# Patient Record
Sex: MALE | Race: WHITE | NOT HISPANIC OR LATINO | Employment: OTHER | URBAN - METROPOLITAN AREA
[De-identification: names, ages, dates, MRNs, and addresses within clinical notes are randomized per-mention and may not be internally consistent; named-entity substitution may affect disease eponyms.]

---

## 2018-05-04 RX ORDER — METFORMIN HYDROCHLORIDE 500 MG/1
500 TABLET ORAL
COMMUNITY
Start: 2017-11-01 | End: 2018-05-24 | Stop reason: SDUPTHER

## 2018-05-04 RX ORDER — LOSARTAN POTASSIUM AND HYDROCHLOROTHIAZIDE 12.5; 1 MG/1; MG/1
TABLET ORAL DAILY
COMMUNITY
Start: 2017-11-01 | End: 2019-03-21 | Stop reason: HOSPADM

## 2018-05-04 RX ORDER — FELODIPINE 2.5 MG/1
10 TABLET, EXTENDED RELEASE ORAL DAILY
COMMUNITY
Start: 2018-01-12 | End: 2019-12-24 | Stop reason: ALTCHOICE

## 2018-05-04 RX ORDER — METOPROLOL TARTRATE 25 MG/1
25 TABLET, FILM COATED ORAL DAILY
COMMUNITY
Start: 2017-11-01 | End: 2019-02-25 | Stop reason: ALTCHOICE

## 2018-05-04 RX ORDER — FINASTERIDE 5 MG/1
5 TABLET, FILM COATED ORAL DAILY
COMMUNITY
Start: 2017-11-01 | End: 2019-05-20 | Stop reason: SDUPTHER

## 2018-05-09 ENCOUNTER — OFFICE VISIT (OUTPATIENT)
Dept: FAMILY MEDICINE | Facility: CLINIC | Age: 68
End: 2018-05-09
Attending: FAMILY MEDICINE
Payer: MEDICARE

## 2018-05-09 VITALS
DIASTOLIC BLOOD PRESSURE: 84 MMHG | TEMPERATURE: 97.2 F | HEIGHT: 71 IN | BODY MASS INDEX: 33.71 KG/M2 | SYSTOLIC BLOOD PRESSURE: 142 MMHG | HEART RATE: 67 BPM | WEIGHT: 240.8 LBS | RESPIRATION RATE: 18 BRPM | OXYGEN SATURATION: 97 %

## 2018-05-09 DIAGNOSIS — E11.9 TYPE 2 DIABETES MELLITUS WITHOUT COMPLICATION, WITHOUT LONG-TERM CURRENT USE OF INSULIN (CMS/HCC): ICD-10-CM

## 2018-05-09 DIAGNOSIS — I10 HYPERTENSION, BENIGN: Primary | ICD-10-CM

## 2018-05-09 PROCEDURE — 83036 HEMOGLOBIN GLYCOSYLATED A1C: CPT | Performed by: FAMILY MEDICINE

## 2018-05-09 PROCEDURE — 99213 OFFICE O/P EST LOW 20 MIN: CPT | Performed by: FAMILY MEDICINE

## 2018-05-09 ASSESSMENT — ENCOUNTER SYMPTOMS
SORE THROAT: 0
VOMITING: 0
APPETITE CHANGE: 0
CHEST TIGHTNESS: 0
DIABETIC ASSOCIATED SYMPTOMS: 0
NAUSEA: 0
WHEEZING: 0
SHORTNESS OF BREATH: 0
PALPITATIONS: 0
FATIGUE: 0

## 2018-05-09 NOTE — PROGRESS NOTES
"Subjective      Patient ID: Aneesh Brito is a 68 y.o. male.    Patient for follow-up of blood sugar and blood pressure.  He has no new medical complaints.  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  He is tolerating medication well.  Readings have been good.  His last A1c was 6.5.      Diabetes   He has type 2 diabetes mellitus. His disease course has been fluctuating. There are no hypoglycemic associated symptoms. There are no diabetic associated symptoms. Pertinent negatives for diabetes include no chest pain and no fatigue. There are no hypoglycemic complications. Symptoms are stable. There are no diabetic complications. Risk factors for coronary artery disease include male sex and hypertension. Current diabetic treatment includes diet and oral agent (monotherapy). He is compliant with treatment all of the time. His weight is stable. There is no change in his home blood glucose trend.       The following have been reviewed and updated as appropriate in this visit:  Allergies  Meds  Problems       Review of Systems   Constitutional: Negative for appetite change and fatigue.   HENT: Negative for ear pain and sore throat.    Respiratory: Negative for chest tightness, shortness of breath and wheezing.    Cardiovascular: Negative for chest pain, palpitations and leg swelling.   Gastrointestinal: Negative for nausea and vomiting.   Skin: Negative for rash.       Objective     Vitals:    05/09/18 1031 05/09/18 1110   BP: (!) 188/100 (!) 142/84   BP Location: Left upper arm    Patient Position: Sitting    Pulse: 67    Resp: 18    Temp: 36.2 °C (97.2 °F)    TempSrc: Tympanic    SpO2: 97%    Weight: 109 kg (240 lb 12.8 oz)    Height: 1.81 m (5' 11.25\")      Body mass index is 33.35 kg/m².    Physical Exam   Constitutional: He appears well-developed and well-nourished.   HENT:   Head: Normocephalic and atraumatic.   Right Ear: Tympanic membrane and ear canal normal.   Left " Ear: Tympanic membrane and ear canal normal.   Nose: Nose normal.   Mouth/Throat: Oropharynx is clear and moist.   Eyes: Conjunctivae are normal.   Neck: Normal range of motion. Neck supple. No thyromegaly present.   Cardiovascular: Normal rate, regular rhythm and normal heart sounds.    No murmur heard.  Pulmonary/Chest: Effort normal and breath sounds normal. He has no wheezes. He has no rales.   Musculoskeletal: He exhibits no edema.   Vitals reviewed.      Assessment/Plan   Problem List Items Addressed This Visit     Hypertension, benign - Primary    Relevant Medications    felodipine (PLENDIL) 2.5 mg 24 hr tablet    losartan-hydrochlorothiazide (HYZAAR) 100-12.5 mg per tablet    metoprolol tartrate (LOPRESSOR) 25 mg tablet    Type 2 diabetes mellitus (CMS/HCC) ([facility])    Relevant Medications    metFORMIN (GLUCOPHAGE) 500 mg tablet    Other Relevant Orders    Hemoglobin A1c        Blood pressure.  Adequate control.  Patient will continue current medication will reevaluate in 3 months.  Diabetes.  Reasonable control.  Diet discussed at length.  Patient to continue current medication will reevaluate with A1c today.

## 2018-05-10 LAB
EST. AVERAGE GLUCOSE BLD GHB EST-MCNC: 154 MG/DL
HBA1C MFR BLD HPLC: 7 %

## 2018-05-24 RX ORDER — METFORMIN HYDROCHLORIDE 500 MG/1
500 TABLET ORAL 2 TIMES DAILY WITH MEALS
Qty: 60 TABLET | Refills: 5 | Status: SHIPPED | OUTPATIENT
Start: 2018-05-24 | End: 2018-11-16 | Stop reason: SDUPTHER

## 2018-08-14 ENCOUNTER — OFFICE VISIT (OUTPATIENT)
Dept: FAMILY MEDICINE | Facility: CLINIC | Age: 68
End: 2018-08-14
Payer: MEDICARE

## 2018-08-14 VITALS
SYSTOLIC BLOOD PRESSURE: 164 MMHG | OXYGEN SATURATION: 95 % | WEIGHT: 235.6 LBS | HEIGHT: 71 IN | DIASTOLIC BLOOD PRESSURE: 86 MMHG | RESPIRATION RATE: 16 BRPM | TEMPERATURE: 98.3 F | HEART RATE: 70 BPM | BODY MASS INDEX: 32.98 KG/M2

## 2018-08-14 DIAGNOSIS — I10 HYPERTENSION, BENIGN: Primary | ICD-10-CM

## 2018-08-14 DIAGNOSIS — N52.9 ERECTILE DYSFUNCTION, UNSPECIFIED ERECTILE DYSFUNCTION TYPE: ICD-10-CM

## 2018-08-14 DIAGNOSIS — L29.9 CHRONIC PRURITUS: ICD-10-CM

## 2018-08-14 DIAGNOSIS — F43.21 REACTION, ADJUSTMENT, WITH DEPRESSED MOOD, BRIEF: ICD-10-CM

## 2018-08-14 DIAGNOSIS — E11.9 TYPE 2 DIABETES MELLITUS WITHOUT COMPLICATION, WITHOUT LONG-TERM CURRENT USE OF INSULIN (CMS/HCC): ICD-10-CM

## 2018-08-14 LAB
EST. AVERAGE GLUCOSE BLD GHB EST-MCNC: 148 MG/DL
HBA1C MFR BLD HPLC: 6.8 %

## 2018-08-14 PROCEDURE — 83036 HEMOGLOBIN GLYCOSYLATED A1C: CPT | Performed by: FAMILY MEDICINE

## 2018-08-14 PROCEDURE — 99214 OFFICE O/P EST MOD 30 MIN: CPT | Performed by: FAMILY MEDICINE

## 2018-08-14 RX ORDER — SILDENAFIL 50 MG/1
50 TABLET, FILM COATED ORAL DAILY PRN
Qty: 10 TABLET | Refills: 5 | Status: SHIPPED | OUTPATIENT
Start: 2018-08-14 | End: 2018-11-16 | Stop reason: SDUPTHER

## 2018-08-14 ASSESSMENT — ENCOUNTER SYMPTOMS
SORE THROAT: 0
SHORTNESS OF BREATH: 0
APPETITE CHANGE: 0
NAUSEA: 0
CHEST TIGHTNESS: 0
DYSPHORIC MOOD: 0
FATIGUE: 0
ARTHRALGIAS: 0
AGITATION: 0
VOMITING: 0
NERVOUS/ANXIOUS: 0
PALPITATIONS: 0
CONFUSION: 0
WHEEZING: 0

## 2018-08-14 NOTE — PROGRESS NOTES
"Subjective      Patient ID: Aneesh Brito is a 68 y.o. male.    Patient for follow-up of blood pressure and blood sugar.  Patient has been retired.  He has been active however his wife seems to have a concern that his mood seems to be off.  Patient notes energy level good concentration level is good.  But at times he does seem less interested.  He admits that retiring is not as easy as he thought it would be.  It always been very busy he is entire life.  Unrelated patient complains of recent diffuse pruritus without rash.  Different parts of his skin seem to get itchy for no apparent reason.  Lastly patient would like refill of Viagra.  He stopped this is not generic.  He and his wife are planning a trip to [place] for a few weeks.        The following have been reviewed and updated as appropriate in this visit:       Review of Systems   Constitutional: Negative for appetite change and fatigue.   HENT: Negative for ear pain and sore throat.    Respiratory: Negative for chest tightness, shortness of breath and wheezing.    Cardiovascular: Negative for chest pain, palpitations and leg swelling.   Gastrointestinal: Negative for nausea and vomiting.   Musculoskeletal: Negative for arthralgias.   Skin: Negative for rash.   Psychiatric/Behavioral: Negative for agitation, behavioral problems, confusion, dysphoric mood and self-injury. The patient is not nervous/anxious.        Objective     Vitals:    08/14/18 0944 08/14/18 1020   BP: (!) 174/100 (!) 164/86   BP Location: Left upper arm    Patient Position: Sitting    Pulse: 70    Resp: 16    Temp: 36.8 °C (98.3 °F)    TempSrc: Tympanic    SpO2: 95%    Weight: 107 kg (235 lb 9.6 oz)    Height: 1.81 m (5' 11.25\")      Body mass index is 32.63 kg/m².    Physical Exam   Constitutional: He appears well-developed and well-nourished.   HENT:   Head: Normocephalic and atraumatic.   Right Ear: Tympanic membrane and ear canal normal.   Left Ear: Tympanic membrane and ear canal " normal.   Nose: Nose normal.   Mouth/Throat: Oropharynx is clear and moist.   Eyes: Conjunctivae are normal.   Neck: Normal range of motion. Neck supple. No thyromegaly present.   Cardiovascular: Normal rate, regular rhythm and normal heart sounds.    No murmur heard.  Pulmonary/Chest: Effort normal and breath sounds normal. He has no wheezes. He has no rales.   Musculoskeletal: He exhibits no edema.   Skin: Skin is warm and dry. No rash noted. No erythema.   Vitals reviewed.      Assessment/Plan   Problem List Items Addressed This Visit     Hypertension, benign - Primary    Type 2 diabetes mellitus (CMS/HCC) ([facility])    Relevant Orders    Hemoglobin A1c      Other Visit Diagnoses     Chronic pruritus        Reaction, adjustment, with depressed mood, brief        Erectile dysfunction, unspecified erectile dysfunction type        Relevant Medications    sildenafil (VIAGRA) 50 mg tablet        Blood pressure.  Fair control continue current medication plan to reevaluate.  Diabetes.  Encourage weight loss.  Will check A1c today.  Patient continue medication.  Chronic pruritus without rash.  Questionable etiology.  Will give simple trial of antihistamine at bedtime.  Adjustment reaction.  Long discussion.  Will refer patient to psychology for evaluation and treatment.

## 2018-08-15 ENCOUNTER — TELEPHONE (OUTPATIENT)
Dept: FAMILY MEDICINE | Facility: CLINIC | Age: 68
End: 2018-08-15

## 2018-08-15 NOTE — TELEPHONE ENCOUNTER
LM - returning call to review lab ( hgbAic6.8- + improved 0  Reviewed HgbAic 6.8 w pt- + improvement- pt states + wt loss- will cont./ applaud efforts/ cont diet/ exercise/ metformin 500 BID- lisa 3 mths

## 2018-09-18 ENCOUNTER — OFFICE VISIT (OUTPATIENT)
Dept: FAMILY MEDICINE | Facility: CLINIC | Age: 68
End: 2018-09-18
Payer: MEDICARE

## 2018-09-18 VITALS
HEART RATE: 73 BPM | HEIGHT: 71 IN | WEIGHT: 235 LBS | SYSTOLIC BLOOD PRESSURE: 142 MMHG | RESPIRATION RATE: 14 BRPM | BODY MASS INDEX: 32.9 KG/M2 | DIASTOLIC BLOOD PRESSURE: 86 MMHG | OXYGEN SATURATION: 96 % | TEMPERATURE: 98.1 F

## 2018-09-18 DIAGNOSIS — Z23 NEED FOR PROPHYLACTIC VACCINATION AND INOCULATION AGAINST INFLUENZA: ICD-10-CM

## 2018-09-18 DIAGNOSIS — I10 HYPERTENSION, BENIGN: Primary | ICD-10-CM

## 2018-09-18 PROCEDURE — G0008 ADMIN INFLUENZA VIRUS VAC: HCPCS | Performed by: FAMILY MEDICINE

## 2018-09-18 PROCEDURE — 99213 OFFICE O/P EST LOW 20 MIN: CPT | Mod: 25 | Performed by: FAMILY MEDICINE

## 2018-09-18 PROCEDURE — 90653 IIV ADJUVANT VACCINE IM: CPT | Performed by: FAMILY MEDICINE

## 2018-09-18 ASSESSMENT — ENCOUNTER SYMPTOMS
SHORTNESS OF BREATH: 0
VOMITING: 0
PALPITATIONS: 0
APPETITE CHANGE: 0
NAUSEA: 0
WHEEZING: 0
CHEST TIGHTNESS: 0
FATIGUE: 0
SORE THROAT: 0

## 2018-09-18 NOTE — PROGRESS NOTES
"Subjective      Patient ID: Aneesh Brito is a 68 y.o. male.  1950      Patient for follow-up blood pressure he has no new medical complaints.  Patient has been seeing counseling with good success.  Tolerating blood pressure medicine well.        The following have been reviewed and updated as appropriate in this visit:       Review of Systems   Constitutional: Negative for appetite change and fatigue.   HENT: Negative for ear pain and sore throat.    Respiratory: Negative for chest tightness, shortness of breath and wheezing.    Cardiovascular: Negative for chest pain, palpitations and leg swelling.   Gastrointestinal: Negative for nausea and vomiting.   Skin: Negative for rash.       Objective     Vitals:    09/18/18 1028   BP: (!) 142/86   BP Location: Left upper arm   Patient Position: Sitting   Pulse: 73   Resp: 14   Temp: 36.7 °C (98.1 °F)   TempSrc: Oral   SpO2: 96%   Weight: 107 kg (235 lb)   Height: 1.81 m (5' 11.25\")     Body mass index is 32.55 kg/m².    Physical Exam   Constitutional: He appears well-developed and well-nourished.   HENT:   Head: Normocephalic and atraumatic.   Right Ear: Tympanic membrane and ear canal normal.   Left Ear: Tympanic membrane and ear canal normal.   Nose: Nose normal.   Mouth/Throat: Oropharynx is clear and moist.   Eyes: Conjunctivae are normal.   Neck: Normal range of motion. Neck supple. No thyromegaly present.   Cardiovascular: Normal rate, regular rhythm and normal heart sounds.    No murmur heard.  Pulmonary/Chest: Effort normal and breath sounds normal. He has no wheezes. He has no rales.   Musculoskeletal: He exhibits no edema.   Vitals reviewed.      Assessment/Plan   Problem List Items Addressed This Visit     Hypertension, benign - Primary      Other Visit Diagnoses     Need for prophylactic vaccination and inoculation against influenza        Relevant Orders    Influenza vaccine 65 and older IM preservative free (FluAd)        Blood pressure.  Adequate " control.  Patient continue medication reevaluate in 2 months.

## 2018-11-16 ENCOUNTER — OFFICE VISIT (OUTPATIENT)
Dept: FAMILY MEDICINE | Facility: CLINIC | Age: 68
End: 2018-11-16
Payer: MEDICARE

## 2018-11-16 VITALS
OXYGEN SATURATION: 97 % | DIASTOLIC BLOOD PRESSURE: 78 MMHG | TEMPERATURE: 97.7 F | RESPIRATION RATE: 20 BRPM | SYSTOLIC BLOOD PRESSURE: 138 MMHG | HEART RATE: 60 BPM | BODY MASS INDEX: 32.62 KG/M2 | HEIGHT: 71 IN | WEIGHT: 233 LBS

## 2018-11-16 DIAGNOSIS — C43.9 MALIGNANT MELANOMA OF SKIN (CMS/HCC): ICD-10-CM

## 2018-11-16 DIAGNOSIS — M48.061 SPINAL STENOSIS OF LUMBAR REGION WITHOUT NEUROGENIC CLAUDICATION: ICD-10-CM

## 2018-11-16 DIAGNOSIS — N40.1 BENIGN PROSTATIC HYPERPLASIA WITH LOWER URINARY TRACT SYMPTOMS, SYMPTOM DETAILS UNSPECIFIED: ICD-10-CM

## 2018-11-16 DIAGNOSIS — E11.9 TYPE 2 DIABETES MELLITUS WITHOUT COMPLICATION, WITHOUT LONG-TERM CURRENT USE OF INSULIN (CMS/HCC): ICD-10-CM

## 2018-11-16 DIAGNOSIS — H40.9 GLAUCOMA OF BOTH EYES, UNSPECIFIED GLAUCOMA TYPE: ICD-10-CM

## 2018-11-16 DIAGNOSIS — M17.0 PRIMARY OSTEOARTHRITIS OF BOTH KNEES: ICD-10-CM

## 2018-11-16 DIAGNOSIS — Z11.59 NEED FOR HEPATITIS C SCREENING TEST: ICD-10-CM

## 2018-11-16 DIAGNOSIS — Z00.00 ENCOUNTER FOR GENERAL ADULT MEDICAL EXAMINATION WITHOUT ABNORMAL FINDINGS: ICD-10-CM

## 2018-11-16 DIAGNOSIS — I10 HYPERTENSION, BENIGN: Primary | ICD-10-CM

## 2018-11-16 DIAGNOSIS — K57.30 DIVERTICULOSIS OF COLON: ICD-10-CM

## 2018-11-16 LAB
ALBUMIN SERPL-MCNC: 3.9 G/DL (ref 3.4–5)
ALBUMIN/CREAT UR: 629.7 UG/MG
ALP SERPL-CCNC: 63 IU/L (ref 35–126)
ALT SERPL-CCNC: 34 IU/L (ref 16–63)
ANION GAP SERPL CALC-SCNC: 12 MEQ/L (ref 3–15)
AST SERPL-CCNC: 26 IU/L (ref 15–41)
BACTERIA URNS QL MICRO: ABNORMAL /HPF
BILIRUB SERPL-MCNC: 0.7 MG/DL (ref 0.3–1.2)
BILIRUB UR QL STRIP.AUTO: NEGATIVE MG/DL
BUN SERPL-MCNC: 18 MG/DL (ref 8–20)
CALCIUM SERPL-MCNC: 9.4 MG/DL (ref 8.9–10.3)
CHLORIDE SERPL-SCNC: 101 MEQ/L (ref 98–109)
CHOLEST SERPL-MCNC: 163 MG/DL
CLARITY UR REFRACT.AUTO: CLEAR
CO2 SERPL-SCNC: 26 MEQ/L (ref 22–32)
COLOR UR AUTO: YELLOW
CREAT SERPL-MCNC: 0.9 MG/DL (ref 0.8–1.3)
CREAT UR-MCNC: 197.6 MG/DL
ERYTHROCYTE [DISTWIDTH] IN BLOOD BY AUTOMATED COUNT: 13.3 % (ref 11.6–14.4)
EST. AVERAGE GLUCOSE BLD GHB EST-MCNC: 157 MG/DL
GFR SERPL CREATININE-BSD FRML MDRD: >60 ML/MIN/1.73M*2
GLUCOSE SERPL-MCNC: 157 MG/DL (ref 70–99)
GLUCOSE UR STRIP.AUTO-MCNC: NEGATIVE MG/DL
HBA1C MFR BLD HPLC: 7.1 %
HCT VFR BLDCO AUTO: 40.9 % (ref 40.1–51)
HDLC SERPL-MCNC: 30 MG/DL
HDLC SERPL: 5.4 {RATIO}
HGB BLD-MCNC: 14.3 G/DL (ref 13.7–17.5)
HGB UR QL STRIP.AUTO: NEGATIVE
HYALINE CASTS #/AREA URNS LPF: ABNORMAL /LPF
KETONES UR STRIP.AUTO-MCNC: NEGATIVE MG/DL
LDLC SERPL CALC-MCNC: 96 MG/DL
LEUKOCYTE ESTERASE UR QL STRIP.AUTO: NEGATIVE
MCH RBC QN AUTO: 32.3 PG (ref 28–33.2)
MCHC RBC AUTO-ENTMCNC: 35 G/DL (ref 32.2–36.5)
MCV RBC AUTO: 92.3 FL (ref 83–98)
MICROALBUMIN UR-MCNC: 1244.2 MG/L
NITRITE UR QL STRIP.AUTO: NEGATIVE
NONHDLC SERPL-MCNC: 133 MG/DL
PDW BLD AUTO: 11.2 FL (ref 9.4–12.4)
PH UR STRIP.AUTO: 6 [PH]
PLATELET # BLD AUTO: 254 K/UL (ref 150–350)
POTASSIUM SERPL-SCNC: 3.1 MEQ/L (ref 3.6–5.1)
PROT SERPL-MCNC: 6.4 G/DL (ref 6–8.2)
PROT UR QL STRIP.AUTO: 3
PSA SERPL-MCNC: 0.15 NG/ML
RBC # BLD AUTO: 4.43 M/UL (ref 4.5–5.8)
RBC #/AREA URNS HPF: ABNORMAL /HPF
SODIUM SERPL-SCNC: 139 MEQ/L (ref 136–144)
SP GR UR REFRACT.AUTO: 1.02
SQUAMOUS URNS QL MICRO: 2 /HPF
TRIGL SERPL-MCNC: 185 MG/DL (ref 30–149)
UROBILINOGEN UR STRIP-ACNC: 0.2 EU/DL
WBC # BLD AUTO: 7.14 K/UL (ref 3.8–10.5)
WBC #/AREA URNS HPF: ABNORMAL /HPF

## 2018-11-16 PROCEDURE — 85027 COMPLETE CBC AUTOMATED: CPT | Performed by: FAMILY MEDICINE

## 2018-11-16 PROCEDURE — 83036 HEMOGLOBIN GLYCOSYLATED A1C: CPT | Performed by: FAMILY MEDICINE

## 2018-11-16 PROCEDURE — 81001 URINALYSIS AUTO W/SCOPE: CPT | Performed by: FAMILY MEDICINE

## 2018-11-16 PROCEDURE — 80053 COMPREHEN METABOLIC PANEL: CPT | Performed by: FAMILY MEDICINE

## 2018-11-16 PROCEDURE — 84153 ASSAY OF PSA TOTAL: CPT | Performed by: FAMILY MEDICINE

## 2018-11-16 PROCEDURE — G0439 PPPS, SUBSEQ VISIT: HCPCS | Performed by: FAMILY MEDICINE

## 2018-11-16 PROCEDURE — 82570 ASSAY OF URINE CREATININE: CPT | Performed by: FAMILY MEDICINE

## 2018-11-16 PROCEDURE — 80061 LIPID PANEL: CPT | Performed by: FAMILY MEDICINE

## 2018-11-16 PROCEDURE — 86803 HEPATITIS C AB TEST: CPT | Performed by: FAMILY MEDICINE

## 2018-11-16 RX ORDER — LATANOPROSTENE BUNOD 0.24 MG/ML
1 SOLUTION/ DROPS OPHTHALMIC NIGHTLY
COMMUNITY
Start: 2018-10-17 | End: 2022-05-17 | Stop reason: ALTCHOICE

## 2018-11-16 RX ORDER — METFORMIN HYDROCHLORIDE 500 MG/1
TABLET ORAL 2 TIMES DAILY
COMMUNITY
Start: 2018-08-30 | End: 2019-07-12 | Stop reason: SDUPTHER

## 2018-11-16 RX ORDER — BRIMONIDINE TARTRATE 1.5 MG/ML
1 SOLUTION/ DROPS OPHTHALMIC 2 TIMES DAILY
COMMUNITY
Start: 2018-08-30 | End: 2022-05-17 | Stop reason: ALTCHOICE

## 2018-11-16 RX ORDER — SILDENAFIL 50 MG/1
TABLET, FILM COATED ORAL AS NEEDED
COMMUNITY
Start: 2018-09-21 | End: 2020-08-18 | Stop reason: SDUPTHER

## 2018-11-16 ASSESSMENT — ENCOUNTER SYMPTOMS
DYSURIA: 0
ARTHRALGIAS: 0
FATIGUE: 0
VOMITING: 0
WEAKNESS: 0
COUGH: 0
CONSTIPATION: 0
EYE DISCHARGE: 0
SINUS PAIN: 0
BLOOD IN STOOL: 0
DIARRHEA: 0
EYE PAIN: 0
ACTIVITY CHANGE: 0
DIZZINESS: 0
HEADACHES: 0
NUMBNESS: 0
JOINT SWELLING: 0
CHEST TIGHTNESS: 0
HEMATURIA: 0
NAUSEA: 0
FREQUENCY: 0
SHORTNESS OF BREATH: 0
PALPITATIONS: 0
APPETITE CHANGE: 0
ABDOMINAL PAIN: 0
SORE THROAT: 0
TREMORS: 0

## 2018-11-16 ASSESSMENT — MINI COG
COMPLETED: YES
TOTAL SCORE: 4

## 2018-11-16 ASSESSMENT — ACTIVITIES OF DAILY LIVING (ADL)
PATIENT'S MEMORY ADEQUATE TO SAFELY COMPLETE DAILY ACTIVITIES?: YES
ADEQUATE_TO_COMPLETE_ADL: YES

## 2018-11-16 NOTE — PATIENT INSTRUCTIONS
Patient continue current medication regimen.  3 meals a day no snacking.  Patient to continue regular exercise program.

## 2018-11-16 NOTE — PROGRESS NOTES
Subjective      Patient ID: Aneesh Brito is a 68 y.o. male.  1950      Patient for annual exam.  He has no new medical complaints today.  Patient is retired.  He is .  He and his wife spent time helping out with grandchildren.  He typically has 3 meals per day.  He has excellent exercise routine and goes to the gym 5 days a week.  He typically sleeps well at night.  His nocturia is markedly improved with medication for his prostate.  He is tolerating blood pressure medicine well readings have been good.  Tolerates medication for his lipids well.  He remains on medication for diabetes his last A1c was 6.8.   has a past medical history of Diverticulosis of colon; Enlarged prostate with lower urinary tract symptoms (LUTS); Hypertension; Melanoma of skin (CMS/HCC) ([facility]); Osteoarthritis of knee; Overweight; Spinal stenosis of lumbar region; and Type 2 diabetes mellitus (CMS/[facility]) ([facility]).   has a past surgical history that includes Appendectomy; Colonoscopy (01/10/2012); Hip Arthroplasty (Bilateral); and Knee Arthroplasty (Left).        The following have been reviewed and updated as appropriate in this visit:  Med Hx  Surg Hx  Fam Hx       Review of Systems   Constitutional: Negative for activity change, appetite change and fatigue.   HENT: Negative for congestion, hearing loss, postnasal drip, sinus pain and sore throat.    Eyes: Negative for pain and discharge.   Respiratory: Negative for cough, chest tightness and shortness of breath.    Cardiovascular: Negative for chest pain and palpitations.   Gastrointestinal: Negative for abdominal pain, blood in stool, constipation, diarrhea, nausea and vomiting.   Genitourinary: Negative for dysuria, frequency and hematuria.   Musculoskeletal: Negative for arthralgias and joint swelling.   Neurological: Negative for dizziness, tremors, weakness, numbness and headaches.       Objective     Vitals:    11/16/18 0906 11/16/18 0958   BP: (!) 150/80 138/78   BP  "Location: Left upper arm    Patient Position: Sitting    Pulse: 60    Resp: 20    Temp: 36.5 °C (97.7 °F)    TempSrc: Oral    SpO2: 97%    Weight: 106 kg (233 lb)    Height: 1.795 m (5' 10.67\")      Body mass index is 32.8 kg/m².    Physical Exam   Constitutional: He is oriented to person, place, and time. He appears well-developed and well-nourished.   HENT:   Head: Normocephalic and atraumatic.   Right Ear: Hearing, tympanic membrane, external ear and ear canal normal.   Left Ear: Hearing, tympanic membrane, external ear and ear canal normal.   Nose: Nose normal.   Mouth/Throat: Uvula is midline, oropharynx is clear and moist and mucous membranes are normal.   Eyes: Conjunctivae and EOM are normal. Pupils are equal, round, and reactive to light.   Neck: Normal range of motion. Neck supple. No thyromegaly present.   Cardiovascular: Normal rate, regular rhythm, normal heart sounds and intact distal pulses.    Pulmonary/Chest: Effort normal and breath sounds normal.   Abdominal: Soft. Bowel sounds are normal. He exhibits no mass. There is no tenderness.   Genitourinary: Rectum normal and penis normal. Rectal exam shows guaiac negative stool. Prostate is enlarged.   Musculoskeletal: Normal range of motion. He exhibits no edema.   Lymphadenopathy:     He has no cervical adenopathy.   Neurological: He is alert and oriented to person, place, and time. No cranial nerve deficit. He exhibits normal muscle tone.   Skin: Skin is warm and dry.   Psychiatric: He has a normal mood and affect.   Vitals reviewed.      Assessment/Plan   Problem List Items Addressed This Visit     Diverticulosis of colon    Enlarged prostate with lower urinary tract symptoms (LUTS)    Relevant Orders    PSA    Hypertension, benign - Primary    Relevant Orders    Comprehensive metabolic panel    Lipid panel    CBC    Malignant melanoma of skin (CMS/HCC) (HCC)    Osteoarthritis of knee    Spinal stenosis of lumbar region    Type 2 diabetes mellitus " (CMS/HCC) (HCC)    Relevant Medications    metFORMIN (GLUCOPHAGE) 500 mg tablet    Other Relevant Orders    Hemoglobin A1c    Urinalysis with microscopic    MICROALBUMIN, RANDOM URINE    UA Macroscopic    UA Microscopic      Other Visit Diagnoses     Encounter for general adult medical examination without abnormal findings        Glaucoma of both eyes, unspecified glaucoma type        Relevant Medications    brimonidine (ALPHAGAN) 0.15 % ophthalmic solution    VYZULTA 0.024 % drops    Need for hepatitis C screening test        Relevant Orders    Hepatitis C antibody        Annual exam.  Daily routine discussed at length.  Recommend 3 meals per day no snacking.  Recommend continue regular exercise program.  Patient to limit caffeine to 28 ounces per day limit alcohol to one drink per day.  Hypertension.  Adequate control.  Patient continue current medication reevaluate in 3 months.  Diabetes.  Patient continue current medication will evaluate lab work today.  Lipids currently diet controlled.  Will check lab work today.  History of malignant melanoma.  Patient followed by dermatology on a regular basis.  Osteoarthrosis.  Patient continue to exercise.  Noted joint replacements.  History of spinal stenosis.  Patient much improved with regular exercise program.  Glaucoma.  Patient follows by ophthalmology.  Screen for hepatitis C

## 2018-11-17 LAB — HCV AB SER QL: NONREACTIVE

## 2018-11-26 ENCOUNTER — CLINICAL SUPPORT (OUTPATIENT)
Dept: FAMILY MEDICINE | Facility: CLINIC | Age: 68
End: 2018-11-26
Payer: MEDICARE

## 2018-11-26 DIAGNOSIS — R80.9 PROTEINURIA, UNSPECIFIED TYPE: Primary | ICD-10-CM

## 2018-11-26 DIAGNOSIS — E87.6 LOW BLOOD POTASSIUM: Primary | ICD-10-CM

## 2018-11-26 LAB
BACTERIA URNS QL MICRO: ABNORMAL /HPF
BILIRUB UR QL STRIP.AUTO: NEGATIVE MG/DL
CLARITY UR REFRACT.AUTO: CLEAR
COLOR UR AUTO: YELLOW
GLUCOSE UR STRIP.AUTO-MCNC: NEGATIVE MG/DL
HGB UR QL STRIP.AUTO: NEGATIVE
HYALINE CASTS #/AREA URNS LPF: ABNORMAL /LPF
KETONES UR STRIP.AUTO-MCNC: NEGATIVE MG/DL
LEUKOCYTE ESTERASE UR QL STRIP.AUTO: NEGATIVE
NITRITE UR QL STRIP.AUTO: NEGATIVE
PH UR STRIP.AUTO: 6.5 [PH]
POTASSIUM SERPL-SCNC: 3.1 MEQ/L (ref 3.6–5.1)
PROT UR QL STRIP.AUTO: 2
RBC #/AREA URNS HPF: ABNORMAL /HPF
SP GR UR REFRACT.AUTO: 1.01
SQUAMOUS URNS QL MICRO: 1 /HPF
UROBILINOGEN UR STRIP-ACNC: 0.2 EU/DL
WBC #/AREA URNS HPF: ABNORMAL /HPF

## 2018-11-26 PROCEDURE — 81001 URINALYSIS AUTO W/SCOPE: CPT | Performed by: FAMILY MEDICINE

## 2018-11-26 PROCEDURE — 84132 ASSAY OF SERUM POTASSIUM: CPT | Performed by: FAMILY MEDICINE

## 2018-11-26 PROCEDURE — 36415 COLL VENOUS BLD VENIPUNCTURE: CPT | Performed by: FAMILY MEDICINE

## 2018-11-26 NOTE — PROGRESS NOTES
Pt came into office stating per Dr. Palacios he was to repeat a urine sample from recent blood work. After pt left I looked into lab notes and pt was suppose to have potassium repeated as well. Will call pt to schedule a repeat blood draw on potassium.

## 2018-11-27 ENCOUNTER — TELEPHONE (OUTPATIENT)
Dept: FAMILY MEDICINE | Facility: CLINIC | Age: 68
End: 2018-11-27

## 2018-11-27 NOTE — TELEPHONE ENCOUNTER
Pt had lisa U/A, K+/ no changein K=- 3.1 ( pt on Hyzaar/ no K=supplement/ U/A- still + 2 protein- will review w Dr. Palacios

## 2018-11-28 ENCOUNTER — TELEPHONE (OUTPATIENT)
Dept: FAMILY MEDICINE | Facility: CLINIC | Age: 68
End: 2018-11-28

## 2018-11-28 RX ORDER — POTASSIUM CHLORIDE 750 MG/1
10 TABLET, EXTENDED RELEASE ORAL 2 TIMES DAILY
Qty: 30 TABLET | Refills: 3 | Status: SHIPPED | OUTPATIENT
Start: 2018-11-28 | End: 2019-01-18 | Stop reason: SDUPTHER

## 2018-11-28 NOTE — TELEPHONE ENCOUNTER
Pt calling back- had a question about his lab results that he forgot to ask when you spoke earlier this morning. Okay to call pt back at

## 2018-11-28 NOTE — TELEPHONE ENCOUNTER
Reviewed labs w pt/ repeat K + - still low- pt on losartan-HCTZ- eats banana q day- as per - will add KCL 10 meq qd-will lisa 2 mths- lisa U/A- + protein- will check 24 hr urine- pt to p/u

## 2018-11-29 NOTE — TELEPHONE ENCOUNTER
Tried to return call/ no answer/ no VM/  Pt in office- reviewed 24 hr urine collection- start p first voided am specimen/ include 2nd day first voided specimen/ reviewed K=-3.1- will start supplement-  Malaika 1 mth

## 2018-12-03 DIAGNOSIS — R80.9 PROTEINURIA, UNSPECIFIED TYPE: Primary | ICD-10-CM

## 2018-12-05 ENCOUNTER — TELEPHONE (OUTPATIENT)
Dept: FAMILY MEDICINE | Facility: CLINIC | Age: 68
End: 2018-12-05

## 2018-12-05 DIAGNOSIS — E11.21 DIABETIC NEPHROPATHY WITH PROTEINURIA (CMS/HCC): Primary | ICD-10-CM

## 2018-12-05 LAB — PROT 24H UR-MRATE: 1368 MG/24 H

## 2019-01-16 ENCOUNTER — TRANSCRIBE ORDERS (OUTPATIENT)
Dept: SCHEDULING | Age: 69
End: 2019-01-16

## 2019-01-16 DIAGNOSIS — I10 ESSENTIAL (PRIMARY) HYPERTENSION: ICD-10-CM

## 2019-01-16 DIAGNOSIS — R80.9 PROTEINURIA: ICD-10-CM

## 2019-01-16 DIAGNOSIS — E11.9 TYPE 2 DIABETES MELLITUS WITHOUT COMPLICATIONS (CMS/HCC): ICD-10-CM

## 2019-01-16 DIAGNOSIS — C43.9 MALIGNANT MELANOMA OF SKIN (CMS/HCC): ICD-10-CM

## 2019-01-16 DIAGNOSIS — N18.1 CHRONIC KIDNEY DISEASE, STAGE I: Primary | ICD-10-CM

## 2019-01-16 DIAGNOSIS — N40.0 ENLARGED PROSTATE WITHOUT LOWER URINARY TRACT SYMPTOMS (LUTS): ICD-10-CM

## 2019-01-18 RX ORDER — POTASSIUM CHLORIDE 750 MG/1
TABLET, EXTENDED RELEASE ORAL
Qty: 60 TABLET | Refills: 0 | Status: SHIPPED | OUTPATIENT
Start: 2019-01-18 | End: 2019-02-26 | Stop reason: SDUPTHER

## 2019-01-22 ENCOUNTER — TRANSCRIBE ORDERS (OUTPATIENT)
Dept: LAB | Facility: HOSPITAL | Age: 69
End: 2019-01-22

## 2019-01-22 ENCOUNTER — APPOINTMENT (OUTPATIENT)
Dept: LAB | Facility: HOSPITAL | Age: 69
End: 2019-01-22
Attending: SPECIALIST
Payer: MEDICARE

## 2019-01-22 DIAGNOSIS — N40.0 ENLARGED PROSTATE WITHOUT LOWER URINARY TRACT SYMPTOMS (LUTS): ICD-10-CM

## 2019-01-22 DIAGNOSIS — N18.1 CHRONIC KIDNEY DISEASE, STAGE I: Primary | ICD-10-CM

## 2019-01-22 DIAGNOSIS — R80.9 PROTEINURIA: ICD-10-CM

## 2019-01-22 DIAGNOSIS — M19.90 OSTEOARTHRITIS: ICD-10-CM

## 2019-01-22 DIAGNOSIS — I10 ESSENTIAL (PRIMARY) HYPERTENSION: ICD-10-CM

## 2019-01-22 DIAGNOSIS — N18.1 CHRONIC KIDNEY DISEASE, STAGE I: ICD-10-CM

## 2019-01-22 DIAGNOSIS — C43.9 MALIGNANT MELANOMA OF SKIN (CMS/HCC): ICD-10-CM

## 2019-01-22 DIAGNOSIS — E11.9 TYPE 2 DIABETES MELLITUS WITHOUT COMPLICATIONS (CMS/HCC): ICD-10-CM

## 2019-01-22 LAB
ALBUMIN SERPL-MCNC: 4 G/DL (ref 3.4–5)
ALP SERPL-CCNC: 65 IU/L (ref 35–126)
ALT SERPL-CCNC: 39 IU/L (ref 16–63)
ANION GAP SERPL CALC-SCNC: 10 MEQ/L (ref 3–15)
AST SERPL-CCNC: 29 IU/L (ref 15–41)
BILIRUB SERPL-MCNC: 0.8 MG/DL (ref 0.3–1.2)
BUN SERPL-MCNC: 13 MG/DL (ref 8–20)
CALCIUM SERPL-MCNC: 9.1 MG/DL (ref 8.9–10.3)
CHLORIDE SERPL-SCNC: 98 MEQ/L (ref 98–109)
CO2 SERPL-SCNC: 27 MEQ/L (ref 22–32)
CREAT SERPL-MCNC: 0.8 MG/DL
GFR SERPL CREATININE-BSD FRML MDRD: >60 ML/MIN/1.73M*2
GLUCOSE SERPL-MCNC: 145 MG/DL (ref 70–99)
POTASSIUM SERPL-SCNC: 3.4 MEQ/L (ref 3.6–5.1)
PROT SERPL-MCNC: 7.1 G/DL (ref 6–8.2)
SODIUM SERPL-SCNC: 135 MEQ/L (ref 136–144)

## 2019-01-22 PROCEDURE — 80053 COMPREHEN METABOLIC PANEL: CPT

## 2019-01-22 PROCEDURE — 36415 COLL VENOUS BLD VENIPUNCTURE: CPT

## 2019-01-22 PROCEDURE — 82088 ASSAY OF ALDOSTERONE: CPT

## 2019-01-25 ENCOUNTER — HOSPITAL ENCOUNTER (OUTPATIENT)
Dept: RADIOLOGY | Age: 69
Discharge: HOME | End: 2019-01-25
Attending: SPECIALIST
Payer: MEDICARE

## 2019-01-25 ENCOUNTER — TRANSCRIBE ORDERS (OUTPATIENT)
Dept: LAB | Facility: HOSPITAL | Age: 69
End: 2019-01-25

## 2019-01-25 ENCOUNTER — APPOINTMENT (OUTPATIENT)
Dept: LAB | Facility: HOSPITAL | Age: 69
End: 2019-01-25
Attending: SPECIALIST
Payer: MEDICARE

## 2019-01-25 DIAGNOSIS — N18.1 CHRONIC KIDNEY DISEASE, STAGE I: ICD-10-CM

## 2019-01-25 DIAGNOSIS — N40.0 ENLARGED PROSTATE WITHOUT LOWER URINARY TRACT SYMPTOMS (LUTS): ICD-10-CM

## 2019-01-25 DIAGNOSIS — E11.9 TYPE 2 DIABETES MELLITUS WITHOUT COMPLICATIONS (CMS/HCC): ICD-10-CM

## 2019-01-25 DIAGNOSIS — M19.90 OSTEOARTHRITIS: ICD-10-CM

## 2019-01-25 DIAGNOSIS — I10 ESSENTIAL (PRIMARY) HYPERTENSION: ICD-10-CM

## 2019-01-25 DIAGNOSIS — R80.9 PROTEINURIA: ICD-10-CM

## 2019-01-25 DIAGNOSIS — N18.1 CHRONIC KIDNEY DISEASE, STAGE I: Primary | ICD-10-CM

## 2019-01-25 DIAGNOSIS — C43.9 MALIGNANT MELANOMA OF SKIN (CMS/HCC): ICD-10-CM

## 2019-01-25 LAB
COLLECT DURATION TIME UR: 24 H
COLLECT DURATION TIME UR: 24 H
CREAT 24H UR-MRATE: 1.29 G/24 H (ref 0.8–2)
CREAT CL/1.73 SQ M 24H UR+SERPL-ARVRAT: 99 ML/MIN (ref 85–125)
CREAT SERPL-MCNC: 0.9 MG/DL
GFR SERPL CREATININE-BSD FRML MDRD: >60 ML/MIN/1.73M*2
PROT 24H UR-MRATE: 1.18 G/24 H (ref 0–0.15)
SPECIMEN VOL 24H UR: 2800 ML
SPECIMEN VOL 24H UR: 2800 ML

## 2019-01-25 PROCEDURE — 82088 ASSAY OF ALDOSTERONE: CPT

## 2019-01-25 PROCEDURE — 84156 ASSAY OF PROTEIN URINE: CPT

## 2019-01-25 PROCEDURE — 82565 ASSAY OF CREATININE: CPT | Mod: 59

## 2019-01-25 PROCEDURE — 82575 CREATININE CLEARANCE TEST: CPT

## 2019-01-25 PROCEDURE — 36415 COLL VENOUS BLD VENIPUNCTURE: CPT

## 2019-01-25 RX ORDER — GADOBUTROL 604.72 MG/ML
10.5 INJECTION INTRAVENOUS ONCE
Status: COMPLETED | OUTPATIENT
Start: 2019-01-25 | End: 2019-01-25

## 2019-01-25 RX ADMIN — GADOBUTROL 10.5 ML: 604.72 INJECTION INTRAVENOUS at 09:45

## 2019-01-26 LAB — ALDOST SERPL-MCNC: 10 NG/DL

## 2019-01-29 ENCOUNTER — HOSPITAL ENCOUNTER (OUTPATIENT)
Dept: RADIOLOGY | Age: 69
Discharge: HOME | End: 2019-01-29
Attending: SPECIALIST
Payer: MEDICARE

## 2019-01-29 DIAGNOSIS — C43.9 MALIGNANT MELANOMA OF SKIN (CMS/HCC): ICD-10-CM

## 2019-01-29 DIAGNOSIS — R80.9 PROTEINURIA: ICD-10-CM

## 2019-01-29 DIAGNOSIS — E11.9 TYPE 2 DIABETES MELLITUS WITHOUT COMPLICATIONS (CMS/HCC): ICD-10-CM

## 2019-01-29 DIAGNOSIS — N18.1 CHRONIC KIDNEY DISEASE, STAGE I: ICD-10-CM

## 2019-01-29 DIAGNOSIS — I10 ESSENTIAL (PRIMARY) HYPERTENSION: ICD-10-CM

## 2019-01-29 DIAGNOSIS — N40.0 ENLARGED PROSTATE WITHOUT LOWER URINARY TRACT SYMPTOMS (LUTS): ICD-10-CM

## 2019-01-29 RX ORDER — GADOBUTROL 604.72 MG/ML
10 INJECTION INTRAVENOUS
Status: COMPLETED | OUTPATIENT
Start: 2019-01-29 | End: 2019-01-29

## 2019-01-29 RX ADMIN — GADOBUTROL 10 MMOL: 604.72 INJECTION INTRAVENOUS at 09:58

## 2019-01-30 LAB
ALDOST 24H UR-MRATE: 8 MCG/24 H
CREAT 24H UR-MRATE: 1.23 G/24 H (ref 0.5–2.15)
SPECIMEN VOL 24H UR: 2800 ML

## 2019-02-12 RX ORDER — AMLODIPINE BESYLATE 10 MG/1
TABLET ORAL
COMMUNITY
End: 2019-02-25 | Stop reason: ALTCHOICE

## 2019-02-12 RX ORDER — OLMESARTAN MEDOXOMIL 5 MG/1
TABLET ORAL
COMMUNITY
End: 2019-02-25 | Stop reason: ALTCHOICE

## 2019-02-12 RX ORDER — TIMOLOL MALEATE 6.8 MG/ML
SOLUTION/ DROPS OPHTHALMIC
COMMUNITY
End: 2021-04-28 | Stop reason: ALTCHOICE

## 2019-02-25 ENCOUNTER — APPOINTMENT (OUTPATIENT)
Dept: LAB | Facility: HOSPITAL | Age: 69
End: 2019-02-25
Attending: SPECIALIST
Payer: MEDICARE

## 2019-02-25 ENCOUNTER — OFFICE VISIT (OUTPATIENT)
Dept: FAMILY MEDICINE | Facility: CLINIC | Age: 69
End: 2019-02-25
Payer: MEDICARE

## 2019-02-25 ENCOUNTER — TRANSCRIBE ORDERS (OUTPATIENT)
Dept: LAB | Facility: HOSPITAL | Age: 69
End: 2019-02-25

## 2019-02-25 VITALS
DIASTOLIC BLOOD PRESSURE: 106 MMHG | WEIGHT: 236.2 LBS | HEART RATE: 65 BPM | RESPIRATION RATE: 12 BRPM | BODY MASS INDEX: 33.07 KG/M2 | HEIGHT: 71 IN | OXYGEN SATURATION: 99 % | TEMPERATURE: 98.3 F | SYSTOLIC BLOOD PRESSURE: 184 MMHG

## 2019-02-25 DIAGNOSIS — N40.0 ENLARGED PROSTATE WITHOUT LOWER URINARY TRACT SYMPTOMS (LUTS): ICD-10-CM

## 2019-02-25 DIAGNOSIS — M19.90 OSTEOARTHRITIS: ICD-10-CM

## 2019-02-25 DIAGNOSIS — N18.1 CHRONIC KIDNEY DISEASE, STAGE I: Primary | ICD-10-CM

## 2019-02-25 DIAGNOSIS — E11.9 TYPE 2 DIABETES MELLITUS WITHOUT COMPLICATION, WITHOUT LONG-TERM CURRENT USE OF INSULIN (CMS/HCC): ICD-10-CM

## 2019-02-25 DIAGNOSIS — R80.9 PROTEINURIA: ICD-10-CM

## 2019-02-25 DIAGNOSIS — I10 ESSENTIAL (PRIMARY) HYPERTENSION: ICD-10-CM

## 2019-02-25 DIAGNOSIS — E11.9 TYPE 2 DIABETES MELLITUS WITHOUT COMPLICATIONS (CMS/HCC): ICD-10-CM

## 2019-02-25 DIAGNOSIS — C43.9 MALIGNANT MELANOMA OF SKIN (CMS/HCC): ICD-10-CM

## 2019-02-25 DIAGNOSIS — I72.3 ANEURYSM OF ILIAC ARTERY (CMS/HCC): ICD-10-CM

## 2019-02-25 DIAGNOSIS — I10 HYPERTENSION, BENIGN: Primary | ICD-10-CM

## 2019-02-25 DIAGNOSIS — N18.1 CHRONIC KIDNEY DISEASE, STAGE I: ICD-10-CM

## 2019-02-25 LAB
ALBUMIN SERPL-MCNC: 4.1 G/DL (ref 3.4–5)
ALBUMIN SERPL-MCNC: 4.2 G/DL (ref 3.4–5)
ALP SERPL-CCNC: 59 IU/L (ref 35–126)
ALT SERPL-CCNC: 42 IU/L (ref 16–63)
ANION GAP SERPL CALC-SCNC: 11 MEQ/L (ref 3–15)
ANION GAP SERPL CALC-SCNC: 11 MEQ/L (ref 3–15)
ANION GAP SERPL CALC-SCNC: 9 MEQ/L (ref 3–15)
AST SERPL-CCNC: 30 IU/L (ref 15–41)
BILIRUB SERPL-MCNC: 0.8 MG/DL (ref 0.3–1.2)
BUN SERPL-MCNC: 15 MG/DL (ref 8–20)
BUN SERPL-MCNC: 15 MG/DL (ref 8–20)
BUN SERPL-MCNC: 16 MG/DL (ref 8–20)
CALCIUM SERPL-MCNC: 9.3 MG/DL (ref 8.9–10.3)
CALCIUM SERPL-MCNC: 9.4 MG/DL (ref 8.9–10.3)
CALCIUM SERPL-MCNC: 9.6 MG/DL (ref 8.9–10.3)
CHLORIDE SERPL-SCNC: 97 MEQ/L (ref 98–109)
CHLORIDE SERPL-SCNC: 98 MEQ/L (ref 98–109)
CHLORIDE SERPL-SCNC: 99 MEQ/L (ref 98–109)
CO2 SERPL-SCNC: 27 MEQ/L (ref 22–32)
CO2 SERPL-SCNC: 28 MEQ/L (ref 22–32)
CO2 SERPL-SCNC: 29 MEQ/L (ref 22–32)
CREAT SERPL-MCNC: 0.8 MG/DL
CREAT SERPL-MCNC: 0.8 MG/DL
CREAT SERPL-MCNC: 0.9 MG/DL
EST. AVERAGE GLUCOSE BLD GHB EST-MCNC: 140 MG/DL
GFR SERPL CREATININE-BSD FRML MDRD: >60 ML/MIN/1.73M*2
GLUCOSE SERPL-MCNC: 116 MG/DL (ref 70–99)
GLUCOSE SERPL-MCNC: 121 MG/DL (ref 70–99)
GLUCOSE SERPL-MCNC: 208 MG/DL (ref 70–99)
HBA1C MFR BLD HPLC: 6.5 %
PHOSPHATE SERPL-MCNC: 3.2 MG/DL (ref 2.4–4.7)
POTASSIUM SERPL-SCNC: 3.2 MEQ/L (ref 3.6–5.1)
POTASSIUM SERPL-SCNC: 3.3 MEQ/L (ref 3.6–5.1)
POTASSIUM SERPL-SCNC: 3.4 MEQ/L (ref 3.6–5.1)
PROT SERPL-MCNC: 6.9 G/DL (ref 6–8.2)
SODIUM SERPL-SCNC: 136 MEQ/L (ref 136–144)
SODIUM SERPL-SCNC: 136 MEQ/L (ref 136–144)
SODIUM SERPL-SCNC: 137 MEQ/L (ref 136–144)

## 2019-02-25 PROCEDURE — 86039 ANTINUCLEAR ANTIBODIES (ANA): CPT

## 2019-02-25 PROCEDURE — 83516 IMMUNOASSAY NONANTIBODY: CPT

## 2019-02-25 PROCEDURE — 86160 COMPLEMENT ANTIGEN: CPT

## 2019-02-25 PROCEDURE — 86160 COMPLEMENT ANTIGEN: CPT | Mod: 59

## 2019-02-25 PROCEDURE — 86235 NUCLEAR ANTIGEN ANTIBODY: CPT | Mod: 59

## 2019-02-25 PROCEDURE — 83516 IMMUNOASSAY NONANTIBODY: CPT | Mod: 59

## 2019-02-25 PROCEDURE — 99213 OFFICE O/P EST LOW 20 MIN: CPT | Performed by: FAMILY MEDICINE

## 2019-02-25 PROCEDURE — 80069 RENAL FUNCTION PANEL: CPT

## 2019-02-25 PROCEDURE — 80048 BASIC METABOLIC PNL TOTAL CA: CPT | Performed by: FAMILY MEDICINE

## 2019-02-25 PROCEDURE — 86038 ANTINUCLEAR ANTIBODIES: CPT

## 2019-02-25 PROCEDURE — 83036 HEMOGLOBIN GLYCOSYLATED A1C: CPT | Performed by: FAMILY MEDICINE

## 2019-02-25 RX ORDER — METOPROLOL SUCCINATE 50 MG/1
50 TABLET, EXTENDED RELEASE ORAL DAILY
Qty: 90 TABLET | Refills: 1 | Status: ON HOLD | OUTPATIENT
Start: 2019-02-25 | End: 2019-03-20

## 2019-02-25 ASSESSMENT — ENCOUNTER SYMPTOMS
CHEST TIGHTNESS: 0
SHORTNESS OF BREATH: 0
APPETITE CHANGE: 0
VOMITING: 0
WHEEZING: 0
PALPITATIONS: 0
NAUSEA: 0
FATIGUE: 0
SORE THROAT: 0

## 2019-02-25 NOTE — PROGRESS NOTES
"Subjective      Patient ID: Aneesh Brito is a 68 y.o. male.  1950      Patient for follow-up of blood pressure.  He has no new complaints.  Patient does note of the past few weeks has been noticing a higher blood pressure at home.  In the 160/100 range.  He is otherwise asymptomatic.  He is exercising on a regular basis and feeling well doing this.  He has been being seen by nephrology for renal insufficiency.        The following have been reviewed and updated as appropriate in this visit:       Review of Systems   Constitutional: Negative for appetite change and fatigue.   HENT: Negative for ear pain and sore throat.    Respiratory: Negative for chest tightness, shortness of breath and wheezing.    Cardiovascular: Negative for chest pain, palpitations and leg swelling.   Gastrointestinal: Negative for nausea and vomiting.   Skin: Negative for rash.       Objective     Vitals:    02/25/19 1050   BP: (!) 184/106   BP Location: Left upper arm   Patient Position: Sitting   Pulse: 65   Resp: 12   Temp: 36.8 °C (98.3 °F)   TempSrc: Tympanic   SpO2: 99%   Weight: 107 kg (236 lb 3.2 oz)   Height: 1.795 m (5' 10.67\")     Body mass index is 33.25 kg/m².    Physical Exam   Constitutional: He appears well-developed and well-nourished.   HENT:   Head: Normocephalic and atraumatic.   Right Ear: Tympanic membrane and ear canal normal.   Left Ear: Tympanic membrane and ear canal normal.   Nose: Nose normal.   Mouth/Throat: Oropharynx is clear and moist.   Eyes: Conjunctivae are normal.   Neck: Normal range of motion. Neck supple. No thyromegaly present.   Cardiovascular: Normal rate, regular rhythm and normal heart sounds.    No murmur heard.  Pulmonary/Chest: Effort normal and breath sounds normal. He has no wheezes. He has no rales.   Musculoskeletal: He exhibits no edema.   Vitals reviewed.      Assessment/Plan   Diagnoses and all orders for this visit:    Hypertension, benign (Primary)  -     metoprolol succinate XL " (TOPROL XL) 50 mg 24 hr tablet; Take 1 tablet (50 mg total) by mouth daily.    Type 2 diabetes mellitus without complication, without long-term current use of insulin (CMS/[facility])  -     Hemoglobin A1c  -     Basic metabolic panel    Blood pressure.  Inadequate control.  Patient is limited by side effects he has had in the past.  We will initially increase his metoprolol tartrate continue current medication and plan to reevaluate at short interval.  Diabetes will check A1c today.  Also will check patient's potassium level.

## 2019-02-26 ENCOUNTER — TELEPHONE (OUTPATIENT)
Dept: FAMILY MEDICINE | Facility: CLINIC | Age: 69
End: 2019-02-26

## 2019-02-26 DIAGNOSIS — I10 HYPERTENSION, BENIGN: ICD-10-CM

## 2019-02-26 DIAGNOSIS — E87.6 HYPOKALEMIA: ICD-10-CM

## 2019-02-26 DIAGNOSIS — E87.6 HYPOKALEMIA: Primary | ICD-10-CM

## 2019-02-26 LAB
C3 SERPL-MCNC: 122 MG/DL (ref 80–200)
C4 SERPL-MCNC: 27 MG/DL (ref 16–45)

## 2019-02-26 RX ORDER — POTASSIUM CHLORIDE 750 MG/1
10 TABLET, EXTENDED RELEASE ORAL DAILY
Qty: 60 TABLET | Refills: 6 | Status: SHIPPED | OUTPATIENT
Start: 2019-02-26 | End: 2019-02-26 | Stop reason: SDUPTHER

## 2019-02-26 RX ORDER — POTASSIUM CHLORIDE 750 MG/1
10 TABLET, EXTENDED RELEASE ORAL DAILY
Qty: 30 TABLET | Refills: 6 | Status: ON HOLD | OUTPATIENT
Start: 2019-02-26 | End: 2019-03-21

## 2019-02-26 NOTE — TELEPHONE ENCOUNTER
Pharmacy calleed- rx  Potassium sent today-# 60 1 bid- pt was taking 1 BID- ( lisa K+ 3.4 - reviewed w dr Palacios - pt has not been taking K+ supplement- pt instructed to resume Rx K+ 1 qd-

## 2019-02-27 ENCOUNTER — APPOINTMENT (OUTPATIENT)
Dept: LAB | Facility: HOSPITAL | Age: 69
End: 2019-02-27
Attending: SPECIALIST
Payer: MEDICARE

## 2019-02-27 DIAGNOSIS — R80.9 PROTEINURIA: ICD-10-CM

## 2019-02-27 DIAGNOSIS — N18.1 CHRONIC KIDNEY DISEASE, STAGE I: ICD-10-CM

## 2019-02-27 DIAGNOSIS — E11.9 TYPE 2 DIABETES MELLITUS WITHOUT COMPLICATIONS (CMS/HCC): ICD-10-CM

## 2019-02-27 DIAGNOSIS — I72.3 ANEURYSM OF ILIAC ARTERY (CMS/HCC): ICD-10-CM

## 2019-02-27 DIAGNOSIS — M19.90 OSTEOARTHRITIS: ICD-10-CM

## 2019-02-27 DIAGNOSIS — N40.0 ENLARGED PROSTATE WITHOUT LOWER URINARY TRACT SYMPTOMS (LUTS): ICD-10-CM

## 2019-02-27 DIAGNOSIS — I10 ESSENTIAL (PRIMARY) HYPERTENSION: ICD-10-CM

## 2019-02-27 DIAGNOSIS — C43.9 MALIGNANT MELANOMA OF SKIN (CMS/HCC): ICD-10-CM

## 2019-02-27 LAB
MYELOPEROXIDASE AB SER IA-ACNC: <0.3 U/ML
PROTEINASE3 AB SER IA-ACNC: <0.7 U/ML

## 2019-02-27 PROCEDURE — 84166 PROTEIN E-PHORESIS/URINE/CSF: CPT

## 2019-02-28 LAB
ALBUMIN ?TM UR ELPH-MCNC: 49.47 MG/DL
ALBUMIN MFR UR ELPH: 72.8 %
ALPHA1 GLOB MFR UR ELPH: 4.6 %
ALPHA1 GLOB UR ELPH-MCNC: 3.15 MG/DL
ALPHA2 GLOB MFR UR ELPH: 4.3 %
ALPHA2 GLOB UR ELPH-MCNC: 2.94 MG/DL
ANA SER QL IA: ABNORMAL
B-GLOBULIN MFR UR ELPH: 8.1 %
B-GLOBULIN UR ELPH-MCNC: 5.5 MG/DL
CENTROMERE B AB SER-ACNC: 8 AU/ML
DSDNA IGG SER-ACNC: 10 IU/ML
ENA JO1 AB SER-ACNC: 16 AU/ML
ENA RNP AB SER-ACNC: 105 AU/ML
ENA SCL70 AB SER-ACNC: 10 AU/ML
ENA SM AB SER-ACNC: 12 AU/ML
ENA SS-A IGG SER-ACNC: 24 AU/ML
ENA SS-B IGG SER-ACNC: 16 AU/ML
GAMMA GLOB MFR UR ELPH: 10.2 %
GAMMA GLOB UR ELPH-MCNC: 6.94 MG/DL
HISTONE IGG SER-ACNC: 11 AU/ML
PROT PATTERN UR ELPH-IMP: NORMAL
PROT UR-MCNC: 68 MG/DL

## 2019-03-06 LAB
ANA PAT SER IF-IMP: NORMAL
ANA TITR SER HEP2 SUBST: NEGATIVE {TITER}

## 2019-03-19 ENCOUNTER — TRANSCRIBE ORDERS (OUTPATIENT)
Dept: LAB | Facility: HOSPITAL | Age: 69
End: 2019-03-19

## 2019-03-19 ENCOUNTER — APPOINTMENT (OUTPATIENT)
Dept: LAB | Facility: HOSPITAL | Age: 69
End: 2019-03-19
Attending: SPECIALIST
Payer: MEDICARE

## 2019-03-19 DIAGNOSIS — M19.90 OSTEOARTHRITIS: ICD-10-CM

## 2019-03-19 DIAGNOSIS — E11.9 TYPE 2 DIABETES MELLITUS WITHOUT COMPLICATIONS (CMS/HCC): ICD-10-CM

## 2019-03-19 DIAGNOSIS — K76.0 FATTY (CHANGE OF) LIVER, NOT ELSEWHERE CLASSIFIED: ICD-10-CM

## 2019-03-19 DIAGNOSIS — C43.9 MALIGNANT MELANOMA OF SKIN (CMS/HCC): ICD-10-CM

## 2019-03-19 DIAGNOSIS — I10 ESSENTIAL (PRIMARY) HYPERTENSION: ICD-10-CM

## 2019-03-19 DIAGNOSIS — I72.3 ANEURYSM OF ILIAC ARTERY (CMS/HCC): ICD-10-CM

## 2019-03-19 DIAGNOSIS — N18.1 CHRONIC KIDNEY DISEASE, STAGE I: Primary | ICD-10-CM

## 2019-03-19 DIAGNOSIS — N18.1 CHRONIC KIDNEY DISEASE, STAGE I: ICD-10-CM

## 2019-03-19 DIAGNOSIS — N40.0 ENLARGED PROSTATE WITHOUT LOWER URINARY TRACT SYMPTOMS (LUTS): ICD-10-CM

## 2019-03-19 DIAGNOSIS — R80.9 PROTEINURIA: ICD-10-CM

## 2019-03-19 LAB
CHOLEST SERPL-MCNC: 160 MG/DL
CORTIS SERPL-MCNC: 11.8 UG/DL
HDLC SERPL-MCNC: 32 MG/DL
HDLC SERPL: 5 {RATIO}
LDLC SERPL CALC-MCNC: 98 MG/DL
NONHDLC SERPL-MCNC: 128 MG/DL
TRIGL SERPL-MCNC: 151 MG/DL (ref 30–149)

## 2019-03-19 PROCEDURE — 82533 TOTAL CORTISOL: CPT

## 2019-03-19 PROCEDURE — 36415 COLL VENOUS BLD VENIPUNCTURE: CPT

## 2019-03-19 PROCEDURE — 83835 ASSAY OF METANEPHRINES: CPT

## 2019-03-19 PROCEDURE — 80061 LIPID PANEL: CPT

## 2019-03-20 ENCOUNTER — HOSPITAL ENCOUNTER (OUTPATIENT)
Dept: RADIOLOGY | Facility: HOSPITAL | Age: 69
Discharge: HOME | End: 2019-03-21
Attending: SPECIALIST | Admitting: HOSPITALIST
Payer: MEDICARE

## 2019-03-20 ENCOUNTER — TRANSCRIBE ORDERS (OUTPATIENT)
Dept: RADIOLOGY | Facility: HOSPITAL | Age: 69
End: 2019-03-20

## 2019-03-20 ENCOUNTER — APPOINTMENT (OUTPATIENT)
Dept: RADIOLOGY | Facility: HOSPITAL | Age: 69
End: 2019-03-20
Attending: PHYSICIAN ASSISTANT
Payer: MEDICARE

## 2019-03-20 ENCOUNTER — APPOINTMENT (OUTPATIENT)
Dept: RADIOLOGY | Facility: HOSPITAL | Age: 69
End: 2019-03-20
Attending: SPECIALIST
Payer: MEDICARE

## 2019-03-20 DIAGNOSIS — N18.1 CHRONIC KIDNEY DISEASE, STAGE I: ICD-10-CM

## 2019-03-20 DIAGNOSIS — E87.6 HYPOKALEMIA: ICD-10-CM

## 2019-03-20 LAB
ANION GAP SERPL CALC-SCNC: 13 MEQ/L (ref 3–15)
BASOPHILS # BLD: 0.08 K/UL (ref 0.01–0.1)
BASOPHILS NFR BLD: 1.1 %
BUN SERPL-MCNC: 15 MG/DL (ref 8–20)
CALCIUM SERPL-MCNC: 8.8 MG/DL (ref 8.9–10.3)
CHLORIDE SERPL-SCNC: 100 MEQ/L (ref 98–109)
CO2 SERPL-SCNC: 25 MEQ/L (ref 22–32)
CREAT SERPL-MCNC: 0.8 MG/DL
DIFFERENTIAL METHOD BLD: NORMAL
EOSINOPHIL # BLD: 0.18 K/UL (ref 0.04–0.54)
EOSINOPHIL NFR BLD: 2.4 %
ERYTHROCYTE [DISTWIDTH] IN BLOOD BY AUTOMATED COUNT: 12.7 % (ref 11.6–14.4)
GFR SERPL CREATININE-BSD FRML MDRD: >60 ML/MIN/1.73M*2
GLUCOSE SERPL-MCNC: 225 MG/DL (ref 70–99)
HCT VFR BLDCO AUTO: 37.3 %
HCT VFR BLDCO AUTO: 40.9 %
HCT VFR BLDCO AUTO: 43 %
HGB BLD-MCNC: 12.9 G/DL
HGB BLD-MCNC: 14.2 G/DL
HGB BLD-MCNC: 15 G/DL
IMM GRANULOCYTES # BLD AUTO: 0.03 K/UL (ref 0–0.08)
IMM GRANULOCYTES NFR BLD AUTO: 0.4 %
INR PPP: 1 INR
LYMPHOCYTES # BLD: 2.23 K/UL (ref 1.2–3.5)
LYMPHOCYTES NFR BLD: 30.2 %
MCH RBC QN AUTO: 31.4 PG (ref 28–33.2)
MCH RBC QN AUTO: 31.5 PG (ref 28–33.2)
MCH RBC QN AUTO: 31.6 PG (ref 28–33.2)
MCHC RBC AUTO-ENTMCNC: 34.6 G/DL (ref 32.2–36.5)
MCHC RBC AUTO-ENTMCNC: 34.7 G/DL (ref 32.2–36.5)
MCHC RBC AUTO-ENTMCNC: 34.9 G/DL (ref 32.2–36.5)
MCV RBC AUTO: 90.5 FL (ref 83–98)
MCV RBC AUTO: 90.7 FL (ref 83–98)
MCV RBC AUTO: 91.2 FL (ref 83–98)
MONOCYTES # BLD: 0.78 K/UL (ref 0.3–1)
MONOCYTES NFR BLD: 10.6 %
NEUTROPHILS # BLD: 4.08 K/UL (ref 1.7–7)
NEUTS SEG NFR BLD: 55.3 %
NRBC BLD-RTO: 0 %
PDW BLD AUTO: 10.6 FL (ref 9.4–12.4)
PDW BLD AUTO: 10.8 FL (ref 9.4–12.4)
PDW BLD AUTO: 11 FL (ref 9.4–12.4)
PLATELET # BLD AUTO: 238 K/UL
PLATELET # BLD AUTO: 242 K/UL
PLATELET # BLD AUTO: 263 K/UL
POTASSIUM SERPL-SCNC: 2.9 MEQ/L (ref 3.6–5.1)
PROTHROMBIN TIME: 12.5 SEC (ref 12.2–14.5)
RBC # BLD AUTO: 4.09 M/UL (ref 4.5–5.8)
RBC # BLD AUTO: 4.52 M/UL (ref 4.5–5.8)
RBC # BLD AUTO: 4.74 M/UL (ref 4.5–5.8)
SODIUM SERPL-SCNC: 138 MEQ/L (ref 136–144)
WBC # BLD AUTO: 7.38 K/UL
WBC # BLD AUTO: 7.67 K/UL
WBC # BLD AUTO: 9.98 K/UL

## 2019-03-20 PROCEDURE — 85027 COMPLETE CBC AUTOMATED: CPT | Mod: 59 | Performed by: SPECIALIST

## 2019-03-20 PROCEDURE — 85610 PROTHROMBIN TIME: CPT | Performed by: PHYSICIAN ASSISTANT

## 2019-03-20 PROCEDURE — 27200000 US GUIDED KIDNEY BIOPSY

## 2019-03-20 PROCEDURE — 99219 PR INITIAL OBSERVATION CARE/DAY 50 MINUTES: CPT | Performed by: INTERNAL MEDICINE

## 2019-03-20 PROCEDURE — 88313 SPECIAL STAINS GROUP 2: CPT | Mod: 59 | Performed by: SPECIALIST

## 2019-03-20 PROCEDURE — 36415 COLL VENOUS BLD VENIPUNCTURE: CPT | Performed by: PHYSICIAN ASSISTANT

## 2019-03-20 PROCEDURE — 76775 US EXAM ABDO BACK WALL LIM: CPT

## 2019-03-20 PROCEDURE — 0TB03ZX EXCISION OF RIGHT KIDNEY, PERCUTANEOUS APPROACH, DIAGNOSTIC: ICD-10-PCS | Performed by: RADIOLOGY

## 2019-03-20 PROCEDURE — 71000011 HC PACU PHASE 1 EA ADDL MIN

## 2019-03-20 PROCEDURE — 63600000 HC DRUGS/DETAIL CODE: Performed by: RADIOLOGY

## 2019-03-20 PROCEDURE — 82374 ASSAY BLOOD CARBON DIOXIDE: CPT | Performed by: INTERNAL MEDICINE

## 2019-03-20 PROCEDURE — 71000001 HC PACU PHASE 1 INITIAL 30MIN

## 2019-03-20 PROCEDURE — 85025 COMPLETE CBC W/AUTO DIFF WBC: CPT | Performed by: PHYSICIAN ASSISTANT

## 2019-03-20 PROCEDURE — 63700000 HC SELF-ADMINISTRABLE DRUG: Performed by: INTERNAL MEDICINE

## 2019-03-20 RX ORDER — POTASSIUM CHLORIDE 1.5 G/1.58G
20 POWDER, FOR SOLUTION ORAL AS NEEDED
Status: DISCONTINUED | OUTPATIENT
Start: 2019-03-20 | End: 2019-03-21 | Stop reason: HOSPADM

## 2019-03-20 RX ORDER — METOPROLOL SUCCINATE 50 MG/1
50 TABLET, EXTENDED RELEASE ORAL DAILY
Status: DISCONTINUED | OUTPATIENT
Start: 2019-03-20 | End: 2019-03-20

## 2019-03-20 RX ORDER — BRIMONIDINE TARTRATE 2 MG/ML
1 SOLUTION/ DROPS OPHTHALMIC 2 TIMES DAILY
Status: DISCONTINUED | OUTPATIENT
Start: 2019-03-20 | End: 2019-03-21 | Stop reason: HOSPADM

## 2019-03-20 RX ORDER — FENTANYL CITRATE 50 UG/ML
INJECTION, SOLUTION INTRAMUSCULAR; INTRAVENOUS
Status: COMPLETED | OUTPATIENT
Start: 2019-03-20 | End: 2019-03-20

## 2019-03-20 RX ORDER — POTASSIUM CHLORIDE 1.5 G/1.58G
40 POWDER, FOR SOLUTION ORAL AS NEEDED
Status: DISCONTINUED | OUTPATIENT
Start: 2019-03-20 | End: 2019-03-21 | Stop reason: HOSPADM

## 2019-03-20 RX ORDER — IBUPROFEN 200 MG
16-32 TABLET ORAL AS NEEDED
Status: DISCONTINUED | OUTPATIENT
Start: 2019-03-20 | End: 2019-03-21 | Stop reason: HOSPADM

## 2019-03-20 RX ORDER — METOPROLOL TARTRATE 50 MG/1
50 TABLET ORAL 2 TIMES DAILY
Status: DISCONTINUED | OUTPATIENT
Start: 2019-03-20 | End: 2019-03-21 | Stop reason: HOSPADM

## 2019-03-20 RX ORDER — DEXTROSE 50 % IN WATER (D50W) INTRAVENOUS SYRINGE
25 AS NEEDED
Status: DISCONTINUED | OUTPATIENT
Start: 2019-03-20 | End: 2019-03-21 | Stop reason: HOSPADM

## 2019-03-20 RX ORDER — FELODIPINE 2.5 MG/1
2.5 TABLET, EXTENDED RELEASE ORAL DAILY
Status: DISCONTINUED | OUTPATIENT
Start: 2019-03-20 | End: 2019-03-21 | Stop reason: HOSPADM

## 2019-03-20 RX ORDER — POTASSIUM CHLORIDE 14.9 MG/ML
20 INJECTION INTRAVENOUS AS NEEDED
Status: DISCONTINUED | OUTPATIENT
Start: 2019-03-20 | End: 2019-03-21 | Stop reason: HOSPADM

## 2019-03-20 RX ORDER — FINASTERIDE 5 MG/1
5 TABLET, FILM COATED ORAL DAILY
Status: DISCONTINUED | OUTPATIENT
Start: 2019-03-20 | End: 2019-03-21 | Stop reason: HOSPADM

## 2019-03-20 RX ORDER — POTASSIUM CHLORIDE 750 MG/1
40 TABLET, FILM COATED, EXTENDED RELEASE ORAL AS NEEDED
Status: DISCONTINUED | OUTPATIENT
Start: 2019-03-20 | End: 2019-03-21 | Stop reason: HOSPADM

## 2019-03-20 RX ORDER — POTASSIUM CHLORIDE 750 MG/1
20 TABLET, FILM COATED, EXTENDED RELEASE ORAL AS NEEDED
Status: DISCONTINUED | OUTPATIENT
Start: 2019-03-20 | End: 2019-03-21 | Stop reason: HOSPADM

## 2019-03-20 RX ORDER — DEXTROSE 40 %
15-30 GEL (GRAM) ORAL AS NEEDED
Status: DISCONTINUED | OUTPATIENT
Start: 2019-03-20 | End: 2019-03-21 | Stop reason: HOSPADM

## 2019-03-20 RX ORDER — TIMOLOL MALEATE 5 MG/ML
1 SOLUTION/ DROPS OPHTHALMIC DAILY
Status: DISCONTINUED | OUTPATIENT
Start: 2019-03-20 | End: 2019-03-21 | Stop reason: HOSPADM

## 2019-03-20 RX ORDER — LATANOPROST 50 UG/ML
1 SOLUTION/ DROPS OPHTHALMIC NIGHTLY
Status: DISCONTINUED | OUTPATIENT
Start: 2019-03-20 | End: 2019-03-21 | Stop reason: HOSPADM

## 2019-03-20 RX ADMIN — POTASSIUM CHLORIDE 40 MEQ: 750 TABLET, FILM COATED, EXTENDED RELEASE ORAL at 18:27

## 2019-03-20 RX ADMIN — FENTANYL CITRATE 50 MCG: 50 INJECTION INTRAMUSCULAR; INTRAVENOUS at 11:38

## 2019-03-20 RX ADMIN — FINASTERIDE 5 MG: 5 TABLET, FILM COATED ORAL at 22:14

## 2019-03-20 RX ADMIN — METOPROLOL TARTRATE 50 MG: 50 TABLET ORAL at 19:43

## 2019-03-20 RX ADMIN — LATANOPROST 1 DROP: 50 SOLUTION OPHTHALMIC at 22:16

## 2019-03-20 RX ADMIN — BRIMONIDINE TARTRATE 1 DROP: 2 SOLUTION OPHTHALMIC at 22:14

## 2019-03-20 NOTE — H&P
Hospital Medicine Service -  History & Physical        CHIEF COMPLAINT   Presented for elective renal biopsy     HISTORY OF PRESENT ILLNESS      Aneesh Brito is a 68 y.o. male with a past medical history of HTN, non-insulin-dependent diabetes, BPH with lower urinary tract symptoms, who presents for renal biopsy. Patient has proteinuria and is followed by nephrology as an outpatient.  He denies any complaints post procedure and has been able to urinate without difficulty.    PAST MEDICAL AND SURGICAL HISTORY      Past Medical History:   Diagnosis Date   • Diverticulosis of colon    • Enlarged prostate with lower urinary tract symptoms (LUTS)    • Hypertension    • Melanoma of skin (CMS/[facility]) ([facility])    • Osteoarthritis of knee    • Overweight    • Spinal stenosis of lumbar region    • Type 2 diabetes mellitus (CMS/[facility]) ([facility])        Past Surgical History:   Procedure Laterality Date   • APPENDECTOMY     • COLONOSCOPY  01/10/2012    diverticulosis   • HIP ARTHROPLASTY Bilateral    • KNEE ARTHROPLASTY Left        PCP: Kamar Palacios MD    MEDICATIONS      Prior to Admission medications    Medication Sig Start Date End Date Taking? Authorizing Provider   brimonidine (ALPHAGAN) 0.15 % ophthalmic solution Administer 1 drop into both eyes 2 (two) times a day.  8/30/18   Patience Saini MD   felodipine (PLENDIL) 2.5 mg 24 hr tablet 2.5 mg daily.  1/12/18   Patience Saini MD   finasteride (PROSCAR) 5 mg tablet 5 mg daily.  11/1/17   Patience Saini MD   losartan-hydrochlorothiazide (HYZAAR) 100-12.5 mg per tablet daily.  11/1/17   Patience Saini MD   metFORMIN (GLUCOPHAGE) 500 mg tablet 2 (two) times a day.  8/30/18   Patience Saini MD   metoprolol succinate XL (TOPROL XL) 50 mg 24 hr tablet Take 1 tablet (50 mg total) by mouth daily. 2/25/19 8/24/19  Kamar Palacios MD   potassium chloride (KLOR-CON) 10 mEq CR tablet Take 1 tablet (10 mEq total) by mouth daily. 2/26/19   Philip  Kamar ESPINOSA MD   sildenafil (VIAGRA) 50 mg tablet as needed.  9/21/18   Provider, MD Patience   timolol maleate (ISTALOL) 0.5 % drops, once daily     ProviderPatience MD   VYZULTA 0.024 % drops 1 drop nightly.  10/17/18   ProviderPatience MD       ALLERGIES      No known allergies    FAMILY HISTORY      Family History   Problem Relation Age of Onset   • Breast cancer Mother        SOCIAL HISTORY      Social History     Social History   • Marital status:      Spouse name: N/A   • Number of children: N/A   • Years of education: N/A     Social History Main Topics   • Smoking status: Never Smoker   • Smokeless tobacco: Never Used   • Alcohol use Yes      Comment: BEER, 1 CONSUMED DAILY   • Drug use: Unknown   • Sexual activity: Not Asked     Other Topics Concern   • None     Social History Narrative   • None       REVIEW OF SYSTEMS      All other systems reviewed and negative except as noted in HPI    PHYSICAL EXAMINATION      Temp:  [36.3 °C (97.4 °F)-36.7 °C (98 °F)] 36.3 °C (97.4 °F)  Heart Rate:  [72-74] 73  Resp:  [18] 18  BP: (135-168)/(72-94) 135/94  There is no height or weight on file to calculate BMI.    Physical Exam   Constitutional: He is oriented to person, place, and time. He appears well-developed and well-nourished.   HENT:   Head: Normocephalic and atraumatic.   Eyes: EOM are normal. Pupils are equal, round, and reactive to light.   Neck: Normal range of motion. Neck supple.   Cardiovascular: Normal rate and regular rhythm.    Pulmonary/Chest: Effort normal and breath sounds normal.   Abdominal: Soft. Bowel sounds are normal.   Musculoskeletal: He exhibits no edema or deformity.   Neurological: He is alert and oriented to person, place, and time.   Skin: Skin is warm and dry. Capillary refill takes less than 2 seconds.   Psychiatric: He has a normal mood and affect. His behavior is normal.   Vitals reviewed.      LABS / IMAGING / EKG        Labs  No new labs.    I    ASSESSMENT AND  PLAN           Chronic kidney disease, stage I   Assessment & Plan    Patient s/p renal biopsy due to proteinuria.  Followed by Dr. Asencio           Type 2 diabetes mellitus (CMS/HCC) ([facility])   Assessment & Plan    Stable, A1C 6.5  Accu check qac/hs        Hypertension, benign   Assessment & Plan    Patient states BP has been difficult to control, so Lopressor increased last week.  Continue lopressor, losartan/hctz, and plendil.        Enlarged prostate with lower urinary tract symptoms (LUTS)   Assessment & Plan    Patient states has chronic urinary frequency, no dysuria, or urgency  Cont proscar.              VTE Assessment: Padua VTE Score: 1  VTE Prophylaxis Plan: SCD  Code Status: Full Code  Estimated Discharge Date: 3/21/2019  Disposition Planning: SCD     Abdirahman Faulkner,   3/20/2019

## 2019-03-20 NOTE — ASSESSMENT & PLAN NOTE
Patient states BP has been difficult to control, so Lopressor increased last week.  Continue lopressor, losartan/hctz, and plendil.

## 2019-03-20 NOTE — DISCHARGE INSTR - OTHER ORDERS
Needle Biopsy    DISCHARGE INSTRUCTIONS  You have had a biopsy of your right kidney.  You were given pain medication today.   You may resume your normal diet.  You may resume your normal medications.   Do not drive, engage in heavy lifting or strenuous activity or drink any alcoholic beverages for the next 48 hours.  You may resume normal activity in 48 hours, as tolerated.  Keep the area covered and dry for the next 24 hours.   It is normal to experience some pain at the site over the next few days. You may take Tylenol for that pain  Notify your primary physician and/or Interventional Radiologist IMMEDIATELY if any of the following occur:  · Fever of 101° F (38 ° C) or chills  · Swelling and or redness in the area of the puncture site  · Worsening pain  · Lightheadedness or dizziness upon standing  Physician Contact Information  • If you have a problem that you believe requires immediate attention, you should go to the Emergency Room, either at [facility] or the closest hospital. If you believe that your problem can safely wait until you speak to a physician, you should contact your doctor or Interventional Radiologist.   • It is usually easier to contact your own physician by calling the office, or after hours through the answering service. If you do not know the number, the hospital  can connect you by calling [phone].   • If you have concerns that need to be answered by the Interventional Radiologist, you can reach him/ her as follows:   o During regular weekday hours (8AM to 5PM), call [phone] and ask the technologist to connect you with the Interventional Radiologist.   During off hours for emergencies call [phone] and ask the hospital  to contact the Interventional Radiologist on-callNeedle Biopsy    DISCHARGE INSTRUCTIONS  You have had a biopsy of your ***.  You were given pain medication today.   You may resume your normal diet.  You may resume your normal  medications.   Do not drive, engage in heavy lifting or strenuous activity or drink any alcoholic beverages for the next 48 hours.  You may resume normal activity in 48 hours, as tolerated.  Keep the area covered and dry for the next 24 hours.   It is normal to experience some pain at the site over the next few days. You may take Tylenol for that pain  Notify your primary physician and/or Interventional Radiologist IMMEDIATELY if any of the following occur:  · Fever of 101° F (38 ° C) or chills  · Swelling and or redness in the area of the puncture site  · Worsening pain  · Lightheadedness or dizziness upon standing  Physician Contact Information  • If you have a problem that you believe requires immediate attention, you should go to the Emergency Room, either at [facility] or the closest hospital. If you believe that your problem can safely wait until you speak to a physician, you should contact your doctor or Interventional Radiologist.   • It is usually easier to contact your own physician by calling the office, or after hours through the answering service. If you do not know the number, the hospital  can connect you by calling [phone].   • If you have concerns that need to be answered by the Interventional Radiologist, you can reach him/ her as follows:   o During regular weekday hours (8AM to 5PM), call [phone] and ask the technologist to connect you with the Interventional Radiologist.   During off hours for emergencies call [phone] and ask the hospital  to contact the Interventional Radiologist on-call

## 2019-03-20 NOTE — ASSESSMENT & PLAN NOTE
Patient s/p right renal biopsy 3/20/19 with IR due to proteinuria.   Post-procedure renal ultrasound - IMPRESSION: Small right perinephric hematoma.  Hgb stable, 13.7  Repeat renal ultrasound today pending, can be discharged if stable  D/w Dr. Asencio this morning - Recommend Repeat CBC in 5 days. Bedrest x 3 more days.  D/w Dr. Palacios, PCP - already has an appointment next week

## 2019-03-20 NOTE — H&P
Inpatient History & Physical     Admitting Diagnosis: Chronic kidney disease, stage I [N18.1]  Chronic kidney disease, stage I [N18.1]    HPI  Patient is a 68 y.o. male with history of chronic kidney disease and proteinuria presents to IR for image guided renal biopsy.  The patient has some urinary frequency.  He has no gross hematuria.  He is hypertensive and diabetic.    Medical History:   Past Medical History:   Diagnosis Date   • Diverticulosis of colon    • Enlarged prostate with lower urinary tract symptoms (LUTS)    • Hypertension    • Melanoma of skin (CMS/[facility]) ([facility])    • Osteoarthritis of knee    • Overweight    • Spinal stenosis of lumbar region    • Type 2 diabetes mellitus (CMS/[facility]) ([facility])        Surgical History:   Past Surgical History:   Procedure Laterality Date   • APPENDECTOMY     • COLONOSCOPY  01/10/2012    diverticulosis   • HIP ARTHROPLASTY Bilateral    • KNEE ARTHROPLASTY Left        Allergies: No known allergies    [unfilled]•  brimonidine, Administer 1 drop into both eyes 2 (two) times a day.   •  felodipine, 2.5 mg daily.   •  finasteride, 5 mg daily.   •  losartan-hydrochlorothiazide, daily.   •  metFORMIN, 2 (two) times a day.   •  metoprolol succinate XL, Take 1 tablet (50 mg total) by mouth daily.  •  potassium chloride, Take 1 tablet (10 mEq total) by mouth daily.  •  sildenafil, as needed.   •  timolol maleate,   •  VYZULTA, 1 drop nightly.       Social History:   Social History     Social History   • Marital status:      Spouse name: N/A   • Number of children: N/A   • Years of education: N/A     Social History Main Topics   • Smoking status: Never Smoker   • Smokeless tobacco: Never Used   • Alcohol use Yes      Comment: BEER, 1 CONSUMED DAILY   • Drug use: Unknown   • Sexual activity: Not Asked     Other Topics Concern   • None     Social History Narrative   • None       Family History:   Family History   Problem Relation Age of Onset   • Breast cancer Mother        Review of  Systems  Constitutional: negative except for weight loss  Respiratory: negative for cough, shortness of breath and dyspnea on exertion  Cardiovascular: negative for chest pain and palpitations  Gastrointestinal: negative for abdominal pain, nausea, vomiting and change in bowel habits  Genitourinary:negative except for frequency    Objective     Vital Signs for the last 24 hours:  BP: (168)/(86) 168/86      General appearance: alert, appears stated age, no distress and cooperative  Lungs: clear to auscultation bilaterally  Heart: regular rate and rhythm, S1, S2 normal, no murmur, click, rub or gallop  Abdomen: soft, non-tender; bowel sounds normal; no masses, no organomegaly  Extremities: extremities normal, warm and well-perfused; no cyanosis, clubbing, or edema    Labs   I have reviewed the patient's pertinent labs. Pertinent labs are within normal limits.    Imaging  Significant findings include:   Study Result     CLINICAL HISTORY:    History of chronic kidney disease, diabetes and  hypertension.     COMMENT:   MR angiography was performed from the lower chest to the lower pelvis  after the intravenous administration of 10 ml of Gadavist contrast. 3D MIPS  reconstructions were obtained in multiple projections.     Suprarenal abdominal aorta: Patent.  Infrarenal abdominal aorta: Patent.  No abdominal aortic aneurysm.  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  SMA: Patent without evidence of flow-limiting stenosis.  ASKSHI: Patent without evidence of flow-limiting stenosis.  Renal arteries: Patent without evidence of flow-limiting stenosis.     Right common iliac artery: Patent.  Right mid common iliac artery aneurysm  measuring 2.6 cm.  Left common iliac artery: Patent without evidence of flow-limiting stenosis or  aneurysm.     --  IMPRESSION:  1.  No evidence of renal artery stenosis.  2.  Right common iliac artery aneurysm  measuring 2.6 cm.     I certify that I have reviewed this examination and agree with this report.  Familia Mata         Assessment/Plan : 68-year-old male with history of diabetes, hypertension, chronic kidney disease and proteinuria presents to  for image guided renal biopsy.  The procedure was reviewed.  As per Dr. Asencio, the patient will be admitted overnight for observation.  We will recheck a hemoglobin and hematocrit 3 hours post biopsy as well as a follow-up renal ultrasound to assess for any evidence of perinephric hematoma.  [facility] is aware.  Consent will be obtained by Dr. Mata.        Code Status: Full Code  Estimated discharge date: 3/21/2019

## 2019-03-20 NOTE — POST-PROCEDURE NOTE
Interventional Radiology Brief Postprocedure Note    Aneesh Brito    Attending: Adolfo    Assistant: None    Diagnosis: Proteinuria    Description of procedure: US guided kidney biopsy    Contrast: None     Anesthesia:  Local    Medications: Fentanyl 50 mcg iv     Complications: None    Estimated Blood Loss: Estimated Blood Loss: 0-10 ml    Anticoagulation: Hold    Specimens: Right kidney    Findings: Satisfactory right kidney renal biopsy.    Familia Mata MD

## 2019-03-20 NOTE — Clinical Note
Patient placed on procedure table in prone position. Positioning devices: all pressure points padded, safety strap applied and pillow under knees.

## 2019-03-20 NOTE — PROGRESS NOTES
The patient is post right renal biopsy.  A postbiopsy ultrasound shows trace alaina-nephric hematoma which is not unusual following biopsy.  His hemoglobin decreased from 15-14.2 which again is not atypical as we usually allow for a 1 g drop following biopsy.  His vital signs appear stable.    D/W Dr. You, no intervention needed at this time.  If needed, could consider repeat hemoglobin in am.  IR on call if any concerns.

## 2019-03-21 ENCOUNTER — APPOINTMENT (OUTPATIENT)
Dept: RADIOLOGY | Facility: HOSPITAL | Age: 69
End: 2019-03-21
Attending: INTERNAL MEDICINE
Payer: MEDICARE

## 2019-03-21 VITALS
WEIGHT: 236 LBS | OXYGEN SATURATION: 96 % | BODY MASS INDEX: 33.04 KG/M2 | SYSTOLIC BLOOD PRESSURE: 174 MMHG | DIASTOLIC BLOOD PRESSURE: 87 MMHG | HEART RATE: 74 BPM | TEMPERATURE: 97.5 F | HEIGHT: 71 IN | RESPIRATION RATE: 16 BRPM

## 2019-03-21 PROBLEM — N18.9 CKD (CHRONIC KIDNEY DISEASE): Status: ACTIVE | Noted: 2019-03-21

## 2019-03-21 PROBLEM — E87.6 HYPOKALEMIA: Status: ACTIVE | Noted: 2019-03-21

## 2019-03-21 LAB
ANION GAP SERPL CALC-SCNC: 10 MEQ/L (ref 3–15)
BUN SERPL-MCNC: 19 MG/DL (ref 8–20)
CALCIUM SERPL-MCNC: 8.8 MG/DL (ref 8.9–10.3)
CHLORIDE SERPL-SCNC: 101 MEQ/L (ref 98–109)
CHLORIDE UR-SCNC: 31 MEQ/L
CO2 SERPL-SCNC: 24 MEQ/L (ref 22–32)
CREAT SERPL-MCNC: 1 MG/DL
ERYTHROCYTE [DISTWIDTH] IN BLOOD BY AUTOMATED COUNT: 12.7 % (ref 11.6–14.4)
GFR SERPL CREATININE-BSD FRML MDRD: >60 ML/MIN/1.73M*2
GLUCOSE SERPL-MCNC: 165 MG/DL (ref 70–99)
HCT VFR BLDCO AUTO: 39.1 %
HGB BLD-MCNC: 13.7 G/DL
MCH RBC QN AUTO: 31.5 PG (ref 28–33.2)
MCHC RBC AUTO-ENTMCNC: 35 G/DL (ref 32.2–36.5)
MCV RBC AUTO: 89.9 FL (ref 83–98)
PDW BLD AUTO: 10.7 FL (ref 9.4–12.4)
PLATELET # BLD AUTO: 251 K/UL
POTASSIUM SERPL-SCNC: 3.1 MEQ/L (ref 3.6–5.1)
POTASSIUM UR-SCNC: 58.8 MEQ/L
RBC # BLD AUTO: 4.35 M/UL (ref 4.5–5.8)
SODIUM SERPL-SCNC: 135 MEQ/L (ref 136–144)
SODIUM UR-SCNC: 35 MEQ/L
WBC # BLD AUTO: 9.39 K/UL

## 2019-03-21 PROCEDURE — 99217 PR OBSERVATION CARE DISCHARGE MANAGEMENT: CPT | Performed by: HOSPITALIST

## 2019-03-21 PROCEDURE — 63700000 HC SELF-ADMINISTRABLE DRUG: Performed by: INTERNAL MEDICINE

## 2019-03-21 PROCEDURE — 76775 US EXAM ABDO BACK WALL LIM: CPT

## 2019-03-21 PROCEDURE — 80048 BASIC METABOLIC PNL TOTAL CA: CPT | Performed by: INTERNAL MEDICINE

## 2019-03-21 PROCEDURE — 85027 COMPLETE CBC AUTOMATED: CPT | Performed by: SPECIALIST

## 2019-03-21 PROCEDURE — 84133 ASSAY OF URINE POTASSIUM: CPT | Performed by: SPECIALIST

## 2019-03-21 PROCEDURE — 36415 COLL VENOUS BLD VENIPUNCTURE: CPT | Performed by: SPECIALIST

## 2019-03-21 RX ORDER — LOSARTAN POTASSIUM 100 MG/1
100 TABLET ORAL DAILY
Status: DISCONTINUED | OUTPATIENT
Start: 2019-03-22 | End: 2019-03-21 | Stop reason: HOSPADM

## 2019-03-21 RX ORDER — POTASSIUM CHLORIDE 750 MG/1
20 TABLET, EXTENDED RELEASE ORAL DAILY
Qty: 60 TABLET | Refills: 0 | Status: SHIPPED | OUTPATIENT
Start: 2019-03-21 | End: 2019-12-24 | Stop reason: SDUPTHER

## 2019-03-21 RX ORDER — LOSARTAN POTASSIUM 100 MG/1
100 TABLET ORAL DAILY
Qty: 30 TABLET | Refills: 0 | Status: SHIPPED | OUTPATIENT
Start: 2019-03-22 | End: 2019-04-10 | Stop reason: SDUPTHER

## 2019-03-21 RX ADMIN — LOSARTAN POTASSIUM: 100 TABLET, FILM COATED ORAL at 08:26

## 2019-03-21 RX ADMIN — POTASSIUM CHLORIDE 40 MEQ: 750 TABLET, FILM COATED, EXTENDED RELEASE ORAL at 08:27

## 2019-03-21 RX ADMIN — BRIMONIDINE TARTRATE 1 DROP: 2 SOLUTION OPHTHALMIC at 08:27

## 2019-03-21 RX ADMIN — FELODIPINE 2.5 MG: 2.5 TABLET, FILM COATED, EXTENDED RELEASE ORAL at 08:27

## 2019-03-21 RX ADMIN — METOPROLOL TARTRATE 50 MG: 50 TABLET ORAL at 08:26

## 2019-03-21 NOTE — ASSESSMENT & PLAN NOTE
Chronic, 2.8 on admission  K 3.1 -repleted with 40 mEq of potassium chloride this morning  Instructed patient to increase his potassium supplement to 20 mEq or 2 tablets daily and repeat BMP with follow-up with Dr. Price next week  Also provide list of foods that are high in potassium

## 2019-03-21 NOTE — PLAN OF CARE
Problem: Patient Care Overview (Adult)  Goal: Plan of Care Review  Outcome: Ongoing (interventions implemented as appropriate)   03/21/19 8177   Plan of Care Review   Progress no change   Outcome Summary pt denies pain, still on bedrest, awaiting follow up ultrasound

## 2019-03-21 NOTE — PATIENT CARE CONFERENCE
Care Progression Rounds Note  Date: 3/21/2019  Time: 11:07 AM     Patient Name: Aneesh Brito     Medical Record Number: [id number]   YOB: 1950  Sex: Male      Room/Bed: G105    Admitting Diagnosis: Chronic kidney disease, stage I [N18.1]  Chronic kidney disease, stage I [N18.1]  CKD (chronic kidney disease) [N18.9]   Admit Date/Time: 3/20/2019  9:16 AM    Primary Diagnosis: No Principal Problem: There is no principal problem currently on the Problem List. Please update the Problem List and refresh.  Principal Problem: No Principal Problem: There is no principal problem currently on the Problem List. Please update the Problem List and refresh.    GMLOS: pending  Anticipated Discharge Date: 3/21/2019    AM-PAC  Mobility Score:      Discharge Planning:       Barriers to Discharge:  Barriers to Discharge: Test pending  Comment: renal ultrasound, poss d/c later    Participants:  advanced practice provider, , social work/services, nursing, physical therapy

## 2019-03-21 NOTE — PATIENT CARE CONFERENCE
Care Progression Rounds Note  Date: 3/21/2019  Time: 3:00 PM     Patient Name: Aneesh Brito     Medical Record Number: [id number]   YOB: 1950  Sex: Male      Room/Bed: G105    Admitting Diagnosis: Chronic kidney disease, stage I [N18.1]  Chronic kidney disease, stage I [N18.1]  CKD (chronic kidney disease) [N18.9]   Admit Date/Time: 3/20/2019  9:16 AM    Primary Diagnosis: Chronic kidney disease, stage I  Principal Problem: Chronic kidney disease, stage I    GMLOS: pending  Anticipated Discharge Date: 3/21/2019    AM-PAC  Mobility Score:      Discharge Planning:       Barriers to Discharge:  Barriers to Discharge: None  Comment: d/c, no needs    Participants:  , social work/services, nursing

## 2019-03-21 NOTE — DISCHARGE SUMMARY
Hospital Medicine Service -  Inpatient Discharge Summary        BRIEF OVERVIEW   Admitting Provider: Pamela Rivera MD  Attending Provider: Pamela Rivera MD Attending phys phone: [phone]    PCP: Kamar Palacios [phone]    Admission Date: 3/20/2019  Discharge Date: 3/21/2019     DISCHARGE DIAGNOSES      Primary Discharge Diagnosis  Chronic kidney disease, stage I    Secondary Discharge Diagnoses  Active Hospital Problems    Diagnosis Date Noted   • Chronic kidney disease, stage I 03/20/2019     Priority: High   • Hypertension, benign 09/10/2014     Priority: Medium   • CKD (chronic kidney disease) 03/21/2019   • Hypokalemia 03/21/2019   • Type 2 diabetes mellitus (CMS/HCC) (HCC) 09/10/2014   • Enlarged prostate with lower urinary tract symptoms (LUTS) 09/10/2014      Resolved Hospital Problems    Diagnosis Date Noted Date Resolved   No resolved problems to display.       Problem List on Day of Discharge  * Chronic kidney disease, stage I   Assessment & Plan    Patient s/p right renal biopsy 3/20/19 with IR due to proteinuria.   Post-procedure renal ultrasound - IMPRESSION: Small right perinephric hematoma.  Hgb stable, 13.7  Repeat renal ultrasound today pending, can be discharged if stable  D/w Dr. Asencio this morning - Recommend Repeat CBC in 5 days. Bedrest x 3 more days.  D/w Dr. Palacios, PCP - already has an appointment next week          Hypertension, benign   Assessment & Plan    Patient states BP has been difficult to control, so Lopressor increased last week.  SBP was in 180's at last PCP visit.  Continue lopressor, losartan/hctz, and plendil  BP elevated prior to AM medications, RN to recheck prior to d/c  Will f/u with PCP for management        Hypokalemia   Assessment & Plan    Chronic, 2.8 on admission  K 3.1 -repleted with 40 mEq of potassium chloride this morning  Instructed patient to increase his potassium supplement to 20 mEq or 2 tablets daily and repeat BMP with  follow-up with Dr. Price next week  Also provide list of foods that are high in potassium        Type 2 diabetes mellitus (CMS/HCC) (HCC)   Assessment & Plan    Stable, A1C 6.5  Continue metformin 500 mg BID        Enlarged prostate with lower urinary tract symptoms (LUTS)   Assessment & Plan    Patient states has chronic urinary frequency, no dysuria, or urgency  Cont proscar.           SUMMARY OF HOSPITALIZATION      Presenting Problem/History of Present Illness  CKD (chronic kidney disease)    This is a 68 y.o. year-old male admitted on 3/20/2019 with Chronic kidney disease, stage I [N18.1]  Chronic kidney disease, stage I [N18.1]  CKD (chronic kidney disease) [N18.9].    Aneesh Brito is a 68 y.o. male with a past medical history of HTN, non-insulin-dependent diabetes, BPH with lower urinary tract symptoms, who presented to [facility] on 3/20/2019 for renal biopsy. Patient has proteinuria and is followed by nephrology as an outpatient.  He denies any complaints post procedure and has been able to urinate without difficulty.  Nephrology recommended he be monitored for 24 hours post procedure on [facility] service.  Postprocedure right renal ultrasound showed small right perinephric hematoma.    Hospital Course    Patient has no complaints this morning.  He is anxious for discharge to home.  His hemoglobin has remained stable and is 13.7 this morning.  He shows no sign of active bleeding.  Repeat renal ultrasound is pending.  He will be discharged if ultrasound is stable. He will have a repeat CBC on follow-up with his PCP next week.  He will follow-up with nephrology for biopsy results.    Patient's potassium was 2.9 on admission 3.1 this morning after 40 mEq of potassium chloride.  He was given an additional 40 mEq of potassium chloride this morning.  He is recommended to increase his outpatient supplement to 20 mEq daily and have repeat BMP with his PCP next week.    Exam on Day of Discharge  Physical Exam    Constitutional: He is oriented to person, place, and time. He appears well-developed and well-nourished. No distress.   HENT:   Head: Normocephalic and atraumatic.   Eyes: No scleral icterus.   Neck: Neck supple.   Cardiovascular: Normal rate and regular rhythm.    Pulmonary/Chest: Effort normal and breath sounds normal.   Abdominal: Soft. Bowel sounds are normal.   Musculoskeletal: Normal range of motion. He exhibits no edema.   Neurological: He is alert and oriented to person, place, and time.   Skin: Skin is warm and dry.   Right back biopsy site - small dressing present, no new blood noted   Psychiatric: He has a normal mood and affect.          Consults During Admission  None    DISCHARGE MEDICATIONS        Medication List      CHANGE how you take these medications    potassium chloride 10 mEq CR tablet  Commonly known as:  KLOR-CON  Take 2 tablets (20 mEq total) by mouth daily.  What changed:  how much to take        CONTINUE taking these medications    brimonidine 0.15 % ophthalmic solution  Commonly known as:  ALPHAGAN  Administer 1 drop into both eyes 2 (two) times a day.     felodipine 2.5 mg 24 hr tablet  Commonly known as:  PLENDIL  2.5 mg daily.     finasteride 5 mg tablet  Commonly known as:  PROSCAR  5 mg daily.     ISTALOL 0.5 % drops, once daily  Generic drug:  timolol maleate     losartan-hydrochlorothiazide 100-12.5 mg per tablet  Commonly known as:  HYZAAR  daily.     metFORMIN 500 mg tablet  Commonly known as:  GLUCOPHAGE  2 (two) times a day.     sildenafil 50 mg tablet  Commonly known as:  VIAGRA  as needed.     VYZULTA 0.024 % drops  Generic drug:  latanoprostene bunod  1 drop nightly.            Instructions for after discharge     Call provider for:       Any signs of bleeding    Call provider for:  persistent dizziness or light-headedness       Diet       Diet Type:  Cardiac (2gm Sodium/Low Fat) Comment - Diabetic    Follow-up with Provider:       Instructions for follow-up:  as  previously instructed for biopsy results    Follow-up with primary physician (PCP)       Instructions for follow-up:  as scheduled next week - repeat BMP and CBC    Post-Discharge Activity (Specify Additional Restrictions in Comments)       Additional Activity Restrictions: Bedrest x 3 days             PROCEDURES / LABS / IMAGING          Pertinent Labs    Results from last 7 days  Lab Units 03/21/19  0659 03/20/19  1510   SODIUM mEQ/L 135* 138   POTASSIUM mEQ/L 3.1* 2.9*   CHLORIDE mEQ/L 101 100   CO2 mEQ/L 24 25   BUN mg/dL 19 15   CREATININE mg/dL 1.0 0.8   GLUCOSE mg/dL 165* 225*   CALCIUM mg/dL 8.8* 8.8*         Results from last 7 days  Lab Units 03/21/19  0659 03/20/19  2302 03/20/19  1510   WBC K/uL 9.39 9.98 7.67   HEMOGLOBIN g/dL 13.7 12.9* 14.2   HEMATOCRIT % 39.1* 37.3* 40.9   PLATELETS K/uL 251 242 238           Pertinent Imaging  Ultrasound Kidneys    Result Date: 3/20/2019  IMPRESSION: Small right perinephric hematoma.    Ultrasound Guided Kidney Biopsy    Result Date: 3/20/2019  IMPRESSION:   Successful right lower pole renal biopsy. COMMENT: PROCEDURE:  Ultrasound guided kidney biopsy. ANESTHESIA:  Lidocaine 1% local. MEDICATIONS:  Fentanyl 50 mcg IV. DESCRIPTION OF PROCEDURE:    Written informed consent was obtained.  The patient was placed prone on the table. Ultrasound localized an access site in the right kidney, and the skin over this area was prepped and draped in sterile fashion, and anesthetized with 1% Lidocaine.  Ultrasound was used to guide an 18-gauge core biopsy needle into the right lower pole renal cortex, and three productive passes were made with the needle, and samples were sent to histopathology. Ultrasound after the biopsies did not reveal any significant hemorrhage.  The patient tolerated the procedure well and left the IR in stable condition.    Us Technical Assistance    Result Date: 3/20/2019  IMPRESSION:   Successful right lower pole renal biopsy. COMMENT: PROCEDURE:   Ultrasound guided kidney biopsy. ANESTHESIA:  Lidocaine 1% local. MEDICATIONS:  Fentanyl 50 mcg IV. DESCRIPTION OF PROCEDURE:    Written informed consent was obtained.  The patient was placed prone on the table. Ultrasound localized an access site in the right kidney, and the skin over this area was prepped and draped in sterile fashion, and anesthetized with 1% Lidocaine.  Ultrasound was used to guide an 18-gauge core biopsy needle into the right lower pole renal cortex, and three productive passes were made with the needle, and samples were sent to histopathology. Ultrasound after the biopsies did not reveal any significant hemorrhage.  The patient tolerated the procedure well and left the IR in stable condition.      OUTPATIENT  FOLLOW-UP / REFERRALS / PENDING TESTS        Outpatient Follow-Up Appointments  PCP  Nephrology    Important Issues to Address in Follow-Up  Repeat BMP and CBC next week  Follow up with Nephrology for biopsy results    DISCHARGE DISPOSITION      Disposition: Home    Code Status At Discharge: Full Code    Physician Order for Life-Sustaining Treatment Document Status      No documents found

## 2019-03-21 NOTE — ASSESSMENT & PLAN NOTE
Patient states BP has been difficult to control, so Lopressor increased last week.  SBP was in 180's at last PCP visit.  Continue lopressor, losartan/hctz, and plendil  BP elevated prior to AM medications, RN to recheck prior to d/c  Will f/u with PCP for management

## 2019-03-21 NOTE — PLAN OF CARE
Problem: Patient Care Overview (Adult)  Goal: Plan of Care Review  Outcome: Ongoing (interventions implemented as appropriate)   03/21/19 1134   Coping/Psychosocial   Plan Of Care Reviewed With patient;spouse;son   Coping/Psychosocial   Patient Agreement with Plan of Care agrees   Plan of Care Review   Progress improving   Outcome Summary Patient denies pain. R flank dressing intact, drainage marked. BP improved over the night. Pt on bedrest until AM.;     Goal: Individualization & Mutuality  Outcome: Ongoing (interventions implemented as appropriate)

## 2019-03-23 LAB
METANEPH FREE SERPL-MCNC: <25 PG/ML
METANEPHS SERPL-MCNC: 169 PG/ML
NORMETANEPH FREE SERPL-MCNC: 169 PG/ML

## 2019-03-25 ENCOUNTER — OFFICE VISIT (OUTPATIENT)
Dept: FAMILY MEDICINE | Facility: CLINIC | Age: 69
End: 2019-03-25
Payer: MEDICARE

## 2019-03-25 VITALS
SYSTOLIC BLOOD PRESSURE: 122 MMHG | TEMPERATURE: 98.4 F | WEIGHT: 235.4 LBS | OXYGEN SATURATION: 97 % | HEART RATE: 80 BPM | BODY MASS INDEX: 32.96 KG/M2 | DIASTOLIC BLOOD PRESSURE: 78 MMHG | HEIGHT: 71 IN | RESPIRATION RATE: 12 BRPM

## 2019-03-25 DIAGNOSIS — E87.6 HYPOKALEMIA: ICD-10-CM

## 2019-03-25 DIAGNOSIS — N18.1 CHRONIC KIDNEY DISEASE, STAGE I: ICD-10-CM

## 2019-03-25 DIAGNOSIS — I10 HYPERTENSION, BENIGN: Primary | ICD-10-CM

## 2019-03-25 LAB
ANION GAP SERPL CALC-SCNC: 11 MEQ/L (ref 3–15)
BUN SERPL-MCNC: 16 MG/DL (ref 8–20)
CALCIUM SERPL-MCNC: 9.2 MG/DL (ref 8.9–10.3)
CHLORIDE SERPL-SCNC: 102 MEQ/L (ref 98–109)
CO2 SERPL-SCNC: 24 MEQ/L (ref 22–32)
CREAT SERPL-MCNC: 0.9 MG/DL
GFR SERPL CREATININE-BSD FRML MDRD: >60 ML/MIN/1.73M*2
GLUCOSE SERPL-MCNC: 202 MG/DL (ref 70–99)
POTASSIUM SERPL-SCNC: 3.6 MEQ/L (ref 3.6–5.1)
SODIUM SERPL-SCNC: 137 MEQ/L (ref 136–144)

## 2019-03-25 PROCEDURE — 80048 BASIC METABOLIC PNL TOTAL CA: CPT | Performed by: FAMILY MEDICINE

## 2019-03-25 PROCEDURE — 99495 TRANSJ CARE MGMT MOD F2F 14D: CPT | Performed by: FAMILY MEDICINE

## 2019-03-25 RX ORDER — METOPROLOL TARTRATE 50 MG/1
50 TABLET ORAL 2 TIMES DAILY
COMMUNITY
End: 2019-12-24 | Stop reason: SDUPTHER

## 2019-03-25 ASSESSMENT — ENCOUNTER SYMPTOMS
CHEST TIGHTNESS: 0
SHORTNESS OF BREATH: 0
APPETITE CHANGE: 0
FATIGUE: 0
NAUSEA: 0
WHEEZING: 0
VOMITING: 0
PALPITATIONS: 0
SORE THROAT: 0

## 2019-03-25 NOTE — PROGRESS NOTES
"Subjective      Patient ID: Aneesh Brito is a 68 y.o. male.  1950      Patient for follow-up of recent hospitalization for renal biopsy.  Patient underwent biopsy that was uneventful except for a postoperative small hematoma.  During hospitalization he was noted to be hypokalemic.  He is on potassium supplement as per renal.  He is tolerating blood pressure medicine well readings have been good.        The following have been reviewed and updated as appropriate in this visit:       Review of Systems   Constitutional: Negative for appetite change and fatigue.   HENT: Negative for ear pain and sore throat.    Respiratory: Negative for chest tightness, shortness of breath and wheezing.    Cardiovascular: Negative for chest pain, palpitations and leg swelling.   Gastrointestinal: Negative for nausea and vomiting.   Skin: Negative for rash.       Objective     Vitals:    03/25/19 1301 03/25/19 1321   BP: (!) 152/88 122/78   BP Location: Left upper arm    Patient Position: Sitting    Pulse: 80    Resp: 12    Temp: 36.9 °C (98.4 °F)    TempSrc: Tympanic    SpO2: 97%    Weight: 107 kg (235 lb 6.4 oz)    Height: 1.803 m (5' 11\")      Body mass index is 32.83 kg/m².    Physical Exam   Constitutional: He appears well-developed and well-nourished.   HENT:   Head: Normocephalic and atraumatic.   Right Ear: Tympanic membrane and ear canal normal.   Left Ear: Tympanic membrane and ear canal normal.   Nose: Nose normal.   Mouth/Throat: Oropharynx is clear and moist.   Eyes: Conjunctivae are normal.   Neck: Normal range of motion. Neck supple. No thyromegaly present.   Cardiovascular: Normal rate, regular rhythm and normal heart sounds.    No murmur heard.  Pulmonary/Chest: Effort normal and breath sounds normal. He has no wheezes. He has no rales.   Musculoskeletal: He exhibits no edema.   Vitals reviewed.      Assessment/Plan   Diagnoses and all orders for this visit:    Hypertension, benign (Primary)    Hypokalemia  -     " Basic metabolic panel    Blood pressure.  Adequate control.  Patient continue current medication reevaluate in 3 months.  Hypokalemia.  Patient on potassium supplement.  Will check lab work.

## 2019-04-04 ENCOUNTER — TRANSCRIBE ORDERS (OUTPATIENT)
Dept: LAB | Facility: HOSPITAL | Age: 69
End: 2019-04-04

## 2019-04-04 ENCOUNTER — APPOINTMENT (OUTPATIENT)
Dept: LAB | Facility: HOSPITAL | Age: 69
End: 2019-04-04
Attending: INTERNAL MEDICINE
Payer: MEDICARE

## 2019-04-04 DIAGNOSIS — E87.6 HYPOKALEMIA: ICD-10-CM

## 2019-04-04 DIAGNOSIS — I10 ESSENTIAL (PRIMARY) HYPERTENSION: ICD-10-CM

## 2019-04-04 DIAGNOSIS — I10 ESSENTIAL (PRIMARY) HYPERTENSION: Primary | ICD-10-CM

## 2019-04-04 PROCEDURE — 83835 ASSAY OF METANEPHRINES: CPT

## 2019-04-04 PROCEDURE — 84244 ASSAY OF RENIN: CPT

## 2019-04-04 PROCEDURE — 82088 ASSAY OF ALDOSTERONE: CPT

## 2019-04-04 PROCEDURE — 36415 COLL VENOUS BLD VENIPUNCTURE: CPT

## 2019-04-07 LAB — RENIN PLAS-CCNC: 0.21 NG/ML/H (ref 0.25–5.82)

## 2019-04-08 LAB — ALDOST SERPL-MCNC: 9 NG/DL

## 2019-04-09 LAB
METANEPH FREE SERPL-MCNC: <25 PG/ML
METANEPHS SERPL-MCNC: 145 PG/ML
NORMETANEPH FREE SERPL-MCNC: 145 PG/ML

## 2019-04-10 DIAGNOSIS — I10 ESSENTIAL HYPERTENSION: Primary | ICD-10-CM

## 2019-04-10 RX ORDER — LOSARTAN POTASSIUM 100 MG/1
100 TABLET ORAL DAILY
Qty: 30 TABLET | Refills: 6 | Status: SHIPPED | OUTPATIENT
Start: 2019-04-10 | End: 2019-06-25 | Stop reason: SDUPTHER

## 2019-04-12 LAB
CASE RPRT: NORMAL
CLINICAL INFO: NORMAL
PATH REPORT.ADDENDUM SPEC: NORMAL
PATH REPORT.FINAL DX SPEC: NORMAL
PATH REPORT.GROSS SPEC: NORMAL

## 2019-04-17 ENCOUNTER — APPOINTMENT (OUTPATIENT)
Dept: LAB | Facility: HOSPITAL | Age: 69
End: 2019-04-17
Attending: SPECIALIST
Payer: MEDICARE

## 2019-04-17 ENCOUNTER — TRANSCRIBE ORDERS (OUTPATIENT)
Dept: LAB | Facility: HOSPITAL | Age: 69
End: 2019-04-17

## 2019-04-17 DIAGNOSIS — I10 ESSENTIAL (PRIMARY) HYPERTENSION: ICD-10-CM

## 2019-04-17 DIAGNOSIS — M19.90 OSTEOARTHRITIS: ICD-10-CM

## 2019-04-17 DIAGNOSIS — C43.9 MALIGNANT MELANOMA OF SKIN (CMS/HCC): ICD-10-CM

## 2019-04-17 DIAGNOSIS — N18.1 CHRONIC KIDNEY DISEASE, STAGE I: Primary | ICD-10-CM

## 2019-04-17 DIAGNOSIS — K76.0 FATTY (CHANGE OF) LIVER, NOT ELSEWHERE CLASSIFIED: ICD-10-CM

## 2019-04-17 DIAGNOSIS — R80.9 PROTEINURIA: ICD-10-CM

## 2019-04-17 DIAGNOSIS — I72.3 ANEURYSM OF ILIAC ARTERY (CMS/HCC): ICD-10-CM

## 2019-04-17 DIAGNOSIS — N40.0 ENLARGED PROSTATE WITHOUT LOWER URINARY TRACT SYMPTOMS (LUTS): ICD-10-CM

## 2019-04-17 DIAGNOSIS — N18.1 CHRONIC KIDNEY DISEASE, STAGE I: ICD-10-CM

## 2019-04-17 DIAGNOSIS — E11.9 TYPE 2 DIABETES MELLITUS WITHOUT COMPLICATIONS (CMS/HCC): ICD-10-CM

## 2019-04-17 LAB
ANION GAP SERPL CALC-SCNC: 12 MEQ/L (ref 3–15)
BUN SERPL-MCNC: 12 MG/DL (ref 8–20)
CALCIUM SERPL-MCNC: 9.5 MG/DL (ref 8.9–10.3)
CHLORIDE SERPL-SCNC: 102 MEQ/L (ref 98–109)
CO2 SERPL-SCNC: 24 MEQ/L (ref 22–32)
CREAT SERPL-MCNC: 0.9 MG/DL
GFR SERPL CREATININE-BSD FRML MDRD: >60 ML/MIN/1.73M*2
GLUCOSE SERPL-MCNC: 100 MG/DL (ref 70–99)
POTASSIUM SERPL-SCNC: 3.9 MEQ/L (ref 3.6–5.1)
SODIUM SERPL-SCNC: 138 MEQ/L (ref 136–144)

## 2019-04-17 PROCEDURE — 36415 COLL VENOUS BLD VENIPUNCTURE: CPT

## 2019-04-17 PROCEDURE — 80048 BASIC METABOLIC PNL TOTAL CA: CPT

## 2019-06-25 ENCOUNTER — OFFICE VISIT (OUTPATIENT)
Dept: FAMILY MEDICINE | Facility: CLINIC | Age: 69
End: 2019-06-25
Payer: MEDICARE

## 2019-06-25 VITALS
WEIGHT: 221 LBS | OXYGEN SATURATION: 98 % | TEMPERATURE: 97.8 F | HEART RATE: 49 BPM | SYSTOLIC BLOOD PRESSURE: 128 MMHG | RESPIRATION RATE: 16 BRPM | DIASTOLIC BLOOD PRESSURE: 78 MMHG | BODY MASS INDEX: 30.94 KG/M2 | HEIGHT: 71 IN

## 2019-06-25 DIAGNOSIS — N18.1 CHRONIC KIDNEY DISEASE, STAGE I: ICD-10-CM

## 2019-06-25 DIAGNOSIS — E11.9 TYPE 2 DIABETES MELLITUS WITHOUT COMPLICATION, WITHOUT LONG-TERM CURRENT USE OF INSULIN (CMS/HCC): ICD-10-CM

## 2019-06-25 DIAGNOSIS — I10 ESSENTIAL HYPERTENSION: ICD-10-CM

## 2019-06-25 DIAGNOSIS — I10 HYPERTENSION, BENIGN: Primary | ICD-10-CM

## 2019-06-25 LAB
EST. AVERAGE GLUCOSE BLD GHB EST-MCNC: 128 MG/DL
HBA1C MFR BLD HPLC: 6.1 %

## 2019-06-25 PROCEDURE — 99213 OFFICE O/P EST LOW 20 MIN: CPT | Performed by: FAMILY MEDICINE

## 2019-06-25 PROCEDURE — 83036 HEMOGLOBIN GLYCOSYLATED A1C: CPT | Performed by: FAMILY MEDICINE

## 2019-06-25 RX ORDER — FELODIPINE 10 MG/1
TABLET, EXTENDED RELEASE ORAL
COMMUNITY
Start: 2019-05-20 | End: 2019-12-24 | Stop reason: SDUPTHER

## 2019-06-25 RX ORDER — LOSARTAN POTASSIUM 100 MG/1
100 TABLET ORAL DAILY
Qty: 90 TABLET | Refills: 3 | Status: SHIPPED | OUTPATIENT
Start: 2019-06-25 | End: 2019-09-25

## 2019-06-25 ASSESSMENT — ENCOUNTER SYMPTOMS
NAUSEA: 0
DIABETIC ASSOCIATED SYMPTOMS: 0
PALPITATIONS: 0
APPETITE CHANGE: 0
FATIGUE: 0
WHEEZING: 0
SORE THROAT: 0
HYPERTENSION: 1
CHEST TIGHTNESS: 0
VOMITING: 0
SHORTNESS OF BREATH: 0

## 2019-06-25 NOTE — PROGRESS NOTES
"Subjective      Patient ID: Aneesh Brito is a 69 y.o. male.  1950      Patient for follow-up of blood pressure.  Patient with stage I renal disease and focal glomerulosclerosis.  Patient has been purposefully losing weight he has been on weight watchers and lost 15 pounds in the last 3 months.      Hypertension   This is a chronic problem. The current episode started more than 1 year ago. The problem has been gradually improving since onset. Pertinent negatives include no chest pain, palpitations or shortness of breath.   Diabetes   He presents for his follow-up diabetic visit. He has type 2 diabetes mellitus. His disease course has been improving. There are no hypoglycemic associated symptoms. There are no diabetic associated symptoms. Pertinent negatives for diabetes include no chest pain and no fatigue.       The following have been reviewed and updated as appropriate in this visit:       Review of Systems   Constitutional: Negative for appetite change and fatigue.   HENT: Negative for ear pain and sore throat.    Respiratory: Negative for chest tightness, shortness of breath and wheezing.    Cardiovascular: Negative for chest pain, palpitations and leg swelling.   Gastrointestinal: Negative for nausea and vomiting.   Skin: Negative for rash.       Objective     Vitals:    06/25/19 1123 06/25/19 1148   BP: (!) 156/82 128/78   BP Location: Left upper arm    Patient Position: Sitting    Pulse: (!) 49    Resp: 16    Temp: 36.6 °C (97.8 °F)    TempSrc: Oral    SpO2: 98%    Weight: 100 kg (221 lb)    Height: 1.803 m (5' 11\")      Body mass index is 30.82 kg/m².    Physical Exam   Constitutional: He appears well-developed and well-nourished.   HENT:   Head: Normocephalic and atraumatic.   Right Ear: Tympanic membrane and ear canal normal.   Left Ear: Tympanic membrane and ear canal normal.   Nose: Nose normal.   Mouth/Throat: Oropharynx is clear and moist.   Eyes: Conjunctivae are normal.   Neck: Normal range " of motion. Neck supple. No thyromegaly present.   Cardiovascular: Normal rate, regular rhythm and normal heart sounds.    No murmur heard.  Pulmonary/Chest: Effort normal and breath sounds normal. He has no wheezes. He has no rales.   Musculoskeletal: He exhibits no edema.   Vitals reviewed.      Assessment/Plan   Diagnoses and all orders for this visit:    Hypertension, benign (Primary)    Chronic kidney disease, stage I    Type 2 diabetes mellitus without complication, without long-term current use of insulin (CMS/[facility])  -     Hemoglobin A1c    Essential hypertension  -     losartan (COZAAR) 100 mg tablet; Take 1 tablet (100 mg total) by mouth daily.    Blood pressure.  Adequate control.  Patient continue current medication will reevaluate in 3 months.  Stage I kidney disease with focal glomerulosclerosis.  Diabetes.  Will check A1c today.  Encouraged continued weight loss.

## 2019-06-27 ENCOUNTER — TELEPHONE (OUTPATIENT)
Dept: FAMILY MEDICINE | Facility: CLINIC | Age: 69
End: 2019-06-27

## 2019-09-25 ENCOUNTER — OFFICE VISIT (OUTPATIENT)
Dept: FAMILY MEDICINE | Facility: CLINIC | Age: 69
End: 2019-09-25
Payer: MEDICARE

## 2019-09-25 VITALS
DIASTOLIC BLOOD PRESSURE: 74 MMHG | SYSTOLIC BLOOD PRESSURE: 122 MMHG | OXYGEN SATURATION: 98 % | WEIGHT: 217.8 LBS | BODY MASS INDEX: 30.49 KG/M2 | HEIGHT: 71 IN | HEART RATE: 53 BPM | RESPIRATION RATE: 12 BRPM | TEMPERATURE: 98.5 F

## 2019-09-25 DIAGNOSIS — E11.9 TYPE 2 DIABETES MELLITUS WITHOUT COMPLICATION, WITHOUT LONG-TERM CURRENT USE OF INSULIN (CMS/HCC): ICD-10-CM

## 2019-09-25 DIAGNOSIS — I10 HYPERTENSION, BENIGN: Primary | ICD-10-CM

## 2019-09-25 DIAGNOSIS — Z23 NEED FOR IMMUNIZATION AGAINST INFLUENZA: ICD-10-CM

## 2019-09-25 PROBLEM — Z96.1 PSEUDOPHAKIA OF BOTH EYES: Status: ACTIVE | Noted: 2019-09-25

## 2019-09-25 PROBLEM — H01.116 CONTACT DERMATITIS OF LEFT EYELID: Status: ACTIVE | Noted: 2019-09-25

## 2019-09-25 PROBLEM — H40.1190 PRIMARY OPEN ANGLE GLAUCOMA (POAG): Status: ACTIVE | Noted: 2019-09-25

## 2019-09-25 PROBLEM — H01.113 CONTACT DERMATITIS OF RIGHT EYELID: Status: ACTIVE | Noted: 2019-09-25

## 2019-09-25 PROBLEM — H04.123 DRY EYE SYNDROME OF BILATERAL LACRIMAL GLANDS: Status: ACTIVE | Noted: 2019-09-25

## 2019-09-25 LAB
ANION GAP SERPL CALC-SCNC: 11 MEQ/L (ref 3–15)
BUN SERPL-MCNC: 13 MG/DL (ref 8–20)
CALCIUM SERPL-MCNC: 9.6 MG/DL (ref 8.9–10.3)
CHLORIDE SERPL-SCNC: 102 MEQ/L (ref 98–109)
CO2 SERPL-SCNC: 28 MEQ/L (ref 22–32)
CREAT SERPL-MCNC: 0.8 MG/DL
GFR SERPL CREATININE-BSD FRML MDRD: >60 ML/MIN/1.73M*2
GLUCOSE SERPL-MCNC: 112 MG/DL (ref 70–99)
POTASSIUM SERPL-SCNC: 3.5 MEQ/L (ref 3.6–5.1)
SODIUM SERPL-SCNC: 141 MEQ/L (ref 136–144)

## 2019-09-25 PROCEDURE — 83036 HEMOGLOBIN GLYCOSYLATED A1C: CPT | Performed by: FAMILY MEDICINE

## 2019-09-25 PROCEDURE — G0008 ADMIN INFLUENZA VIRUS VAC: HCPCS | Performed by: FAMILY MEDICINE

## 2019-09-25 PROCEDURE — 80048 BASIC METABOLIC PNL TOTAL CA: CPT | Performed by: FAMILY MEDICINE

## 2019-09-25 PROCEDURE — 99214 OFFICE O/P EST MOD 30 MIN: CPT | Mod: 25 | Performed by: FAMILY MEDICINE

## 2019-09-25 PROCEDURE — 90653 IIV ADJUVANT VACCINE IM: CPT | Performed by: FAMILY MEDICINE

## 2019-09-25 RX ORDER — LOSARTAN POTASSIUM 50 MG/1
50 TABLET ORAL DAILY
Qty: 90 TABLET | Refills: 1 | Status: SHIPPED | OUTPATIENT
Start: 2019-09-25 | End: 2019-12-24 | Stop reason: SDUPTHER

## 2019-09-25 ASSESSMENT — ENCOUNTER SYMPTOMS
FATIGUE: 0
PALPITATIONS: 0
NAUSEA: 0
APPETITE CHANGE: 0
WHEEZING: 0
DIABETIC ASSOCIATED SYMPTOMS: 0
CHEST TIGHTNESS: 0
SORE THROAT: 0
SHORTNESS OF BREATH: 0
VOMITING: 0

## 2019-09-25 NOTE — PROGRESS NOTES
"Subjective      Patient ID: Aneesh Brito is a 69 y.o. male.  1950      Patient for follow-up of blood pressure and blood sugar.  Patient also has had some hypokalemia currently on potassium replacement.  Patient has made dramatic changes in his diet and has lost 20 pounds in the past 6 months.  He also continues to exercise and feels well doing that.  His last A1c was 6.1.  His last potassium was 3.9      Diabetes   He presents for his follow-up diabetic visit. He has type 2 diabetes mellitus. His disease course has been improving. There are no hypoglycemic associated symptoms. There are no diabetic associated symptoms. Pertinent negatives for diabetes include no chest pain and no fatigue.       The following have been reviewed and updated as appropriate in this visit:       Review of Systems   Constitutional: Negative for appetite change and fatigue.   HENT: Negative for ear pain and sore throat.    Respiratory: Negative for chest tightness, shortness of breath and wheezing.    Cardiovascular: Negative for chest pain, palpitations and leg swelling.   Gastrointestinal: Negative for nausea and vomiting.   Skin: Negative for rash.       Objective     Vitals:    09/25/19 0939 09/25/19 1004   BP: (!) 158/86 122/74   BP Location: Left upper arm    Patient Position: Sitting    Pulse: (!) 53    Resp: 12    Temp: 36.9 °C (98.5 °F)    TempSrc: Tympanic    SpO2: 98%    Weight: 98.8 kg (217 lb 12.8 oz)    Height: 1.803 m (5' 11\")      Body mass index is 30.38 kg/m².    Physical Exam   Constitutional: He appears well-developed and well-nourished.   HENT:   Head: Normocephalic and atraumatic.   Right Ear: Tympanic membrane and ear canal normal.   Left Ear: Tympanic membrane and ear canal normal.   Nose: Nose normal.   Mouth/Throat: Oropharynx is clear and moist.   Eyes: Conjunctivae are normal.   Neck: Normal range of motion. Neck supple. No thyromegaly present.   Cardiovascular: Normal rate, regular rhythm and normal " heart sounds.    No murmur heard.  1+ ankle edema   Pulmonary/Chest: Effort normal and breath sounds normal. He has no wheezes. He has no rales.   Musculoskeletal: He exhibits edema.   Vitals reviewed.      Assessment/Plan   Diagnoses and all orders for this visit:    Hypertension, benign (Primary)  -     losartan (COZAAR) 50 mg tablet; Take 1 tablet (50 mg total) by mouth daily.    Type 2 diabetes mellitus without complication, without long-term current use of insulin (CMS/[facility])  -     Basic metabolic panel  -     Hemoglobin A1c    Need for immunization against influenza  -     Influenza vaccine 65 and older IM preservative free (FluAd)    Blood pressure excellent control with weight loss.  Will have patient decrease losartan to 50 once a day plan to recheck in 3 months.  Diabetes also markedly improving.  Will check lab work today.  Also repeat potassium today.

## 2019-09-26 LAB
EST. AVERAGE GLUCOSE BLD GHB EST-MCNC: 123 MG/DL
HBA1C MFR BLD HPLC: 5.9 %

## 2019-10-16 ENCOUNTER — TRANSCRIBE ORDERS (OUTPATIENT)
Dept: LAB | Facility: HOSPITAL | Age: 69
End: 2019-10-16

## 2019-10-16 ENCOUNTER — APPOINTMENT (OUTPATIENT)
Dept: LAB | Facility: HOSPITAL | Age: 69
End: 2019-10-16
Attending: SPECIALIST
Payer: MEDICARE

## 2019-10-16 DIAGNOSIS — C43.9 MALIGNANT MELANOMA OF SKIN, UNSPECIFIED: ICD-10-CM

## 2019-10-16 DIAGNOSIS — M19.90 UNSPECIFIED OSTEOARTHRITIS, UNSPECIFIED SITE: ICD-10-CM

## 2019-10-16 DIAGNOSIS — E11.9 TYPE 2 DIABETES MELLITUS WITHOUT COMPLICATIONS (CMS/HCC): ICD-10-CM

## 2019-10-16 DIAGNOSIS — N40.0 BENIGN PROSTATIC HYPERPLASIA WITHOUT LOWER URINARY TRACT SYMPTOMS: ICD-10-CM

## 2019-10-16 DIAGNOSIS — I72.3 ANEURYSM OF ILIAC ARTERY (CMS/HCC): ICD-10-CM

## 2019-10-16 DIAGNOSIS — I10 ESSENTIAL (PRIMARY) HYPERTENSION: ICD-10-CM

## 2019-10-16 DIAGNOSIS — R80.9 PROTEINURIA, UNSPECIFIED: ICD-10-CM

## 2019-10-16 DIAGNOSIS — N18.1 CHRONIC KIDNEY DISEASE, STAGE 1: ICD-10-CM

## 2019-10-16 DIAGNOSIS — K76.0 FATTY (CHANGE OF) LIVER, NOT ELSEWHERE CLASSIFIED: ICD-10-CM

## 2019-10-16 DIAGNOSIS — N18.1 CHRONIC KIDNEY DISEASE, STAGE 1: Primary | ICD-10-CM

## 2019-10-16 LAB
25(OH)D3 SERPL-MCNC: 36 NG/ML (ref 30–100)
ALBUMIN SERPL-MCNC: 3.7 G/DL (ref 3.4–5)
ALP SERPL-CCNC: 68 IU/L (ref 35–126)
ALT SERPL-CCNC: 18 IU/L (ref 16–63)
ANION GAP SERPL CALC-SCNC: 11 MEQ/L (ref 3–15)
AST SERPL-CCNC: 17 IU/L (ref 15–41)
BACTERIA URNS QL MICRO: ABNORMAL /HPF
BILIRUB SERPL-MCNC: 0.6 MG/DL (ref 0.3–1.2)
BILIRUB UR QL STRIP.AUTO: NEGATIVE MG/DL
BUN SERPL-MCNC: 12 MG/DL (ref 8–20)
CALCIUM SERPL-MCNC: 8.8 MG/DL (ref 8.9–10.3)
CALCIUM SERPL-MCNC: 8.8 MG/DL (ref 8.9–10.3)
CHLORIDE SERPL-SCNC: 103 MEQ/L (ref 98–109)
CLARITY UR REFRACT.AUTO: CLEAR
CO2 SERPL-SCNC: 27 MEQ/L (ref 22–32)
COLOR UR AUTO: YELLOW
CREAT SERPL-MCNC: 0.8 MG/DL
ERYTHROCYTE [DISTWIDTH] IN BLOOD BY AUTOMATED COUNT: 13.9 % (ref 11.6–14.4)
EST. AVERAGE GLUCOSE BLD GHB EST-MCNC: 126 MG/DL
GFR SERPL CREATININE-BSD FRML MDRD: >60 ML/MIN/1.73M*2
GLUCOSE SERPL-MCNC: 170 MG/DL (ref 70–99)
GLUCOSE UR STRIP.AUTO-MCNC: NEGATIVE MG/DL
HBA1C MFR BLD HPLC: 6 %
HCT VFR BLDCO AUTO: 39.4 % (ref 40.1–51)
HGB BLD-MCNC: 13.2 G/DL
HGB UR QL STRIP.AUTO: NEGATIVE
HYALINE CASTS #/AREA URNS LPF: ABNORMAL /LPF
KETONES UR STRIP.AUTO-MCNC: NEGATIVE MG/DL
LEUKOCYTE ESTERASE UR QL STRIP.AUTO: NEGATIVE
MCH RBC QN AUTO: 31.4 PG (ref 28–33.2)
MCHC RBC AUTO-ENTMCNC: 33.5 G/DL (ref 32.2–36.5)
MCV RBC AUTO: 93.8 FL (ref 83–98)
NITRITE UR QL STRIP.AUTO: NEGATIVE
PDW BLD AUTO: 11.5 FL (ref 9.4–12.4)
PH UR STRIP.AUTO: 7 [PH]
PHOSPHATE SERPL-MCNC: 3.9 MG/DL (ref 2.4–4.7)
PLATELET # BLD AUTO: 211 K/UL
POTASSIUM SERPL-SCNC: 3.5 MEQ/L (ref 3.6–5.1)
PROT SERPL-MCNC: 6.3 G/DL (ref 6–8.2)
PROT UR QL STRIP.AUTO: 2
PTH-INTACT SERPL-MCNC: 62.3 PG/ML (ref 12–88)
RBC # BLD AUTO: 4.2 M/UL (ref 4.5–5.8)
RBC #/AREA URNS HPF: ABNORMAL /HPF
SODIUM SERPL-SCNC: 141 MEQ/L (ref 136–144)
SP GR UR REFRACT.AUTO: 1.01
SQUAMOUS URNS QL MICRO: ABNORMAL /HPF
UROBILINOGEN UR STRIP-ACNC: 0.2 EU/DL
WBC # BLD AUTO: 6.24 K/UL
WBC #/AREA URNS HPF: ABNORMAL /HPF

## 2019-10-16 PROCEDURE — 80053 COMPREHEN METABOLIC PANEL: CPT

## 2019-10-16 PROCEDURE — 85027 COMPLETE CBC AUTOMATED: CPT

## 2019-10-16 PROCEDURE — 36415 COLL VENOUS BLD VENIPUNCTURE: CPT

## 2019-10-16 PROCEDURE — 83036 HEMOGLOBIN GLYCOSYLATED A1C: CPT

## 2019-10-16 PROCEDURE — 81003 URINALYSIS AUTO W/O SCOPE: CPT

## 2019-10-16 PROCEDURE — 82306 VITAMIN D 25 HYDROXY: CPT | Mod: GA

## 2019-10-16 PROCEDURE — 83970 ASSAY OF PARATHORMONE: CPT

## 2019-10-16 PROCEDURE — 84100 ASSAY OF PHOSPHORUS: CPT

## 2019-11-21 DIAGNOSIS — E11.29 TYPE 2 DIABETES MELLITUS WITH MICROALBUMINURIA, WITHOUT LONG-TERM CURRENT USE OF INSULIN (CMS/HCC): ICD-10-CM

## 2019-11-21 DIAGNOSIS — R80.9 TYPE 2 DIABETES MELLITUS WITH MICROALBUMINURIA, WITHOUT LONG-TERM CURRENT USE OF INSULIN (CMS/HCC): ICD-10-CM

## 2019-11-21 RX ORDER — METFORMIN HYDROCHLORIDE 500 MG/1
500 TABLET ORAL 2 TIMES DAILY WITH MEALS
Qty: 60 TABLET | Refills: 6 | Status: SHIPPED | OUTPATIENT
Start: 2019-11-21 | End: 2020-04-21 | Stop reason: SDUPTHER

## 2019-11-21 NOTE — TELEPHONE ENCOUNTER
Patent would like a refill  Medicine Refill Request    Last Office Visit: 9/25/2019  Next Office Visit: 12/24/2019        Current Outpatient Medications:   •  brimonidine (ALPHAGAN) 0.15 % ophthalmic solution, Administer 1 drop into both eyes 2 (two) times a day. , Disp: , Rfl:   •  felodipine (PLENDIL) 10 mg 24 hr tablet, , Disp: , Rfl:   •  felodipine (PLENDIL) 2.5 mg 24 hr tablet, 10 mg daily.  , Disp: , Rfl:   •  finasteride (PROSCAR) 5 mg tablet, TAKE ONE TABLET EVERY DAY, Disp: 90 tablet, Rfl: 3  •  losartan (COZAAR) 50 mg tablet, Take 1 tablet (50 mg total) by mouth daily., Disp: 90 tablet, Rfl: 1  •  metFORMIN (GLUCOPHAGE) 500 mg tablet, Take 1 tablet (500 mg total) by mouth 2 (two) times a day with meals., Disp: 60 tablet, Rfl: 11  •  metoprolol tartrate (LOPRESSOR) 50 mg tablet, Take 50 mg by mouth 2 (two) times a day., Disp: , Rfl:   •  potassium chloride (KLOR-CON) 10 mEq CR tablet, Take 2 tablets (20 mEq total) by mouth daily., Disp: 60 tablet, Rfl: 0  •  potassium chloride (KLOR-CON) 10 mEq CR tablet, TAKE ONE TABLET DAILY, Disp: 30 tablet, Rfl: 6  •  sildenafil (VIAGRA) 50 mg tablet, as needed. , Disp: , Rfl:   •  timolol maleate (ISTALOL) 0.5 % drops, once daily, , Disp: , Rfl:   •  VYZULTA 0.024 % drops, 1 drop nightly. , Disp: , Rfl:       BP Readings from Last 3 Encounters:   09/25/19 122/74   06/25/19 128/78   03/25/19 122/78       Recent Lab results:  Lab Results   Component Value Date    CHOL 160 03/19/2019   ,   Lab Results   Component Value Date    HDL 32 (L) 03/19/2019   ,   Lab Results   Component Value Date    LDLCALC 98 03/19/2019   ,   Lab Results   Component Value Date    TRIG 151 (H) 03/19/2019        Lab Results   Component Value Date    GLUCOSE 170 (H) 10/16/2019   ,   Lab Results   Component Value Date    HGBA1C 6.0 (H) 10/16/2019         Lab Results   Component Value Date    CREATININE 0.8 10/16/2019       No results found for: TSH

## 2019-12-24 ENCOUNTER — OFFICE VISIT (OUTPATIENT)
Dept: FAMILY MEDICINE | Facility: CLINIC | Age: 69
End: 2019-12-24
Payer: MEDICARE

## 2019-12-24 VITALS
RESPIRATION RATE: 16 BRPM | BODY MASS INDEX: 31.19 KG/M2 | HEIGHT: 71 IN | HEART RATE: 57 BPM | TEMPERATURE: 98.6 F | DIASTOLIC BLOOD PRESSURE: 82 MMHG | OXYGEN SATURATION: 99 % | WEIGHT: 222.8 LBS | SYSTOLIC BLOOD PRESSURE: 124 MMHG

## 2019-12-24 DIAGNOSIS — I10 HYPERTENSION, BENIGN: Primary | ICD-10-CM

## 2019-12-24 DIAGNOSIS — Z00.00 ENCOUNTER FOR GENERAL ADULT MEDICAL EXAMINATION WITHOUT ABNORMAL FINDINGS: ICD-10-CM

## 2019-12-24 DIAGNOSIS — N18.1 CHRONIC KIDNEY DISEASE, STAGE I: ICD-10-CM

## 2019-12-24 DIAGNOSIS — E87.6 HYPOKALEMIA: ICD-10-CM

## 2019-12-24 DIAGNOSIS — C43.9 MALIGNANT MELANOMA OF SKIN (CMS/HCC): ICD-10-CM

## 2019-12-24 DIAGNOSIS — N40.1 BENIGN PROSTATIC HYPERPLASIA WITH LOWER URINARY TRACT SYMPTOMS, SYMPTOM DETAILS UNSPECIFIED: ICD-10-CM

## 2019-12-24 DIAGNOSIS — H40.1130 PRIMARY OPEN ANGLE GLAUCOMA OF BOTH EYES, UNSPECIFIED GLAUCOMA STAGE: ICD-10-CM

## 2019-12-24 DIAGNOSIS — Z96.1 PSEUDOPHAKIA OF BOTH EYES: ICD-10-CM

## 2019-12-24 DIAGNOSIS — M48.061 SPINAL STENOSIS OF LUMBAR REGION WITHOUT NEUROGENIC CLAUDICATION: ICD-10-CM

## 2019-12-24 DIAGNOSIS — E11.9 TYPE 2 DIABETES MELLITUS WITHOUT COMPLICATION, WITHOUT LONG-TERM CURRENT USE OF INSULIN (CMS/HCC): ICD-10-CM

## 2019-12-24 PROBLEM — H01.113 CONTACT DERMATITIS OF RIGHT EYELID: Status: RESOLVED | Noted: 2019-09-25 | Resolved: 2019-12-24

## 2019-12-24 PROBLEM — N18.9 CKD (CHRONIC KIDNEY DISEASE): Status: RESOLVED | Noted: 2019-03-21 | Resolved: 2019-12-24

## 2019-12-24 PROBLEM — H01.116 CONTACT DERMATITIS OF LEFT EYELID: Status: RESOLVED | Noted: 2019-09-25 | Resolved: 2019-12-24

## 2019-12-24 LAB
ALBUMIN SERPL-MCNC: 3.8 G/DL (ref 3.4–5)
ALP SERPL-CCNC: 66 IU/L (ref 35–126)
ALT SERPL-CCNC: 21 IU/L (ref 16–63)
ANION GAP SERPL CALC-SCNC: 8 MEQ/L (ref 3–15)
AST SERPL-CCNC: 21 IU/L (ref 15–41)
BACTERIA URNS QL MICRO: ABNORMAL /HPF
BILIRUB SERPL-MCNC: 0.7 MG/DL (ref 0.3–1.2)
BILIRUB UR QL STRIP.AUTO: NEGATIVE MG/DL
BUN SERPL-MCNC: 15 MG/DL (ref 8–20)
CALCIUM SERPL-MCNC: 9.4 MG/DL (ref 8.9–10.3)
CAOX CRY URNS QL MICRO: 2 /HPF
CHLORIDE SERPL-SCNC: 105 MEQ/L (ref 98–109)
CHOLEST SERPL-MCNC: 181 MG/DL
CLARITY UR REFRACT.AUTO: CLEAR
CO2 SERPL-SCNC: 27 MEQ/L (ref 22–32)
COLOR UR AUTO: YELLOW
CREAT SERPL-MCNC: 0.8 MG/DL
ERYTHROCYTE [DISTWIDTH] IN BLOOD BY AUTOMATED COUNT: 13.6 % (ref 11.6–14.4)
EST. AVERAGE GLUCOSE BLD GHB EST-MCNC: 126 MG/DL
GFR SERPL CREATININE-BSD FRML MDRD: >60 ML/MIN/1.73M*2
GLUCOSE SERPL-MCNC: 111 MG/DL (ref 70–99)
GLUCOSE UR STRIP.AUTO-MCNC: NEGATIVE MG/DL
HBA1C MFR BLD HPLC: 6 %
HCT VFR BLDCO AUTO: 43.1 % (ref 40.1–51)
HDLC SERPL-MCNC: 39 MG/DL
HDLC SERPL: 4.6 {RATIO}
HGB BLD-MCNC: 14.5 G/DL
HGB UR QL STRIP.AUTO: NEGATIVE
HYALINE CASTS #/AREA URNS LPF: ABNORMAL /LPF
KETONES UR STRIP.AUTO-MCNC: NEGATIVE MG/DL
LDLC SERPL CALC-MCNC: 122 MG/DL
LEUKOCYTE ESTERASE UR QL STRIP.AUTO: NEGATIVE
MCH RBC QN AUTO: 31.7 PG (ref 28–33.2)
MCHC RBC AUTO-ENTMCNC: 33.6 G/DL (ref 32.2–36.5)
MCV RBC AUTO: 94.3 FL (ref 83–98)
NITRITE UR QL STRIP.AUTO: NEGATIVE
NONHDLC SERPL-MCNC: 142 MG/DL
PDW BLD AUTO: 11.3 FL (ref 9.4–12.4)
PH UR STRIP.AUTO: 6.5 [PH]
PLATELET # BLD AUTO: 241 K/UL
POTASSIUM SERPL-SCNC: 3.4 MEQ/L (ref 3.6–5.1)
PROT SERPL-MCNC: 6.4 G/DL (ref 6–8.2)
PROT UR QL STRIP.AUTO: 2
PSA SERPL-MCNC: 0.13 NG/ML
RBC # BLD AUTO: 4.57 M/UL (ref 4.5–5.8)
RBC #/AREA URNS HPF: ABNORMAL /HPF
SODIUM SERPL-SCNC: 140 MEQ/L (ref 136–144)
SP GR UR REFRACT.AUTO: 1.02
SQUAMOUS #/AREA URNS HPF: ABNORMAL /HPF
TRIGL SERPL-MCNC: 102 MG/DL (ref 30–149)
UROBILINOGEN UR STRIP-ACNC: 0.2 EU/DL
WBC # BLD AUTO: 7.11 K/UL
WBC #/AREA URNS HPF: ABNORMAL /HPF

## 2019-12-24 PROCEDURE — 80053 COMPREHEN METABOLIC PANEL: CPT | Performed by: FAMILY MEDICINE

## 2019-12-24 PROCEDURE — 84153 ASSAY OF PSA TOTAL: CPT | Performed by: FAMILY MEDICINE

## 2019-12-24 PROCEDURE — G0439 PPPS, SUBSEQ VISIT: HCPCS | Performed by: FAMILY MEDICINE

## 2019-12-24 PROCEDURE — 82043 UR ALBUMIN QUANTITATIVE: CPT | Performed by: FAMILY MEDICINE

## 2019-12-24 PROCEDURE — 80061 LIPID PANEL: CPT | Performed by: FAMILY MEDICINE

## 2019-12-24 PROCEDURE — 81003 URINALYSIS AUTO W/O SCOPE: CPT | Performed by: FAMILY MEDICINE

## 2019-12-24 PROCEDURE — 82570 ASSAY OF URINE CREATININE: CPT | Performed by: FAMILY MEDICINE

## 2019-12-24 PROCEDURE — 83036 HEMOGLOBIN GLYCOSYLATED A1C: CPT | Performed by: FAMILY MEDICINE

## 2019-12-24 PROCEDURE — 85027 COMPLETE CBC AUTOMATED: CPT | Performed by: FAMILY MEDICINE

## 2019-12-24 RX ORDER — METOPROLOL TARTRATE 50 MG/1
50 TABLET ORAL 2 TIMES DAILY
Qty: 90 TABLET | Refills: 3 | Status: SHIPPED | OUTPATIENT
Start: 2019-12-24 | End: 2020-02-28 | Stop reason: SDUPTHER

## 2019-12-24 RX ORDER — LOSARTAN POTASSIUM 50 MG/1
50 TABLET ORAL DAILY
Qty: 90 TABLET | Refills: 1 | Status: SHIPPED | OUTPATIENT
Start: 2019-12-24 | End: 2020-02-28 | Stop reason: SDUPTHER

## 2019-12-24 RX ORDER — FELODIPINE 10 MG/1
10 TABLET, EXTENDED RELEASE ORAL DAILY
Qty: 90 TABLET | Refills: 3 | Status: SHIPPED | OUTPATIENT
Start: 2019-12-24 | End: 2020-02-28 | Stop reason: SDUPTHER

## 2019-12-24 ASSESSMENT — ENCOUNTER SYMPTOMS
VOMITING: 0
EYE DISCHARGE: 0
FREQUENCY: 0
ABDOMINAL PAIN: 0
TREMORS: 0
WEAKNESS: 0
CHEST TIGHTNESS: 0
CONSTIPATION: 0
SINUS PAIN: 0
DIARRHEA: 0
ACTIVITY CHANGE: 0
BLOOD IN STOOL: 0
COUGH: 0
DYSURIA: 0
HEMATURIA: 0
EYE PAIN: 0
PALPITATIONS: 0
FATIGUE: 0
HEADACHES: 0
DIZZINESS: 0
JOINT SWELLING: 0
SORE THROAT: 0
ARTHRALGIAS: 0
NUMBNESS: 0
SHORTNESS OF BREATH: 0
APPETITE CHANGE: 0
NAUSEA: 0

## 2019-12-24 ASSESSMENT — ACTIVITIES OF DAILY LIVING (ADL)
PATIENT'S MEMORY ADEQUATE TO SAFELY COMPLETE DAILY ACTIVITIES?: YES
ADEQUATE_TO_COMPLETE_ADL: YES

## 2019-12-24 ASSESSMENT — MINI COG
TOTAL SCORE: 5
COMPLETED: YES

## 2019-12-24 NOTE — PROGRESS NOTES
Subjective      Patient ID: Aneesh Brito is a 69 y.o. male.  1950      Patient annual exam.  He has no new medical complaints today.  Patient had been doing weight watchers.  Unfortunately he has gained a few pounds back.  Patient is .  His children are all grown he is multiple grandchildren.  He typically has 2-3 meals per day.  He does exercise on a regular basis.  He typically sleeps well at night with occasional nocturia.  His blood pressure is under good control.  He has been seen by nephrology for a grade 1 renal disease.  They recently suggested patient be on Invokana.  Patient also with diabetes.  This is been under reasonable control, tolerating current medication.  His last A1c was 6.0.  He is followed by dermatology for history of melanoma.   has a past medical history of Diverticulosis of colon, Enlarged prostate with lower urinary tract symptoms (LUTS), Hypertension, Melanoma of skin (CMS/HCC), Osteoarthritis of knee, Overweight, Spinal stenosis of lumbar region, and Type 2 diabetes mellitus (CMS/[facility]).  Past Surgical History:  No date: APPENDECTOMY  01/10/2012: COLONOSCOPY      Comment:  diverticulosis  No date: HIP ARTHROPLASTY; Bilateral  No date: KNEE ARTHROPLASTY; Left      The following have been reviewed and updated as appropriate in this visit:  Fam Hx       Review of Systems   Constitutional: Negative for activity change, appetite change and fatigue.   HENT: Negative for congestion, hearing loss, postnasal drip, sinus pain and sore throat.    Eyes: Negative for pain and discharge.   Respiratory: Negative for cough, chest tightness and shortness of breath.    Cardiovascular: Negative for chest pain and palpitations.   Gastrointestinal: Negative for abdominal pain, blood in stool, constipation, diarrhea, nausea and vomiting.   Genitourinary: Negative for dysuria, frequency and hematuria.   Musculoskeletal: Negative for arthralgias and joint swelling.   Neurological: Negative for  "dizziness, tremors, weakness, numbness and headaches.       Objective     Vitals:    12/24/19 0850   BP: 124/82   BP Location: Left upper arm   Patient Position: Sitting   Pulse: (!) 57   Resp: 16   Temp: 37 °C (98.6 °F)   TempSrc: Oral   SpO2: 99%   Weight: 101 kg (222 lb 12.8 oz)   Height: 1.803 m (5' 11\")     Body mass index is 31.07 kg/m².    Physical Exam   Constitutional: He is oriented to person, place, and time. He appears well-developed and well-nourished.   HENT:   Head: Normocephalic and atraumatic.   Right Ear: Hearing, tympanic membrane, external ear and ear canal normal.   Left Ear: Hearing, tympanic membrane, external ear and ear canal normal.   Nose: Nose normal.   Mouth/Throat: Uvula is midline, oropharynx is clear and moist and mucous membranes are normal.   Eyes: Pupils are equal, round, and reactive to light. Conjunctivae and EOM are normal.   Neck: Normal range of motion. Neck supple. No thyromegaly present.   Cardiovascular: Normal rate, regular rhythm, normal heart sounds and intact distal pulses.   Pulses:       Dorsalis pedis pulses are 2+ on the right side, and 2+ on the left side.        Posterior tibial pulses are 2+ on the right side, and 2+ on the left side.   Pulmonary/Chest: Effort normal and breath sounds normal.   Abdominal: Soft. Bowel sounds are normal. He exhibits no mass. There is no tenderness.   Genitourinary: Rectum normal and penis normal. Rectal exam shows guaiac negative stool. Prostate is enlarged.   Musculoskeletal: Normal range of motion. He exhibits no edema.   Lymphadenopathy:     He has no cervical adenopathy.   Neurological: He is alert and oriented to person, place, and time. No cranial nerve deficit. He exhibits normal muscle tone.   Skin: Skin is warm and dry.   Multiple benign nevi.  Seborrheic keratosis left side of face at temporal area   Psychiatric: He has a normal mood and affect.   Vitals reviewed.      Assessment/Plan   Diagnoses and all orders for this " visit:    Hypertension, benign (Primary)  -     losartan (COZAAR) 50 mg tablet; Take 1 tablet (50 mg total) by mouth daily.  -     metoprolol tartrate (LOPRESSOR) 50 mg tablet; Take 1 tablet (50 mg total) by mouth 2 (two) times a day.  -     felodipine (PLENDIL) 10 mg 24 hr tablet; Take 1 tablet (10 mg total) by mouth daily.  -     Comprehensive metabolic panel  -     Lipid panel  -     CBC    Chronic kidney disease, stage I    Type 2 diabetes mellitus without complication, without long-term current use of insulin (CMS/HCC)  -     Hemoglobin A1c  -     Urinalysis with microscopic  -     MICROALBUMIN, RANDOM URINE  -     UA Macroscopic  -     UA Microscopic    Benign prostatic hyperplasia with lower urinary tract symptoms, symptom details unspecified  -     PSA    Hypokalemia    Spinal stenosis of lumbar region without neurogenic claudication    Malignant melanoma of skin (CMS/HCC)    Primary open angle glaucoma of both eyes, unspecified glaucoma stage    Pseudophakia of both eyes    Encounter for general adult medical examination without abnormal findings    Blood pressure.  Adequate control.  Patient continue current medication reevaluate in 3 months.  Chronic kidney disease.  This is stable.  Patient followed by nephrology.  Type 2 diabetes.  Will check lab work.  Diet exercise discussed.  Long discussion about medication.  Patient elects not to go on Invokana at this point in time.  Hypokalemia.  This is chronic patient remains on medication.  Spinal stenosis.  Currently asymptomatic no new intervention continue exercise  Melanoma.  Patient continue follow-up with dermatology on a regular basis.  Glaucoma history.  Patient continue follow-up with ophthalmology  Pseudophakia stable  Annual exam.  Daily routine discussed at length.  Recommend 3 meals per day no snacking.  Recommend regular low impact exercise.  Patient continue to limit caffeine and alcohol.

## 2019-12-24 NOTE — PATIENT INSTRUCTIONS
Recommend 3 meals per day no snacking.  Continue regular exercise  Continue current medication regimen

## 2019-12-26 ENCOUNTER — TELEPHONE (OUTPATIENT)
Dept: FAMILY MEDICINE | Facility: CLINIC | Age: 69
End: 2019-12-26

## 2019-12-26 LAB
ALBUMIN/CREAT UR: 1153.4 UG/MG
CREAT UR-MCNC: 82 MG/DL
MICROALBUMIN UR-MCNC: 945.8 MG/L

## 2019-12-27 ENCOUNTER — TELEPHONE (OUTPATIENT)
Dept: FAMILY MEDICINE | Facility: CLINIC | Age: 69
End: 2019-12-27

## 2019-12-27 NOTE — TELEPHONE ENCOUNTER
Patient calling to go over lab results from 12/24/19. Please give patient a call a @ [phone] anytime is good for him.

## 2020-02-28 DIAGNOSIS — I10 HYPERTENSION, BENIGN: ICD-10-CM

## 2020-02-28 RX ORDER — METOPROLOL TARTRATE 50 MG/1
50 TABLET ORAL 2 TIMES DAILY
Qty: 90 TABLET | Refills: 3 | Status: SHIPPED | OUTPATIENT
Start: 2020-02-28 | End: 2020-04-21 | Stop reason: SDUPTHER

## 2020-02-28 RX ORDER — FELODIPINE 10 MG/1
10 TABLET, EXTENDED RELEASE ORAL DAILY
Qty: 90 TABLET | Refills: 3 | Status: SHIPPED | OUTPATIENT
Start: 2020-02-28 | End: 2020-04-21 | Stop reason: SDUPTHER

## 2020-02-28 RX ORDER — LOSARTAN POTASSIUM 50 MG/1
50 TABLET ORAL DAILY
Qty: 90 TABLET | Refills: 1 | Status: SHIPPED | OUTPATIENT
Start: 2020-02-28 | End: 2020-04-21 | Stop reason: SDUPTHER

## 2020-02-28 NOTE — TELEPHONE ENCOUNTER
Refill request    Medicine Refill Request    Last Office Visit: 12/24/2019  Next Office Visit: 3/24/2020        Current Outpatient Medications:   •  brimonidine (ALPHAGAN) 0.15 % ophthalmic solution, Administer 1 drop into both eyes 2 (two) times a day. , Disp: , Rfl:   •  felodipine (PLENDIL) 10 mg 24 hr tablet, Take 1 tablet (10 mg total) by mouth daily., Disp: 90 tablet, Rfl: 3  •  finasteride (PROSCAR) 5 mg tablet, TAKE ONE TABLET EVERY DAY, Disp: 90 tablet, Rfl: 3  •  losartan (COZAAR) 50 mg tablet, Take 1 tablet (50 mg total) by mouth daily., Disp: 90 tablet, Rfl: 1  •  metFORMIN (GLUCOPHAGE) 500 mg tablet, Take 1 tablet (500 mg total) by mouth 2 (two) times a day with meals., Disp: 60 tablet, Rfl: 6  •  metoprolol tartrate (LOPRESSOR) 50 mg tablet, Take 1 tablet (50 mg total) by mouth 2 (two) times a day., Disp: 90 tablet, Rfl: 3  •  potassium chloride (KLOR-CON) 10 mEq CR tablet, TAKE ONE TABLET DAILY, Disp: 30 tablet, Rfl: 6  •  sildenafil (VIAGRA) 50 mg tablet, as needed. , Disp: , Rfl:   •  timolol maleate (ISTALOL) 0.5 % drops, once daily, , Disp: , Rfl:   •  VYZULTA 0.024 % drops, 1 drop nightly. , Disp: , Rfl:       BP Readings from Last 3 Encounters:   12/24/19 124/82   09/25/19 122/74   06/25/19 128/78       Recent Lab results:  Lab Results   Component Value Date    CHOL 181 12/24/2019   ,   Lab Results   Component Value Date    HDL 39 (L) 12/24/2019   ,   Lab Results   Component Value Date    LDLCALC 122 (H) 12/24/2019   ,   Lab Results   Component Value Date    TRIG 102 12/24/2019        Lab Results   Component Value Date    GLUCOSE 111 (H) 12/24/2019   ,   Lab Results   Component Value Date    HGBA1C 6.0 (H) 12/24/2019         Lab Results   Component Value Date    CREATININE 0.8 12/24/2019       No results found for: TSH

## 2020-04-21 ENCOUNTER — TELEMEDICINE (OUTPATIENT)
Dept: FAMILY MEDICINE | Facility: CLINIC | Age: 70
End: 2020-04-21
Payer: MEDICARE

## 2020-04-21 DIAGNOSIS — E11.9 DIABETES MELLITUS WITHOUT COMPLICATION (CMS/HCC): ICD-10-CM

## 2020-04-21 DIAGNOSIS — E87.6 HYPOKALEMIA: ICD-10-CM

## 2020-04-21 DIAGNOSIS — E11.29 TYPE 2 DIABETES MELLITUS WITH MICROALBUMINURIA, WITHOUT LONG-TERM CURRENT USE OF INSULIN (CMS/HCC): ICD-10-CM

## 2020-04-21 DIAGNOSIS — I10 ESSENTIAL HYPERTENSION: Primary | ICD-10-CM

## 2020-04-21 DIAGNOSIS — I10 HYPERTENSION, BENIGN: ICD-10-CM

## 2020-04-21 DIAGNOSIS — R80.9 TYPE 2 DIABETES MELLITUS WITH MICROALBUMINURIA, WITHOUT LONG-TERM CURRENT USE OF INSULIN (CMS/HCC): ICD-10-CM

## 2020-04-21 PROCEDURE — 99213 OFFICE O/P EST LOW 20 MIN: CPT | Mod: 95 | Performed by: FAMILY MEDICINE

## 2020-04-21 RX ORDER — POTASSIUM CHLORIDE 750 MG/1
10 TABLET, EXTENDED RELEASE ORAL
Qty: 90 TABLET | Refills: 3 | Status: SHIPPED | OUTPATIENT
Start: 2020-04-21 | End: 2022-01-11 | Stop reason: ENTERED-IN-ERROR

## 2020-04-21 RX ORDER — FELODIPINE 10 MG/1
10 TABLET, EXTENDED RELEASE ORAL DAILY
Qty: 90 TABLET | Refills: 3 | Status: SHIPPED | OUTPATIENT
Start: 2020-04-21 | End: 2021-01-20 | Stop reason: SDUPTHER

## 2020-04-21 RX ORDER — LOSARTAN POTASSIUM 50 MG/1
50 TABLET ORAL DAILY
Qty: 90 TABLET | Refills: 1 | Status: SHIPPED | OUTPATIENT
Start: 2020-04-21 | End: 2021-01-20 | Stop reason: SDUPTHER

## 2020-04-21 RX ORDER — METFORMIN HYDROCHLORIDE 500 MG/1
500 TABLET ORAL 2 TIMES DAILY WITH MEALS
Qty: 180 TABLET | Refills: 3 | Status: SHIPPED | OUTPATIENT
Start: 2020-04-21 | End: 2021-01-20 | Stop reason: SDUPTHER

## 2020-04-21 RX ORDER — FINASTERIDE 5 MG/1
5 TABLET, FILM COATED ORAL
Qty: 90 TABLET | Refills: 3 | Status: SHIPPED | OUTPATIENT
Start: 2020-04-21 | End: 2021-01-20 | Stop reason: SDUPTHER

## 2020-04-21 RX ORDER — METOPROLOL TARTRATE 50 MG/1
50 TABLET ORAL 2 TIMES DAILY
Qty: 90 TABLET | Refills: 3 | Status: SHIPPED | OUTPATIENT
Start: 2020-04-21 | End: 2021-01-20 | Stop reason: SDUPTHER

## 2020-04-21 ASSESSMENT — ENCOUNTER SYMPTOMS
WHEEZING: 0
CHEST TIGHTNESS: 0
SORE THROAT: 0
VOMITING: 0
PALPITATIONS: 0
NAUSEA: 0
FATIGUE: 0
APPETITE CHANGE: 0
SHORTNESS OF BREATH: 0

## 2020-04-21 NOTE — PATIENT INSTRUCTIONS
Patient continue current medication regimen.  Continue current low impact exercise.  Plan to reevaluate once the pandemic restrictions are lifted

## 2020-04-21 NOTE — PROGRESS NOTES
Request for Consent:   Video Encounter   Hello, my name is Kamar Palacios MD.  Before we proceed, can you please verify your identification by telling me your full name and date of birth?  Can you tell me who is in the room with you?    You and I are about to have a telemedicine check-in or visit because you have requested it.  This is a live video-conference.  I am a real person, speaking to you in real time.  There is no one else with me on the video-conference.  However, when we use ([employer], [employer], etc) it is important for you to know that the video-conference may not be secure or private.  I am not recording this conversation and I am asking you not to record it.  This telemedicine visit will be billed to your health insurance or you, if you are self-insured.  You understand you will be responsible for any copayments or coinsurances that apply to your telemedicine visit.  Before starting our telemedicine visit, I am required to get your consent for this virtual check-in or visit by telemedicine. Do you consent?    Patient Response to Request for Consent:  Yes      Visit Documentation:  Subjective     Patient ID: Aneesh Brito is a 70 y.o. male.  1950      This is a telemedicine visit with video.  Patient follow-up of diabetes and blood pressure.  Patient has been sheltering in place.  He is feeling very well.  He and his wife take 1 or 2 walks every day.  He feels well doing this.  He is tolerating blood pressure medicine at home his readings have been in the 140/70 range.  He admits his weight is up a few pounds although his last A1c was 6.0.  He seems to be tolerating medication well.  On video he is clearly in no distress.      The following have been reviewed and updated as appropriate in this visit:  Tobacco  Allergies  Meds  Problems  Med Hx  Surg Hx  Fam Hx  Soc Hx        Review of Systems   Constitutional: Negative for appetite change and fatigue.   HENT: Negative for ear pain and  sore throat.    Respiratory: Negative for chest tightness, shortness of breath and wheezing.    Cardiovascular: Negative for chest pain, palpitations and leg swelling.   Gastrointestinal: Negative for nausea and vomiting.   Skin: Negative for rash.         Assessment/Plan      Hypertension.  Adequate control.  Patient continue current medication will plan to reevaluate once pandemic restrictions are lifted.  Diabetes.  Patient continue medication.  Good A1c.  Diet discussed continue exercise program plan to reevaluate once pandemic restrictions are lifted.  Patient understands

## 2020-05-07 ENCOUNTER — TRANSCRIBE ORDERS (OUTPATIENT)
Dept: LAB | Facility: HOSPITAL | Age: 70
End: 2020-05-07

## 2020-05-07 ENCOUNTER — APPOINTMENT (OUTPATIENT)
Dept: LAB | Facility: HOSPITAL | Age: 70
End: 2020-05-07
Attending: SPECIALIST
Payer: MEDICARE

## 2020-05-07 DIAGNOSIS — I72.3 ANEURYSM OF ILIAC ARTERY (CMS/HCC): ICD-10-CM

## 2020-05-07 DIAGNOSIS — N40.0 BENIGN PROSTATIC HYPERPLASIA WITHOUT LOWER URINARY TRACT SYMPTOMS: ICD-10-CM

## 2020-05-07 DIAGNOSIS — I10 ESSENTIAL (PRIMARY) HYPERTENSION: ICD-10-CM

## 2020-05-07 DIAGNOSIS — E11.9 TYPE 2 DIABETES MELLITUS WITHOUT COMPLICATIONS (CMS/HCC): ICD-10-CM

## 2020-05-07 DIAGNOSIS — N18.1 CHRONIC KIDNEY DISEASE, STAGE 1: Primary | ICD-10-CM

## 2020-05-07 DIAGNOSIS — N18.1 CHRONIC KIDNEY DISEASE, STAGE 1: ICD-10-CM

## 2020-05-07 DIAGNOSIS — C43.9 MALIGNANT MELANOMA OF SKIN, UNSPECIFIED: ICD-10-CM

## 2020-05-07 DIAGNOSIS — R80.9 PROTEINURIA, UNSPECIFIED: ICD-10-CM

## 2020-05-07 DIAGNOSIS — M19.90 UNSPECIFIED OSTEOARTHRITIS, UNSPECIFIED SITE: ICD-10-CM

## 2020-05-07 DIAGNOSIS — K76.0 FATTY (CHANGE OF) LIVER, NOT ELSEWHERE CLASSIFIED: ICD-10-CM

## 2020-05-07 LAB
ALBUMIN SERPL-MCNC: 4 G/DL (ref 3.4–5)
ALP SERPL-CCNC: 67 IU/L (ref 35–126)
ALT SERPL-CCNC: 19 IU/L (ref 16–63)
ANION GAP SERPL CALC-SCNC: 12 MEQ/L (ref 3–15)
AST SERPL-CCNC: 18 IU/L (ref 15–41)
BILIRUB SERPL-MCNC: 0.7 MG/DL (ref 0.3–1.2)
BUN SERPL-MCNC: 12 MG/DL (ref 8–20)
CALCIUM SERPL-MCNC: 9.1 MG/DL (ref 8.9–10.3)
CHLORIDE SERPL-SCNC: 101 MEQ/L (ref 98–109)
CHOLEST SERPL-MCNC: 168 MG/DL
CO2 SERPL-SCNC: 25 MEQ/L (ref 22–32)
CREAT SERPL-MCNC: 0.7 MG/DL (ref 0.8–1.3)
CREAT UR-MCNC: 22.2 MG/DL
ERYTHROCYTE [DISTWIDTH] IN BLOOD BY AUTOMATED COUNT: 13.6 % (ref 11.6–14.4)
EST. AVERAGE GLUCOSE BLD GHB EST-MCNC: 128 MG/DL
GFR SERPL CREATININE-BSD FRML MDRD: >60 ML/MIN/1.73M*2
GLUCOSE SERPL-MCNC: 116 MG/DL (ref 70–99)
HBA1C MFR BLD HPLC: 6.1 %
HCT VFR BLDCO AUTO: 41.3 % (ref 40.1–51)
HDLC SERPL-MCNC: 37 MG/DL
HDLC SERPL: 4.5 {RATIO}
HGB BLD-MCNC: 14 G/DL (ref 13.7–17.5)
LDLC SERPL CALC-MCNC: 107 MG/DL
MCH RBC QN AUTO: 31.5 PG (ref 28–33.2)
MCHC RBC AUTO-ENTMCNC: 33.9 G/DL (ref 32.2–36.5)
MCV RBC AUTO: 93 FL (ref 83–98)
NONHDLC SERPL-MCNC: 131 MG/DL
PDW BLD AUTO: 11.1 FL (ref 9.4–12.4)
PHOSPHATE SERPL-MCNC: 4 MG/DL (ref 2.4–4.7)
PLATELET # BLD AUTO: 235 K/UL (ref 150–350)
POTASSIUM SERPL-SCNC: 3.6 MEQ/L (ref 3.6–5.1)
PROT SERPL-MCNC: 7 G/DL (ref 6–8.2)
PROT UR-MCNC: 98 MG/DL
PROT/CREAT UR: 4.41 MG/G CREAT
RBC # BLD AUTO: 4.44 M/UL (ref 4.5–5.8)
SODIUM SERPL-SCNC: 138 MEQ/L (ref 136–144)
TRIGL SERPL-MCNC: 118 MG/DL (ref 30–149)
WBC # BLD AUTO: 6.78 K/UL (ref 3.8–10.5)

## 2020-05-07 PROCEDURE — 80061 LIPID PANEL: CPT

## 2020-05-07 PROCEDURE — 84156 ASSAY OF PROTEIN URINE: CPT

## 2020-05-07 PROCEDURE — 85027 COMPLETE CBC AUTOMATED: CPT

## 2020-05-07 PROCEDURE — 84100 ASSAY OF PHOSPHORUS: CPT

## 2020-05-07 PROCEDURE — 36415 COLL VENOUS BLD VENIPUNCTURE: CPT

## 2020-05-07 PROCEDURE — 80053 COMPREHEN METABOLIC PANEL: CPT

## 2020-05-07 PROCEDURE — 83036 HEMOGLOBIN GLYCOSYLATED A1C: CPT

## 2020-05-14 ENCOUNTER — TELEPHONE (OUTPATIENT)
Dept: FAMILY MEDICINE | Facility: CLINIC | Age: 70
End: 2020-05-14

## 2020-05-14 NOTE — TELEPHONE ENCOUNTER
Patient seen Dr Asencio recently and had blood work done.     He is asking if you are able to take a look at the results and give him a call back, he would like to discuss the results with you.

## 2020-05-15 NOTE — TELEPHONE ENCOUNTER
Labs reviewed with patient.  Good recent results.  A1c was 6.1.  No new intervention recheck 3 months

## 2020-08-18 RX ORDER — SILDENAFIL 50 MG/1
50 TABLET, FILM COATED ORAL DAILY PRN
Qty: 10 TABLET | Refills: 3 | Status: SHIPPED | OUTPATIENT
Start: 2020-08-18 | End: 2021-04-28 | Stop reason: SDUPTHER

## 2020-08-18 NOTE — TELEPHONE ENCOUNTER
Medicine Refill Request    Last Office Visit: 12/24/2019  Last Telemedicine Visit: Visit date not found    Next Office Visit: 8/21/2020  Next Telemedicine Visit: Visit date not found         Current Outpatient Medications:   •  brimonidine (ALPHAGAN) 0.15 % ophthalmic solution, Administer 1 drop into both eyes 2 (two) times a day. , Disp: , Rfl:   •  felodipine (PLENDIL) 10 mg 24 hr tablet, Take 1 tablet (10 mg total) by mouth daily., Disp: 90 tablet, Rfl: 3  •  finasteride (PROSCAR) 5 mg tablet, Take 1 tablet (5 mg total) by mouth once daily., Disp: 90 tablet, Rfl: 3  •  losartan (COZAAR) 50 mg tablet, Take 1 tablet (50 mg total) by mouth daily., Disp: 90 tablet, Rfl: 1  •  metFORMIN (GLUCOPHAGE) 500 mg tablet, Take 1 tablet (500 mg total) by mouth 2 (two) times a day with meals., Disp: 180 tablet, Rfl: 3  •  metoprolol tartrate (LOPRESSOR) 50 mg tablet, Take 1 tablet (50 mg total) by mouth 2 (two) times a day., Disp: 90 tablet, Rfl: 3  •  potassium chloride (KLOR-CON) 10 mEq CR tablet, Take 1 tablet (10 mEq total) by mouth once daily., Disp: 90 tablet, Rfl: 3  •  sildenafil (VIAGRA) 50 mg tablet, as needed. , Disp: , Rfl:   •  timolol maleate (ISTALOL) 0.5 % drops, once daily, , Disp: , Rfl:   •  VYZULTA 0.024 % drops, 1 drop nightly. , Disp: , Rfl:       BP Readings from Last 3 Encounters:   12/24/19 124/82   09/25/19 122/74   06/25/19 128/78       Recent Lab results:  Lab Results   Component Value Date    CHOL 168 05/07/2020   ,   Lab Results   Component Value Date    HDL 37 (L) 05/07/2020   ,   Lab Results   Component Value Date    LDLCALC 107 (H) 05/07/2020   ,   Lab Results   Component Value Date    TRIG 118 05/07/2020        Lab Results   Component Value Date    GLUCOSE 116 (H) 05/07/2020   ,   Lab Results   Component Value Date    HGBA1C 6.1 (H) 05/07/2020         Lab Results   Component Value Date    CREATININE 0.7 (L) 05/07/2020       No results found for: TSH

## 2020-08-21 ENCOUNTER — OFFICE VISIT (OUTPATIENT)
Dept: FAMILY MEDICINE | Facility: CLINIC | Age: 70
End: 2020-08-21
Payer: MEDICARE

## 2020-08-21 VITALS
TEMPERATURE: 98.2 F | RESPIRATION RATE: 16 BRPM | BODY MASS INDEX: 32.34 KG/M2 | SYSTOLIC BLOOD PRESSURE: 128 MMHG | WEIGHT: 231 LBS | OXYGEN SATURATION: 97 % | HEART RATE: 66 BPM | HEIGHT: 71 IN | DIASTOLIC BLOOD PRESSURE: 82 MMHG

## 2020-08-21 DIAGNOSIS — E11.9 TYPE 2 DIABETES MELLITUS WITHOUT COMPLICATION, WITHOUT LONG-TERM CURRENT USE OF INSULIN (CMS/HCC): Primary | ICD-10-CM

## 2020-08-21 DIAGNOSIS — I10 HYPERTENSION, BENIGN: ICD-10-CM

## 2020-08-21 PROCEDURE — 99213 OFFICE O/P EST LOW 20 MIN: CPT | Performed by: FAMILY MEDICINE

## 2020-08-21 PROCEDURE — 83036 HEMOGLOBIN GLYCOSYLATED A1C: CPT | Performed by: FAMILY MEDICINE

## 2020-08-21 ASSESSMENT — ENCOUNTER SYMPTOMS
FATIGUE: 0
CHEST TIGHTNESS: 0
NAUSEA: 0
SHORTNESS OF BREATH: 0
SORE THROAT: 0
APPETITE CHANGE: 0
PALPITATIONS: 0
VOMITING: 0
WHEEZING: 0

## 2020-08-21 ASSESSMENT — PAIN SCALES - GENERAL: PAINLEVEL: 0-NO PAIN

## 2020-08-21 NOTE — PROGRESS NOTES
"Subjective      Patient ID: Aneesh Brito is a 70 y.o. male.  1950      Patient for follow-up of blood pressure and blood sugar.  Patient with no new complaints.  Patient is been sheltering in place for the pandemic.  He has gained some weight.  He does however feel very well.  Tolerating blood pressure medicine well readings have been good.  Also tolerating medication for blood sugars last A1c was 6.1.      The following have been reviewed and updated as appropriate in this visit:       Review of Systems   Constitutional: Negative for appetite change and fatigue.   HENT: Negative for ear pain and sore throat.    Respiratory: Negative for chest tightness, shortness of breath and wheezing.    Cardiovascular: Negative for chest pain, palpitations and leg swelling.   Gastrointestinal: Negative for nausea and vomiting.   Skin: Negative for rash.       Objective     Vitals:    08/21/20 1110   BP: 128/82   BP Location: Left upper arm   Patient Position: Sitting   Pulse: 66   Resp: 16   Temp: 36.8 °C (98.2 °F)   TempSrc: Temporal   SpO2: 97%   Weight: 105 kg (231 lb)   Height: 1.803 m (5' 11\")     Body mass index is 32.22 kg/m².    Physical Exam   Constitutional: He appears well-developed and well-nourished.   HENT:   Head: Normocephalic and atraumatic.   Right Ear: Tympanic membrane and ear canal normal.   Left Ear: Tympanic membrane and ear canal normal.   Nose: Nose normal.   Mouth/Throat: Oropharynx is clear and moist.   Eyes: Conjunctivae are normal.   Neck: Normal range of motion. Neck supple. No thyromegaly present.   Cardiovascular: Normal rate, regular rhythm and normal heart sounds.   No murmur heard.  Pulmonary/Chest: Effort normal and breath sounds normal. He has no wheezes. He has no rales.   Musculoskeletal: He exhibits no edema.   Vitals reviewed.      Assessment/Plan   Diagnoses and all orders for this visit:    Type 2 diabetes mellitus without complication, without long-term current use of insulin " (CMS/[facility]) (Primary)  -     Hemoglobin A1c    Hypertension, benign    Diabetes.  Patient continue medication.  Encourage patient to get back to weight loss routine.  Check A1c today.  Blood pressure.  Adequate control.  Patient continue medication reevaluate in 3 months

## 2020-08-22 LAB
EST. AVERAGE GLUCOSE BLD GHB EST-MCNC: 126 MG/DL
HBA1C MFR BLD HPLC: 6 %

## 2020-09-11 ENCOUNTER — CLINICAL SUPPORT (OUTPATIENT)
Dept: FAMILY MEDICINE | Facility: CLINIC | Age: 70
End: 2020-09-11
Payer: MEDICARE

## 2020-09-11 VITALS — TEMPERATURE: 97.8 F

## 2020-09-11 DIAGNOSIS — Z23 NEED FOR INFLUENZA VACCINATION: Primary | ICD-10-CM

## 2020-09-11 PROCEDURE — G0008 ADMIN INFLUENZA VIRUS VAC: HCPCS | Performed by: FAMILY MEDICINE

## 2020-09-11 PROCEDURE — 90694 VACC AIIV4 NO PRSRV 0.5ML IM: CPT | Performed by: FAMILY MEDICINE

## 2020-10-30 ENCOUNTER — APPOINTMENT (OUTPATIENT)
Dept: LAB | Facility: HOSPITAL | Age: 70
End: 2020-10-30
Attending: SPECIALIST
Payer: MEDICARE

## 2020-10-30 ENCOUNTER — TRANSCRIBE ORDERS (OUTPATIENT)
Dept: CASE MANAGEMENT | Facility: CLINIC | Age: 70
End: 2020-10-30

## 2020-10-30 DIAGNOSIS — I10 ESSENTIAL (PRIMARY) HYPERTENSION: ICD-10-CM

## 2020-10-30 DIAGNOSIS — R80.9 PROTEINURIA, UNSPECIFIED: ICD-10-CM

## 2020-10-30 DIAGNOSIS — I72.3 ANEURYSM OF ILIAC ARTERY (CMS/HCC): ICD-10-CM

## 2020-10-30 DIAGNOSIS — N18.1 CHRONIC KIDNEY DISEASE, STAGE 1: ICD-10-CM

## 2020-10-30 DIAGNOSIS — E11.9 TYPE 2 DIABETES MELLITUS WITHOUT COMPLICATIONS (CMS/HCC): ICD-10-CM

## 2020-10-30 DIAGNOSIS — M19.90 UNSPECIFIED OSTEOARTHRITIS, UNSPECIFIED SITE: ICD-10-CM

## 2020-10-30 DIAGNOSIS — K76.0 FATTY (CHANGE OF) LIVER, NOT ELSEWHERE CLASSIFIED: ICD-10-CM

## 2020-10-30 DIAGNOSIS — C43.9 MALIGNANT MELANOMA OF SKIN, UNSPECIFIED: ICD-10-CM

## 2020-10-30 DIAGNOSIS — N40.0 BENIGN PROSTATIC HYPERPLASIA WITHOUT LOWER URINARY TRACT SYMPTOMS: ICD-10-CM

## 2020-10-30 DIAGNOSIS — N18.1 CHRONIC KIDNEY DISEASE, STAGE 1: Primary | ICD-10-CM

## 2020-10-30 LAB
ALBUMIN SERPL-MCNC: 3.9 G/DL (ref 3.4–5)
ALP SERPL-CCNC: 71 IU/L (ref 35–126)
ALT SERPL-CCNC: 31 IU/L (ref 16–63)
ANION GAP SERPL CALC-SCNC: 11 MEQ/L (ref 3–15)
AST SERPL-CCNC: 22 IU/L (ref 15–41)
BACTERIA URNS QL MICRO: NORMAL /HPF
BILIRUB SERPL-MCNC: 0.9 MG/DL (ref 0.3–1.2)
BILIRUB UR QL STRIP.AUTO: NEGATIVE MG/DL
BUN SERPL-MCNC: 12 MG/DL (ref 8–20)
CALCIUM SERPL-MCNC: 8.9 MG/DL (ref 8.9–10.3)
CHLORIDE SERPL-SCNC: 102 MEQ/L (ref 98–109)
CHOLEST SERPL-MCNC: 176 MG/DL
CLARITY UR REFRACT.AUTO: CLEAR
CO2 SERPL-SCNC: 26 MEQ/L (ref 22–32)
COLOR UR AUTO: YELLOW
CREAT SERPL-MCNC: 0.9 MG/DL (ref 0.8–1.3)
CREAT UR-MCNC: 56 MG/DL
ERYTHROCYTE [DISTWIDTH] IN BLOOD BY AUTOMATED COUNT: 13.4 % (ref 11.6–14.4)
EST. AVERAGE GLUCOSE BLD GHB EST-MCNC: 134 MG/DL
GFR SERPL CREATININE-BSD FRML MDRD: >60 ML/MIN/1.73M*2
GLUCOSE SERPL-MCNC: 126 MG/DL (ref 70–99)
GLUCOSE UR STRIP.AUTO-MCNC: NEGATIVE MG/DL
HBA1C MFR BLD HPLC: 6.3 %
HCT VFR BLDCO AUTO: 42.8 % (ref 40.1–51)
HDLC SERPL-MCNC: 34 MG/DL
HDLC SERPL: 5.2 {RATIO}
HGB BLD-MCNC: 14.9 G/DL (ref 13.7–17.5)
HGB UR QL STRIP.AUTO: NEGATIVE
HYALINE CASTS #/AREA URNS LPF: NORMAL /LPF
KETONES UR STRIP.AUTO-MCNC: NEGATIVE MG/DL
LDLC SERPL CALC-MCNC: 105 MG/DL
LEUKOCYTE ESTERASE UR QL STRIP.AUTO: NEGATIVE
MCH RBC QN AUTO: 31.9 PG (ref 28–33.2)
MCHC RBC AUTO-ENTMCNC: 34.8 G/DL (ref 32.2–36.5)
MCV RBC AUTO: 91.6 FL (ref 83–98)
NITRITE UR QL STRIP.AUTO: NEGATIVE
NONHDLC SERPL-MCNC: 142 MG/DL
PDW BLD AUTO: 10.7 FL (ref 9.4–12.4)
PH UR STRIP.AUTO: 7 [PH]
PHOSPHATE SERPL-MCNC: 3.5 MG/DL (ref 2.4–4.7)
PLATELET # BLD AUTO: 261 K/UL (ref 150–350)
POTASSIUM SERPL-SCNC: 3.5 MEQ/L (ref 3.6–5.1)
PROT SERPL-MCNC: 6.8 G/DL (ref 6–8.2)
PROT UR QL STRIP.AUTO: 2
PROT UR-MCNC: 145 MG/DL
PROT/CREAT UR: 2.59 MG/G CREAT
RBC # BLD AUTO: 4.67 M/UL (ref 4.5–5.8)
RBC #/AREA URNS HPF: NORMAL /HPF
SODIUM SERPL-SCNC: 139 MEQ/L (ref 136–144)
SP GR UR REFRACT.AUTO: 1.01
SQUAMOUS URNS QL MICRO: NORMAL /HPF
TRIGL SERPL-MCNC: 184 MG/DL (ref 30–149)
UROBILINOGEN UR STRIP-ACNC: 0.2 EU/DL
WBC # BLD AUTO: 7.74 K/UL (ref 3.8–10.5)
WBC #/AREA URNS HPF: NORMAL /HPF

## 2020-10-30 PROCEDURE — 80061 LIPID PANEL: CPT

## 2020-10-30 PROCEDURE — 84100 ASSAY OF PHOSPHORUS: CPT

## 2020-10-30 PROCEDURE — 85027 COMPLETE CBC AUTOMATED: CPT

## 2020-10-30 PROCEDURE — 81001 URINALYSIS AUTO W/SCOPE: CPT

## 2020-10-30 PROCEDURE — 83036 HEMOGLOBIN GLYCOSYLATED A1C: CPT

## 2020-10-30 PROCEDURE — 80053 COMPREHEN METABOLIC PANEL: CPT

## 2020-10-30 PROCEDURE — 84156 ASSAY OF PROTEIN URINE: CPT

## 2020-10-30 PROCEDURE — 36415 COLL VENOUS BLD VENIPUNCTURE: CPT

## 2020-11-27 ENCOUNTER — TELEPHONE (OUTPATIENT)
Dept: FAMILY MEDICINE | Facility: CLINIC | Age: 70
End: 2020-11-27

## 2020-11-27 DIAGNOSIS — H83.09 LABYRINTHITIS, UNSPECIFIED LATERALITY: Primary | ICD-10-CM

## 2020-11-27 RX ORDER — MECLIZINE HCL 12.5 MG 12.5 MG/1
12.5 TABLET ORAL 3 TIMES DAILY PRN
Qty: 21 TABLET | Refills: 0 | Status: SHIPPED | OUTPATIENT
Start: 2020-11-27 | End: 2021-04-28 | Stop reason: ALTCHOICE

## 2020-11-27 NOTE — TELEPHONE ENCOUNTER
Patient complaining of episodes of positional vertigo symptoms.  Started last night.  Seems to be better today.  Will give trial of Antivert.  Patient to call if symptoms increase.

## 2020-12-28 ENCOUNTER — CLINICAL SUPPORT (OUTPATIENT)
Dept: FAMILY MEDICINE | Facility: CLINIC | Age: 70
End: 2020-12-28
Payer: MEDICARE

## 2020-12-28 DIAGNOSIS — E11.9 TYPE 2 DIABETES MELLITUS WITHOUT COMPLICATION, WITHOUT LONG-TERM CURRENT USE OF INSULIN (CMS/HCC): ICD-10-CM

## 2020-12-28 DIAGNOSIS — I10 HYPERTENSION, BENIGN: ICD-10-CM

## 2020-12-28 DIAGNOSIS — Z00.00 ENCOUNTER FOR GENERAL ADULT MEDICAL EXAMINATION WITHOUT ABNORMAL FINDINGS: Primary | ICD-10-CM

## 2020-12-28 LAB
ALBUMIN SERPL-MCNC: 4.2 G/DL (ref 3.4–5)
ALBUMIN/CREAT UR: 1396.2 UG/MG
ALP SERPL-CCNC: 73 IU/L (ref 35–126)
ALT SERPL-CCNC: 30 IU/L (ref 16–63)
ANION GAP SERPL CALC-SCNC: 12 MEQ/L (ref 3–15)
AST SERPL-CCNC: 18 IU/L (ref 15–41)
BACTERIA URNS QL MICRO: ABNORMAL /HPF
BASOPHILS # BLD: 0.07 K/UL (ref 0.01–0.1)
BASOPHILS NFR BLD: 0.9 %
BILIRUB SERPL-MCNC: 0.7 MG/DL (ref 0.3–1.2)
BILIRUB UR QL STRIP.AUTO: NEGATIVE MG/DL
BUN SERPL-MCNC: 20 MG/DL (ref 8–20)
CALCIUM SERPL-MCNC: 9.5 MG/DL (ref 8.9–10.3)
CHLORIDE SERPL-SCNC: 102 MEQ/L (ref 98–109)
CHOLEST SERPL-MCNC: 189 MG/DL
CLARITY UR REFRACT.AUTO: CLEAR
CO2 SERPL-SCNC: 26 MEQ/L (ref 22–32)
COLOR UR AUTO: YELLOW
CREAT SERPL-MCNC: 1 MG/DL (ref 0.8–1.3)
CREAT UR-MCNC: 90.6 MG/DL
DIFFERENTIAL METHOD BLD: NORMAL
EOSINOPHIL # BLD: 0.16 K/UL (ref 0.04–0.54)
EOSINOPHIL NFR BLD: 2.2 %
ERYTHROCYTE [DISTWIDTH] IN BLOOD BY AUTOMATED COUNT: 13.8 % (ref 11.6–14.4)
GFR SERPL CREATININE-BSD FRML MDRD: >60 ML/MIN/1.73M*2
GLUCOSE SERPL-MCNC: 128 MG/DL (ref 70–99)
GLUCOSE UR STRIP.AUTO-MCNC: NEGATIVE MG/DL
HCT VFR BLDCO AUTO: 45.1 % (ref 40.1–51)
HDLC SERPL-MCNC: 47 MG/DL
HDLC SERPL: 4 {RATIO}
HGB BLD-MCNC: 15.4 G/DL (ref 13.7–17.5)
HGB UR QL STRIP.AUTO: NEGATIVE
HYALINE CASTS #/AREA URNS LPF: ABNORMAL /LPF
IMM GRANULOCYTES # BLD AUTO: 0.02 K/UL (ref 0–0.08)
IMM GRANULOCYTES NFR BLD AUTO: 0.3 %
KETONES UR STRIP.AUTO-MCNC: NEGATIVE MG/DL
LDLC SERPL CALC-MCNC: 118 MG/DL
LEUKOCYTE ESTERASE UR QL STRIP.AUTO: NEGATIVE
LYMPHOCYTES # BLD: 1.98 K/UL (ref 1.2–3.5)
LYMPHOCYTES NFR BLD: 26.9 %
MCH RBC QN AUTO: 32.5 PG (ref 28–33.2)
MCHC RBC AUTO-ENTMCNC: 34.1 G/DL (ref 32.2–36.5)
MCV RBC AUTO: 95.1 FL (ref 83–98)
MICROALBUMIN UR-MCNC: 1265 MG/L
MONOCYTES # BLD: 0.71 K/UL (ref 0.3–1)
MONOCYTES NFR BLD: 9.6 %
NEUTROPHILS # BLD: 4.43 K/UL (ref 1.7–7)
NEUTS SEG NFR BLD: 60.1 %
NITRITE UR QL STRIP.AUTO: NEGATIVE
NONHDLC SERPL-MCNC: 142 MG/DL
NRBC BLD-RTO: 0 %
PDW BLD AUTO: 11 FL (ref 9.4–12.4)
PH UR STRIP.AUTO: 6.5 [PH]
PLATELET # BLD AUTO: 283 K/UL (ref 150–350)
POTASSIUM SERPL-SCNC: 3.8 MEQ/L (ref 3.6–5.1)
PROT SERPL-MCNC: 7 G/DL (ref 6–8.2)
PROT UR QL STRIP.AUTO: 3
PSA SERPL-MCNC: 0.16 NG/ML
RBC # BLD AUTO: 4.74 M/UL (ref 4.5–5.8)
RBC #/AREA URNS HPF: ABNORMAL /HPF
SODIUM SERPL-SCNC: 140 MEQ/L (ref 136–144)
SP GR UR REFRACT.AUTO: 1.02
SQUAMOUS URNS QL MICRO: 1 /HPF
TRIGL SERPL-MCNC: 118 MG/DL (ref 30–149)
UROBILINOGEN UR STRIP-ACNC: 0.2 EU/DL
WBC # BLD AUTO: 7.37 K/UL (ref 3.8–10.5)
WBC #/AREA URNS HPF: ABNORMAL /HPF

## 2020-12-28 PROCEDURE — 80061 LIPID PANEL: CPT | Performed by: FAMILY MEDICINE

## 2020-12-28 PROCEDURE — 80053 COMPREHEN METABOLIC PANEL: CPT | Performed by: FAMILY MEDICINE

## 2020-12-28 PROCEDURE — 82570 ASSAY OF URINE CREATININE: CPT | Performed by: FAMILY MEDICINE

## 2020-12-28 PROCEDURE — 36415 COLL VENOUS BLD VENIPUNCTURE: CPT | Performed by: FAMILY MEDICINE

## 2020-12-28 PROCEDURE — 83036 HEMOGLOBIN GLYCOSYLATED A1C: CPT | Performed by: FAMILY MEDICINE

## 2020-12-28 PROCEDURE — 85025 COMPLETE CBC W/AUTO DIFF WBC: CPT | Performed by: FAMILY MEDICINE

## 2020-12-28 PROCEDURE — 81001 URINALYSIS AUTO W/SCOPE: CPT | Performed by: FAMILY MEDICINE

## 2020-12-28 PROCEDURE — 84153 ASSAY OF PSA TOTAL: CPT | Mod: GZ | Performed by: FAMILY MEDICINE

## 2020-12-29 LAB
EST. AVERAGE GLUCOSE BLD GHB EST-MCNC: 131 MG/DL
HBA1C MFR BLD HPLC: 6.2 %

## 2021-01-20 ENCOUNTER — OFFICE VISIT (OUTPATIENT)
Dept: FAMILY MEDICINE | Facility: CLINIC | Age: 71
End: 2021-01-20
Payer: MEDICARE

## 2021-01-20 VITALS
DIASTOLIC BLOOD PRESSURE: 78 MMHG | SYSTOLIC BLOOD PRESSURE: 132 MMHG | HEIGHT: 71 IN | RESPIRATION RATE: 18 BRPM | WEIGHT: 230 LBS | BODY MASS INDEX: 32.2 KG/M2 | OXYGEN SATURATION: 98 % | TEMPERATURE: 96.6 F | HEART RATE: 67 BPM

## 2021-01-20 DIAGNOSIS — N40.1 BENIGN PROSTATIC HYPERPLASIA WITH LOWER URINARY TRACT SYMPTOMS, SYMPTOM DETAILS UNSPECIFIED: ICD-10-CM

## 2021-01-20 DIAGNOSIS — Z00.00 ENCOUNTER FOR GENERAL ADULT MEDICAL EXAMINATION WITHOUT ABNORMAL FINDINGS: ICD-10-CM

## 2021-01-20 DIAGNOSIS — C43.9 MALIGNANT MELANOMA OF SKIN (CMS/HCC): ICD-10-CM

## 2021-01-20 DIAGNOSIS — M48.061 SPINAL STENOSIS OF LUMBAR REGION WITHOUT NEUROGENIC CLAUDICATION: ICD-10-CM

## 2021-01-20 DIAGNOSIS — I10 HYPERTENSION, BENIGN: Primary | ICD-10-CM

## 2021-01-20 DIAGNOSIS — E11.9 TYPE 2 DIABETES MELLITUS WITHOUT COMPLICATION, WITHOUT LONG-TERM CURRENT USE OF INSULIN (CMS/HCC): ICD-10-CM

## 2021-01-20 DIAGNOSIS — R80.9 TYPE 2 DIABETES MELLITUS WITH MICROALBUMINURIA, WITHOUT LONG-TERM CURRENT USE OF INSULIN (CMS/HCC): ICD-10-CM

## 2021-01-20 DIAGNOSIS — E11.29 TYPE 2 DIABETES MELLITUS WITH MICROALBUMINURIA, WITHOUT LONG-TERM CURRENT USE OF INSULIN (CMS/HCC): ICD-10-CM

## 2021-01-20 DIAGNOSIS — K57.30 DIVERTICULOSIS OF COLON: ICD-10-CM

## 2021-01-20 DIAGNOSIS — N18.1 CHRONIC KIDNEY DISEASE, STAGE I: ICD-10-CM

## 2021-01-20 PROBLEM — H40.1134 PRIMARY OPEN-ANGLE GLAUCOMA, BILATERAL, INDETERMINATE STAGE: Status: ACTIVE | Noted: 2019-09-25

## 2021-01-20 PROBLEM — H04.129 TEAR FILM INSUFFICIENCY: Status: ACTIVE | Noted: 2021-01-20

## 2021-01-20 PROCEDURE — G0439 PPPS, SUBSEQ VISIT: HCPCS | Performed by: FAMILY MEDICINE

## 2021-01-20 RX ORDER — FELODIPINE 10 MG/1
10 TABLET, EXTENDED RELEASE ORAL DAILY
Qty: 90 TABLET | Refills: 3 | Status: SHIPPED | OUTPATIENT
Start: 2021-01-20 | End: 2022-01-11 | Stop reason: ENTERED-IN-ERROR

## 2021-01-20 RX ORDER — METFORMIN HYDROCHLORIDE 500 MG/1
500 TABLET ORAL 2 TIMES DAILY WITH MEALS
Qty: 180 TABLET | Refills: 3 | Status: SHIPPED | OUTPATIENT
Start: 2021-01-20 | End: 2022-01-11 | Stop reason: ENTERED-IN-ERROR

## 2021-01-20 RX ORDER — METOPROLOL TARTRATE 50 MG/1
50 TABLET ORAL 2 TIMES DAILY
Qty: 90 TABLET | Refills: 3 | Status: SHIPPED | OUTPATIENT
Start: 2021-01-20 | End: 2022-01-25 | Stop reason: SDUPTHER

## 2021-01-20 RX ORDER — LOSARTAN POTASSIUM 50 MG/1
50 TABLET ORAL DAILY
Qty: 90 TABLET | Refills: 1 | Status: SHIPPED | OUTPATIENT
Start: 2021-01-20 | End: 2021-08-09 | Stop reason: SDUPTHER

## 2021-01-20 RX ORDER — FINASTERIDE 5 MG/1
5 TABLET, FILM COATED ORAL
Qty: 90 TABLET | Refills: 3 | Status: SHIPPED | OUTPATIENT
Start: 2021-01-20 | End: 2022-01-11

## 2021-01-20 ASSESSMENT — ACTIVITIES OF DAILY LIVING (ADL)
ADEQUATE_TO_COMPLETE_ADL: YES
PATIENT'S MEMORY ADEQUATE TO SAFELY COMPLETE DAILY ACTIVITIES?: YES

## 2021-01-20 ASSESSMENT — ENCOUNTER SYMPTOMS
HEMATURIA: 0
NUMBNESS: 0
DYSURIA: 0
FATIGUE: 0
SHORTNESS OF BREATH: 0
APPETITE CHANGE: 0
ABDOMINAL PAIN: 0
SINUS PAIN: 0
EYE DISCHARGE: 0
CONSTIPATION: 0
SORE THROAT: 0
EYE PAIN: 0
VOMITING: 0
ACTIVITY CHANGE: 0
COUGH: 0
CHEST TIGHTNESS: 0
WEAKNESS: 0
DIARRHEA: 0
ARTHRALGIAS: 0
DIZZINESS: 0
PALPITATIONS: 0
TREMORS: 0
FREQUENCY: 0
NAUSEA: 0
JOINT SWELLING: 0
BLOOD IN STOOL: 0
HEADACHES: 0

## 2021-01-20 ASSESSMENT — MINI COG
TOTAL SCORE: 5
COMPLETED: YES

## 2021-01-20 ASSESSMENT — PATIENT HEALTH QUESTIONNAIRE - PHQ9: SUM OF ALL RESPONSES TO PHQ9 QUESTIONS 1 & 2: 0

## 2021-01-20 NOTE — PROGRESS NOTES
Subjective      Patient ID: Aneesh Brito is a 70 y.o. male.  1950      Patient annual exam.  Patient with no new medical complaints today.  Patient is .  He is retired teacher.  Patient typically has 3 meals per day he has rejoined weight watchers in hopes to reduce his weight again.  He does walk for exercise although his exercise has been limited during the pandemic.  He typically sleeps well at night he does have nocturia.  He remains on medication for blood pressure.  Readings have been good.  Also remains on medication for diabetes and tolerates this well.  His last A1c was 6.2.  Patient also with a history of hyperlipidemia and on medication.  Extensive review of recent lab work with patient.  Patient with BPH but normal PSA this was also reviewed with patient.  Patient with a history of chronic kidney disease has been evaluated by nephrology.  He did have elevation of urinary albumin.  History of melanoma and followed by dermatology on a regular basis   has a past medical history of Diverticulosis of colon, Enlarged prostate with lower urinary tract symptoms (LUTS), Hypertension, Melanoma of skin (CMS/[facility]), Osteoarthritis of knee, Overweight, Spinal stenosis of lumbar region, and Type 2 diabetes mellitus (CMS/[facility]).   has a past surgical history that includes Appendectomy; Colonoscopy (01/10/2012); Hip Arthroplasty (Bilateral); and Knee Arthroplasty (Left).      The following have been reviewed and updated as appropriate in this visit:  Tobacco  Allergies  Meds  Problems       Review of Systems   Constitutional: Negative for activity change, appetite change and fatigue.   HENT: Negative for congestion, hearing loss, postnasal drip, sinus pain and sore throat.    Eyes: Negative for pain and discharge.   Respiratory: Negative for cough, chest tightness and shortness of breath.    Cardiovascular: Negative for chest pain and palpitations.   Gastrointestinal: Negative for abdominal pain,  "blood in stool, constipation, diarrhea, nausea and vomiting.   Genitourinary: Negative for dysuria, frequency and hematuria.   Musculoskeletal: Negative for arthralgias and joint swelling.   Neurological: Negative for dizziness, tremors, weakness, numbness and headaches.       Objective     Vitals:    01/20/21 1112 01/20/21 2203   BP: (!) 146/88 132/78   Pulse: 67    Resp: 18    Temp: (!) 35.9 °C (96.6 °F)    TempSrc: Temporal    SpO2: 98%    Weight: 104 kg (230 lb)    Height: 1.803 m (5' 11\")      Body mass index is 32.08 kg/m².    Physical Exam  Vitals signs reviewed.   Constitutional:       Appearance: He is well-developed.   HENT:      Head: Normocephalic and atraumatic.      Right Ear: Hearing, tympanic membrane, ear canal and external ear normal.      Left Ear: Hearing, tympanic membrane, ear canal and external ear normal.      Nose: Nose normal.      Mouth/Throat:      Pharynx: Uvula midline.   Eyes:      Conjunctiva/sclera: Conjunctivae normal.      Pupils: Pupils are equal, round, and reactive to light.   Neck:      Musculoskeletal: Normal range of motion and neck supple.      Thyroid: No thyromegaly.   Cardiovascular:      Rate and Rhythm: Normal rate and regular rhythm.      Heart sounds: Normal heart sounds.   Pulmonary:      Effort: Pulmonary effort is normal.      Breath sounds: Normal breath sounds.   Abdominal:      General: Bowel sounds are normal.      Palpations: Abdomen is soft. There is no mass.      Tenderness: There is no abdominal tenderness.   Genitourinary:     Penis: Normal.       Prostate: Enlarged.      Rectum: Normal. Guaiac result negative.   Musculoskeletal: Normal range of motion.   Lymphadenopathy:      Cervical: No cervical adenopathy.   Skin:     General: Skin is warm and dry.   Neurological:      Mental Status: He is alert and oriented to person, place, and time.      Cranial Nerves: No cranial nerve deficit.      Motor: No abnormal muscle tone.         Assessment/Plan "   Diagnoses and all orders for this visit:    Hypertension, benign (Primary)  -     metoprolol tartrate (LOPRESSOR) 50 mg tablet; Take 1 tablet (50 mg total) by mouth 2 (two) times a day.  -     losartan (COZAAR) 50 mg tablet; Take 1 tablet (50 mg total) by mouth daily.  -     felodipine (PLENDIL) 10 mg 24 hr tablet; Take 1 tablet (10 mg total) by mouth daily.    Chronic kidney disease, stage I    Type 2 diabetes mellitus without complication, without long-term current use of insulin (CMS/HCC)    Spinal stenosis of lumbar region without neurogenic claudication    Malignant melanoma of skin (CMS/HCC)    Benign prostatic hyperplasia with lower urinary tract symptoms, symptom details unspecified  -     finasteride (PROSCAR) 5 mg tablet; Take 1 tablet (5 mg total) by mouth once daily.    Diverticulosis of colon    Type 2 diabetes mellitus with microalbuminuria, without long-term current use of insulin (CMS/HCC)  -     metFORMIN (GLUCOPHAGE) 500 mg tablet; Take 1 tablet (500 mg total) by mouth 2 (two) times a day with meals.    Encounter for general adult medical examination without abnormal findings    Blood pressure.  Adequate control.  Patient continue medication reevaluate in 3 months.  Chronic kidney disease stable.  No new intervention.  Diabetes improved control.  Agree with weight watchers.  Elevated microalbumin will continue to follow.  Spinal stenosis patient continue exercise program weight loss.  Malignant melanoma patient follow-up with dermatology on a regular basis.  BPH.  Improved symptoms with finasteride patient will continue his medication.  Lab work reviewed  Diverticulosis currently stable no new intervention.  Diabetes with microalbuminuria reviewed patient continue medication  Annual exam.  Daily routine discussed at length peer recommend 3 meals per day no snacking.  Recommend regular low impact exercise.  Patient continue limit caffeine and alcohol

## 2021-04-21 ENCOUNTER — DOCUMENTATION (OUTPATIENT)
Dept: FAMILY MEDICINE | Facility: CLINIC | Age: 71
End: 2021-04-21

## 2021-04-21 NOTE — PROGRESS NOTES
Coding Clarification ([facility]) - [facility]  Note       DATE: 2021    RE: Aneesh VOGEL Brito  : 1950      Under the direction of Kamar Palacios MD, the following adjustments were made to this patients Problem List:    Specified Code: E11.22 Code Description: Type 2 diabetes mellitus with diabetic chronic kidney disease   Clarification Type EMR System Encounter Type Date of Service Provider   Code Specificity Epic Office Note 2021  Kamar Palacios MD   Rationale: DM and CKD stage 1 found in Assessment on progress note 2021, diagnosed by Kamar Palacios MD, Higher specificity for E11.9 from Problem List is available to be E11.22         Original Code: E11.9

## 2021-04-28 ENCOUNTER — OFFICE VISIT (OUTPATIENT)
Dept: FAMILY MEDICINE | Facility: CLINIC | Age: 71
End: 2021-04-28
Payer: MEDICARE

## 2021-04-28 VITALS
RESPIRATION RATE: 18 BRPM | SYSTOLIC BLOOD PRESSURE: 142 MMHG | TEMPERATURE: 98.4 F | DIASTOLIC BLOOD PRESSURE: 76 MMHG | OXYGEN SATURATION: 98 % | BODY MASS INDEX: 32.9 KG/M2 | HEART RATE: 63 BPM | HEIGHT: 71 IN | WEIGHT: 235 LBS

## 2021-04-28 DIAGNOSIS — E87.6 HYPOKALEMIA: ICD-10-CM

## 2021-04-28 DIAGNOSIS — E78.00 PURE HYPERCHOLESTEROLEMIA: ICD-10-CM

## 2021-04-28 DIAGNOSIS — N18.1 TYPE 2 DIABETES MELLITUS WITH STAGE 1 CHRONIC KIDNEY DISEASE, WITHOUT LONG-TERM CURRENT USE OF INSULIN (CMS/HCC)  (CMS/HCC): ICD-10-CM

## 2021-04-28 DIAGNOSIS — I10 HYPERTENSION, BENIGN: Primary | ICD-10-CM

## 2021-04-28 DIAGNOSIS — E11.22 TYPE 2 DIABETES MELLITUS WITH STAGE 1 CHRONIC KIDNEY DISEASE, WITHOUT LONG-TERM CURRENT USE OF INSULIN (CMS/HCC)  (CMS/HCC): ICD-10-CM

## 2021-04-28 DIAGNOSIS — N52.9 ERECTILE DYSFUNCTION, UNSPECIFIED ERECTILE DYSFUNCTION TYPE: ICD-10-CM

## 2021-04-28 LAB
EST. AVERAGE GLUCOSE BLD GHB EST-MCNC: 128 MG/DL
HBA1C MFR BLD HPLC: 6.1 %

## 2021-04-28 PROCEDURE — G8754 DIAS BP LESS 90: HCPCS | Performed by: FAMILY MEDICINE

## 2021-04-28 PROCEDURE — G8753 SYS BP > OR = 140: HCPCS | Performed by: FAMILY MEDICINE

## 2021-04-28 PROCEDURE — 83036 HEMOGLOBIN GLYCOSYLATED A1C: CPT | Performed by: FAMILY MEDICINE

## 2021-04-28 PROCEDURE — 99214 OFFICE O/P EST MOD 30 MIN: CPT | Performed by: FAMILY MEDICINE

## 2021-04-28 RX ORDER — SILDENAFIL 50 MG/1
50 TABLET, FILM COATED ORAL DAILY PRN
Qty: 10 TABLET | Refills: 3 | Status: SHIPPED | OUTPATIENT
Start: 2021-04-28 | End: 2022-01-11 | Stop reason: ENTERED-IN-ERROR

## 2021-04-28 ASSESSMENT — ENCOUNTER SYMPTOMS
FATIGUE: 0
APPETITE CHANGE: 0
CHEST TIGHTNESS: 0
NAUSEA: 0
PALPITATIONS: 0
SHORTNESS OF BREATH: 0
SORE THROAT: 0
VOMITING: 0
WHEEZING: 0

## 2021-04-28 NOTE — PROGRESS NOTES
"      Subjective      Patient ID: Aneesh rBito is a 71 y.o. male.  1950      Patient for follow-up of blood pressure and blood sugar.  Patient with no new medical complaints today.  Patient now back in the gym and exercising and feeling well doing this.  He is tolerating medication well.  Blood pressure readings have been good.  His last A1c was 6.2.  Reviewed previous lab work with patient.  His renal function is now normalized.  His potassium was normal.  His LDL lipids were slightly elevated.      The following have been reviewed and updated as appropriate in this visit:       Review of Systems   Constitutional: Negative for appetite change and fatigue.   HENT: Negative for ear pain and sore throat.    Respiratory: Negative for chest tightness, shortness of breath and wheezing.    Cardiovascular: Negative for chest pain, palpitations and leg swelling.   Gastrointestinal: Negative for nausea and vomiting.   Skin: Negative for rash.       Objective     Vitals:    04/28/21 1001   BP: (!) 142/76   Pulse: 63   Resp: 18   Temp: 36.9 °C (98.4 °F)   TempSrc: Temporal   SpO2: 98%   Weight: 107 kg (235 lb)   Height: 1.803 m (5' 11\")     Body mass index is 32.78 kg/m².    Physical Exam  Vitals reviewed.   Constitutional:       Appearance: He is well-developed.   HENT:      Head: Normocephalic and atraumatic.      Right Ear: Tympanic membrane and ear canal normal.      Left Ear: Tympanic membrane and ear canal normal.      Nose: Nose normal.   Eyes:      Conjunctiva/sclera: Conjunctivae normal.   Neck:      Thyroid: No thyromegaly.   Cardiovascular:      Rate and Rhythm: Normal rate and regular rhythm.      Heart sounds: Normal heart sounds. No murmur heard.     Pulmonary:      Effort: Pulmonary effort is normal.      Breath sounds: Normal breath sounds. No wheezing or rales.   Musculoskeletal:      Cervical back: Normal range of motion and neck supple.         Assessment/Plan   Diagnoses and all orders for this " visit:    Hypertension, benign (Primary)    Type 2 diabetes mellitus with stage 1 chronic kidney disease, without long-term current use of insulin (CMS/[facility])  -     Hemoglobin A1c    Erectile dysfunction, unspecified erectile dysfunction type  -     sildenafiL (VIAGRA) 50 mg tablet; Take 1 tablet (50 mg total) by mouth daily as needed for erectile dysfunction.    Hypokalemia    Pure hypercholesterolemia    Blood pressure.  Adequate control.  Patient continue medication reevaluate in 3 months.  Diabetes.  Last renal lab work was normal.  Patient will continue her current medication regimen we will check lab work today.  Erectile dysfunction refill Viagra.  Hypokalemia.  Patient has had stable potassium may go off of potassium and will plan to recheck next visit.  Hyperlipidemia Long discussion.  Patient to continue work on diet and exercise.  We will check this again next visit patient may need to be on statin therapy

## 2021-06-21 ENCOUNTER — TELEPHONE (OUTPATIENT)
Dept: FAMILY MEDICINE | Facility: CLINIC | Age: 71
End: 2021-06-21

## 2021-06-21 DIAGNOSIS — N40.1 BENIGN PROSTATIC HYPERPLASIA WITH LOWER URINARY TRACT SYMPTOMS, SYMPTOM DETAILS UNSPECIFIED: Primary | ICD-10-CM

## 2021-06-28 ENCOUNTER — APPOINTMENT (EMERGENCY)
Dept: CT IMAGING | Facility: HOSPITAL | Age: 71
End: 2021-06-28
Payer: MEDICARE

## 2021-06-28 ENCOUNTER — HOSPITAL ENCOUNTER (EMERGENCY)
Facility: HOSPITAL | Age: 71
Discharge: HOME | End: 2021-06-28
Attending: EMERGENCY MEDICINE | Admitting: EMERGENCY MEDICINE
Payer: MEDICARE

## 2021-06-28 VITALS
HEIGHT: 72 IN | WEIGHT: 230 LBS | BODY MASS INDEX: 31.15 KG/M2 | HEART RATE: 71 BPM | TEMPERATURE: 97.3 F | RESPIRATION RATE: 18 BRPM | OXYGEN SATURATION: 96 % | DIASTOLIC BLOOD PRESSURE: 77 MMHG | SYSTOLIC BLOOD PRESSURE: 176 MMHG

## 2021-06-28 DIAGNOSIS — S00.93XA HEAD CONTUSION: Primary | ICD-10-CM

## 2021-06-28 PROCEDURE — 99284 EMERGENCY DEPT VISIT MOD MDM: CPT | Performed by: EMERGENCY MEDICINE

## 2021-06-28 PROCEDURE — 70450 CT HEAD/BRAIN W/O DYE: CPT

## 2021-06-28 PROCEDURE — 99284 EMERGENCY DEPT VISIT MOD MDM: CPT

## 2021-06-28 PROCEDURE — 72125 CT NECK SPINE W/O DYE: CPT

## 2021-06-28 PROCEDURE — 93005 ELECTROCARDIOGRAM TRACING: CPT

## 2021-06-29 LAB
ATRIAL RATE: 73 BPM
P AXIS: 56 DEGREES
PR INTERVAL: 198 MS
QRS AXIS: 8 DEGREES
QRSD INTERVAL: 94 MS
QT INTERVAL: 400 MS
QTC INTERVAL: 440 MS
T WAVE AXIS: 12 DEGREES
VENTRICULAR RATE: 73 BPM

## 2021-06-29 PROCEDURE — 93010 ELECTROCARDIOGRAM REPORT: CPT | Performed by: INTERNAL MEDICINE

## 2021-06-29 NOTE — ED PROVIDER NOTES
History  Chief Complaint   Patient presents with    Fall     Pt son in law reports he lost his balance and rolled into a ditch  He did hit his head , denies LOC or BTs  Pt reports he doesnt have pain anywhere  66year old male comes emergency department for evaluation of a slip and fall down a hill  Patient's son was with him, states that he saw him lose balance, and tumbled down help  Patient is ambulatory upon arrival here in the emergency department, he was well appearing, feeling well, did have a small abrasion on the lateral aspect of the frontal area of his scalp, with no bleeding and no hematoma  Plans for CT evaluation of the head and cervical spine  History provided by:  Patient   used: No    Fall  Mechanism of injury: fall    Injury location:  Head/neck  Arrived directly from scene: yes    Prior to arrival data:     Bystander interventions:  None  Associated symptoms: no abdominal pain, no blindness, no hearing loss and no vomiting    Risk factors: no pacemaker        None       Past Medical History:   Diagnosis Date    Hypertension        Past Surgical History:   Procedure Laterality Date    BACK SURGERY      JOINT REPLACEMENT         History reviewed  No pertinent family history  I have reviewed and agree with the history as documented  E-Cigarette/Vaping    E-Cigarette Use Never User      E-Cigarette/Vaping Substances     Social History     Tobacco Use    Smoking status: Never Smoker    Smokeless tobacco: Never Used   Vaping Use    Vaping Use: Never used   Substance Use Topics    Alcohol use: Never    Drug use: Not on file       Review of Systems   HENT: Negative for hearing loss  Eyes: Negative for blindness  Gastrointestinal: Negative for abdominal pain and vomiting  All other systems reviewed and are negative  Physical Exam  Physical Exam  Vitals and nursing note reviewed  Constitutional:       General: He is not in acute distress  Appearance: He is well-developed  He is not diaphoretic  HENT:      Head: Normocephalic and atraumatic  Right Ear: External ear normal       Left Ear: External ear normal    Eyes:      General: No scleral icterus  Right eye: No discharge  Left eye: No discharge  Conjunctiva/sclera: Conjunctivae normal    Neck:      Thyroid: No thyromegaly  Vascular: No JVD  Trachea: No tracheal deviation  Cardiovascular:      Rate and Rhythm: Normal rate and regular rhythm  Pulmonary:      Effort: Pulmonary effort is normal  No respiratory distress  Breath sounds: Normal breath sounds  No stridor  No wheezing or rales  Abdominal:      General: Bowel sounds are normal  There is no distension  Palpations: Abdomen is soft  Tenderness: There is no abdominal tenderness  Musculoskeletal:         General: No tenderness or deformity  Normal range of motion  Cervical back: Normal range of motion and neck supple  Skin:     General: Skin is warm and dry  Neurological:      Mental Status: He is alert and oriented to person, place, and time  Cranial Nerves: No cranial nerve deficit        Coordination: Coordination normal    Psychiatric:         Behavior: Behavior normal          Vital Signs  ED Triage Vitals [06/28/21 1455]   Temperature Pulse Respirations Blood Pressure SpO2   (!) 97 3 °F (36 3 °C) 71 18 (!) 176/77 96 %      Temp Source Heart Rate Source Patient Position - Orthostatic VS BP Location FiO2 (%)   Temporal Monitor Sitting Left arm --      Pain Score       --           Vitals:    06/28/21 1455   BP: (!) 176/77   Pulse: 71   Patient Position - Orthostatic VS: Sitting         Visual Acuity  Visual Acuity      Most Recent Value   L Pupil Size (mm)  3   R Pupil Size (mm)  3          ED Medications  Medications - No data to display    Diagnostic Studies  Results Reviewed     None                 CT head without contrast   Final Result by Anayeli Alcala MD (06/28 1819)      No acute intracranial abnormality  Workstation performed: [id number]         CT spine cervical without contrast   Final Result by Leola Jett MD (06/28 1817)      No acute cervical spine fracture or traumatic malalignment  Workstation performed: [id number]                    Procedures  Procedures         ED Course                             SBIRT 22yo+      Most Recent Value   SBIRT (24 yo +)   In order to provide better care to our patients, we are screening all of our patients for alcohol and drug use  Would it be okay to ask you these screening questions? Yes Filed at: 06/28/2021 1738   Initial Alcohol Screen: US AUDIT-C    1  How often do you have a drink containing alcohol?  0 Filed at: 06/28/2021 1738   2  How many drinks containing alcohol do you have on a typical day you are drinking? 0 Filed at: 06/28/2021 1738   3a  Male UNDER 65: How often do you have five or more drinks on one occasion? 0 Filed at: 06/28/2021 1738   3b  FEMALE Any Age, or MALE 65+: How often do you have 4 or more drinks on one occassion? 0 Filed at: 06/28/2021 1738   Audit-C Score  0 Filed at: 06/28/2021 1738   JAMILAH: How many times in the past year have you    Used an illegal drug or used a prescription medication for non-medical reasons?   Never Filed at: 06/28/2021 1738                    [facility]  Number of Diagnoses or Management Options  Head contusion: new and requires workup     Amount and/or Complexity of Data Reviewed  Clinical lab tests: ordered and reviewed  Tests in the radiology section of CPT®: reviewed and ordered  Decide to obtain previous medical records or to obtain history from someone other than the patient: yes  Review and summarize past medical records: yes    Patient Progress  Patient progress: stable      Disposition  Final diagnoses:   Head contusion     Time reflects when diagnosis was documented in both MDM as applicable and the Disposition within this note     Time User Action Codes Description Comment    6/28/2021  6:23 PM Xiomara Rojas Add [V90 [id number]] Head contusion       ED Disposition     ED Disposition Condition Date/Time Comment    Discharge Stable Mon Jun 28, 2021  6:22 PM Rosalva Tomlinson discharge to home/self care  Follow-up Information     Follow up With Specialties Details Why Layla    [address]  [facility] [id number]  [phone]            There are no discharge medications for this patient  No discharge procedures on file      PDMP Review     None          ED Provider  Electronically Signed by           Reva Vargas DO  06/29/21 2924

## 2021-08-03 ENCOUNTER — OFFICE VISIT (OUTPATIENT)
Dept: FAMILY MEDICINE | Facility: CLINIC | Age: 71
End: 2021-08-03
Payer: MEDICARE

## 2021-08-03 VITALS
RESPIRATION RATE: 17 BRPM | DIASTOLIC BLOOD PRESSURE: 78 MMHG | HEIGHT: 71 IN | HEART RATE: 62 BPM | OXYGEN SATURATION: 98 % | SYSTOLIC BLOOD PRESSURE: 134 MMHG | WEIGHT: 237 LBS | BODY MASS INDEX: 33.18 KG/M2

## 2021-08-03 DIAGNOSIS — E11.22 TYPE 2 DIABETES MELLITUS WITH STAGE 1 CHRONIC KIDNEY DISEASE, WITHOUT LONG-TERM CURRENT USE OF INSULIN (CMS/HCC)  (CMS/HCC): ICD-10-CM

## 2021-08-03 DIAGNOSIS — I10 HYPERTENSION, BENIGN: Primary | ICD-10-CM

## 2021-08-03 DIAGNOSIS — N18.1 TYPE 2 DIABETES MELLITUS WITH STAGE 1 CHRONIC KIDNEY DISEASE, WITHOUT LONG-TERM CURRENT USE OF INSULIN (CMS/HCC)  (CMS/HCC): ICD-10-CM

## 2021-08-03 LAB
EST. AVERAGE GLUCOSE BLD GHB EST-MCNC: 134 MG/DL
HBA1C MFR BLD HPLC: 6.3 %

## 2021-08-03 PROCEDURE — 83036 HEMOGLOBIN GLYCOSYLATED A1C: CPT | Performed by: FAMILY MEDICINE

## 2021-08-03 PROCEDURE — 99214 OFFICE O/P EST MOD 30 MIN: CPT | Performed by: FAMILY MEDICINE

## 2021-08-03 PROCEDURE — G8752 SYS BP LESS 140: HCPCS | Performed by: FAMILY MEDICINE

## 2021-08-03 PROCEDURE — G8754 DIAS BP LESS 90: HCPCS | Performed by: FAMILY MEDICINE

## 2021-08-03 ASSESSMENT — ENCOUNTER SYMPTOMS
CHEST TIGHTNESS: 0
SHORTNESS OF BREATH: 0
APPETITE CHANGE: 0
SORE THROAT: 0
PALPITATIONS: 0
VOMITING: 0
NAUSEA: 0
FATIGUE: 0
WHEEZING: 0

## 2021-08-03 NOTE — PROGRESS NOTES
"      Subjective      Patient ID: Aneesh Brito is a 71 y.o. male.  1950      Patient for follow-up of blood pressure and blood sugar.  No new medical complaints.  Patient actually spending summer vacation and the [employer].  He has been exercising.  His weight is stable.  His last A1c was 6.1.  Tolerating blood pressure medicine well readings have been good      The following have been reviewed and updated as appropriate in this visit:  Tobacco       Review of Systems   Constitutional: Negative for appetite change and fatigue.   HENT: Negative for ear pain and sore throat.    Respiratory: Negative for chest tightness, shortness of breath and wheezing.    Cardiovascular: Negative for chest pain, palpitations and leg swelling.   Gastrointestinal: Negative for nausea and vomiting.   Skin: Negative for rash.       Objective     Vitals:    08/03/21 1007 08/03/21 1017   BP: (!) 144/62 134/78   Pulse: 62    Resp: 17    SpO2: 98%    Weight: 108 kg (237 lb)    Height: 1.803 m (5' 11\")      Body mass index is 33.05 kg/m².    Physical Exam  Vitals reviewed.   Constitutional:       Appearance: He is well-developed.   HENT:      Head: Normocephalic and atraumatic.      Right Ear: Tympanic membrane and ear canal normal.      Left Ear: Tympanic membrane and ear canal normal.      Nose: Nose normal.   Eyes:      Conjunctiva/sclera: Conjunctivae normal.   Neck:      Thyroid: No thyromegaly.   Cardiovascular:      Rate and Rhythm: Normal rate and regular rhythm.      Heart sounds: Normal heart sounds. No murmur heard.     Pulmonary:      Effort: Pulmonary effort is normal.      Breath sounds: Normal breath sounds. No wheezing or rales.   Musculoskeletal:      Cervical back: Normal range of motion and neck supple.         Assessment/Plan   Diagnoses and all orders for this visit:    Hypertension, benign (Primary)    Type 2 diabetes mellitus with stage 1 chronic kidney disease, without long-term current use of insulin " (CMS/[facility])    Blood pressure.  Adequate control.  Patient continue medication reevaluate in 3 months  Diabetes.  Improved control.  Patient continue medication encourage weight loss and exercise recheck in 3 months

## 2021-08-09 ENCOUNTER — OFFICE VISIT (OUTPATIENT)
Dept: FAMILY MEDICINE | Facility: CLINIC | Age: 71
End: 2021-08-09
Payer: MEDICARE

## 2021-08-09 VITALS
HEIGHT: 71 IN | SYSTOLIC BLOOD PRESSURE: 138 MMHG | DIASTOLIC BLOOD PRESSURE: 80 MMHG | BODY MASS INDEX: 31.95 KG/M2 | WEIGHT: 228.2 LBS | OXYGEN SATURATION: 95 % | TEMPERATURE: 97.5 F | RESPIRATION RATE: 18 BRPM | HEART RATE: 58 BPM

## 2021-08-09 DIAGNOSIS — J06.9 ACUTE URI: Primary | ICD-10-CM

## 2021-08-09 DIAGNOSIS — I10 HYPERTENSION, BENIGN: ICD-10-CM

## 2021-08-09 PROCEDURE — G8754 DIAS BP LESS 90: HCPCS | Performed by: FAMILY MEDICINE

## 2021-08-09 PROCEDURE — G8752 SYS BP LESS 140: HCPCS | Performed by: FAMILY MEDICINE

## 2021-08-09 PROCEDURE — 99213 OFFICE O/P EST LOW 20 MIN: CPT | Performed by: FAMILY MEDICINE

## 2021-08-09 RX ORDER — METHYLPREDNISOLONE 4 MG/1
TABLET ORAL
Qty: 21 TABLET | Refills: 0 | Status: SHIPPED | OUTPATIENT
Start: 2021-08-09 | End: 2021-08-16

## 2021-08-09 RX ORDER — LOSARTAN POTASSIUM 50 MG/1
50 TABLET ORAL DAILY
Qty: 90 TABLET | Refills: 1 | Status: SHIPPED | OUTPATIENT
Start: 2021-08-09 | End: 2022-01-14 | Stop reason: HOSPADM

## 2021-08-09 ASSESSMENT — ENCOUNTER SYMPTOMS
VOMITING: 0
EYE PAIN: 0
SORE THROAT: 0
WHEEZING: 0
SINUS PRESSURE: 0
FEVER: 0
EYE DISCHARGE: 0
SINUS PAIN: 0
SHORTNESS OF BREATH: 0
VOICE CHANGE: 0
PALPITATIONS: 0
CHILLS: 0
NAUSEA: 0

## 2021-08-09 NOTE — PROGRESS NOTES
"      Subjective      Patient ID: Aneesh Brito is a 71 y.o. male.  1950      Patient with complaint of nasal congestion and occasional cough.  Patient has been vaccinated against COVID-19.  Patient did spend the last week with [place] children on vacation.  Patient notes no fever.  He is taking Coricidin      The following have been reviewed and updated as appropriate in this visit:       Review of Systems   Constitutional: Negative for chills and fever.   HENT: Negative for ear pain, sinus pressure, sinus pain, sore throat and voice change.    Eyes: Negative for pain and discharge.   Respiratory: Negative for shortness of breath and wheezing.    Cardiovascular: Negative for chest pain and palpitations.   Gastrointestinal: Negative for nausea and vomiting.   Skin: Negative for rash.       Objective     Vitals:    08/09/21 0944   BP: 138/80   Pulse: (!) 58   Resp: 18   Temp: 36.4 °C (97.5 °F)   TempSrc: Temporal   SpO2: 95%   Weight: 104 kg (228 lb 3.2 oz)   Height: 1.803 m (5' 11\")     Body mass index is 31.83 kg/m².    Physical Exam  Vitals reviewed.   Constitutional:       General: He is not in acute distress.     Appearance: He is well-developed.   HENT:      Head: Normocephalic and atraumatic.      Right Ear: Tympanic membrane, ear canal and external ear normal.      Left Ear: Tympanic membrane, ear canal and external ear normal.      Nose: Mucosal edema and rhinorrhea present.      Mouth/Throat:      Pharynx: No oropharyngeal exudate or posterior oropharyngeal erythema.   Eyes:      General:         Right eye: No discharge.         Left eye: No discharge.      Conjunctiva/sclera: Conjunctivae normal.   Neck:      Thyroid: No thyromegaly.   Cardiovascular:      Rate and Rhythm: Normal rate and regular rhythm.   Pulmonary:      Effort: Pulmonary effort is normal. No respiratory distress.      Breath sounds: Examination of the right-lower field reveals wheezing. Examination of the left-lower field reveals " wheezing. Wheezing present. No rales.      Comments: Pain inspiratory wheeze bilaterally  Musculoskeletal:      Cervical back: Normal range of motion and neck supple.   Lymphadenopathy:      Cervical: No cervical adenopathy.         Assessment/Plan   Diagnoses and all orders for this visit:    Acute URI (Primary)  -     methylPREDNISolone (MEDROL DOSEPACK) 4 mg tablet; Follow package directions.    Hypertension, benign  -     losartan (COZAAR) 50 mg tablet; Take 1 tablet (50 mg total) by mouth daily.    Upper respiratory infection.  Will have patient add Mucinex DM.  Will give trial of Medrol Dosepak for fate respiratory wheezing.  Hypertension we will refill losartan

## 2021-11-09 ENCOUNTER — OFFICE VISIT (OUTPATIENT)
Dept: FAMILY MEDICINE | Facility: CLINIC | Age: 71
End: 2021-11-09
Payer: MEDICARE

## 2021-11-09 VITALS
HEART RATE: 72 BPM | BODY MASS INDEX: 32.06 KG/M2 | RESPIRATION RATE: 17 BRPM | SYSTOLIC BLOOD PRESSURE: 130 MMHG | HEIGHT: 71 IN | DIASTOLIC BLOOD PRESSURE: 70 MMHG | OXYGEN SATURATION: 99 % | TEMPERATURE: 98 F | WEIGHT: 229 LBS

## 2021-11-09 DIAGNOSIS — I10 HYPERTENSION, BENIGN: Primary | ICD-10-CM

## 2021-11-09 DIAGNOSIS — N18.1 TYPE 2 DIABETES MELLITUS WITH STAGE 1 CHRONIC KIDNEY DISEASE, WITHOUT LONG-TERM CURRENT USE OF INSULIN (CMS/HCC)  (CMS/HCC): ICD-10-CM

## 2021-11-09 DIAGNOSIS — S00.91XA ABRASION OF HEAD, INITIAL ENCOUNTER: ICD-10-CM

## 2021-11-09 DIAGNOSIS — E11.22 TYPE 2 DIABETES MELLITUS WITH STAGE 1 CHRONIC KIDNEY DISEASE, WITHOUT LONG-TERM CURRENT USE OF INSULIN (CMS/HCC)  (CMS/HCC): ICD-10-CM

## 2021-11-09 LAB
EST. AVERAGE GLUCOSE BLD GHB EST-MCNC: 140 MG/DL
HBA1C MFR BLD HPLC: 6.5 %

## 2021-11-09 PROCEDURE — 99214 OFFICE O/P EST MOD 30 MIN: CPT | Performed by: FAMILY MEDICINE

## 2021-11-09 PROCEDURE — G8754 DIAS BP LESS 90: HCPCS | Performed by: FAMILY MEDICINE

## 2021-11-09 PROCEDURE — G8752 SYS BP LESS 140: HCPCS | Performed by: FAMILY MEDICINE

## 2021-11-09 PROCEDURE — 83036 HEMOGLOBIN GLYCOSYLATED A1C: CPT | Performed by: FAMILY MEDICINE

## 2021-11-09 RX ORDER — NETARSUDIL AND LATANOPROST OPHTHALMIC SOLUTION, 0.02%/0.005% .2; .05 MG/ML; MG/ML
1 SOLUTION/ DROPS OPHTHALMIC; TOPICAL NIGHTLY
COMMUNITY
Start: 2021-10-21

## 2021-11-09 ASSESSMENT — ENCOUNTER SYMPTOMS
HEADACHES: 0
SHORTNESS OF BREATH: 0
FACIAL ASYMMETRY: 0
APPETITE CHANGE: 0
CHEST TIGHTNESS: 0
NUMBNESS: 0
WHEEZING: 0
DIZZINESS: 0
FATIGUE: 0
SORE THROAT: 0
PALPITATIONS: 0
NAUSEA: 0
LIGHT-HEADEDNESS: 0
VOMITING: 0
WEAKNESS: 0

## 2021-11-09 NOTE — PROGRESS NOTES
"      Subjective      Patient ID: nAeesh Brito is a 71 y.o. male.  1950      Patient for follow-up of blood pressure and blood sugar.  Patient blood pressures been stable.  His last A1c was 6.3.  Is tolerating medication well.  Patient notes he was taking out the trash and had a slip and fall and abrasion to his left knee and his right forehead.  No loss of consciousness.  Patient today feels well.  Unrelated patient notes he has gotten his flu shot as well as his booster Covid immunization      The following have been reviewed and updated as appropriate in this visit:       Review of Systems   Constitutional: Negative for appetite change and fatigue.   HENT: Negative for ear pain and sore throat.    Respiratory: Negative for chest tightness, shortness of breath and wheezing.    Cardiovascular: Negative for chest pain, palpitations and leg swelling.   Gastrointestinal: Negative for nausea and vomiting.   Skin: Negative for rash.   Neurological: Negative for dizziness, syncope, facial asymmetry, weakness, light-headedness, numbness and headaches.       Objective     Vitals:    11/09/21 0953   BP: 130/70   Pulse: 72   Resp: 17   Temp: 36.7 °C (98 °F)   SpO2: 99%   Weight: 104 kg (229 lb)   Height: 1.803 m (5' 11\")     Body mass index is 31.94 kg/m².    Physical Exam  Vitals reviewed.   Constitutional:       Appearance: He is well-developed.   HENT:      Head: Normocephalic and atraumatic.      Right Ear: Tympanic membrane and ear canal normal.      Left Ear: Tympanic membrane and ear canal normal.      Nose: Nose normal.   Eyes:      Conjunctiva/sclera: Conjunctivae normal.   Neck:      Thyroid: No thyromegaly.   Cardiovascular:      Rate and Rhythm: Normal rate and regular rhythm.      Heart sounds: Normal heart sounds. No murmur heard.      Pulmonary:      Effort: Pulmonary effort is normal.      Breath sounds: Normal breath sounds. No wheezing or rales.   Musculoskeletal:      Cervical back: Normal range of " motion and neck supple.   Skin:     Comments: Right temporoparietal area superficial 4 cm abrasion.  No skin infection.   Neurological:      General: No focal deficit present.         Assessment/Plan   Diagnoses and all orders for this visit:    Hypertension, benign (Primary)    Type 2 diabetes mellitus with stage 1 chronic kidney disease, without long-term current use of insulin (CMS/[facility])  -     Hemoglobin A1c    Abrasion of head, initial encounter    Hypertension.  Adequate control.  Patient continue medication reevaluate in 3 months  Diabetes.  Improved control.  Patient weight stable will check A1c  Abrasion to head.  No signs of cellulitis.  May continue to keep clean with soap and water

## 2021-11-30 ENCOUNTER — TELEPHONE (OUTPATIENT)
Dept: FAMILY MEDICINE | Facility: CLINIC | Age: 71
End: 2021-11-30

## 2021-12-06 ENCOUNTER — TELEPHONE (OUTPATIENT)
Dept: FAMILY MEDICINE | Facility: CLINIC | Age: 71
End: 2021-12-06
Payer: MEDICARE

## 2021-12-06 DIAGNOSIS — M79.89 MASS OF SOFT TISSUE OF KNEE: Primary | ICD-10-CM

## 2021-12-06 NOTE — TELEPHONE ENCOUNTER
TC from pt - he recently discussed a CT with Dr. Palacios. He would like to follow up regarding this. He would not disclose any other information.     Ph: [phone]

## 2021-12-07 ENCOUNTER — TELEPHONE (OUTPATIENT)
Dept: FAMILY MEDICINE | Facility: CLINIC | Age: 71
End: 2021-12-07
Payer: MEDICARE

## 2021-12-07 DIAGNOSIS — M79.89 MASS OF SOFT TISSUE OF KNEE: Primary | ICD-10-CM

## 2021-12-07 NOTE — TELEPHONE ENCOUNTER
Pt states a script was ordered by Philip & needs it to be corrected & faxed. He states he was told that the MRI needs to be ordered w/o or both w/ & w/o contrast and CT [address]16 (hip) was coded & he's having MRI of right knee. Coding needs to be changed as well.     Fax: [phone]

## 2021-12-08 NOTE — TELEPHONE ENCOUNTER
Reviewed w pt/ pt having MRI at [facility]- told order needs to be MRI with/ and without contrast/ aslo note CPT code was wrong/ Rx renetered for MRI R knee w/o contrast/ dx soft tissue mass? / had ct KNEE WHICH SHOWED ? MASS VS CYST   RX FAXED TO [facility]/

## 2021-12-21 ENCOUNTER — TELEPHONE (OUTPATIENT)
Dept: FAMILY MEDICINE | Facility: CLINIC | Age: 71
End: 2021-12-21
Payer: MEDICARE

## 2021-12-21 NOTE — TELEPHONE ENCOUNTER
Recent MRI discussed with patient.  Ganglion cysts noted.  Patient due to have knee replacement early next year

## 2022-01-10 ENCOUNTER — APPOINTMENT (EMERGENCY)
Dept: RADIOLOGY | Facility: HOSPITAL | Age: 72
DRG: 372 | End: 2022-01-10
Attending: EMERGENCY MEDICINE
Payer: MEDICARE

## 2022-01-10 ENCOUNTER — TELEPHONE (OUTPATIENT)
Dept: FAMILY MEDICINE | Facility: CLINIC | Age: 72
End: 2022-01-10
Payer: MEDICARE

## 2022-01-10 ENCOUNTER — HOSPITAL ENCOUNTER (INPATIENT)
Facility: HOSPITAL | Age: 72
LOS: 2 days | Discharge: HOME HEALTH | DRG: 372 | End: 2022-01-14
Attending: EMERGENCY MEDICINE | Admitting: HOSPITALIST
Payer: MEDICARE

## 2022-01-10 DIAGNOSIS — E11.29 TYPE 2 DIABETES MELLITUS WITH MICROALBUMINURIA, WITHOUT LONG-TERM CURRENT USE OF INSULIN (CMS/HCC): ICD-10-CM

## 2022-01-10 DIAGNOSIS — R94.31 PROLONGED Q-T INTERVAL ON ECG: Primary | ICD-10-CM

## 2022-01-10 DIAGNOSIS — R80.9 TYPE 2 DIABETES MELLITUS WITH MICROALBUMINURIA, WITHOUT LONG-TERM CURRENT USE OF INSULIN (CMS/HCC): ICD-10-CM
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
## 2022-01-10 DIAGNOSIS — R50.9 FEVER, UNSPECIFIED FEVER CAUSE: ICD-10-CM

## 2022-01-10 LAB
ANION GAP SERPL CALC-SCNC: 12 MEQ/L (ref 3–15)
BASOPHILS # BLD: 0.05 K/UL (ref 0.01–0.1)
BASOPHILS NFR BLD: 0.3 %
BUN SERPL-MCNC: 12 MG/DL (ref 8–20)
CALCIUM SERPL-MCNC: 9.1 MG/DL (ref 8.9–10.3)
CHLORIDE SERPL-SCNC: 97 MEQ/L (ref 98–109)
CO2 SERPL-SCNC: 24 MEQ/L (ref 22–32)
CREAT SERPL-MCNC: 0.9 MG/DL (ref 0.8–1.3)
DIFFERENTIAL METHOD BLD: ABNORMAL
EOSINOPHIL # BLD: 0.01 K/UL (ref 0.04–0.54)
EOSINOPHIL NFR BLD: 0.1 %
ERYTHROCYTE [DISTWIDTH] IN BLOOD BY AUTOMATED COUNT: 13.3 % (ref 11.6–14.4)
GFR SERPL CREATININE-BSD FRML MDRD: >60 ML/MIN/1.73M*2
GLUCOSE SERPL-MCNC: 164 MG/DL (ref 70–99)
HCT VFR BLDCO AUTO: 41.2 % (ref 40.1–51)
HGB BLD-MCNC: 14.4 G/DL (ref 13.7–17.5)
IMM GRANULOCYTES # BLD AUTO: 0.06 K/UL (ref 0–0.08)
IMM GRANULOCYTES NFR BLD AUTO: 0.4 %
LYMPHOCYTES # BLD: 0.92 K/UL (ref 1.2–3.5)
LYMPHOCYTES NFR BLD: 6 %
MCH RBC QN AUTO: 32.3 PG (ref 28–33.2)
MCHC RBC AUTO-ENTMCNC: 35 G/DL (ref 32.2–36.5)
MCV RBC AUTO: 92.4 FL (ref 83–98)
MONOCYTES # BLD: 1.08 K/UL (ref 0.3–1)
MONOCYTES NFR BLD: 7.1 %
NEUTROPHILS # BLD: 13.16 K/UL (ref 1.7–7)
NEUTS SEG NFR BLD: 86.1 %
NRBC BLD-RTO: 0 %
PDW BLD AUTO: 10.5 FL (ref 9.4–12.4)
PLATELET # BLD AUTO: 244 K/UL (ref 150–350)
POTASSIUM SERPL-SCNC: 3.2 MEQ/L (ref 3.6–5.1)
RBC # BLD AUTO: 4.46 M/UL (ref 4.5–5.8)
SODIUM SERPL-SCNC: 133 MEQ/L (ref 136–144)
TROPONIN I SERPL HS-MCNC: 15.8 PG/ML
WBC # BLD AUTO: 15.28 K/UL (ref 3.8–10.5)

## 2022-01-10 PROCEDURE — 63700000 HC SELF-ADMINISTRABLE DRUG

## 2022-01-10 PROCEDURE — 84484 ASSAY OF TROPONIN QUANT: CPT | Performed by: EMERGENCY MEDICINE

## 2022-01-10 PROCEDURE — 36415 COLL VENOUS BLD VENIPUNCTURE: CPT

## 2022-01-10 PROCEDURE — 80048 BASIC METABOLIC PNL TOTAL CA: CPT

## 2022-01-10 PROCEDURE — 84484 ASSAY OF TROPONIN QUANT: CPT

## 2022-01-10 PROCEDURE — 80048 BASIC METABOLIC PNL TOTAL CA: CPT | Performed by: EMERGENCY MEDICINE

## 2022-01-10 PROCEDURE — 71045 X-RAY EXAM CHEST 1 VIEW: CPT

## 2022-01-10 PROCEDURE — 85025 COMPLETE CBC W/AUTO DIFF WBC: CPT | Performed by: EMERGENCY MEDICINE

## 2022-01-10 PROCEDURE — 99285 EMERGENCY DEPT VISIT HI MDM: CPT | Mod: 25

## 2022-01-10 PROCEDURE — 85025 COMPLETE CBC W/AUTO DIFF WBC: CPT

## 2022-01-10 PROCEDURE — 93005 ELECTROCARDIOGRAM TRACING: CPT | Performed by: PHYSICIAN ASSISTANT

## 2022-01-10 RX ORDER — ACETAMINOPHEN 325 MG/1
975 TABLET ORAL ONCE
Status: COMPLETED | OUTPATIENT
Start: 2022-01-10 | End: 2022-01-10

## 2022-01-10 RX ADMIN — ACETAMINOPHEN 975 MG: 325 TABLET, FILM COATED ORAL at 19:57

## 2022-01-10 NOTE — PROGRESS NOTES
On call note. Pt with weakness in upper and lower extremities over the last 4 days. 4 falls during this time. Fell out of bed and off commode. Also with diarrhea and fecal incontinence. Wife unable to bring him to office. He is too weak. ems came monday and neighbors helped yesterday with fall. Rec 911 and er eval for neurological and metabolic derrangements. Wife agrees with plan.   Rufina

## 2022-01-11 PROBLEM — E87.6 HYPOKALEMIA: Status: ACTIVE | Noted: 2022-01-11

## 2022-01-11 PROBLEM — R53.1 WEAKNESS: Status: ACTIVE | Noted: 2022-01-11

## 2022-01-11 PROBLEM — E87.1 HYPONATREMIA: Status: ACTIVE | Noted: 2022-01-11
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
## 2022-01-11 PROBLEM — R50.9 FEVER: Status: ACTIVE | Noted: 2022-01-11

## 2022-01-11 PROBLEM — R19.7 DIARRHEA: Status: ACTIVE | Noted: 2022-01-11

## 2022-01-11 LAB
ATRIAL RATE: 88
BACTERIA URNS QL MICRO: ABNORMAL /HPF
BILIRUB UR QL STRIP.AUTO: NEGATIVE MG/DL
C DIFF STL QL: NORMAL
CLARITY UR REFRACT.AUTO: CLEAR
COLOR UR AUTO: YELLOW
FLUAV RNA SPEC QL NAA+PROBE: NEGATIVE
FLUBV RNA SPEC QL NAA+PROBE: NEGATIVE
GLUCOSE UR STRIP.AUTO-MCNC: NEGATIVE MG/DL
HGB UR QL STRIP.AUTO: 1
HYALINE CASTS #/AREA URNS LPF: ABNORMAL /LPF
KETONES UR STRIP.AUTO-MCNC: ABNORMAL MG/DL
LEUKOCYTE ESTERASE UR QL STRIP.AUTO: NEGATIVE
NITRITE UR QL STRIP.AUTO: NEGATIVE
P AXIS: 72
PH UR STRIP.AUTO: 6.5 [PH]
PR INTERVAL: 216
PROT UR QL STRIP.AUTO: 3
QRS DURATION: 86
QT INTERVAL: 416
QTC CALCULATION(BAZETT): 506
R AXIS: 203
RBC #/AREA URNS HPF: ABNORMAL /HPF
RSV RNA SPEC QL NAA+PROBE: NEGATIVE
SARS-COV-2 RNA RESP QL NAA+PROBE: NEGATIVE
SP GR UR REFRACT.AUTO: 1.02
SQUAMOUS URNS QL MICRO: 1 /HPF
T WAVE AXIS: 220
TROPONIN I SERPL HS-MCNC: 14.2 PG/ML
UROBILINOGEN UR STRIP-ACNC: 0.2 EU/DL
VENTRICULAR RATE: 89
WBC #/AREA URNS HPF: ABNORMAL /HPF

## 2022-01-11 PROCEDURE — 63700000 HC SELF-ADMINISTRABLE DRUG: Performed by: HOSPITALIST

## 2022-01-11 PROCEDURE — 96361 HYDRATE IV INFUSION ADD-ON: CPT

## 2022-01-11 PROCEDURE — 87493 C DIFF AMPLIFIED PROBE: CPT | Performed by: INTERNAL MEDICINE

## 2022-01-11 PROCEDURE — 63600000 HC DRUGS/DETAIL CODE

## 2022-01-11 PROCEDURE — 99220 PR INITIAL OBSERVATION CARE/DAY 70 MINUTES: CPT | Performed by: HOSPITALIST

## 2022-01-11 PROCEDURE — 63600000 HC DRUGS/DETAIL CODE: Performed by: HOSPITALIST

## 2022-01-11 PROCEDURE — 87040 BLOOD CULTURE FOR BACTERIA: CPT | Performed by: PHYSICIAN ASSISTANT

## 2022-01-11 PROCEDURE — 87177 OVA AND PARASITES SMEARS: CPT | Performed by: PHYSICIAN ASSISTANT

## 2022-01-11 PROCEDURE — 87077 CULTURE AEROBIC IDENTIFY: CPT | Performed by: PHYSICIAN ASSISTANT

## 2022-01-11 PROCEDURE — 84484 ASSAY OF TROPONIN QUANT: CPT | Performed by: EMERGENCY MEDICINE

## 2022-01-11 PROCEDURE — 25800000 HC PHARMACY IV SOLUTIONS: Performed by: PHYSICIAN ASSISTANT

## 2022-01-11 PROCEDURE — 87154 CUL TYP ID BLD PTHGN 6+ TRGT: CPT | Performed by: PHYSICIAN ASSISTANT

## 2022-01-11 PROCEDURE — 87324 CLOSTRIDIUM AG IA: CPT | Performed by: PHYSICIAN ASSISTANT

## 2022-01-11 PROCEDURE — 25800000 HC PHARMACY IV SOLUTIONS: Performed by: HOSPITALIST

## 2022-01-11 PROCEDURE — 96360 HYDRATION IV INFUSION INIT: CPT

## 2022-01-11 PROCEDURE — 36415 COLL VENOUS BLD VENIPUNCTURE: CPT | Performed by: PHYSICIAN ASSISTANT

## 2022-01-11 PROCEDURE — 96372 THER/PROPH/DIAG INJ SC/IM: CPT | Mod: 59

## 2022-01-11 PROCEDURE — 87637 SARSCOV2&INF A&B&RSV AMP PRB: CPT | Performed by: PHYSICIAN ASSISTANT

## 2022-01-11 PROCEDURE — G0378 HOSPITAL OBSERVATION PER HR: HCPCS

## 2022-01-11 PROCEDURE — 200200 PR NO CHARGE: Performed by: HOSPITALIST

## 2022-01-11 PROCEDURE — 81001 URINALYSIS AUTO W/SCOPE: CPT | Performed by: PHYSICIAN ASSISTANT

## 2022-01-11 RX ORDER — METFORMIN HYDROCHLORIDE 500 MG/1
500 TABLET ORAL 2 TIMES DAILY WITH MEALS
COMMUNITY
End: 2022-01-25 | Stop reason: SDUPTHER

## 2022-01-11 RX ORDER — LATANOPROST 50 UG/ML
1 SOLUTION/ DROPS OPHTHALMIC NIGHTLY
Status: DISCONTINUED | OUTPATIENT
Start: 2022-01-11 | End: 2022-01-14 | Stop reason: HOSPADM

## 2022-01-11 RX ORDER — FINASTERIDE 5 MG/1
5 TABLET, FILM COATED ORAL DAILY
COMMUNITY
End: 2022-01-25 | Stop reason: SDUPTHER

## 2022-01-11 RX ORDER — FELODIPINE 10 MG/1
10 TABLET, EXTENDED RELEASE ORAL DAILY
COMMUNITY
End: 2022-03-09 | Stop reason: SDUPTHER

## 2022-01-11 RX ORDER — DEXTROSE 50 % IN WATER (D50W) INTRAVENOUS SYRINGE
25 AS NEEDED
Status: DISCONTINUED | OUTPATIENT
Start: 2022-01-11 | End: 2022-01-14 | Stop reason: HOSPADM

## 2022-01-11 RX ORDER — ENOXAPARIN SODIUM 100 MG/ML
40 INJECTION SUBCUTANEOUS
Status: DISCONTINUED | OUTPATIENT
Start: 2022-01-11 | End: 2022-01-14 | Stop reason: HOSPADM

## 2022-01-11 RX ORDER — ACETAMINOPHEN 325 MG/1
650 TABLET ORAL 4 TIMES DAILY PRN
Status: DISCONTINUED | OUTPATIENT
Start: 2022-01-11 | End: 2022-01-14 | Stop reason: HOSPADM

## 2022-01-11 RX ORDER — IBUPROFEN 200 MG
16-32 TABLET ORAL AS NEEDED
Status: DISCONTINUED | OUTPATIENT
Start: 2022-01-11 | End: 2022-01-14 | Stop reason: HOSPADM

## 2022-01-11 RX ORDER — METFORMIN HYDROCHLORIDE 500 MG/1
500 TABLET ORAL 2 TIMES DAILY WITH MEALS
Status: DISCONTINUED | OUTPATIENT
Start: 2022-01-11 | End: 2022-01-14 | Stop reason: HOSPADM

## 2022-01-11 RX ORDER — NITROGLYCERIN 0.4 MG/1
0.4 TABLET SUBLINGUAL EVERY 5 MIN PRN
Status: DISCONTINUED | OUTPATIENT
Start: 2022-01-11 | End: 2022-01-14 | Stop reason: HOSPADM

## 2022-01-11 RX ORDER — ONDANSETRON HYDROCHLORIDE 2 MG/ML
INJECTION, SOLUTION INTRAVENOUS
Status: COMPLETED
Start: 2022-01-11 | End: 2022-01-11

## 2022-01-11 RX ORDER — SODIUM CHLORIDE 9 MG/ML
INJECTION, SOLUTION INTRAVENOUS CONTINUOUS
Status: DISCONTINUED | OUTPATIENT
Start: 2022-01-11 | End: 2022-01-12

## 2022-01-11 RX ORDER — ATROPINE SULFATE 0.1 MG/ML
0.5 INJECTION INTRAVENOUS EVERY 5 MIN PRN
Status: DISCONTINUED | OUTPATIENT
Start: 2022-01-11 | End: 2022-01-14 | Stop reason: HOSPADM

## 2022-01-11 RX ORDER — LOSARTAN POTASSIUM 50 MG/1
50 TABLET ORAL DAILY
Status: DISCONTINUED | OUTPATIENT
Start: 2022-01-11 | End: 2022-01-12

## 2022-01-11 RX ORDER — POTASSIUM CHLORIDE 750 MG/1
20 TABLET, FILM COATED, EXTENDED RELEASE ORAL AS NEEDED
Status: DISCONTINUED | OUTPATIENT
Start: 2022-01-11 | End: 2022-01-14 | Stop reason: HOSPADM

## 2022-01-11 RX ORDER — METOPROLOL TARTRATE 50 MG/1
50 TABLET ORAL 2 TIMES DAILY
Status: DISCONTINUED | OUTPATIENT
Start: 2022-01-11 | End: 2022-01-14 | Stop reason: HOSPADM

## 2022-01-11 RX ORDER — POTASSIUM CHLORIDE 750 MG/1
40 TABLET, FILM COATED, EXTENDED RELEASE ORAL AS NEEDED
Status: DISCONTINUED | OUTPATIENT
Start: 2022-01-11 | End: 2022-01-14 | Stop reason: HOSPADM

## 2022-01-11 RX ORDER — DEXTROSE 40 %
15-30 GEL (GRAM) ORAL AS NEEDED
Status: DISCONTINUED | OUTPATIENT
Start: 2022-01-11 | End: 2022-01-14 | Stop reason: HOSPADM

## 2022-01-11 RX ORDER — BRIMONIDINE TARTRATE 2 MG/ML
1 SOLUTION/ DROPS OPHTHALMIC 2 TIMES DAILY
Status: DISCONTINUED | OUTPATIENT
Start: 2022-01-11 | End: 2022-01-14 | Stop reason: HOSPADM

## 2022-01-11 RX ADMIN — METOPROLOL TARTRATE 50 MG: 50 TABLET, FILM COATED ORAL at 20:11

## 2022-01-11 RX ADMIN — ONDANSETRON: 2 INJECTION INTRAMUSCULAR; INTRAVENOUS at 01:53

## 2022-01-11 RX ADMIN — METOPROLOL TARTRATE 50 MG: 50 TABLET, FILM COATED ORAL at 08:48

## 2022-01-11 RX ADMIN — BRIMONIDINE TARTRATE 1 DROP: 2 SOLUTION OPHTHALMIC at 08:51

## 2022-01-11 RX ADMIN — LOSARTAN POTASSIUM 50 MG: 50 TABLET, FILM COATED ORAL at 08:49

## 2022-01-11 RX ADMIN — VANCOMYCIN HYDROCHLORIDE 125 MG: KIT at 18:34

## 2022-01-11 RX ADMIN — SODIUM CHLORIDE: 9 INJECTION, SOLUTION INTRAVENOUS at 07:59

## 2022-01-11 RX ADMIN — METFORMIN HYDROCHLORIDE 500 MG: 500 TABLET ORAL at 08:49

## 2022-01-11 RX ADMIN — SODIUM CHLORIDE 1000 ML: 9 INJECTION, SOLUTION INTRAVENOUS at 00:19

## 2022-01-11 RX ADMIN — BRIMONIDINE TARTRATE 1 DROP: 2 SOLUTION OPHTHALMIC at 21:36

## 2022-01-11 RX ADMIN — ACETAMINOPHEN 650 MG: 325 TABLET, FILM COATED ORAL at 07:57

## 2022-01-11 RX ADMIN — LATANOPROST 1 DROP: 50 SOLUTION OPHTHALMIC at 21:42

## 2022-01-11 RX ADMIN — ENOXAPARIN SODIUM 40 MG: 40 INJECTION SUBCUTANEOUS at 20:12

## 2022-01-11 RX ADMIN — METFORMIN HYDROCHLORIDE 500 MG: 500 TABLET ORAL at 20:11

## 2022-01-11 ASSESSMENT — COGNITIVE AND FUNCTIONAL STATUS - GENERAL
TOILETING: 3 - A LITTLE
CLIMB 3 TO 5 STEPS WITH RAILING: 3 - A LITTLE
DRESSING REGULAR LOWER BODY CLOTHING: 4 - NONE
STANDING UP FROM CHAIR USING ARMS: 3 - A LITTLE
DRESSING REGULAR UPPER BODY CLOTHING: 4 - NONE
WALKING IN HOSPITAL ROOM: 3 - A LITTLE
HELP NEEDED FOR PERSONAL GROOMING: 4 - NONE
MOVING TO AND FROM BED TO CHAIR: 4 - NONE
EATING MEALS: 4 - NONE
HELP NEEDED FOR BATHING: 4 - NONE

## 2022-01-11 ASSESSMENT — ENCOUNTER SYMPTOMS
ARTHRALGIAS: 0
NAUSEA: 0
DYSURIA: 0
VOMITING: 0
SHORTNESS OF BREATH: 0
DIZZINESS: 0
HEADACHES: 0
ABDOMINAL PAIN: 0
FATIGUE: 1
CHILLS: 0
HEMATURIA: 0
DIARRHEA: 1
FEVER: 0
WEAKNESS: 1

## 2022-01-11 ASSESSMENT — PATIENT HEALTH QUESTIONNAIRE - PHQ9: SUM OF ALL RESPONSES TO PHQ9 QUESTIONS 1 & 2: 0

## 2022-01-11 NOTE — ED ATTESTATION NOTE
I have personally seen and examined the patient.  I reviewed and agree with physician assistant / nurse practitioner’s assessment and plan of care    My examination, assessment, and plan of care of  is as follows:     Patient presents with some mild right knee pain he is getting knee replacement 2 weeks but due to the knee pain he has had few falls no head injury no headache no neck pain no trauma from the falls he said his right knee just gives out.  He does not feel weak on the right side at all.  No trauma to the knee no hip pain no ankle pain.  He denies any chest pain or shortness of breath no loss of consciousness no cough no fevers or chills although he is slightly febrile here he has had loose stools twice a day for the last 2 to 3 days.    Exam  Patient is awake alert oriented in no acute distress  Head normocephalic atraumatic  Neck is nontender no range of motion  Heart regular rate and rhythm no murmurs  Lungs are clear bilateral auscultation  Abdomen is soft nontender  Bilateral hips and pelvis are stable full range of motion in the right knee I cannot reproduce any pain with palpation to the right knee no deformities    Plan  We will track down patient's fever fluid hydrate and reevaluate.     Christopher Neville MD  01/11/22 7818

## 2022-01-11 NOTE — H&P
Hospital Medicine Service -  History & Physical        CHIEF COMPLAINT     Weakness, falls     HISTORY OF PRESENT ILLNESS        Aneesh Brito is a 71 y.o. male patient with past medical history of hypertension, diabetes mellitus, osteoarthritis of right knee presents with complaints of generalized weakness and falls.  Patient reports fell off 4 times in the last week which he attributes to his right knee.  He denied any trauma to head or neck.  Denies headache or neck pain.  Patient has severe osteoarthritis of right knee scheduled for surgery in 2 weeks at [facility].  He denied any dizziness prior to the fall or loss of consciousness.  No chest pain or shortness of breath palpitations.  In the ER he was noted to have fever of 100.5.  He denied chills but complains of generalized weakness no new focal weakness.  No headache or visual changes.  He tested negative for COVID.    He denied abdominal pain nausea or vomiting but has diarrhea since Sunday multiple watery episodes.  No blood in the stools.  Denies any recent travel or eating food outside.  Denies urinary symptoms.  No back pain or flank pain .    PAST MEDICAL AND SURGICAL HISTORY        Past Medical History:   Diagnosis Date   • Diverticulosis of colon    • Enlarged prostate with lower urinary tract symptoms (LUTS)    • Glaucoma    • Hypertension    • Melanoma of skin (CMS/HCC)    • Osteoarthritis of knee    • Overweight    • Spinal stenosis of lumbar region    • Type 2 diabetes mellitus (CMS/HCC)        Past Surgical History:   Procedure Laterality Date   • APPENDECTOMY     • COLONOSCOPY  01/10/2012    diverticulosis   • HIP ARTHROPLASTY Bilateral    • KNEE ARTHROPLASTY Left        MEDICATIONS        Prior to Admission medications    Medication Sig Start Date End Date Taking? Authorizing Provider   brimonidine (ALPHAGAN) 0.15 % ophthalmic solution Administer 1 drop into both eyes 2 (two) times a day.  8/30/18   Provider, MD Patience   losartan  (COZAAR) 50 mg tablet Take 1 tablet (50 mg total) by mouth daily. 8/9/21 2/5/22  Kamar Palacios MD   metFORMIN (GLUCOPHAGE) 500 mg tablet Take 1 tablet (500 mg total) by mouth 2 (two) times a day with meals. 1/20/21 11/9/21  Kamar Palacios MD   metoprolol tartrate (LOPRESSOR) 50 mg tablet Take 1 tablet (50 mg total) by mouth 2 (two) times a day. 1/20/21   Kamar Palacios MD   potassium chloride (KLOR-CON) 10 mEq CR tablet Take 1 tablet (10 mEq total) by mouth once daily.  Patient not taking: Reported on 8/3/2021  4/21/20 4/28/21  Kamar Palacios MD   ROCKLATAN 0.02-0.005 % drops  10/21/21   ProviderPatience MD   sildenafiL (VIAGRA) 50 mg tablet Take 1 tablet (50 mg total) by mouth daily as needed for erectile dysfunction. 4/28/21 11/9/21  Kamar Palacios MD   VYZULTA 0.024 % drops 1 drop nightly.  10/17/18   ProviderPatience MD   felodipine (PLENDIL) 10 mg 24 hr tablet Take 1 tablet (10 mg total) by mouth daily. 1/20/21 1/11/22  Kamar Palacios MD   finasteride (PROSCAR) 5 mg tablet Take 1 tablet (5 mg total) by mouth once daily. 1/20/21 1/11/22  Kamar Palacios MD         ALLERGIES        No known allergies    FAMILY HISTORY        Family History   Problem Relation Age of Onset   • Breast cancer Biological Mother        SOCIAL HISTORY        Social History     Socioeconomic History   • Marital status:      Spouse name: None   • Number of children: None   • Years of education: None   • Highest education level: None   Occupational History   • Occupation: retired teacher   Tobacco Use   • Smoking status: Never Smoker   • Smokeless tobacco: Never Used   Substance and Sexual Activity   • Alcohol use: Yes     Comment: BEER, 1 CONSUMED DAILY   • Drug use: Never   • Sexual activity: None   Other Topics Concern   • None   Social History Narrative   • None     Social Determinants of Health     Financial Resource Strain: Not on file   Food Insecurity: No Food Insecurity   • Worried About  Running Out of Food in the Last Year: Never true   • Ran Out of Food in the Last Year: Never true   Transportation Needs: Not on file   Physical Activity: Not on file   Stress: Not on file   Social Connections: Not on file   Intimate Partner Violence: Not on file   Housing Stability: Not on file       REVIEW OF SYSTEMS      All other systems reviewed and negative except as noted in HPI    PHYSICAL EXAMINATION        Visit Vitals  BP (!) 168/74 (BP Location: Right upper arm, Patient Position: Sitting)   Pulse 90   Temp (!) 38.1 °C (100.5 °F) (Tympanic)   Resp 20   Ht 1.829 m (6')   Wt 102 kg (225 lb)   SpO2 92%   BMI 30.52 kg/m²     Body mass index is 30.52 kg/m².  Physical Exam  Vitals reviewed.   HENT:      Head: Normocephalic and atraumatic.   Eyes:      Extraocular Movements: Extraocular movements intact.   Cardiovascular:      Rate and Rhythm: Normal rate and regular rhythm.   Pulmonary:      Effort: Pulmonary effort is normal.      Breath sounds: Normal breath sounds.   Abdominal:      General: Bowel sounds are normal.      Palpations: Abdomen is soft.      Tenderness: There is no abdominal tenderness.   Musculoskeletal:      Cervical back: Normal range of motion and neck supple.      Right lower leg: No edema.      Left lower leg: No edema.   Skin:     General: Skin is warm and dry.   Neurological:      General: No focal deficit present.      Mental Status: He is alert and oriented to person, place, and time.   Psychiatric:         Mood and Affect: Mood normal.           LABS / IMAGING / EKG        Labs  I have reviewed the patient's pertinent labs. Pertinent labs are within normal limits.  Results from last 7 days   Lab Units 01/10/22  2000   WBC K/uL 15.28*   HEMOGLOBIN g/dL 14.4   HEMATOCRIT % 41.2   PLATELETS K/uL 244     Results from last 7 days   Lab Units 01/10/22  2000   SODIUM mEQ/L 133*   POTASSIUM mEQ/L 3.2*   CHLORIDE mEQ/L 97*   CO2 mEQ/L 24   BUN mg/dL 12   CREATININE mg/dL 0.9   GLUCOSE mg/dL  164*   CALCIUM mg/dL 9.1     Imaging  I have independently reviewed the pertinent imaging from the last 24 hrs.  CXR prelim  NO ACUTE ACUTE DISEASE     ECG/Telemetry  EKG poor tracing telemetry normal sinus rhythm    ASSESSMENT AND PLAN           * Weakness  Assessment & Plan  Secondary to fever, dehydration  Fall precautions    Fever  Assessment & Plan  Likely secondary to gastroenteritis  UA normal  Follow blood cultures and stool studies    Hyponatremia  Assessment & Plan  IV fluids  Follow BMP    Diarrhea  Assessment & Plan  ?viral enteritis  Follow stool studies   gentle hydration  Monitor electrolytes      Fall  Assessment & Plan  Mechanical falls due to severe OA rt Knee and dehydration  Fall precautions  Pt/ot     Primary open-angle glaucoma, bilateral, indeterminate stage  Assessment & Plan  Continue latanoprost, Alphagan eyedrops    Hypokalemia  Assessment & Plan  Replace k  Follow BMP    Type 2 diabetes mellitus (CMS/HCC)  Assessment & Plan  Hold metformin due to diarrhea  Monitor Accu-Cheks   cover with sliding scale insulin as needed    Osteoarthritis of knee  Assessment & Plan  Patient scheduled for right knee surgery in 2 weeks  Follow-up with Ortho outpatient    Hypertension, benign  Assessment & Plan  Continue losartan, metoprolol  Monitor BP         VTE Assessment: Padua VTE Score: 4  Code Status: Full Code  Estimated discharge date: 1/13/2022     Sheron Macdonald MD  1/11/2022  6:59 AM

## 2022-01-11 NOTE — ED PROVIDER NOTES
Emergency Medicine Note  HPI   HISTORY OF PRESENT ILLNESS     71-year-old male with a history of type 2 diabetes obesity enlarged prostate hypertension presents the ED for weakness.  Patient states for the past several days he has felt generally weak has had to falls at home related to weakness states he did not injure himself.  He is also been having dark brown watery diarrhea.  He denies cough shortness of breath chest pain abdominal pain vomiting.  Denies urinary symptoms.   Patient was febrile in triage, states he was unaware of that he had a fever, denies chills or rigors.  Patient denies recent hospitalizations or travel.  Patient denies any known sick contacts.  Patient is due for right knee replacement in the next several months, he is unsure if his weakness was related to that.            Patient History   PAST HISTORY     Reviewed from Nursing Triage:  Tobacco  Allergies  Meds  Problems  Med Hx  Surg Hx  Fam Hx  Soc   Hx      Past Medical History:   Diagnosis Date   • Diverticulosis of colon    • Enlarged prostate with lower urinary tract symptoms (LUTS)    • Glaucoma    • Hypertension    • Melanoma of skin (CMS/HCC)    • Osteoarthritis of knee    • Overweight    • Spinal stenosis of lumbar region    • Type 2 diabetes mellitus (CMS/HCC)        Past Surgical History:   Procedure Laterality Date   • APPENDECTOMY     • COLONOSCOPY  01/10/2012    diverticulosis   • HIP ARTHROPLASTY Bilateral    • KNEE ARTHROPLASTY Left        Family History   Problem Relation Age of Onset   • Breast cancer Biological Mother        Social History     Tobacco Use   • Smoking status: Never Smoker   • Smokeless tobacco: Never Used   Substance Use Topics   • Alcohol use: Yes     Comment: BEER, 1 CONSUMED DAILY   • Drug use: Never         Review of Systems   REVIEW OF SYSTEMS     Review of Systems   Constitutional: Positive for fatigue. Negative for chills and fever.   Eyes: Negative for visual disturbance.   Respiratory:  Negative for shortness of breath.    Cardiovascular: Negative for chest pain.   Gastrointestinal: Positive for diarrhea. Negative for abdominal pain, nausea and vomiting.   Genitourinary: Negative for dysuria and hematuria.   Musculoskeletal: Negative for arthralgias.   Neurological: Positive for weakness. Negative for dizziness and headaches.         VITALS     ED Vitals    Date/Time Temp Pulse Resp BP SpO2 [facility]   01/11/22 1517 -- 73 20 180/88 94 % SC   01/11/22 0847 37.8 °C (100.1 °F) 89 20 169/78 94 % SC   01/11/22 0752 38.3 °C (101 °F) 90 20 186/77 93 % SC   01/11/22 0556 -- 90 20 168/74 92 % MLK   01/11/22 0149 -- 85 22 -- 98 % ESB   01/10/22 1953 38.1 °C (100.5 °F) 83 18 163/92 97 % MAG        Pulse Ox %: 97 % (01/11/22 0128)  Pulse Ox Interpretation: Normal (01/11/22 0128)  Heart Rate: 83 (01/11/22 0128)  Rhythm Strip Interpretation: Normal Sinus Rhythm (01/11/22 0128)     Physical Exam   PHYSICAL EXAM     Physical Exam  Vitals and nursing note reviewed.   Constitutional:       Appearance: He is well-developed.   HENT:      Head: Normocephalic and atraumatic.   Eyes:      Conjunctiva/sclera: Conjunctivae normal.   Cardiovascular:      Rate and Rhythm: Normal rate and regular rhythm.   Pulmonary:      Effort: Pulmonary effort is normal.      Breath sounds: Normal breath sounds.   Abdominal:      General: There is no distension.      Palpations: Abdomen is soft. There is no mass.      Tenderness: There is no abdominal tenderness.   Musculoskeletal:         General: No tenderness or deformity. Normal range of motion.      Cervical back: Normal range of motion.   Skin:     General: Skin is warm and dry.   Neurological:      General: No focal deficit present.      Mental Status: He is alert and oriented to person, place, and time. Mental status is at baseline.      Cranial Nerves: Cranial nerves are intact.      Sensory: Sensation is intact.      Motor: Motor function is intact.      Coordination: Coordination is  intact.   Psychiatric:         Behavior: Behavior normal.           PROCEDURES     Procedures     DATA     Results     Procedure Component Value Units Date/Time    Clostridium difficile tox screen Stool [[id number]]  (Normal) Collected: 01/11/22 1115    Specimen: Stool Updated: 01/11/22 1447     C difficile Toxin Screen Indeterminate     Comment: An Indeterminate result may represent a potential carrier or less likely C Diff infection. If there is strong suspicion of C Diff related illness consult an ID physician who may decide to order C Diff by PCR.       Ova and parasite screen Stool [[id number]] Collected: 01/11/22 1204    Specimen: Stool Updated: 01/11/22 1217    Stool culture Stool [[id number]] Collected: 01/11/22 1115    Specimen: Stool Updated: 01/11/22 1119    UA with reflex culture [[id number]]  (Abnormal) Collected: 01/11/22 0433    Specimen: Urine, Clean Catch Updated: 01/11/22 0456    Narrative:      The following orders were created for panel order UA with reflex culture.  Procedure                               Abnormality         Status                     ---------                               -----------         ------                     UA Reflex to Culture (Ma...[[id number]]  Abnormal            Final result               UA Microscopic[[id number]]               Abnormal            Final result                 Please view results for these tests on the individual orders.    UA Microscopic [[id number]]  (Abnormal) Collected: 01/11/22 0433    Specimen: Urine, Clean Catch Updated: 01/11/22 0456     RBC, Urine 0 TO 4 /HPF      WBC, Urine 0 TO 3 /HPF      Squamous Epithelial +1 /hpf      Hyaline Cast 0 TO 2 /lpf      Bacteria, Urine None Seen /HPF     UA Reflex to Culture (Macroscopic) [[id number]]  (Abnormal) Collected: 01/11/22 0433    Specimen: Urine, Clean Catch Updated: 01/11/22 0455     Color, Urine Yellow     Clarity, Urine Clear     Specific Gravity, Urine 1.020     pH, Urine 6.5     Leukocyte  Esterase Negative     Comment: Results can be falsely negative due to high specific gravity, some antibiotics, glucose >3 g/dl, or WBC other than neutrophils.        Nitrite, Urine Negative     Protein, Urine +3     Glucose, Urine Negative mg/dL      Ketones, Urine Trace mg/dL      Comment: Free sulfhydryl drugs such as Mesna, Capoten, and Acetylcysteine (Mucomyst) may cause false positive ketonuria.        Urobilinogen, Urine 0.2 EU/dL      Bilirubin, Urine Negative mg/dL      Blood, Urine +1     Comment: The sensitivity of the occult blood test is equivalent to approximately 4 intact RBC/HPF.       SARS-CoV-2 (COVID-19), PCR Nasopharynx [[id number]]  (Normal) Collected: 01/11/22 0021    Specimen: Nasopharyngeal Swab from Nasopharynx Updated: 01/11/22 0114    Narrative:      The following orders were created for panel order SARS-CoV-2 (COVID-19), PCR Nasopharynx.  Procedure                               Abnormality         Status                     ---------                               -----------         ------                     SARS-COV-2 (COVID-19)/ F...[[id number]]  Normal              Final result                 Please view results for these tests on the individual orders.    SARS-COV-2 (COVID-19)/ FLU A/B, AND RSV, PCR Nasopharynx [[id number]]  (Normal) Collected: 01/11/22 0021    Specimen: Nasopharyngeal Swab from Nasopharynx Updated: 01/11/22 0114     SARS-CoV-2 (COVID-19) Negative     Influenza A Negative     Influenza B Negative     Respiratory Syncytial Virus Negative    Blood Culture Blood, Venous [[id number]] Collected: 01/11/22 0055    Specimen: Blood, Venous Updated: 01/11/22 0108    Blood Culture Blood, Venous [[id number]] Collected: 01/11/22 0048    Specimen: Blood, Venous Updated: 01/11/22 0108    HS Troponin I (with 2 hour reflex) [[id number]]  (Abnormal) Collected: 01/10/22 2000    Specimen: Blood, Venous Updated: 01/10/22 2048     High Sens Troponin I 15.80 pg/mL     Basic metabolic panel  [[id number]]  (Abnormal) Collected: 01/10/22 2000    Specimen: Blood, Venous Updated: 01/10/22 2038     Sodium 133 mEQ/L      Potassium 3.2 mEQ/L      Chloride 97 mEQ/L      CO2 24 mEQ/L      BUN 12 mg/dL      Creatinine 0.9 mg/dL      Glucose 164 mg/dL      Calcium 9.1 mg/dL      eGFR >60.0 mL/min/1.73m*2      Anion Gap 12 mEQ/L     CBC and differential [[id number]]  (Abnormal) Collected: 01/10/22 2000    Specimen: Blood, Venous Updated: 01/10/22 2015     WBC 15.28 K/uL      RBC 4.46 M/uL      Hemoglobin 14.4 g/dL      Hematocrit 41.2 %      MCV 92.4 fL      MCH 32.3 pg      MCHC 35.0 g/dL      RDW 13.3 %      Platelets 244 K/uL      MPV 10.5 fL      Differential Type Auto     nRBC 0.0 %      Immature Granulocytes 0.4 %      Neutrophils 86.1 %      Lymphocytes 6.0 %      Monocytes 7.1 %      Eosinophils 0.1 %      Basophils 0.3 %      Immature Granulocytes, Absolute 0.06 K/uL      Neutrophils, Absolute 13.16 K/uL      Lymphocytes, Absolute 0.92 K/uL      Monocytes, Absolute 1.08 K/uL      Eosinophils, Absolute 0.01 K/uL      Basophils, Absolute 0.05 K/uL           Imaging Results          X-RAY CHEST 1 VIEW (Final result)  Result time 01/11/22 08:36:57    Final result                 Impression:    IMPRESSION:  No acute disease in the chest.    I certify that I have reviewed this examination and agree with this report.  Eliza Cash MD               Narrative:    CLINICAL HISTORY:   Fever, weakness. Rule out pneumonia.    COMPARISON:   Chest x-ray dated May 13, 2012.    COMMENT: Frontal view of the chest was performed. The heart size is normal.  The  hilar and mediastinal contours are normal. The lungs are clear without  consolidation, effusion or pneumothorax. There is no acute osseous abnormality.                    ED Interpretation    NAD                              ECG 12 lead   ED Interpretation   EKG shows a normal sinus rhythm at a rate of 89 bpm with a left axis deviation normal ST segments nonspecific T  wave changes but hard to interpret due to poor baseline and artifact.      Final Result          Scoring tools                                 ED Course & MDM   MDM / ED COURSE and CLINICAL IMPRESSIONS     [facility]    ED Course as of 01/11/22 1731 Tue Jan 11, 2022   0128 Potassium(!): 3.2 [RS]   0128 WBC(!): 15.28 [RS]   0128 High Sens Troponin I(!): 15.80 [RS]   0135 COVID is negative chest x-ray unremarkable, patient does have an elevated white blood cell count has not given us a urine yet we will make sure he does not have a UTI.  We will need to repeat troponin although patient has no chest pain [RS]   0359 WBC(!): 15.28 [RS]   0536 Urine neg for infection, will admit for fever unknown origin and frequent falls  Paged [facility] for admission [LB]   1295 Signed out to [facility] [LB]      ED Course User Index  [LB] Lolita Moreno PA C  [RS] Christopher Neville MD         Clinical Impressions as of 01/11/22 1731   Fever, unspecified fever cause   Fall, initial encounter            Frankel, Elena C, PA C  01/11/22 1731

## 2022-01-11 NOTE — PROGRESS NOTES
Pt seen and examined. With liquidy diarrhea. Denies any CP or SOB. No abdominal pain, no N/V. Febrile this morning    +S1/S2, RRR  CTA b/l  Soft, NT/ND, +BS    A/P:  C. difficile test indeterminate.  Will start oral vancomycin for now and consult ID to see if PCR testing needed.  Follow other stool cultures.  Encourage p.o. intake, IV hydration for now.  Replete electrolyte abnormalities as needed.  PT/OT eval for weakness/falls.

## 2022-01-12 PROBLEM — A04.72 C. DIFFICILE COLITIS: Status: ACTIVE | Noted: 2022-01-12

## 2022-01-12 LAB
ANION GAP SERPL CALC-SCNC: 11 MEQ/L (ref 3–15)
B FRAGILIS DNA BLD QL NAA+PROBE: NOT DETECTED
BLACTX-M ISLT/SPM QL: ABNORMAL
BLAIMP ISLT/SPM QL: ABNORMAL
BLAOXA-48-LIKE ISLT/SPM QL: ABNORMAL
BLAVIM ISLT/SPM QL: ABNORMAL
BUN SERPL-MCNC: 16 MG/DL (ref 8–20)
C ALBICANS DNA BLD QL NAA+PROBE: NOT DETECTED
C AURIS DNA BLD POS QL NAA+PROBE: NOT DETECTED
C DIFF TOX GENS STL QL NAA+PROBE: POSITIVE
C GATTII+NEOFOR DNA BLD POS QL NAA+N-PRB: NOT DETECTED
C GLABRATA DNA BLD QL NAA+PROBE: NOT DETECTED
C KRUSEI DNA BLD QL NAA+PROBE: NOT DETECTED
C PARAP DNA BLD QL NAA+PROBE: NOT DETECTED
C TROPICLS DNA BLD QL NAA+PROBE: NOT DETECTED
CALCIUM SERPL-MCNC: 8.4 MG/DL (ref 8.9–10.3)
CHLORIDE SERPL-SCNC: 103 MEQ/L (ref 98–109)
CO2 SERPL-SCNC: 22 MEQ/L (ref 22–32)
COLISTIN RES MCR-1 ISLT/SPM QL: ABNORMAL
CPR GENES ISLT NAA+PROBE: ABNORMAL
CREAT SERPL-MCNC: 1 MG/DL (ref 0.8–1.3)
E CLOAC COMP DNA BLD POS NAA+NON-PROBE: NOT DETECTED
E COLI DNA SPEC QL NAA+PROBE: NOT DETECTED
E FAECALIS DNA SPEC QL NAA+PROBE: NOT DETECTED
E FAECIUM DNA SPEC QL NAA+PROBE: NOT DETECTED
ENTEROBACTERALES DNA BLD POS NAA+N-PRB: NOT DETECTED
ERYTHROCYTE [DISTWIDTH] IN BLOOD BY AUTOMATED COUNT: 13.4 % (ref 11.6–14.4)
GFR SERPL CREATININE-BSD FRML MDRD: >60 ML/MIN/1.73M*2
GLUCOSE BLD-MCNC: 116 MG/DL (ref 70–99)
GLUCOSE BLD-MCNC: 117 MG/DL (ref 70–99)
GLUCOSE BLD-MCNC: 134 MG/DL (ref 70–99)
GLUCOSE BLD-MCNC: 141 MG/DL (ref 70–99)
GLUCOSE SERPL-MCNC: 106 MG/DL (ref 70–99)
GP B STREP DNA SPEC QL NAA+PROBE: NOT DETECTED
HAEM INFLU DNA SPEC QL NAA+PROBE: NOT DETECTED
HCT VFR BLDCO AUTO: 36.7 % (ref 40.1–51)
HGB BLD-MCNC: 12.5 G/DL (ref 13.7–17.5)
K OXYTOCA DNA BLD QL NAA+PROBE: NOT DETECTED
K PNEUMON DNA SPEC QL NAA+PROBE: NOT DETECTED
K. AEROGENES DNA SPEC QL NAA+PROBE: NOT DETECTED
L MONOCYTOG DNA SPEC QL NAA+PROBE: NOT DETECTED
MAGNESIUM SERPL-MCNC: 1.8 MG/DL (ref 1.8–2.5)
MCH RBC QN AUTO: 31.6 PG (ref 28–33.2)
MCHC RBC AUTO-ENTMCNC: 34.1 G/DL (ref 32.2–36.5)
MCV RBC AUTO: 92.9 FL (ref 83–98)
MECA ISLT/SPM QL: DETECTED
MECA+MECC+MREJ ISLT/SPM QL: ABNORMAL
N MEN DNA BLD QL NAA+PROBE: NOT DETECTED
NDM: ABNORMAL
O+P SPEC MICRO: NORMAL
P AERUGINOSA DNA SPEC QL NAA+PROBE: NOT DETECTED
PDW BLD AUTO: 11 FL (ref 9.4–12.4)
PLATELET # BLD AUTO: 194 K/UL (ref 150–350)
POCT TEST: ABNORMAL
POTASSIUM SERPL-SCNC: 2.9 MEQ/L (ref 3.6–5.1)
PROTEUS SP DNA BLD POS QL NAA+NON-PROBE: NOT DETECTED
RBC # BLD AUTO: 3.95 M/UL (ref 4.5–5.8)
S AUREUS DNA SPEC QL NAA+PROBE: NOT DETECTED
S EPIDERMIDIS DNA SPEC QL NAA+PROBE: DETECTED
S HAEMOLYTICUS DNA BLD QL NAA+PROBE: NOT DETECTED
S MALTOPH DNA SPEC QL NAA+PROBE: NOT DETECTED
S MARCESCENS DNA SPEC QL NAA+PROBE: NOT DETECTED
S PNEUM DNA BLD QL NAA+PROBE: NOT DETECTED
S PYO DNA SPEC NAA+PROBE: NOT DETECTED
SALMONELLA DNA SPEC QL NAA+PROBE: NOT DETECTED
SODIUM SERPL-SCNC: 136 MEQ/L (ref 136–144)
STREPTOCOCCUS DNA BLD QL NAA+PROBE: NOT DETECTED
TEST PERFORMANCE INFO SPEC: ABNORMAL
VANA+VANB ISLT/SPM QL: ABNORMAL
WBC # BLD AUTO: 8.15 K/UL (ref 3.8–10.5)

## 2022-01-12 PROCEDURE — 63700000 HC SELF-ADMINISTRABLE DRUG: Performed by: HOSPITALIST

## 2022-01-12 PROCEDURE — 25800000 HC PHARMACY IV SOLUTIONS: Performed by: HOSPITALIST

## 2022-01-12 PROCEDURE — 96361 HYDRATE IV INFUSION ADD-ON: CPT

## 2022-01-12 PROCEDURE — 97116 GAIT TRAINING THERAPY: CPT | Mod: GP

## 2022-01-12 PROCEDURE — 83735 ASSAY OF MAGNESIUM: CPT | Performed by: HOSPITALIST

## 2022-01-12 PROCEDURE — 80048 BASIC METABOLIC PNL TOTAL CA: CPT | Performed by: HOSPITALIST

## 2022-01-12 PROCEDURE — 36415 COLL VENOUS BLD VENIPUNCTURE: CPT | Performed by: HOSPITALIST

## 2022-01-12 PROCEDURE — 97162 PT EVAL MOD COMPLEX 30 MIN: CPT | Mod: GP

## 2022-01-12 PROCEDURE — 85027 COMPLETE CBC AUTOMATED: CPT | Performed by: HOSPITALIST

## 2022-01-12 PROCEDURE — G0378 HOSPITAL OBSERVATION PER HR: HCPCS

## 2022-01-12 PROCEDURE — 12000000 HC ROOM AND CARE MED/SURG

## 2022-01-12 PROCEDURE — 97166 OT EVAL MOD COMPLEX 45 MIN: CPT | Mod: GO

## 2022-01-12 PROCEDURE — 99232 SBSQ HOSP IP/OBS MODERATE 35: CPT | Performed by: HOSPITALIST

## 2022-01-12 PROCEDURE — 63600000 HC DRUGS/DETAIL CODE: Performed by: HOSPITALIST

## 2022-01-12 RX ORDER — INSULIN ASPART 100 [IU]/ML
3-5 INJECTION, SOLUTION INTRAVENOUS; SUBCUTANEOUS
Status: DISCONTINUED | OUTPATIENT
Start: 2022-01-12 | End: 2022-01-14 | Stop reason: HOSPADM

## 2022-01-12 RX ORDER — LOSARTAN POTASSIUM 50 MG/1
50 TABLET ORAL DAILY
Status: DISCONTINUED | OUTPATIENT
Start: 2022-01-13 | End: 2022-01-14

## 2022-01-12 RX ORDER — LOSARTAN POTASSIUM 50 MG/1
50 TABLET ORAL ONCE
Status: DISCONTINUED | OUTPATIENT
Start: 2022-01-12 | End: 2022-01-12

## 2022-01-12 RX ORDER — FELODIPINE 5 MG/1
10 TABLET, EXTENDED RELEASE ORAL DAILY
Status: DISCONTINUED | OUTPATIENT
Start: 2022-01-12 | End: 2022-01-14 | Stop reason: HOSPADM

## 2022-01-12 RX ORDER — FINASTERIDE 5 MG/1
5 TABLET, FILM COATED ORAL DAILY
Status: DISCONTINUED | OUTPATIENT
Start: 2022-01-12 | End: 2022-01-14 | Stop reason: HOSPADM

## 2022-01-12 RX ORDER — LOSARTAN POTASSIUM 100 MG/1
100 TABLET ORAL DAILY
Status: DISCONTINUED | OUTPATIENT
Start: 2022-01-13 | End: 2022-01-12

## 2022-01-12 RX ORDER — POTASSIUM CHLORIDE 750 MG/1
40 TABLET, FILM COATED, EXTENDED RELEASE ORAL ONCE
Status: COMPLETED | OUTPATIENT
Start: 2022-01-12 | End: 2022-01-12

## 2022-01-12 RX ADMIN — LOSARTAN POTASSIUM 50 MG: 50 TABLET, FILM COATED ORAL at 09:23

## 2022-01-12 RX ADMIN — METOPROLOL TARTRATE 50 MG: 50 TABLET, FILM COATED ORAL at 09:24

## 2022-01-12 RX ADMIN — ENOXAPARIN SODIUM 40 MG: 40 INJECTION SUBCUTANEOUS at 17:26

## 2022-01-12 RX ADMIN — VANCOMYCIN HYDROCHLORIDE 125 MG: KIT at 12:20

## 2022-01-12 RX ADMIN — METFORMIN HYDROCHLORIDE 500 MG: 500 TABLET ORAL at 09:23

## 2022-01-12 RX ADMIN — VANCOMYCIN HYDROCHLORIDE 125 MG: KIT at 05:10

## 2022-01-12 RX ADMIN — BRIMONIDINE TARTRATE 1 DROP: 2 SOLUTION OPHTHALMIC at 20:15

## 2022-01-12 RX ADMIN — FELODIPINE 10 MG: 5 TABLET, FILM COATED, EXTENDED RELEASE ORAL at 09:23

## 2022-01-12 RX ADMIN — METOPROLOL TARTRATE 50 MG: 50 TABLET, FILM COATED ORAL at 20:14

## 2022-01-12 RX ADMIN — VANCOMYCIN HYDROCHLORIDE 125 MG: KIT at 23:49

## 2022-01-12 RX ADMIN — LATANOPROST 1 DROP: 50 SOLUTION OPHTHALMIC at 22:09

## 2022-01-12 RX ADMIN — BRIMONIDINE TARTRATE 1 DROP: 2 SOLUTION OPHTHALMIC at 09:22

## 2022-01-12 RX ADMIN — FINASTERIDE 5 MG: 5 TABLET, FILM COATED ORAL at 09:24

## 2022-01-12 RX ADMIN — POTASSIUM CHLORIDE 40 MEQ: 750 TABLET, FILM COATED, EXTENDED RELEASE ORAL at 09:23

## 2022-01-12 RX ADMIN — SODIUM CHLORIDE: 9 INJECTION, SOLUTION INTRAVENOUS at 06:36

## 2022-01-12 RX ADMIN — VANCOMYCIN HYDROCHLORIDE 125 MG: KIT at 17:26

## 2022-01-12 RX ADMIN — VANCOMYCIN HYDROCHLORIDE 125 MG: KIT at 00:00

## 2022-01-12 ASSESSMENT — COGNITIVE AND FUNCTIONAL STATUS - GENERAL
HELP NEEDED FOR PERSONAL GROOMING: 3 - A LITTLE
EATING MEALS: 4 - NONE
CLIMB 3 TO 5 STEPS WITH RAILING: 2 - A LOT
WALKING IN HOSPITAL ROOM: 3 - A LITTLE
STANDING UP FROM CHAIR USING ARMS: 3 - A LITTLE
AFFECT: WFL
DRESSING REGULAR LOWER BODY CLOTHING: 4 - NONE
HELP NEEDED FOR BATHING: 3 - A LITTLE
AFFECT: WFL
MOVING TO AND FROM BED TO CHAIR: 3 - A LITTLE
TOILETING: 3 - A LITTLE
DRESSING REGULAR UPPER BODY CLOTHING: 3 - A LITTLE

## 2022-01-12 NOTE — PROGRESS NOTES
Hospital Medicine Service -  Daily Progress Note       SUBJECTIVE   Interval History: Pt is seen at bedside where he reports feeling well. States he has not had a bowel movement since yesterday when it was still diarrhea. He is seen while eating breakfast and states his appetite has returned and he is tolerating it well. Denies fever, chills, dizziness, headache, SOB, cough, chest pain, abdominal pain, nausea, vomiting, hematochezia or melena.     Spoke with his nurse this afternoon who states he had another episode of diarrhea this morning. Pt stated it was less watery than day prior though.     Attempted to update pt's wife Yas over the phone, reached voicemail and left a message.      OBJECTIVE      Vital signs in last 24 hours:  Temp:  [36.6 °C (97.9 °F)-37.4 °C (99.3 °F)] 36.6 °C (97.9 °F)  Heart Rate:  [54-74] 56  Resp:  [16-20] 16  BP: (160-185)/() 175/86    Intake/Output Summary (Last 24 hours) at 1/12/2022 1150  Last data filed at 1/11/2022 2100  Gross per 24 hour   Intake 320 ml   Output 250 ml   Net 70 ml       PHYSICAL EXAMINATION      Physical Exam  Vitals reviewed.   HENT:      Head: Normocephalic and atraumatic.      Right Ear: External ear normal.      Left Ear: External ear normal.      Nose: Nose normal.      Mouth/Throat:      Pharynx: Oropharynx is clear.   Cardiovascular:      Rate and Rhythm: Normal rate and regular rhythm.      Pulses: Normal pulses.      Heart sounds: Normal heart sounds. No murmur heard.  Pulmonary:      Effort: Pulmonary effort is normal. No respiratory distress.      Breath sounds: Normal breath sounds.   Abdominal:      General: Abdomen is flat. Bowel sounds are normal. There is no distension.      Palpations: Abdomen is soft.      Tenderness: There is no abdominal tenderness. There is no guarding.   Musculoskeletal:         General: Normal range of motion.      Comments: Trace lower extremity edema bilaterally   Skin:     General: Skin is warm and dry.    Neurological:      General: No focal deficit present.      Mental Status: He is alert and oriented to person, place, and time.   Psychiatric:         Mood and Affect: Mood normal.         Behavior: Behavior normal.          LINES, CATHETERS, DRAINS, AIRWAYS, AND WOUNDS   Lines, Drains, and Airways:  Wounds (agree with documentation and present on admission):  Peripheral IV (Adult) 01/10/22 Left Antecubital (Active)   Number of days: 2         Comments:      LABS / IMAGING / TELE      Labs  Results from last 7 days   Lab Units 01/12/22  0439 01/10/22  2000   WBC K/uL 8.15 15.28*   HEMOGLOBIN g/dL 12.5* 14.4   HEMATOCRIT % 36.7* 41.2   PLATELETS K/uL 194 244     Results from last 7 days   Lab Units 01/12/22  0439 01/10/22  2000   SODIUM mEQ/L 136 133*   POTASSIUM mEQ/L 2.9* 3.2*   CHLORIDE mEQ/L 103 97*   CO2 mEQ/L 22 24   BUN mg/dL 16 12   CREATININE mg/dL 1.0 0.9   CALCIUM mg/dL 8.4* 9.1   GLUCOSE mg/dL 106* 164*       SARS-CoV-2 (COVID-19) (no units)   Date/Time Value   01/11/2022 0021 Negative       Imaging  I have independently reviewed the pertinent imaging from the last 24 hrs.    ECG/Telemetry  I have independently reviewed the telemetry. Significant findings include occasional bradycardia.    ASSESSMENT AND PLAN      * Weakness  Assessment & Plan  Secondary to fever, dehydration  IVF complete   Has remained afebrile now  Fall precautions    Diarrhea  Assessment & Plan  C diff tox screen indeterminate, pending PCR  Follow blood and stool cultures, no growth to date  Continue oral vancomycin  Holding Metformin   IVF complete  Monitor electrolytes      Hyponatremia  Assessment & Plan  Completed IVF  Improved at 136  Follow BMP    Fever  Assessment & Plan  Likely secondary to gastroenteritis  UA normal  Has remained afebrile since admission  Tylenol prn  C diff tox screen indeterminate, pending PCR  Follow blood and stool cultures, no growth to date    Fall  Assessment & Plan  Mechanical falls due to severe OA  R Knee and dehydration  Completed IVF  Fall precautions  PT/OT  Follow up with ortho outpatient, due for surgery in 2 weeks    Primary open-angle glaucoma, bilateral, indeterminate stage  Assessment & Plan  Continue latanoprost, Alphagan eyedrops     Hypokalemia  Assessment & Plan  K 2.9 this morning, repleted  Mag was normal  Follow BMP    Type 2 diabetes mellitus (CMS/[facility])  Assessment & Plan  Hold metformin due to diarrhea  Monitor Accu-Cheks    Cover with sliding scale insulin as needed    Osteoarthritis of knee  Assessment & Plan  Patient scheduled for right knee surgery in 2 weeks  PT/OT  Follow-up with Ortho outpatient     Hypertension, benign  Assessment & Plan  Continue losartan, metoprolol  Monitor BP           VTE Assessment: [place] VTE Score: 4  VTE Prophylaxis:  Current anticoagulants:  enoxaparin (LOVENOX) syringe 40 mg, subcutaneous, Daily (6p)      Code Status: Full Code      Estimated Discharge Date: 1/13/2022     Disposition Planning: Plan for d/c once medically stable, likely 1-2 days.      SRUTHI Sigala  1/12/2022

## 2022-01-12 NOTE — PROGRESS NOTES
Patient: Aneesh Brito  Location:  [facility] 3C 0355  MRN:  [id number]  Today's date:  1/12/2022    Pt placed in recliner chair w/ LE's elevated.  Call bell and personal items w/in reach.  Mobility monitor activated.    Aneesh is a 71 y.o. male admitted on 1/10/2022 with Weakness [R53.1]  Fall, initial encounter [W19.XXXA]  Fever, unspecified fever cause [R50.9]. Principal problem is Weakness.    Past Medical History  Aneesh has a past medical history of Diverticulosis of colon, Enlarged prostate with lower urinary tract symptoms (LUTS), Glaucoma, Hypertension, Melanoma of skin (CMS/HCC), Osteoarthritis of knee, Overweight, Spinal stenosis of lumbar region, and Type 2 diabetes mellitus (CMS/[facility]).    History of Present Illness   Aneesh Brito is a 71 y.o. male patient with past medical history of hypertension, diabetes mellitus, osteoarthritis of right knee presents with complaints of generalized weakness and falls.  Patient reports fell off 4 times in the last week which he attributes to his right knee.  He denied any trauma to head or neck.  Denies headache or neck pain.  Patient has severe osteoarthritis of right knee scheduled for surgery in 2 weeks at [facility].  He denied any dizziness prior to the fall or loss of consciousness.      CXR 1/10:  IMPRESSION:  No acute disease in the chest.      PT Vitals    Date/Time Pulse SpO2 Pt Activity O2 Therapy BP BP Location BP Method Pt Position Observations [facility]   01/12/22 1358 66 95 % At rest None (Room air) 155/80 Right upper arm Automatic Lying -- VTV   01/12/22 1405 -- -- -- -- 149/73 Right upper arm Automatic Sitting At EOB VTV   01/12/22 1414 70 95 % At rest None (Room air) -- -- -- -- After PT/OT in recliner VTV      PT Pain    Date/Time Pain Type Rating: Rest Rating: Activity Who   01/12/22 1357 -- 0 - no pain 0 - no pain SGB   01/12/22 1358 Pain Assessment 0 - no pain 0 - no pain VTV          Prior Living Environment      Most Recent Value   People  in Home spouse   Current Living Arrangements home   Living Environment Comment Pt lives w/ wife in 2SH, 8 ANIVAL w/ HR, no MA, FF to 2F bed and tub shower w/GBs. Has SC and Commode avail if needed        Prior Level of Function      Most Recent Value   Dominant Hand left   Ambulation assistive equipment   Transferring assistive equipment   Toileting independent   Bathing independent   Dressing independent   Eating independent   Communication understands/communicates without difficulty   Prior Level of Function Comment Per pt report he was IND w/ all ADLs PTA. Was recently using RW for ambulation 2* incr weakness but normally IND w/o   Assistive Device Currently Used at Home none  [has RW and BSC but does not currently use it]           PT Evaluation and Treatment - 01/12/22 1357        PT Time Calculation    Start Time 1357     Stop Time 1417     Time Calculation (min) 20 min        Session Details    Document Type initial evaluation     Mode of Treatment physical therapy        General Information    Patient Profile Reviewed yes     Onset of Illness/Injury or Date of Surgery 01/10/22     Referring Physician Janette     Patient/Family/Caregiver Comments/Observations pt's wife in room w/ pt     General Observations of Patient pt in bed - no distress     Existing Precautions/Restrictions fall;special contact   r/o C-diff       Cognition/Psychosocial    Affect/Mental Status (Cognition) WFL     Orientation Status (Cognition) oriented x 4     Follows Commands (Cognition) WFL     Cognitive Function WFL        Sensory    Hearing Status WFL        Vision Assessment/Intervention    Visual Impairment/Limitations WFL        Sensory Assessment (Somatosensory)    Left LE Sensory Assessment general sensation;intact     Right LE Sensory Assessment general sensation;intact        Range of Motion (ROM)    Left Lower Extremity (ROM) left LE ROM is WFL     Right Lower Extremity (ROM) right LE ROM is WFL        Strength (Manual Muscle  Testing)    Left Lower Extremity Strength left LE strength is WFL   mild, non-focal generalized weakness    Hip, Left (Strength) 4+/5     Knee, Left (Strength) 4+/5     Ankle, Left (Strength) 4+/5     Right Lower Extremity Strength right LE strength is WFL   mild, non-focal generalized weakness.    Hip, Right (Strength) 4+/5     Knee, Right (Strength) 4+/5     Ankle, Right (Strength) 4+/5        Bed Mobility    [place], Supine to Sit supervision     Assistive Device head of bed elevated     Comment (Bed Mobility) no (A) req'd for OOB - sitting balance OK @ EOB.  denied dizziness.        Sit to Stand Transfer    [place], Sit to Stand Transfer supervision     Verbal Cues hand placement     Assistive Device walker, front-wheeled     Comment stood w/o physical (A) from bedside - initial standing balance OK - denied dizziness.        Stand to Sit Transfer    [place], Stand to Sit Transfer supervision     Verbal Cues hand placement     Assistive Device none     Comment demo'd safe stand to sit w/ occ'l cues to back up to chair.  able to control stand to sit 2/ UE support on armrests.        Gait Training    [place], Gait close supervision     Assistive Device walker, front-wheeled;none     Distance in Feet 60 feet     Pattern (Gait) step-through     Comment (Gait/Stairs) pt able to demo slow, short steps w/ RW for initial support.  Pt also able to demo slow, but mildly unsteady (no overt LOB) ambulation w/o RW as well.  No significant unsteadiness noted.  denied dizziness or SOB during activity.        Safety Issues, Functional Mobility    Impairments Affecting Function (Mobility) balance;endurance/activity tolerance;strength        Balance    Static Sitting Balance WFL;sitting, edge of bed     Dynamic Sitting Balance WFL;sitting, edge of bed     Sit to Stand Dynamic Balance WFL;supported     Static Standing Balance WFL;supported     Dynamic Standing Balance mild impairment;unsupported        AM-PAC  (TM) - Mobility (Current Function)    Turning from your back to your side while in a flat bed without using bedrails? 4 - None     Moving from lying on your back to sitting on the side of a flat bed without using bedrails? 4 - None     Moving to and from a bed to a chair? 3 - A Little     Standing up from a chair using your arms? 3 - A Little     To walk in a hospital room? 3 - A Little     Climbing 3-5 steps with a railing? 2 - A Lot     AM-PAC (TM) Mobility Score 19        Assessment/Plan (PT)    Daily Outcome Statement pt currently (S) level of mobility and ambulation w/ & w/o RW - anticipate DC back to home w/ wife & services if needed.     Rehab Potential good, to achieve stated therapy goals     Therapy Frequency 3-5 times/wk     Planned Therapy Interventions balance training;bed mobility training;gait training;patient/family education;stair training;strengthening;transfer training               PT Assessment/Plan      Most Recent Value   PT Recommended Discharge Disposition home with home health, home with assistance at 01/12/2022 1357   Anticipated Equipment Needs at Discharge (PT) --  [may benefit from RW] at 01/12/2022 1357   Patient/Family Therapy Goals Statement return to home at 01/12/2022 1357                    Education Documentation  Treatment Plan, taught by Chandler Ahuja, PT at 1/12/2022  5:15 PM.  Learner: Patient  Readiness: Acceptance  Method: Explanation  Response: Verbalizes Understanding  Comment: explained role of therapy in pt's care, recovery and DC planning          PT Goals      Most Recent Value   Bed Mobility Goal 1    Activity/Assistive Device sit to supine/supine to sit at 01/12/2022 1357   [place] modified independence at 01/12/2022 1357   Time Frame by discharge at 01/12/2022 1357   Progress/Outcome goal ongoing at 01/12/2022 1357   Transfer Goal 1    Activity/Assistive Device sit-to-stand/stand-to-sit, walker, front-wheeled  [use RW if needed for ambulation - otherwise, no  (A) dev for ambulation] at 01/12/2022 1357   [place] modified independence at 01/12/2022 1357   Time Frame by discharge at 01/12/2022 1357   Progress/Outcome goal ongoing at 01/12/2022 1357   Gait Training Goal 1    Activity/Assistive Device gait (walking locomotion), assistive device use, walker, front-wheeled  [use RW if needed for ambulation - otherwise, no (A) dev for ambulation] at 01/12/2022 1357   [place] modified independence at 01/12/2022 1357   Distance 120 at 01/12/2022 1357   Time Frame by discharge at 01/12/2022 1357   Progress/Outcome goal ongoing at 01/12/2022 1357   Stairs Goal 1    Activity/Assistive Device ascending stairs, descending stairs, using handrail, left, step-over step, no assistive device at 01/12/2022 1357   [place] modified independence at 01/12/2022 1357   Number of Stairs 12 at 01/12/2022 1357   Time Frame by discharge at 01/12/2022 1357   Progress/Outcome goal ongoing at 01/12/2022 1357

## 2022-01-12 NOTE — PLAN OF CARE
Problem: Adult Inpatient Plan of Care  Goal: Plan of Care Review  Outcome: Progressing  Flowsheets (Taken 1/12/2022 9200)  Progress: improving  Plan of Care Reviewed With: patient  Outcome Summary: PT eval complete

## 2022-01-12 NOTE — CONSULTS
Infectious Disease Consult Note    Patient Name: Aneesh Brito  MR#: [id number]  : 1950  Admission Date: 1/10/2022  Consult Date: 22 2:54 PM   Consultant: Jace Verde MD    Reason for Consult: concern for cdiff. Test indeterminate  Referring Provider: Dr. Savage      Aneesh Brito is a 71 y.o. male who was admitted on 1/10/2022.  This patient denies any recent antibiotic use.  He has been feeling extremely weak unable to get up.  He fell 4 times last week.  Over the 2 days prior to admission he developed watery diarrhea.  He was tested for C. difficile indeterminate and started on vancomycin.  Since he started vancomycin his diarrhea improved.  He is supposed to get right knee surgery with Parag in 2 weeks.  In the ED he was febrile 101 Fahrenheit BP is elevated and he had leukocytosis of 15,000.      Allergies:   Allergies   Allergen Reactions   • No Known Allergies        Medical History:   Past Medical History:   Diagnosis Date   • Diverticulosis of colon    • Enlarged prostate with lower urinary tract symptoms (LUTS)    • Glaucoma    • Hypertension    • Melanoma of skin (CMS/HCC)    • Osteoarthritis of knee    • Overweight    • Spinal stenosis of lumbar region    • Type 2 diabetes mellitus (CMS/HCC)        Surgical History:   Past Surgical History:   Procedure Laterality Date   • APPENDECTOMY     • COLONOSCOPY  01/10/2012    diverticulosis   • HIP ARTHROPLASTY Bilateral    • KNEE ARTHROPLASTY Left        Social History     Tobacco Use   • Smoking status: Never Smoker   • Smokeless tobacco: Never Used   Substance Use Topics   • Alcohol use: Yes     Comment: BEER, 1 CONSUMED DAILY   • Drug use: Never       Family History:   Family History   Problem Relation Age of Onset   • Breast cancer Biological Mother        Review of Systems    All other systems reviewed and negative except as noted in the HPI.    Medications:    Current IP Meds (From admission, onward)        Frequency      "losartan (COZAAR) tablet 100 mg  Status:  Discontinued         Daily     losartan (COZAAR) tablet 50 mg         Daily     losartan (COZAAR) tablet 50 mg  Status:  Discontinued         Once     felodipine (PLENDIL) 24 hr ER tablet 10 mg         Daily     finasteride (PROSCAR) tablet 5 mg         Daily     potassium chloride (KLOR-CON) tablet extended release 40 mEq         Once     insulin aspart U-100 (NovoLOG) pen 3-5 Units         4 times daily with meals and nightly     latanoprost (XALATAN) 0.005 % ophthalmic solution 1 drop         Nightly     enoxaparin (LOVENOX) syringe 40 mg  (Assessed at increased VTE risk (Padua score greater than or equal to 4))         Daily (6p)     vancomycin 50 mg/mL oral solution 125 mg         Every 6 hours interval     brimonidine (ALPHAGAN) 0.2 % ophthalmic solution 1 drop         2 times daily     losartan (COZAAR) tablet 50 mg  Status:  Discontinued         Daily     metoprolol tartrate (LOPRESSOR) tablet 50 mg         2 times daily     [Provider Managed Hold]  metFORMIN (GLUCOPHAGE) tablet 500 mg        (Provider Managed Hold since Wed 1/12/2022 at 1133 by Carissa Rome PA C.Hold Reason: Other (see comment).Hold Comments: Diarrhea)    2 times daily with meals     acetaminophen (TYLENOL) tablet 650 mg         4 times daily PRN     sodium chloride 0.9 % infusion  (Saline Fluids)  Status:  Discontinued         Continuous     potassium chloride (KLOR-CON) tablet extended release 20 mEq  (Potassium Replacement)         As needed     potassium chloride (KLOR-CON) tablet extended release 40 mEq  (Potassium Replacement)         As needed     atropine injection 0.5 mg         Every 5 min PRN     nitroglycerin (NITROSTAT) SL tablet 0.4 mg         Every 5 min PRN     glucose chewable tablet 16-32 g of dextrose  (Hypoglycemia Treatment Protocol and Hyperglycemia Validation Protocol)        \"Or\" Linked Group Details    As needed     dextrose 40 % oral gel 15-30 g of dextrose  " "(Hypoglycemia Treatment Protocol and Hyperglycemia Validation Protocol)        \"Or\" Linked Group Details    As needed     glucagon (GLUCAGEN) injection 1 mg  (Hypoglycemia Treatment Protocol and Hyperglycemia Validation Protocol)        \"Or\" Linked Group Details    As needed     dextrose in water injection 12.5 g  (Hypoglycemia Treatment Protocol and Hyperglycemia Validation Protocol)        \"Or\" Linked Group Details    As needed          Anti-infectives (From admission, onward)    Start     Dose/Rate Route Frequency Ordered Stop    22 1800  vancomycin 50 mg/mL oral solution 125 mg         125 mg oral Every 6 hours interval 22 1712              Vital Signs:    Temp:  [36.6 °C (97.9 °F)-37.4 °C (99.3 °F)] 36.6 °C (97.9 °F)  Heart Rate:  [54-74] 70  Resp:  [16-20] 16  BP: (149-185)/() 149/73    Temp (72hrs), Av.4 °C (99.3 °F), Min:36.6 °C (97.9 °F), Max:38.3 °C (101 °F)      Physical Exam     Gen: Aox3  HEENT: OP clear  Neck: Supple  LAD: No cervical LAD  Lungs: CTAB  CV: RRR no murmurs  Abd: Soft NTND +BS  Ext: No c/c/e  Skin: no rash  Neuro: II-XII intact        Lines, Drains, Airways, Wounds:  Peripheral IV (Adult) 01/10/22 Left Antecubital (Active)   Number of days: 2       Labs:    Lab Results   Component Value Date    WBC 8.15 2022    HGB 12.5 (L) 2022    HCT 36.7 (L) 2022    MCV 92.9 2022     2022     Lab Results   Component Value Date    GLUCOSE 106 (H) 2022    CALCIUM 8.4 (L) 2022     2022    K 2.9 (L) 2022    CO2 22 2022     2022    BUN 16 2022    CREATININE 1.0 2022     Lab Results   Component Value Date    ALT 30 2020    AST 18 2020    ALKPHOS 73 2020    BILITOT 0.7 2020     UA Results       22     0433    Color Yellow    Clarity Clear    Glucose Negative    Bilirubin Negative    Ketones Trace    Sp Grav 1.020    Blood +1    Ph 6.5    Protein +3    " Urobilinogen 0.2    Nitrite Negative    Leuk Est Negative    WBC 0 TO 3    RBC 0 TO 4    Bacteria None Seen         Comment for Ketones at 0433 on 01/11/22: Free sulfhydryl drugs such as Mesna, Capoten, and Acetylcysteine (Mucomyst) may cause false positive ketonuria.    Comment for Blood at 0433 on 01/11/22: The sensitivity of the occult blood test is equivalent to approximately 4 intact RBC/HPF.    Comment for Leuk Est at 0433 on 01/11/22: Results can be falsely negative due to high specific gravity, some antibiotics, glucose >3 g/dl, or WBC other than neutrophils.        Microbiology Results     Procedure Component Value Units Date/Time    Ova and parasite screen Stool [[id number]]  (Normal) Collected: 01/11/22 1204    Specimen: Stool Updated: 01/12/22 1315     Ova + Parasite Exam No ova and parasites seen    Clostridium difficile tox screen Stool [[id number]]  (Normal) Collected: 01/11/22 1115    Specimen: Stool Updated: 01/11/22 1447     C difficile Toxin Screen Indeterminate    Stool culture Stool [[id number]]  (Normal) Collected: 01/11/22 1115    Specimen: Stool Updated: 01/12/22 0640     Stool Culture Culture in progress    C. difficile Toxin by PCR Stool [[id number]]  (Abnormal) Collected: 01/11/22 1115    Specimen: Stool Updated: 01/12/22 1410     C. difficile Toxin PCR Positive    Blood Culture Blood, Venous [[id number]]  (Normal) Collected: 01/11/22 0055    Specimen: Blood, Venous Updated: 01/12/22 0600     Culture No growth at 18-24 hours    Blood Culture Blood, Venous [[id number]]  (Normal) Collected: 01/11/22 0048    Specimen: Blood, Venous Updated: 01/12/22 0600     Culture No growth at 18-24 hours    SARS-CoV-2 (COVID-19), PCR Nasopharynx [[id number]]  (Normal) Collected: 01/11/22 0021    Specimen: Nasopharyngeal Swab from Nasopharynx Updated: 01/11/22 0114    Narrative:      The following orders were created for panel order SARS-CoV-2 (COVID-19), PCR Nasopharynx.  Procedure                                Abnormality         Status                     ---------                               -----------         ------                     SARS-COV-2 (COVID-19)/ F...[[id number]]  Normal              Final result                 Please view results for these tests on the individual orders.    SARS-COV-2 (COVID-19)/ FLU A/B, AND RSV, PCR Nasopharynx [[id number]]  (Normal) Collected: 01/11/22 0021    Specimen: Nasopharyngeal Swab from Nasopharynx Updated: 01/11/22 0114     SARS-CoV-2 (COVID-19) Negative     Influenza A Negative     Influenza B Negative     Respiratory Syncytial Virus Negative           Pathology Results     ** No results found for the last 720 hours. **          Echo:         Imaging:    Radiology Imaging    XR CHEST 1 VW    Narrative  CLINICAL HISTORY:   Fever, weakness. Rule out pneumonia.    COMPARISON:   Chest x-ray dated May 13, 2012.    COMMENT: Frontal view of the chest was performed. The heart size is normal.  The  hilar and mediastinal contours are normal. The lungs are clear without  consolidation, effusion or pneumothorax. There is no acute osseous abnormality.    --    Impression  No acute disease in the chest.    I certify that I have reviewed this examination and agree with this report.  Eliza Cash MD      Patient Active Problem List   Diagnosis Code   • Diverticulosis of colon K57.30   • Enlarged prostate with lower urinary tract symptoms (LUTS) N40.1   • Hypertension, benign I10   • Malignant melanoma of skin (CMS/HCC) C43.9   • Osteoarthritis of knee M17.10   • Overweight E66.3   • Spinal stenosis of lumbar region M48.061   • Type 2 diabetes mellitus (CMS/HCC) E11.9   • Chronic kidney disease, stage I N18.1   • Hypokalemia E87.6   • Dry eye syndrome of bilateral lacrimal glands H04.123   • Primary open-angle glaucoma, bilateral, indeterminate stage H40.1134   • Bilateral pseudophakia Z96.1   • Tear film insufficiency H04.129   • Pure hypercholesterolemia E78.00   • Weakness R53.1   •  Fall W19.XXXA   • Fever R50.9   • Diarrhea R19.7   • Hyponatremia E87.1   • C. difficile colitis A04.72     C. difficile colitis  Assessment & Plan  I have ordered C. difficile PCR which came back positive  The patient has signs and symptoms consistent with C. difficile colitis  Continue with p.o. vancomycin 125 every 6 hours  Once he is improved it might be beneficial to see if his insurance will cover Dificid upon discharge        Jace Verde MD  1/12/2022 2:54 PM

## 2022-01-12 NOTE — UM PHYSICIAN REVIEW NOTE
Patient admitted with diarrhea yesterday, found to have c.diff positive by PCR today.  K low at 2.9 today, repleted.  Still with diarrhea.  Anticipate care will cross a [facility].  Inpatient is appropriate.    Margaret White, DO

## 2022-01-12 NOTE — HOSPITAL COURSE
Aneesh is a 71 y.o. male admitted on 1/10/2022 with Weakness [R53.1]  Fall, initial encounter [W19.XXXA]  Fever, unspecified fever cause [R50.9]. Principal problem is Weakness.    Past Medical History  Aneesh has a past medical history of Diverticulosis of colon, Enlarged prostate with lower urinary tract symptoms (LUTS), Glaucoma, Hypertension, Melanoma of skin (CMS/[facility]), Osteoarthritis of knee, Overweight, Spinal stenosis of lumbar region, and Type 2 diabetes mellitus (CMS/[facility]).    History of Present Illness   Aneesh Brito is a 71 y.o. male patient with past medical history of hypertension, diabetes mellitus, osteoarthritis of right knee presents with complaints of generalized weakness and falls.  Patient reports fell off 4 times in the last week which he attributes to his right knee.  He denied any trauma to head or neck.  Denies headache or neck pain.  Patient has severe osteoarthritis of right knee scheduled for surgery in 2 weeks at [facility].  He denied any dizziness prior to the fall or loss of consciousness.      CXR 1/10:  IMPRESSION:  No acute disease in the chest.

## 2022-01-12 NOTE — PLAN OF CARE
Problem: Adult Inpatient Plan of Care  Goal: Plan of Care Review  Outcome: Progressing  Flowsheets (Taken 1/12/2022 7612)  Progress: improving  Plan of Care Reviewed With: patient  Outcome Summary: pt feeling much better, oob with supervision in room, less diarrhea today, stool came back cdiff positive.  on PO vanco, for hopeful discharge home tomorrow.   Plan of Care Review  Plan of Care Reviewed With: patient  Progress: improving  Outcome Summary: pt feeling much better, oob with supervision in room, less diarrhea today, stool came back cdiff positive.  on PO vanco, for hopeful discharge home tomorrow.

## 2022-01-12 NOTE — PLAN OF CARE
Problem: Adult Inpatient Plan of Care  Goal: Readiness for Transition of Care  Outcome: Progressing     Problem: Adult Inpatient Plan of Care  Goal: Readiness for Transition of Care  Intervention: Mutually Develop Transition Plan  Flowsheets (Taken 1/12/2022 3475)  Anticipated Discharge Disposition: home with assistance  Equipment Needed After Discharge: none  Discharge Coordination/Progress: Assessment completed by speaking to pt at bedside.  Assistive Device/Animal Currently Used at Home: (has RW and BSC but does not currently use it) none  Concerns Comments: obs status, admitt after weakness/fall, r/o CDIFF  Transportation Concerns: car, none  Readmission Within the Last 30 Days: no previous admission in last 30 days  Patient/Family Anticipated Services at Transition: none  Patient/Family Anticipates Transition to: home with family  Transportation Anticipated: family or friend will provide  Concerns to be Addressed:  • care coordination/care conferences  • discharge planning  Patient's Choice of Community Agency(s): denies needs at this time  Offered/Gave Vendor List: no    Pt lives with his wife Yas in a 2SH with 3+5 ANIVAL, 2nd floor bed/bathroom. He is independent with mobility and ADLs at baseline, has RW and BSC at home but does not use either. Pt is able to navigate the stairs without difficulty, and reports he is scheduled for R TKR on 1/25/22.     Pt has ACP documents which identify his wife Yas as HCPOA. He has no current outpatient or home care services and is not a . PCP and pharmacy confirmed - updated pharmacy entered in [facility].     Pt declines home care at d/c, states his wife will drive him home. Per PA-C, anticipate pt will be stable for d/c home on Thursday. PT/OT consults pending.    17:43 ADDENDUM: Per PT, pt and wife verbalized they are now agreeable to home care. SW to f/u tomorrow to offer choice and make referral.      Anticipated Disposition: Home with wife, no needs  identified/declines home care  2SH with wife, independent at baseline  Wife to transport home

## 2022-01-12 NOTE — PLAN OF CARE
Problem: Adult Inpatient Plan of Care  Goal: Plan of Care Review  Outcome: Progressing  Flowsheets (Taken 1/12/2022 5473)  Progress: improving  Plan of Care Reviewed With: patient  Outcome Summary: OT eval completed. Rec d/c home w/ (A) and VINNIE

## 2022-01-12 NOTE — DISCHARGE SUMMARY
Hospital Medicine Service -  Inpatient Discharge Summary        BRIEF OVERVIEW   Admitting Provider: Reggie Savage MD  Attending Provider: No att. providers found Attending phys phone: N/A    PCP: Kamar Palacios [phone]    Admission Date: 1/10/2022  Discharge Date: 1/14/2022     DISCHARGE DIAGNOSES      Primary Discharge Diagnosis  Weakness    Secondary Discharge Diagnoses  Active Hospital Problems    Diagnosis Date Noted   • Campylobacter diarrhea 01/14/2022   • C. difficile colitis 01/12/2022   • Weakness 01/11/2022   • Fall 01/11/2022   • Fever 01/11/2022   • Diarrhea 01/11/2022   • Hyponatremia 01/11/2022   • Primary open-angle glaucoma, bilateral, indeterminate stage 09/25/2019   • Hypokalemia 03/21/2019   • Hypertension, benign 09/10/2014   • Osteoarthritis of knee 09/10/2014   • Type 2 diabetes mellitus (CMS/[facility]) 09/10/2014      Resolved Hospital Problems   No resolved problems to display.       Problem List on Day of Discharge  Campylobacter diarrhea  Assessment & Plan  Stool cultures positive  Azithromycin 500 mg for 3 days  QT rechecked prior to starting, wnl  ID following     C. difficile colitis  Assessment & Plan  Positive on PCR  Continue oral vanco 14 days total, covered low cost on discharge  Appreciate ID input     Hyponatremia  Assessment & Plan  Completed IVF  Improved at 137  Follow BMP      Diarrhea  Assessment & Plan  C diff positive on PCR and also stool culture positive for Campylobacter.  Positive blood culture likely contamination per ID  Continue oral vancomycin  Starting azithromycin for his Campylobacter infection  Holding Metformin   Monitor electrolytes -replete low K as needed  Appreciate ID input    Fever  Assessment & Plan  Likely secondary to cdiff infection and stool culture also positive for Campylobacter  UA normal  Has remained afebrile since admission  Tylenol prn  1 blood culture came positive with coag negative staph which is likely contaminant. This was  "discussed with ID who agreed.  Per ID recommendations plan for azithromycin 500 mg for 3 days for his Campylobacter infection.  For C. difficile oral vancomycin for a 14-day course recommended. Low co-pay for oral vancomycin.  Deficit cost was verified and it was deemed too expensive for patient.     Fall  Assessment & Plan  Mechanical falls due to severe OA R Knee and dehydration  Completed IVF  Fall precautions  PT/OT  Follow up with ortho outpatient, due for surgery in 2 weeks     Primary open-angle glaucoma, bilateral, indeterminate stage  Assessment & Plan  Continue latanoprost, Alphagan eyedrops      Hypokalemia  Assessment & Plan  K 3.2 this morning, repleted  Follow BMP    Type 2 diabetes mellitus (CMS/[facility])  Assessment & Plan  Hold metformin due to diarrhea, resume on discharge  Monitor Accu-Cheks    Cover with sliding scale insulin as needed     Osteoarthritis of knee  Assessment & Plan  Patient scheduled for right knee surgery in 2 weeks  PT/OT eval - appreciate recs. Home health recommended  Follow-up with Ortho outpatient      Hypertension, benign  Assessment & Plan  Continue losartan, metoprolol and felodipine  BP still elevated at times  Monitor BP and will adjust meds as needed       * Weakness  Assessment & Plan  Resolved  Secondary to fever, dehydration  IVF complete   Has remained afebrile now  Fall precautions     SUMMARY OF HOSPITALIZATION      Presenting Problem/History of Present Illness  This is a 71 y.o. year-old male admitted on 1/10/2022 with Weakness [R53.1]  Fall, initial encounter [W19.XXXA]  Fever, unspecified fever cause [R50.9].    From H&P: \"Aneesh Brito is a 71 y.o. male patient with past medical history of hypertension, diabetes mellitus, osteoarthritis of right knee presents with complaints of generalized weakness and falls.  Patient reports fell off 4 times in the last week which he attributes to his right knee.  He denied any trauma to head or neck.  Denies headache or neck " "pain.  Patient has severe osteoarthritis of right knee scheduled for surgery in 2 weeks at [facility].  He denied any dizziness prior to the fall or loss of consciousness.  No chest pain or shortness of breath palpitations.  In the ER he was noted to have fever of 100.5.  He denied chills but complains of generalized weakness no new focal weakness.  No headache or visual changes.  He tested negative for COVID. He denied abdominal pain nausea or vomiting but has diarrhea since Sunday multiple watery episodes.  No blood in the stools.  Denies any recent travel or eating food outside.  Denies urinary symptoms.  No back pain or flank pain.\"     Hospital Course    Mr. Brito is 70 y/o male with PMH Type II DM, OA right knee, HTN, glaucoma, and spinal stenosis who presented to ED on 1/11 with weakness, diarrhea, and multiple falls which he attributed to his R knee. He was febrile on presentation and admitted to multiple watery diarrheas since 1/9. He was consultation by Infectious Disease who followed his course while in hospital. C diff testing was initially indeterminate but was positive on PCR testing. He has been receiving oral vancomycin with improvement in frequency of diarrhea and will complete at 14 day course. Stool culture was also positive for Campylobacter infection which he was started on Azithromycin 500 mg for 3 days for. Lastly one blood culture was positive for coag negative staph but is suspected to be contamination upon discussion with ID.    Pt's QT was initially prolonged on presentation but on rechecking had normlaized prior to starting Azithromycin. Also completed IVF for hydration. During admission pt was also found to be hypokalemic likely due to GI losses and was repleted as needed. He was seen in consult by PT/OT and is medically stable for discharge. He is scheduled for surgery of the R knee in approximately 2 weeks..    Losartan increased to 100 mg as pt with persistently elevated blood " pressures  Also prescribed oral potassium supplementation for 3 days to offset potential GI losses. Recommended repeat BMP, follow up with PCP early next week    Spent 35 minutes on pt care.    Exam on Day of Discharge  Physical Exam   Constitutional:       General: He is not in acute distress.     Appearance: Normal appearance. He is obese. He is not toxic-appearing.   HENT:      Head: Normocephalic and atraumatic.   Eyes:      Conjunctiva/sclera: Conjunctivae normal.   Cardiovascular:      Rate and Rhythm: Normal rate and regular rhythm.      Heart sounds: Normal heart sounds.   Pulmonary:      Effort: Pulmonary effort is normal.      Breath sounds: Normal breath sounds.   Abdominal:      General: Bowel sounds are normal. There is no distension.      Palpations: Abdomen is soft.      Tenderness: There is no abdominal tenderness.   Skin:     General: Skin is warm and dry.   Neurological:      Mental Status: He is alert and oriented to person, place, and time.   Psychiatric:         Mood and Affect: Mood normal.         Behavior: Behavior normal.     Consults During Admission  IP CONSULT TO SOCIAL WORK  IP CONSULT TO INFECTIOUS DISEASE    DISCHARGE MEDICATIONS        Medication List      START taking these medications    azithromycin 250 mg tablet  Commonly known as: ZITHROMAX  Start taking on: January 15, 2022  Take 2 tablets (500 mg total) by mouth daily for 1 day Indications: stool infection.  Dose: 500 mg     potassium chloride 10 mEq CR tablet  Commonly known as: KLOR-CON  Take 2 tablets (20 mEq total) by mouth daily for 3 days.  Dose: 20 mEq     vancomycin 125 mg capsule  Commonly known as: VANCOCIN  Take 1 capsule (125 mg total) by mouth 4 (four) times a day for 11 days.  Dose: 125 mg        CHANGE how you take these medications    losartan 100 mg tablet  Commonly known as: COZAAR  Start taking on: January 15, 2022  Take 1 tablet (100 mg total) by mouth daily.  Dose: 100 mg  What changed:   · medication  strength  · how much to take     metFORMIN 500 mg tablet  Commonly known as: GLUCOPHAGE  Take 500 mg by mouth 2 (two) times a day with meals.  Dose: 500 mg  What changed: Another medication with the same name was removed. Continue taking this medication, and follow the directions you see here.        CONTINUE taking these medications    brimonidine 0.15 % ophthalmic solution  Commonly known as: ALPHAGAN  Administer 1 drop into both eyes 2 (two) times a day. Morning and late afternoon  Dose: 1 drop     felodipine 10 mg 24 hr tablet  Commonly known as: PLENDIL  Take 10 mg by mouth daily.  Dose: 10 mg     finasteride 5 mg tablet  Commonly known as: PROSCAR  Take 5 mg by mouth daily.  Dose: 5 mg     metoprolol tartrate 50 mg tablet  Commonly known as: LOPRESSOR  Take 1 tablet (50 mg total) by mouth 2 (two) times a day.  Dose: 50 mg     ROCKLATAN 0.02-0.005 % drops  Administer 1 drop into both eyes nightly. Wait a few minutes in between eye drops  Dose: 1 drop  Generic drug: netarsudiL-latanoprost     VYZULTA 0.024 % drops  Administer 1 drop into both eyes nightly. Wait a few minutes in between eye drops  Dose: 1 drop  Generic drug: latanoprostene bunod             Instructions for after discharge     Call provider for:  difficulty breathing, headache or visual disturbances      Call provider for:  difficulty breathing, headache or visual disturbances      Call provider for:  extreme fatigue      Call provider for:  extreme fatigue      Call provider for:  persistent dizziness or light-headedness      Call provider for:  persistent dizziness or light-headedness      Call provider for:  persistent nausea or vomiting      Call provider for:  persistent nausea or vomiting      Call provider for:  rash      Call provider for:  redness, tenderness, or signs of infection (pain, swelling, redness, odor or green/yellow discharge around incision site)      Call provider for:  severe uncontrolled pain      Call provider for:   temperature >100.4      Call provider for:  temperature >100.4      Call provider for: blood sugar change      Call provider for blood sugar less than 70 or greater than 350    Call provider for: glucose less than 70 or greater than 350      Discharge diet      Diet Type / Texture:  Diabetic, No Concentrated Sweets  Cardiac (Low Sodium, Low Fat)  Regular       Discharge diet      Diet Type / Texture: Cardiac (Low Sodium, Low Fat)    Follow-up with primary physician (PCP)      Within a week    Other Follow-up      Recommend Lab work: (Basic metabolic panel) next week on Monday or Tuesday to follow up on your potassium levels and kidney function. This can be done through your primary care doctor    Post-Discharge Activity: Normal activity as tolerated.      Normal activity as tolerated.    Post-Discharge Activity: Normal activity as tolerated.      Normal activity as tolerated.             PROCEDURES / LABS / IMAGING      Operative Procedures  None    Other Procedures  none    Pertinent Labs  Results from last 7 days   Lab Units 01/13/22  0416 01/12/22  0439 01/10/22  2000   WBC K/uL 7.08 8.15 15.28*   HEMOGLOBIN g/dL 12.2* 12.5* 14.4   HEMATOCRIT % 35.5* 36.7* 41.2   PLATELETS K/uL 199 194 244     Results from last 7 days   Lab Units 01/14/22  0441 01/13/22  1245 01/13/22  0416 01/12/22  0439   SODIUM mEQ/L 137  --  140 136   POTASSIUM mEQ/L 3.2* 3.6 3.1* 2.9*   CHLORIDE mEQ/L 104  --  107 103   CO2 mEQ/L 23  --  23 22   BUN mg/dL 21*  --  16 16   CREATININE mg/dL 1.2  --  0.9 1.0   CALCIUM mg/dL 8.9  --  8.5* 8.4*   GLUCOSE mg/dL 115*  --  104* 106*       SARS-CoV-2 (COVID-19) (no units)   Date/Time Value   01/11/2022 0021 Negative       Pertinent Imaging  X-RAY CHEST 1 VIEW    Result Date: 1/11/2022  IMPRESSION: No acute disease in the chest. I certify that I have reviewed this examination and agree with this report. Eliza Cash MD       OUTPATIENT  FOLLOW-UP / REFERRALS / PENDING TESTS        Outpatient  Follow-Up Appointments            In 1 week Kamar Palacios MD [facility] in [place]          Referrals  No orders of the defined types were placed in this encounter.      Test Results Pending at Discharge  Unresulted Labs (From admission, onward)             Start     Ordered    01/13/22 0600  CBC  Morning draw        Question:  Release to patient  Answer:  Immediate    01/12/22 1143    01/13/22 0600  Basic metabolic panel  Morning draw        Question:  Release to patient  Answer:  Immediate    01/12/22 1143    01/13/22 0600  Magnesium  Morning draw        Question:  Release to patient  Answer:  Immediate    01/12/22 1152    01/12/22 1422  Blood Culture PCR Panel Blood, Venous  Once        Question:  Release to patient  Answer:  Immediate    01/12/22 1421                Important Issues to Address in Follow-Up  You were admitted to [facility] on 1/10 with weakness, multiple falls and diarrhea. While admitted you were found to have an infection named Clostridium difficile and Campylobacter. You were treated with oral antibiotics, Vancomycin and Azithromycin. You should be sure to complete both courses of antibiotic as outpatient.    DISCHARGE DISPOSITION AND DESTINATION      Disposition: Home Health Care - [facility]  Destination: Home                            Code Status At Discharge: Prior    Physician Order for Life-Sustaining Treatment Document Status      No documents found

## 2022-01-12 NOTE — PROGRESS NOTES
Patient:  Aneesh Brito  Location:  [facility] 3C 0355  MRN:  [id number]  Today's date:  1/12/2022     RN aware and cleared for therapy. Pt left seated in bedside recliner w/ chair alarm activated. Call bell and personal items within reach. NAD and VSS. Nurse notified of session progress/outcome. Wife present at termination of session      Aneesh is a 71 y.o. male admitted on 1/10/2022 with Weakness [R53.1]  Fall, initial encounter [W19.XXXA]  Fever, unspecified fever cause [R50.9]. Principal problem is Weakness.    Past Medical History  Aneesh has a past medical history of Diverticulosis of colon, Enlarged prostate with lower urinary tract symptoms (LUTS), Glaucoma, Hypertension, Melanoma of skin (CMS/HCC), Osteoarthritis of knee, Overweight, Spinal stenosis of lumbar region, and Type 2 diabetes mellitus (CMS/[facility]).    History of Present Illness   Aneesh Brito is a 71 y.o. male patient with past medical history of hypertension, diabetes mellitus, osteoarthritis of right knee presents with complaints of generalized weakness and falls.  Patient reports fell off 4 times in the last week which he attributes to his right knee.  He denied any trauma to head or neck.  Denies headache or neck pain.  Patient has severe osteoarthritis of right knee scheduled for surgery in 2 weeks at [facility].  He denied any dizziness prior to the fall or loss of consciousness.      CXR 1/10:  IMPRESSION:  No acute disease in the chest.      OT Vitals    Date/Time Pulse SpO2 Pt Activity O2 Therapy BP BP Location BP Method Pt Position Observations [facility]   01/12/22 1358 66 95 % At rest None (Room air) 155/80 Right upper arm Automatic Lying -- VTV   01/12/22 1405 -- -- -- -- 149/73 Right upper arm Automatic Sitting At EOB VTV   01/12/22 1414 70 95 % At rest None (Room air) -- -- -- -- After PT/OT in recliner VTV      OT Pain    Date/Time Pain Type Rating: Rest Rating: Activity [facility]   01/12/22 1358 Pain Assessment 0 - no pain 0 - no  pain VTV          Prior Living Environment      Most Recent Value   People in Home spouse   Current Living Arrangements home   Living Environment Comment Pt lives w/ wife in 2SH, 8 ANIVAL w/ HR, no DC, FF to 2F bed and tub shower w/GBs. Has SC and Commode avail if needed        Prior Level of Function      Most Recent Value   Dominant Hand left   Ambulation assistive equipment   Transferring assistive equipment   Toileting independent   Bathing independent   Dressing independent   Eating independent   Communication understands/communicates without difficulty   Prior Level of Function Comment Per pt report he was IND w/ all ADLs PTA. Was recently using RW for ambulation 2* incr weakness but normally IND w/o   Assistive Device Currently Used at Home none  [has RW and BSC but does not currently use it]        Occupational Profile      Most Recent Value   Reason for Services/Referral Pt adm w/ multiple falls and weakness - febrile in ED, dehydrated and hyponatremic. R/o c. 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   Environmental Supports and Barriers Supportive wife at home           OT Evaluation and Treatment - 01/12/22 1358        OT Time Calculation    Start Time 1358     Stop Time 1414     Time Calculation (min) 16 min        Session Details    Document Type initial evaluation     Mode of Treatment occupational therapy        General Information    Patient Profile Reviewed yes     Onset of Illness/Injury or Date of Surgery 01/10/22     Patient/Family/Caregiver Comments/Observations RN aware and cleared for therapy     General Observations of Patient Pt r'cved in bed, agreeable to OT and PT session. Wife present t/o session     Existing Precautions/Restrictions fall;special contact   r/o c. diff       Living Environment    Name(s) of People in Home Korina     Primary Care Provided by self        Cognition/Psychosocial    Affect/Mental Status (Cognition) WFL     Orientation Status  (Cognition) oriented x 4     Follows Commands (Cognition) WFL     Cognitive Function WFL     Comment, Cognition Pleasant and cooperative t/o session        Hearing Assessment    Hearing Status WFL        Vision Assessment/Intervention    Visual Impairment/Limitations WFL        Sensory Assessment (Somatosensory)    Sensory Assessment (Somatosensory) UE sensation intact        Range of Motion (ROM)    Range of Motion ROM is WFL;bilateral upper extremities        Strength (Manual Muscle Testing)    Strength (Manual Muscle Testing) strength is WFL;bilateral upper extremities        Bed Mobility    Jennings, Supine to Sit supervision     Assistive Device head of bed elevated     Comment (Bed Mobility) OOB to R. No (A) required. SUP for safety. Denies any dizziness upon sitting EOB.        Transfers    Transfers other (see comments)     Comment Pt able to complete short distance func mobility w/ and w/o RW at [facility]. No overt LOB noted but mildly unsteady 2* deconditioning/weakness prior to admission. Denies any diziness w/ OOB activity8        Sit to Stand Transfer    Jennings, Sit to Stand Transfer supervision     Verbal Cues hand placement     Assistive Device walker, front-wheeled     Comment From EOB. Cues for hand placement. Good steady rise w/o A        Stand to Sit Transfer    Jennings, Stand to Sit Transfer supervision;verbal cues     Verbal Cues hand placement     Assistive Device none     Comment To recliner. Trailed ambulation w/o AD to recliner at [facility] and cues for safe tech        Toilet Transfer    Comment Declined need at this time        Safety Issues, Functional Mobility    Impairments Affecting Function (Mobility) balance;endurance/activity tolerance;strength        Balance    Balance Assessment sitting static balance;sitting dynamic balance;sit to stand dynamic balance;standing static balance;standing dynamic balance     Static Sitting Balance WFL;sitting, edge of bed     Dynamic Sitting  Balance WFL;sitting, edge of bed     Sit to Stand Dynamic Balance WFL;supported     Static Standing Balance WFL;supported     Dynamic Standing Balance mild impairment;unsupported     Comment, Balance Pt required SUP-CLS for safe OOB activity w/ and w/o RW. No overt LOB noted but mildly unsteady w/o AD. Limited by dcr activity tolerance and strength        Lower Body Dressing    Tasks socks     Self-Performance dons/doffs right sock;dons/doffs left sock     Cameron supervision     Position edge of bed sitting     Comment Pt able to don BLE socks while seated EOB at SUP. Good func reach to distal BLE        Toileting    Comment Declined need at this time but edu and rec to continue ambulating to bathroom w/ nursing to incr OOB activity and tolerance. Pt receptive to provided edu and recs        AM-PAC (TM) - ADL (Current Function)    Putting on and taking off regular lower body clothing? 4 - None     Bathing? 3 - A Little     Toileting? 3 - A Little     Putting on/taking off regular upper body clothing? 3 - A Little     How much help for taking care of personal grooming? 3 - A Little     Eating meals? 4 - None     AM-PAC (TM) ADL Score 20        Assessment/Plan (OT)    Daily Outcome Statement OT eval completed. Pt is a 71 y.o. male adm w/ weakness and falls - febrile in ED, dehydrated, and hyponatremic. R/o C.diff. Pt required SUP for bed mobility, SUP for STS, and CLS for func mobility w/ and w/o RW. Plus SUP for LBD while seated EOB. Edu on econ/pacing, progression of activity tolerance, and antic d/c planning. Pt limited by dcr activity tolerance, balance, and strength. Rec ongoing OT in acute setting. Rec d/c home w/ (A) and  OT to address func endurance and transfer safety     Rehab Potential good, to achieve stated therapy goals     Therapy Frequency 3-5 times/wk     Planned Therapy Interventions activity tolerance training;adaptive equipment training;BADL retraining;functional balance  retraining;occupation/activity based interventions;patient/caregiver education/training;transfer/mobility retraining               OT Assessment/Plan      Most Recent Value   OT Recommended Discharge Disposition home with assistance, home with home health at 01/12/2022 1358   Anticipated Equipment Needs At Discharge (OT) shower chair  [has RW, SPC, GBs, and commode] at 01/12/2022 1358   Patient/Family Therapy Goal Statement To return home at d/c at 01/12/2022 1358                    Education Documentation  Self-Care, taught by Samantha Gary OT at 1/12/2022  2:34 PM.  Learner: Significant Other  Readiness: Acceptance  Method: Explanation  Response: Verbalizes Understanding  Comment: OT POC/role, safety and tech w/ func transfers and mobility w/ RW, safety w/ ADLs, econ/pacing, and d/c planning    Self-Care, taught by Samantha Gary OT at 1/12/2022  2:34 PM.  Learner: Patient  Readiness: Acceptance  Method: Explanation  Response: Verbalizes Understanding  Comment: OT POC/role, safety and tech w/ func transfers and mobility w/ RW, safety w/ ADLs, econ/pacing, and d/c planning          OT Goals      Most Recent Value   Bed Mobility Goal 1    Activity/Assistive Device bed mobility activities, all at 01/12/2022 1358   Krebs independent at 01/12/2022 1358   Time Frame by discharge at 01/12/2022 1358   Progress/Outcome goal ongoing at 01/12/2022 1358   Transfer Goal 1    Activity/Assistive Device toilet, sit-to-stand/stand-to-sit at 01/12/2022 1358   Krebs independent at 01/12/2022 1358   Time Frame by discharge at 01/12/2022 1358   Progress/Outcome goal ongoing at 01/12/2022 1358   Dressing Goal 1    Activity/Adaptive Equipment lower body dressing at 01/12/2022 1358   Krebs independent at 01/12/2022 1358   Time Frame by discharge at 01/12/2022 1358   Progress/Outcome goal ongoing at 01/12/2022 1358   Toileting Goal 1    Activity/Assistive Device toileting skills, all at 01/12/2022 1358   Krebs  modified independence at 01/12/2022 1358   Time Frame by discharge at 01/12/2022 1358   Progress/Outcome goal ongoing at 01/12/2022 1672

## 2022-01-12 NOTE — PATIENT CARE CONFERENCE
Care Progression Rounds Note  Date: 1/12/2022  Time: 10:33 AM     Patient Name: Aneesh Brito     Medical Record Number: [id number]   YOB: 1950  Sex: Male      Room/Bed: 0355    Admitting Diagnosis: Weakness [R53.1]  Fall, initial encounter [W19.XXXA]  Fever, unspecified fever cause [R50.9]   Admit Date/Time: 1/10/2022 11:34 PM    Primary Diagnosis: Weakness  Principal Problem: Weakness    GMLOS: pending  Anticipated Discharge Date: 1/13/2022    AM-PAC:  Mobility Score: 21    Discharge Planning:  Anticipated Discharge Disposition: home with assistance,home with home health    Barriers to Discharge:  Medical issues not resolved,Consultant recommendations pending,Therapy update pending    Comments:  ID consulted,    Participants:  nursing,social work/services,,advanced practice provider

## 2022-01-12 NOTE — CONSULTS
SW consulted for d/c planning. See SW plan of care note for assessment information. Pt denies needs at d/c, declines home care at this time.     Anticipated Disposition: Home with wife, no needs identified/declines home care  2SH with wife, independent at baseline  Wife to transport home

## 2022-01-13 LAB
ANION GAP SERPL CALC-SCNC: 10 MEQ/L (ref 3–15)
BACTERIA STL CULT: ABNORMAL
BACTERIA STL CULT: ABNORMAL
BUN SERPL-MCNC: 16 MG/DL (ref 8–20)
CALCIUM SERPL-MCNC: 8.5 MG/DL (ref 8.9–10.3)
CHLORIDE SERPL-SCNC: 107 MEQ/L (ref 98–109)
CO2 SERPL-SCNC: 23 MEQ/L (ref 22–32)
CREAT SERPL-MCNC: 0.9 MG/DL (ref 0.8–1.3)
ERYTHROCYTE [DISTWIDTH] IN BLOOD BY AUTOMATED COUNT: 13.4 % (ref 11.6–14.4)
GFR SERPL CREATININE-BSD FRML MDRD: >60 ML/MIN/1.73M*2
GLUCOSE BLD-MCNC: 110 MG/DL (ref 70–99)
GLUCOSE BLD-MCNC: 113 MG/DL (ref 70–99)
GLUCOSE BLD-MCNC: 135 MG/DL (ref 70–99)
GLUCOSE BLD-MCNC: 147 MG/DL (ref 70–99)
GLUCOSE SERPL-MCNC: 104 MG/DL (ref 70–99)
HCT VFR BLDCO AUTO: 35.5 % (ref 40.1–51)
HGB BLD-MCNC: 12.2 G/DL (ref 13.7–17.5)
MAGNESIUM SERPL-MCNC: 1.9 MG/DL (ref 1.8–2.5)
MCH RBC QN AUTO: 31.5 PG (ref 28–33.2)
MCHC RBC AUTO-ENTMCNC: 34.4 G/DL (ref 32.2–36.5)
MCV RBC AUTO: 91.7 FL (ref 83–98)
PDW BLD AUTO: 10.7 FL (ref 9.4–12.4)
PLATELET # BLD AUTO: 199 K/UL (ref 150–350)
POCT TEST: ABNORMAL
POTASSIUM SERPL-SCNC: 3.1 MEQ/L (ref 3.6–5.1)
POTASSIUM SERPL-SCNC: 3.6 MEQ/L (ref 3.6–5.1)
RBC # BLD AUTO: 3.87 M/UL (ref 4.5–5.8)
SODIUM SERPL-SCNC: 140 MEQ/L (ref 136–144)
WBC # BLD AUTO: 7.08 K/UL (ref 3.8–10.5)

## 2022-01-13 PROCEDURE — 63600000 HC DRUGS/DETAIL CODE: Performed by: HOSPITALIST

## 2022-01-13 PROCEDURE — 63700000 HC SELF-ADMINISTRABLE DRUG: Performed by: HOSPITALIST

## 2022-01-13 PROCEDURE — 83735 ASSAY OF MAGNESIUM: CPT

## 2022-01-13 PROCEDURE — 84132 ASSAY OF SERUM POTASSIUM: CPT | Performed by: HOSPITALIST

## 2022-01-13 PROCEDURE — 85027 COMPLETE CBC AUTOMATED: CPT

## 2022-01-13 PROCEDURE — 63700000 HC SELF-ADMINISTRABLE DRUG

## 2022-01-13 PROCEDURE — 99232 SBSQ HOSP IP/OBS MODERATE 35: CPT | Performed by: HOSPITALIST

## 2022-01-13 PROCEDURE — 93005 ELECTROCARDIOGRAM TRACING: CPT | Performed by: HOSPITALIST

## 2022-01-13 PROCEDURE — 36415 COLL VENOUS BLD VENIPUNCTURE: CPT

## 2022-01-13 PROCEDURE — 80048 BASIC METABOLIC PNL TOTAL CA: CPT

## 2022-01-13 PROCEDURE — 97116 GAIT TRAINING THERAPY: CPT | Mod: GP,CQ

## 2022-01-13 PROCEDURE — 12000000 HC ROOM AND CARE MED/SURG

## 2022-01-13 RX ORDER — FIDAXOMICIN 200 MG/1
200 TABLET, FILM COATED ORAL 2 TIMES DAILY
Qty: 20 TABLET | Refills: 0 | Status: SHIPPED | OUTPATIENT
Start: 2022-01-13 | End: 2022-01-14 | Stop reason: HOSPADM

## 2022-01-13 RX ORDER — VANCOMYCIN HYDROCHLORIDE 125 MG/1
125 CAPSULE ORAL 4 TIMES DAILY
Qty: 32 CAPSULE | Refills: 0 | Status: SHIPPED | OUTPATIENT
Start: 2022-01-13 | End: 2022-01-14 | Stop reason: SDUPTHER

## 2022-01-13 RX ORDER — POTASSIUM CHLORIDE 750 MG/1
40 TABLET, FILM COATED, EXTENDED RELEASE ORAL ONCE
Status: COMPLETED | OUTPATIENT
Start: 2022-01-13 | End: 2022-01-13

## 2022-01-13 RX ORDER — AZITHROMYCIN 250 MG/1
500 TABLET, FILM COATED ORAL
Status: DISCONTINUED | OUTPATIENT
Start: 2022-01-13 | End: 2022-01-14 | Stop reason: HOSPADM

## 2022-01-13 RX ADMIN — POTASSIUM CHLORIDE 40 MEQ: 750 TABLET, FILM COATED, EXTENDED RELEASE ORAL at 08:43

## 2022-01-13 RX ADMIN — METOPROLOL TARTRATE 50 MG: 50 TABLET, FILM COATED ORAL at 20:34

## 2022-01-13 RX ADMIN — BRIMONIDINE TARTRATE 1 DROP: 2 SOLUTION OPHTHALMIC at 08:44

## 2022-01-13 RX ADMIN — LOSARTAN POTASSIUM 50 MG: 50 TABLET, FILM COATED ORAL at 08:44

## 2022-01-13 RX ADMIN — BRIMONIDINE TARTRATE 1 DROP: 2 SOLUTION OPHTHALMIC at 20:26

## 2022-01-13 RX ADMIN — VANCOMYCIN HYDROCHLORIDE 125 MG: KIT at 18:19

## 2022-01-13 RX ADMIN — VANCOMYCIN HYDROCHLORIDE 125 MG: KIT at 12:37

## 2022-01-13 RX ADMIN — ENOXAPARIN SODIUM 40 MG: 40 INJECTION SUBCUTANEOUS at 18:18

## 2022-01-13 RX ADMIN — METOPROLOL TARTRATE 50 MG: 50 TABLET, FILM COATED ORAL at 08:44

## 2022-01-13 RX ADMIN — AZITHROMYCIN 500 MG: 250 TABLET, FILM COATED ORAL at 15:08

## 2022-01-13 RX ADMIN — VANCOMYCIN HYDROCHLORIDE 125 MG: KIT at 05:29

## 2022-01-13 RX ADMIN — FINASTERIDE 5 MG: 5 TABLET, FILM COATED ORAL at 08:44

## 2022-01-13 RX ADMIN — FELODIPINE 10 MG: 5 TABLET, FILM COATED, EXTENDED RELEASE ORAL at 08:43

## 2022-01-13 RX ADMIN — LATANOPROST 1 DROP: 50 SOLUTION OPHTHALMIC at 21:44

## 2022-01-13 ASSESSMENT — COGNITIVE AND FUNCTIONAL STATUS - GENERAL
CLIMB 3 TO 5 STEPS WITH RAILING: 3 - A LITTLE
MOVING TO AND FROM BED TO CHAIR: 4 - NONE
AFFECT: WFL
STANDING UP FROM CHAIR USING ARMS: 3 - A LITTLE
WALKING IN HOSPITAL ROOM: 4 - NONE

## 2022-01-13 NOTE — PATIENT CARE CONFERENCE
Care Progression Rounds Note  Date: 1/13/2022  Time: 10:35 AM     Patient Name: Aneesh Brito     Medical Record Number: [id number]   YOB: 1950  Sex: Male      Room/Bed: 0355    Admitting Diagnosis: Weakness [R53.1]  Fall, initial encounter [W19.XXXA]  Fever, unspecified fever cause [R50.9]   Admit Date/Time: 1/10/2022 11:34 PM    Primary Diagnosis: Weakness  Principal Problem: Weakness    GMLOS: 2.6  Anticipated Discharge Date: 1/13/2022    AM-PAC:  Mobility Score: 19    Discharge Planning:  Current Living Arrangements: home  Concerns to be Addressed: care coordination/care conferences,discharge planning  Anticipated Discharge Disposition: home with home health    Barriers to Discharge:  Therapy update pending    Comments:  PT to see patient before d/c discahrge on dificid if on patients pharmacy plan    Participants:  nursing,social work/services,,advanced practice provider

## 2022-01-13 NOTE — PROGRESS NOTES
Infectious Disease Progress Note    Patient Name: Aneesh Brito  MR#: [id number]  : 1950  Admission Date: 1/10/2022  Date: 22   Time: 4:10 PM   Author: Jace Verde MD    OBJECTIVE:  C. difficile PCR positive  Stool culture positive for Campylobacter jejuni    Antibiotics:    Anti-infectives (From admission, onward)    Start     Dose/Rate Route Frequency Ordered Stop    22 1515  azithromycin (ZITHROMAX) tablet 500 mg         500 mg oral Every 24 hours interval 22 1428 22 1514    22 1800  vancomycin 50 mg/mL oral solution 125 mg         125 mg oral Every 6 hours interval 22 1712            ROS  Negative  Vital Signs:    Temp:  [36.8 °C (98.2 °F)-37.2 °C (98.9 °F)] 36.8 °C (98.2 °F)  Heart Rate:  [53-67] 67  Resp:  [16] 16  BP: (152-182)/(82-98) 152/91    Temp (72hrs), Av.2 °C (99 °F), Min:36.4 °C (97.6 °F), Max:38.3 °C (101 °F)      Physical Exam    Gen: Aox3  HEENT: OP clear  Neck: Supple  LAD: No cervical LAD  Lungs: CTAB  CV: RRR no murmurs  Abd: Soft NTND +BS  Ext: No c/c/e  Skin: no rash  Neuro: II-XII intact        Lines, Drains, Airways, Wounds:  Peripheral IV (Adult) 01/10/22 Left Antecubital (Active)   Number of days: 3       Labs:    Lab Results   Component Value Date    WBC 7.08 2022    HGB 12.2 (L) 2022    HCT 35.5 (L) 2022    MCV 91.7 2022     2022     Lab Results   Component Value Date    GLUCOSE 104 (H) 2022    CALCIUM 8.5 (L) 2022     2022    K 3.6 2022    CO2 23 2022     2022    BUN 16 2022    CREATININE 0.9 2022     Lab Results   Component Value Date    ALT 30 2020    AST 18 2020    ALKPHOS 73 2020    BILITOT 0.7 2020         Patient Active Problem List   Diagnosis Code   • Diverticulosis of colon K57.30   • Enlarged prostate with lower urinary tract symptoms (LUTS) N40.1   • Hypertension, benign I10   • Malignant  melanoma of skin (CMS/Formerly [facility]) C43.9   • Osteoarthritis of knee M17.10   • Overweight E66.3   • Spinal stenosis of lumbar region M48.061   • Type 2 diabetes mellitus (CMS/Formerly [facility]) E11.9   • Chronic kidney disease, stage I N18.1   • Hypokalemia E87.6   • Dry eye syndrome of bilateral lacrimal glands H04.123   • Primary open-angle glaucoma, bilateral, indeterminate stage H40.1134   • Bilateral pseudophakia Z96.1   • Tear film insufficiency H04.129   • Pure hypercholesterolemia E78.00   • Weakness R53.1   • Fall W19.XXXA   • Fever R50.9   • Diarrhea R19.7   • Hyponatremia E87.1   • C. difficile colitis A04.72     C. difficile colitis  Assessment & Plan  3 days of azithromycin p.o.  14 days of p.o. vancomycin (Dificid was too expensive)  I will sign off.  Reconsult if needed          Jace Verde MD  1/13/20224:10 PM

## 2022-01-13 NOTE — PROGRESS NOTES
Patient: Aneesh Brito  Location:  [facility] 3C 0355  MRN:  [id number]  Today's date:  1/13/2022    Patient treatment session complete. Attending RN cleared patient and gave consent for therapy to see patient for PT session. Patient positioned comfortably in bedside recliner with call bell in reach and alarm status on. Pt did not report or present with any chest pain, nausea, no neuro signs/symptoms. Attending RN aware of treatment session progress.     Aneesh is a 71 y.o. male admitted on 1/10/2022 with Weakness [R53.1]  Fall, initial encounter [W19.XXXA]  Fever, unspecified fever cause [R50.9]. Principal problem is Weakness.    Past Medical History  Aneesh has a past medical history of Diverticulosis of colon, Enlarged prostate with lower urinary tract symptoms (LUTS), Glaucoma, Hypertension, Melanoma of skin (CMS/HCC), Osteoarthritis of knee, Overweight, Spinal stenosis of lumbar region, and Type 2 diabetes mellitus (CMS/[facility]).    History of Present Illness   Aneesh Brito is a 71 y.o. male patient with past medical history of hypertension, diabetes mellitus, osteoarthritis of right knee presents with complaints of generalized weakness and falls.  Patient reports fell off 4 times in the last week which he attributes to his right knee.  He denied any trauma to head or neck.  Denies headache or neck pain.  Patient has severe osteoarthritis of right knee scheduled for surgery in 2 weeks at [facility].  He denied any dizziness prior to the fall or loss of consciousness.      CXR 1/10:  IMPRESSION:  No acute disease in the chest.      PT Vitals    Date/Time Pulse HR Source SpO2 Pt Activity O2 Therapy BP BP Location BP Method Pt Position [facility]   01/13/22 1110 53 Monitor 98 % At rest None (Room air) 160/90 Right upper arm Automatic Lying MTC          Prior Living Environment      Most Recent Value   People in Home spouse   Current Living Arrangements home   Living Environment Comment Pt lives w/ wife in 2SH, 8  ANIVAL w/ HR, no [place], FF to 2F bed and tub shower w/GBs. Has SC and Commode avail if needed        Prior Level of Function      Most Recent Value   Dominant Hand left   Ambulation assistive equipment   Transferring assistive equipment   Toileting independent   Bathing independent   Dressing independent   Eating independent   Communication understands/communicates without difficulty   Prior Level of Function Comment Per pt report he was IND w/ all ADLs PTA. Was recently using RW for ambulation 2* incr weakness but normally IND w/o   Assistive Device Currently Used at Home none  [has RW and BSC but does not currently use it]           PT Evaluation and Treatment - 01/13/22 1110        PT Time Calculation    Start Time 1110     Stop Time 1129     Time Calculation (min) 19 min        Session Details    Document Type daily treatment/progress note     Mode of Treatment physical therapy        General Information    Patient Profile Reviewed yes     Patient/Family/Caregiver Comments/Observations Spouse present for treatment session     General Observations of Patient Pt rec'd at bedside     Existing Precautions/Restrictions fall;special contact   r/o c. diff       Cognition/Psychosocial    Affect/Mental Status (Cognition) WFL     Orientation Status (Cognition) oriented x 4     Follows Commands (Cognition) WFL     Cognitive Function WFL        Sensory    Hearing Status WFL        Vision Assessment/Intervention    Visual Impairment/Limitations WFL        Bed Mobility    [place], Supine to Sit modified independence        Sit to Stand Transfer    [place], Sit to Stand Transfer supervision     Verbal Cues hand placement;safety;technique     Assistive Device walker, front-wheeled     Comment VC's for precautions and safe techniques.        Stand to Sit Transfer    [place], Stand to Sit Transfer supervision     Verbal Cues hand placement;safety;technique     Assistive Device walker, front-wheeled     Comment VC's for  precautions and safe techniques. Vc's for slow descent. VC's for hand placement        Gait Training    [place], Gait distant supervision     Assistive Device walker, front-wheeled     Distance in Feet 70 feet     Pattern (Gait) step-through     Deviations/Abnormal Patterns (Gait) gait speed decreased;step length decreased;stride length decreased     Comment (Gait/Stairs) No Overt LOB's, gait abnormalility causing mild unbalance, small steps with amb        Stairs Training    [place], Stairs supervision     Assistive Device railing     Handrail Location (Stairs) both sides     Number of Stairs 10     Ascending Stairs Technique step-to-step     Descending Stairs Technique step-to-step     Comment Pt req'd cues for management of assistive device and cues for sequencing for BLE's. Recommending Supervision on stairs once d/c'd. Pt's family not present for treatment. Pt educated on how family can provide assistance / supervision for safety.        Balance    Static Sitting Balance WFL     Dynamic Sitting Balance WFL     Sit to Stand Dynamic Balance WFL     Static Standing Balance WFL     Dynamic Standing Balance mild impairment     Comment, Balance Mild unbalance, supervision for amb for safety        AM-PAC (TM) - Mobility (Current Function)    Turning from your back to your side while in a flat bed without using bedrails? 4 - None     Moving from lying on your back to sitting on the side of a flat bed without using bedrails? 4 - None     Moving to and from a bed to a chair? 4 - None     Standing up from a chair using your arms? 3 - A Little     To walk in a hospital room? 4 - None     Climbing 3-5 steps with a railing? 3 - A Little     AM-PAC (TM) Mobility Score 22        Assessment/Plan (PT)    Daily Outcome Statement Ax1, supervision for safe functional mobility. Training conducted with negotiation of gait w/ RW and transfer techniques + stair techniques. Pt. demo good carryover with instructions. Pt +  spouse educated on how family can provide assistance / supervision for safety. Recommending D/c home with spouse who is able to provide supervision + assistance as req’d.               PT Assessment/Plan      Most Recent Value   PT Recommended Discharge Disposition home with home health, home with assistance at 01/12/2022 1357   Anticipated Equipment Needs at Discharge (PT) --  [may benefit from RW] at 01/12/2022 1357   Patient/Family Therapy Goals Statement return to home at 01/12/2022 1357                    Educated on functional mobility with stairs. Educated spouse and pt on safe techniques for using shower chair with tub shower and techniques for stairs, with guarding techniques.  PT Goals      Most Recent Value   Bed Mobility Goal 1    Activity/Assistive Device sit to supine/supine to sit at 01/12/2022 1357   [place] modified independence at 01/12/2022 1357   Time Frame by discharge at 01/12/2022 1357   Progress/Outcome goal ongoing at 01/12/2022 1357   Transfer Goal 1    Activity/Assistive Device sit-to-stand/stand-to-sit, walker, front-wheeled  [use RW if needed for ambulation - otherwise, no (A) dev for ambulation] at 01/12/2022 1357   [place] modified independence at 01/12/2022 1357   Time Frame by discharge at 01/12/2022 1357   Progress/Outcome goal ongoing at 01/12/2022 1357   Gait Training Goal 1    Activity/Assistive Device gait (walking locomotion), assistive device use, walker, front-wheeled  [use RW if needed for ambulation - otherwise, no (A) dev for ambulation] at 01/12/2022 1357   [place] modified independence at 01/12/2022 1357   Distance 120 at 01/12/2022 1357   Time Frame by discharge at 01/12/2022 1357   Progress/Outcome goal ongoing at 01/12/2022 1357   Stairs Goal 1    Activity/Assistive Device ascending stairs, descending stairs, using handrail, left, step-over step, no assistive device at 01/12/2022 1357   [place] modified independence at 01/12/2022 1357   Number of  Stairs 12 at 01/12/2022 1357   Time Frame by discharge at 01/12/2022 1357   Progress/Outcome goal ongoing at 01/12/2022 6191

## 2022-01-13 NOTE — PLAN OF CARE
Problem: Adult Inpatient Plan of Care  Goal: Plan of Care Review  Flowsheets (Taken 1/13/2022 1100)  Progress: improving  Plan of Care Reviewed With: (Treatment Team) --  Outcome Summary: Per interdisciplinary rounds, pt is medically cleared for dc home today.     CHONG alerted pt will be discharged on dificid. CHONG contacted [facility] for cost verification and in stock availability. They inform they will call CHONG back with information. CHONG to follow.     Addm 11:53am: CHONG received a return call from Santy at [facility] who informs no prior auth needed, however cost is $1441.75. CHONG updated MD. SCHWARZ to follow.     Addm 12:30pm: CHONG contacted pt at bedside to discuss home care. [facility]. CHONG informs we are awaiting confirmation on medication and cost for CDIFF.    CHONG updated [facility] liaison of new referral with anticipated dc today. CHONG to follow.     Addm 12:54pm: CHONG received a return call from Santy at pharmacy who confirms vanco in stock and $11 copay cost. CHONG updated RN and MD. SCHWARZ to remain available for any additional dc needs.

## 2022-01-13 NOTE — PROGRESS NOTES
Hospital Medicine Service -  Daily Progress Note       SUBJECTIVE   Interval History: Pt seen and examined. Reports overall feels better. States his weakness resolved and diarrhea much improved. Denies any chest pain or shortness of breath. Noted to have prolonged QTC on admission but with correction of electrolytes recheck normal today.     OBJECTIVE      Vital signs in last 24 hours:  Temp:  [36.4 °C (97.6 °F)-37.2 °C (98.9 °F)] 36.8 °C (98.2 °F)  Heart Rate:  [53-70] 59  Resp:  [16-18] 16  BP: (137-182)/(73-98) 152/82    Intake/Output Summary (Last 24 hours) at 1/13/2022 1438  Last data filed at 1/13/2022 1200  Gross per 24 hour   Intake 600 ml   Output --   Net 600 ml       PHYSICAL EXAMINATION      Physical Exam  Constitutional:       General: He is not in acute distress.     Appearance: Normal appearance. He is obese. He is not toxic-appearing.   HENT:      Head: Normocephalic and atraumatic.   Eyes:      Conjunctiva/sclera: Conjunctivae normal.   Cardiovascular:      Rate and Rhythm: Normal rate and regular rhythm.      Heart sounds: Normal heart sounds.   Pulmonary:      Effort: Pulmonary effort is normal.      Breath sounds: Normal breath sounds.   Abdominal:      General: Bowel sounds are normal. There is no distension.      Palpations: Abdomen is soft.      Tenderness: There is no abdominal tenderness.   Skin:     General: Skin is warm and dry.   Neurological:      Mental Status: He is alert and oriented to person, place, and time.   Psychiatric:         Mood and Affect: Mood normal.         Behavior: Behavior normal.          LINES, CATHETERS, DRAINS, AIRWAYS, AND WOUNDS   Lines, Drains, and Airways:  Wounds (agree with documentation and present on admission):  Peripheral IV (Adult) 01/10/22 Left Antecubital (Active)   Number of days: 2         Comments:      LABS / IMAGING / TELE      Labs  Results from last 7 days   Lab Units 01/13/22  0416 01/12/22  0439 01/10/22  2000   WBC K/uL 7.08 8.15 15.28*    HEMOGLOBIN g/dL 12.2* 12.5* 14.4   HEMATOCRIT % 35.5* 36.7* 41.2   PLATELETS K/uL 199 194 244     Results from last 7 days   Lab Units 01/13/22  1245 01/13/22  0416 01/12/22  0439 01/10/22  2000   SODIUM mEQ/L  --  140 136 133*   POTASSIUM mEQ/L 3.6 3.1* 2.9* 3.2*   CHLORIDE mEQ/L  --  107 103 97*   CO2 mEQ/L  --  23 22 24   BUN mg/dL  --  16 16 12   CREATININE mg/dL  --  0.9 1.0 0.9   CALCIUM mg/dL  --  8.5* 8.4* 9.1   GLUCOSE mg/dL  --  104* 106* 164*       SARS-CoV-2 (COVID-19) (no units)   Date/Time Value   01/11/2022 0021 Negative       Imaging  I have independently reviewed the pertinent imaging from the last 24 hrs.    ECG/Telemetry  I have independently reviewed the telemetry. Significant findings include occasional bradycardia.    ASSESSMENT AND PLAN      C. difficile colitis  Assessment & Plan  Positive on PCR  Continue oral vanco  Appreciate ID input    Hyponatremia  Assessment & Plan  Completed IVF  Improved at 140  Follow BMP      Diarrhea  Assessment & Plan  C diff positive on PCR and also stool culture positive for Campylobacter.  Positive blood cultures  Continue oral vancomycin  Starting azithromycin for his Campylobacter infection  Holding Metformin   Monitor electrolytes -replete low K as needed  Appreciate ID input    Fever  Assessment & Plan  Likely secondary to cdiff infection and stool culture also positive for Campylobacter  UA normal  Has remained afebrile since admission  Tylenol prn  1 blood culture came positive with coag negative staph which is likely contaminant. This was discussed with ID who agreed.  Per ID recommendations plan for azithromycin 500 mg for 3 days for his Campylobacter infection.  For C. difficile oral vancomycin for a 14-day course recommended. Low co-pay for oral vancomycin.  Deficit cost was verified and it was deemed too expensive for patient.    Fall  Assessment & Plan  Mechanical falls due to severe OA R Knee and dehydration  Completed IVF  Fall  precautions  PT/OT  Follow up with ortho outpatient, due for surgery in 2 weeks     Primary open-angle glaucoma, bilateral, indeterminate stage  Assessment & Plan  Continue latanoprost, Alphagan eyedrops      Hypokalemia  Assessment & Plan  K 3.1 this morning, repleted  Mag was normal  Follow BMP    Type 2 diabetes mellitus (CMS/HCC)  Assessment & Plan  Hold metformin due to diarrhea, resume on discharge  Monitor Accu-Cheks    Cover with sliding scale insulin as needed     Osteoarthritis of knee  Assessment & Plan  Patient scheduled for right knee surgery in 2 weeks  PT/OT eval - devyn recs. Home health recommended  Follow-up with Ortho outpatient      Hypertension, benign  Assessment & Plan  Continue losartan, metoprolol and felodipine  BP still elevated at times  Monitor BP and will adjust meds as needed       * Weakness  Assessment & Plan  Resolved  Secondary to fever, dehydration  IVF complete   Has remained afebrile now  Fall precautions        VTE Assessment: [place] VTE Score: 4  VTE Prophylaxis:  Current anticoagulants:  enoxaparin (LOVENOX) syringe 40 mg, subcutaneous, Daily (6p)      Code Status: Full Code      Estimated Discharge Date: 1/13/2022       Disposition Planning: Plan for d/c once medically stable, likely 1-2 days.      Reggie Savage MD  1/13/2022

## 2022-01-14 VITALS
RESPIRATION RATE: 16 BRPM | HEART RATE: 50 BPM | BODY MASS INDEX: 30.12 KG/M2 | TEMPERATURE: 97.4 F | DIASTOLIC BLOOD PRESSURE: 68 MMHG | OXYGEN SATURATION: 95 % | WEIGHT: 222.4 LBS | HEIGHT: 72 IN | SYSTOLIC BLOOD PRESSURE: 148 MMHG

## 2022-01-14 PROBLEM — A04.5 CAMPYLOBACTER DIARRHEA: Status: ACTIVE | Noted: 2022-01-14

## 2022-01-14 LAB
ANION GAP SERPL CALC-SCNC: 10 MEQ/L (ref 3–15)
BUN SERPL-MCNC: 21 MG/DL (ref 8–20)
CALCIUM SERPL-MCNC: 8.9 MG/DL (ref 8.9–10.3)
CHLORIDE SERPL-SCNC: 104 MEQ/L (ref 98–109)
CO2 SERPL-SCNC: 23 MEQ/L (ref 22–32)
CREAT SERPL-MCNC: 1.2 MG/DL (ref 0.8–1.3)
GFR SERPL CREATININE-BSD FRML MDRD: 59.3 ML/MIN/1.73M*2
GLUCOSE BLD-MCNC: 100 MG/DL (ref 70–99)
GLUCOSE BLD-MCNC: 116 MG/DL (ref 70–99)
GLUCOSE SERPL-MCNC: 115 MG/DL (ref 70–99)
POCT TEST: ABNORMAL
POCT TEST: ABNORMAL
POTASSIUM SERPL-SCNC: 3.2 MEQ/L (ref 3.6–5.1)
SODIUM SERPL-SCNC: 137 MEQ/L (ref 136–144)

## 2022-01-14 PROCEDURE — 99239 HOSP IP/OBS DSCHRG MGMT >30: CPT | Performed by: HOSPITALIST

## 2022-01-14 PROCEDURE — 80048 BASIC METABOLIC PNL TOTAL CA: CPT | Performed by: HOSPITALIST

## 2022-01-14 PROCEDURE — 36415 COLL VENOUS BLD VENIPUNCTURE: CPT | Performed by: HOSPITALIST

## 2022-01-14 PROCEDURE — 63700000 HC SELF-ADMINISTRABLE DRUG: Performed by: HOSPITALIST

## 2022-01-14 PROCEDURE — 93005 ELECTROCARDIOGRAM TRACING: CPT | Performed by: HOSPITALIST

## 2022-01-14 RX ORDER — AZITHROMYCIN 250 MG/1
500 TABLET, FILM COATED ORAL DAILY
Qty: 2 TABLET | Refills: 0 | Status: SHIPPED | OUTPATIENT
Start: 2022-01-15 | End: 2022-01-16

## 2022-01-14 RX ORDER — METFORMIN HYDROCHLORIDE 500 MG/1
500 TABLET ORAL 2 TIMES DAILY WITH MEALS
Qty: 60 TABLET | Refills: 0 | Status: SHIPPED | OUTPATIENT
Start: 2022-01-14 | End: 2022-01-14 | Stop reason: HOSPADM

## 2022-01-14 RX ORDER — VANCOMYCIN HYDROCHLORIDE 125 MG/1
125 CAPSULE ORAL 4 TIMES DAILY
Qty: 44 CAPSULE | Refills: 0 | Status: SHIPPED | OUTPATIENT
Start: 2022-01-14 | End: 2022-01-25

## 2022-01-14 RX ORDER — LOSARTAN POTASSIUM 100 MG/1
100 TABLET ORAL DAILY
Qty: 30 TABLET | Refills: 0 | Status: SHIPPED | OUTPATIENT
Start: 2022-01-15 | End: 2022-01-25 | Stop reason: SDUPTHER

## 2022-01-14 RX ORDER — LOSARTAN POTASSIUM 100 MG/1
100 TABLET ORAL DAILY
Status: DISCONTINUED | OUTPATIENT
Start: 2022-01-14 | End: 2022-01-14 | Stop reason: HOSPADM

## 2022-01-14 RX ORDER — SODIUM CHLORIDE 9 MG/ML
INJECTION, SOLUTION INTRAVENOUS CONTINUOUS
Status: DISCONTINUED | OUTPATIENT
Start: 2022-01-14 | End: 2022-01-14 | Stop reason: HOSPADM

## 2022-01-14 RX ORDER — POTASSIUM CHLORIDE 750 MG/1
20 TABLET, EXTENDED RELEASE ORAL DAILY
Qty: 6 TABLET | Refills: 0 | Status: SHIPPED | OUTPATIENT
Start: 2022-01-14 | End: 2022-05-17 | Stop reason: ALTCHOICE

## 2022-01-14 RX ORDER — POTASSIUM CHLORIDE 750 MG/1
40 TABLET, FILM COATED, EXTENDED RELEASE ORAL ONCE
Status: COMPLETED | OUTPATIENT
Start: 2022-01-14 | End: 2022-01-14

## 2022-01-14 RX ADMIN — VANCOMYCIN HYDROCHLORIDE 125 MG: KIT at 12:10

## 2022-01-14 RX ADMIN — METOPROLOL TARTRATE 50 MG: 50 TABLET, FILM COATED ORAL at 08:25

## 2022-01-14 RX ADMIN — FELODIPINE 10 MG: 5 TABLET, FILM COATED, EXTENDED RELEASE ORAL at 08:24

## 2022-01-14 RX ADMIN — VANCOMYCIN HYDROCHLORIDE 125 MG: KIT at 01:28

## 2022-01-14 RX ADMIN — AZITHROMYCIN 500 MG: 250 TABLET, FILM COATED ORAL at 13:44

## 2022-01-14 RX ADMIN — FINASTERIDE 5 MG: 5 TABLET, FILM COATED ORAL at 08:25

## 2022-01-14 RX ADMIN — LOSARTAN POTASSIUM 100 MG: 100 TABLET, FILM COATED ORAL at 08:24

## 2022-01-14 RX ADMIN — POTASSIUM CHLORIDE 40 MEQ: 750 TABLET, FILM COATED, EXTENDED RELEASE ORAL at 08:24

## 2022-01-14 RX ADMIN — VANCOMYCIN HYDROCHLORIDE 125 MG: KIT at 06:33

## 2022-01-14 RX ADMIN — BRIMONIDINE TARTRATE 1 DROP: 2 SOLUTION OPHTHALMIC at 08:24

## 2022-01-14 NOTE — ASSESSMENT & PLAN NOTE
C diff positive on PCR and also stool culture positive for Campylobacter.  Positive blood culture likely contamination per ID  Continue oral vancomycin  Starting azithromycin for his Campylobacter infection  Holding Metformin   Monitor electrolytes -replete low K as needed  Appreciate ID input  
Completed IVF  Improved at 137  Follow BMP    
Continue latanoprost, Alphagan eyedrops    
Continue losartan, metoprolol and felodipine  BP still elevated at times  Monitor BP and will adjust meds as needed     
Hold metformin due to diarrhea, resume on discharge  Monitor Accu-Cheks    Cover with sliding scale insulin as needed   
K 3.2 this morning, repleted  Follow BMP  
Likely secondary to cdiff infection and stool culture also positive for Campylobacter  UA normal  Has remained afebrile since admission  Tylenol prn  1 blood culture came positive with coag negative staph which is likely contaminant. This was discussed with ID who agreed.  Per ID recommendations plan for azithromycin 500 mg for 3 days for his Campylobacter infection.  For C. difficile oral vancomycin for a 14-day course recommended. Low co-pay for oral vancomycin.  Deficit cost was verified and it was deemed too expensive for patient.   
Mechanical falls due to severe OA R Knee and dehydration  Completed IVF  Fall precautions  PT/OT  Follow up with ortho outpatient, due for surgery in 2 weeks   
Patient scheduled for right knee surgery in 2 weeks  PT/OT eval - appreciate recs. Home health recommended  Follow-up with Ortho outpatient    
Positive on PCR  Continue oral vanco 14 days total, covered low cost on discharge  Appreciate ID input   
Resolved  Secondary to fever, dehydration  IVF complete   Has remained afebrile now  Fall precautions   
Stool cultures positive  Azithromycin 500 mg for 3 days  QT rechecked prior to starting, wnl  ID following   
n  
normal...

## 2022-01-14 NOTE — PROGRESS NOTES
[facility]: Spoke with Pt, Pt agreeable to home SN and PT. Referral completed for 1/15/22 home care contact. Ashleigh Prado RN

## 2022-01-14 NOTE — PATIENT CARE CONFERENCE
Care Progression Rounds Note  Date: 1/14/2022  Time: 10:48 AM     Patient Name: Aneesh Brito     Medical Record Number: [id number]   YOB: 1950  Sex: Male      Room/Bed: 0355    Admitting Diagnosis: Weakness [R53.1]  Fall, initial encounter [W19.XXXA]  Fever, unspecified fever cause [R50.9]   Admit Date/Time: 1/10/2022 11:34 PM    Primary Diagnosis: Weakness  Principal Problem: Weakness    GMLOS: 2.6  Anticipated Discharge Date: 1/14/2022    AM-PAC:  Mobility Score: 22    Discharge Planning:  Current Living Arrangements: home  Concerns to be Addressed: care coordination/care conferences,discharge planning  Anticipated Discharge Disposition: home with home health  Type of Home Care Services: home PT,nursing    Barriers to Discharge:  Consultant recommendations pending,Test pending    Comments:  possible d/c, labs at noon and EKG    Participants:  nursing,social work/services,,advanced practice provider

## 2022-01-14 NOTE — PLAN OF CARE
Problem: Adult Inpatient Plan of Care  Goal: Plan of Care Review  Flowsheets (Taken 1/14/2022 1101)  Progress: improving  Plan of Care Reviewed With: (Treatment Team) --  Outcome Summary: Per interdisciplinary rounds, plan for dc later today following repeat K and EKG at noon. Medication verified at pharmacy per previous encounter. [facility], left vm. SW to remain available for any additional dc needs.

## 2022-01-14 NOTE — DISCHARGE INSTRUCTIONS
You were admitted to [facility] on 1/10 with weakness, multiple falls and diarrhea. While admitted you were found to have an infection named Clostridium difficile and Campylobacter. You were treated with oral antibiotics, Vancomycin and Azithromycin. You should be sure to complete both courses of antibiotic as outpatient.

## 2022-01-15 LAB
ATRIAL RATE: 49
ATRIAL RATE: 68
P AXIS: 79
P AXIS: 89
PR INTERVAL: 182
PR INTERVAL: 190
QRS DURATION: 110
QRS DURATION: 116
QT INTERVAL: 418
QT INTERVAL: 456
QTC CALCULATION(BAZETT): 411
QTC CALCULATION(BAZETT): 444
R AXIS: -4
R AXIS: -7
T WAVE AXIS: -13
T WAVE AXIS: -14
VENTRICULAR RATE: 49
VENTRICULAR RATE: 68

## 2022-01-16 LAB
BACTERIA BLD CULT: ABNORMAL
BACTERIA BLD CULT: ABNORMAL
BACTERIA BLD CULT: NORMAL
GRAM STN SPEC: ABNORMAL

## 2022-01-17 ENCOUNTER — PATIENT OUTREACH (OUTPATIENT)
Dept: CASE MANAGEMENT | Facility: CLINIC | Age: 72
End: 2022-01-17
Payer: MEDICARE

## 2022-01-17 ASSESSMENT — ENCOUNTER SYMPTOMS
NEUROLOGICAL NEGATIVE: 1
EYES NEGATIVE: 1
ALLERGIC/IMMUNOLOGIC NEGATIVE: 1
HEMATOLOGIC/LYMPHATIC NEGATIVE: 1
PSYCHIATRIC NEGATIVE: 1
GASTROINTESTINAL NEGATIVE: 1
CONSTITUTIONAL NEGATIVE: 1
CARDIOVASCULAR NEGATIVE: 1
RESPIRATORY NEGATIVE: 1
MUSCULOSKELETAL NEGATIVE: 1
ENDOCRINE NEGATIVE: 1

## 2022-01-19 ENCOUNTER — DOCUMENTATION (OUTPATIENT)
Dept: FAMILY MEDICINE | Facility: CLINIC | Age: 72
End: 2022-01-19
Payer: MEDICARE

## 2022-01-19 NOTE — PROGRESS NOTES
Coding Clarification ([facility]) - [facility]  Note       DATE: 2022    RE: Aneesh VOGEL Brito  : 1950      Under the direction of Irving Palacios MD, the following adjustments were made to this patients Problem List:          Specified Code: E11.22 Code Description: Type 2 diabetes mellitus with diabetic chronic kidney disease   Clarification Type EMR System Encounter Type Date of Service Provider   Code Specificity Epic Claims Note 2021 IRVING PALACIOS   Rationale:  Claims data indicate Dr. IRVING PALACIOS submitted E11.22 in association with clinical care rendered on DOS 2021 No Clinical data available for review by   Higher specificity for E11.9 from Problem List is available to be E11.22       Original Code: E11.9

## 2022-01-21 ENCOUNTER — CLINICAL SUPPORT (OUTPATIENT)
Dept: FAMILY MEDICINE | Facility: CLINIC | Age: 72
End: 2022-01-21
Payer: MEDICARE

## 2022-01-21 DIAGNOSIS — R53.1 WEAKNESS: Primary | ICD-10-CM

## 2022-01-21 LAB
ANION GAP SERPL CALC-SCNC: 12 MEQ/L (ref 3–15)
BUN SERPL-MCNC: 13 MG/DL (ref 8–20)
CALCIUM SERPL-MCNC: 9.2 MG/DL (ref 8.9–10.3)
CHLORIDE SERPL-SCNC: 100 MEQ/L (ref 98–109)
CO2 SERPL-SCNC: 26 MEQ/L (ref 22–32)
CREAT SERPL-MCNC: 0.9 MG/DL (ref 0.8–1.3)
GFR SERPL CREATININE-BSD FRML MDRD: >60 ML/MIN/1.73M*2
GLUCOSE SERPL-MCNC: 111 MG/DL (ref 70–99)
POTASSIUM SERPL-SCNC: 3.9 MEQ/L (ref 3.6–5.1)
SODIUM SERPL-SCNC: 138 MEQ/L (ref 136–144)

## 2022-01-21 PROCEDURE — 36415 COLL VENOUS BLD VENIPUNCTURE: CPT | Performed by: FAMILY MEDICINE

## 2022-01-21 PROCEDURE — 80048 BASIC METABOLIC PNL TOTAL CA: CPT | Performed by: FAMILY MEDICINE

## 2022-01-24 ENCOUNTER — TELEPHONE (OUTPATIENT)
Dept: FAMILY MEDICINE | Facility: CLINIC | Age: 72
End: 2022-01-24
Payer: MEDICARE

## 2022-01-25 ENCOUNTER — TELEPHONE (OUTPATIENT)
Dept: FAMILY MEDICINE | Facility: CLINIC | Age: 72
End: 2022-01-25
Payer: MEDICARE

## 2022-01-25 DIAGNOSIS — R35.1 BENIGN PROSTATIC HYPERPLASIA WITH NOCTURIA: Primary | ICD-10-CM

## 2022-01-25 DIAGNOSIS — N18.1 TYPE 2 DIABETES MELLITUS WITH STAGE 1 CHRONIC KIDNEY DISEASE, WITHOUT LONG-TERM CURRENT USE OF INSULIN (CMS/HCC)  (CMS/HCC): ICD-10-CM

## 2022-01-25 DIAGNOSIS — I10 HYPERTENSION, BENIGN: ICD-10-CM

## 2022-01-25 DIAGNOSIS — N40.1 BENIGN PROSTATIC HYPERPLASIA WITH NOCTURIA: Primary | ICD-10-CM

## 2022-01-25 DIAGNOSIS — E11.22 TYPE 2 DIABETES MELLITUS WITH STAGE 1 CHRONIC KIDNEY DISEASE, WITHOUT LONG-TERM CURRENT USE OF INSULIN (CMS/HCC)  (CMS/HCC): ICD-10-CM

## 2022-01-25 RX ORDER — LOSARTAN POTASSIUM 100 MG/1
100 TABLET ORAL DAILY
Qty: 90 TABLET | Refills: 3 | Status: SHIPPED | OUTPATIENT
Start: 2022-01-25 | End: 2022-12-29

## 2022-01-25 RX ORDER — METFORMIN HYDROCHLORIDE 500 MG/1
500 TABLET ORAL 2 TIMES DAILY WITH MEALS
Qty: 180 TABLET | Refills: 3 | Status: SHIPPED | OUTPATIENT
Start: 2022-01-25 | End: 2023-02-22 | Stop reason: SDUPTHER

## 2022-01-25 RX ORDER — FINASTERIDE 5 MG/1
5 TABLET, FILM COATED ORAL DAILY
Qty: 90 TABLET | Refills: 3 | Status: SHIPPED | OUTPATIENT
Start: 2022-01-25 | End: 2022-10-27

## 2022-01-25 RX ORDER — METOPROLOL TARTRATE 50 MG/1
50 TABLET ORAL 2 TIMES DAILY
Qty: 90 TABLET | Refills: 3 | Status: SHIPPED | OUTPATIENT
Start: 2022-01-25 | End: 2022-08-16 | Stop reason: SDUPTHER

## 2022-02-14 ENCOUNTER — APPOINTMENT (EMERGENCY)
Dept: RADIOLOGY | Facility: HOSPITAL | Age: 72
End: 2022-02-14
Attending: EMERGENCY MEDICINE
Payer: MEDICARE

## 2022-02-14 ENCOUNTER — HOSPITAL ENCOUNTER (EMERGENCY)
Facility: HOSPITAL | Age: 72
Discharge: HOME | End: 2022-02-14
Attending: EMERGENCY MEDICINE
Payer: MEDICARE

## 2022-02-14 VITALS
SYSTOLIC BLOOD PRESSURE: 203 MMHG | RESPIRATION RATE: 22 BRPM | TEMPERATURE: 97.5 F | HEART RATE: 77 BPM | OXYGEN SATURATION: 100 % | DIASTOLIC BLOOD PRESSURE: 92 MMHG

## 2022-02-14 DIAGNOSIS — S00.81XA ABRASION OF FOREHEAD, INITIAL ENCOUNTER: ICD-10-CM
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
## 2022-02-14 DIAGNOSIS — S09.90XA CLOSED HEAD INJURY, INITIAL ENCOUNTER: ICD-10-CM

## 2022-02-14 DIAGNOSIS — S80.02XA CONTUSION OF LEFT KNEE, INITIAL ENCOUNTER: ICD-10-CM

## 2022-02-14 PROCEDURE — G1004 CDSM NDSC: HCPCS

## 2022-02-14 PROCEDURE — 3E0334Z INTRODUCTION OF SERUM, TOXOID AND VACCINE INTO PERIPHERAL VEIN, PERCUTANEOUS APPROACH: ICD-10-PCS | Performed by: EMERGENCY MEDICINE

## 2022-02-14 PROCEDURE — 99284 EMERGENCY DEPT VISIT MOD MDM: CPT | Mod: 25

## 2022-02-14 PROCEDURE — 63600000 HC DRUGS/DETAIL CODE: Performed by: EMERGENCY MEDICINE

## 2022-02-14 PROCEDURE — 73564 X-RAY EXAM KNEE 4 OR MORE: CPT | Mod: LT

## 2022-02-14 PROCEDURE — 90715 TDAP VACCINE 7 YRS/> IM: CPT | Performed by: EMERGENCY MEDICINE

## 2022-02-14 PROCEDURE — 63700000 HC SELF-ADMINISTRABLE DRUG: Performed by: EMERGENCY MEDICINE

## 2022-02-14 PROCEDURE — 90471 IMMUNIZATION ADMIN: CPT | Performed by: EMERGENCY MEDICINE

## 2022-02-14 PROCEDURE — 70450 CT HEAD/BRAIN W/O DYE: CPT | Mod: ME

## 2022-02-14 RX ADMIN — BACITRACIN, NEOMYCIN, POLYMYXIN B 1 APPLICATION.: 400; 3.5; 5 OINTMENT TOPICAL at 14:18

## 2022-02-14 RX ADMIN — TETANUS TOXOID, REDUCED DIPHTHERIA TOXOID AND ACELLULAR PERTUSSIS VACCINE, ADSORBED 0.5 ML: 5; 2.5; 8; 8; 2.5 SUSPENSION INTRAMUSCULAR at 14:17

## 2022-02-14 ASSESSMENT — ENCOUNTER SYMPTOMS
FACIAL ASYMMETRY: 0
NECK STIFFNESS: 0
BACK PAIN: 0
NECK PAIN: 0
CHEST TIGHTNESS: 0
ABDOMINAL PAIN: 0
LIGHT-HEADEDNESS: 0
WEAKNESS: 0
LOSS OF CONSCIOUSNESS: 0
NAUSEA: 0
DIFFICULTY BREATHING: 0
CHILLS: 0
SHORTNESS OF BREATH: 0
VOMITING: 0
HEADACHES: 0
SPEECH DIFFICULTY: 0
FEVER: 0
WOUND: 1
DIZZINESS: 0

## 2022-02-14 NOTE — ED PROVIDER NOTES
Emergency Medicine Note  HPI   HISTORY OF PRESENT ILLNESS       History provided by:  Patient  Trauma  Mechanism of injury: fall  Injury location: face and leg  Injury location detail: R eyebrow and L knee  Incident location: home  Time since incident: 1 hour  Arrived directly from scene: yes     Fall:       Fall occurred: slipped walking to car.       Impact surface: concrete       Point of impact: knees and head       Suspicion of alcohol use: no       Suspicion of drug use: no    EMS/PTA data:       Ambulatory at scene: yes       Blood loss: minimal       Responsiveness: alert       Oriented to: person, place, situation and time       Loss of consciousness: no       Amnesic to event: no       Breathing interventions: none       IV access: none       IO access: none       Fluids administered: none       Cardiac interventions: none       Medications administered: none       Immobilization: C-collar    Current symptoms:       Pain scale: 4/10       Pain quality: dull       Pain timing: constant       Associated symptoms:             Denies abdominal pain, back pain, chest pain, difficulty breathing, headache, loss of consciousness, nausea, neck pain and vomiting.     Relevant PMH:       Pharmacological risk factors:             No anticoagulation therapy or antiplatelet therapy.        Tetanus status: unknown        Patient History   PAST HISTORY     Reviewed from Nursing Triage:       Past Medical History:   Diagnosis Date   • Diverticulosis of colon    • Enlarged prostate with lower urinary tract symptoms (LUTS)    • Glaucoma    • Hypertension    • Melanoma of skin (CMS/HCC)    • Osteoarthritis of knee    • Overweight    • Spinal stenosis of lumbar region    • Type 2 diabetes mellitus (CMS/HCC)        Past Surgical History:   Procedure Laterality Date   • APPENDECTOMY     • COLONOSCOPY  01/10/2012    diverticulosis   • HIP ARTHROPLASTY Bilateral    • KNEE ARTHROPLASTY Left        Family History   Problem Relation  Age of Onset   • Breast cancer Biological Mother        Social History     Tobacco Use   • Smoking status: Never Smoker   • Smokeless tobacco: Never Used   Substance Use Topics   • Alcohol use: Yes     Comment: BEER, 1 CONSUMED DAILY   • Drug use: Never         Review of Systems   REVIEW OF SYSTEMS     Review of Systems   Constitutional: Negative for chills and fever.   Respiratory: Negative for chest tightness and shortness of breath.    Cardiovascular: Negative for chest pain.   Gastrointestinal: Negative for abdominal pain, nausea and vomiting.   Musculoskeletal: Negative for back pain, neck pain and neck stiffness.   Skin: Positive for wound.   Neurological: Negative for dizziness, loss of consciousness, facial asymmetry, speech difficulty, weakness, light-headedness and headaches.         VITALS     ED Vitals    Date/Time Temp Pulse Resp BP SpO2 [facility]   02/14/22 1052 36.4 °C (97.5 °F) 77 22 203/92 100 % GDD        Pulse Ox %: 100 % (02/14/22 1106)  Pulse Ox Interpretation: Normal (02/14/22 1106)           Physical Exam   PHYSICAL EXAM     Physical Exam  Vitals and nursing note reviewed.   Constitutional:       Interventions: Cervical collar in place.   HENT:      Head: Normocephalic. No raccoon eyes or Elizabeth's sign.      Jaw: No trismus, tenderness, swelling, pain on movement or malocclusion.        Right Ear: No hemotympanum.      Left Ear: No hemotympanum.   Eyes:      Extraocular Movements: Extraocular movements intact.      Conjunctiva/sclera: Conjunctivae normal.   Cardiovascular:      Rate and Rhythm: Normal rate and regular rhythm.      Heart sounds: Normal heart sounds.   Pulmonary:      Effort: Pulmonary effort is normal.      Breath sounds: Normal breath sounds.   Abdominal:      Palpations: Abdomen is soft.      Tenderness: There is no abdominal tenderness.   Musculoskeletal:      Right shoulder: Normal. No tenderness or bony tenderness. Normal range of motion.      Left shoulder: Normal. No  tenderness or bony tenderness. Normal range of motion.      Right elbow: Normal. Normal range of motion. No tenderness.      Left elbow: Normal. Normal range of motion. No tenderness.      Right wrist: Normal. No tenderness, bony tenderness or snuff box tenderness. Normal range of motion. Normal pulse.      Left wrist: Normal. No tenderness, bony tenderness or snuff box tenderness. Normal range of motion. Normal pulse.      Right hand: Normal. No tenderness or bony tenderness. Normal range of motion. Normal strength. Normal sensation. There is no disruption of two-point discrimination. Normal capillary refill. Normal pulse.      Left hand: Normal. No tenderness or bony tenderness. Normal range of motion. Normal strength. Normal sensation. There is no disruption of two-point discrimination. Normal capillary refill. Normal pulse.      Cervical back: Normal range of motion and neck supple. No spinous process tenderness (no midline tenderness or step off) or muscular tenderness.      Thoracic back: Normal. No tenderness or bony tenderness. Normal range of motion.      Lumbar back: Normal. No tenderness or bony tenderness. Normal range of motion.      Right hip: Normal. No tenderness or bony tenderness. Normal range of motion.      Left hip: Normal. No tenderness or bony tenderness. Normal range of motion.      Right knee: Normal range of motion. No tenderness.      Left knee: Swelling and ecchymosis present. Normal range of motion. Tenderness (mild over patella) present. No LCL laxity, MCL laxity, ACL laxity or PCL laxity.Normal pulse.      Right ankle: Normal. No tenderness. Normal range of motion.      Left ankle: Normal. No tenderness. Normal range of motion.        Legs:    Skin:     General: Skin is warm and dry.   Neurological:      Mental Status: He is alert and oriented to person, place, and time.      Cranial Nerves: Cranial nerves are intact.      Sensory: Sensation is intact.      Motor: Motor function is  intact.      Coordination: Coordination is intact.   Psychiatric:         Mood and Affect: Mood normal.           PROCEDURES     Procedures     DATA     Results     None          Imaging Results          X-RAY KNEE LEFT 4+ VIEWS (Final result)  Result time 02/14/22 12:38:44    Final result                 Impression:    IMPRESSION: There is a left knee arthroplasty. There are no acute fractures.  Normal alignment.                 Narrative:    CLINICAL HISTORY: fall, patellar tenderness    COMPARISON: Left knee radiographs 9/8/2007    COMMENT:  TECHNIQUE: Five views of the left knee.                               CT HEAD WITHOUT IV CONTRAST (Final result)  Result time 02/14/22 12:15:07    Final result                 Impression:    IMPRESSION:  1.  No acute intracranial hemorrhage.  2.  No facial fracture.  3.  No evidence of acute fracture or traumatic malalignment of the cervical  spine.    TECHNIQUE:  Unenhanced axial CT images of the brain were obtained. Sagittal and coronal  reformats were performed.  Unenhanced axial CT images of the facial bones were obtained. Sagittal and  coronal reformats were performed.  Unenhanced axial CT image of the cervical spine were obtained. Sagittal and  coronal reformats were performed.    CT DOSE:  One or more dose reduction techniques (e.g. automated exposure  control, adjustment of the mA and/or kV according to patient size, use of  iterative reconstruction technique) utilized for this examination.    COMMENT:  BRAIN:  Ventricles and sulci are prominent, in keeping with age-related cerebral volume  loss. No extra axial collection.  No intracranial hemorrhage.  No mass effect or  edema.  No large, acute territorial infarction. Patchy areas of white matter  hypoattenuation are nonspecific but most likely due to microangiopathy. There  are vascular consultations at the skull base. There are no depressed skull  fractures.    FACIAL BONES:  There is right preseptal soft tissue  swelling and likely a small hematoma. The  orbits are within normal limits. There are bilateral lens replacements.  Mild mucosal thickening in the right maxillary sinus and ethmoid air cells. The  remainder of the visualized paranasal sinuses are well aerated.  There is no evidence of facial fractures.  The temporal mandibular joints are orthotopic.    CERVICAL SPINE:  Posterior fixation from C4 to C7 with associated laminectomies. Vertebral body  heights are maintained. Normal alignment. Unremarkable paraspinal soft tissues.    Multilevel degenerative spondylosis with no high-grade osseous encroachment on  central canal. There are large ridging anterior osteophytes.                       Narrative:      CLINICAL HISTORY: Neck trauma (Age >= 65y)    COMPARISON:  1412, head CT 5/13/2012, MRI of the cervical spine 5/14/2012, most  recent radiograph of the cervical spine 5/5/2014, CT of the orbits 9/1/2009                               CT FACIAL BONES WITHOUT IV CONTRAST (Final result)  Result time 02/14/22 12:15:07    Final result                 Impression:    IMPRESSION:  1.  No acute intracranial hemorrhage.  2.  No facial fracture.  3.  No evidence of acute fracture or traumatic malalignment of the cervical  spine.    TECHNIQUE:  Unenhanced axial CT images of the brain were obtained. Sagittal and coronal  reformats were performed.  Unenhanced axial CT images of the facial bones were obtained. Sagittal and  coronal reformats were performed.  Unenhanced axial CT image of the cervical spine were obtained. Sagittal and  coronal reformats were performed.    CT DOSE:  One or more dose reduction techniques (e.g. automated exposure  control, adjustment of the mA and/or kV according to patient size, use of  iterative reconstruction technique) utilized for this examination.    COMMENT:  BRAIN:  Ventricles and sulci are prominent, in keeping with age-related cerebral volume  loss. No extra axial collection.  No intracranial  hemorrhage.  No mass effect or  edema.  No large, acute territorial infarction. Patchy areas of white matter  hypoattenuation are nonspecific but most likely due to microangiopathy. There  are vascular consultations at the skull base. There are no depressed skull  fractures.    FACIAL BONES:  There is right preseptal soft tissue swelling and likely a small hematoma. The  orbits are within normal limits. There are bilateral lens replacements.  Mild mucosal thickening in the right maxillary sinus and ethmoid air cells. The  remainder of the visualized paranasal sinuses are well aerated.  There is no evidence of facial fractures.  The temporal mandibular joints are orthotopic.    CERVICAL SPINE:  Posterior fixation from C4 to C7 with associated laminectomies. Vertebral body  heights are maintained. Normal alignment. Unremarkable paraspinal soft tissues.    Multilevel degenerative spondylosis with no high-grade osseous encroachment on  central canal. There are large ridging anterior osteophytes.                       Narrative:      CLINICAL HISTORY: Neck trauma (Age >= 65y)    COMPARISON:  1412, head CT 5/13/2012, MRI of the cervical spine 5/14/2012, most  recent radiograph of the cervical spine 5/5/2014, CT of the orbits 9/1/2009                               CT CERVICAL SPINE WITHOUT IV CONTRAST (Final result)  Result time 02/14/22 12:15:07    Final result                 Impression:    IMPRESSION:  1.  No acute intracranial hemorrhage.  2.  No facial fracture.  3.  No evidence of acute fracture or traumatic malalignment of the cervical  spine.    TECHNIQUE:  Unenhanced axial CT images of the brain were obtained. Sagittal and coronal  reformats were performed.  Unenhanced axial CT images of the facial bones were obtained. Sagittal and  coronal reformats were performed.  Unenhanced axial CT image of the cervical spine were obtained. Sagittal and  coronal reformats were performed.    CT DOSE:  One or more dose  reduction techniques (e.g. automated exposure  control, adjustment of the mA and/or kV according to patient size, use of  iterative reconstruction technique) utilized for this examination.    COMMENT:  BRAIN:  Ventricles and sulci are prominent, in keeping with age-related cerebral volume  loss. No extra axial collection.  No intracranial hemorrhage.  No mass effect or  edema.  No large, acute territorial infarction. Patchy areas of white matter  hypoattenuation are nonspecific but most likely due to microangiopathy. There  are vascular consultations at the skull base. There are no depressed skull  fractures.    FACIAL BONES:  There is right preseptal soft tissue swelling and likely a small hematoma. The  orbits are within normal limits. There are bilateral lens replacements.  Mild mucosal thickening in the right maxillary sinus and ethmoid air cells. The  remainder of the visualized paranasal sinuses are well aerated.  There is no evidence of facial fractures.  The temporal mandibular joints are orthotopic.    CERVICAL SPINE:  Posterior fixation from C4 to C7 with associated laminectomies. Vertebral body  heights are maintained. Normal alignment. Unremarkable paraspinal soft tissues.    Multilevel degenerative spondylosis with no high-grade osseous encroachment on  central canal. There are large ridging anterior osteophytes.                       Narrative:      CLINICAL HISTORY: Neck trauma (Age >= 65y)    COMPARISON:  1412, head CT 5/13/2012, MRI of the cervical spine 5/14/2012, most  recent radiograph of the cervical spine 5/5/2014, CT of the orbits 9/1/2009                                No orders to display       Scoring tools                                 ED Course & MDM   MDM / ED COURSE / CLINICAL IMPRESSIONS / DISPO     [facility]    ED Course as of 02/14/22 1624   Mon Feb 14, 2022   1223 CT HEAD WITHOUT IV CONTRAST  --  IMPRESSION:  1.  No acute intracranial hemorrhage.  2.  No facial fracture.  3.  No  evidence of acute fracture or traumatic malalignment of the cervical  spine. [RS]   1249 X-RAY KNEE LEFT 4+ VIEWS  IMPRESSION: There is a left knee arthroplasty. There are no acute fractures.  Normal alignment. [RS]      ED Course User Index  [RS] López Soriano PA C         Clinical Impressions as of 02/14/22 1624   Fall, initial encounter   Closed head injury, initial encounter   Abrasion of forehead, initial encounter   Contusion of left knee, initial encounter     Discharge         López Soriano PA C  02/14/22 9819

## 2022-02-14 NOTE — ED ATTESTATION NOTE
Physician Attestation:     I personally saw and evaluated the patient, participated in the management, and agree with the findings in the above note except as where stated.  The Physician Assistant and I discussed  the case, workup, and disposition.      Chief Complaint  No chief complaint on file.        71-year-old male presents the emergency department for evaluation.  Pt with mechanical fall  Abrasion to right frohead above right eye  Swelling  Tetanus not utd  Not on ac/ap  No neck pain  Minimal left knee pain over patella  No other injury    Normocephalic atraumatic with the exception of a large hematoma and abrasion with small superficial laceration to the right eyebrow  EOMI, PERRL  Tender to palpation supraorbitally  No proptosis or signs of entrapment  Neck in c-collar  No dental injuries or facial injuries otherwise  Small tenderness to palpation left patella with small spot of ecchymosis full range of motion neurovascular intact.  No other injuries     Ben Fraser DO  02/14/22 1109

## 2022-02-15 ENCOUNTER — TELEPHONE (OUTPATIENT)
Dept: FAMILY MEDICINE | Facility: CLINIC | Age: 72
End: 2022-02-15
Payer: MEDICARE

## 2022-02-16 ENCOUNTER — OFFICE VISIT (OUTPATIENT)
Dept: FAMILY MEDICINE | Facility: CLINIC | Age: 72
End: 2022-02-16
Payer: MEDICARE

## 2022-02-16 VITALS
HEART RATE: 72 BPM | RESPIRATION RATE: 18 BRPM | SYSTOLIC BLOOD PRESSURE: 142 MMHG | TEMPERATURE: 98 F | OXYGEN SATURATION: 98 % | HEIGHT: 71 IN | DIASTOLIC BLOOD PRESSURE: 76 MMHG | BODY MASS INDEX: 31.36 KG/M2 | WEIGHT: 224 LBS

## 2022-02-16 DIAGNOSIS — N18.1 TYPE 2 DIABETES MELLITUS WITH STAGE 1 CHRONIC KIDNEY DISEASE, WITHOUT LONG-TERM CURRENT USE OF INSULIN (CMS/HCC)  (CMS/HCC): ICD-10-CM

## 2022-02-16 DIAGNOSIS — Z00.00 ENCOUNTER FOR GENERAL ADULT MEDICAL EXAMINATION WITHOUT ABNORMAL FINDINGS: ICD-10-CM

## 2022-02-16 DIAGNOSIS — I72.3 ANEURYSM OF ILIAC ARTERY (CMS/HCC): ICD-10-CM

## 2022-02-16 DIAGNOSIS — N18.1 CHRONIC KIDNEY DISEASE, STAGE I: ICD-10-CM

## 2022-02-16 DIAGNOSIS — K57.30 DIVERTICULOSIS OF COLON: ICD-10-CM

## 2022-02-16 DIAGNOSIS — C43.9 MALIGNANT MELANOMA OF SKIN (CMS/HCC): ICD-10-CM

## 2022-02-16 DIAGNOSIS — I10 HYPERTENSION, BENIGN: Primary | ICD-10-CM

## 2022-02-16 DIAGNOSIS — E11.22 TYPE 2 DIABETES MELLITUS WITH STAGE 1 CHRONIC KIDNEY DISEASE, WITHOUT LONG-TERM CURRENT USE OF INSULIN (CMS/HCC)  (CMS/HCC): ICD-10-CM

## 2022-02-16 DIAGNOSIS — N40.1 BENIGN PROSTATIC HYPERPLASIA WITH LOWER URINARY TRACT SYMPTOMS, SYMPTOM DETAILS UNSPECIFIED: ICD-10-CM

## 2022-02-16 DIAGNOSIS — N26.9 GLOMERULOSCLEROSIS: ICD-10-CM

## 2022-02-16 PROCEDURE — 80053 COMPREHEN METABOLIC PANEL: CPT | Performed by: FAMILY MEDICINE

## 2022-02-16 PROCEDURE — G0439 PPPS, SUBSEQ VISIT: HCPCS | Performed by: FAMILY MEDICINE

## 2022-02-16 PROCEDURE — 85027 COMPLETE CBC AUTOMATED: CPT | Performed by: FAMILY MEDICINE

## 2022-02-16 PROCEDURE — 80061 LIPID PANEL: CPT | Performed by: FAMILY MEDICINE

## 2022-02-16 PROCEDURE — 81003 URINALYSIS AUTO W/O SCOPE: CPT | Performed by: FAMILY MEDICINE

## 2022-02-16 PROCEDURE — 84153 ASSAY OF PSA TOTAL: CPT | Performed by: FAMILY MEDICINE

## 2022-02-16 ASSESSMENT — ENCOUNTER SYMPTOMS
TREMORS: 0
COUGH: 0
SHORTNESS OF BREATH: 0
SORE THROAT: 0
BLOOD IN STOOL: 0
HEMATURIA: 0
JOINT SWELLING: 0
ARTHRALGIAS: 0
CHEST TIGHTNESS: 0
FATIGUE: 0
CONSTIPATION: 0
VOMITING: 0
NAUSEA: 0
ACTIVITY CHANGE: 0
EYE PAIN: 0
NUMBNESS: 0
PALPITATIONS: 0
ABDOMINAL PAIN: 0
DIZZINESS: 0
HEADACHES: 0
APPETITE CHANGE: 0
SINUS PAIN: 0
DIARRHEA: 0
EYE DISCHARGE: 0
DYSURIA: 0
FREQUENCY: 0
WEAKNESS: 0

## 2022-02-16 ASSESSMENT — ACTIVITIES OF DAILY LIVING (ADL)
ASSISTIVE_DEVICE: EYEGLASSES
PATIENT'S MEMORY ADEQUATE TO SAFELY COMPLETE DAILY ACTIVITIES?: YES
ADEQUATE_TO_COMPLETE_ADL: YES

## 2022-02-16 ASSESSMENT — PATIENT HEALTH QUESTIONNAIRE - PHQ9: SUM OF ALL RESPONSES TO PHQ9 QUESTIONS 1 & 2: 0

## 2022-02-16 ASSESSMENT — MINI COG
COMPLETED: YES
TOTAL SCORE: 5

## 2022-02-16 NOTE — PROGRESS NOTES
Subjective      Patient ID: Aneesh Brito is a 71 y.o. male.  1950      Patient annual exam.  Patient notes he has planned knee arthroplasty for next week.  Patient had had recent fall and sustained contusion abrasion to his right orbit.  Otherwise work-up unremarkable.  Patient generally feeling well.  He did have a difficult year because had an episode of C. difficile.  This is resolved  Patient is   Patient typically has 3 meals per day.  Exercise been limited because of health issues.  Offloaded because of the pandemic.  Patient has nocturia x1.  He remains on medication for blood pressure and tolerates this well.  Patient also with history of diabetes last A1c was 6.5.  Patient followed by dermatology for history of melanoma.  Patient states he has had a colonoscopy within the last 2 years  Patient notes he had had a recent bladder scan after voiding and had a 200 cc residual.  He has appointment with urology tomorrow for evaluation   has a past medical history of Diverticulosis of colon, Enlarged prostate with lower urinary tract symptoms (LUTS), Glaucoma, Hypertension, Melanoma of skin (CMS/[facility]), Osteoarthritis of knee, Overweight, Spinal stenosis of lumbar region, and Type 2 diabetes mellitus (CMS/[facility]).   has a past surgical history that includes Appendectomy; Colonoscopy (01/10/2012); Hip Arthroplasty (Bilateral); and Knee Arthroplasty (Left).      The following have been reviewed and updated as appropriate in this visit:   Tobacco       Review of Systems   Constitutional: Negative for activity change, appetite change and fatigue.   HENT: Negative for congestion, hearing loss, postnasal drip, sinus pain and sore throat.    Eyes: Negative for pain and discharge.   Respiratory: Negative for cough, chest tightness and shortness of breath.    Cardiovascular: Negative for chest pain and palpitations.   Gastrointestinal: Negative for abdominal pain, blood in stool, constipation, diarrhea,  "nausea and vomiting.   Genitourinary: Negative for dysuria, frequency and hematuria.   Musculoskeletal: Negative for arthralgias and joint swelling.   Neurological: Negative for dizziness, tremors, weakness, numbness and headaches.       Objective     Vitals:    02/16/22 1210 02/16/22 1233   BP: (!) 168/90 (!) 142/76   Pulse: 72    Resp: 18    Temp: 36.7 °C (98 °F)    TempSrc: Oral    SpO2: 98%    Weight: 102 kg (224 lb)    Height: 1.803 m (5' 11\")      Body mass index is 31.24 kg/m².    Physical Exam  Vitals reviewed.   Constitutional:       Appearance: He is well-developed.   HENT:      Head: Normocephalic and atraumatic.      Comments: Contusion ecchymosis to right periorbital area with healing abrasions.     Right Ear: Hearing, tympanic membrane, ear canal and external ear normal.      Left Ear: Hearing, tympanic membrane, ear canal and external ear normal.      Nose: Nose normal.      Mouth/Throat:      Pharynx: Uvula midline.   Eyes:      Conjunctiva/sclera: Conjunctivae normal.      Pupils: Pupils are equal, round, and reactive to light.   Neck:      Thyroid: No thyromegaly.   Cardiovascular:      Rate and Rhythm: Normal rate and regular rhythm.      Heart sounds: Normal heart sounds.   Pulmonary:      Effort: Pulmonary effort is normal.      Breath sounds: Normal breath sounds.   Abdominal:      General: Bowel sounds are normal.      Palpations: Abdomen is soft. There is no mass.      Tenderness: There is no abdominal tenderness.   Genitourinary:     Comments: Deferred to upcoming urologic appointment  Musculoskeletal:         General: Normal range of motion.      Cervical back: Normal range of motion and neck supple.   Lymphadenopathy:      Cervical: No cervical adenopathy.   Skin:     General: Skin is warm and dry.   Neurological:      Mental Status: He is alert and oriented to person, place, and time.      Cranial Nerves: No cranial nerve deficit.      Motor: No abnormal muscle tone. "         Assessment/Plan   Diagnoses and all orders for this visit:    Hypertension, benign (Primary)  -     Comprehensive metabolic panel  -     Lipid panel  -     CBC  -     Urinalysis with microscopic    Diverticulosis of colon    Type 2 diabetes mellitus with stage 1 chronic kidney disease, without long-term current use of insulin (CMS/HCC)    Benign prostatic hyperplasia with lower urinary tract symptoms, symptom details unspecified  -     PSA    Chronic kidney disease, stage I    Malignant melanoma of skin (CMS/HCC)    Aneurysm of iliac artery (CMS/HCC)    Encounter for general adult medical examination without abnormal findings    Glomerulosclerosis    Blood pressure.  Adequate control.  Patient continue medication reevaluate in 3 months  Diverticulosis currently stable  Diabetes.  Patient has been stable continue medication recheck in 3 months  BPH.  Patient is seeing urology tomorrow we will check PSA    Chronic kidney disease patient followed by nephrology  Melanoma history patient followed by dermatology  Aneurysm of iliac artery currently stable  Annual exam.  Daily routine discussed at length.  Recommend 3 meals per day no snacking peer recommend regular low impact exercise.  Patient continue to limit caffeine alcohol  Glomerulosclerosis by biopsy patient followed by nephrology

## 2022-02-17 ENCOUNTER — TELEPHONE (OUTPATIENT)
Dept: FAMILY MEDICINE | Facility: CLINIC | Age: 72
End: 2022-02-17
Payer: MEDICARE

## 2022-02-17 DIAGNOSIS — E78.00 PURE HYPERCHOLESTEROLEMIA: Primary | ICD-10-CM

## 2022-02-17 LAB
ALBUMIN SERPL-MCNC: 4 G/DL (ref 3.4–5)
ALP SERPL-CCNC: 64 IU/L (ref 35–126)
ALT SERPL-CCNC: 25 IU/L (ref 16–63)
ANION GAP SERPL CALC-SCNC: 12 MEQ/L (ref 3–15)
AST SERPL-CCNC: 21 IU/L (ref 15–41)
BILIRUB SERPL-MCNC: 0.8 MG/DL (ref 0.3–1.2)
BILIRUB UR QL STRIP.AUTO: NEGATIVE MG/DL
BUN SERPL-MCNC: 11 MG/DL (ref 8–20)
CALCIUM SERPL-MCNC: 9.3 MG/DL (ref 8.9–10.3)
CHLORIDE SERPL-SCNC: 102 MEQ/L (ref 98–109)
CHOLEST SERPL-MCNC: 175 MG/DL
CLARITY UR REFRACT.AUTO: CLEAR
CO2 SERPL-SCNC: 26 MEQ/L (ref 22–32)
COLOR UR AUTO: YELLOW
CREAT SERPL-MCNC: 0.9 MG/DL (ref 0.8–1.3)
ERYTHROCYTE [DISTWIDTH] IN BLOOD BY AUTOMATED COUNT: 14.6 % (ref 11.6–14.4)
GFR SERPL CREATININE-BSD FRML MDRD: >60 ML/MIN/1.73M*2
GLUCOSE SERPL-MCNC: 125 MG/DL (ref 70–99)
GLUCOSE UR STRIP.AUTO-MCNC: NEGATIVE MG/DL
HCT VFR BLDCO AUTO: 44.2 % (ref 40.1–51)
HDLC SERPL-MCNC: 42 MG/DL
HDLC SERPL: 4.2 {RATIO}
HGB BLD-MCNC: 14.1 G/DL (ref 13.7–17.5)
HGB UR QL STRIP.AUTO: NEGATIVE
KETONES UR STRIP.AUTO-MCNC: NEGATIVE MG/DL
LDLC SERPL CALC-MCNC: 107 MG/DL
LEUKOCYTE ESTERASE UR QL STRIP.AUTO: NEGATIVE
MCH RBC QN AUTO: 31.3 PG (ref 28–33.2)
MCHC RBC AUTO-ENTMCNC: 31.9 G/DL (ref 32.2–36.5)
MCV RBC AUTO: 98.2 FL (ref 83–98)
NITRITE UR QL STRIP.AUTO: NEGATIVE
NONHDLC SERPL-MCNC: 133 MG/DL
PDW BLD AUTO: 11.2 FL (ref 9.4–12.4)
PH UR STRIP.AUTO: 6.5 [PH]
PLATELET # BLD AUTO: 285 K/UL (ref 150–350)
POTASSIUM SERPL-SCNC: 3.6 MEQ/L (ref 3.6–5.1)
PROT SERPL-MCNC: 6.9 G/DL (ref 6–8.2)
PROT UR QL STRIP.AUTO: 2
PSA SERPL-MCNC: 0.13 NG/ML
RBC # BLD AUTO: 4.5 M/UL (ref 4.5–5.8)
SODIUM SERPL-SCNC: 140 MEQ/L (ref 136–144)
SP GR UR REFRACT.AUTO: 1.01
TRIGL SERPL-MCNC: 128 MG/DL (ref 30–149)
UROBILINOGEN UR STRIP-ACNC: 0.2 EU/DL
WBC # BLD AUTO: 8.13 K/UL (ref 3.8–10.5)

## 2022-02-17 RX ORDER — ATORVASTATIN CALCIUM 20 MG/1
20 TABLET, FILM COATED ORAL DAILY
Qty: 90 TABLET | Refills: 1 | Status: SHIPPED | OUTPATIENT
Start: 2022-02-17 | End: 2022-08-16 | Stop reason: SDUPTHER

## 2022-02-24 ENCOUNTER — PATIENT OUTREACH (OUTPATIENT)
Dept: CASE MANAGEMENT | Facility: CLINIC | Age: 72
End: 2022-02-24
Payer: MEDICARE

## 2022-02-24 RX ORDER — TRAMADOL HYDROCHLORIDE 50 MG/1
50 TABLET ORAL EVERY 6 HOURS PRN
COMMUNITY
End: 2022-05-17 | Stop reason: ALTCHOICE

## 2022-02-24 ASSESSMENT — ENCOUNTER SYMPTOMS
ACTIVITY CHANGE: 1
WEAKNESS: 1
EYES NEGATIVE: 1
PSYCHIATRIC NEGATIVE: 1
ALLERGIC/IMMUNOLOGIC NEGATIVE: 1
HEMATOLOGIC/LYMPHATIC NEGATIVE: 1
CARDIOVASCULAR NEGATIVE: 1
JOINT SWELLING: 1
ENDOCRINE NEGATIVE: 1
GASTROINTESTINAL NEGATIVE: 1
RESPIRATORY NEGATIVE: 1

## 2022-02-24 NOTE — PROGRESS NOTES
NAME: Aneesh Brito    MRN: [id number]    YOB: 1950    Event Review:    Initial TCM Patient Outreach Date: 02/24/22    Assessment completed with: Patient  Patient stated reason for hospitalization: Osteoarthritis  Discharge Diagnosis: Right knee replacement    Patient readmitted in the last 30 days: No  Discharging Facility: Other  Date of Admission: 02/22/22  Date of Discharge: 02/23/22    Patient's perception of their health status since discharge: Improving    HPI: Spoke to patient today, he is S/P elective Right knee replacement.  Patient lives at home with supportive spouse.  Patient utilizing walker at all times for safety.  Instructed on pain management and hydration.  Educated on ice and elevation, constipation prevention.  Reviewed SS of infection, incision is CD&I.    Review of Systems   Constitutional: Positive for activity change.   HENT: Negative.    Eyes: Negative.    Respiratory: Negative.    Cardiovascular: Negative.    Gastrointestinal: Negative.    Endocrine: Negative.    Genitourinary: Negative.    Musculoskeletal: Positive for joint swelling (Right knee).   Skin: Negative.    Allergic/Immunologic: Negative.    Neurological: Positive for weakness (RLE).   Hematological: Negative.    Psychiatric/Behavioral: Negative.        Medication Review:    Medication Review: Yes     Reported by: Patient  Any new medications prescribed at discharge?: Yes  Is the patient having any side effects they believe may be caused by any medication additions or changes?: No  New prescriptions filled?: Yes     Do you have enough of your regularly prescribed medications?: Yes       Medication adherence problem?: No  Was a medication discrepancy indentified?: No       Nursing Interventions: Nurse provided patient education  Reconciled the current and discharge medications: Yes  Reviewed AVS (Discharge Instructions)?: Yes         Acute Pain:    Acute pain: Yes     Acute pain severity: 3  Acute pain timing:  intermittent  Location of acute pain: Right knee    Chronic Pain:    Chronic pain: No    Diet/Nutrition:    Type of Diet: Regular,Diabetic    Home Care Services:  Home Care Interventions: No intervention required     Post-Discharge Durable Medical Equipment::     Oxygen Use: No    Home Management:    Living Arrangement: Spouse  Support System:: Spouse,Family  Type of Residence: 2 [place] house  Home Monitoring: None  Any patient reported falls in the last 3 months?: No  Tenriism or spiritual beliefs that impact treatment?: No    Appointment Scheduling:    Parag F/U to be scheduled       Patient Scheduling Dispositions: Pt prefers to see specialist     Interventions/ Care Coordination:       General Education: Post-Op instructions       Reviewed signs/symptoms of worsening condition or complication that necessitate a call to the Physician's office.  Educated patient on access to care.  RN phone number given for future care management.    Jessica Griffiths RN BSN A [facility]  [phone]

## 2022-03-09 RX ORDER — FELODIPINE 10 MG/1
10 TABLET, EXTENDED RELEASE ORAL DAILY
Qty: 30 TABLET | Refills: 6 | Status: SHIPPED | OUTPATIENT
Start: 2022-03-09 | End: 2022-08-16 | Stop reason: SDUPTHER

## 2022-03-09 NOTE — TELEPHONE ENCOUNTER
Medicine Refill Request    Last Office: 2/16/2022   Last Consult Visit: Visit date not found  Last Telemedicine Visit: Visit date not found    Next Appointment: 5/17/2022      Current Outpatient Medications:   •  atorvastatin (LIPITOR) 20 mg tablet, Take 1 tablet (20 mg total) by mouth daily., Disp: 90 tablet, Rfl: 1  •  brimonidine (ALPHAGAN) 0.15 % ophthalmic solution, Administer 1 drop into both eyes 2 (two) times a day. Morning and late afternoon , Disp: , Rfl:   •  felodipine 10 mg 24 hr tablet, Take 10 mg by mouth daily., Disp: , Rfl:   •  finasteride 5 mg tablet, Take 1 tablet (5 mg total) by mouth daily., Disp: 90 tablet, Rfl: 3  •  losartan 100 mg tablet, Take 1 tablet (100 mg total) by mouth daily., Disp: 90 tablet, Rfl: 3  •  metFORMIN 500 mg tablet, Take 1 tablet (500 mg total) by mouth 2 (two) times a day with meals., Disp: 180 tablet, Rfl: 3  •  metoprolol tartrate 50 mg tablet, Take 1 tablet (50 mg total) by mouth 2 (two) times a day., Disp: 90 tablet, Rfl: 3  •  potassium chloride 10 mEq CR tablet, Take 2 tablets (20 mEq total) by mouth daily for 3 days., Disp: 6 tablet, Rfl: 0  •  ROCKLATAN 0.02-0.005 % drops, Administer 1 drop into both eyes nightly. Wait a few minutes in between eye drops , Disp: , Rfl:   •  traMADoL (ULTRAM) 50 mg tablet, Take 50 mg by mouth every 6 (six) hours as needed for moderate pain., Disp: , Rfl:   •  VYZULTA 0.024 % drops, Administer 1 drop into both eyes nightly. Wait a few minutes in between eye drops , Disp: , Rfl:       BP Readings from Last 3 Encounters:   02/16/22 (!) 142/76   02/14/22 (!) 203/92   01/14/22 (!) 148/68       Recent Lab results:  Lab Results   Component Value Date    CHOL 175 02/16/2022   ,   Lab Results   Component Value Date    HDL 42 (L) 02/16/2022   ,   Lab Results   Component Value Date    LDLCALC 107 (H) 02/16/2022   ,   Lab Results   Component Value Date    TRIG 128 02/16/2022        Lab Results   Component Value Date    GLUCOSE 125 (H)  02/16/2022   ,   Lab Results   Component Value Date    HGBA1C 6.5 (H) 11/09/2021         Lab Results   Component Value Date    CREATININE 0.9 02/16/2022       No results found for: TSH

## 2022-04-19 ENCOUNTER — TELEPHONE (OUTPATIENT)
Dept: FAMILY MEDICINE | Facility: CLINIC | Age: 72
End: 2022-04-19
Payer: MEDICARE

## 2022-04-19 NOTE — TELEPHONE ENCOUNTER
Pt is scheduled for a dentist appt. He says since his Knee replacement, he was instrcucted to take amoxicillin prior to appt. Pt wants to know if this is ok to do considering his c diff infection.

## 2022-04-19 NOTE — TELEPHONE ENCOUNTER
Reviewed w pt/ pt w hx c diff/ ok to take prophylactic ATB / amoxicillin 500 mg # 4 )  for dental procedure/ pt s/p knee replacement

## 2022-05-17 ENCOUNTER — OFFICE VISIT (OUTPATIENT)
Dept: FAMILY MEDICINE | Facility: CLINIC | Age: 72
End: 2022-05-17
Payer: MEDICARE

## 2022-05-17 VITALS
DIASTOLIC BLOOD PRESSURE: 82 MMHG | TEMPERATURE: 98.4 F | SYSTOLIC BLOOD PRESSURE: 136 MMHG | HEART RATE: 60 BPM | OXYGEN SATURATION: 98 % | WEIGHT: 231 LBS | BODY MASS INDEX: 32.34 KG/M2 | RESPIRATION RATE: 16 BRPM | HEIGHT: 71 IN

## 2022-05-17 DIAGNOSIS — I10 HYPERTENSION, BENIGN: Primary | ICD-10-CM

## 2022-05-17 DIAGNOSIS — N18.1 TYPE 2 DIABETES MELLITUS WITH STAGE 1 CHRONIC KIDNEY DISEASE, WITHOUT LONG-TERM CURRENT USE OF INSULIN (CMS/HCC)  (CMS/HCC): ICD-10-CM

## 2022-05-17 DIAGNOSIS — E11.22 TYPE 2 DIABETES MELLITUS WITH STAGE 1 CHRONIC KIDNEY DISEASE, WITHOUT LONG-TERM CURRENT USE OF INSULIN (CMS/HCC)  (CMS/HCC): ICD-10-CM

## 2022-05-17 DIAGNOSIS — E78.00 PURE HYPERCHOLESTEROLEMIA: ICD-10-CM

## 2022-05-17 DIAGNOSIS — S46.212A STRAIN OF LEFT BICEPS, INITIAL ENCOUNTER: ICD-10-CM

## 2022-05-17 LAB
EST. AVERAGE GLUCOSE BLD GHB EST-MCNC: 131 MG/DL
HBA1C MFR BLD HPLC: 6.2 %

## 2022-05-17 PROCEDURE — 99214 OFFICE O/P EST MOD 30 MIN: CPT | Performed by: FAMILY MEDICINE

## 2022-05-17 PROCEDURE — 80061 LIPID PANEL: CPT | Performed by: FAMILY MEDICINE

## 2022-05-17 PROCEDURE — G8754 DIAS BP LESS 90: HCPCS | Performed by: FAMILY MEDICINE

## 2022-05-17 PROCEDURE — G8752 SYS BP LESS 140: HCPCS | Performed by: FAMILY MEDICINE

## 2022-05-17 PROCEDURE — 83036 HEMOGLOBIN GLYCOSYLATED A1C: CPT | Performed by: FAMILY MEDICINE

## 2022-05-17 RX ORDER — TIMOLOL MALEATE 5 MG/ML
1 SOLUTION/ DROPS OPHTHALMIC DAILY
COMMUNITY

## 2022-05-17 ASSESSMENT — ENCOUNTER SYMPTOMS
WEAKNESS: 0
ARTHRALGIAS: 0
NUMBNESS: 0
PALPITATIONS: 0
WHEEZING: 0
MYALGIAS: 1
CHEST TIGHTNESS: 0
JOINT SWELLING: 0
FATIGUE: 0
NAUSEA: 0
VOMITING: 0
SHORTNESS OF BREATH: 0
APPETITE CHANGE: 0
SORE THROAT: 0

## 2022-05-17 NOTE — PROGRESS NOTES
"      Subjective      Patient ID: Aneesh Brito is a 72 y.o. male.  1950      Patient for follow-up of blood pressure and blood sugar.  Patient notes recent left arm pain in the biceps area.  No trauma.  Patient unsure if he had any specific overuse injury.  Patient also had right knee replacement.  He is improving persistent but slowly.  Patient tolerating blood pressure medicine well readings have been good.  Patient last A1c was 6.5      The following have been reviewed and updated as appropriate in this visit:          Review of Systems   Constitutional: Negative for appetite change and fatigue.   HENT: Negative for ear pain and sore throat.    Respiratory: Negative for chest tightness, shortness of breath and wheezing.    Cardiovascular: Negative for chest pain, palpitations and leg swelling.   Gastrointestinal: Negative for nausea and vomiting.   Musculoskeletal: Positive for myalgias. Negative for arthralgias and joint swelling.   Skin: Negative for rash.   Neurological: Negative for weakness and numbness.       Objective     Vitals:    05/17/22 1013   BP: (!) 170/100   Pulse: 60   Resp: 16   Temp: 36.9 °C (98.4 °F)   TempSrc: Oral   SpO2: 98%   Weight: 105 kg (231 lb)   Height: 1.803 m (5' 11\")     Body mass index is 32.22 kg/m².    Physical Exam  Vitals reviewed.   Constitutional:       Appearance: He is well-developed.   HENT:      Head: Normocephalic and atraumatic.      Right Ear: Tympanic membrane and ear canal normal.      Left Ear: Tympanic membrane and ear canal normal.      Nose: Nose normal.   Eyes:      Conjunctiva/sclera: Conjunctivae normal.   Neck:      Thyroid: No thyromegaly.   Cardiovascular:      Rate and Rhythm: Normal rate and regular rhythm.      Heart sounds: Normal heart sounds. No murmur heard.  Pulmonary:      Effort: Pulmonary effort is normal.      Breath sounds: Normal breath sounds. No wheezing or rales.   Musculoskeletal:      Left shoulder: Normal.      Left upper arm: " Tenderness present. No swelling, deformity or bony tenderness.      Left elbow: Normal.      Cervical back: Normal range of motion and neck supple.      Comments: Tenderness to belly of biceps.         Assessment/Plan   Diagnoses and all orders for this visit:    Hypertension, benign (Primary)    Type 2 diabetes mellitus with stage 1 chronic kidney disease, without long-term current use of insulin (CMS/[facility])    Strain of left biceps, initial encounter    Blood pressure.  Adequate control.  Patient continue medication reevaluate in 3 months  Diabetes.  Patient's weight has been fluctuating.  We will check A1c today.  Strain of biceps refer to physical therapy for evaluation

## 2022-05-18 LAB
CHOLEST SERPL-MCNC: 97 MG/DL
HDLC SERPL-MCNC: 36 MG/DL
HDLC SERPL: 2.7 {RATIO}
LDLC SERPL CALC-MCNC: 45 MG/DL
NONHDLC SERPL-MCNC: 61 MG/DL
TRIGL SERPL-MCNC: 81 MG/DL (ref 30–149)

## 2022-08-11 ENCOUNTER — TELEPHONE (OUTPATIENT)
Dept: FAMILY MEDICINE | Facility: CLINIC | Age: 72
End: 2022-08-11
Payer: MEDICARE

## 2022-08-11 NOTE — TELEPHONE ENCOUNTER
Pt sttes he tested + Covid last night/ onset sx 2 days ago- mild cough/ low grade fever/ S/T/ pt fully vaccinated/ 2 boosters/ review 5 day quarantine/ 5 addt days mask if no fever/ supportive care/ will review Rx keeley Palacios/  Will send rx /pt to hold atorvastatin while taking med/ f/u if any worsening sx/cough/ Fever.

## 2022-08-16 ENCOUNTER — OFFICE VISIT (OUTPATIENT)
Dept: FAMILY MEDICINE | Facility: CLINIC | Age: 72
End: 2022-08-16
Payer: MEDICARE

## 2022-08-16 VITALS
HEART RATE: 64 BPM | OXYGEN SATURATION: 96 % | WEIGHT: 230 LBS | DIASTOLIC BLOOD PRESSURE: 64 MMHG | SYSTOLIC BLOOD PRESSURE: 128 MMHG | HEIGHT: 71 IN | BODY MASS INDEX: 32.2 KG/M2 | RESPIRATION RATE: 16 BRPM | TEMPERATURE: 98.1 F

## 2022-08-16 DIAGNOSIS — N18.1 TYPE 2 DIABETES MELLITUS WITH STAGE 1 CHRONIC KIDNEY DISEASE, WITHOUT LONG-TERM CURRENT USE OF INSULIN (CMS/HCC)  (CMS/HCC): ICD-10-CM

## 2022-08-16 DIAGNOSIS — E11.22 TYPE 2 DIABETES MELLITUS WITH STAGE 1 CHRONIC KIDNEY DISEASE, WITHOUT LONG-TERM CURRENT USE OF INSULIN (CMS/HCC)  (CMS/HCC): ICD-10-CM

## 2022-08-16 DIAGNOSIS — I10 HYPERTENSION, BENIGN: Primary | ICD-10-CM

## 2022-08-16 DIAGNOSIS — E78.00 PURE HYPERCHOLESTEROLEMIA: ICD-10-CM

## 2022-08-16 PROCEDURE — G8754 DIAS BP LESS 90: HCPCS | Performed by: FAMILY MEDICINE

## 2022-08-16 PROCEDURE — 99214 OFFICE O/P EST MOD 30 MIN: CPT | Performed by: FAMILY MEDICINE

## 2022-08-16 PROCEDURE — G8752 SYS BP LESS 140: HCPCS | Performed by: FAMILY MEDICINE

## 2022-08-16 RX ORDER — ATORVASTATIN CALCIUM 20 MG/1
20 TABLET, FILM COATED ORAL DAILY
Qty: 90 TABLET | Refills: 3 | Status: SHIPPED | OUTPATIENT
Start: 2022-08-16 | End: 2023-05-11

## 2022-08-16 RX ORDER — FELODIPINE 10 MG/1
10 TABLET, EXTENDED RELEASE ORAL DAILY
Qty: 90 TABLET | Refills: 3 | Status: SHIPPED | OUTPATIENT
Start: 2022-08-16 | End: 2023-09-06

## 2022-08-16 RX ORDER — METOPROLOL TARTRATE 50 MG/1
50 TABLET ORAL 2 TIMES DAILY
Qty: 90 TABLET | Refills: 3 | Status: SHIPPED | OUTPATIENT
Start: 2022-08-16 | End: 2022-12-29

## 2022-08-16 ASSESSMENT — ENCOUNTER SYMPTOMS
FATIGUE: 0
CHEST TIGHTNESS: 0
SHORTNESS OF BREATH: 0
PALPITATIONS: 0
NAUSEA: 0
SORE THROAT: 0
APPETITE CHANGE: 0
VOMITING: 0
WHEEZING: 0

## 2022-08-16 NOTE — PROGRESS NOTES
"      Subjective      Patient ID: Aneesh Brito is a 72 y.o. male.  1950      Patient follow-up of blood pressure and blood sugar.  Patient with no new medical complaints today.  Patient did recover from COVID-19 he tolerated paxlovid well.  Patient tolerating blood pressure medicine well readings have been good..  Patient last A1c was 6.2      The following have been reviewed and updated as appropriate in this visit:          Review of Systems   Constitutional: Negative for appetite change and fatigue.   HENT: Negative for ear pain and sore throat.    Respiratory: Negative for chest tightness, shortness of breath and wheezing.    Cardiovascular: Negative for chest pain, palpitations and leg swelling.   Gastrointestinal: Negative for nausea and vomiting.   Skin: Negative for rash.       Objective     Vitals:    08/16/22 1007   BP: 128/64   Pulse: 64   Resp: 16   Temp: 36.7 °C (98.1 °F)   TempSrc: Oral   SpO2: 96%   Weight: 104 kg (230 lb)   Height: 1.803 m (5' 11\")     Body mass index is 32.08 kg/m².    Physical Exam  Vitals reviewed.   Constitutional:       Appearance: He is well-developed.   HENT:      Head: Normocephalic and atraumatic.      Right Ear: Tympanic membrane and ear canal normal.      Left Ear: Tympanic membrane and ear canal normal.      Nose: Nose normal.   Eyes:      Conjunctiva/sclera: Conjunctivae normal.   Neck:      Thyroid: No thyromegaly.   Cardiovascular:      Rate and Rhythm: Normal rate and regular rhythm.      Heart sounds: Normal heart sounds. No murmur heard.  Pulmonary:      Effort: Pulmonary effort is normal.      Breath sounds: Normal breath sounds. No wheezing or rales.   Musculoskeletal:      Cervical back: Normal range of motion and neck supple.         Assessment/Plan   Diagnoses and all orders for this visit:    Hypertension, benign (Primary)  -     metoprolol tartrate (LOPRESSOR) 50 mg tablet; Take 1 tablet (50 mg total) by mouth 2 (two) times a day.  -     felodipine " (PLENDIL) 10 mg 24 hr tablet; Take 1 tablet (10 mg total) by mouth daily.    Type 2 diabetes mellitus with stage 1 chronic kidney disease, without long-term current use of insulin (CMS/[facility])  -     Hemoglobin A1c; Future    Pure hypercholesterolemia  -     atorvastatin (LIPITOR) 20 mg tablet; Take 1 tablet (20 mg total) by mouth daily.    Hypertension adequate control.  Patient continue medication reevaluate in 3 months  Diabetes.  We will check A1c patient continue current regiment  Hyperlipidemia.  Patient may restart atorvastatin after having been on paxlovid

## 2022-08-23 ENCOUNTER — CLINICAL SUPPORT (OUTPATIENT)
Dept: FAMILY MEDICINE | Facility: CLINIC | Age: 72
End: 2022-08-23
Payer: MEDICARE

## 2022-08-23 DIAGNOSIS — E11.22 TYPE 2 DIABETES MELLITUS WITH STAGE 1 CHRONIC KIDNEY DISEASE, WITHOUT LONG-TERM CURRENT USE OF INSULIN (CMS/HCC)  (CMS/HCC): Primary | ICD-10-CM

## 2022-08-23 DIAGNOSIS — N18.1 TYPE 2 DIABETES MELLITUS WITH STAGE 1 CHRONIC KIDNEY DISEASE, WITHOUT LONG-TERM CURRENT USE OF INSULIN (CMS/HCC)  (CMS/HCC): Primary | ICD-10-CM

## 2022-08-23 PROCEDURE — 36415 COLL VENOUS BLD VENIPUNCTURE: CPT | Performed by: FAMILY MEDICINE

## 2022-08-24 LAB
EST. AVERAGE GLUCOSE BLD GHB EST-MCNC: 148 MG/DL
HBA1C MFR BLD HPLC: 6.8 %

## 2022-08-24 PROCEDURE — 83036 HEMOGLOBIN GLYCOSYLATED A1C: CPT | Performed by: FAMILY MEDICINE

## 2022-08-24 PROCEDURE — 36415 COLL VENOUS BLD VENIPUNCTURE: CPT

## 2022-10-19 ENCOUNTER — TELEPHONE (OUTPATIENT)
Dept: FAMILY MEDICINE | Facility: CLINIC | Age: 72
End: 2022-10-19
Payer: MEDICARE

## 2022-10-19 NOTE — TELEPHONE ENCOUNTER
TC from pt - he is requesting a call back to discuss if he should bring any prescriptions to [place] with him incase of infection or covid.    Ph: [phone]

## 2022-10-20 RX ORDER — AZITHROMYCIN 250 MG/1
TABLET, FILM COATED ORAL
Qty: 6 TABLET | Refills: 0 | Status: SHIPPED | OUTPATIENT
Start: 2022-10-20 | End: 2022-10-25

## 2022-10-20 RX ORDER — NIRMATRELVIR AND RITONAVIR 300-100 MG
3 KIT ORAL 2 TIMES DAILY
Qty: 30 TABLET | Refills: 0 | Status: SHIPPED | OUTPATIENT
Start: 2022-10-20 | End: 2022-10-25

## 2022-10-27 DIAGNOSIS — R35.1 BENIGN PROSTATIC HYPERPLASIA WITH NOCTURIA: ICD-10-CM

## 2022-10-27 DIAGNOSIS — N40.1 BENIGN PROSTATIC HYPERPLASIA WITH NOCTURIA: ICD-10-CM

## 2022-10-27 RX ORDER — FINASTERIDE 5 MG/1
TABLET, FILM COATED ORAL
Qty: 90 TABLET | Refills: 3 | Status: SHIPPED | OUTPATIENT
Start: 2022-10-27 | End: 2023-02-22 | Stop reason: SDUPTHER

## 2022-10-27 NOTE — TELEPHONE ENCOUNTER
Medicine Refill Request    Last Office: 8/16/2022   Last Consult Visit: Visit date not found  Last Telemedicine Visit: Visit date not found    Next Appointment: 11/16/2022      Current Outpatient Medications:     atorvastatin (LIPITOR) 20 mg tablet, Take 1 tablet (20 mg total) by mouth daily., Disp: 90 tablet, Rfl: 3    felodipine (PLENDIL) 10 mg 24 hr tablet, Take 1 tablet (10 mg total) by mouth daily., Disp: 90 tablet, Rfl: 3    finasteride 5 mg tablet, Take 1 tablet (5 mg total) by mouth daily., Disp: 90 tablet, Rfl: 3    losartan 100 mg tablet, Take 1 tablet (100 mg total) by mouth daily., Disp: 90 tablet, Rfl: 3    metFORMIN 500 mg tablet, Take 1 tablet (500 mg total) by mouth 2 (two) times a day with meals., Disp: 180 tablet, Rfl: 3    metoprolol tartrate (LOPRESSOR) 50 mg tablet, Take 1 tablet (50 mg total) by mouth 2 (two) times a day., Disp: 90 tablet, Rfl: 3    ROCKLATAN 0.02-0.005 % drops, Administer 1 drop into both eyes nightly. Wait a few minutes in between eye drops , Disp: , Rfl:     timolol (TIMOPTIC) 0.5 % ophthalmic solution, 1 drop daily., Disp: , Rfl:       BP Readings from Last 3 Encounters:   08/16/22 128/64   05/17/22 136/82   02/16/22 (!) 142/76       Recent Lab results:  Lab Results   Component Value Date    CHOL 97 05/17/2022   ,   Lab Results   Component Value Date    HDL 36 (L) 05/17/2022   ,   Lab Results   Component Value Date    LDLCALC 45 05/17/2022   ,   Lab Results   Component Value Date    TRIG 81 05/17/2022        Lab Results   Component Value Date    GLUCOSE 125 (H) 02/16/2022   ,   Lab Results   Component Value Date    HGBA1C 6.8 (H) 08/24/2022         Lab Results   Component Value Date    CREATININE 0.9 02/16/2022       No results found for: TSH

## 2022-11-21 ENCOUNTER — OFFICE VISIT (OUTPATIENT)
Dept: FAMILY MEDICINE | Facility: CLINIC | Age: 72
End: 2022-11-21
Payer: MEDICARE

## 2022-11-21 VITALS
TEMPERATURE: 97.5 F | OXYGEN SATURATION: 99 % | RESPIRATION RATE: 18 BRPM | WEIGHT: 230 LBS | HEIGHT: 71 IN | SYSTOLIC BLOOD PRESSURE: 140 MMHG | BODY MASS INDEX: 32.2 KG/M2 | HEART RATE: 73 BPM | DIASTOLIC BLOOD PRESSURE: 76 MMHG

## 2022-11-21 DIAGNOSIS — Z01.818 PREOPERATIVE EXAMINATION: Primary | ICD-10-CM

## 2022-11-21 DIAGNOSIS — N18.1 TYPE 2 DIABETES MELLITUS WITH STAGE 1 CHRONIC KIDNEY DISEASE, WITHOUT LONG-TERM CURRENT USE OF INSULIN (CMS/HCC)  (CMS/HCC): ICD-10-CM

## 2022-11-21 DIAGNOSIS — E11.22 TYPE 2 DIABETES MELLITUS WITH STAGE 1 CHRONIC KIDNEY DISEASE, WITHOUT LONG-TERM CURRENT USE OF INSULIN (CMS/HCC)  (CMS/HCC): ICD-10-CM

## 2022-11-21 DIAGNOSIS — I10 HYPERTENSION, BENIGN: ICD-10-CM

## 2022-11-21 DIAGNOSIS — H40.9 GLAUCOMA, UNSPECIFIED GLAUCOMA TYPE, UNSPECIFIED LATERALITY: ICD-10-CM

## 2022-11-21 LAB
EST. AVERAGE GLUCOSE BLD GHB EST-MCNC: 137 MG/DL
HBA1C MFR BLD HPLC: 6.4 %

## 2022-11-21 PROCEDURE — G8753 SYS BP > OR = 140: HCPCS | Performed by: FAMILY MEDICINE

## 2022-11-21 PROCEDURE — 93000 ELECTROCARDIOGRAM COMPLETE: CPT | Performed by: FAMILY MEDICINE

## 2022-11-21 PROCEDURE — 83036 HEMOGLOBIN GLYCOSYLATED A1C: CPT | Performed by: FAMILY MEDICINE

## 2022-11-21 PROCEDURE — 99214 OFFICE O/P EST MOD 30 MIN: CPT | Performed by: FAMILY MEDICINE

## 2022-11-21 PROCEDURE — G8754 DIAS BP LESS 90: HCPCS | Performed by: FAMILY MEDICINE

## 2022-11-21 RX ORDER — SILODOSIN 8 MG/1
CAPSULE ORAL
COMMUNITY
Start: 2022-10-07 | End: 2024-02-23 | Stop reason: SDUPTHER

## 2022-11-21 ASSESSMENT — ENCOUNTER SYMPTOMS
SINUS PAIN: 0
VOMITING: 0
SORE THROAT: 0
SINUS PRESSURE: 0
CHILLS: 0
CONSTIPATION: 0
EYE PAIN: 0
NAUSEA: 0
EYE DISCHARGE: 0
PALPITATIONS: 0
VOICE CHANGE: 0
SHORTNESS OF BREATH: 0
WHEEZING: 0
FEVER: 0
DIARRHEA: 0

## 2022-11-21 NOTE — PROGRESS NOTES
"      Subjective      Patient ID: Aneesh Brito is a 72 y.o. male.  1950      Patient for preop exam for glaucoma surgery.  Patient with no new complaints.  Patient also needs evaluation of blood pressure and diabetes.  Patient had been on recent vacation his weight has stayed the same.  Patient tolerating blood pressure medicine well readings have been good.  Patient last A1c was 6.8.   has a past medical history of Colonic adenoma, Diverticulosis of colon, Enlarged prostate with lower urinary tract symptoms (LUTS), Glaucoma, Hypertension, Melanoma of skin (CMS/HCC), Osteoarthritis of knee, Overweight, Spinal stenosis of lumbar region, and Type 2 diabetes mellitus (CMS/[facility]).   has a past surgical history that includes Appendectomy; Colonoscopy (01/10/2012); Hip Arthroplasty (Bilateral); Knee Arthroplasty (Left); Colonoscopy w/ polypectomy (02/09/2022); and Knee Arthroplasty (Right, 02/02/2022).      The following have been reviewed and updated as appropriate in this visit:        Review of Systems   Constitutional: Negative for chills and fever.   HENT: Negative for ear pain, sinus pressure, sinus pain, sore throat and voice change.    Eyes: Negative for pain and discharge.   Respiratory: Negative for shortness of breath and wheezing.    Cardiovascular: Negative for chest pain and palpitations.   Gastrointestinal: Negative for constipation, diarrhea, nausea and vomiting.   Skin: Negative for rash.       Objective     Vitals:    11/21/22 1140   BP: 140/76   Pulse: 73   Resp: 18   Temp: 36.4 °C (97.5 °F)   TempSrc: Temporal   SpO2: 99%   Weight: 104 kg (230 lb)   Height: 1.803 m (5' 11\")     Body mass index is 32.08 kg/m².    Physical Exam  Vitals reviewed.   Constitutional:       General: He is not in acute distress.     Appearance: He is well-developed.   HENT:      Head: Normocephalic and atraumatic.      Right Ear: Tympanic membrane, ear canal and external ear normal.      Left Ear: Tympanic membrane, ear " canal and external ear normal.      Nose: Mucosal edema present. No rhinorrhea.      Mouth/Throat:      Pharynx: No oropharyngeal exudate or posterior oropharyngeal erythema.   Eyes:      General:         Right eye: No discharge.         Left eye: No discharge.      Conjunctiva/sclera: Conjunctivae normal.   Neck:      Thyroid: No thyromegaly.   Cardiovascular:      Rate and Rhythm: Normal rate and regular rhythm.   Pulmonary:      Effort: Pulmonary effort is normal. No respiratory distress.      Breath sounds: Normal breath sounds. No wheezing or rales.   Abdominal:      General: There is no distension.      Palpations: Abdomen is soft.      Tenderness: There is no abdominal tenderness.   Musculoskeletal:      Cervical back: Normal range of motion and neck supple.   Lymphadenopathy:      Cervical: No cervical adenopathy.   Neurological:      General: No focal deficit present.         Assessment/Plan   Diagnoses and all orders for this visit:    Preoperative examination (Primary)  -     ECG 12 lead    Glaucoma, unspecified glaucoma type, unspecified laterality    Hypertension, benign    Type 2 diabetes mellitus with stage 1 chronic kidney disease, without long-term current use of insulin (CMS/[facility])  -     Hemoglobin A1c    Preop exam.  Patient medically stable for proposed procedure.  Glaucoma treatment as per ophthalmology  Hypertension adequate control.  Patient continue medication.  Diabetes adequate control.  We will check A1c today.  Patient continue medication

## 2022-12-28 DIAGNOSIS — I10 HYPERTENSION, BENIGN: ICD-10-CM

## 2022-12-29 DIAGNOSIS — I10 HYPERTENSION, BENIGN: ICD-10-CM

## 2022-12-29 RX ORDER — LOSARTAN POTASSIUM 100 MG/1
100 TABLET ORAL DAILY
Qty: 90 TABLET | Refills: 3 | Status: SHIPPED | OUTPATIENT
Start: 2022-12-29 | End: 2023-02-22 | Stop reason: SDUPTHER

## 2022-12-29 RX ORDER — METOPROLOL TARTRATE 50 MG/1
TABLET ORAL
Qty: 90 TABLET | Refills: 3 | Status: SHIPPED | OUTPATIENT
Start: 2022-12-29 | End: 2023-02-22 | Stop reason: SDUPTHER

## 2022-12-29 NOTE — TELEPHONE ENCOUNTER
Medicine Refill Request    Last Office: 11/21/2022   Last Consult Visit: Visit date not found  Last Telemedicine Visit: Visit date not found    Next Appointment: 2/22/2023      Current Outpatient Medications:   •  atorvastatin (LIPITOR) 20 mg tablet, Take 1 tablet (20 mg total) by mouth daily., Disp: 90 tablet, Rfl: 3  •  felodipine (PLENDIL) 10 mg 24 hr tablet, Take 1 tablet (10 mg total) by mouth daily., Disp: 90 tablet, Rfl: 3  •  finasteride (PROSCAR) 5 mg tablet, TAKE 1 TABLET BY MOUTH DAILY, Disp: 90 tablet, Rfl: 3  •  losartan 100 mg tablet, Take 1 tablet (100 mg total) by mouth daily., Disp: 90 tablet, Rfl: 3  •  metFORMIN 500 mg tablet, Take 1 tablet (500 mg total) by mouth 2 (two) times a day with meals., Disp: 180 tablet, Rfl: 3  •  metoprolol tartrate (LOPRESSOR) 50 mg tablet, Take 1 tablet (50 mg total) by mouth 2 (two) times a day., Disp: 90 tablet, Rfl: 3  •  ROCKLATAN 0.02-0.005 % drops, Administer 1 drop into both eyes nightly. Wait a few minutes in between eye drops , Disp: , Rfl:   •  silodosin (RAPAFLO) 8 mg capsule, , Disp: , Rfl:   •  timolol (TIMOPTIC) 0.5 % ophthalmic solution, 1 drop daily., Disp: , Rfl:       BP Readings from Last 3 Encounters:   11/21/22 140/76   08/16/22 128/64   05/17/22 136/82       Recent Lab results:  Lab Results   Component Value Date    CHOL 97 05/17/2022   ,   Lab Results   Component Value Date    HDL 36 (L) 05/17/2022   ,   Lab Results   Component Value Date    LDLCALC 45 05/17/2022   ,   Lab Results   Component Value Date    TRIG 81 05/17/2022        Lab Results   Component Value Date    GLUCOSE 125 (H) 02/16/2022   ,   Lab Results   Component Value Date    HGBA1C 6.4 (H) 11/21/2022         Lab Results   Component Value Date    CREATININE 0.9 02/16/2022       No results found for: TSH

## 2023-02-03 ENCOUNTER — TRANSCRIBE ORDERS (OUTPATIENT)
Dept: SCHEDULING | Age: 73
End: 2023-02-03

## 2023-02-03 DIAGNOSIS — D41.02 NEOPLASM OF UNCERTAIN BEHAVIOR OF LEFT KIDNEY: ICD-10-CM

## 2023-02-03 DIAGNOSIS — R33.8 OTHER RETENTION OF URINE: Primary | ICD-10-CM

## 2023-02-08 ENCOUNTER — HOSPITAL ENCOUNTER (OUTPATIENT)
Dept: RADIOLOGY | Age: 73
Discharge: HOME | End: 2023-02-08
Attending: NURSE PRACTITIONER
Payer: MEDICARE

## 2023-02-08 DIAGNOSIS — R33.8 OTHER RETENTION OF URINE: ICD-10-CM

## 2023-02-08 PROCEDURE — 76775 US EXAM ABDO BACK WALL LIM: CPT

## 2023-02-22 ENCOUNTER — OFFICE VISIT (OUTPATIENT)
Dept: FAMILY MEDICINE | Facility: CLINIC | Age: 73
End: 2023-02-22
Payer: MEDICARE

## 2023-02-22 VITALS
HEART RATE: 60 BPM | TEMPERATURE: 97.2 F | BODY MASS INDEX: 31.5 KG/M2 | WEIGHT: 225 LBS | RESPIRATION RATE: 16 BRPM | HEIGHT: 71 IN | SYSTOLIC BLOOD PRESSURE: 138 MMHG | DIASTOLIC BLOOD PRESSURE: 78 MMHG | OXYGEN SATURATION: 99 %

## 2023-02-22 DIAGNOSIS — I10 HYPERTENSION, BENIGN: Primary | ICD-10-CM

## 2023-02-22 DIAGNOSIS — C43.9 MALIGNANT MELANOMA OF SKIN (CMS/HCC): ICD-10-CM

## 2023-02-22 DIAGNOSIS — Z00.00 ENCOUNTER FOR GENERAL ADULT MEDICAL EXAMINATION WITHOUT ABNORMAL FINDINGS: ICD-10-CM

## 2023-02-22 DIAGNOSIS — N40.1 BENIGN PROSTATIC HYPERPLASIA WITH NOCTURIA: ICD-10-CM

## 2023-02-22 DIAGNOSIS — E78.00 PURE HYPERCHOLESTEROLEMIA: ICD-10-CM

## 2023-02-22 DIAGNOSIS — I72.3 ANEURYSM OF ILIAC ARTERY (CMS/HCC): ICD-10-CM

## 2023-02-22 DIAGNOSIS — R35.1 BENIGN PROSTATIC HYPERPLASIA WITH NOCTURIA: ICD-10-CM

## 2023-02-22 DIAGNOSIS — N26.9 GLOMERULOSCLEROSIS: ICD-10-CM

## 2023-02-22 DIAGNOSIS — N40.1 BENIGN PROSTATIC HYPERPLASIA WITH LOWER URINARY TRACT SYMPTOMS, SYMPTOM DETAILS UNSPECIFIED: ICD-10-CM

## 2023-02-22 DIAGNOSIS — E11.22 TYPE 2 DIABETES MELLITUS WITH STAGE 1 CHRONIC KIDNEY DISEASE, WITHOUT LONG-TERM CURRENT USE OF INSULIN (CMS/HCC)  (CMS/HCC): ICD-10-CM

## 2023-02-22 DIAGNOSIS — N18.1 TYPE 2 DIABETES MELLITUS WITH STAGE 1 CHRONIC KIDNEY DISEASE, WITHOUT LONG-TERM CURRENT USE OF INSULIN (CMS/HCC)  (CMS/HCC): ICD-10-CM

## 2023-02-22 PROBLEM — R50.9 FEVER: Status: RESOLVED | Noted: 2022-01-11 | Resolved: 2023-02-22

## 2023-02-22 PROBLEM — R19.7 DIARRHEA: Status: RESOLVED | Noted: 2022-01-11 | Resolved: 2023-02-22

## 2023-02-22 PROBLEM — A04.5 CAMPYLOBACTER DIARRHEA: Status: RESOLVED | Noted: 2022-01-14 | Resolved: 2023-02-22

## 2023-02-22 PROBLEM — A04.72 C. DIFFICILE COLITIS: Status: RESOLVED | Noted: 2022-01-12 | Resolved: 2023-02-22

## 2023-02-22 PROBLEM — R53.1 WEAKNESS: Status: RESOLVED | Noted: 2022-01-11 | Resolved: 2023-02-22

## 2023-02-22 PROBLEM — E87.6 HYPOKALEMIA: Status: RESOLVED | Noted: 2019-03-21 | Resolved: 2023-02-22

## 2023-02-22 LAB
ALBUMIN SERPL-MCNC: 3.7 G/DL (ref 3.4–5)
ALP SERPL-CCNC: 78 IU/L (ref 35–126)
ALT SERPL-CCNC: 21 IU/L (ref 16–63)
ANION GAP SERPL CALC-SCNC: 11 MEQ/L (ref 3–15)
AST SERPL-CCNC: 19 IU/L (ref 15–41)
BACTERIA URNS QL MICRO: ABNORMAL /HPF
BILIRUB SERPL-MCNC: 0.8 MG/DL (ref 0.3–1.2)
BILIRUB UR QL STRIP.AUTO: NEGATIVE MG/DL
BUN SERPL-MCNC: 13 MG/DL (ref 8–20)
CALCIUM SERPL-MCNC: 9.2 MG/DL (ref 8.9–10.3)
CHLORIDE SERPL-SCNC: 102 MEQ/L (ref 98–109)
CHOLEST SERPL-MCNC: 120 MG/DL
CLARITY UR REFRACT.AUTO: CLEAR
CO2 SERPL-SCNC: 27 MEQ/L (ref 22–32)
COLOR UR AUTO: YELLOW
CREAT SERPL-MCNC: 1 MG/DL (ref 0.8–1.3)
ERYTHROCYTE [DISTWIDTH] IN BLOOD BY AUTOMATED COUNT: 14.1 % (ref 11.6–14.4)
EST. AVERAGE GLUCOSE BLD GHB EST-MCNC: 143 MG/DL
GFR SERPL CREATININE-BSD FRML MDRD: >60 ML/MIN/1.73M*2
GLUCOSE SERPL-MCNC: 135 MG/DL (ref 70–99)
GLUCOSE UR STRIP.AUTO-MCNC: NEGATIVE MG/DL
HBA1C MFR BLD HPLC: 6.6 %
HCT VFR BLDCO AUTO: 41.3 % (ref 40.1–51)
HDLC SERPL-MCNC: 37 MG/DL
HDLC SERPL: 3.2 {RATIO}
HGB BLD-MCNC: 14 G/DL (ref 13.7–17.5)
HGB UR QL STRIP.AUTO: NEGATIVE
HYALINE CASTS #/AREA URNS LPF: ABNORMAL /LPF
KETONES UR STRIP.AUTO-MCNC: NEGATIVE MG/DL
LDLC SERPL CALC-MCNC: 58 MG/DL
LEUKOCYTE ESTERASE UR QL STRIP.AUTO: NEGATIVE
MCH RBC QN AUTO: 32 PG (ref 28–33.2)
MCHC RBC AUTO-ENTMCNC: 33.9 G/DL (ref 32.2–36.5)
MCV RBC AUTO: 94.5 FL (ref 83–98)
MUCOUS THREADS URNS QL MICRO: ABNORMAL /LPF
NITRITE UR QL STRIP.AUTO: NEGATIVE
NONHDLC SERPL-MCNC: 83 MG/DL
PDW BLD AUTO: 11.1 FL (ref 9.4–12.4)
PH UR STRIP.AUTO: 7 [PH]
PLATELET # BLD AUTO: 257 K/UL (ref 150–350)
POTASSIUM SERPL-SCNC: 3.3 MEQ/L (ref 3.6–5.1)
PROT SERPL-MCNC: 6.8 G/DL (ref 6–8.2)
PROT UR QL STRIP.AUTO: 3
PSA SERPL-MCNC: 0.09 NG/ML
RBC # BLD AUTO: 4.37 M/UL (ref 4.5–5.8)
RBC #/AREA URNS HPF: ABNORMAL /HPF
SODIUM SERPL-SCNC: 140 MEQ/L (ref 136–144)
SP GR UR REFRACT.AUTO: 1.01
SQUAMOUS URNS QL MICRO: ABNORMAL /HPF
TRIGL SERPL-MCNC: 123 MG/DL (ref 30–149)
UROBILINOGEN UR STRIP-ACNC: 0.2 EU/DL
WBC # BLD AUTO: 6.96 K/UL (ref 3.8–10.5)
WBC #/AREA URNS HPF: ABNORMAL /HPF

## 2023-02-22 PROCEDURE — 80061 LIPID PANEL: CPT | Performed by: FAMILY MEDICINE

## 2023-02-22 PROCEDURE — 82040 ASSAY OF SERUM ALBUMIN: CPT | Performed by: FAMILY MEDICINE

## 2023-02-22 PROCEDURE — 81003 URINALYSIS AUTO W/O SCOPE: CPT | Performed by: FAMILY MEDICINE

## 2023-02-22 PROCEDURE — G0439 PPPS, SUBSEQ VISIT: HCPCS | Performed by: FAMILY MEDICINE

## 2023-02-22 PROCEDURE — 85027 COMPLETE CBC AUTOMATED: CPT | Performed by: FAMILY MEDICINE

## 2023-02-22 PROCEDURE — 84153 ASSAY OF PSA TOTAL: CPT | Performed by: FAMILY MEDICINE

## 2023-02-22 PROCEDURE — 83036 HEMOGLOBIN GLYCOSYLATED A1C: CPT | Performed by: FAMILY MEDICINE

## 2023-02-22 RX ORDER — LOSARTAN POTASSIUM 100 MG/1
100 TABLET ORAL DAILY
Qty: 90 TABLET | Refills: 3 | Status: SHIPPED | OUTPATIENT
Start: 2023-02-22 | End: 2024-02-23 | Stop reason: SDUPTHER

## 2023-02-22 RX ORDER — METFORMIN HYDROCHLORIDE 500 MG/1
500 TABLET ORAL 2 TIMES DAILY WITH MEALS
Qty: 180 TABLET | Refills: 3 | Status: SHIPPED | OUTPATIENT
Start: 2023-02-22 | End: 2023-11-29

## 2023-02-22 RX ORDER — FINASTERIDE 5 MG/1
5 TABLET, FILM COATED ORAL DAILY
Qty: 90 TABLET | Refills: 3 | Status: SHIPPED | OUTPATIENT
Start: 2023-02-22 | End: 2023-10-12

## 2023-02-22 RX ORDER — METOPROLOL TARTRATE 50 MG/1
50 TABLET ORAL 2 TIMES DAILY
Qty: 180 TABLET | Refills: 3 | Status: SHIPPED | OUTPATIENT
Start: 2023-02-22 | End: 2024-02-23 | Stop reason: SDUPTHER

## 2023-02-22 ASSESSMENT — ENCOUNTER SYMPTOMS
APPETITE CHANGE: 0
CONSTIPATION: 0
VOMITING: 0
JOINT SWELLING: 0
NAUSEA: 0
SINUS PAIN: 0
DYSURIA: 0
BLOOD IN STOOL: 0
ABDOMINAL PAIN: 0
FREQUENCY: 0
FATIGUE: 0
HEADACHES: 0
EYE PAIN: 0
SHORTNESS OF BREATH: 0
HEMATURIA: 0
DIARRHEA: 0
COUGH: 0
EYE DISCHARGE: 0
ARTHRALGIAS: 0
DIZZINESS: 0
TREMORS: 0
WEAKNESS: 0
NUMBNESS: 0
SORE THROAT: 0
ACTIVITY CHANGE: 0
PALPITATIONS: 0
CHEST TIGHTNESS: 0

## 2023-02-22 ASSESSMENT — ACTIVITIES OF DAILY LIVING (ADL)
PATIENT'S MEMORY ADEQUATE TO SAFELY COMPLETE DAILY ACTIVITIES?: YES
ADEQUATE_TO_COMPLETE_ADL: YES

## 2023-02-22 ASSESSMENT — MINI COG
TOTAL SCORE: 5
COMPLETED: YES

## 2023-02-22 ASSESSMENT — PATIENT HEALTH QUESTIONNAIRE - PHQ9: SUM OF ALL RESPONSES TO PHQ9 QUESTIONS 1 & 2: 0

## 2023-02-22 NOTE — PROGRESS NOTES
Subjective      Patient ID: Aneesh Brito is a 72 y.o. male.  1950      Patient annual exam.  No new medical complaints today.  Patient is retired.  He is .  Patient recently evaluated by urology and found to have a 1 cm complicated renal cyst.  He is going to have CT scan to delineate this.  Patient typically has 3 meals per day.  He has started exercise program and feeling well.  He has lost 5 pounds.  Sleeps well at night with occasional nocturia.  Patient remains on medication for blood pressure and tolerates this well.  Patient also on medication for lipids.  Patient medication for diabetes last A1c was 6.4.  She has been tolerating this well.  Patient followed by dermatology for history of melanoma.  Patient does have a history of glomerulosclerosis with mild renal insufficiency   has a past medical history of Colonic adenoma, Diverticulosis of colon, Enlarged prostate with lower urinary tract symptoms (LUTS), Glaucoma, Hypertension, Melanoma of skin (CMS/HCC), Osteoarthritis of knee, Overweight, Spinal stenosis of lumbar region, and Type 2 diabetes mellitus (CMS/[facility]).   has a past surgical history that includes Appendectomy; Colonoscopy (01/10/2012); Hip Arthroplasty (Bilateral); Knee Arthroplasty (Left); Colonoscopy w/ polypectomy (02/09/2022); and Knee Arthroplasty (Right, 02/02/2022).      The following have been reviewed and updated as appropriate in this visit:   Allergies  Meds  Problems       Review of Systems   Constitutional: Negative for activity change, appetite change and fatigue.   HENT: Negative for congestion, hearing loss, postnasal drip, sinus pain and sore throat.    Eyes: Negative for pain and discharge.   Respiratory: Negative for cough, chest tightness and shortness of breath.    Cardiovascular: Negative for chest pain and palpitations.   Gastrointestinal: Negative for abdominal pain, blood in stool, constipation, diarrhea, nausea and vomiting.   Genitourinary:  "Negative for dysuria, frequency and hematuria.   Musculoskeletal: Negative for arthralgias and joint swelling.   Neurological: Negative for dizziness, tremors, weakness, numbness and headaches.       Objective     Vitals:    02/22/23 0943   BP: 138/78   Pulse: 60   Resp: 16   Temp: 36.2 °C (97.2 °F)   TempSrc: Temporal   SpO2: 99%   Weight: 102 kg (225 lb)   Height: 1.803 m (5' 11\")     Body mass index is 31.38 kg/m².    Physical Exam  Vitals reviewed.   Constitutional:       Appearance: He is well-developed.   HENT:      Head: Normocephalic and atraumatic.      Right Ear: Tympanic membrane, ear canal and external ear normal.      Left Ear: Tympanic membrane, ear canal and external ear normal.   Eyes:      Conjunctiva/sclera: Conjunctivae normal.      Pupils: Pupils are equal, round, and reactive to light.   Neck:      Thyroid: No thyromegaly.   Cardiovascular:      Rate and Rhythm: Normal rate and regular rhythm.      Heart sounds: Normal heart sounds. No murmur heard.  Pulmonary:      Effort: Pulmonary effort is normal.      Breath sounds: Normal breath sounds.   Abdominal:      General: Bowel sounds are normal.      Palpations: Abdomen is soft. There is no mass.      Tenderness: There is no abdominal tenderness.   Musculoskeletal:         General: No deformity.      Cervical back: Normal range of motion and neck supple.   Lymphadenopathy:      Cervical: No cervical adenopathy.   Skin:     General: Skin is warm.      Findings: No rash.   Neurological:      Mental Status: He is alert and oriented to person, place, and time.      Motor: No abnormal muscle tone.         Assessment/Plan   Diagnoses and all orders for this visit:    Hypertension, benign (Primary)  -     metoprolol tartrate (LOPRESSOR) 50 mg tablet; Take 1 tablet (50 mg total) by mouth 2 (two) times a day.  -     losartan (COZAAR) 100 mg tablet; Take 1 tablet (100 mg total) by mouth daily.    Benign prostatic hyperplasia with lower urinary tract " symptoms, symptom details unspecified    Glomerulosclerosis    Type 2 diabetes mellitus with stage 1 chronic kidney disease, without long-term current use of insulin (CMS/HCC)  -     metFORMIN (GLUCOPHAGE) 500 mg tablet; Take 1 tablet (500 mg total) by mouth 2 (two) times a day with meals.  -     Comprehensive metabolic panel  -     Lipid panel  -     CBC  -     Urinalysis with microscopic  -     Hemoglobin A1c  -     Microalbumin / creatinine urine ratio    Malignant melanoma of skin (CMS/HCC)    Pure hypercholesterolemia    Benign prostatic hyperplasia with nocturia  -     finasteride (PROSCAR) 5 mg tablet; Take 1 tablet (5 mg total) by mouth daily.  -     PSA    Encounter for general adult medical examination without abnormal findings    Aneurysm of iliac artery (CMS/HCC)    Hypertension.  Adequate control.  Patient continue medication reevaluate 3 months  BPH history patient improved symptoms we will repeat PSA.  Glomerulosclerosis stable  Diabetes.  Patient continue medication we will check lab work.  Excellent weight loss.  Melanoma history patient followed by dermatology.  Hyperlipidemia.  Patient continue medication check fasting lab work  Refill Proscar  Annual exam.  Daily routine discussed at length.  Commend 3 meals per day no snacking.  Recommend regular low impact exercise.  Patient continue to limit caffeine and alcohol.  History of aneurysm of iliac artery stable

## 2023-02-23 ENCOUNTER — TELEPHONE (OUTPATIENT)
Dept: FAMILY MEDICINE | Facility: CLINIC | Age: 73
End: 2023-02-23
Payer: MEDICARE

## 2023-02-23 NOTE — TELEPHONE ENCOUNTER
Bella from [employer] called and stated that they received the patients blood for his blood work  And the micro albumin was collected in the wrong vial. Stated must be recollected in order to get results. Please advise

## 2023-02-24 ENCOUNTER — CLINICAL SUPPORT (OUTPATIENT)
Dept: FAMILY MEDICINE | Facility: CLINIC | Age: 73
End: 2023-02-24
Payer: MEDICARE

## 2023-02-24 DIAGNOSIS — N26.9 GLOMERULOSCLEROSIS: Primary | ICD-10-CM

## 2023-02-24 LAB
ALBUMIN/CREAT UR: 2908.9 UG/MG
CREAT UR-MCNC: 39.5 MG/DL
MICROALBUMIN UR-MCNC: 1149 MG/L

## 2023-02-24 PROCEDURE — 82570 ASSAY OF URINE CREATININE: CPT | Performed by: FAMILY MEDICINE

## 2023-02-24 PROCEDURE — 99999 PR OFFICE/OUTPT VISIT,PROCEDURE ONLY: CPT | Performed by: FAMILY MEDICINE

## 2023-03-06 ENCOUNTER — HOSPITAL ENCOUNTER (OUTPATIENT)
Dept: RADIOLOGY | Facility: CLINIC | Age: 73
Discharge: HOME | End: 2023-03-06
Attending: NURSE PRACTITIONER
Payer: MEDICARE

## 2023-03-06 DIAGNOSIS — D41.02 NEOPLASM OF UNCERTAIN BEHAVIOR OF LEFT KIDNEY: ICD-10-CM

## 2023-03-06 PROCEDURE — 63600105 HC IODINE BASED CONTRAST

## 2023-03-06 PROCEDURE — 74178 CT ABD&PLV WO CNTR FLWD CNTR: CPT

## 2023-03-06 RX ADMIN — IOHEXOL 100 ML: 350 INJECTION, SOLUTION INTRAVENOUS at 14:36

## 2023-05-11 DIAGNOSIS — E78.00 PURE HYPERCHOLESTEROLEMIA: ICD-10-CM

## 2023-05-11 RX ORDER — ATORVASTATIN CALCIUM 20 MG/1
TABLET, FILM COATED ORAL
Qty: 90 TABLET | Refills: 3 | Status: SHIPPED | OUTPATIENT
Start: 2023-05-11 | End: 2024-02-23 | Stop reason: SDUPTHER

## 2023-05-11 NOTE — TELEPHONE ENCOUNTER
Medicine Refill Request    Last Office: 2/22/2023   Last Consult Visit: Visit date not found  Last Telemedicine Visit: Visit date not found    Next Appointment: 5/24/2023      Current Outpatient Medications:   •  atorvastatin (LIPITOR) 20 mg tablet, Take 1 tablet (20 mg total) by mouth daily., Disp: 90 tablet, Rfl: 3  •  felodipine (PLENDIL) 10 mg 24 hr tablet, Take 1 tablet (10 mg total) by mouth daily., Disp: 90 tablet, Rfl: 3  •  finasteride (PROSCAR) 5 mg tablet, Take 1 tablet (5 mg total) by mouth daily., Disp: 90 tablet, Rfl: 3  •  losartan (COZAAR) 100 mg tablet, Take 1 tablet (100 mg total) by mouth daily., Disp: 90 tablet, Rfl: 3  •  metFORMIN (GLUCOPHAGE) 500 mg tablet, Take 1 tablet (500 mg total) by mouth 2 (two) times a day with meals., Disp: 180 tablet, Rfl: 3  •  metoprolol tartrate (LOPRESSOR) 50 mg tablet, Take 1 tablet (50 mg total) by mouth 2 (two) times a day., Disp: 180 tablet, Rfl: 3  •  ROCKLATAN 0.02-0.005 % drops, Administer 1 drop into both eyes nightly. Wait a few minutes in between eye drops , Disp: , Rfl:   •  silodosin (RAPAFLO) 8 mg capsule, , Disp: , Rfl:   •  timolol (TIMOPTIC) 0.5 % ophthalmic solution, 1 drop daily., Disp: , Rfl:       BP Readings from Last 3 Encounters:   02/22/23 138/78   11/21/22 140/76   08/16/22 128/64       Recent Lab results:  Lab Results   Component Value Date    CHOL 120 02/22/2023   ,   Lab Results   Component Value Date    HDL 37 (L) 02/22/2023   ,   Lab Results   Component Value Date    LDLCALC 58 02/22/2023   ,   Lab Results   Component Value Date    TRIG 123 02/22/2023        Lab Results   Component Value Date    GLUCOSE 135 (H) 02/22/2023   ,   Lab Results   Component Value Date    HGBA1C 6.6 (H) 02/22/2023         Lab Results   Component Value Date    CREATININE 1.0 02/22/2023       No results found for: TSH

## 2023-05-24 ENCOUNTER — OFFICE VISIT (OUTPATIENT)
Dept: FAMILY MEDICINE | Facility: CLINIC | Age: 73
End: 2023-05-24
Payer: MEDICARE

## 2023-05-24 VITALS
HEIGHT: 71 IN | BODY MASS INDEX: 31.58 KG/M2 | RESPIRATION RATE: 17 BRPM | SYSTOLIC BLOOD PRESSURE: 170 MMHG | DIASTOLIC BLOOD PRESSURE: 86 MMHG | OXYGEN SATURATION: 98 % | HEART RATE: 68 BPM | WEIGHT: 225.6 LBS | TEMPERATURE: 97.9 F

## 2023-05-24 DIAGNOSIS — R26.89 BALANCE DISORDER: ICD-10-CM

## 2023-05-24 DIAGNOSIS — N18.1 TYPE 2 DIABETES MELLITUS WITH STAGE 1 CHRONIC KIDNEY DISEASE, WITHOUT LONG-TERM CURRENT USE OF INSULIN (CMS/HCC)  (CMS/HCC): ICD-10-CM

## 2023-05-24 DIAGNOSIS — I10 HYPERTENSION, BENIGN: Primary | ICD-10-CM

## 2023-05-24 DIAGNOSIS — E87.6 HYPOKALEMIA: ICD-10-CM

## 2023-05-24 DIAGNOSIS — E11.22 TYPE 2 DIABETES MELLITUS WITH STAGE 1 CHRONIC KIDNEY DISEASE, WITHOUT LONG-TERM CURRENT USE OF INSULIN (CMS/HCC)  (CMS/HCC): ICD-10-CM

## 2023-05-24 PROCEDURE — G8754 DIAS BP LESS 90: HCPCS | Performed by: FAMILY MEDICINE

## 2023-05-24 PROCEDURE — G8753 SYS BP > OR = 140: HCPCS | Performed by: FAMILY MEDICINE

## 2023-05-24 PROCEDURE — 99214 OFFICE O/P EST MOD 30 MIN: CPT | Performed by: FAMILY MEDICINE

## 2023-05-24 ASSESSMENT — ENCOUNTER SYMPTOMS
WHEEZING: 0
VOMITING: 0
APPETITE CHANGE: 0
SORE THROAT: 0
NAUSEA: 0
PALPITATIONS: 0
SHORTNESS OF BREATH: 0
CHEST TIGHTNESS: 0
FATIGUE: 0

## 2023-05-24 NOTE — PROGRESS NOTES
"      Subjective      Patient ID: Aneesh Brito is a 73 y.o. male.  1950      Patient follow-up of blood pressure and blood sugar.  Patient with no new medical complaints today.  Patient has been a little concerned about his balance.  He does exercise on a regular basis.  He goes to the [facility].  He has had no falls.  Patient also needs follow-up on potassium level.      The following have been reviewed and updated as appropriate in this visit:        Review of Systems   Constitutional: Negative for appetite change and fatigue.   HENT: Negative for ear pain and sore throat.    Respiratory: Negative for chest tightness, shortness of breath and wheezing.    Cardiovascular: Negative for chest pain, palpitations and leg swelling.   Gastrointestinal: Negative for nausea and vomiting.   Skin: Negative for rash.       Objective     Vitals:    23 1047   BP: (!) 170/86   BP Location: Left upper arm   Patient Position: Sitting   Pulse: 68   Resp: 17   Temp: 36.6 °C (97.9 °F)   TempSrc: Oral   SpO2: 98%   Weight: 102 kg (225 lb 9.6 oz)   Height: 1.803 m (5' 11\")     Body mass index is 31.46 kg/m².    Physical Exam  Vitals reviewed.   Constitutional:       Appearance: He is well-developed.   HENT:      Head: Normocephalic and atraumatic.      Right Ear: Tympanic membrane and ear canal normal.      Left Ear: Tympanic membrane and ear canal normal.      Nose: Nose normal.   Eyes:      Conjunctiva/sclera: Conjunctivae normal.   Neck:      Thyroid: No thyromegaly.   Cardiovascular:      Rate and Rhythm: Normal rate and regular rhythm.      Heart sounds: Normal heart sounds. No murmur heard.  Pulmonary:      Effort: Pulmonary effort is normal.      Breath sounds: Normal breath sounds. No wheezing or rales.   Musculoskeletal:      Cervical back: Normal range of motion and neck supple.      Right lower le+ Pitting Edema present.      Left lower le+ Pitting Edema present.   Neurological:      General: No focal deficit " present.         Assessment/Plan   Diagnoses and all orders for this visit:    Hypertension, benign (Primary)    Type 2 diabetes mellitus with stage 1 chronic kidney disease, without long-term current use of insulin (CMS/[facility])    Hypokalemia  -     Basic metabolic panel    Balance disorder  -     Ambulatory referral to Physical Therapy; Future    Hypertension.  Adequate control.  Patient continue medication reevaluate 3 months  Diabetes recheck A1c today.  Hypokalemia we will recheck potassium level.  Balance disorder refer to physical therapy for balance.

## 2023-05-25 LAB
BUN SERPL-MCNC: 17 MG/DL (ref 7–25)
BUN/CREAT SERPL: ABNORMAL (CALC) (ref 6–22)
CALCIUM SERPL-MCNC: 9.3 MG/DL (ref 8.6–10.3)
CHLORIDE SERPL-SCNC: 99 MMOL/L (ref 98–110)
CO2 SERPL-SCNC: 28 MMOL/L (ref 20–32)
CREAT SERPL-MCNC: 0.85 MG/DL (ref 0.7–1.28)
EGFRCR SERPLBLD CKD-EPI 2021: 91 ML/MIN/1.73M2
GLUCOSE SERPL-MCNC: 141 MG/DL (ref 65–99)
HBA1C MFR BLD: 6.9 % OF TOTAL HGB
POTASSIUM SERPL-SCNC: 3.7 MMOL/L (ref 3.5–5.3)
SODIUM SERPL-SCNC: 136 MMOL/L (ref 135–146)

## 2023-06-18 ENCOUNTER — HOSPITAL ENCOUNTER (INPATIENT)
Facility: HOSPITAL | Age: 73
LOS: 1 days | Discharge: HOME | DRG: 178 | End: 2023-06-20
Attending: EMERGENCY MEDICINE | Admitting: HOSPITALIST
Payer: MEDICARE

## 2023-06-18 ENCOUNTER — APPOINTMENT (EMERGENCY)
Dept: RADIOLOGY | Facility: HOSPITAL | Age: 73
DRG: 178 | End: 2023-06-18
Payer: MEDICARE

## 2023-06-18 DIAGNOSIS — E87.6 HYPOKALEMIA: ICD-10-CM
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
## 2023-06-18 DIAGNOSIS — R53.1 GENERALIZED WEAKNESS: ICD-10-CM

## 2023-06-18 DIAGNOSIS — U07.1 COVID-19: Primary | ICD-10-CM

## 2023-06-18 LAB
ALBUMIN SERPL-MCNC: 3.8 G/DL (ref 3.4–5)
ALP SERPL-CCNC: 82 IU/L (ref 35–126)
ALT SERPL-CCNC: 20 IU/L (ref 16–63)
ANION GAP SERPL CALC-SCNC: 9 MEQ/L (ref 3–15)
AST SERPL-CCNC: 21 IU/L (ref 15–41)
BASOPHILS # BLD: 0.05 K/UL (ref 0.01–0.1)
BASOPHILS NFR BLD: 0.5 %
BILIRUB DIRECT SERPL-MCNC: 0.1 MG/DL
BILIRUB SERPL-MCNC: 0.7 MG/DL (ref 0.3–1.2)
BUN SERPL-MCNC: 11 MG/DL (ref 8–20)
CALCIUM SERPL-MCNC: 8.7 MG/DL (ref 8.9–10.3)
CHLORIDE SERPL-SCNC: 102 MEQ/L (ref 98–109)
CO2 SERPL-SCNC: 25 MEQ/L (ref 22–32)
CREAT SERPL-MCNC: 0.9 MG/DL (ref 0.8–1.3)
DIFFERENTIAL METHOD BLD: ABNORMAL
EOSINOPHIL # BLD: 0.04 K/UL (ref 0.04–0.54)
EOSINOPHIL NFR BLD: 0.4 %
ERYTHROCYTE [DISTWIDTH] IN BLOOD BY AUTOMATED COUNT: 13.5 % (ref 11.6–14.4)
FLUAV RNA SPEC QL NAA+PROBE: NEGATIVE
FLUBV RNA SPEC QL NAA+PROBE: NEGATIVE
GFR SERPL CREATININE-BSD FRML MDRD: >60 ML/MIN/1.73M*2
GLUCOSE SERPL-MCNC: 141 MG/DL (ref 70–99)
HCT VFR BLDCO AUTO: 37.7 % (ref 40.1–51)
HGB BLD-MCNC: 12.8 G/DL (ref 13.7–17.5)
IMM GRANULOCYTES # BLD AUTO: 0.03 K/UL (ref 0–0.08)
IMM GRANULOCYTES NFR BLD AUTO: 0.3 %
LYMPHOCYTES # BLD: 0.87 K/UL (ref 1.2–3.5)
LYMPHOCYTES NFR BLD: 9.1 %
MAGNESIUM SERPL-MCNC: 1.8 MG/DL (ref 1.8–2.5)
MCH RBC QN AUTO: 31.4 PG (ref 28–33.2)
MCHC RBC AUTO-ENTMCNC: 34 G/DL (ref 32.2–36.5)
MCV RBC AUTO: 92.6 FL (ref 83–98)
MONOCYTES # BLD: 0.9 K/UL (ref 0.3–1)
MONOCYTES NFR BLD: 9.5 %
NEUTROPHILS # BLD: 7.63 K/UL (ref 1.7–7)
NEUTS SEG NFR BLD: 80.2 %
NRBC BLD-RTO: 0 %
PDW BLD AUTO: 10.1 FL (ref 9.4–12.4)
PLATELET # BLD AUTO: 256 K/UL (ref 150–350)
POTASSIUM SERPL-SCNC: 2.8 MEQ/L (ref 3.6–5.1)
PROT SERPL-MCNC: 7.4 G/DL (ref 6–8.2)
RBC # BLD AUTO: 4.07 M/UL (ref 4.5–5.8)
RSV RNA SPEC QL NAA+PROBE: NEGATIVE
SARS-COV-2 RNA RESP QL NAA+PROBE: POSITIVE
SODIUM SERPL-SCNC: 136 MEQ/L (ref 136–144)
TROPONIN I SERPL HS-MCNC: 15.1 PG/ML
WBC # BLD AUTO: 9.52 K/UL (ref 3.8–10.5)

## 2023-06-18 PROCEDURE — 96374 THER/PROPH/DIAG INJ IV PUSH: CPT

## 2023-06-18 PROCEDURE — 25800000 HC PHARMACY IV SOLUTIONS: Performed by: EMERGENCY MEDICINE

## 2023-06-18 PROCEDURE — 87040 BLOOD CULTURE FOR BACTERIA: CPT | Performed by: PHYSICIAN ASSISTANT

## 2023-06-18 PROCEDURE — 85025 COMPLETE CBC W/AUTO DIFF WBC: CPT | Performed by: PHYSICIAN ASSISTANT

## 2023-06-18 PROCEDURE — 83735 ASSAY OF MAGNESIUM: CPT | Performed by: PHYSICIAN ASSISTANT

## 2023-06-18 PROCEDURE — 93970 EXTREMITY STUDY: CPT

## 2023-06-18 PROCEDURE — 87637 SARSCOV2&INF A&B&RSV AMP PRB: CPT | Performed by: PHYSICIAN ASSISTANT

## 2023-06-18 PROCEDURE — G1004 CDSM NDSC: HCPCS

## 2023-06-18 PROCEDURE — 84484 ASSAY OF TROPONIN QUANT: CPT | Performed by: PHYSICIAN ASSISTANT

## 2023-06-18 PROCEDURE — 96375 TX/PRO/DX INJ NEW DRUG ADDON: CPT

## 2023-06-18 PROCEDURE — 71045 X-RAY EXAM CHEST 1 VIEW: CPT

## 2023-06-18 PROCEDURE — 93005 ELECTROCARDIOGRAM TRACING: CPT | Performed by: PHYSICIAN ASSISTANT

## 2023-06-18 PROCEDURE — 99285 EMERGENCY DEPT VISIT HI MDM: CPT | Mod: 25

## 2023-06-18 PROCEDURE — 36415 COLL VENOUS BLD VENIPUNCTURE: CPT | Performed by: PHYSICIAN ASSISTANT

## 2023-06-18 PROCEDURE — 80053 COMPREHEN METABOLIC PANEL: CPT | Performed by: PHYSICIAN ASSISTANT

## 2023-06-18 PROCEDURE — 63600000 HC DRUGS/DETAIL CODE: Mod: JZ | Performed by: PHYSICIAN ASSISTANT

## 2023-06-18 RX ORDER — ONDANSETRON HYDROCHLORIDE 2 MG/ML
4 INJECTION, SOLUTION INTRAVENOUS ONCE
Status: COMPLETED | OUTPATIENT
Start: 2023-06-18 | End: 2023-06-18

## 2023-06-18 RX ORDER — POTASSIUM CHLORIDE 14.9 MG/ML
20 INJECTION INTRAVENOUS ONCE
Status: COMPLETED | OUTPATIENT
Start: 2023-06-19 | End: 2023-06-19

## 2023-06-18 RX ORDER — POTASSIUM CHLORIDE 14.9 MG/ML
20 INJECTION INTRAVENOUS ONCE
Status: COMPLETED | OUTPATIENT
Start: 2023-06-18 | End: 2023-06-19

## 2023-06-18 RX ADMIN — POTASSIUM CHLORIDE 20 MEQ: 14.9 INJECTION, SOLUTION INTRAVENOUS at 22:58

## 2023-06-18 RX ADMIN — ONDANSETRON 4 MG: 2 INJECTION INTRAMUSCULAR; INTRAVENOUS at 22:29

## 2023-06-18 RX ADMIN — SODIUM CHLORIDE 500 ML: 9 INJECTION, SOLUTION INTRAVENOUS at 22:59

## 2023-06-18 ASSESSMENT — ENCOUNTER SYMPTOMS
DIZZINESS: 0
COUGH: 0
FREQUENCY: 1
DIARRHEA: 0
HEADACHES: 0
NAUSEA: 1
SHORTNESS OF BREATH: 0
CHILLS: 1
FATIGUE: 1
WEAKNESS: 1
VOMITING: 1

## 2023-06-19 PROBLEM — R53.1 GENERALIZED WEAKNESS: Status: ACTIVE | Noted: 2023-06-19

## 2023-06-19 PROBLEM — U07.1 COVID-19 VIRUS INFECTION: Status: ACTIVE | Noted: 2023-06-19

## 2023-06-19 LAB
ALBUMIN SERPL-MCNC: 3.6 G/DL (ref 3.4–5)
ALP SERPL-CCNC: 81 IU/L (ref 35–126)
ALT SERPL-CCNC: 18 IU/L (ref 16–63)
ANION GAP SERPL CALC-SCNC: 10 MEQ/L (ref 3–15)
ANION GAP SERPL CALC-SCNC: 6 MEQ/L (ref 3–15)
AST SERPL-CCNC: 20 IU/L (ref 15–41)
BACTERIA URNS QL MICRO: NORMAL /HPF
BASOPHILS # BLD: 0.06 K/UL (ref 0.01–0.1)
BASOPHILS NFR BLD: 0.7 %
BILIRUB SERPL-MCNC: 0.7 MG/DL (ref 0.3–1.2)
BILIRUB UR QL STRIP.AUTO: NEGATIVE MG/DL
BUN SERPL-MCNC: 11 MG/DL (ref 8–20)
BUN SERPL-MCNC: 11 MG/DL (ref 8–20)
CALCIUM SERPL-MCNC: 8.5 MG/DL (ref 8.9–10.3)
CALCIUM SERPL-MCNC: 8.7 MG/DL (ref 8.9–10.3)
CHLORIDE SERPL-SCNC: 103 MEQ/L (ref 98–109)
CHLORIDE SERPL-SCNC: 106 MEQ/L (ref 98–109)
CLARITY UR REFRACT.AUTO: CLEAR
CO2 SERPL-SCNC: 25 MEQ/L (ref 22–32)
CO2 SERPL-SCNC: 26 MEQ/L (ref 22–32)
COLOR UR AUTO: YELLOW
CREAT SERPL-MCNC: 0.8 MG/DL (ref 0.8–1.3)
CREAT SERPL-MCNC: 0.9 MG/DL (ref 0.8–1.3)
DIFFERENTIAL METHOD BLD: ABNORMAL
EOSINOPHIL # BLD: 0.02 K/UL (ref 0.04–0.54)
EOSINOPHIL NFR BLD: 0.2 %
ERYTHROCYTE [DISTWIDTH] IN BLOOD BY AUTOMATED COUNT: 13.7 % (ref 11.6–14.4)
GFR SERPL CREATININE-BSD FRML MDRD: >60 ML/MIN/1.73M*2
GFR SERPL CREATININE-BSD FRML MDRD: >60 ML/MIN/1.73M*2
GLUCOSE BLD-MCNC: 141 MG/DL (ref 70–99)
GLUCOSE BLD-MCNC: 175 MG/DL (ref 70–99)
GLUCOSE BLD-MCNC: 200 MG/DL (ref 70–99)
GLUCOSE BLD-MCNC: 270 MG/DL (ref 70–99)
GLUCOSE SERPL-MCNC: 124 MG/DL (ref 70–99)
GLUCOSE SERPL-MCNC: 144 MG/DL (ref 70–99)
GLUCOSE UR STRIP.AUTO-MCNC: NEGATIVE MG/DL
HCT VFR BLDCO AUTO: 36.8 % (ref 40.1–51)
HGB BLD-MCNC: 12.4 G/DL (ref 13.7–17.5)
HGB UR QL STRIP.AUTO: NEGATIVE
HYALINE CASTS #/AREA URNS LPF: NORMAL /LPF
IMM GRANULOCYTES # BLD AUTO: 0.03 K/UL (ref 0–0.08)
IMM GRANULOCYTES NFR BLD AUTO: 0.4 %
KETONES UR STRIP.AUTO-MCNC: NEGATIVE MG/DL
LEUKOCYTE ESTERASE UR QL STRIP.AUTO: NEGATIVE
LYMPHOCYTES # BLD: 1.58 K/UL (ref 1.2–3.5)
LYMPHOCYTES NFR BLD: 19.1 %
MAGNESIUM SERPL-MCNC: 1.8 MG/DL (ref 1.8–2.5)
MCH RBC QN AUTO: 31.4 PG (ref 28–33.2)
MCHC RBC AUTO-ENTMCNC: 33.7 G/DL (ref 32.2–36.5)
MCV RBC AUTO: 93.2 FL (ref 83–98)
MONOCYTES # BLD: 1.02 K/UL (ref 0.3–1)
MONOCYTES NFR BLD: 12.3 %
NEUTROPHILS # BLD: 5.55 K/UL (ref 1.7–7)
NEUTS SEG NFR BLD: 67.3 %
NITRITE UR QL STRIP.AUTO: NEGATIVE
NRBC BLD-RTO: 0 %
PDW BLD AUTO: 10.8 FL (ref 9.4–12.4)
PH UR STRIP.AUTO: 7 [PH]
PLATELET # BLD AUTO: 257 K/UL (ref 150–350)
POCT TEST: ABNORMAL
POTASSIUM SERPL-SCNC: 3.1 MEQ/L (ref 3.6–5.1)
POTASSIUM SERPL-SCNC: 3.3 MEQ/L (ref 3.6–5.1)
PROT SERPL-MCNC: 6.9 G/DL (ref 6–8.2)
PROT UR QL STRIP.AUTO: 3
RBC # BLD AUTO: 3.95 M/UL (ref 4.5–5.8)
RBC #/AREA URNS HPF: NORMAL /HPF
SODIUM SERPL-SCNC: 138 MEQ/L (ref 136–144)
SODIUM SERPL-SCNC: 138 MEQ/L (ref 136–144)
SP GR UR REFRACT.AUTO: 1.01
SQUAMOUS URNS QL MICRO: NORMAL /HPF
TROPONIN I SERPL HS-MCNC: 14.3 PG/ML
UROBILINOGEN UR STRIP-ACNC: 0.2 EU/DL
WBC # BLD AUTO: 8.26 K/UL (ref 3.8–10.5)
WBC #/AREA URNS HPF: NORMAL /HPF

## 2023-06-19 PROCEDURE — 63600000 HC DRUGS/DETAIL CODE: Mod: JZ | Performed by: PHYSICIAN ASSISTANT

## 2023-06-19 PROCEDURE — 63600000 HC DRUGS/DETAIL CODE: Performed by: HOSPITALIST

## 2023-06-19 PROCEDURE — 84484 ASSAY OF TROPONIN QUANT: CPT | Performed by: PHYSICIAN ASSISTANT

## 2023-06-19 PROCEDURE — 63700000 HC SELF-ADMINISTRABLE DRUG: Performed by: HOSPITALIST

## 2023-06-19 PROCEDURE — 20600000 HC ROOM AND CARE INTERMEDIATE/TELEMETRY

## 2023-06-19 PROCEDURE — 80048 BASIC METABOLIC PNL TOTAL CA: CPT | Performed by: STUDENT IN AN ORGANIZED HEALTH CARE EDUCATION/TRAINING PROGRAM

## 2023-06-19 PROCEDURE — 63600000 HC DRUGS/DETAIL CODE: Mod: JZ | Performed by: HOSPITALIST

## 2023-06-19 PROCEDURE — 80053 COMPREHEN METABOLIC PANEL: CPT | Performed by: HOSPITALIST

## 2023-06-19 PROCEDURE — 63600000 HC DRUGS/DETAIL CODE: Mod: JZ | Performed by: STUDENT IN AN ORGANIZED HEALTH CARE EDUCATION/TRAINING PROGRAM

## 2023-06-19 PROCEDURE — 85025 COMPLETE CBC W/AUTO DIFF WBC: CPT | Performed by: HOSPITALIST

## 2023-06-19 PROCEDURE — 99223 1ST HOSP IP/OBS HIGH 75: CPT | Performed by: HOSPITALIST

## 2023-06-19 PROCEDURE — 63700000 HC SELF-ADMINISTRABLE DRUG: Performed by: STUDENT IN AN ORGANIZED HEALTH CARE EDUCATION/TRAINING PROGRAM

## 2023-06-19 PROCEDURE — 83735 ASSAY OF MAGNESIUM: CPT | Performed by: HOSPITALIST

## 2023-06-19 PROCEDURE — 36415 COLL VENOUS BLD VENIPUNCTURE: CPT | Performed by: PHYSICIAN ASSISTANT

## 2023-06-19 PROCEDURE — 81001 URINALYSIS AUTO W/SCOPE: CPT | Performed by: PHYSICIAN ASSISTANT

## 2023-06-19 RX ORDER — FINASTERIDE 5 MG/1
5 TABLET, FILM COATED ORAL DAILY
Status: DISCONTINUED | OUTPATIENT
Start: 2023-06-19 | End: 2023-06-20 | Stop reason: HOSPADM

## 2023-06-19 RX ORDER — DEXTROSE 40 %
15-30 GEL (GRAM) ORAL AS NEEDED
Status: DISCONTINUED | OUTPATIENT
Start: 2023-06-19 | End: 2023-06-20 | Stop reason: HOSPADM

## 2023-06-19 RX ORDER — LOSARTAN POTASSIUM 100 MG/1
100 TABLET ORAL DAILY
Status: DISCONTINUED | OUTPATIENT
Start: 2023-06-19 | End: 2023-06-20

## 2023-06-19 RX ORDER — DEXTROSE 50 % IN WATER (D50W) INTRAVENOUS SYRINGE
25 AS NEEDED
Status: DISCONTINUED | OUTPATIENT
Start: 2023-06-19 | End: 2023-06-20 | Stop reason: HOSPADM

## 2023-06-19 RX ORDER — POTASSIUM CHLORIDE 14.9 MG/ML
20 INJECTION INTRAVENOUS ONCE
Status: COMPLETED | OUTPATIENT
Start: 2023-06-19 | End: 2023-06-19

## 2023-06-19 RX ORDER — METOPROLOL TARTRATE 50 MG/1
50 TABLET ORAL 2 TIMES DAILY
Status: DISCONTINUED | OUTPATIENT
Start: 2023-06-19 | End: 2023-06-20 | Stop reason: HOSPADM

## 2023-06-19 RX ORDER — FELODIPINE 5 MG/1
10 TABLET, EXTENDED RELEASE ORAL DAILY
Status: DISCONTINUED | OUTPATIENT
Start: 2023-06-19 | End: 2023-06-20

## 2023-06-19 RX ORDER — POTASSIUM CHLORIDE 14.9 MG/ML
20 INJECTION INTRAVENOUS ONCE
Status: COMPLETED | OUTPATIENT
Start: 2023-06-19 | End: 2023-06-20

## 2023-06-19 RX ORDER — ACETAMINOPHEN 325 MG/1
650 TABLET ORAL EVERY 4 HOURS PRN
Status: DISCONTINUED | OUTPATIENT
Start: 2023-06-19 | End: 2023-06-20 | Stop reason: HOSPADM

## 2023-06-19 RX ORDER — ONDANSETRON 4 MG/1
4 TABLET, ORALLY DISINTEGRATING ORAL EVERY 8 HOURS PRN
Status: DISCONTINUED | OUTPATIENT
Start: 2023-06-19 | End: 2023-06-20 | Stop reason: HOSPADM

## 2023-06-19 RX ORDER — IBUPROFEN 200 MG
16-32 TABLET ORAL AS NEEDED
Status: DISCONTINUED | OUTPATIENT
Start: 2023-06-19 | End: 2023-06-20 | Stop reason: HOSPADM

## 2023-06-19 RX ORDER — ATORVASTATIN CALCIUM 20 MG/1
20 TABLET, FILM COATED ORAL DAILY
Status: DISCONTINUED | OUTPATIENT
Start: 2023-06-19 | End: 2023-06-20 | Stop reason: HOSPADM

## 2023-06-19 RX ORDER — ONDANSETRON HYDROCHLORIDE 2 MG/ML
4 INJECTION, SOLUTION INTRAVENOUS EVERY 8 HOURS PRN
Status: DISCONTINUED | OUTPATIENT
Start: 2023-06-19 | End: 2023-06-20 | Stop reason: HOSPADM

## 2023-06-19 RX ORDER — INSULIN LISPRO 100 [IU]/ML
2-12 INJECTION, SOLUTION INTRAVENOUS; SUBCUTANEOUS
Status: DISCONTINUED | OUTPATIENT
Start: 2023-06-19 | End: 2023-06-20 | Stop reason: HOSPADM

## 2023-06-19 RX ORDER — POTASSIUM CHLORIDE 750 MG/1
40 TABLET, EXTENDED RELEASE ORAL 2 TIMES DAILY
Status: DISCONTINUED | OUTPATIENT
Start: 2023-06-19 | End: 2023-06-20 | Stop reason: HOSPADM

## 2023-06-19 RX ADMIN — INSULIN LISPRO 2 UNITS: 100 INJECTION, SOLUTION INTRAVENOUS; SUBCUTANEOUS at 12:28

## 2023-06-19 RX ADMIN — POTASSIUM CHLORIDE 40 MEQ: 750 TABLET, EXTENDED RELEASE ORAL at 20:21

## 2023-06-19 RX ADMIN — METOPROLOL TARTRATE 50 MG: 50 TABLET, FILM COATED ORAL at 20:21

## 2023-06-19 RX ADMIN — POTASSIUM CHLORIDE 20 MEQ: 14.9 INJECTION, SOLUTION INTRAVENOUS at 10:13

## 2023-06-19 RX ADMIN — POTASSIUM CHLORIDE 20 MEQ: 14.9 INJECTION, SOLUTION INTRAVENOUS at 01:03

## 2023-06-19 RX ADMIN — POTASSIUM CHLORIDE 20 MEQ: 14.9 INJECTION, SOLUTION INTRAVENOUS at 22:30

## 2023-06-19 RX ADMIN — POTASSIUM CHLORIDE 20 MEQ: 14.9 INJECTION, SOLUTION INTRAVENOUS at 12:28

## 2023-06-19 RX ADMIN — POTASSIUM CHLORIDE 20 MEQ: 14.9 INJECTION, SOLUTION INTRAVENOUS at 20:22

## 2023-06-19 RX ADMIN — FINASTERIDE 5 MG: 5 TABLET, FILM COATED ORAL at 10:11

## 2023-06-19 RX ADMIN — LOSARTAN POTASSIUM 100 MG: 100 TABLET, FILM COATED ORAL at 10:11

## 2023-06-19 RX ADMIN — METOPROLOL TARTRATE 50 MG: 50 TABLET, FILM COATED ORAL at 10:11

## 2023-06-19 RX ADMIN — ATORVASTATIN CALCIUM 20 MG: 20 TABLET, FILM COATED ORAL at 10:11

## 2023-06-19 RX ADMIN — INSULIN LISPRO 2 UNITS: 100 INJECTION, SOLUTION INTRAVENOUS; SUBCUTANEOUS at 22:30

## 2023-06-19 ASSESSMENT — COGNITIVE AND FUNCTIONAL STATUS - GENERAL
CLIMB 3 TO 5 STEPS WITH RAILING: 3 - A LITTLE
WALKING IN HOSPITAL ROOM: 3 - A LITTLE
MOVING TO AND FROM BED TO CHAIR: 4 - NONE
STANDING UP FROM CHAIR USING ARMS: 4 - NONE

## 2023-06-19 NOTE — ASSESSMENT & PLAN NOTE
Assessment: Patient was hypertensive on arrival to the ER.  Currently blood pressure is improved at 153/78.    Plan:  Monitor blood pressures closely.  Continue metoprolol tartrate 50 mg twice daily.  Continue losartan 100 mg daily.  Continue felodipine 10 mg daily.

## 2023-06-19 NOTE — PLAN OF CARE
Care Coordination Admission Assessment Note    General Information:  Readmission Within the last 30 days: no previous admission in last 30 days  Does patient have a : No  Patient-Specific Goals (include timeframe): Return home with wife    Living Arrangements:  Arrived From: home  Current Living Arrangements: home  People in Home: spouse  Home Accessibility: stairs to enter home (Group) (8)  Living Arrangement Comments: 2 st house, no NE on 1st fl, 2nd fl BR shower with bars, owns a shower chair if needed    Housing Stability and Financial Resources (SDOH):  In the last 12 months, was there a time when you were not able to pay the mortgage or rent on time?: No  In the last 12 months, how many places have you lived?: 1  In the last 12 months, was there a time when you did not have a steady place to sleep or slept in a shelter (including now)?: No  How hard is it for you to pay for the very basics like food, housing, medical care, and heating?: Not hard at all    Functional Status Prior to Admission:   Assistive Device/Animal Currently Used at Home: cane, straight, grab bar, walker, front-wheeled  Functional Status Comments: Independent, Amb with cane, rarely uses a walker  IADL Comments: Ind with ADLs     Supports and Services:  Current Outpatient/Agency/Support Group: none  Type of Current Home Care Services:    History of home care episode or rehab stay: No hx HC or SNF    Discharge Needs Assessment:   Concerns to be Addressed: discharge planning, care coordination/care conferences  Current Discharge Risk:    Anticipated Changes Related to Illness: none    Patient/Family Anticipated Discharge Plan:  Patient/Family Anticipates Transition To: home with family  Patient/Family Anticipated Services at Transition: none    Patient Choice:   Offered/Gave Vendor List:    Patient's Choice of Community Agency(s):         Anticipated Discharge Plan:  Met with patient. Provided education and contact information  for Care Coordination services.: yes  Anticipated Discharge Disposition: home without assistance or services     Transportation Needs (SDOH):  Transportation Concerns: none  Transportation Anticipated: family or friend will provide  Is Out of Hospital DNR needed at discharge?: no    In the past 12 months, has lack of transportation kept you from medical appointments or from getting medications?: No  In the past 12 months, has lack of transportation kept you from meetings, work, or from getting things needed for daily living?: No    Concerns - comments: denies concerns     Dispo: home  Trans: wife

## 2023-06-19 NOTE — ED ATTESTATION NOTE
I have personally seen and examined the patient. I personally performed the key components of the encounter and provided a substantive portion of the care and medical decision making. I reviewed and agree with the physician assistant’s assessment and plan of care, except as where stated below.    My examination, assessment, and plan of care of Aneesh Brito is as follows:     Patient is a 73-year-old male who presents the emergency department with generalized weakness, inability to ambulate.  Wife is at bedside and provides much of the history.  [place] [employer]  At baseline, the patient is able to ambulate independently.  He seemed to be okay this morning but this afternoon he got up and went to walk to the bathroom.  His legs gave out, he fell to the ground and urinated himself.  EMS came to the house, was able to get him up and back into bed.  He refused evaluation in the hospital at that time.  However, he since has not been able to get out of bed.  He has no complaints of pain.  States that he got a cold on the cruise.  No complaints of diarrhea.  No fevers.    On exam, patient is awake, alert, tired appearing but in no acute distress.  He answers questions appropriately.  Heart rate is normal, rhythm is regular.  Respirations are nonlabored, lungs are clear.  Abdomen is soft, nontender.  He has bilateral lower extremity edema.  Right lower extremity is slightly more erythematous than the left but minimally so.  He has 5/5 strength in both legs.    On arrival, the patient is hypertensive.  Temperature is 100 °F.  Work-up was obtained as noted in the chart.  He is hypokalemic with potassium of 2.8.  Patient also found to be COVID-positive.  Suspect that this is likely the main cause of the patient's symptoms.  Will replete potassium.  With hypokalemia, added on magnesium level as well.  Family is concerned about blood clots given the recent travel.  Will check ultrasounds of the  legs.  He has no chest pain, shortness of breath.  Have a lower concern for PE.  With the fall earlier today and incontinence, will check a CT of the head and urinalysis as well     Kirstie Saleh MD  06/18/23 0557

## 2023-06-19 NOTE — H&P
"   Hospital Medicine Service -  History & Physical        CHIEF COMPLAINT   \"Generalized weakness\"     HISTORY OF PRESENT ILLNESS      Aneesh Brito is a 73 y.o. male with a past medical history of melanoma, diabetes mellitus type 2, spinal stenosis of lumbar region, hypertension and BPH presents from home with a complaint of generalized weakness has gotten progressively worse today associated with cough.  Patient returned from a cruise to [place] yesterday and reports that he was feeling well, however, this morning started to feel weak, malaise and chills.  Patient had a fall at home today that required the [employer] to come to the home and help him off of the ground.  Patient was not brought into the hospital at that time because he felt \"okay\".  As the day progressed the patient's symptoms became worse to the point where he was unable to get up from sitting.  At baseline patient does not require any assistance for ambulation but did take a cane on the cruise to make sure he would have balance.  Today patient required significant assistance for ambulation and for getting up from sitting.  Patient denies any  chest pain, palpitations, lightheadedness, dizziness, headache, vision changes, neck stiffness, shortness of breath, abdominal pain, dysuria, constipation, rash, joint aches or pains.  He has some diarrhea and had 2 episodes of vomiting today.  Patient initially had a small 1 followed by a larger episode of emesis.  Patient's wife is a sick contact as she also had similar symptoms that started before the patient's symptoms started.  No recent changes in medications.    In the ER, patient is noted to be COVID-19 positive.  In addition his potassium level is noted to be severely low at 2.8.    PAST MEDICAL AND SURGICAL HISTORY      Past Medical History:   Diagnosis Date   • Colonic adenoma    • Diverticulosis of colon    • Enlarged prostate with lower urinary tract symptoms (LUTS)    • Glaucoma    • " Hypertension    • Melanoma of skin (CMS/HCC)    • Osteoarthritis of knee    • Overweight    • Spinal stenosis of lumbar region    • Type 2 diabetes mellitus (CMS/HCC)        Past Surgical History:   Procedure Laterality Date   • APPENDECTOMY     • COLONOSCOPY  01/10/2012    diverticulosis   • COLONOSCOPY W/ POLYPECTOMY  02/09/2022   • HIP ARTHROPLASTY Bilateral    • KNEE ARTHROPLASTY Left    • KNEE ARTHROPLASTY Right 02/02/2022       PCP: Kamar Palacios MD    MEDICATIONS      Prior to Admission medications    Medication Sig Start Date End Date Taking? Authorizing Provider   atorvastatin (LIPITOR) 20 mg tablet TAKE 1 TABLET BY MOUTH DAILY 5/11/23   Kamar Palacios MD   felodipine (PLENDIL) 10 mg 24 hr tablet Take 1 tablet (10 mg total) by mouth daily. 8/16/22 5/24/23  Kamar Palacios MD   finasteride (PROSCAR) 5 mg tablet Take 1 tablet (5 mg total) by mouth daily. 2/22/23 5/24/23  Kamar Palacios MD   losartan (COZAAR) 100 mg tablet Take 1 tablet (100 mg total) by mouth daily. 2/22/23 5/24/23  Kamar Palacios MD   metFORMIN (GLUCOPHAGE) 500 mg tablet Take 1 tablet (500 mg total) by mouth 2 (two) times a day with meals. 2/22/23 5/24/23  Kamar Palacios MD   metoprolol tartrate (LOPRESSOR) 50 mg tablet Take 1 tablet (50 mg total) by mouth 2 (two) times a day. 2/22/23 5/24/23  Kamar Palacios MD   ROCKLATAN 0.02-0.005 % drops Administer 1 drop into both eyes nightly. Wait a few minutes in between eye drops  10/21/21   Patience Saini MD   silodosin (RAPAFLO) 8 mg capsule  10/7/22   [facility] MD Patience   timolol (TIMOPTIC) 0.5 % ophthalmic solution 1 drop daily.    Patience Saini MD       ALLERGIES      No known allergies    FAMILY HISTORY      Family History   Problem Relation Age of Onset   • Breast cancer Biological Mother        SOCIAL HISTORY      Social History     Socioeconomic History   • Marital status:      Spouse name: None   • Number of children: None   • Years of  education: None   • Highest education level: None   Occupational History   • Occupation: retired teacher   Tobacco Use   • Smoking status: Never   • Smokeless tobacco: Never   Substance and Sexual Activity   • Alcohol use: Yes     Comment: BEER, 1 CONSUMED DAILY   • Drug use: Never     Social Determinants of Health     Food Insecurity: No Food Insecurity (6/18/2023)    Hunger Vital Sign    • Worried About Running Out of Food in the Last Year: Never true    • Ran Out of Food in the Last Year: Never true       REVIEW OF SYSTEMS      All other systems reviewed and negative except as noted in HPI    PHYSICAL EXAMINATION      Temp:  [37.4 °C (99.3 °F)-37.8 °C (100 °F)] 37.4 °C (99.3 °F)  Heart Rate:  [80-96] 80  Resp:  [16-18] 18  BP: (153-220)/() 153/78  Body mass index is 31.93 kg/m².    Physical Exam  Vitals and nursing note reviewed.   Constitutional:       General: He is not in acute distress.     Appearance: He is obese. He is not toxic-appearing or diaphoretic.   HENT:      Head: Normocephalic and atraumatic.      Right Ear: External ear normal.      Left Ear: External ear normal.      Nose: Nose normal. No congestion or rhinorrhea.      Mouth/Throat:      Mouth: Mucous membranes are moist.   Eyes:      General: No scleral icterus.     Conjunctiva/sclera: Conjunctivae normal.      Pupils: Pupils are equal, round, and reactive to light.   Cardiovascular:      Rate and Rhythm: Normal rate. Rhythm irregular.   Pulmonary:      Effort: Pulmonary effort is normal. No respiratory distress.      Breath sounds: Normal breath sounds. No wheezing, rhonchi or rales.   Chest:      Chest wall: No tenderness.   Abdominal:      General: Bowel sounds are normal. There is no distension.      Palpations: Abdomen is soft.      Tenderness: There is no abdominal tenderness. There is no right CVA tenderness, left CVA tenderness, guarding or rebound.   Genitourinary:     Comments: Deferred  Musculoskeletal:      Cervical back:  Normal range of motion and neck supple. No rigidity or tenderness.      Right lower leg: Edema present.      Left lower leg: Edema present.      Comments: +1 pitting edema to mid shin level   Lymphadenopathy:      Cervical: No cervical adenopathy.   Skin:     General: Skin is warm and dry.      Coloration: Skin is not jaundiced.      Findings: No rash.   Neurological:      General: No focal deficit present.      Mental Status: He is alert and oriented to person, place, and time. Mental status is at baseline.      Cranial Nerves: No cranial nerve deficit.      Sensory: No sensory deficit.      Motor: No weakness.      Coordination: Coordination normal.   Psychiatric:         Mood and Affect: Mood normal.         Behavior: Behavior normal.         LABS / IMAGING / EKG        Labs  I have reviewed the patient's pertinent labs.  Significant abnormals are Potassium 2.8, hemoglobin 12.8, high sensitive troponin 15.1.  Lab Results   Component Value Date    GLUCOSE 141 (H) 06/18/2023    CALCIUM 8.7 (L) 06/18/2023     06/18/2023    K 2.8 (LL) 06/18/2023    CO2 25 06/18/2023     06/18/2023    BUN 11 06/18/2023    CREATININE 0.9 06/18/2023     Lab Results   Component Value Date    ALT 20 06/18/2023    AST 21 06/18/2023    ALKPHOS 82 06/18/2023    BILITOT 0.7 06/18/2023     Lab Results   Component Value Date    WBC 9.52 06/18/2023    HGB 12.8 (L) 06/18/2023    HCT 37.7 (L) 06/18/2023    MCV 92.6 06/18/2023     06/18/2023     Troponin I Results       06/19/23 06/18/23 01/11/22     0008 2207 0108    HS Troponin I 14.3 15.1 14.20            Imaging  I have independently reviewed the pertinent imaging from the last 24 hrs.    CT head  The ventricular system and sulci reflect mild cerebral and cerebellar volume  loss.  There is no midline shift, mass effect, evidence acute hemorrhage, or  acute transcortical infarct.  There is mild to moderate nonspecific  periventricular and subcortical white matter  hypoattenuation.  There is no  depressed skull fracture.  There are vascular calcifications.  Thinning of the  lenses of the globes bilaterally.  Mild bilateral ethmoid and sphenoid sinus  mucosal thickening.  Small partly seen fluid level in the right maxillary sinus.  Correlate for possible acute on chronic sinusitis.     --  IMPRESSION:     No acute intracranial lesion.     Atrophy, nonspecific white matter changes probably secondary to chronic small  vessel ischemia and atherosclerotic vascular calcification.  Sinus disease,  possible acute on chronic sinusitis.    Chest x-ray  ?  Right lower lobe infiltrate  SARS-CoV-2 (COVID-19) (no units)   Date/Time Value   06/18/2023 2209 Positive (A)       ECG/Telemetry  I have independently reviewed the ECG. Significant findings include Sinus rhythm with first-degree AV block, nonspecific ST-T wave changes    ASSESSMENT AND PLAN           Hypokalemia  Assessment & Plan  Assessment: Potassium 2.8, secondary to GI loss due to diarrhea related to COVID-19 infection.  Contributing factor to generalized weakness.    Plan:  We will monitor patient closely on telemetry.  Will replete and check BMP in AM. Check Magnesium level and replete as needed.        * Generalized weakness  Assessment & Plan  Assessment: Suspect generalized weakness in the setting of acute COVID-19 infection.  Patient is not hypoxic and does not have significant GI symptoms at this time.  Also contributed to by hypokalemia.    Plan:  Will admit and monitor patient closely on telemetry.  Replete potassium level and monitor closely for clinical improvement.  Control underlying nausea as contributing to GI loss for hypokalemia.  PT/OT evaluation.  Fall precautions and aspiration precautions.      COVID-19 virus infection  Assessment & Plan  Assessment: Patient is fully vaccinated.  Currently COVID-19 positive.  Patient has some nausea and vomiting with intermittent diarrhea, however, no profuse diarrhea or  abdominal pain.  No hypoxia present.    Plan:  We will monitor patient closely on telemetry.  Monitor closely for hypoxia.  Supplemental oxygen to maintain O2 sats greater than 92%.  Hold IV Decadron in the setting of no hypoxia.      Type 2 diabetes mellitus with diabetic chronic kidney disease (CMS/[facility])  Assessment & Plan  Lab Results   Component Value Date    HGBA1C 6.9 (H) 05/24/2023     Plan:  Accu-Cheks ACHS.  Sliding scale insulin ACHS.  No concentrated sweets diet.  Hold metformin 500 mg twice daily.    Hypertension, benign  Assessment & Plan  Assessment: Patient was hypertensive on arrival to the ER.  Currently blood pressure is improved at 153/78.    Plan:  Monitor blood pressures closely.  Continue metoprolol tartrate 50 mg twice daily.  Continue losartan 100 mg daily.  Continue felodipine 10 mg daily.       VTE Assessment: [place] VTE Score: 6  VTE Prophylaxis: Heparin  Code Status: Full Code      Discussed advanced care planning.  Patient confirms that he is full code.  Extended Emergency Contact Information  Primary Emergency Contact: Yas Brito  Address: [address] [place] [address] of [place]  Home Phone: [phone]  Mobile Phone: [phone]  Relation: Designated Lay Caregiver    Estimated Discharge Date: 6/21/2023  Disposition Planning: Anticipate inpatient hospitalization for 48 to 72 hours.     Chris Anderson MD  6/19/2023

## 2023-06-19 NOTE — ASSESSMENT & PLAN NOTE
Assessment: Patient is fully vaccinated.  Currently COVID-19 positive.  Patient has some nausea and vomiting with intermittent diarrhea, however, no profuse diarrhea or abdominal pain.  No hypoxia present.    Plan:  We will monitor patient closely on telemetry.  Monitor closely for hypoxia.  Supplemental oxygen to maintain O2 sats greater than 92%.  Hold IV Decadron in the setting of no hypoxia.

## 2023-06-19 NOTE — ASSESSMENT & PLAN NOTE
Lab Results   Component Value Date    HGBA1C 6.9 (H) 05/24/2023     Plan:  Accu-Cheks ACHS.  Sliding scale insulin ACHS.  No concentrated sweets diet.  Hold metformin 500 mg twice daily.

## 2023-06-19 NOTE — PLAN OF CARE
Patient was seen and examined at bedside. No acute events overnight.  Reports feeling improved.  Nausea, vomiting resolved.  Diarrhea improved.  Admits mild upper respiratory symptoms but tolerable.  Not short of breath, no fever or chills    I have reviewed all pertinent labs, vitals, telemetry, and imaging.    Physical Exam:  General: NAD, pleasant, none toxic, appears tired, well-developed, sitting up in bed comfortably  HEENT: atraumatic, normocephalic, dry mm, sclera anicteric  Cardiovascular: RRR, no murmurs; S1/S2+  Pulmonary: CTAB; no rales, rhonchi or wheezing  Gi: Soft, nontender, nondistended, BS present  Ext: trace peripheral edema  Neuro: Alert and oriented x3; sensations and motor function grossly intact  Psych: Calm, cooperative, appropriate answers      A/P:  Hypokalemia -persistent, aggressive repletion IV and p.o., follow-up BMP in a.m. with goal K greater than 3.5  COVID-19 infection -mild symptoms mostly GI related.  Supportive care, no oxygen requirement    Remaining plan per H&P      Caprice De Leon,   6/19/2023

## 2023-06-19 NOTE — ED PROVIDER NOTES
Emergency Medicine Note  HPI   HISTORY OF PRESENT ILLNESS     73 year old male with a history of diabetes, hypertension presents to the ER for evaluation. Patient returned home yesterday [place] cruise. He woke up feeling okay today but this afternoon was walking to the bathroom and his knee buckled causing him to fall. No head trauma. Since falling hes felt generally very weak he also is nauseas and vomiting. His wife was sick with a cold after the cruise. He denies any headche, chest pain, abdominal pain. No diarrhea. His legs are swollen which is chronic. He denies any pain from falling. He has a  Low grade temp in the ER             Patient History   PAST HISTORY     Reviewed from Nursing Triage:       Past Medical History:   Diagnosis Date   • Colonic adenoma    • Diverticulosis of colon    • Enlarged prostate with lower urinary tract symptoms (LUTS)    • Glaucoma    • Hypertension    • Melanoma of skin (CMS/HCC)    • Osteoarthritis of knee    • Overweight    • Spinal stenosis of lumbar region    • Type 2 diabetes mellitus (CMS/HCC)        Past Surgical History:   Procedure Laterality Date   • APPENDECTOMY     • COLONOSCOPY  01/10/2012    diverticulosis   • COLONOSCOPY W/ POLYPECTOMY  02/09/2022   • HIP ARTHROPLASTY Bilateral    • KNEE ARTHROPLASTY Left    • KNEE ARTHROPLASTY Right 02/02/2022       Family History   Problem Relation Age of Onset   • Breast cancer Biological Mother        Social History     Tobacco Use   • Smoking status: Never   • Smokeless tobacco: Never   Substance Use Topics   • Alcohol use: Yes     Comment: BEER, 1 CONSUMED DAILY   • Drug use: Never         Review of Systems   REVIEW OF SYSTEMS     Review of Systems   Constitutional: Positive for chills and fatigue.   Respiratory: Negative for cough and shortness of breath.    Cardiovascular: Negative for chest pain.   Gastrointestinal: Positive for nausea and vomiting. Negative for diarrhea.   Genitourinary: Positive for frequency.    Neurological: Positive for weakness. Negative for dizziness, syncope and headaches.         VITALS     ED Vitals    Date/Time Temp Pulse Resp BP SpO2 [facility]   06/19/23 0242 37.2 °C (99 °F) 80 18 178/79 95 % MG   06/19/23 0105 -- 80 16 190/88 96 % MG   06/19/23 0003 37.4 °C (99.3 °F) 80 18 153/78 97 % MG   06/18/23 2205 -- 86 16 179/88 96 % MG   06/18/23 2154 37.8 °C (100 °F) 96 18 220/117 96 % MG        Pulse Ox %: 96 % (06/18/23 2216)  Pulse Ox Interpretation: Normal (06/18/23 2216)           Physical Exam   PHYSICAL EXAM     Physical Exam  Constitutional:       General: He is not in acute distress.     Appearance: Normal appearance. He is ill-appearing. He is not toxic-appearing or diaphoretic.      Comments: Generally weak    HENT:      Head: Normocephalic and atraumatic.   Eyes:      Extraocular Movements: Extraocular movements intact.      Conjunctiva/sclera: Conjunctivae normal.      Pupils: Pupils are equal, round, and reactive to light.   Cardiovascular:      Rate and Rhythm: Normal rate and regular rhythm.   Pulmonary:      Effort: Pulmonary effort is normal.      Breath sounds: Normal breath sounds.   Abdominal:      General: There is no distension.      Palpations: Abdomen is soft.      Tenderness: There is no abdominal tenderness. There is no guarding or rebound.   Musculoskeletal:      Cervical back: Neck supple.      Comments: Pitting edema to both legs, R is warm and red to touc h   Skin:     General: Skin is warm and dry.   Neurological:      General: No focal deficit present.      Mental Status: He is alert and oriented to person, place, and time.      Cranial Nerves: No cranial nerve deficit.      Sensory: No sensory deficit.      Motor: No weakness.           PROCEDURES     Procedures     DATA     Results     Procedure Component Value Units Date/Time    Urinalysis with Reflex Culture (ED and Outpatient only) [[id number]]  (Abnormal) Collected: 06/19/23 0009    Specimen: Urine, Clean Catch Updated:  06/19/23 0021    Narrative:      The following orders were created for panel order Urinalysis with Reflex Culture (ED and Outpatient only).  Procedure                               Abnormality         Status                     ---------                               -----------         ------                     UA Reflex to Culture (Ma...[[id number]]  Abnormal            Final result               UA Microscopic[[id number]]               Normal              Final result                 Please view results for these tests on the individual orders.    UA Microscopic [[id number]]  (Normal) Collected: 06/19/23 0009    Specimen: Urine, Clean Catch Updated: 06/19/23 0021     RBC, Urine 0 TO 4 /HPF      WBC, Urine 0 TO 3 /HPF      Squamous Epithelial None Seen /hpf      Hyaline Cast None Seen /lpf      Bacteria, Urine None Seen /HPF     UA Reflex to Culture (Macroscopic) [[id number]]  (Abnormal) Collected: 06/19/23 0009    Specimen: Urine, Clean Catch Updated: 06/19/23 0020     Color, Urine Yellow     Clarity, Urine Clear     Specific Gravity, Urine 1.012     pH, Urine 7.0     Leukocyte Esterase Negative     Comment: Results can be falsely negative due to high specific gravity, some antibiotics, glucose >3 g/dl, or WBC other than neutrophils.        Nitrite, Urine Negative     Protein, Urine +3     Glucose, Urine Negative mg/dL      Ketones, Urine Negative mg/dL      Urobilinogen, Urine 0.2 EU/dL      Bilirubin, Urine Negative mg/dL      Blood, Urine Negative     Comment: The sensitivity of the occult blood test is equivalent to approximately 4 intact RBC/HPF.       Magnesium [[id number]]  (Normal) Collected: 06/18/23 2207    Specimen: Blood, Venous Updated: 06/18/23 2311     Magnesium 1.8 mg/dL     SARS-CoV-2 (COVID-19), PCR Nasopharynx [[id number]]  (Abnormal) Collected: 06/18/23 2209    Specimen: Nasopharyngeal Swab from Nasopharynx Updated: 06/18/23 2301    Narrative:      The following orders were created for panel order  SARS-CoV-2 (COVID-19), PCR Nasopharynx.  Procedure                               Abnormality         Status                     ---------                               -----------         ------                     SARS-COV-2 (COVID-19)/ F...[[id number]]  Abnormal            Final result                 Please view results for these tests on the individual orders.    SARS-COV-2 (COVID-19)/ FLU A/B, AND RSV, PCR Nasopharynx [[id number]]  (Abnormal) Collected: 06/18/23 2209    Specimen: Nasopharyngeal Swab from Nasopharynx Updated: 06/18/23 2301     SARS-CoV-2 (COVID-19) Positive     Influenza A Negative     Influenza B Negative     Respiratory Syncytial Virus Negative    Narrative:      Testing performed using real-time PCR for detection of COVID-19. EUA approved validation studies performed on site.     HS Troponin (with 2 hour reflex) [[id number]]  (Abnormal) Collected: 06/18/23 2207    Specimen: Blood, Venous Updated: 06/18/23 2246     High Sens Troponin I 15.1 pg/mL     Basic metabolic panel [[id number]]  (Abnormal) Collected: 06/18/23 2207    Specimen: Blood, Venous Updated: 06/18/23 2243     Sodium 136 mEQ/L      Potassium 2.8 mEQ/L      Comment: Results obtained on plasma. Plasma Potassium values may be up to 0.4 mEQ/L less than serum values. The differences may be greater for patients with high platelet or white cell counts.        Chloride 102 mEQ/L      CO2 25 mEQ/L      BUN 11 mg/dL      Creatinine 0.9 mg/dL      Glucose 141 mg/dL      Calcium 8.7 mg/dL      eGFR >60.0 mL/min/1.73m*2      Anion Gap 9 mEQ/L     Hepatic function panel [[id number]]  (Normal) Collected: 06/18/23 2207    Specimen: Blood, Venous Updated: 06/18/23 2240     Albumin 3.8 g/dL      Bilirubin, Total 0.7 mg/dL      Bilirubin, Direct 0.1 mg/dL      Alkaline Phosphatase 82 IU/L      AST (SGOT) 21 IU/L      ALT (SGPT) 20 IU/L      Total Protein 7.4 g/dL      Comment: Test performed on plasma which typically contains approximately 0.4 g/dL more  protein than serum.       CBC and Differential [[id number]]  (Abnormal) Collected: 06/18/23 2207    Specimen: Blood, Venous Updated: 06/18/23 2217     WBC 9.52 K/uL      RBC 4.07 M/uL      Hemoglobin 12.8 g/dL      Hematocrit 37.7 %      MCV 92.6 fL      MCH 31.4 pg      MCHC 34.0 g/dL      RDW 13.5 %      Platelets 256 K/uL      MPV 10.1 fL      Differential Type Auto     nRBC 0.0 %      Immature Granulocytes 0.3 %      Neutrophils 80.2 %      Lymphocytes 9.1 %      Monocytes 9.5 %      Eosinophils 0.4 %      Basophils 0.5 %      Immature Granulocytes, Absolute 0.03 K/uL      Neutrophils, Absolute 7.63 K/uL      Lymphocytes, Absolute 0.87 K/uL      Monocytes, Absolute 0.90 K/uL      Eosinophils, Absolute 0.04 K/uL      Basophils, Absolute 0.05 K/uL     Blood Culture Blood, Venous [[id number]] Collected: 06/18/23 2207    Specimen: Blood, Venous Updated: 06/18/23 2213    Blood Culture Blood, Venous [[id number]] Collected: 06/18/23 2207    Specimen: Blood, Venous Updated: 06/18/23 2213          Imaging Results          US venous leg bilateral (Preliminary result)  Result time 06/19/23 00:16:11    Preliminary Interpretation      History: Leg swelling Covid + protocol    Preliminary Impression:    No evidence of DVT. Calf veins are visualized.      Interpreted by: Peter Casarez MD, Jun 19, 2023 12:14 AM                             CT HEAD WITHOUT IV CONTRAST (Preliminary result)  Result time 06/18/23 23:55:26    Preliminary Interpretation      History: FALL WEAKNESS    Preliminary Impression:    No acute intracranial abnormality. Paranasal sinus mucosal thickening with air-fluid level in the right maxillary sinus suggestive of acute on chronic sinusitis.      Interpreted by: Peter Casarez MD, Jun 18, 2023 11:23 PM    NAVID CROCKETT [id number], Jun 18, 2023                             X-RAY CHEST 1 VIEW (Preliminary result)  Result time 06/19/23 00:18:46    Preliminary Interpretation    Infiltrate R base?  Round lesion right upper lung                               ECG 12 lead    (Results Pending)       Scoring tools                                  ED Course & MDM   MDM / ED COURSE / CLINICAL IMPRESSION / DISPO     Medical Decision Making  Pt returned from vacation. Today very weak, nauseas, low grade temp. Difficulty walking. COVID+, not hypoxic. Also hypokalemic. K repletion ordered. Pt admitted to [facility] [place], [place]     Amount and/or Complexity of Data Reviewed  Labs: ordered. Decision-making details documented in ED Course.  Radiology: ordered and independent interpretation performed.  ECG/medicine tests: ordered.      Risk  Prescription drug management.  Decision regarding hospitalization.          ED Course as of 06/19/23 0319   Sun Jun 18, 2023 2324 SARS-CoV-2 (COVID-19)(!): Positive [AC]   2324 Potassium(!!): 2.8  Repletion ordered  [AC]   2324 Magnesium: 1.8 [AC]      ED Course User Index  [AC] Arleth Aguilera PA C     Clinical Impression      COVID-19   Fall, initial encounter   Generalized weakness   Hypokalemia     _________________     ED Disposition   Admit / Observation                   Arleth Aguilera PA C  06/19/23 0319

## 2023-06-19 NOTE — ASSESSMENT & PLAN NOTE
Assessment: Potassium 2.8, secondary to GI loss due to diarrhea related to COVID-19 infection.  Contributing factor to generalized weakness.    Plan:  We will monitor patient closely on telemetry.  Will replete and check BMP in AM. Check Magnesium level and replete as needed.

## 2023-06-19 NOTE — ASSESSMENT & PLAN NOTE
Assessment: Suspect generalized weakness in the setting of acute COVID-19 infection.  Patient is not hypoxic and does not have significant GI symptoms at this time.  Also contributed to by hypokalemia.    Plan:  Will admit and monitor patient closely on telemetry.  Replete potassium level and monitor closely for clinical improvement.  Control underlying nausea as contributing to GI loss for hypokalemia.  PT/OT evaluation.  Fall precautions and aspiration precautions.

## 2023-06-19 NOTE — PLAN OF CARE
Plan of Care Review  Plan of Care Reviewed With: patient  Progress: improving  Outcome Evaluation: Pt plan for d/c home today pending bmp.

## 2023-06-20 VITALS
HEIGHT: 72 IN | HEART RATE: 49 BPM | RESPIRATION RATE: 18 BRPM | BODY MASS INDEX: 30.82 KG/M2 | SYSTOLIC BLOOD PRESSURE: 181 MMHG | OXYGEN SATURATION: 97 % | WEIGHT: 227.51 LBS | TEMPERATURE: 97.5 F | DIASTOLIC BLOOD PRESSURE: 84 MMHG

## 2023-06-20 LAB
ANION GAP SERPL CALC-SCNC: 8 MEQ/L (ref 3–15)
ATRIAL RATE: 90
BUN SERPL-MCNC: 11 MG/DL (ref 8–20)
CALCIUM SERPL-MCNC: 8.7 MG/DL (ref 8.9–10.3)
CHLORIDE SERPL-SCNC: 105 MEQ/L (ref 98–109)
CO2 SERPL-SCNC: 24 MEQ/L (ref 22–32)
CREAT SERPL-MCNC: 0.6 MG/DL (ref 0.8–1.3)
GFR SERPL CREATININE-BSD FRML MDRD: >60 ML/MIN/1.73M*2
GLUCOSE BLD-MCNC: 136 MG/DL (ref 70–99)
GLUCOSE BLD-MCNC: 185 MG/DL (ref 70–99)
GLUCOSE SERPL-MCNC: 127 MG/DL (ref 70–99)
MAGNESIUM SERPL-MCNC: 1.8 MG/DL (ref 1.8–2.5)
P AXIS: 89
POCT TEST: ABNORMAL
POCT TEST: ABNORMAL
POTASSIUM SERPL-SCNC: 3.4 MEQ/L (ref 3.6–5.1)
PR INTERVAL: 240
QRS DURATION: 88
QT INTERVAL: 364
QTC CALCULATION(BAZETT): 445
R AXIS: -26
SODIUM SERPL-SCNC: 137 MEQ/L (ref 136–144)
T WAVE AXIS: -53
VENTRICULAR RATE: 90

## 2023-06-20 PROCEDURE — 63700000 HC SELF-ADMINISTRABLE DRUG: Performed by: HOSPITALIST

## 2023-06-20 PROCEDURE — 63600000 HC DRUGS/DETAIL CODE: Mod: JZ | Performed by: PHYSICIAN ASSISTANT

## 2023-06-20 PROCEDURE — 63700000 HC SELF-ADMINISTRABLE DRUG: Performed by: STUDENT IN AN ORGANIZED HEALTH CARE EDUCATION/TRAINING PROGRAM

## 2023-06-20 PROCEDURE — 83735 ASSAY OF MAGNESIUM: CPT | Performed by: STUDENT IN AN ORGANIZED HEALTH CARE EDUCATION/TRAINING PROGRAM

## 2023-06-20 PROCEDURE — 36415 COLL VENOUS BLD VENIPUNCTURE: CPT | Performed by: STUDENT IN AN ORGANIZED HEALTH CARE EDUCATION/TRAINING PROGRAM

## 2023-06-20 PROCEDURE — 63600000 HC DRUGS/DETAIL CODE: Mod: JZ | Performed by: INTERNAL MEDICINE

## 2023-06-20 PROCEDURE — 80048 BASIC METABOLIC PNL TOTAL CA: CPT | Performed by: STUDENT IN AN ORGANIZED HEALTH CARE EDUCATION/TRAINING PROGRAM

## 2023-06-20 PROCEDURE — 63700000 HC SELF-ADMINISTRABLE DRUG: Performed by: FAMILY MEDICINE

## 2023-06-20 PROCEDURE — 99238 HOSP IP/OBS DSCHRG MGMT 30/<: CPT | Performed by: STUDENT IN AN ORGANIZED HEALTH CARE EDUCATION/TRAINING PROGRAM

## 2023-06-20 RX ORDER — AMLODIPINE BESYLATE 10 MG/1
10 TABLET ORAL DAILY
Status: DISCONTINUED | OUTPATIENT
Start: 2023-06-20 | End: 2023-06-20

## 2023-06-20 RX ORDER — FELODIPINE 10 MG/1
10 TABLET, EXTENDED RELEASE ORAL DAILY
Status: DISCONTINUED | OUTPATIENT
Start: 2023-06-20 | End: 2023-06-20 | Stop reason: HOSPADM

## 2023-06-20 RX ORDER — LOSARTAN POTASSIUM 100 MG/1
100 TABLET ORAL DAILY
Status: DISCONTINUED | OUTPATIENT
Start: 2023-06-20 | End: 2023-06-20 | Stop reason: HOSPADM

## 2023-06-20 RX ORDER — HYDRALAZINE HYDROCHLORIDE 25 MG/1
25 TABLET, FILM COATED ORAL ONCE
Status: DISCONTINUED | OUTPATIENT
Start: 2023-06-20 | End: 2023-06-20

## 2023-06-20 RX ORDER — POTASSIUM CHLORIDE 750 MG/1
10 TABLET, EXTENDED RELEASE ORAL DAILY
Qty: 5 TABLET | Refills: 0 | Status: SHIPPED | OUTPATIENT
Start: 2023-06-20 | End: 2024-08-21 | Stop reason: ALTCHOICE

## 2023-06-20 RX ORDER — HYDRALAZINE HYDROCHLORIDE 20 MG/ML
5 INJECTION INTRAMUSCULAR; INTRAVENOUS ONCE
Status: COMPLETED | OUTPATIENT
Start: 2023-06-20 | End: 2023-06-20

## 2023-06-20 RX ADMIN — POTASSIUM CHLORIDE 40 MEQ: 750 TABLET, EXTENDED RELEASE ORAL at 09:35

## 2023-06-20 RX ADMIN — FINASTERIDE 5 MG: 5 TABLET, FILM COATED ORAL at 09:36

## 2023-06-20 RX ADMIN — METOPROLOL TARTRATE 50 MG: 50 TABLET, FILM COATED ORAL at 09:35

## 2023-06-20 RX ADMIN — LOSARTAN POTASSIUM 100 MG: 100 TABLET, FILM COATED ORAL at 05:07

## 2023-06-20 RX ADMIN — HYDRALAZINE HYDROCHLORIDE 5 MG: 20 INJECTION INTRAMUSCULAR; INTRAVENOUS at 01:24

## 2023-06-20 RX ADMIN — MAGNESIUM SULFATE HEPTAHYDRATE 2 G: 2 INJECTION, SOLUTION INTRAVENOUS at 09:35

## 2023-06-20 RX ADMIN — ATORVASTATIN CALCIUM 20 MG: 20 TABLET, FILM COATED ORAL at 09:35

## 2023-06-20 ASSESSMENT — COGNITIVE AND FUNCTIONAL STATUS - GENERAL
MOVING TO AND FROM BED TO CHAIR: 4 - NONE
WALKING IN HOSPITAL ROOM: 3 - A LITTLE
STANDING UP FROM CHAIR USING ARMS: 4 - NONE
CLIMB 3 TO 5 STEPS WITH RAILING: 3 - A LITTLE

## 2023-06-20 NOTE — DISCHARGE INSTRUCTIONS
Mr. Brito,   You were hospitalized with weakness. You were found to have COVID-19.   You had some electrolyte disturbances from the GI symptoms from COVID. Please take potassium supplement as prescribed for the next 5 days.   Your blood pressure was elevated this admission. Please follow up with your PCP and continue to check your pressures daily.

## 2023-06-20 NOTE — DISCHARGE SUMMARY
[facility] Medicine Service -  Inpatient Discharge Summary        BRIEF OVERVIEW   Patient: Aneesh Brito (1950)    Admitting Provider: Chris Anderson MD  Attending Provider: Caprice De Leon DO Attending phys phone: [phone]    PCP: Kamar Palacios [phone]    Admission Date: 6/18/2023  Discharge Date: 6/20/2023     DISCHARGE DIAGNOSES      Primary Discharge Diagnosis  Generalized weakness    Secondary Discharge Diagnoses  Active Hospital Problems    Diagnosis Date Noted   • Generalized weakness 06/19/2023   • COVID-19 virus infection 06/19/2023   • Hypokalemia 03/21/2019   • Hypertension, benign 09/10/2014   • Type 2 diabetes mellitus with diabetic chronic kidney disease (CMS/[facility]) 09/10/2014      Resolved Hospital Problems   No resolved problems to display.       Problem List on Day of Discharge  COVID-19 virus infection  Assessment & Plan  Assessment: Patient is fully vaccinated.  Currently COVID-19 positive.  Patient has some nausea and vomiting with intermittent diarrhea, however, no profuse diarrhea or abdominal pain.  No hypoxia present.    Plan:  We will monitor patient closely on telemetry.  Monitor closely for hypoxia.  Supplemental oxygen to maintain O2 sats greater than 92%.  Hold IV Decadron in the setting of no hypoxia.      Hypokalemia  Assessment & Plan  Assessment: Potassium 2.8, secondary to GI loss due to diarrhea related to COVID-19 infection.  Contributing factor to generalized weakness.    Plan:  We will monitor patient closely on telemetry.  Will replete and check BMP in AM. Check Magnesium level and replete as needed.        Type 2 diabetes mellitus with diabetic chronic kidney disease (CMS/[facility])  Assessment & Plan  Lab Results   Component Value Date    HGBA1C 6.9 (H) 05/24/2023     Plan:  Accu-Cheks ACHS.  Sliding scale insulin ACHS.  No concentrated sweets diet.  Hold metformin 500 mg twice daily.    Hypertension, benign  Assessment & Plan  Assessment: Patient was  "hypertensive on arrival to the ER.  Currently blood pressure is improved at 153/78.    Plan:  Monitor blood pressures closely.  Continue metoprolol tartrate 50 mg twice daily.  Continue losartan 100 mg daily.  Continue felodipine 10 mg daily.    * Generalized weakness  Assessment & Plan  Assessment: Suspect generalized weakness in the setting of acute COVID-19 infection.  Patient is not hypoxic and does not have significant GI symptoms at this time.  Also contributed to by hypokalemia.    Plan:  Will admit and monitor patient closely on telemetry.  Replete potassium level and monitor closely for clinical improvement.  Control underlying nausea as contributing to GI loss for hypokalemia.  PT/OT evaluation.  Fall precautions and aspiration precautions.          SUMMARY OF HOSPITALIZATION      Presenting Problem/History of Present Illness  This is a 73 y.o. year-old male admitted on 6/18/2023 with Generalized weakness.    As per Admit H and P:   Aneesh Brito is a 73 y.o. male with a past medical history of melanoma, diabetes mellitus type 2, spinal stenosis of lumbar region, hypertension and BPH presents from home with a complaint of generalized weakness has gotten progressively worse today associated with cough.  Patient returned from a cruise to [place] yesterday and reports that he was feeling well, however, this morning started to feel weak, malaise and chills.  Patient had a fall at home today that required the [employer] to come to the home and help him off of the ground.  Patient was not brought into the hospital at that time because he felt \"okay\".  As the day progressed the patient's symptoms became worse to the point where he was unable to get up from sitting.  At baseline patient does not require any assistance for ambulation but did take a cane on the cruise to make sure he would have balance.  Today patient required significant assistance for ambulation and for getting up from sitting.  Patient " denies any  chest pain, palpitations, lightheadedness, dizziness, headache, vision changes, neck stiffness, shortness of breath, abdominal pain, dysuria, constipation, rash, joint aches or pains.  He has some diarrhea and had 2 episodes of vomiting today.  Patient initially had a small 1 followed by a larger episode of emesis.  Patient's wife is a sick contact as she also had similar symptoms that started before the patient's symptoms started.  No recent changes in medications.     In the ER, patient is noted to be COVID-19 positive.  In addition his potassium level is noted to be severely low at 2.8.    Hospital Course  Mr. Brito is a 74 y/o M with PMH as noted above who presented on 6/19/23 with generalized weakness. Patient found to have COVID-19 infection. Symptomatic only with diarrhea/vomiting. K noted to be critically low as above. Admitted for electrolyte management, telemetry monitoring. K and Mag repleted and GI symptoms resolved at time of discharge. Patient was noted to have elevated Bps this admission, asymptomatic throughout. Reporting normal Bps on home BP cuff. Agreeable to close PCP follow up and ongoing BP management at discharge. Also discharging on oral K supplement. Counseled on COVID-19 home management.     On day of discharge, patient is ambulating on unit and tolerating diet. He is without acute complaint. Agreeable with discharge plan and aware of need for close PCP follow up, repeat labs.      Exam on Day of Discharge  Physical Exam  Vitals and nursing note reviewed.   Constitutional:       General: He is not in acute distress.     Comments: Very pleasant elderly gentleman sitting up in chair at bedside    HENT:      Head: Normocephalic.      Mouth/Throat:      Mouth: Mucous membranes are moist.   Eyes:      Extraocular Movements: Extraocular movements intact.      Conjunctiva/sclera: Conjunctivae normal.   Cardiovascular:      Rate and Rhythm: Normal rate and regular rhythm.      Heart  sounds: Normal heart sounds.   Pulmonary:      Effort: No respiratory distress.      Breath sounds: Normal breath sounds. No wheezing.   Abdominal:      General: Bowel sounds are normal.      Palpations: Abdomen is soft.      Tenderness: There is no abdominal tenderness.   Musculoskeletal:      Right lower leg: No edema.      Left lower leg: No edema.   Skin:     General: Skin is warm and dry.   Neurological:      Mental Status: He is alert and oriented to person, place, and time.   Psychiatric:         Mood and Affect: Mood normal.         Consults During Admission  None    DISCHARGE MEDICATIONS               Medication List      START taking these medications    potassium chloride 10 mEq CR tablet  Commonly known as: KLOR-CON  Take 1 tablet (10 mEq total) by mouth daily for 5 days.  Dose: 10 mEq        CONTINUE taking these medications    atorvastatin 20 mg tablet  Commonly known as: LIPITOR  TAKE 1 TABLET BY MOUTH DAILY     felodipine 10 mg 24 hr tablet  Commonly known as: PLENDIL  Take 1 tablet (10 mg total) by mouth daily.  Dose: 10 mg     finasteride 5 mg tablet  Commonly known as: PROSCAR  Take 1 tablet (5 mg total) by mouth daily.  Dose: 5 mg     losartan 100 mg tablet  Commonly known as: COZAAR  Take 1 tablet (100 mg total) by mouth daily.  Dose: 100 mg     metFORMIN 500 mg tablet  Commonly known as: GLUCOPHAGE  Take 1 tablet (500 mg total) by mouth 2 (two) times a day with meals.  Dose: 500 mg     metoprolol tartrate 50 mg tablet  Commonly known as: LOPRESSOR  Take 1 tablet (50 mg total) by mouth 2 (two) times a day.  Dose: 50 mg     ROCKLATAN 0.02-0.005 % drops  Administer 1 drop into both eyes nightly. Wait a few minutes in between eye drops  Dose: 1 drop  Generic drug: netarsudiL-latanoprost     silodosin 8 mg capsule  Commonly known as: RAPAFLO     timolol 0.5 % ophthalmic solution  Commonly known as: TIMOPTIC  1 drop daily.  Dose: 1 drop             Instructions for after discharge     Call provider  for:  difficulty breathing, headache or visual disturbances      Call provider for:  extreme fatigue      Call provider for:  persistent dizziness or light-headedness      Call provider for:  persistent nausea or vomiting      Call provider for:  rash      Call provider for:  redness, tenderness, or signs of infection (pain, swelling, redness, odor or green/yellow discharge around incision site)      Call provider for:  severe uncontrolled pain      Call provider for:  temperature >100.4      Call provider for: blood sugar change      Call provider for blood sugar less than 70 or greater than 350    Discharge diet      Diet Type / Texture:  Regular  Cardiac (Low Sodium, Low Fat)  Diabetic, No Concentrated Sweets       Fluid restriction dietary / 24h: 1800 mL Fluid    Follow-up with primary physician (PCP)      Please see your PCP within 1 week of discharge    Post-Discharge Activity: Normal activity as tolerated.      Normal activity as tolerated.             PROCEDURES / LABS / IMAGING      Operative Procedures  none    Other Procedures  none    Pertinent Labs  labs: prior to d/c labs      Results from last 7 days   Lab Units 06/19/23  0601 06/18/23  2207   WBC K/uL 8.26 9.52   HEMOGLOBIN g/dL 12.4* 12.8*   HEMATOCRIT % 36.8* 37.7*   PLATELETS K/uL 257 256       Results from last 7 days   Lab Units 06/20/23  0342 06/19/23  1723 06/19/23  0601 06/18/23  2207   SODIUM mEQ/L 137 138 138 136   POTASSIUM mEQ/L 3.4* 3.1* 3.3* 2.8*   CHLORIDE mEQ/L 105 106 103 102   CO2 mEQ/L 24 26 25 25   BUN mg/dL 11 11 11 11   CREATININE mg/dL 0.6* 0.8 0.9 0.9   CALCIUM mg/dL 8.7* 8.5* 8.7* 8.7*   ALBUMIN g/dL  --   --  3.6 3.8   BILIRUBIN TOTAL mg/dL  --   --  0.7 0.7   ALK PHOS IU/L  --   --  81 82   ALT IU/L  --   --  18 20   AST IU/L  --   --  20 21   GLUCOSE mg/dL 127* 144* 124* 141*       Results from last 7 days   Lab Units 06/20/23  0342 06/19/23  0601 06/18/23  2207   MAGNESIUM mg/dL 1.8 1.8 1.8     Potassium repleted prior to  d/c on 6/20    SARS-CoV-2 (COVID-19) (no units)   Date/Time Value   06/18/2023 2209 Positive (A)       Pertinent Imaging  X-RAY CHEST 1 VIEW    Result Date: 6/19/2023  IMPRESSION: Mild patchy airspace disease at the right lung base.    CT HEAD WITHOUT IV CONTRAST    Result Date: 6/19/2023  IMPRESSION: No acute intracranial lesion. Atrophy, nonspecific white matter changes probably secondary to chronic small vessel ischemia and atherosclerotic vascular calcification.  Sinus disease, possible acute on chronic sinusitis. The study was sent for preliminary interpretation to the overnight remote radiologist at the time of the examination and the final results are concordant.     US venous leg bilateral    Result Date: 6/19/2023  IMPRESSION: No deep vein thrombosis identified from the right and left common femoral through the popliteal veins. The calf veins are not adequately visualized.       OUTPATIENT  FOLLOW-UP / REFERRALS / PENDING TESTS        Outpatient Follow-Up Appointments            In 2 months Kamar Palacios MD [facility] in [place]          Referrals  No orders of the defined types were placed in this encounter.      Test Results Pending at Discharge  Unresulted Labs (From admission, onward)    None          Important Issues to Address in Follow-Up  PCP follow up- BP management and follow up BMP and magnesium     DISCHARGE DISPOSITION AND DESTINATION      Disposition: Home   Destination:                              Code Status At Discharge: Full Code    Physician Order for Life-Sustaining Treatment Document Status      No documents found

## 2023-06-20 NOTE — PLAN OF CARE
Care Coordination Discharge Plan Note     Discharge Needs Assessment  Concerns to be Addressed: discharge planning, care coordination/care conferences  Current Discharge Risk:      Anticipated Discharge Plan  Anticipated Discharge Disposition: home without assistance or services       Patient Choice  Offered/Gave Vendor List:    Patient's Choice of Community Agency(s):           Discharge Barriers   Barriers to Discharge: None  ---------------------------------------------------------------------------------------------------------------------    Interdisciplinary Discharge Plan Review:  Participants:advanced practice provider, nursing, , occupational therapy, pharmacy    Concerns Comments: denies concerns    Discharge Plan:   Disposition/Destination: Home  /    Discharge Facility:    Community Resources:      Discharge Transportation:  Is Out of Hospital DNR needed at Discharge: no  Does patient need discharge transport? No       Dispo: home  Trans: family

## 2023-06-21 ENCOUNTER — PATIENT OUTREACH (OUTPATIENT)
Dept: CASE MANAGEMENT | Facility: CLINIC | Age: 73
End: 2023-06-21
Payer: MEDICARE

## 2023-06-21 ASSESSMENT — ENCOUNTER SYMPTOMS
GASTROINTESTINAL NEGATIVE: 1
CONSTITUTIONAL NEGATIVE: 1
RESPIRATORY NEGATIVE: 1

## 2023-06-21 NOTE — PROGRESS NOTES
NAME: Aneesh Brito    MRN: [id number]    YOB: 1950    Event Review:    Initial TCM Patient Outreach Date: 06/21/23         Assessment completed with: Spouse or Significant Other (Wife (Yas))  Patient stated reason for hospitalization: Discharged from [facility] for COVID-19. Patient presented with generalized weakness and found to have COVID-19 infection. In addition, potassium level noted to be severely low (2.8), on discharge potassium level (3.4) patient sent home with oral K supplement. Also, blood pressure elevated during admission. CM spoke with wife Yas that reported patient is recovering well, with no respiratory distress. She does report that patient is experiencing edema to B/L LE’s, CM instructed on the low salt diet and to elevate extremities while resting to decrease swelling. Medication reviewed and wife reports that patient take medications as prescribed. PCP appointment scheduled prior to outreach and converted to TCM visit. No other needs identified at this time.         Discharge Diagnosis: Generalized weakness         Patient readmitted in the last 30 days: No  Discharging Facility: [facility]  Date of Last Admission: 06/18/23  Date of Last Discharge: 06/20/23        Patient's perception of their health status since discharge: Improving          Review of Systems   Constitutional: Negative.    Respiratory: Negative.    Gastrointestinal: Negative.    Genitourinary: Negative.          Medication Review:      Medication Review: Yes       Reported by: Spouse  Any new medications prescribed at discharge?: Yes  Is the patient having any side effects they believe may be caused by any medication additions or changes?: No  New prescriptions filled?: Yes       Do you have enough of your regularly prescribed medications?: Yes       Was a medication discrepancy indentified?: No       Nursing Interventions: Nurse provided patient education  Reconciled the current and discharge  medications: Yes  Reviewed AVS (Discharge Instructions)?: Yes        Acute Pain:      Acute pain: No      Diet/Nutrition:      Type of Diet: Cardiac 2mg sodium, Diabetic         Post-Discharge Durable Medical Equipment::      Durable Medical Equipment: Cane, Grab bars, Shower/tub seat        Home Management:      Living Arrangement: Spouse  Support System:: Spouse, Family  Type of Residence: [address] [place] [address]        Appointment Scheduling:      PCP appointment scheduled: Yes     Appointment Date: 06/27/23     Appointment Provider: Dr. Palacios        Interventions/ Care Coordination:        General Education: Respiratory precautions (flu, colds, pneumonia, COVID-19)         Reviewed signs/symptoms of worsening condition or complication that necessitate a call to the Physician's office.  Educated patient on access to care.  RN phone number given for future care management.        Jammie Lorenzo RN   [phone]

## 2023-06-21 NOTE — PROGRESS NOTES
06/21/2023 09:45 AM EDT by Jammie Lorenzo RN 06/21/2023 09:45 AM EDT by Jammie Lorenzo RN  Outgoing Aneesh Brito ([facility]) [phone] (Mobile)     Left MessageCommunicated - ARACELI

## 2023-06-24 LAB
BACTERIA BLD CULT: NORMAL
BACTERIA BLD CULT: NORMAL

## 2023-06-27 ENCOUNTER — OFFICE VISIT (OUTPATIENT)
Dept: FAMILY MEDICINE | Facility: CLINIC | Age: 73
End: 2023-06-27
Payer: MEDICARE

## 2023-06-27 VITALS
SYSTOLIC BLOOD PRESSURE: 122 MMHG | HEART RATE: 87 BPM | HEIGHT: 71 IN | WEIGHT: 226 LBS | TEMPERATURE: 98 F | BODY MASS INDEX: 31.64 KG/M2 | RESPIRATION RATE: 16 BRPM | DIASTOLIC BLOOD PRESSURE: 80 MMHG | OXYGEN SATURATION: 98 %

## 2023-06-27 DIAGNOSIS — N18.1 TYPE 2 DIABETES MELLITUS WITH STAGE 1 CHRONIC KIDNEY DISEASE, WITHOUT LONG-TERM CURRENT USE OF INSULIN (CMS/HCC)  (CMS/HCC): ICD-10-CM

## 2023-06-27 DIAGNOSIS — E87.6 HYPOKALEMIA: ICD-10-CM

## 2023-06-27 DIAGNOSIS — E11.22 TYPE 2 DIABETES MELLITUS WITH STAGE 1 CHRONIC KIDNEY DISEASE, WITHOUT LONG-TERM CURRENT USE OF INSULIN (CMS/HCC)  (CMS/HCC): ICD-10-CM

## 2023-06-27 DIAGNOSIS — U07.1 COVID-19: Primary | ICD-10-CM

## 2023-06-27 PROCEDURE — 80048 BASIC METABOLIC PNL TOTAL CA: CPT | Performed by: FAMILY MEDICINE

## 2023-06-27 PROCEDURE — G8752 SYS BP LESS 140: HCPCS | Performed by: FAMILY MEDICINE

## 2023-06-27 PROCEDURE — 83036 HEMOGLOBIN GLYCOSYLATED A1C: CPT | Performed by: FAMILY MEDICINE

## 2023-06-27 PROCEDURE — G8754 DIAS BP LESS 90: HCPCS | Performed by: FAMILY MEDICINE

## 2023-06-27 PROCEDURE — 99496 TRANSJ CARE MGMT HIGH F2F 7D: CPT | Performed by: FAMILY MEDICINE

## 2023-06-27 PROCEDURE — 1111F DSCHRG MED/CURRENT MED MERGE: CPT | Performed by: FAMILY MEDICINE

## 2023-06-27 ASSESSMENT — ENCOUNTER SYMPTOMS
PALPITATIONS: 0
SINUS PAIN: 0
CHILLS: 0
SINUS PRESSURE: 0
VOMITING: 0
WHEEZING: 0
NAUSEA: 0
VOICE CHANGE: 0
EYE DISCHARGE: 0
FEVER: 0
SHORTNESS OF BREATH: 0
CONSTIPATION: 0
DIARRHEA: 0
SORE THROAT: 0
EYE PAIN: 0

## 2023-06-27 NOTE — PROGRESS NOTES
[facility] IN [place]       Reason for visit: Hospital Discharge Follow-up (Patient was discharged 6/20/23 from [facility] for COVID. Low potassium )    HPI:  Aneesh Brito is a 73 y.o. male presenting for follow-up after hospital discharge.    Patient readmitted in the last 30 days: No    Discharge Diagnosis: Generalized weakness  Discharging Facility: [facility]  Date of Last Admission: 06/18/23  Date of Last Discharge: 06/20/23                    Initial TCM Patient Outreach Date: 06/21/23    Summary of Hospital Course: Patient had return from a cruise from [place] and then had a fall at home.  He was taken to emergency room found to be positive for COVID-19 and be hypokalemic.  During the hospitalization his blood pressure remained elevated.  No changes were made to his medication.  At the time of discharge patient is tolerating orally well.    Medications prescribed at discharge reconciled with current ambulatory medication list: Yes.    Inpatient testing (labs, imaging, cardiovascular) requiring follow-up: Patient needs follow-up of potassium and follow-up of blood sugar    History since hospital discharge: Since discharge patient has felt well.  His breathing is comfortable.  Patient tolerating orally well.  Good bowel and bladder function.      Advance Care Planning Documents     Document Type Status Effective Date Expiration Date Received On Description    Advance Directives and Living Will Not Received        Power of  Not Received              Patient Active Problem List   Diagnosis   • Diverticulosis of colon   • Enlarged prostate with lower urinary tract symptoms (LUTS)   • Hypertension, benign   • Malignant melanoma of skin (CMS/[facility])   • Osteoarthritis of knee   • Overweight   • Spinal stenosis of lumbar region   • Type 2 diabetes mellitus with diabetic chronic kidney disease (CMS/formerly [facility])   • Chronic kidney disease, stage I   • Hypokalemia   • Dry eye syndrome of  bilateral lacrimal glands   • Primary open-angle glaucoma, bilateral, indeterminate stage   • Bilateral pseudophakia   • Tear film insufficiency   • Pure hypercholesterolemia   • Fall   • Hyponatremia   • Aneurysm of iliac artery (CMS/HCC)   • Glomerulosclerosis   • Generalized weakness   • COVID-19 virus infection   • COVID-19 virus infection     Past Medical History:   Diagnosis Date   • Colonic adenoma    • Diverticulosis of colon    • Enlarged prostate with lower urinary tract symptoms (LUTS)    • Glaucoma    • Hypertension    • Melanoma of skin (CMS/HCC)    • Osteoarthritis of knee    • Overweight    • Spinal stenosis of lumbar region    • Type 2 diabetes mellitus (CMS/HCC)      Past Surgical History:   Procedure Laterality Date   • APPENDECTOMY     • COLONOSCOPY  01/10/2012    diverticulosis   • COLONOSCOPY W/ POLYPECTOMY  02/09/2022   • HIP ARTHROPLASTY Bilateral    • KNEE ARTHROPLASTY Left    • KNEE ARTHROPLASTY Right 02/02/2022     Social History     Socioeconomic History   • Marital status:      Spouse name: Not on file   • Number of children: Not on file   • Years of education: Not on file   • Highest education level: Not on file   Occupational History   • Occupation: retired teacher   Tobacco Use   • Smoking status: Never   • Smokeless tobacco: Never   Substance and Sexual Activity   • Alcohol use: Yes     Comment: BEER, 1 CONSUMED DAILY   • Drug use: Never   • Sexual activity: Not on file   Other Topics Concern   • Not on file   Social History Narrative   • Not on file     Social Determinants of Health     Financial Resource Strain: Low Risk  (6/19/2023)    Overall Financial Resource Strain (CARDIA)    • Difficulty of Paying Living Expenses: Not hard at all   Food Insecurity: No Food Insecurity (6/19/2023)    Hunger Vital Sign    • Worried About Running Out of Food in the Last Year: Never true    • Ran Out of Food in the Last Year: Never true   Transportation Needs: No Transportation Needs  (6/19/2023)    PRAPARE - Transportation    • Lack of Transportation (Medical): No    • Lack of Transportation (Non-Medical): No   Physical Activity: Not on file   Stress: Not on file   Social Connections: Not on file   Intimate Partner Violence: Not on file   Housing Stability: Low Risk  (6/19/2023)    Housing Stability Vital Sign    • Unable to Pay for Housing in the Last Year: No    • Number of Places Lived in the Last Year: 1    • Unstable Housing in the Last Year: No     Family History   Problem Relation Age of Onset   • Breast cancer Biological Mother      No known allergies  Current Outpatient Medications   Medication Sig Dispense Refill   • atorvastatin (LIPITOR) 20 mg tablet TAKE 1 TABLET BY MOUTH DAILY 90 tablet 3   • felodipine (PLENDIL) 10 mg 24 hr tablet Take 1 tablet (10 mg total) by mouth daily. 90 tablet 3   • finasteride (PROSCAR) 5 mg tablet Take 1 tablet (5 mg total) by mouth daily. 90 tablet 3   • losartan (COZAAR) 100 mg tablet Take 1 tablet (100 mg total) by mouth daily. 90 tablet 3   • metFORMIN (GLUCOPHAGE) 500 mg tablet Take 1 tablet (500 mg total) by mouth 2 (two) times a day with meals. 180 tablet 3   • metoprolol tartrate (LOPRESSOR) 50 mg tablet Take 1 tablet (50 mg total) by mouth 2 (two) times a day. 180 tablet 3   • potassium chloride (KLOR-CON) 10 mEq CR tablet Take 1 tablet (10 mEq total) by mouth daily for 5 days. 5 tablet 0   • ROCKLATAN 0.02-0.005 % drops Administer 1 drop into both eyes nightly. Wait a few minutes in between eye drops      • silodosin (RAPAFLO) 8 mg capsule      • timolol (TIMOPTIC) 0.5 % ophthalmic solution 1 drop daily.       No current facility-administered medications for this visit.       ROS:  Review of Systems   Constitutional: Negative for chills and fever.   HENT: Negative for ear pain, sinus pressure, sinus pain, sore throat and voice change.    Eyes: Negative for pain and discharge.   Respiratory: Negative for shortness of breath and wheezing.   "  Cardiovascular: Negative for chest pain and palpitations.   Gastrointestinal: Negative for constipation, diarrhea, nausea and vomiting.   Skin: Negative for rash.       Vitals:    06/27/23 1410   BP: 122/80   Pulse: 87   Resp: 16   Temp: 36.7 °C (98 °F)   TempSrc: Oral   SpO2: 98%   Weight: 103 kg (226 lb)   Height: 1.803 m (5' 11\")       EXAM:  Physical Exam  Vitals reviewed.   Constitutional:       Appearance: He is well-developed.   HENT:      Head: Normocephalic and atraumatic.      Right Ear: Tympanic membrane and ear canal normal.      Left Ear: Tympanic membrane and ear canal normal.      Nose: Nose normal.   Eyes:      Conjunctiva/sclera: Conjunctivae normal.   Neck:      Thyroid: No thyromegaly.   Cardiovascular:      Rate and Rhythm: Normal rate and regular rhythm.      Heart sounds: Normal heart sounds. No murmur heard.  Pulmonary:      Effort: Pulmonary effort is normal.      Breath sounds: Normal breath sounds. No wheezing or rales.   Musculoskeletal:      Cervical back: Normal range of motion and neck supple.         Procedures    Lab Results   Component Value Date    WBC 8.26 06/19/2023    HGB 12.4 (L) 06/19/2023    HCT 36.8 (L) 06/19/2023     06/19/2023    CHOL 120 02/22/2023    TRIG 123 02/22/2023    HDL 37 (L) 02/22/2023    ALT 18 06/19/2023    AST 20 06/19/2023     06/20/2023    K 3.4 (L) 06/20/2023     06/20/2023    CREATININE 0.6 (L) 06/20/2023    BUN 11 06/20/2023    CO2 24 06/20/2023    PSA 0.09 02/22/2023    INR 1.0 03/20/2019    HGBA1C 6.9 (H) 05/24/2023    MICROALBUR 1,149.0 (H) 02/24/2023         ASSESSMENT/PLAN:  Diagnoses and all orders for this visit:    COVID-19 (Primary)    Hypokalemia  -     Basic metabolic panel    Type 2 diabetes mellitus with stage 1 chronic kidney disease, without long-term current use of insulin (CMS/[facility])  -     Hemoglobin A1c    COVID-19 episode now resolved patient feeling back to baseline  Hypokalemia.  Patient clinically without any " symptoms however will check potassium today  Diabetes.  We will follow-up with A1c today.  Patient had some elevation of blood sugar in the hospital      Kamar Palacios MD  6/27/2023

## 2023-06-28 LAB
ANION GAP SERPL CALC-SCNC: 9 MEQ/L (ref 3–15)
BUN SERPL-MCNC: 16 MG/DL (ref 7–25)
CALCIUM SERPL-MCNC: 9 MG/DL (ref 8.6–10.3)
CHLORIDE SERPL-SCNC: 103 MEQ/L (ref 98–107)
CO2 SERPL-SCNC: 25 MEQ/L (ref 21–31)
CREAT SERPL-MCNC: 0.7 MG/DL (ref 0.7–1.3)
EST. AVERAGE GLUCOSE BLD GHB EST-MCNC: 151 MG/DL
GFR SERPL CREATININE-BSD FRML MDRD: >60 ML/MIN/1.73M*2
GLUCOSE SERPL-MCNC: 85 MG/DL (ref 70–99)
HBA1C MFR BLD: 6.9 %
POTASSIUM SERPL-SCNC: 3.8 MEQ/L (ref 3.5–5.1)
SODIUM SERPL-SCNC: 137 MEQ/L (ref 136–145)

## 2023-07-06 ENCOUNTER — PATIENT OUTREACH (OUTPATIENT)
Dept: CASE MANAGEMENT | Facility: CLINIC | Age: 73
End: 2023-07-06
Payer: MEDICARE

## 2023-07-06 NOTE — PROGRESS NOTES
07/06/2023 03:16 PM EDT by Jammie Lorenzo RN 07/06/2023 03:16 PM EDT by Jammie oLrenzo RN  Outgoing Aneesh Brito (Self) [phone] (Mobile)     Reached PatientCommunicated - F/U    CM spoke with patient that reported he is recovering well with an increase in strength. System assessed and medications reviewed. Patient reported that all F/U visit with PCP completed. CM instructed patient on the importance of taking medication and proper nutrition and hydration to maintain optimal health. No issues or concerns identified at this time.

## 2023-07-20 ENCOUNTER — PATIENT OUTREACH (OUTPATIENT)
Dept: CASE MANAGEMENT | Facility: CLINIC | Age: 73
End: 2023-07-20
Payer: MEDICARE

## 2023-07-20 NOTE — PROGRESS NOTES
07/20/2023 03:33 PM EDT by Jammie Lorenzo RN 07/20/2023 03:33 PM EDT by Jammie Lorenzo, ROBYN  Outgoing Aneesh Brito ([facility]) [phone] (Mobile)     Left MessageCommunicated - F/U

## 2023-09-06 DIAGNOSIS — I10 HYPERTENSION, BENIGN: ICD-10-CM

## 2023-09-06 RX ORDER — FELODIPINE 10 MG/1
10 TABLET, EXTENDED RELEASE ORAL DAILY
Qty: 90 TABLET | Refills: 3 | Status: SHIPPED | OUTPATIENT
Start: 2023-09-06 | End: 2024-02-23 | Stop reason: SDUPTHER

## 2023-09-06 NOTE — TELEPHONE ENCOUNTER
Medicine Refill Request    Last Office Visit: 6/27/2023   Last Consult Visit: Visit date not found  Last Telemedicine Visit: Visit date not found    Next Appointment: 9/29/2023      Current Outpatient Medications:     atorvastatin (LIPITOR) 20 mg tablet, TAKE 1 TABLET BY MOUTH DAILY, Disp: 90 tablet, Rfl: 3    felodipine (PLENDIL) 10 mg 24 hr tablet, Take 1 tablet (10 mg total) by mouth daily., Disp: 90 tablet, Rfl: 3    finasteride (PROSCAR) 5 mg tablet, Take 1 tablet (5 mg total) by mouth daily., Disp: 90 tablet, Rfl: 3    losartan (COZAAR) 100 mg tablet, Take 1 tablet (100 mg total) by mouth daily., Disp: 90 tablet, Rfl: 3    metFORMIN (GLUCOPHAGE) 500 mg tablet, Take 1 tablet (500 mg total) by mouth 2 (two) times a day with meals., Disp: 180 tablet, Rfl: 3    metoprolol tartrate (LOPRESSOR) 50 mg tablet, Take 1 tablet (50 mg total) by mouth 2 (two) times a day., Disp: 180 tablet, Rfl: 3    potassium chloride (KLOR-CON) 10 mEq CR tablet, Take 1 tablet (10 mEq total) by mouth daily for 5 days., Disp: 5 tablet, Rfl: 0    ROCKLATAN 0.02-0.005 % drops, Administer 1 drop into both eyes nightly. Wait a few minutes in between eye drops , Disp: , Rfl:     silodosin (RAPAFLO) 8 mg capsule, , Disp: , Rfl:     timolol (TIMOPTIC) 0.5 % ophthalmic solution, 1 drop daily., Disp: , Rfl:       BP Readings from Last 3 Encounters:   06/27/23 122/80   06/20/23 (!) 181/84   05/24/23 (!) 170/86       Recent Lab results:  Lab Results   Component Value Date    CHOL 120 02/22/2023   ,   Lab Results   Component Value Date    HDL 37 (L) 02/22/2023   ,   Lab Results   Component Value Date    LDLCALC 58 02/22/2023   ,   Lab Results   Component Value Date    TRIG 123 02/22/2023        Lab Results   Component Value Date    GLUCOSE 85 06/27/2023   ,   Lab Results   Component Value Date    HGBA1C 6.9 (H) 06/27/2023         Lab Results   Component Value Date    CREATININE 0.7 06/27/2023       No results found for: TSH        Lab  Results   Component Value Date    HGBA1C 6.9 (H) 06/27/2023

## 2023-09-29 ENCOUNTER — OFFICE VISIT (OUTPATIENT)
Dept: FAMILY MEDICINE | Facility: CLINIC | Age: 73
End: 2023-09-29
Payer: MEDICARE

## 2023-09-29 VITALS
BODY MASS INDEX: 31.42 KG/M2 | OXYGEN SATURATION: 98 % | WEIGHT: 232 LBS | TEMPERATURE: 97.8 F | RESPIRATION RATE: 16 BRPM | DIASTOLIC BLOOD PRESSURE: 82 MMHG | SYSTOLIC BLOOD PRESSURE: 140 MMHG | HEART RATE: 65 BPM | HEIGHT: 72 IN

## 2023-09-29 DIAGNOSIS — N18.1 TYPE 2 DIABETES MELLITUS WITH STAGE 1 CHRONIC KIDNEY DISEASE, WITHOUT LONG-TERM CURRENT USE OF INSULIN (CMS/HCC)  (CMS/HCC): ICD-10-CM

## 2023-09-29 DIAGNOSIS — R60.9 DEPENDENT EDEMA: ICD-10-CM

## 2023-09-29 DIAGNOSIS — I10 HYPERTENSION, BENIGN: Primary | ICD-10-CM

## 2023-09-29 DIAGNOSIS — Z23 NEED FOR PROPHYLACTIC VACCINATION AND INOCULATION AGAINST INFLUENZA: ICD-10-CM

## 2023-09-29 DIAGNOSIS — E11.22 TYPE 2 DIABETES MELLITUS WITH STAGE 1 CHRONIC KIDNEY DISEASE, WITHOUT LONG-TERM CURRENT USE OF INSULIN (CMS/HCC)  (CMS/HCC): ICD-10-CM

## 2023-09-29 LAB
ANION GAP SERPL CALC-SCNC: 8 MEQ/L (ref 3–15)
BUN SERPL-MCNC: 18 MG/DL (ref 7–25)
CALCIUM SERPL-MCNC: 9 MG/DL (ref 8.6–10.3)
CHLORIDE SERPL-SCNC: 99 MEQ/L (ref 98–107)
CO2 SERPL-SCNC: 29 MEQ/L (ref 21–31)
CREAT SERPL-MCNC: 1 MG/DL (ref 0.7–1.3)
GFR SERPL CREATININE-BSD FRML MDRD: >60 ML/MIN/1.73M*2
GLUCOSE SERPL-MCNC: 164 MG/DL (ref 70–99)
POTASSIUM SERPL-SCNC: 3.8 MEQ/L (ref 3.5–5.1)
SODIUM SERPL-SCNC: 136 MEQ/L (ref 136–145)

## 2023-09-29 PROCEDURE — 80048 BASIC METABOLIC PNL TOTAL CA: CPT | Performed by: FAMILY MEDICINE

## 2023-09-29 PROCEDURE — 99214 OFFICE O/P EST MOD 30 MIN: CPT | Mod: 25 | Performed by: FAMILY MEDICINE

## 2023-09-29 PROCEDURE — G8754 DIAS BP LESS 90: HCPCS | Performed by: FAMILY MEDICINE

## 2023-09-29 PROCEDURE — 90694 VACC AIIV4 NO PRSRV 0.5ML IM: CPT | Performed by: FAMILY MEDICINE

## 2023-09-29 PROCEDURE — G8753 SYS BP > OR = 140: HCPCS | Performed by: FAMILY MEDICINE

## 2023-09-29 PROCEDURE — 83036 HEMOGLOBIN GLYCOSYLATED A1C: CPT | Performed by: FAMILY MEDICINE

## 2023-09-29 PROCEDURE — G0008 ADMIN INFLUENZA VIRUS VAC: HCPCS | Performed by: FAMILY MEDICINE

## 2023-09-29 ASSESSMENT — ENCOUNTER SYMPTOMS
NAUSEA: 0
SHORTNESS OF BREATH: 0
FATIGUE: 0
SORE THROAT: 0
APPETITE CHANGE: 0
PALPITATIONS: 0
WHEEZING: 0
VOMITING: 0
CHEST TIGHTNESS: 0

## 2023-09-29 NOTE — PROGRESS NOTES
Subjective      Patient ID: Aneesh Brito is a 73 y.o. male.  1950      Patient for follow-up of blood pressure and blood sugar.  Patient notes some recent edema to lower extremities right worse than left.  No pain with this.  Patient notes that when he wakes up this morning his ankles look good and then fluid accumulates throughout the day.  Patient last A1c was 6.9      The following have been reviewed and updated as appropriate in this visit:        Review of Systems   Constitutional: Negative for appetite change and fatigue.   HENT: Negative for ear pain and sore throat.    Respiratory: Negative for chest tightness, shortness of breath and wheezing.    Cardiovascular: Positive for leg swelling. Negative for chest pain and palpitations.   Gastrointestinal: Negative for nausea and vomiting.   Skin: Negative for rash.       Objective     Vitals:    09/29/23 0943   BP: 140/82   Pulse: 65   Resp: 16   Temp: 36.6 °C (97.8 °F)   TempSrc: Oral   SpO2: 98%   Weight: 105 kg (232 lb)   Height: 1.829 m (6')     Body mass index is 31.46 kg/m².    Physical Exam  Vitals reviewed.   Constitutional:       Appearance: He is well-developed.   HENT:      Head: Normocephalic and atraumatic.      Right Ear: Tympanic membrane and ear canal normal.      Left Ear: Tympanic membrane and ear canal normal.      Nose: Nose normal.      Mouth/Throat:      Mouth: Mucous membranes are moist.   Eyes:      Conjunctiva/sclera: Conjunctivae normal.   Neck:      Thyroid: No thyromegaly.   Cardiovascular:      Rate and Rhythm: Normal rate and regular rhythm.      Heart sounds: Normal heart sounds. No murmur heard.  Pulmonary:      Effort: Pulmonary effort is normal.      Breath sounds: Normal breath sounds. No wheezing or rales.   Musculoskeletal:      Cervical back: Normal range of motion and neck supple.      Right lower leg: 3+ Pitting Edema present.      Left lower leg: 3+ Pitting Edema present.         Assessment/Plan   Diagnoses  and all orders for this visit:    Hypertension, benign (Primary)    Type 2 diabetes mellitus with stage 1 chronic kidney disease, without long-term current use of insulin (CMS/[facility])   -     Hemoglobin A1c    Dependent edema  -     Basic metabolic panel; Future    Need for prophylactic vaccination and inoculation against influenza  -     Influenza vaccine Quad Adjuvanted 65 and Older (FluAd Quad)    Hypertension adequate control.  Patient continue medication reevaluate in 3 months  Diabetes we will check A1c today.  Continue current regimen  Dependent edema.  Recommend support hose.  Patient may also elevate legs during the day.  We will check metabolic panel  Shot today.

## 2023-09-30 LAB
EST. AVERAGE GLUCOSE BLD GHB EST-MCNC: 157 MG/DL
HBA1C MFR BLD: 7.1 %

## 2023-10-12 DIAGNOSIS — N40.1 BENIGN PROSTATIC HYPERPLASIA WITH NOCTURIA: ICD-10-CM

## 2023-10-12 DIAGNOSIS — R35.1 BENIGN PROSTATIC HYPERPLASIA WITH NOCTURIA: ICD-10-CM

## 2023-10-12 RX ORDER — FINASTERIDE 5 MG/1
5 TABLET, FILM COATED ORAL DAILY
Qty: 90 TABLET | Refills: 3 | Status: SHIPPED | OUTPATIENT
Start: 2023-10-12 | End: 2024-02-23 | Stop reason: SDUPTHER

## 2023-10-12 NOTE — TELEPHONE ENCOUNTER
Medicine Refill Request    Last Office Visit: 9/29/2023   Last Consult Visit: Visit date not found  Last Telemedicine Visit: Visit date not found    Next Appointment: 2/23/2024      Current Outpatient Medications:     atorvastatin (LIPITOR) 20 mg tablet, TAKE 1 TABLET BY MOUTH DAILY, Disp: 90 tablet, Rfl: 3    felodipine (PLENDIL) 10 mg 24 hr tablet, TAKE 1 TABLET BY MOUTH DAILY, Disp: 90 tablet, Rfl: 3    finasteride (PROSCAR) 5 mg tablet, Take 1 tablet (5 mg total) by mouth daily., Disp: 90 tablet, Rfl: 3    losartan (COZAAR) 100 mg tablet, Take 1 tablet (100 mg total) by mouth daily., Disp: 90 tablet, Rfl: 3    metFORMIN (GLUCOPHAGE) 500 mg tablet, Take 1 tablet (500 mg total) by mouth 2 (two) times a day with meals., Disp: 180 tablet, Rfl: 3    metoprolol tartrate (LOPRESSOR) 50 mg tablet, Take 1 tablet (50 mg total) by mouth 2 (two) times a day., Disp: 180 tablet, Rfl: 3    potassium chloride (KLOR-CON) 10 mEq CR tablet, Take 1 tablet (10 mEq total) by mouth daily for 5 days. (Patient not taking: Reported on 9/29/2023), Disp: 5 tablet, Rfl: 0    ROCKLATAN 0.02-0.005 % drops, Administer 1 drop into both eyes nightly. Wait a few minutes in between eye drops , Disp: , Rfl:     silodosin (RAPAFLO) 8 mg capsule, , Disp: , Rfl:     timolol (TIMOPTIC) 0.5 % ophthalmic solution, 1 drop daily., Disp: , Rfl:       BP Readings from Last 3 Encounters:   09/29/23 140/82   06/27/23 122/80   06/20/23 (!) 181/84       Recent Lab results:  Lab Results   Component Value Date    CHOL 120 02/22/2023   ,   Lab Results   Component Value Date    HDL 37 (L) 02/22/2023   ,   Lab Results   Component Value Date    LDLCALC 58 02/22/2023   ,   Lab Results   Component Value Date    TRIG 123 02/22/2023        Lab Results   Component Value Date    GLUCOSE 164 (H) 09/29/2023   ,   Lab Results   Component Value Date    HGBA1C 7.1 (H) 09/29/2023         Lab Results   Component Value Date    CREATININE 1.0 09/29/2023       No results  "found for: \"TSH\"        Lab Results   Component Value Date    HGBA1C 7.1 (H) 09/29/2023       "

## 2023-11-29 DIAGNOSIS — N18.1 TYPE 2 DIABETES MELLITUS WITH STAGE 1 CHRONIC KIDNEY DISEASE, WITHOUT LONG-TERM CURRENT USE OF INSULIN (CMS/HCC)  (CMS/HCC): ICD-10-CM

## 2023-11-29 DIAGNOSIS — E11.22 TYPE 2 DIABETES MELLITUS WITH STAGE 1 CHRONIC KIDNEY DISEASE, WITHOUT LONG-TERM CURRENT USE OF INSULIN (CMS/HCC)  (CMS/HCC): ICD-10-CM

## 2023-11-29 RX ORDER — METFORMIN HYDROCHLORIDE 500 MG/1
500 TABLET ORAL 2 TIMES DAILY WITH MEALS
Qty: 180 TABLET | Refills: 3 | Status: SHIPPED | OUTPATIENT
Start: 2023-11-29 | End: 2024-02-23 | Stop reason: SDUPTHER

## 2023-11-29 NOTE — TELEPHONE ENCOUNTER
"Medicine Refill Request    Last Office Visit: 9/29/2023   Last Consult Visit: Visit date not found  Last Telemedicine Visit: Visit date not found    Next Appointment: 2/23/2024      Current Outpatient Medications:     atorvastatin (LIPITOR) 20 mg tablet, TAKE 1 TABLET BY MOUTH DAILY, Disp: 90 tablet, Rfl: 3    felodipine (PLENDIL) 10 mg 24 hr tablet, TAKE 1 TABLET BY MOUTH DAILY, Disp: 90 tablet, Rfl: 3    finasteride (PROSCAR) 5 mg tablet, TAKE 1 TABLET BY MOUTH DAILY, Disp: 90 tablet, Rfl: 3    losartan (COZAAR) 100 mg tablet, Take 1 tablet (100 mg total) by mouth daily., Disp: 90 tablet, Rfl: 3    metFORMIN (GLUCOPHAGE) 500 mg tablet, Take 1 tablet (500 mg total) by mouth 2 (two) times a day with meals., Disp: 180 tablet, Rfl: 3    metoprolol tartrate (LOPRESSOR) 50 mg tablet, Take 1 tablet (50 mg total) by mouth 2 (two) times a day., Disp: 180 tablet, Rfl: 3    potassium chloride (KLOR-CON) 10 mEq CR tablet, Take 1 tablet (10 mEq total) by mouth daily for 5 days. (Patient not taking: Reported on 9/29/2023), Disp: 5 tablet, Rfl: 0    ROCKLATAN 0.02-0.005 % drops, Administer 1 drop into both eyes nightly. Wait a few minutes in between eye drops , Disp: , Rfl:     silodosin (RAPAFLO) 8 mg capsule, , Disp: , Rfl:     timolol (TIMOPTIC) 0.5 % ophthalmic solution, 1 drop daily., Disp: , Rfl:       BP Readings from Last 3 Encounters:   09/29/23 140/82   06/27/23 122/80   06/20/23 (!) 181/84       Recent Lab results:  Lab Results   Component Value Date    CHOL 120 02/22/2023   ,   Lab Results   Component Value Date    HDL 37 (L) 02/22/2023   ,   Lab Results   Component Value Date    LDLCALC 58 02/22/2023   ,   Lab Results   Component Value Date    TRIG 123 02/22/2023        Lab Results   Component Value Date    GLUCOSE 164 (H) 09/29/2023   ,   Lab Results   Component Value Date    HGBA1C 7.1 (H) 09/29/2023         Lab Results   Component Value Date    CREATININE 1.0 09/29/2023       No results found for: \"TSH\" "        Lab Results   Component Value Date    HGBA1C 7.1 (H) 09/29/2023

## 2024-02-14 ENCOUNTER — TRANSCRIBE ORDERS (OUTPATIENT)
Dept: SCHEDULING | Age: 74
End: 2024-02-14

## 2024-02-14 DIAGNOSIS — N28.89 OTHER SPECIFIED DISORDERS OF KIDNEY AND URETER: Primary | ICD-10-CM

## 2024-02-23 ENCOUNTER — OFFICE VISIT (OUTPATIENT)
Dept: FAMILY MEDICINE | Facility: CLINIC | Age: 74
End: 2024-02-23
Payer: MEDICARE

## 2024-02-23 VITALS
OXYGEN SATURATION: 98 % | BODY MASS INDEX: 31.5 KG/M2 | HEIGHT: 71 IN | DIASTOLIC BLOOD PRESSURE: 80 MMHG | RESPIRATION RATE: 16 BRPM | WEIGHT: 225 LBS | SYSTOLIC BLOOD PRESSURE: 140 MMHG | HEART RATE: 63 BPM | TEMPERATURE: 98 F

## 2024-02-23 DIAGNOSIS — C43.9 MALIGNANT MELANOMA OF SKIN (CMS/HCC): ICD-10-CM

## 2024-02-23 DIAGNOSIS — M76.31 ILIOTIBIAL BAND SYNDROME, RIGHT: ICD-10-CM

## 2024-02-23 DIAGNOSIS — Z00.00 ENCOUNTER FOR GENERAL ADULT MEDICAL EXAMINATION WITHOUT ABNORMAL FINDINGS: ICD-10-CM

## 2024-02-23 DIAGNOSIS — K57.30 DIVERTICULOSIS OF COLON: ICD-10-CM

## 2024-02-23 DIAGNOSIS — N40.1 BENIGN PROSTATIC HYPERPLASIA WITH LOWER URINARY TRACT SYMPTOMS, SYMPTOM DETAILS UNSPECIFIED: ICD-10-CM

## 2024-02-23 DIAGNOSIS — I10 HYPERTENSION, BENIGN: Primary | ICD-10-CM

## 2024-02-23 DIAGNOSIS — E78.00 PURE HYPERCHOLESTEROLEMIA: ICD-10-CM

## 2024-02-23 DIAGNOSIS — R35.1 BENIGN PROSTATIC HYPERPLASIA WITH NOCTURIA: ICD-10-CM

## 2024-02-23 DIAGNOSIS — H40.1134 PRIMARY OPEN-ANGLE GLAUCOMA, BILATERAL, INDETERMINATE STAGE: ICD-10-CM

## 2024-02-23 DIAGNOSIS — E11.22 TYPE 2 DIABETES MELLITUS WITH STAGE 1 CHRONIC KIDNEY DISEASE, WITHOUT LONG-TERM CURRENT USE OF INSULIN (CMS/HCC)  (CMS/HCC): ICD-10-CM

## 2024-02-23 DIAGNOSIS — N18.1 TYPE 2 DIABETES MELLITUS WITH STAGE 1 CHRONIC KIDNEY DISEASE, WITHOUT LONG-TERM CURRENT USE OF INSULIN (CMS/HCC)  (CMS/HCC): ICD-10-CM

## 2024-02-23 DIAGNOSIS — I72.3 ANEURYSM OF ILIAC ARTERY (CMS/HCC): ICD-10-CM

## 2024-02-23 DIAGNOSIS — N40.1 BENIGN PROSTATIC HYPERPLASIA WITH NOCTURIA: ICD-10-CM

## 2024-02-23 PROBLEM — U07.1 COVID-19 VIRUS INFECTION: Status: RESOLVED | Noted: 2023-06-19 | Resolved: 2024-02-23
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
## 2024-02-23 PROBLEM — E87.6 HYPOKALEMIA: Status: RESOLVED | Noted: 2019-03-21 | Resolved: 2024-02-23

## 2024-02-23 LAB
BACTERIA URNS QL MICRO: ABNORMAL /HPF
BILIRUB UR QL STRIP.AUTO: NEGATIVE MG/DL
CLARITY UR REFRACT.AUTO: CLEAR
COLOR UR AUTO: YELLOW
CREAT UR-MCNC: 39.4 MG/DL
ERYTHROCYTE [DISTWIDTH] IN BLOOD BY AUTOMATED COUNT: 13.5 % (ref 11.6–14.4)
EST. AVERAGE GLUCOSE BLD GHB EST-MCNC: 154 MG/DL
GLUCOSE UR STRIP.AUTO-MCNC: NEGATIVE MG/DL
HBA1C MFR BLD: 7 %
HCT VFR BLD AUTO: 40.6 % (ref 40.1–51)
HGB BLD-MCNC: 13.7 G/DL (ref 13.7–17.5)
HGB UR QL STRIP.AUTO: NEGATIVE
HYALINE CASTS #/AREA URNS LPF: ABNORMAL /LPF
KETONES UR STRIP.AUTO-MCNC: NEGATIVE MG/DL
LEUKOCYTE ESTERASE UR QL STRIP.AUTO: NEGATIVE
MCH RBC QN AUTO: 32 PG (ref 28–33.2)
MCHC RBC AUTO-ENTMCNC: 33.7 G/DL (ref 32.2–36.5)
MCV RBC AUTO: 94.9 FL (ref 83–98)
MICROALBUMIN UR-MCNC: 2394.9 MG/L
MICROALBUMIN/CREAT UR: 6078.4 UG/MG
MUCOUS THREADS URNS QL MICRO: ABNORMAL /LPF
NITRITE UR QL STRIP.AUTO: NEGATIVE
PDW BLD AUTO: 10.8 FL (ref 9.4–12.4)
PH UR STRIP.AUTO: 7 [PH]
PLATELET # BLD AUTO: 268 K/UL (ref 150–350)
PROT UR QL STRIP.AUTO: 3
RBC # BLD AUTO: 4.28 M/UL (ref 4.5–5.8)
RBC #/AREA URNS HPF: ABNORMAL /HPF
SP GR UR REFRACT.AUTO: 1.01
SQUAMOUS URNS QL MICRO: ABNORMAL /HPF
UROBILINOGEN UR STRIP-ACNC: 0.2 EU/DL
WBC # BLD AUTO: 7.74 K/UL (ref 3.8–10.5)
WBC #/AREA URNS HPF: ABNORMAL /HPF

## 2024-02-23 PROCEDURE — G0439 PPPS, SUBSEQ VISIT: HCPCS | Performed by: FAMILY MEDICINE

## 2024-02-23 PROCEDURE — 83036 HEMOGLOBIN GLYCOSYLATED A1C: CPT | Performed by: FAMILY MEDICINE

## 2024-02-23 PROCEDURE — 81001 URINALYSIS AUTO W/SCOPE: CPT | Performed by: FAMILY MEDICINE

## 2024-02-23 PROCEDURE — 80061 LIPID PANEL: CPT | Performed by: FAMILY MEDICINE

## 2024-02-23 PROCEDURE — 85027 COMPLETE CBC AUTOMATED: CPT | Performed by: FAMILY MEDICINE

## 2024-02-23 PROCEDURE — 80053 COMPREHEN METABOLIC PANEL: CPT | Performed by: FAMILY MEDICINE

## 2024-02-23 PROCEDURE — 81003 URINALYSIS AUTO W/O SCOPE: CPT | Performed by: FAMILY MEDICINE

## 2024-02-23 PROCEDURE — 84153 ASSAY OF PSA TOTAL: CPT | Performed by: FAMILY MEDICINE

## 2024-02-23 PROCEDURE — 82570 ASSAY OF URINE CREATININE: CPT | Performed by: FAMILY MEDICINE

## 2024-02-23 RX ORDER — FELODIPINE 10 MG/1
10 TABLET, EXTENDED RELEASE ORAL DAILY
Qty: 90 TABLET | Refills: 3 | Status: SHIPPED | OUTPATIENT
Start: 2024-02-23 | End: 2024-11-26 | Stop reason: SDUPTHER

## 2024-02-23 RX ORDER — FINASTERIDE 5 MG/1
5 TABLET, FILM COATED ORAL DAILY
Qty: 90 TABLET | Refills: 3 | Status: SHIPPED | OUTPATIENT
Start: 2024-02-23 | End: 2024-10-15

## 2024-02-23 RX ORDER — METFORMIN HYDROCHLORIDE 500 MG/1
500 TABLET ORAL 2 TIMES DAILY WITH MEALS
Qty: 180 TABLET | Refills: 3 | Status: SHIPPED | OUTPATIENT
Start: 2024-02-23 | End: 2024-11-26 | Stop reason: SDUPTHER

## 2024-02-23 RX ORDER — ATORVASTATIN CALCIUM 20 MG/1
20 TABLET, FILM COATED ORAL DAILY
Qty: 90 TABLET | Refills: 3 | Status: SHIPPED | OUTPATIENT
Start: 2024-02-23 | End: 2024-11-26 | Stop reason: SDUPTHER

## 2024-02-23 RX ORDER — LOSARTAN POTASSIUM 100 MG/1
100 TABLET ORAL DAILY
Qty: 90 TABLET | Refills: 3 | Status: SHIPPED | OUTPATIENT
Start: 2024-02-23 | End: 2024-11-26 | Stop reason: SDUPTHER

## 2024-02-23 RX ORDER — METOPROLOL TARTRATE 50 MG/1
50 TABLET ORAL 2 TIMES DAILY
Qty: 180 TABLET | Refills: 3 | Status: SHIPPED | OUTPATIENT
Start: 2024-02-23 | End: 2024-11-26 | Stop reason: SDUPTHER

## 2024-02-23 RX ORDER — SILODOSIN 8 MG/1
8 CAPSULE ORAL
Qty: 90 CAPSULE | Refills: 1 | Status: SHIPPED | OUTPATIENT
Start: 2024-02-23 | End: 2024-10-09 | Stop reason: SDUPTHER

## 2024-02-23 ASSESSMENT — ENCOUNTER SYMPTOMS
EYE PAIN: 0
FATIGUE: 0
WEAKNESS: 0
NAUSEA: 0
JOINT SWELLING: 0
SINUS PAIN: 0
DYSURIA: 0
ABDOMINAL PAIN: 0
CHEST TIGHTNESS: 0
APPETITE CHANGE: 0
MYALGIAS: 1
HEADACHES: 0
VOMITING: 0
ARTHRALGIAS: 1
SORE THROAT: 0
HEMATURIA: 0
ACTIVITY CHANGE: 0
CONSTIPATION: 0
NUMBNESS: 0
DIZZINESS: 0
EYE DISCHARGE: 0
BLOOD IN STOOL: 0
DIARRHEA: 0
COUGH: 0
PALPITATIONS: 0
TREMORS: 0
FREQUENCY: 0
SHORTNESS OF BREATH: 0

## 2024-02-23 ASSESSMENT — ACTIVITIES OF DAILY LIVING (ADL)
PATIENT'S MEMORY ADEQUATE TO SAFELY COMPLETE DAILY ACTIVITIES?: YES
ADEQUATE_TO_COMPLETE_ADL: YES

## 2024-02-23 ASSESSMENT — MINI COG
COMPLETED: YES
TOTAL SCORE: 5

## 2024-02-23 ASSESSMENT — PATIENT HEALTH QUESTIONNAIRE - PHQ9: SUM OF ALL RESPONSES TO PHQ9 QUESTIONS 1 & 2: 0

## 2024-02-23 NOTE — PROGRESS NOTES
Subjective      Patient ID: Aneesh Brito is a 73 y.o. male.  1950      Patient for annual exam.  Patient complaining of right proximal leg pain.  Patient otherwise doing well.  Patient is retired.  He is .  Patient typically has 3 meals per day.  When his leg feels well he does walk for exercise.  Sleeps well at night.  He does have nocturia.  Patient had recently completed physical therapy for his right knee.  His right knee improved however now 2 weeks status post therapy starting to have right lateral leg pain.  No specific trauma.  He did see chiropractic with minimal relief.  Patient gentry on medication for blood pressure and tolerates this well.  Patient also on medication for lipids  Patient gentry on medication for diabetes.  His last A1c was 7.1.  He has however purposefully lost weight since then.  Patient is followed by urology for BPH.  Patient is followed by dermatology for history of melanoma in situ.  Patient also followed by ophthalmology   has a past medical history of Colonic adenoma, Diverticulosis of colon, Enlarged prostate with lower urinary tract symptoms (LUTS), Glaucoma, Hypertension, Melanoma of skin (CMS/[facility]), Osteoarthritis of knee, Overweight, Spinal stenosis of lumbar region, and Type 2 diabetes mellitus (CMS/[facility]).   has a past surgical history that includes Appendectomy; Colonoscopy (01/10/2012); Hip Arthroplasty (Bilateral); Knee Arthroplasty (Left); Colonoscopy w/ polypectomy (02/09/2022); and Knee Arthroplasty (Right, 02/02/2022).      The following have been reviewed and updated as appropriate in this visit:        Review of Systems   Constitutional: Negative for activity change, appetite change and fatigue.   HENT: Negative for congestion, hearing loss, postnasal drip, sinus pain and sore throat.    Eyes: Negative for pain and discharge.   Respiratory: Negative for cough, chest tightness and shortness of breath.    Cardiovascular: Negative for chest pain and  "palpitations.   Gastrointestinal: Negative for abdominal pain, blood in stool, constipation, diarrhea, nausea and vomiting.   Genitourinary: Negative for dysuria, frequency and hematuria.   Musculoskeletal: Positive for arthralgias, gait problem and myalgias. Negative for joint swelling.   Neurological: Negative for dizziness, tremors, weakness, numbness and headaches.       Objective     Vitals:    24 0859   BP: 140/80   Pulse: 63   Resp: 16   Temp: 36.7 °C (98 °F)   TempSrc: Oral   SpO2: 98%   Weight: 102 kg (225 lb)   Height: 1.803 m (5' 11\")     Body mass index is 31.38 kg/m².    Physical Exam  Vitals reviewed.   Constitutional:       Appearance: He is well-developed.   HENT:      Head: Normocephalic and atraumatic.      Right Ear: Tympanic membrane, ear canal and external ear normal.      Left Ear: Tympanic membrane, ear canal and external ear normal.   Eyes:      Conjunctiva/sclera: Conjunctivae normal.      Pupils: Pupils are equal, round, and reactive to light.   Neck:      Thyroid: No thyromegaly.   Cardiovascular:      Rate and Rhythm: Normal rate and regular rhythm.      Heart sounds: Normal heart sounds. No murmur heard.  Pulmonary:      Effort: Pulmonary effort is normal.      Breath sounds: Normal breath sounds.   Abdominal:      General: Bowel sounds are normal.      Palpations: Abdomen is soft. There is no mass.      Tenderness: There is no abdominal tenderness.   Genitourinary:  .     Comments: Deferred to urology  Musculoskeletal:         General: No deformity.      Cervical back: Normal range of motion and neck supple.      Right upper leg: Tenderness present. No swelling, deformity or bony tenderness.      Right lower le+ Pitting Edema present.      Left lower le+ Pitting Edema present.      Comments: Right leg tender laterally over her area of iliotibial band   Lymphadenopathy:      Cervical: No cervical adenopathy.   Skin:     General: Skin is warm.      Findings: No rash. "   Neurological:      Mental Status: He is alert and oriented to person, place, and time.      Motor: No abnormal muscle tone.         Assessment/Plan   Diagnoses and all orders for this visit:    Hypertension, benign (Primary)  -     Comprehensive metabolic panel  -     Lipid panel  -     CBC  -     Urinalysis with microscopic  -     felodipine (PLENDIL) 10 mg 24 hr tablet; Take 1 tablet (10 mg total) by mouth daily.  -     losartan (COZAAR) 100 mg tablet; Take 1 tablet (100 mg total) by mouth daily.  -     metoprolol tartrate (LOPRESSOR) 50 mg tablet; Take 1 tablet (50 mg total) by mouth 2 (two) times a day.    Aneurysm of iliac artery (CMS/HCC)    Diverticulosis of colon    Benign prostatic hyperplasia with lower urinary tract symptoms, symptom details unspecified  -     PSA    Type 2 diabetes mellitus with stage 1 chronic kidney disease, without long-term current use of insulin (CMS/HCC)   -     Hemoglobin A1c  -     Microalbumin/Creatinine Ur Random  -     metFORMIN (GLUCOPHAGE) 500 mg tablet; Take 1 tablet (500 mg total) by mouth 2 (two) times a day with meals.    Malignant melanoma of skin (CMS/HCC)    Primary open-angle glaucoma, bilateral, indeterminate stage    Pure hypercholesterolemia  -     atorvastatin (LIPITOR) 20 mg tablet; Take 1 tablet (20 mg total) by mouth daily.    Encounter for general adult medical examination without abnormal findings    Iliotibial band syndrome, right  -     Ambulatory referral to Physical Therapy; Future  -     X-RAY HIP WITH OR WITHOUT PELVIS 4+ VW RIGHT; Future    Benign prostatic hyperplasia with nocturia  -     finasteride (PROSCAR) 5 mg tablet; Take 1 tablet (5 mg total) by mouth daily.    Other orders  -     silodosin (RAPAFLO) 8 mg capsule; Take 1 capsule (8 mg total) by mouth daily with breakfast.    Hypertension.  Adequate control continue medication reevaluate in 3 months.  Aneurysm of iliac artery history stable  Diverticulosis of colon stable  BPH.  Minimal  symptoms no intervention patient follow-up with urology  Diabetes patient has had improved control over the years continue current plan check lab work  Melanoma patient followed by dermatology  Glaucoma history patient followed by ophthalmology  Hyperlipidemia.  Patient continue Lipitor  Annual exam.  Daily routine discussed at length recommend 3 meals per day no snacking.  Recommend regular low impact exercise.  Patient continue to limit caffeine and alcohol  Iliotibial band versus right hip pain.  Patient is status post right hip arthroplasty will check x-ray refer to physical therapy  Refill medication

## 2024-02-24 ENCOUNTER — HOSPITAL ENCOUNTER (OUTPATIENT)
Dept: RADIOLOGY | Age: 74
Discharge: HOME | End: 2024-02-24
Attending: FAMILY MEDICINE
Payer: MEDICARE

## 2024-02-24 DIAGNOSIS — M76.31 ILIOTIBIAL BAND SYNDROME, RIGHT: ICD-10-CM

## 2024-02-24 LAB
ALBUMIN SERPL-MCNC: 4.2 G/DL (ref 3.5–5.7)
ALP SERPL-CCNC: 83 IU/L (ref 34–125)
ALT SERPL-CCNC: 17 IU/L (ref 7–52)
ANION GAP SERPL CALC-SCNC: 10 MEQ/L (ref 3–15)
AST SERPL-CCNC: 16 IU/L (ref 13–39)
BILIRUB SERPL-MCNC: 0.7 MG/DL (ref 0.3–1.2)
BUN SERPL-MCNC: 16 MG/DL (ref 7–25)
CALCIUM SERPL-MCNC: 9.2 MG/DL (ref 8.6–10.3)
CHLORIDE SERPL-SCNC: 100 MEQ/L (ref 98–107)
CHOLEST SERPL-MCNC: 97 MG/DL
CO2 SERPL-SCNC: 28 MEQ/L (ref 21–31)
CREAT SERPL-MCNC: 0.8 MG/DL (ref 0.7–1.3)
EGFRCR SERPLBLD CKD-EPI 2021: >60 ML/MIN/1.73M*2
GLUCOSE SERPL-MCNC: 157 MG/DL (ref 70–99)
HDLC SERPL-MCNC: 37 MG/DL
HDLC SERPL: 2.6 {RATIO}
LDLC SERPL CALC-MCNC: 37 MG/DL
NONHDLC SERPL-MCNC: 60 MG/DL
POTASSIUM SERPL-SCNC: 3.6 MEQ/L (ref 3.5–5.1)
PROT SERPL-MCNC: 7 G/DL (ref 6–8.2)
PSA SERPL-MCNC: 0.14 NG/ML
SODIUM SERPL-SCNC: 138 MEQ/L (ref 136–145)
TRIGL SERPL-MCNC: 115 MG/DL

## 2024-02-24 PROCEDURE — 73503 X-RAY EXAM HIP UNI 4/> VIEWS: CPT | Mod: RT

## 2024-02-27 ENCOUNTER — TELEPHONE (OUTPATIENT)
Dept: FAMILY MEDICINE | Facility: CLINIC | Age: 74
End: 2024-02-27
Payer: MEDICARE

## 2024-03-01 ENCOUNTER — OFFICE VISIT (OUTPATIENT)
Dept: FAMILY MEDICINE | Facility: CLINIC | Age: 74
End: 2024-03-01
Payer: MEDICARE

## 2024-03-01 VITALS
HEART RATE: 67 BPM | DIASTOLIC BLOOD PRESSURE: 80 MMHG | HEIGHT: 71 IN | WEIGHT: 225 LBS | BODY MASS INDEX: 31.5 KG/M2 | RESPIRATION RATE: 16 BRPM | TEMPERATURE: 98 F | SYSTOLIC BLOOD PRESSURE: 140 MMHG | OXYGEN SATURATION: 98 %

## 2024-03-01 DIAGNOSIS — R80.9 PROTEINURIA, UNSPECIFIED TYPE: Primary | ICD-10-CM

## 2024-03-01 PROCEDURE — G8754 DIAS BP LESS 90: HCPCS | Performed by: FAMILY MEDICINE

## 2024-03-01 PROCEDURE — 99213 OFFICE O/P EST LOW 20 MIN: CPT | Performed by: FAMILY MEDICINE

## 2024-03-01 PROCEDURE — G8753 SYS BP > OR = 140: HCPCS | Performed by: FAMILY MEDICINE

## 2024-03-01 ASSESSMENT — ENCOUNTER SYMPTOMS
FATIGUE: 0
SHORTNESS OF BREATH: 0
PALPITATIONS: 0
VOMITING: 0
SORE THROAT: 0
APPETITE CHANGE: 0
NAUSEA: 0
CHEST TIGHTNESS: 0
WHEEZING: 0

## 2024-03-01 NOTE — PROGRESS NOTES
"      Subjective      Patient ID: Aneesh Brito is a 73 y.o. male.  1950      Patient for follow-up of blood work.  Long discussion about results.  Proteinuria noted.  Patient has had CT urogram which showed no intrinsic renal disease.  His BUN/creatinine and GFR are all stable.  Patient also with longstanding history of hypertension and diabetes.  Patient had had renal biopsy.  This did not show glomerular disease.      The following have been reviewed and updated as appropriate in this visit:        Review of Systems   Constitutional: Negative for appetite change and fatigue.   HENT: Negative for ear pain and sore throat.    Respiratory: Negative for chest tightness, shortness of breath and wheezing.    Cardiovascular: Negative for chest pain, palpitations and leg swelling.   Gastrointestinal: Negative for nausea and vomiting.   Skin: Negative for rash.       Objective     Vitals:    03/01/24 1250   BP: 140/80   Pulse: 67   Resp: 16   Temp: 36.7 °C (98 °F)   TempSrc: Oral   SpO2: 98%   Weight: 102 kg (225 lb)   Height: 1.803 m (5' 11\")     Body mass index is 31.38 kg/m².    Physical Exam  Vitals reviewed.   Constitutional:       Appearance: He is well-developed.   HENT:      Head: Normocephalic and atraumatic.      Right Ear: Tympanic membrane and ear canal normal.      Left Ear: Tympanic membrane and ear canal normal.      Nose: Nose normal.   Eyes:      Conjunctiva/sclera: Conjunctivae normal.   Neck:      Thyroid: No thyromegaly.   Cardiovascular:      Rate and Rhythm: Normal rate and regular rhythm.      Heart sounds: Normal heart sounds. No murmur heard.  Pulmonary:      Effort: Pulmonary effort is normal.      Breath sounds: Normal breath sounds. No wheezing or rales.   Musculoskeletal:      Cervical back: Normal range of motion and neck supple.         Assessment/Plan   Diagnoses and all orders for this visit:    Proteinuria, unspecified type (Primary)    Patient with a history of longstanding " proteinuria.  Renal function otherwise remains normal.  Suspect secondary to longstanding hypertension and diabetes

## 2024-04-03 ENCOUNTER — HOSPITAL ENCOUNTER (OUTPATIENT)
Dept: RADIOLOGY | Facility: CLINIC | Age: 74
Discharge: HOME | End: 2024-04-03
Attending: NURSE PRACTITIONER
Payer: MEDICARE

## 2024-04-03 DIAGNOSIS — N28.89 OTHER SPECIFIED DISORDERS OF KIDNEY AND URETER: ICD-10-CM

## 2024-04-03 RX ORDER — GADOBUTROL 604.72 MG/ML
0.1 INJECTION INTRAVENOUS ONCE
Status: COMPLETED | OUTPATIENT
Start: 2024-04-03 | End: 2024-04-03

## 2024-04-03 RX ADMIN — GADOBUTROL 10 ML: 604.72 INJECTION INTRAVENOUS at 08:47

## 2024-04-04 ENCOUNTER — OFFICE VISIT (OUTPATIENT)
Dept: FAMILY MEDICINE | Facility: CLINIC | Age: 74
End: 2024-04-04
Payer: MEDICARE

## 2024-04-04 VITALS
HEART RATE: 62 BPM | SYSTOLIC BLOOD PRESSURE: 140 MMHG | RESPIRATION RATE: 12 BRPM | BODY MASS INDEX: 32.62 KG/M2 | HEIGHT: 71 IN | OXYGEN SATURATION: 97 % | TEMPERATURE: 98 F | WEIGHT: 233 LBS | DIASTOLIC BLOOD PRESSURE: 80 MMHG

## 2024-04-04 DIAGNOSIS — R93.7 ABNORMAL MRI, LUMBAR SPINE: Primary | ICD-10-CM

## 2024-04-04 PROCEDURE — G8754 DIAS BP LESS 90: HCPCS | Performed by: FAMILY MEDICINE

## 2024-04-04 PROCEDURE — G8753 SYS BP > OR = 140: HCPCS | Performed by: FAMILY MEDICINE

## 2024-04-04 PROCEDURE — 99213 OFFICE O/P EST LOW 20 MIN: CPT | Performed by: FAMILY MEDICINE

## 2024-04-04 ASSESSMENT — ENCOUNTER SYMPTOMS
NUMBNESS: 0
DYSURIA: 0
WEAKNESS: 0
CONSTIPATION: 0
DIARRHEA: 0
BACK PAIN: 0
ABDOMINAL DISTENTION: 0

## 2024-04-04 NOTE — PROGRESS NOTES
"    Subjective      Patient ID: Aneesh Brito is a 73 y.o. male.  1950      Patient had had recent MRI of his kidneys because of questionable lesions.  Fortunately these were cysts.  However on the MRI he had coincidental finding of a 1 cm pancreatic cyst as well as as finding of L3 possible disc-itis or abscess.  Patient has had no recent new back pain.  Patient has had no fever or chills.  No back trauma        The following have been reviewed and updated as appropriate in this visit:        Review of Systems   Gastrointestinal:  Negative for abdominal distention, constipation and diarrhea.   Genitourinary:  Negative for dysuria.   Musculoskeletal:  Negative for back pain.   Neurological:  Negative for weakness and numbness.       Objective     Vitals:    04/04/24 1552   BP: (!) 140/80   Pulse: 62   Resp: 12   Temp: 36.7 °C (98 °F)   TempSrc: Oral   SpO2: 97%   Weight: 106 kg (233 lb)   Height: 1.803 m (5' 11\")     Body mass index is 32.5 kg/m².    Physical Exam  Vitals reviewed.   Constitutional:       Appearance: Normal appearance.   Cardiovascular:      Heart sounds: Normal heart sounds.   Pulmonary:      Breath sounds: Normal breath sounds.   Abdominal:      General: There is no distension.      Tenderness: There is no abdominal tenderness.   Musculoskeletal:      Lumbar back: No swelling, spasms, tenderness or bony tenderness. Negative right straight leg raise test and negative left straight leg raise test.   Neurological:      Mental Status: He is alert.         Assessment/Plan   Diagnoses and all orders for this visit:    Abnormal MRI, lumbar spine (Primary)  -     MRI LUMBAR SPINE WITH AND WITHOUT CONTRAST; Future    Abnormal MRI of lumbar spine.  Possible discitis/abscess versus inflammation.  Will repeat MRI designated lumbar spine with and without contrast.      "

## 2024-04-18 ENCOUNTER — HOSPITAL ENCOUNTER (OUTPATIENT)
Dept: RADIOLOGY | Facility: CLINIC | Age: 74
Discharge: HOME | End: 2024-04-18
Attending: FAMILY MEDICINE
Payer: MEDICARE

## 2024-04-18 DIAGNOSIS — R93.7 ABNORMAL MRI, LUMBAR SPINE: ICD-10-CM

## 2024-04-18 RX ORDER — GADOBUTROL 604.72 MG/ML
10.5 INJECTION INTRAVENOUS ONCE
Status: COMPLETED | OUTPATIENT
Start: 2024-04-18 | End: 2024-04-18

## 2024-04-18 RX ADMIN — GADOBUTROL 10.5 ML: 604.72 INJECTION INTRAVENOUS at 15:28

## 2024-04-18 NOTE — PROGRESS NOTES
"Aneesh Brito   [id number]    Your doctor has referred you for an MRI examination that requires the injection of a contrast material into your bloodstream. This contrast material, sometimes referred to as \"x-ray dye\" allows for better interpretation and results in a more accurate interpretation of the examination.     Without the use of contrast, the examination may be less informative and result in a suboptimal examination, and possibly a delay in diagnosis and, if needed, treatment.     The contrast material is given through a small needle or catheter placed into a vein, usually on the inside of the elbow, on the back of hand, or in a vein in the foot or lower leg.    The most common, though still rare, potential reaction to an intravenous contrast injection is an allergic-like reaction. Most allergic-like reactions are mild, though a small subset of people can have more severe reactions. Mild reactions include mild / scattered hives, itching, scratchy throat, sneezing and nasal congestion. More severe reactions include facial swelling, severe difficulty breathing, wheezing and anaphylactic shock. Those with a prior history allergic-like reaction to the same class of contrast media (such as CT contrast or MRI contrast) are at the highest risk for an allergic reaction.     If you believe you had an allergic reaction to contrast in the past, please let our staff know. We can determine if this increases your risk for a future reaction and provide steps to decrease the risk. This may delay your examination, but it decreases the risk of having a new and possibly more severe reaction to the contrast injection.    People with a history of prior allergic reactions to other substances (such as unrelated medications and food) and patients with a history of asthma have slightly increased risk for an allergic reaction from contrast material when compared with the general population, but do not require to be pretreated " prior to a contrast injection.    You should notify the physician, nurse or technologist if you have ever had any of these conditions or if you have any questions.

## 2024-04-22 ENCOUNTER — TELEPHONE (OUTPATIENT)
Dept: FAMILY MEDICINE | Facility: CLINIC | Age: 74
End: 2024-04-22
Payer: MEDICARE

## 2024-04-22 DIAGNOSIS — R93.7 ABNORMAL MRI, LUMBAR SPINE: Primary | ICD-10-CM

## 2024-04-22 NOTE — TELEPHONE ENCOUNTER
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

## 2024-04-25 ENCOUNTER — OFFICE VISIT (OUTPATIENT)
Dept: ORTHOPEDICS | Facility: CLINIC | Age: 74
End: 2024-04-25
Payer: MEDICARE

## 2024-04-25 VITALS — BODY MASS INDEX: 30.88 KG/M2 | WEIGHT: 228 LBS | HEIGHT: 72 IN

## 2024-04-25 DIAGNOSIS — M48.061 SPINAL STENOSIS OF LUMBAR REGION WITHOUT NEUROGENIC CLAUDICATION: Primary | ICD-10-CM

## 2024-04-25 DIAGNOSIS — M47.816 LUMBAR FACET ARTHROPATHY: ICD-10-CM

## 2024-04-25 DIAGNOSIS — M51.369 LUMBAR DEGENERATIVE DISC DISEASE: ICD-10-CM

## 2024-04-25 PROCEDURE — 99204 OFFICE O/P NEW MOD 45 MIN: CPT | Performed by: ORTHOPAEDIC SURGERY

## 2024-04-25 PROCEDURE — G8756 NO BP MEASURE DOC: HCPCS | Performed by: ORTHOPAEDIC SURGERY

## 2024-04-25 NOTE — PROGRESS NOTES
ORTHOSPINE H&P  Admitting Diagnosis:  No admission diagnoses are documented for this encounter.      CHIEF COMPLAINT : 1.  Abnormal MRI    HPI :     Patient is a 74 y.o. male who presents with underwent a CT scan of his abdomen and pelvis and was found to have concerns for a discitis osteomyelitis at the L2-L3 level.    Currently he is completely asymptomatic other than occasional pressure discomfort in his lower back not pain related predominantly right-sided.  Has no neurogenic claudication, radiculopathy,.    Categorically denies any occult related symptomatology fevers mechanical pain complaints nocturnal symptomatology night sweats.    Has no radicular pain weakness gait dysfunction.     Medical History:   Past Medical History:   Diagnosis Date    Colonic adenoma     Diverticulosis of colon     Enlarged prostate with lower urinary tract symptoms (LUTS)     Glaucoma     Hypertension     Melanoma of skin (CMS/HCC)     Osteoarthritis of knee     Overweight     Spinal stenosis of lumbar region     Type 2 diabetes mellitus (CMS/HCC)        Surgical History:   Past Surgical History:   Procedure Laterality Date    APPENDECTOMY      COLONOSCOPY  01/10/2012    diverticulosis    COLONOSCOPY W/ POLYPECTOMY  02/09/2022    HIP ARTHROPLASTY Bilateral     KNEE ARTHROPLASTY Left     KNEE ARTHROPLASTY Right 02/02/2022       Social History:   Social History     Social History Narrative    Not on file       Family History:   Family History   Problem Relation Age of Onset    Breast cancer Biological Mother        Allergies: No known allergies       Medication List            Accurate as of April 25, 2024  9:29 AM. If you have any questions, ask your nurse or doctor.                CONTINUE taking these medications      atorvastatin 20 mg tablet  Commonly known as: LIPITOR  Take 1 tablet (20 mg total) by mouth daily.  Dose: 20 mg     felodipine 10 mg 24 hr tablet  Commonly known as: PLENDIL  Take 1 tablet (10 mg total) by mouth  daily.  Dose: 10 mg     finasteride 5 mg tablet  Commonly known as: PROSCAR  Take 1 tablet (5 mg total) by mouth daily.  Dose: 5 mg     losartan 100 mg tablet  Commonly known as: COZAAR  Take 1 tablet (100 mg total) by mouth daily.  Dose: 100 mg     metFORMIN 500 mg tablet  Commonly known as: GLUCOPHAGE  Take 1 tablet (500 mg total) by mouth 2 (two) times a day with meals.  Dose: 500 mg     metoprolol tartrate 50 mg tablet  Commonly known as: LOPRESSOR  Take 1 tablet (50 mg total) by mouth 2 (two) times a day.  Dose: 50 mg     potassium chloride 10 mEq CR tablet  Commonly known as: KLOR-CON  Take 1 tablet (10 mEq total) by mouth daily for 5 days.  Dose: 10 mEq     ROCKLATAN 0.02-0.005 % drops  Administer 1 drop into both eyes nightly. Wait a few minutes in between eye drops  Dose: 1 drop  Generic drug: netarsudiL-latanoprost     silodosin 8 mg capsule  Commonly known as: RAPAFLO  Take 1 capsule (8 mg total) by mouth daily with breakfast.  Dose: 8 mg     timolol 0.5 % ophthalmic solution  Commonly known as: TIMOPTIC  1 drop daily.  Dose: 1 drop            Review of Systems  All other systems reviewed and negative except as noted in the HPI.    Objective       Physical Exam : Visit Vitals  Ht 1.829 m (6')   Wt 103 kg (228 lb)   BMI 30.92 kg/m²     Posture is neutral gait is assisted      Imaging : Personal review MRI scan lumbar spine notes age-related lumbar degenerative disease principally at the L2-L3 level.  Mild reactive endplate changes are noted.  No instability.  Degenerative autofusion is noted at L5-S1.  Near posterior fusion at the L4-L5 posterior disc space.  Spinal stenosis is present not critical.  Posterior facet disease is noted.  No instability.  Generalized lumbar lordosis is maintained    MRI report reviewed      IMPRESSION : 1.  Lumbar spondylosis  2.  Degenerative L5-S1 autofusion    PLAN : Based on my review of the diagnostic studies there is absolutely no findings that would suggest or support  osteomyelitis, disc base infection, bone infection, epidural abscess or any clinically relevant occult pathology.    MRI characteristics both on the MRI of the pelvis and abdomen as well as the spine indicative of age-related degenerative disc disease with a significant history of previous inflammatory degenerative disease noting an autofusion at the L5-S1 level.    I advise no treatment activities to tolerance there is in fact no clinical or radiographic findings that would support intervention        Chandler Howard MD  4/25/2024  9:29 AM

## 2024-05-15 ENCOUNTER — OFFICE VISIT (OUTPATIENT)
Dept: FAMILY MEDICINE | Facility: CLINIC | Age: 74
End: 2024-05-15
Payer: MEDICARE

## 2024-05-15 VITALS
TEMPERATURE: 98 F | SYSTOLIC BLOOD PRESSURE: 148 MMHG | BODY MASS INDEX: 31.5 KG/M2 | HEIGHT: 71 IN | WEIGHT: 225 LBS | OXYGEN SATURATION: 98 % | DIASTOLIC BLOOD PRESSURE: 74 MMHG | RESPIRATION RATE: 18 BRPM | HEART RATE: 49 BPM

## 2024-05-15 DIAGNOSIS — E11.22 TYPE 2 DIABETES MELLITUS WITH STAGE 1 CHRONIC KIDNEY DISEASE, WITHOUT LONG-TERM CURRENT USE OF INSULIN (CMS/HCC)  (CMS/HCC): ICD-10-CM

## 2024-05-15 DIAGNOSIS — N18.1 TYPE 2 DIABETES MELLITUS WITH STAGE 1 CHRONIC KIDNEY DISEASE, WITHOUT LONG-TERM CURRENT USE OF INSULIN (CMS/HCC)  (CMS/HCC): ICD-10-CM

## 2024-05-15 DIAGNOSIS — I10 HYPERTENSION, BENIGN: Primary | ICD-10-CM

## 2024-05-15 PROCEDURE — G8753 SYS BP > OR = 140: HCPCS | Performed by: FAMILY MEDICINE

## 2024-05-15 PROCEDURE — G8754 DIAS BP LESS 90: HCPCS | Performed by: FAMILY MEDICINE

## 2024-05-15 PROCEDURE — 83036 HEMOGLOBIN GLYCOSYLATED A1C: CPT | Performed by: FAMILY MEDICINE

## 2024-05-15 PROCEDURE — 99214 OFFICE O/P EST MOD 30 MIN: CPT | Performed by: FAMILY MEDICINE

## 2024-05-15 ASSESSMENT — ENCOUNTER SYMPTOMS
SORE THROAT: 0
WHEEZING: 0
CHEST TIGHTNESS: 0
SHORTNESS OF BREATH: 0
FATIGUE: 0
VOMITING: 0
PALPITATIONS: 0
NAUSEA: 0
APPETITE CHANGE: 0

## 2024-05-15 NOTE — PROGRESS NOTES
"    Subjective      Patient ID: Aneesh Brito is a 74 y.o. male.  1950      Patient follow-up of blood pressure and blood sugar.  Patient with no new complaints.  Patient continues with right knee soreness status post knee replacement.  He is going to revisit the surgeon for possible revision surgery.  Patient blood pressure readings have been good.  Patient last A1c was 7.0.        The following have been reviewed and updated as appropriate in this visit:        Review of Systems   Constitutional:  Negative for appetite change and fatigue.   HENT:  Negative for ear pain and sore throat.    Respiratory:  Negative for chest tightness, shortness of breath and wheezing.    Cardiovascular:  Negative for chest pain, palpitations and leg swelling.   Gastrointestinal:  Negative for nausea and vomiting.   Skin:  Negative for rash.       Objective     Vitals:    05/15/24 1036   BP: 128/80   Pulse: (!) 49   Resp: 18   Temp: 36.7 °C (98 °F)   TempSrc: Oral   SpO2: 98%   Weight: 102 kg (225 lb)   Height: 1.803 m (5' 11\")     Body mass index is 31.38 kg/m².    Physical Exam  Vitals reviewed.   Constitutional:       Appearance: He is well-developed.   HENT:      Head: Normocephalic and atraumatic.      Right Ear: Tympanic membrane and ear canal normal.      Left Ear: Tympanic membrane and ear canal normal.      Nose: Nose normal.      Mouth/Throat:      Mouth: Mucous membranes are moist.   Eyes:      Conjunctiva/sclera: Conjunctivae normal.   Neck:      Thyroid: No thyromegaly.   Cardiovascular:      Rate and Rhythm: Normal rate and regular rhythm.      Heart sounds: Normal heart sounds. No murmur heard.  Pulmonary:      Effort: Pulmonary effort is normal.      Breath sounds: Normal breath sounds. No wheezing or rales.   Musculoskeletal:      Cervical back: Normal range of motion and neck supple.         Assessment/Plan   Diagnoses and all orders for this visit:    Hypertension, benign (Primary)    Type 2 diabetes mellitus " with stage 1 chronic kidney disease, without long-term current use of insulin (CMS/HCC)  (CMS/[facility])  -     Hemoglobin A1c    Hypertension.  Adequate control continue medication reevaluate in 3 months  Diabetes.  Borderline A1c will recheck today.  Patient weight is down a few pounds.  Continue current medication regimen

## 2024-05-16 LAB
EST. AVERAGE GLUCOSE BLD GHB EST-MCNC: 148 MG/DL
HBA1C MFR BLD: 6.8 %

## 2024-06-14 ENCOUNTER — TELEPHONE (OUTPATIENT)
Dept: SCHEDULING | Facility: CLINIC | Age: 74
End: 2024-06-14
Payer: MEDICARE

## 2024-06-14 NOTE — TELEPHONE ENCOUNTER
[facility] Appointment Request   Provider: Dr. Palacios  Appointment Type: Diagnostic   Reason for Visit: TB-SHOT   Available Day and Time: Preferably Tuesday morning between 1030a-12 pm   Best Contact Number: [phone]    The practice will reach out to schedule your appointment within the next 2 business days.

## 2024-06-25 ENCOUNTER — CLINICAL SUPPORT (OUTPATIENT)
Dept: FAMILY MEDICINE | Facility: CLINIC | Age: 74
End: 2024-06-25
Payer: MEDICARE

## 2024-06-25 DIAGNOSIS — Z11.1 SCREENING FOR TUBERCULOSIS: Primary | ICD-10-CM

## 2024-06-25 PROCEDURE — 86580 TB INTRADERMAL TEST: CPT | Performed by: FAMILY MEDICINE

## 2024-06-25 PROCEDURE — 200200 PR NO CHARGE: Performed by: FAMILY MEDICINE

## 2024-06-27 LAB
INDURATION: 0 MM
TB SKIN TEST: NEGATIVE

## 2024-08-21 ENCOUNTER — OFFICE VISIT (OUTPATIENT)
Dept: FAMILY MEDICINE | Facility: CLINIC | Age: 74
End: 2024-08-21
Payer: MEDICARE

## 2024-08-21 VITALS
HEART RATE: 69 BPM | RESPIRATION RATE: 16 BRPM | TEMPERATURE: 97.8 F | SYSTOLIC BLOOD PRESSURE: 122 MMHG | WEIGHT: 225 LBS | HEIGHT: 71 IN | BODY MASS INDEX: 31.5 KG/M2 | DIASTOLIC BLOOD PRESSURE: 78 MMHG | OXYGEN SATURATION: 98 %

## 2024-08-21 DIAGNOSIS — L72.9 SCALP CYST: ICD-10-CM

## 2024-08-21 DIAGNOSIS — I10 HYPERTENSION, BENIGN: Primary | ICD-10-CM

## 2024-08-21 DIAGNOSIS — M25.551 PAIN OF RIGHT HIP: ICD-10-CM

## 2024-08-21 DIAGNOSIS — N18.1 TYPE 2 DIABETES MELLITUS WITH STAGE 1 CHRONIC KIDNEY DISEASE, WITHOUT LONG-TERM CURRENT USE OF INSULIN (CMS/HCC)  (CMS/HCC): ICD-10-CM

## 2024-08-21 DIAGNOSIS — E11.22 TYPE 2 DIABETES MELLITUS WITH STAGE 1 CHRONIC KIDNEY DISEASE, WITHOUT LONG-TERM CURRENT USE OF INSULIN (CMS/HCC)  (CMS/HCC): ICD-10-CM

## 2024-08-21 LAB
EST. AVERAGE GLUCOSE BLD GHB EST-MCNC: 146 MG/DL
HBA1C MFR BLD: 6.7 %

## 2024-08-21 PROCEDURE — 83036 HEMOGLOBIN GLYCOSYLATED A1C: CPT | Performed by: FAMILY MEDICINE

## 2024-08-21 PROCEDURE — G8752 SYS BP LESS 140: HCPCS | Performed by: FAMILY MEDICINE

## 2024-08-21 PROCEDURE — 99214 OFFICE O/P EST MOD 30 MIN: CPT | Performed by: FAMILY MEDICINE

## 2024-08-21 PROCEDURE — G8754 DIAS BP LESS 90: HCPCS | Performed by: FAMILY MEDICINE

## 2024-08-21 ASSESSMENT — ENCOUNTER SYMPTOMS
SHORTNESS OF BREATH: 0
FATIGUE: 0
SORE THROAT: 0
CHEST TIGHTNESS: 0
APPETITE CHANGE: 0
NAUSEA: 0
PALPITATIONS: 0
WHEEZING: 0
VOMITING: 0

## 2024-08-21 NOTE — PROGRESS NOTES
"    Subjective      Patient ID: Aneesh Brito is a 74 y.o. male.  1950      Patient follow-up of blood pressure and blood sugar.  Patient continues with chronic right hip pain.  Is been doing physical therapy and has been seen by orthopedics.  Feeling is that this is muscular in nature.  Patient tolerating blood pressure medicine well readings have been good.  Patient last A1c was 6.8.  This has been stable.  Patient notes that he and his wife are now moving to [facility].        The following have been reviewed and updated as appropriate in this visit:   Allergies  Meds  Problems       Review of Systems   Constitutional:  Negative for appetite change and fatigue.   HENT:  Negative for ear pain and sore throat.    Respiratory:  Negative for chest tightness, shortness of breath and wheezing.    Cardiovascular:  Negative for chest pain, palpitations and leg swelling.   Gastrointestinal:  Negative for nausea and vomiting.   Skin:  Negative for rash.       Objective     Vitals:    08/21/24 0936   BP: 122/78   Pulse: 69   Resp: 16   Temp: 36.6 °C (97.8 °F)   TempSrc: Oral   SpO2: 98%   Weight: 102 kg (225 lb)   Height: 1.803 m (5' 11\")     Body mass index is 31.38 kg/m².    Physical Exam  Vitals reviewed.   Constitutional:       Appearance: He is well-developed.   HENT:      Head: Normocephalic and atraumatic.      Right Ear: Tympanic membrane and ear canal normal.      Left Ear: Tympanic membrane and ear canal normal.      Nose: Nose normal.      Mouth/Throat:      Mouth: Mucous membranes are moist.   Eyes:      Conjunctiva/sclera: Conjunctivae normal.   Neck:      Thyroid: No thyromegaly.   Cardiovascular:      Rate and Rhythm: Normal rate and regular rhythm.      Heart sounds: Normal heart sounds. No murmur heard.  Pulmonary:      Effort: Pulmonary effort is normal.      Breath sounds: Normal breath sounds. No wheezing or rales.   Musculoskeletal:      Cervical back: Normal range of motion and neck " supple.      Right hip: No bony tenderness. Decreased range of motion.   Skin:     Comments: 4 cm well-circumscribed soft nontender subcutaneous cyst to posterior scalp on left side.         Assessment/Plan   Diagnoses and all orders for this visit:    Hypertension, benign (Primary)    Type 2 diabetes mellitus with stage 1 chronic kidney disease, without long-term current use of insulin (CMS/HCC)  (CMS/HCC)    Pain of right hip    Scalp cyst    Hypertension adequate control continue medication reevaluate in 3 months  Diabetes patient continue current medication regimen we will check A1c.  Right hip pain.  Possibly muscular.  Will give patient trial off of atorvastatin for 2 weeks and then call with update  Scalp cyst.  No intervention.  Patient call if changes occur

## 2024-08-22 ENCOUNTER — TELEPHONE (OUTPATIENT)
Dept: FAMILY MEDICINE | Facility: CLINIC | Age: 74
End: 2024-08-22
Payer: MEDICARE

## 2024-08-22 DIAGNOSIS — E78.00 PURE HYPERCHOLESTEROLEMIA: ICD-10-CM

## 2024-09-25 ENCOUNTER — TELEPHONE (OUTPATIENT)
Dept: FAMILY MEDICINE | Facility: CLINIC | Age: 74
End: 2024-09-25
Payer: MEDICARE

## 2024-09-25 DIAGNOSIS — R26.81 UNSTEADY GAIT: Primary | ICD-10-CM

## 2024-09-25 DIAGNOSIS — R26.2 AMBULATORY DYSFUNCTION: ICD-10-CM

## 2024-09-25 NOTE — TELEPHONE ENCOUNTER
Received call from Hang at [facility] asking if Dr Palacios could order an outpatient PT script for pt. States pt recently had a fall and this will be for ambulatory dysfunction/unsteady gait. Script can be faxed to

## 2024-10-09 NOTE — TELEPHONE ENCOUNTER
"Medicine Refill Request    Last Office Visit: 8/21/2024   Last Consult Visit: Visit date not found  Last Telemedicine Visit: Visit date not found    Next Appointment: 11/26/2024      Current Outpatient Medications:     atorvastatin (LIPITOR) 20 mg tablet, Take 1 tablet (20 mg total) by mouth daily., Disp: 90 tablet, Rfl: 3    felodipine (PLENDIL) 10 mg 24 hr tablet, Take 1 tablet (10 mg total) by mouth daily., Disp: 90 tablet, Rfl: 3    finasteride (PROSCAR) 5 mg tablet, Take 1 tablet (5 mg total) by mouth daily., Disp: 90 tablet, Rfl: 3    losartan (COZAAR) 100 mg tablet, Take 1 tablet (100 mg total) by mouth daily., Disp: 90 tablet, Rfl: 3    metFORMIN (GLUCOPHAGE) 500 mg tablet, Take 1 tablet (500 mg total) by mouth 2 (two) times a day with meals., Disp: 180 tablet, Rfl: 3    metoprolol tartrate (LOPRESSOR) 50 mg tablet, Take 1 tablet (50 mg total) by mouth 2 (two) times a day., Disp: 180 tablet, Rfl: 3    ROCKLATAN 0.02-0.005 % drops, Administer 1 drop into both eyes nightly. Wait a few minutes in between eye drops , Disp: , Rfl:     silodosin (RAPAFLO) 8 mg capsule, Take 1 capsule (8 mg total) by mouth daily with breakfast., Disp: 90 capsule, Rfl: 1    timolol (TIMOPTIC) 0.5 % ophthalmic solution, 1 drop daily., Disp: , Rfl:       BP Readings from Last 3 Encounters:   08/21/24 122/78   05/15/24 (!) 148/74   04/04/24 (!) 140/80       Recent Lab results:  Lab Results   Component Value Date    CHOL 97 02/23/2024   ,   Lab Results   Component Value Date    HDL 37 (L) 02/23/2024   ,   Lab Results   Component Value Date    LDLCALC 37 02/23/2024   ,   Lab Results   Component Value Date    TRIG 115 02/23/2024        Lab Results   Component Value Date    GLUCOSE 157 (H) 02/23/2024   ,   Lab Results   Component Value Date    HGBA1C 6.7 (H) 08/21/2024         Lab Results   Component Value Date    CREATININE 0.8 02/23/2024       No results found for: \"TSH\"        Lab Results   Component Value Date    HGBA1C 6.7 (H) " Addended by: DEEPA DAVID on: 10/12/2022 04:42 PM     Modules accepted: Orders     08/21/2024

## 2024-10-10 RX ORDER — SILODOSIN 8 MG/1
8 CAPSULE ORAL
Qty: 90 CAPSULE | Refills: 0 | Status: SHIPPED | OUTPATIENT
Start: 2024-10-10 | End: 2024-11-26 | Stop reason: SDUPTHER

## 2024-10-12 DIAGNOSIS — R35.1 BENIGN PROSTATIC HYPERPLASIA WITH NOCTURIA: ICD-10-CM

## 2024-10-12 DIAGNOSIS — N40.1 BENIGN PROSTATIC HYPERPLASIA WITH NOCTURIA: ICD-10-CM

## 2024-10-15 RX ORDER — FINASTERIDE 5 MG/1
5 TABLET, FILM COATED ORAL DAILY
Qty: 90 TABLET | Refills: 0 | Status: SHIPPED | OUTPATIENT
Start: 2024-10-15 | End: 2024-11-26 | Stop reason: SDUPTHER

## 2024-11-26 ENCOUNTER — OFFICE VISIT (OUTPATIENT)
Dept: FAMILY MEDICINE | Facility: CLINIC | Age: 74
End: 2024-11-26
Payer: MEDICARE

## 2024-11-26 VITALS
OXYGEN SATURATION: 98 % | DIASTOLIC BLOOD PRESSURE: 78 MMHG | SYSTOLIC BLOOD PRESSURE: 124 MMHG | HEIGHT: 71 IN | WEIGHT: 223 LBS | BODY MASS INDEX: 31.22 KG/M2 | HEART RATE: 84 BPM | TEMPERATURE: 98.2 F | RESPIRATION RATE: 16 BRPM

## 2024-11-26 DIAGNOSIS — N18.1 TYPE 2 DIABETES MELLITUS WITH STAGE 1 CHRONIC KIDNEY DISEASE, WITHOUT LONG-TERM CURRENT USE OF INSULIN (CMS/HCC)  (CMS/HCC): ICD-10-CM

## 2024-11-26 DIAGNOSIS — R35.1 BENIGN PROSTATIC HYPERPLASIA WITH NOCTURIA: ICD-10-CM

## 2024-11-26 DIAGNOSIS — E78.00 PURE HYPERCHOLESTEROLEMIA: ICD-10-CM

## 2024-11-26 DIAGNOSIS — E11.22 TYPE 2 DIABETES MELLITUS WITH STAGE 1 CHRONIC KIDNEY DISEASE, WITHOUT LONG-TERM CURRENT USE OF INSULIN (CMS/HCC)  (CMS/HCC): ICD-10-CM

## 2024-11-26 DIAGNOSIS — N40.1 BENIGN PROSTATIC HYPERPLASIA WITH NOCTURIA: ICD-10-CM

## 2024-11-26 DIAGNOSIS — I10 HYPERTENSION, BENIGN: Primary | ICD-10-CM

## 2024-11-26 PROCEDURE — 99214 OFFICE O/P EST MOD 30 MIN: CPT | Performed by: FAMILY MEDICINE

## 2024-11-26 PROCEDURE — G8752 SYS BP LESS 140: HCPCS | Performed by: FAMILY MEDICINE

## 2024-11-26 PROCEDURE — 83036 HEMOGLOBIN GLYCOSYLATED A1C: CPT | Performed by: FAMILY MEDICINE

## 2024-11-26 PROCEDURE — G8754 DIAS BP LESS 90: HCPCS | Performed by: FAMILY MEDICINE

## 2024-11-26 RX ORDER — METFORMIN HYDROCHLORIDE 500 MG/1
500 TABLET ORAL 2 TIMES DAILY WITH MEALS
Qty: 180 TABLET | Refills: 3 | Status: SHIPPED | OUTPATIENT
Start: 2024-11-26

## 2024-11-26 RX ORDER — FELODIPINE 10 MG/1
10 TABLET, EXTENDED RELEASE ORAL DAILY
Qty: 90 TABLET | Refills: 3 | Status: SHIPPED | OUTPATIENT
Start: 2024-11-26

## 2024-11-26 RX ORDER — ATORVASTATIN CALCIUM 20 MG/1
20 TABLET, FILM COATED ORAL DAILY
Qty: 90 TABLET | Refills: 3 | Status: SHIPPED | OUTPATIENT
Start: 2024-11-26 | End: 2025-11-21

## 2024-11-26 RX ORDER — SILODOSIN 8 MG/1
8 CAPSULE ORAL
Qty: 90 CAPSULE | Refills: 0 | Status: SHIPPED | OUTPATIENT
Start: 2024-11-26 | End: 2025-01-06

## 2024-11-26 RX ORDER — METOPROLOL TARTRATE 50 MG/1
50 TABLET ORAL 2 TIMES DAILY
Qty: 180 TABLET | Refills: 3 | Status: SHIPPED | OUTPATIENT
Start: 2024-11-26 | End: 2025-11-21

## 2024-11-26 RX ORDER — LOSARTAN POTASSIUM 100 MG/1
100 TABLET ORAL DAILY
Qty: 90 TABLET | Refills: 3 | Status: SHIPPED | OUTPATIENT
Start: 2024-11-26 | End: 2025-11-21

## 2024-11-26 RX ORDER — FINASTERIDE 5 MG/1
5 TABLET, FILM COATED ORAL DAILY
Qty: 90 TABLET | Refills: 0 | Status: SHIPPED | OUTPATIENT
Start: 2024-11-26 | End: 2025-01-09

## 2024-11-26 ASSESSMENT — ENCOUNTER SYMPTOMS
SORE THROAT: 0
APPETITE CHANGE: 0
CHEST TIGHTNESS: 0
VOMITING: 0
WHEEZING: 0
SHORTNESS OF BREATH: 0
FATIGUE: 0
NAUSEA: 0
PALPITATIONS: 0

## 2024-11-26 NOTE — ASSESSMENT & PLAN NOTE
BPH history refill medication  Orders:    finasteride (PROSCAR) 5 mg tablet; Take 1 tablet (5 mg total) by mouth daily.    silodosin (RAPAFLO) 8 mg capsule; Take 1 capsule (8 mg total) by mouth daily with breakfast.

## 2024-11-26 NOTE — ASSESSMENT & PLAN NOTE
Hypertension adequate control continue medication reevaluate 3-month  Orders:    felodipine (PLENDIL) 10 mg 24 hr tablet; Take 1 tablet (10 mg total) by mouth daily.    losartan (COZAAR) 100 mg tablet; Take 1 tablet (100 mg total) by mouth daily.    metoprolol tartrate (LOPRESSOR) 50 mg tablet; Take 1 tablet (50 mg total) by mouth 2 (two) times a day.

## 2024-11-26 NOTE — PROGRESS NOTES
"Subjective      Patient ID: Aneesh Brito is a 74 y.o. male.  1950      Patient follow-up of blood pressure and blood sugar.  Patient with no new medical complaints.  Patient still recovering from knee surgery.  He does use a rolling walker at times around [place].  Patient last A1c was improved to 6.7.  Patient tolerated blood pressure medicine well readings have been good.        The following have been reviewed and updated as appropriate in this visit:        Review of Systems   Constitutional:  Negative for appetite change and fatigue.   HENT:  Negative for ear pain and sore throat.    Respiratory:  Negative for chest tightness, shortness of breath and wheezing.    Cardiovascular:  Negative for chest pain, palpitations and leg swelling.   Gastrointestinal:  Negative for nausea and vomiting.   Skin:  Negative for rash.       Objective     Vitals:    11/26/24 0949   BP: (!) 140/80   Pulse: 84   Resp: 16   Temp: 36.8 °C (98.2 °F)   TempSrc: Oral   SpO2: 98%   Weight: 101 kg (223 lb)   Height: 1.803 m (5' 11\")     Body mass index is 31.1 kg/m².    Physical Exam  Vitals reviewed.   Constitutional:       Appearance: He is well-developed.   HENT:      Head: Normocephalic and atraumatic.      Right Ear: Tympanic membrane and ear canal normal.      Left Ear: Tympanic membrane and ear canal normal.      Nose: Nose normal.   Eyes:      Conjunctiva/sclera: Conjunctivae normal.   Neck:      Thyroid: No thyromegaly.   Cardiovascular:      Rate and Rhythm: Normal rate and regular rhythm.      Heart sounds: Normal heart sounds. No murmur heard.  Pulmonary:      Effort: Pulmonary effort is normal.      Breath sounds: Normal breath sounds. No wheezing or rales.   Musculoskeletal:      Cervical back: Normal range of motion and neck supple.         Assessment & Plan  Hypertension, benign  Hypertension adequate control continue medication reevaluate 3-month  Orders:    felodipine (PLENDIL) 10 mg 24 hr tablet; " Take 1 tablet (10 mg total) by mouth daily.    losartan (COZAAR) 100 mg tablet; Take 1 tablet (100 mg total) by mouth daily.    metoprolol tartrate (LOPRESSOR) 50 mg tablet; Take 1 tablet (50 mg total) by mouth 2 (two) times a day.    Type 2 diabetes mellitus with stage 1 chronic kidney disease, without long-term current use of insulin (CMS/[facility])  (CMS/[facility])  Diabetes discussion about diabetes and definite diabetes.  Patient continue current regimen plan to repeat A1c today.  Orders:    metFORMIN (GLUCOPHAGE) 500 mg tablet; Take 1 tablet (500 mg total) by mouth 2 (two) times a day with meals.    Hemoglobin A1c    Pure hypercholesterolemia  Hyperlipidemia patient back on atorvastatin.  Orders:    atorvastatin (LIPITOR) 20 mg tablet; Take 1 tablet (20 mg total) by mouth daily.    Benign prostatic hyperplasia with nocturia  BPH history refill medication  Orders:    finasteride (PROSCAR) 5 mg tablet; Take 1 tablet (5 mg total) by mouth daily.    silodosin (RAPAFLO) 8 mg capsule; Take 1 capsule (8 mg total) by mouth daily with breakfast.

## 2024-11-26 NOTE — ASSESSMENT & PLAN NOTE
Hyperlipidemia patient back on atorvastatin.  Orders:    atorvastatin (LIPITOR) 20 mg tablet; Take 1 tablet (20 mg total) by mouth daily.

## 2024-11-26 NOTE — ASSESSMENT & PLAN NOTE
Diabetes discussion about diabetes and definite diabetes.  Patient continue current regimen plan to repeat A1c today.  Orders:    metFORMIN (GLUCOPHAGE) 500 mg tablet; Take 1 tablet (500 mg total) by mouth 2 (two) times a day with meals.    Hemoglobin A1c

## 2024-11-27 LAB
EST. AVERAGE GLUCOSE BLD GHB EST-MCNC: 140 MG/DL
HBA1C MFR BLD: 6.5 %

## 2025-01-06 DIAGNOSIS — R35.1 BENIGN PROSTATIC HYPERPLASIA WITH NOCTURIA: ICD-10-CM

## 2025-01-06 DIAGNOSIS — N40.1 BENIGN PROSTATIC HYPERPLASIA WITH NOCTURIA: ICD-10-CM

## 2025-01-06 RX ORDER — SILODOSIN 8 MG/1
8 CAPSULE ORAL
Qty: 90 CAPSULE | Refills: 0 | Status: SHIPPED | OUTPATIENT
Start: 2025-01-06 | End: 2025-04-06

## 2025-01-06 NOTE — TELEPHONE ENCOUNTER
"Medicine Refill Request    Last Office Visit: 11/26/2024   Last Consult Visit: Visit date not found  Last Telemedicine Visit: Visit date not found    Next Appointment: 2/24/2025      Current Outpatient Medications:     atorvastatin (LIPITOR) 20 mg tablet, Take 1 tablet (20 mg total) by mouth daily., Disp: 90 tablet, Rfl: 3    felodipine (PLENDIL) 10 mg 24 hr tablet, Take 1 tablet (10 mg total) by mouth daily., Disp: 90 tablet, Rfl: 3    finasteride (PROSCAR) 5 mg tablet, Take 1 tablet (5 mg total) by mouth daily., Disp: 90 tablet, Rfl: 0    losartan (COZAAR) 100 mg tablet, Take 1 tablet (100 mg total) by mouth daily., Disp: 90 tablet, Rfl: 3    metFORMIN (GLUCOPHAGE) 500 mg tablet, Take 1 tablet (500 mg total) by mouth 2 (two) times a day with meals., Disp: 180 tablet, Rfl: 3    metoprolol tartrate (LOPRESSOR) 50 mg tablet, Take 1 tablet (50 mg total) by mouth 2 (two) times a day., Disp: 180 tablet, Rfl: 3    ROCKLATAN 0.02-0.005 % drops, Administer 1 drop into both eyes nightly. Wait a few minutes in between eye drops , Disp: , Rfl:     silodosin (RAPAFLO) 8 mg capsule, Take 1 capsule (8 mg total) by mouth daily with breakfast., Disp: 90 capsule, Rfl: 0    timolol (TIMOPTIC) 0.5 % ophthalmic solution, 1 drop daily., Disp: , Rfl:       BP Readings from Last 3 Encounters:   11/26/24 124/78   08/21/24 122/78   05/15/24 (!) 148/74       Recent Lab results:  Lab Results   Component Value Date    CHOL 97 02/23/2024   ,   Lab Results   Component Value Date    HDL 37 (L) 02/23/2024   ,   Lab Results   Component Value Date    LDLCALC 37 02/23/2024   ,   Lab Results   Component Value Date    TRIG 115 02/23/2024        Lab Results   Component Value Date    GLUCOSE 157 (H) 02/23/2024   ,   Lab Results   Component Value Date    HGBA1C 6.5 (H) 11/26/2024         Lab Results   Component Value Date    CREATININE 0.8 02/23/2024       No results found for: \"TSH\"        Lab Results   Component Value Date    HGBA1C 6.5 (H) 11/26/2024 "

## 2025-01-09 DIAGNOSIS — R35.1 BENIGN PROSTATIC HYPERPLASIA WITH NOCTURIA: ICD-10-CM

## 2025-01-09 DIAGNOSIS — N40.1 BENIGN PROSTATIC HYPERPLASIA WITH NOCTURIA: ICD-10-CM

## 2025-01-09 RX ORDER — FINASTERIDE 5 MG/1
5 TABLET, FILM COATED ORAL DAILY
Qty: 90 TABLET | Refills: 1 | Status: SHIPPED | OUTPATIENT
Start: 2025-01-09

## 2025-02-10 ENCOUNTER — TRANSCRIBE ORDERS (OUTPATIENT)
Dept: SCHEDULING | Age: 75
End: 2025-02-10

## 2025-02-10 DIAGNOSIS — N20.0 CALCULUS OF KIDNEY: Primary | ICD-10-CM

## 2025-02-10 DIAGNOSIS — N28.1 CYST OF KIDNEY, ACQUIRED: ICD-10-CM

## 2025-02-11 ENCOUNTER — HOSPITAL ENCOUNTER (OUTPATIENT)
Dept: RADIOLOGY | Facility: HOSPITAL | Age: 75
Discharge: HOME | End: 2025-02-11
Attending: NURSE PRACTITIONER
Payer: MEDICARE

## 2025-02-11 DIAGNOSIS — N20.0 CALCULUS OF KIDNEY: ICD-10-CM

## 2025-02-11 DIAGNOSIS — N28.1 CYST OF KIDNEY, ACQUIRED: ICD-10-CM

## 2025-02-11 PROCEDURE — 74018 RADEX ABDOMEN 1 VIEW: CPT

## 2025-02-11 PROCEDURE — 76775 US EXAM ABDO BACK WALL LIM: CPT

## 2025-02-24 ENCOUNTER — OFFICE VISIT (OUTPATIENT)
Dept: FAMILY MEDICINE | Facility: CLINIC | Age: 75
End: 2025-02-24
Payer: MEDICARE

## 2025-02-24 VITALS
HEART RATE: 82 BPM | OXYGEN SATURATION: 98 % | RESPIRATION RATE: 16 BRPM | TEMPERATURE: 97.8 F | SYSTOLIC BLOOD PRESSURE: 138 MMHG | BODY MASS INDEX: 32.76 KG/M2 | HEIGHT: 71 IN | WEIGHT: 234 LBS | DIASTOLIC BLOOD PRESSURE: 80 MMHG

## 2025-02-24 DIAGNOSIS — E66.3 OVERWEIGHT: ICD-10-CM

## 2025-02-24 DIAGNOSIS — E78.00 PURE HYPERCHOLESTEROLEMIA: ICD-10-CM

## 2025-02-24 DIAGNOSIS — N18.1 TYPE 2 DIABETES MELLITUS WITH STAGE 1 CHRONIC KIDNEY DISEASE, WITHOUT LONG-TERM CURRENT USE OF INSULIN (CMS/HCC)  (CMS/HCC): ICD-10-CM

## 2025-02-24 DIAGNOSIS — Z00.00 ENCOUNTER FOR GENERAL ADULT MEDICAL EXAMINATION WITHOUT ABNORMAL FINDINGS: ICD-10-CM

## 2025-02-24 DIAGNOSIS — M25.551 PAIN OF RIGHT HIP: ICD-10-CM

## 2025-02-24 DIAGNOSIS — I10 HYPERTENSION, BENIGN: Primary | ICD-10-CM

## 2025-02-24 DIAGNOSIS — N40.1 BENIGN PROSTATIC HYPERPLASIA WITH LOWER URINARY TRACT SYMPTOMS, SYMPTOM DETAILS UNSPECIFIED: ICD-10-CM

## 2025-02-24 DIAGNOSIS — C43.9 MALIGNANT MELANOMA OF SKIN (CMS/HCC): ICD-10-CM

## 2025-02-24 DIAGNOSIS — E11.22 TYPE 2 DIABETES MELLITUS WITH STAGE 1 CHRONIC KIDNEY DISEASE, WITHOUT LONG-TERM CURRENT USE OF INSULIN (CMS/HCC)  (CMS/HCC): ICD-10-CM

## 2025-02-24 DIAGNOSIS — I72.3 ANEURYSM OF ILIAC ARTERY (CMS/HCC): ICD-10-CM

## 2025-02-24 DIAGNOSIS — M48.061 SPINAL STENOSIS OF LUMBAR REGION WITHOUT NEUROGENIC CLAUDICATION: ICD-10-CM

## 2025-02-24 LAB
ALBUMIN SERPL-MCNC: 4 G/DL (ref 3.5–5.7)
ALP SERPL-CCNC: 91 IU/L (ref 34–125)
ALT SERPL-CCNC: 15 IU/L (ref 7–52)
ANION GAP SERPL CALC-SCNC: 10 MEQ/L (ref 3–15)
AST SERPL-CCNC: 14 IU/L (ref 13–39)
BACTERIA URNS QL MICRO: ABNORMAL /HPF
BILIRUB SERPL-MCNC: 0.7 MG/DL (ref 0.3–1.2)
BILIRUB UR QL STRIP.AUTO: NEGATIVE MG/DL
BUN SERPL-MCNC: 17 MG/DL (ref 7–25)
CALCIUM SERPL-MCNC: 8.6 MG/DL (ref 8.6–10.3)
CHLORIDE SERPL-SCNC: 100 MEQ/L (ref 98–107)
CHOLEST SERPL-MCNC: 116 MG/DL
CLARITY UR REFRACT.AUTO: CLEAR
CO2 SERPL-SCNC: 27 MEQ/L (ref 21–31)
COLOR UR AUTO: YELLOW
CREAT SERPL-MCNC: 1 MG/DL (ref 0.7–1.3)
CREAT UR-MCNC: 62.5 MG/DL
EGFRCR SERPLBLD CKD-EPI 2021: >60 ML/MIN/1.73M*2
ERYTHROCYTE [DISTWIDTH] IN BLOOD BY AUTOMATED COUNT: 13.8 % (ref 11.6–14.4)
EST. AVERAGE GLUCOSE BLD GHB EST-MCNC: 148 MG/DL
GLUCOSE SERPL-MCNC: 142 MG/DL (ref 70–99)
GLUCOSE UR STRIP.AUTO-MCNC: ABNORMAL MG/DL
HBA1C MFR BLD: 6.8 %
HCT VFR BLD AUTO: 41.7 % (ref 40.1–51)
HDLC SERPL-MCNC: 40 MG/DL
HDLC SERPL: 2.9 {RATIO}
HGB BLD-MCNC: 13.7 G/DL (ref 13.7–17.5)
HGB UR QL STRIP.AUTO: NEGATIVE
HYALINE CASTS #/AREA URNS LPF: ABNORMAL /LPF
KETONES UR STRIP.AUTO-MCNC: NEGATIVE MG/DL
LDLC SERPL CALC-MCNC: 52 MG/DL
LEUKOCYTE ESTERASE UR QL STRIP.AUTO: NEGATIVE
MCH RBC QN AUTO: 31.1 PG (ref 28–33.2)
MCHC RBC AUTO-ENTMCNC: 32.9 G/DL (ref 32.2–36.5)
MCV RBC AUTO: 94.8 FL (ref 83–98)
MICROALBUMIN UR-MCNC: 2697.1 MG/L
MICROALBUMIN/CREAT UR: 4315.4 UG/MG
MUCOUS THREADS URNS QL MICRO: ABNORMAL /LPF
NITRITE UR QL STRIP.AUTO: NEGATIVE
NONHDLC SERPL-MCNC: 76 MG/DL
PH UR STRIP.AUTO: 6.5 [PH]
PLATELET # BLD AUTO: 255 K/UL (ref 150–350)
PMV BLD AUTO: 10.7 FL (ref 9.4–12.4)
POTASSIUM SERPL-SCNC: 3.6 MEQ/L (ref 3.5–5.1)
PROT SERPL-MCNC: 6.9 G/DL (ref 6–8.2)
PROT UR QL STRIP.AUTO: 3
PSA SERPL-MCNC: 0.14 NG/ML
RBC # BLD AUTO: 4.4 M/UL (ref 4.5–5.8)
RBC #/AREA URNS HPF: ABNORMAL /HPF
SODIUM SERPL-SCNC: 137 MEQ/L (ref 136–145)
SP GR UR REFRACT.AUTO: 1.01
SQUAMOUS URNS QL MICRO: ABNORMAL /HPF
TRIGL SERPL-MCNC: 121 MG/DL
UROBILINOGEN UR STRIP-ACNC: 0.2 EU/DL
WBC # BLD AUTO: 8.26 K/UL (ref 3.8–10.5)
WBC #/AREA URNS HPF: ABNORMAL /HPF

## 2025-02-24 PROCEDURE — G0439 PPPS, SUBSEQ VISIT: HCPCS | Performed by: FAMILY MEDICINE

## 2025-02-24 PROCEDURE — 80053 COMPREHEN METABOLIC PANEL: CPT | Performed by: FAMILY MEDICINE

## 2025-02-24 PROCEDURE — 82043 UR ALBUMIN QUANTITATIVE: CPT | Performed by: FAMILY MEDICINE

## 2025-02-24 PROCEDURE — 84153 ASSAY OF PSA TOTAL: CPT | Performed by: FAMILY MEDICINE

## 2025-02-24 PROCEDURE — 85027 COMPLETE CBC AUTOMATED: CPT | Performed by: FAMILY MEDICINE

## 2025-02-24 PROCEDURE — 81001 URINALYSIS AUTO W/SCOPE: CPT | Performed by: FAMILY MEDICINE

## 2025-02-24 PROCEDURE — 80061 LIPID PANEL: CPT | Performed by: FAMILY MEDICINE

## 2025-02-24 PROCEDURE — 83036 HEMOGLOBIN GLYCOSYLATED A1C: CPT | Performed by: FAMILY MEDICINE

## 2025-02-24 ASSESSMENT — ENCOUNTER SYMPTOMS
JOINT SWELLING: 0
DIZZINESS: 0
ACTIVITY CHANGE: 0
DIARRHEA: 0
CONSTIPATION: 0
HEADACHES: 0
SHORTNESS OF BREATH: 0
EYE DISCHARGE: 0
ABDOMINAL PAIN: 0
NAUSEA: 0
SINUS PAIN: 0
VOMITING: 0
TREMORS: 0
FATIGUE: 0
WEAKNESS: 0
PALPITATIONS: 0
DYSURIA: 0
CHEST TIGHTNESS: 0
APPETITE CHANGE: 0
HEMATURIA: 0
NUMBNESS: 0
COUGH: 0
MYALGIAS: 1
FREQUENCY: 0
EYE PAIN: 0
ARTHRALGIAS: 1
SORE THROAT: 0
BLOOD IN STOOL: 0

## 2025-02-24 ASSESSMENT — ACTIVITIES OF DAILY LIVING (ADL)
PATIENT'S MEMORY ADEQUATE TO SAFELY COMPLETE DAILY ACTIVITIES?: YES
ADEQUATE_TO_COMPLETE_ADL: YES
ASSISTIVE_DEVICE: EYEGLASSES

## 2025-02-24 ASSESSMENT — PATIENT HEALTH QUESTIONNAIRE - PHQ9
SUM OF ALL RESPONSES TO PHQ9 QUESTIONS 1 & 2: 0
SUM OF ALL RESPONSES TO PHQ9 QUESTIONS 1 & 2: 0

## 2025-02-24 ASSESSMENT — MINI COG
TOTAL SCORE: 5
COMPLETED: YES

## 2025-02-24 NOTE — PROGRESS NOTES
Subjective      Patient ID: Aneesh Brito is a 74 y.o. male.  1950      Patient for annual exam.  Patient lives at [facility].  Patient is .  Patient complains of right hip and knee pains.  He has been evaluated by orthopedics.  He also has had replacement of these joints.  Patient typically has 3 meals per day.  He does ride the bike for exercise and feels well doing this.  He sleeps well at night.  He does have nocturia.  He is on medication followed by urology.  Patient with history of hypertension.  He remains on medications been under good control.  Patient with a history of hyperlipidemia.  He does complain of myalgias today.   Patient with a history of melanoma followed by dermatology.  Patient with a history of diabetes last A1c was 6.5.   has a past medical history of Colonic adenoma, Diverticulosis of colon, Enlarged prostate with lower urinary tract symptoms (LUTS), Glaucoma, Hypertension, Melanoma of skin (CMS/[facility]), Osteoarthritis of knee, Overweight, Spinal stenosis of lumbar region, and Type 2 diabetes mellitus (CMS/[facility]).    has a past surgical history that includes Appendectomy; Colonoscopy (01/10/2012); Hip Arthroplasty (Bilateral); Knee Arthroplasty (Left); Colonoscopy w/ polypectomy (02/09/2022); and Knee Arthroplasty (Right, 02/02/2022).         The following have been reviewed and updated as appropriate in this visit:   Allergies  Meds  Problems       Review of Systems   Constitutional:  Negative for activity change, appetite change and fatigue.   HENT:  Negative for congestion, hearing loss, postnasal drip, sinus pain and sore throat.    Eyes:  Negative for pain and discharge.   Respiratory:  Negative for cough, chest tightness and shortness of breath.    Cardiovascular:  Negative for chest pain and palpitations.   Gastrointestinal:  Negative for abdominal pain, blood in stool, constipation, diarrhea, nausea and vomiting.   Genitourinary:  Negative for dysuria, frequency and  "hematuria.   Musculoskeletal:  Positive for arthralgias and myalgias. Negative for joint swelling.   Neurological:  Negative for dizziness, tremors, weakness, numbness and headaches.       Objective     Vitals:    25 0911   BP: 138/80   Pulse: 82   Resp: 16   Temp: 36.6 °C (97.8 °F)   TempSrc: Oral   SpO2: 98%   Weight: 106 kg (234 lb)   Height: 1.803 m (5' 11\")     Body mass index is 32.64 kg/m².    Physical Exam  Vitals reviewed.   Constitutional:       Appearance: He is well-developed. He is obese.   HENT:      Head: Normocephalic and atraumatic.      Right Ear: Tympanic membrane, ear canal and external ear normal.      Left Ear: Tympanic membrane, ear canal and external ear normal.   Eyes:      Conjunctiva/sclera: Conjunctivae normal.      Pupils: Pupils are equal, round, and reactive to light.   Neck:      Thyroid: No thyromegaly.   Cardiovascular:      Rate and Rhythm: Normal rate and regular rhythm.      Heart sounds: Normal heart sounds. No murmur heard.  Pulmonary:      Effort: Pulmonary effort is normal.      Breath sounds: Normal breath sounds.   Abdominal:      General: Bowel sounds are normal.      Palpations: Abdomen is soft. There is no mass.      Tenderness: There is no abdominal tenderness.   Musculoskeletal:         General: No deformity.      Cervical back: Normal range of motion and neck supple.      Right lower le+ Pitting Edema present.      Left lower le+ Pitting Edema present.   Lymphadenopathy:      Cervical: No cervical adenopathy.   Skin:     General: Skin is warm.      Findings: No rash.   Neurological:      Mental Status: He is alert and oriented to person, place, and time.      Motor: No abnormal muscle tone.         Assessment & Plan  Hypertension, benign  Hypertension.  Adequate control.  Patient continue medication reevaluate in 3 months  Orders:    Urinalysis with microscopic    Type 2 diabetes mellitus with stage 1 chronic kidney disease, without long-term current use " of insulin (CMS/[facility])  (CMS/[facility])  Diabetes good control continue medication.  Recheck A1c and labs today  Orders:    Comprehensive metabolic panel    CBC    Hemoglobin A1c    Microalbumin/Creatinine Ur Random    Malignant melanoma of skin (CMS/[facility])  Melanoma history patient followed by dermatology       Pure hypercholesterolemia  Hyperlipidemia.  Patient on atorvastatin we will repeat labs but will have patient hold medication for possibility of myalgias  Orders:    Lipid panel    Overweight  Overweight diet discussed       Aneurysm of iliac artery (CMS/[facility])  Aneurysm history stable       Spinal stenosis of lumbar region without neurogenic claudication  Spinal stenosis history patient continue exercise program encouraged weight loss       Encounter for general adult medical examination without abnormal findings  Annual exam.  Daily routine discussed at length peer recommend 3 meals per day no snacking.  Recommend regular low-impact exercise.  Patient to add strengthening exercises.  Patient continue limit caffeine and alcohol       Pain of right hip  Pain of right hip will refer for physical therapy.  Orders:    Ambulatory referral to Physical Therapy; Future    Benign prostatic hyperplasia with lower urinary tract symptoms, symptom details unspecified  BPH patient also followed by urology will update lab work  Orders:    PSA

## 2025-02-24 NOTE — ASSESSMENT & PLAN NOTE
Diabetes good control continue medication.  Recheck A1c and labs today  Orders:    Comprehensive metabolic panel    CBC    Hemoglobin A1c    Microalbumin/Creatinine Ur Random

## 2025-02-24 NOTE — ASSESSMENT & PLAN NOTE
Hyperlipidemia.  Patient on atorvastatin we will repeat labs but will have patient hold medication for possibility of myalgias  Orders:    Lipid panel

## 2025-02-24 NOTE — ASSESSMENT & PLAN NOTE
Hypertension.  Adequate control.  Patient continue medication reevaluate in 3 months  Orders:    Urinalysis with microscopic

## 2025-02-26 ENCOUNTER — TELEPHONE (OUTPATIENT)
Dept: FAMILY MEDICINE | Facility: CLINIC | Age: 75
End: 2025-02-26
Payer: MEDICARE

## 2025-02-26 DIAGNOSIS — R35.1 NOCTURIA: Primary | ICD-10-CM

## 2025-02-26 DIAGNOSIS — R26.2 AMBULATORY DYSFUNCTION: ICD-10-CM

## 2025-02-26 NOTE — TELEPHONE ENCOUNTER
He stated that he was calling you back regarding his appointment yesterday.    Wants to talk about the Knee/PT    Waking up at night to urinate.    [phone]

## 2025-02-27 NOTE — TELEPHONE ENCOUNTER
Patient with ongoing nocturia recommend follow-up with urology  Amatory dysfunction discussed continue with physical therapy for hip also will add for ambulatory dysfunction

## 2025-04-04 ENCOUNTER — TELEPHONE (OUTPATIENT)
Dept: FAMILY MEDICINE | Facility: CLINIC | Age: 75
End: 2025-04-04
Payer: MEDICARE

## 2025-04-04 NOTE — TELEPHONE ENCOUNTER
Pt called and wanted to talk about a medication change. He did not mention what medication that he wanted to alter.  I attempted to contact him but it went to [facility] for him to call back with this information  He is also looking for a neurologist.    # [phone]

## 2025-04-04 NOTE — TELEPHONE ENCOUNTER
Reviewed w pt/ pt states he went off atorvastatin for 2 wks in feb.25 due to myalgias/ side pain/ no change/ so restarted atorvastatin/  is going to PT for legs/ I would like to see neurology- sent pt # Dr Haider

## 2025-04-17 ENCOUNTER — TRANSCRIBE ORDERS (OUTPATIENT)
Dept: SCHEDULING | Age: 75
End: 2025-04-17

## 2025-04-17 DIAGNOSIS — M54.16 LUMBAR RADICULOPATHY: Primary | ICD-10-CM

## 2025-04-17 LAB — MLHC DIABETIC EYE EXAM (EXTERNAL): NORMAL

## 2025-04-19 ENCOUNTER — HOSPITAL ENCOUNTER (OUTPATIENT)
Dept: RADIOLOGY | Facility: CLINIC | Age: 75
Discharge: HOME | End: 2025-04-19
Attending: PSYCHIATRY & NEUROLOGY
Payer: MEDICARE

## 2025-04-19 DIAGNOSIS — M54.16 LUMBAR RADICULOPATHY: ICD-10-CM

## 2025-05-12 ENCOUNTER — TRANSCRIBE ORDERS (OUTPATIENT)
Dept: NEUROLOGY | Facility: HOSPITAL | Age: 75
End: 2025-05-12

## 2025-05-12 ENCOUNTER — APPOINTMENT (OUTPATIENT)
Dept: LAB | Facility: HOSPITAL | Age: 75
End: 2025-05-12
Attending: PSYCHIATRY & NEUROLOGY
Payer: MEDICARE

## 2025-05-12 DIAGNOSIS — R79.82 ELEVATED C-REACTIVE PROTEIN (CRP): ICD-10-CM

## 2025-05-12 DIAGNOSIS — M54.16 LUMBAR RADICULOPATHY: ICD-10-CM

## 2025-05-12 DIAGNOSIS — E03.9 MYXEDEMA HEART DISEASE: ICD-10-CM

## 2025-05-12 DIAGNOSIS — D51.9 VITAMIN B12 DEFICIENCY ANEMIA: ICD-10-CM

## 2025-05-12 DIAGNOSIS — I51.9 MYXEDEMA HEART DISEASE: ICD-10-CM

## 2025-05-12 DIAGNOSIS — D64.9 ANEMIA, UNSPECIFIED: ICD-10-CM

## 2025-05-12 DIAGNOSIS — M62.81 MUSCLE WEAKNESS (GENERALIZED): ICD-10-CM

## 2025-05-12 DIAGNOSIS — A69.20 LYME DISEASE: ICD-10-CM

## 2025-05-12 DIAGNOSIS — M62.81 MUSCLE WEAKNESS (GENERALIZED): Primary | ICD-10-CM

## 2025-05-12 LAB
CK SERPL-CCNC: 65 U/L (ref 30–223)
CRP SERPL-MCNC: 1.7 MG/L
ERYTHROCYTE [SEDIMENTATION RATE] IN BLOOD BY WESTERGREN METHOD: 31 MM/HR
TSH SERPL DL<=0.05 MIU/L-ACNC: 2.45 MIU/L (ref 0.34–5.6)
VIT B12 SERPL-MCNC: 219 PG/ML (ref 180–914)

## 2025-05-12 PROCEDURE — 86334 IMMUNOFIX E-PHORESIS SERUM: CPT

## 2025-05-12 PROCEDURE — 86140 C-REACTIVE PROTEIN: CPT

## 2025-05-12 PROCEDURE — 82607 VITAMIN B-12: CPT

## 2025-05-12 PROCEDURE — 86618 LYME DISEASE ANTIBODY: CPT

## 2025-05-12 PROCEDURE — 36415 COLL VENOUS BLD VENIPUNCTURE: CPT

## 2025-05-12 PROCEDURE — 82164 ANGIOTENSIN I ENZYME TEST: CPT

## 2025-05-12 PROCEDURE — 84165 PROTEIN E-PHORESIS SERUM: CPT

## 2025-05-12 PROCEDURE — 86431 RHEUMATOID FACTOR QUANT: CPT

## 2025-05-12 PROCEDURE — 85652 RBC SED RATE AUTOMATED: CPT

## 2025-05-12 PROCEDURE — 82085 ASSAY OF ALDOLASE: CPT

## 2025-05-12 PROCEDURE — 86038 ANTINUCLEAR ANTIBODIES: CPT

## 2025-05-12 PROCEDURE — 83921 ORGANIC ACID SINGLE QUANT: CPT

## 2025-05-12 PROCEDURE — 84443 ASSAY THYROID STIM HORMONE: CPT

## 2025-05-12 PROCEDURE — 82550 ASSAY OF CK (CPK): CPT

## 2025-05-13 LAB — RHEUMATOID FACT SERPL-ACNC: <=7 IU/ML

## 2025-05-14 LAB
ACE SERPL-CCNC: 31 U/L (ref 9–67)
ALBUMIN MFR UR ELPH: 61.7 %
ALBUMIN SERPL ELPH-MCNC: 4.32 G/DL (ref 3.2–4.9)
ALBUMIN/GLOB SERPL: 1.6 {RATIO} (ref 1.1–2.1)
ALPHA1 GLOB MFR SERPL ELPH: 2.8 %
ALPHA1 GLOB SERPL ELPH-MCNC: 0.2 G/DL (ref 0.08–0.23)
ALPHA2 GLOB MFR UR ELPH: 13.5 %
ALPHA2 GLOB SERPL ELPH-MCNC: 0.95 G/DL (ref 0.45–0.92)
ANA SER QL IA: NEGATIVE
B BURGDOR AB SER IA-ACNC: 0.17 RATIO
B-GLOBULIN SERPL ELPH-MCNC: 0.6 G/DL (ref 0.5–1.03)
BETA1 GLOB MFR UR ELPH: 8.6 %
GAMMA GLOB MFR UR ELPH: 13.4 %
GAMMA GLOB SERPL ELPH-MCNC: 0.94 G/DL (ref 0.6–1.6)
INTERPRETATION SERPL IFE-IMP: NORMAL
PROT PATTERN SERPL ELPH-IMP: NORMAL
PROT SERPL-MCNC: 7 G/DL (ref 6–8.2)

## 2025-05-15 LAB
ALDOLASE SERPL-CCNC: 6.8 U/L
METHYLMALONATE SERPL-SCNC: 252 NMOL/L (ref 69–390)

## 2025-05-19 ENCOUNTER — OFFICE VISIT (OUTPATIENT)
Dept: FAMILY MEDICINE | Facility: CLINIC | Age: 75
End: 2025-05-19
Payer: MEDICARE

## 2025-05-19 VITALS
HEART RATE: 56 BPM | TEMPERATURE: 97.8 F | HEIGHT: 71 IN | OXYGEN SATURATION: 96 % | SYSTOLIC BLOOD PRESSURE: 138 MMHG | DIASTOLIC BLOOD PRESSURE: 78 MMHG | WEIGHT: 234 LBS | BODY MASS INDEX: 32.76 KG/M2 | RESPIRATION RATE: 16 BRPM

## 2025-05-19 DIAGNOSIS — E11.9 TYPE 2 DIABETES MELLITUS WITHOUT COMPLICATION, WITHOUT LONG-TERM CURRENT USE OF INSULIN (CMS/HCC): Primary | ICD-10-CM

## 2025-05-19 DIAGNOSIS — I10 HYPERTENSION, BENIGN: ICD-10-CM

## 2025-05-19 LAB
EST. AVERAGE GLUCOSE BLD GHB EST-MCNC: 146 MG/DL
HBA1C MFR BLD: 6.7 %

## 2025-05-19 PROCEDURE — G8754 DIAS BP LESS 90: HCPCS | Performed by: FAMILY MEDICINE

## 2025-05-19 PROCEDURE — G8752 SYS BP LESS 140: HCPCS | Performed by: FAMILY MEDICINE

## 2025-05-19 PROCEDURE — 99214 OFFICE O/P EST MOD 30 MIN: CPT | Performed by: FAMILY MEDICINE

## 2025-05-19 PROCEDURE — 83036 HEMOGLOBIN GLYCOSYLATED A1C: CPT | Performed by: FAMILY MEDICINE

## 2025-05-22 ENCOUNTER — TELEPHONE (OUTPATIENT)
Dept: NEUROSURGERY | Facility: CLINIC | Age: 75
End: 2025-05-22
Payer: MEDICARE

## 2025-05-31 ENCOUNTER — APPOINTMENT (EMERGENCY)
Dept: RADIOLOGY | Facility: HOSPITAL | Age: 75
End: 2025-05-31
Attending: EMERGENCY MEDICINE
Payer: MEDICARE

## 2025-05-31 ENCOUNTER — HOSPITAL ENCOUNTER (EMERGENCY)
Facility: HOSPITAL | Age: 75
Discharge: ANOTHER HOSPITAL | End: 2025-05-31
Attending: EMERGENCY MEDICINE | Admitting: EMERGENCY MEDICINE
Payer: MEDICARE

## 2025-05-31 ENCOUNTER — APPOINTMENT (EMERGENCY)
Dept: RADIOLOGY | Facility: HOSPITAL | Age: 75
End: 2025-05-31
Payer: MEDICARE

## 2025-05-31 ENCOUNTER — HOSPITAL ENCOUNTER (INPATIENT)
Facility: HOSPITAL | Age: 75
LOS: 4 days | DRG: 472 | End: 2025-06-04
Attending: SURGERY | Admitting: SURGERY
Payer: MEDICARE

## 2025-05-31 VITALS
RESPIRATION RATE: 16 BRPM | HEART RATE: 76 BPM | BODY MASS INDEX: 33.33 KG/M2 | OXYGEN SATURATION: 98 % | DIASTOLIC BLOOD PRESSURE: 95 MMHG | HEIGHT: 71 IN | SYSTOLIC BLOOD PRESSURE: 175 MMHG | TEMPERATURE: 99.7 F | WEIGHT: 238.1 LBS
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
## 2025-05-31 DIAGNOSIS — S12.601A CLOSED NONDISPLACED FRACTURE OF SEVENTH CERVICAL VERTEBRA, UNSPECIFIED FRACTURE MORPHOLOGY, INITIAL ENCOUNTER (CMS/HCC): Primary | ICD-10-CM

## 2025-05-31 DIAGNOSIS — S12.600A CLOSED DISPLACED FRACTURE OF SEVENTH CERVICAL VERTEBRA, UNSPECIFIED FRACTURE MORPHOLOGY, INITIAL ENCOUNTER (CMS/HCC): Primary | ICD-10-CM

## 2025-05-31 LAB
ALBUMIN SERPL-MCNC: 3.9 G/DL (ref 3.5–5.7)
ALP SERPL-CCNC: 83 IU/L (ref 34–125)
ALT SERPL-CCNC: 15 IU/L (ref 7–52)
ANION GAP SERPL CALC-SCNC: 10 MEQ/L (ref 3–15)
AST SERPL-CCNC: 32 IU/L (ref 13–39)
ATRIAL RATE: 73
BASOPHILS # BLD: 0.05 K/UL (ref 0.01–0.1)
BASOPHILS NFR BLD: 0.3 %
BILIRUB SERPL-MCNC: 1.1 MG/DL (ref 0.3–1.2)
BUN SERPL-MCNC: 16 MG/DL (ref 7–25)
CALCIUM SERPL-MCNC: 8.9 MG/DL (ref 8.6–10.3)
CHLORIDE SERPL-SCNC: 100 MEQ/L (ref 98–107)
CK SERPL-CCNC: 1118 U/L (ref 30–223)
CO2 SERPL-SCNC: 26 MEQ/L (ref 21–31)
CREAT SERPL-MCNC: 1 MG/DL (ref 0.7–1.3)
DIFFERENTIAL METHOD BLD: ABNORMAL
EGFRCR SERPLBLD CKD-EPI 2021: >60 ML/MIN/1.73M*2
EOSINOPHIL # BLD: 0 K/UL (ref 0.04–0.54)
EOSINOPHIL NFR BLD: 0 %
ERYTHROCYTE [DISTWIDTH] IN BLOOD BY AUTOMATED COUNT: 13.1 % (ref 11.6–14.4)
GLUCOSE SERPL-MCNC: 145 MG/DL (ref 70–99)
HCT VFR BLD AUTO: 39.5 % (ref 40.1–51)
HGB BLD-MCNC: 14.2 G/DL (ref 13.7–17.5)
IMM GRANULOCYTES # BLD AUTO: 0.07 K/UL (ref 0–0.08)
IMM GRANULOCYTES NFR BLD AUTO: 0.5 %
LYMPHOCYTES # BLD: 1.25 K/UL (ref 1.2–3.5)
LYMPHOCYTES NFR BLD: 8.6 %
MCH RBC QN AUTO: 32.7 PG (ref 28–33.2)
MCHC RBC AUTO-ENTMCNC: 35.9 G/DL (ref 32.2–36.5)
MCV RBC AUTO: 91 FL (ref 83–98)
MONOCYTES # BLD: 1.22 K/UL (ref 0.3–1)
MONOCYTES NFR BLD: 8.4 %
NEUTROPHILS # BLD: 11.96 K/UL (ref 1.7–7)
NEUTS SEG NFR BLD: 82.2 %
NRBC BLD-RTO: 0 %
P AXIS: 91
PLATELET # BLD AUTO: 282 K/UL (ref 150–350)
PMV BLD AUTO: 10.6 FL (ref 9.4–12.4)
POTASSIUM SERPL-SCNC: 3.4 MEQ/L (ref 3.5–5.1)
PR INTERVAL: 188
PROT SERPL-MCNC: 7.1 G/DL (ref 6–8.2)
QRS DURATION: 94
QT INTERVAL: 424
QTC CALCULATION(BAZETT): 467
R AXIS: -12
RBC # BLD AUTO: 4.34 M/UL (ref 4.5–5.8)
SODIUM SERPL-SCNC: 136 MEQ/L (ref 136–145)
T WAVE AXIS: -50
TROPONIN I SERPL HS-MCNC: 18.4 PG/ML
TROPONIN I SERPL HS-MCNC: 20.7 PG/ML
VENTRICULAR RATE: 73
WBC # BLD AUTO: 14.55 K/UL (ref 3.8–10.5)

## 2025-05-31 PROCEDURE — 80053 COMPREHEN METABOLIC PANEL: CPT | Performed by: EMERGENCY MEDICINE

## 2025-05-31 PROCEDURE — 71045 X-RAY EXAM CHEST 1 VIEW: CPT

## 2025-05-31 PROCEDURE — 93005 ELECTROCARDIOGRAM TRACING: CPT | Performed by: EMERGENCY MEDICINE

## 2025-05-31 PROCEDURE — 72070 X-RAY EXAM THORAC SPINE 2VWS: CPT

## 2025-05-31 PROCEDURE — 3E0337Z INTRODUCTION OF ELECTROLYTIC AND WATER BALANCE SUBSTANCE INTO PERIPHERAL VEIN, PERCUTANEOUS APPROACH: ICD-10-PCS | Performed by: EMERGENCY MEDICINE

## 2025-05-31 PROCEDURE — 96361 HYDRATE IV INFUSION ADD-ON: CPT | Mod: 59

## 2025-05-31 PROCEDURE — 63600000 HC DRUGS/DETAIL CODE: Mod: JZ | Performed by: PHYSICIAN ASSISTANT

## 2025-05-31 PROCEDURE — 63700000 HC SELF-ADMINISTRABLE DRUG

## 2025-05-31 PROCEDURE — 70450 CT HEAD/BRAIN W/O DYE: CPT

## 2025-05-31 PROCEDURE — 84484 ASSAY OF TROPONIN QUANT: CPT | Mod: 91 | Performed by: EMERGENCY MEDICINE

## 2025-05-31 PROCEDURE — 93005 ELECTROCARDIOGRAM TRACING: CPT

## 2025-05-31 PROCEDURE — 0HQ2XZZ REPAIR RIGHT EAR SKIN, EXTERNAL APPROACH: ICD-10-PCS | Performed by: EMERGENCY MEDICINE

## 2025-05-31 PROCEDURE — 3E033NZ INTRODUCTION OF ANALGESICS, HYPNOTICS, SEDATIVES INTO PERIPHERAL VEIN, PERCUTANEOUS APPROACH: ICD-10-PCS | Performed by: EMERGENCY MEDICINE

## 2025-05-31 PROCEDURE — 85025 COMPLETE CBC W/AUTO DIFF WBC: CPT | Performed by: EMERGENCY MEDICINE

## 2025-05-31 PROCEDURE — 99284 EMERGENCY DEPT VISIT MOD MDM: CPT | Mod: 25

## 2025-05-31 PROCEDURE — 96374 THER/PROPH/DIAG INJ IV PUSH: CPT | Mod: 59

## 2025-05-31 PROCEDURE — 12011 RPR F/E/E/N/L/M 2.5 CM/<: CPT

## 2025-05-31 PROCEDURE — 82550 ASSAY OF CK (CPK): CPT | Performed by: EMERGENCY MEDICINE

## 2025-05-31 PROCEDURE — 20600000 HC ROOM AND CARE INTERMEDIATE/TELEMETRY

## 2025-05-31 PROCEDURE — 36415 COLL VENOUS BLD VENIPUNCTURE: CPT | Performed by: EMERGENCY MEDICINE

## 2025-05-31 PROCEDURE — 99222 1ST HOSP IP/OBS MODERATE 55: CPT | Performed by: SURGERY

## 2025-05-31 PROCEDURE — 3E033GC INTRODUCTION OF OTHER THERAPEUTIC SUBSTANCE INTO PERIPHERAL VEIN, PERCUTANEOUS APPROACH: ICD-10-PCS | Performed by: EMERGENCY MEDICINE

## 2025-05-31 PROCEDURE — 63600000 HC DRUGS/DETAIL CODE: Mod: JZ

## 2025-05-31 PROCEDURE — 84484 ASSAY OF TROPONIN QUANT: CPT | Performed by: EMERGENCY MEDICINE

## 2025-05-31 PROCEDURE — 72125 CT NECK SPINE W/O DYE: CPT

## 2025-05-31 PROCEDURE — 63600000 HC DRUGS/DETAIL CODE: Mod: JZ | Performed by: EMERGENCY MEDICINE

## 2025-05-31 PROCEDURE — 25800000 HC PHARMACY IV SOLUTIONS: Performed by: PHYSICIAN ASSISTANT

## 2025-05-31 PROCEDURE — 96375 TX/PRO/DX INJ NEW DRUG ADDON: CPT | Mod: 59

## 2025-05-31 RX ORDER — TAMSULOSIN HYDROCHLORIDE 0.4 MG/1
0.4 CAPSULE ORAL DAILY
Status: CANCELLED | OUTPATIENT
Start: 2025-05-31

## 2025-05-31 RX ORDER — METOPROLOL TARTRATE 25 MG/1
50 TABLET, FILM COATED ORAL 2 TIMES DAILY
Status: CANCELLED | OUTPATIENT
Start: 2025-05-31

## 2025-05-31 RX ORDER — INSULIN LISPRO 100 [IU]/ML
2-18 INJECTION, SOLUTION INTRAVENOUS; SUBCUTANEOUS
Status: CANCELLED | OUTPATIENT
Start: 2025-05-31

## 2025-05-31 RX ORDER — FINASTERIDE 5 MG/1
5 TABLET, FILM COATED ORAL DAILY
Status: CANCELLED | OUTPATIENT
Start: 2025-05-31

## 2025-05-31 RX ORDER — SODIUM CHLORIDE, SODIUM LACTATE, POTASSIUM CHLORIDE, CALCIUM CHLORIDE 600; 310; 30; 20 MG/100ML; MG/100ML; MG/100ML; MG/100ML
INJECTION, SOLUTION INTRAVENOUS CONTINUOUS
Status: DISCONTINUED | OUTPATIENT
Start: 2025-05-31 | End: 2025-06-01

## 2025-05-31 RX ORDER — ATORVASTATIN CALCIUM 10 MG/1
20 TABLET, FILM COATED ORAL DAILY
Status: CANCELLED | OUTPATIENT
Start: 2025-05-31

## 2025-05-31 RX ORDER — DEXTROSE 40 %
15-30 GEL (GRAM) ORAL AS NEEDED
Status: CANCELLED | OUTPATIENT
Start: 2025-05-31

## 2025-05-31 RX ORDER — HYDRALAZINE HYDROCHLORIDE 20 MG/ML
10 INJECTION INTRAMUSCULAR; INTRAVENOUS EVERY 4 HOURS PRN
Status: COMPLETED | OUTPATIENT
Start: 2025-05-31 | End: 2025-06-02

## 2025-05-31 RX ORDER — ADHESIVE BANDAGE 7/8"
15-30 BANDAGE TOPICAL AS NEEDED
Status: CANCELLED | OUTPATIENT
Start: 2025-05-31

## 2025-05-31 RX ORDER — ONDANSETRON HYDROCHLORIDE 2 MG/ML
4 INJECTION, SOLUTION INTRAVENOUS ONCE
Status: COMPLETED | OUTPATIENT
Start: 2025-05-31 | End: 2025-05-31

## 2025-05-31 RX ORDER — ADHESIVE BANDAGE 7/8"
15-30 BANDAGE TOPICAL AS NEEDED
Status: DISCONTINUED | OUTPATIENT
Start: 2025-05-31 | End: 2025-06-04 | Stop reason: HOSPADM

## 2025-05-31 RX ORDER — POLYETHYLENE GLYCOL 3350 17 G/17G
17 POWDER, FOR SOLUTION ORAL DAILY
Status: DISCONTINUED | OUTPATIENT
Start: 2025-06-01 | End: 2025-06-04 | Stop reason: HOSPADM

## 2025-05-31 RX ORDER — LOSARTAN POTASSIUM 100 MG/1
100 TABLET ORAL DAILY
Status: CANCELLED | OUTPATIENT
Start: 2025-05-31

## 2025-05-31 RX ORDER — DEXTROSE 40 %
15-30 GEL (GRAM) ORAL AS NEEDED
Status: DISCONTINUED | OUTPATIENT
Start: 2025-05-31 | End: 2025-06-04 | Stop reason: HOSPADM

## 2025-05-31 RX ORDER — DEXTROSE 50 % IN WATER (D50W) INTRAVENOUS SYRINGE
25 AS NEEDED
Status: DISCONTINUED | OUTPATIENT
Start: 2025-05-31 | End: 2025-06-04 | Stop reason: HOSPADM

## 2025-05-31 RX ORDER — DEXTROSE 50 % IN WATER (D50W) INTRAVENOUS SYRINGE
25 AS NEEDED
Status: CANCELLED | OUTPATIENT
Start: 2025-05-31

## 2025-05-31 RX ORDER — OXYCODONE HYDROCHLORIDE 5 MG/1
5 TABLET ORAL EVERY 4 HOURS PRN
Refills: 0 | Status: DISCONTINUED | OUTPATIENT
Start: 2025-05-31 | End: 2025-06-04 | Stop reason: HOSPADM

## 2025-05-31 RX ORDER — ONDANSETRON 4 MG/1
4 TABLET, ORALLY DISINTEGRATING ORAL EVERY 8 HOURS PRN
Status: DISCONTINUED | OUTPATIENT
Start: 2025-05-31 | End: 2025-06-04 | Stop reason: HOSPADM

## 2025-05-31 RX ORDER — MORPHINE SULFATE 4 MG/ML
4 INJECTION, SOLUTION INTRAMUSCULAR; INTRAVENOUS ONCE
Status: COMPLETED | OUTPATIENT
Start: 2025-05-31 | End: 2025-05-31

## 2025-05-31 RX ORDER — ACETAMINOPHEN 325 MG/1
650 TABLET ORAL EVERY 4 HOURS
Status: DISCONTINUED | OUTPATIENT
Start: 2025-05-31 | End: 2025-06-04 | Stop reason: HOSPADM

## 2025-05-31 RX ORDER — FELODIPINE 5 MG/1
10 TABLET, EXTENDED RELEASE ORAL DAILY
Status: CANCELLED | OUTPATIENT
Start: 2025-05-31

## 2025-05-31 RX ADMIN — SODIUM CHLORIDE, POTASSIUM CHLORIDE, SODIUM LACTATE AND CALCIUM CHLORIDE: 600; 310; 30; 20 INJECTION, SOLUTION INTRAVENOUS at 23:17

## 2025-05-31 RX ADMIN — SODIUM CHLORIDE 500 ML: 9 INJECTION, SOLUTION INTRAVENOUS at 15:24

## 2025-05-31 RX ADMIN — ONDANSETRON 4 MG: 2 INJECTION INTRAMUSCULAR; INTRAVENOUS at 13:56

## 2025-05-31 RX ADMIN — MORPHINE SULFATE 4 MG: 4 INJECTION, SOLUTION INTRAMUSCULAR; INTRAVENOUS at 13:56

## 2025-05-31 RX ADMIN — ACETAMINOPHEN 650 MG: 325 TABLET ORAL at 23:17

## 2025-05-31 ASSESSMENT — COGNITIVE AND FUNCTIONAL STATUS - GENERAL
STANDING UP FROM CHAIR USING ARMS: 1 - TOTAL
CLIMB 3 TO 5 STEPS WITH RAILING: 1 - TOTAL
MOVING TO AND FROM BED TO CHAIR: 1 - TOTAL
WALKING IN HOSPITAL ROOM: 1 - TOTAL

## 2025-05-31 NOTE — ED ATTESTATION NOTE
I have personally seen and examined Aneesh Brito.  I was involved in the care and medical decision making for this patient.    I reviewed and agree with physician assistant / nurse practitioner’s assessment and plan of care; any exceptions are documented below.      My focused history, examination, assessment and plan of care of Aneesh Brito is as follows:    Brief History:  HPI    75-year-old male status post fall.  Lost his balance.  Found on the floor this morning.  Fell last night.    Focused Physical Exam:  Physical Exam    Awake alert no distress  No respiratory distress  Small laceration of the right ear  Skin warm and dry    Assessment / Plan:    Check labs, IV fluids, CT scan of the brain      Medical Decision Making  Amount and/or Complexity of Data Reviewed  Labs: ordered. Decision-making details documented in ED Course.  Radiology: ordered. Decision-making details documented in ED Course.  ECG/medicine tests: ordered and independent interpretation performed.    Risk  Prescription drug management.        I was physically present for the key/critical portions of the following procedures:  Procedures           Kenney Sousa MD  05/31/25 1600

## 2025-05-31 NOTE — ED PROVIDER NOTES
Emergency Medicine Note  HPI   HISTORY OF PRESENT ILLNESS     74 y/o male from home, with PMHx of pre-DM and HTN presents s/p fall last night. States he went to close the curtains in his bedroom and lost his balance and fell onto his R side hitting his head. Notes he was on the floor all night until his wife returned home this morning. Wife notes she was babysitting grandkids so was out for the night for the first time. Patient states he was unable to get off the floor all night. He denies LOC. Notes some nausea and did vomit today. Patient is not on blood thinners. Denies chest pain.          Patient History   PAST HISTORY     Reviewed from Nursing Triage:       Past Medical History:   Diagnosis Date    Colonic adenoma     Diverticulosis of colon     Enlarged prostate with lower urinary tract symptoms (LUTS)     Glaucoma     Hypertension     Melanoma of skin (CMS/HCC)     Osteoarthritis of knee     Overweight     Spinal stenosis of lumbar region     Type 2 diabetes mellitus (CMS/HCC)        Past Surgical History   Procedure Laterality Date    Appendectomy      Colonoscopy  01/10/2012    diverticulosis    Colonoscopy w/ polypectomy  02/09/2022    Hip arthroplasty Bilateral     Knee arthroplasty Left     Knee arthroplasty Right 02/02/2022       Family History   Problem Relation Name Age of Onset    Breast cancer Biological Mother         Social History     Tobacco Use    Smoking status: Never    Smokeless tobacco: Never   Substance Use Topics    Alcohol use: Yes     Comment: BEER, 1 CONSUMED DAILY    Drug use: Never         Review of Systems   REVIEW OF SYSTEMS     Review of Systems      VITALS     ED Vitals      Date/Time Temp Pulse Resp BP SpO2 [facility]   05/31/25 1434 -- 75 18 139/65 97 % MET   05/31/25 1308 36.8 °C (98.2 °F) 73 17 159/102 100 % MET          Pulse Ox %: 100 % (05/31/25 1431)  Pulse Ox Interpretation: Normal (05/31/25 1431)  Heart Rate: 73 (05/31/25 1431)  Rhythm Strip Interpretation: Normal Sinus  Rhythm (05/31/25 1431)     Physical Exam   PHYSICAL EXAM     Physical Exam  Vitals and nursing note reviewed.   Constitutional:       Appearance: He is well-developed.   HENT:      Head: Normocephalic.      Comments: Abrasion to R parietal scalp with dried blood.     Ears:        Comments: R upper ear with <0.5cm laceration, no active bleeding  Eyes:      Extraocular Movements: Extraocular movements intact.      Conjunctiva/sclera: Conjunctivae normal.      Pupils: Pupils are equal, round, and reactive to light.   Neck:      Comments: No c-spine ttp  Cardiovascular:      Rate and Rhythm: Normal rate and regular rhythm.   Pulmonary:      Effort: Pulmonary effort is normal. No respiratory distress.      Breath sounds: Normal breath sounds.   Abdominal:      Palpations: Abdomen is soft.      Tenderness: There is no abdominal tenderness.   Musculoskeletal:      Cervical back: Neck supple. No tenderness.   Neurological:      Mental Status: He is alert. Mental status is at baseline.      Coordination: Coordination normal.      Comments: Clear speech, follows commands           PROCEDURES     Laceration Repair    Date/Time: 5/31/2025 3:10 PM    Performed by: Serenity Villasenor PA C  Authorized by: Kenney Sousa MD    Consent:     Consent obtained:  Verbal    Consent given by:  Patient    Risks discussed:  Infection, pain and poor cosmetic result  Universal protocol:     Procedure explained and questions answered to patient or proxy's satisfaction: yes      Patient identity confirmed:  Verbally with patient and arm band  Anesthesia:     Anesthesia method:  None  Laceration details:     Location: ear.    Length (cm):  0.3  Treatment:     Area cleansed with:  Peter-Clens    Amount of cleaning:  Standard  Skin repair:     Repair method:  Tissue adhesive  Approximation:     Approximation:  Close  Repair type:     Repair type:  Simple  Post-procedure details:     Dressing:  Open (no dressing)       DATA     Results        Procedure Component Value Units Date/Time    HS Troponin I (with 2 hour reflex) [[id number]]  (Abnormal) Collected: 05/31/25 1315    Specimen: Blood, Venous Updated: 05/31/25 1447     High Sens Troponin I 18.4 pg/mL     CK, Total [[id number]]  (Abnormal) Collected: 05/31/25 1315    Specimen: Blood, Venous Updated: 05/31/25 1354     Total CK 1,118 U/L     Comprehensive metabolic panel [[id number]]  (Abnormal) Collected: 05/31/25 1315    Specimen: Blood, Venous Updated: 05/31/25 1354     Sodium 136 mEQ/L      Potassium 3.4 mEQ/L      Comment: Results obtained on plasma. Plasma Potassium values may be up to 0.4 mEQ/L less than serum values. The differences may be greater for patients with high platelet or white cell counts.        Chloride 100 mEQ/L      CO2 26 mEQ/L      BUN 16 mg/dL      Creatinine 1.0 mg/dL      Glucose 145 mg/dL      Calcium 8.9 mg/dL      AST (SGOT) 32 IU/L      ALT (SGPT) 15 IU/L      Alkaline Phosphatase 83 IU/L      Total Protein 7.1 g/dL      Comment: Test performed on plasma which typically contains approximately 0.4 g/dL more protein than serum.        Albumin 3.9 g/dL      Bilirubin, Total 1.1 mg/dL      eGFR >60.0 mL/min/1.73m*2      Comment: Calculation based on the Chronic Kidney Disease Epidemiology Collaboration (CKD-EPI) equation refit without adjustment for race.        Anion Gap 10 mEQ/L     CBC and differential [[id number]]  (Abnormal) Collected: 05/31/25 1315    Specimen: Blood, Venous Updated: 05/31/25 1337     WBC 14.55 K/uL      RBC 4.34 M/uL      Hemoglobin 14.2 g/dL      Hematocrit 39.5 %      MCV 91.0 fL      MCH 32.7 pg      MCHC 35.9 g/dL      RDW 13.1 %      Platelets 282 K/uL      MPV 10.6 fL      Differential Type Auto     nRBC 0.0 %      Immature Granulocytes 0.5 %      Neutrophils 82.2 %      Lymphocytes 8.6 %      Monocytes 8.4 %      Eosinophils 0.0 %      Basophils 0.3 %      Immature Granulocytes, Absolute 0.07 K/uL      Neutrophils, Absolute 11.96 K/uL       Lymphocytes, Absolute 1.25 K/uL      Monocytes, Absolute 1.22 K/uL      Eosinophils, Absolute 0.00 K/uL      Basophils, Absolute 0.05 K/uL             Imaging Results              CT HEAD WITHOUT IV CONTRAST (Final result)  Result time 05/31/25 17:36:57      Final result                   Impression:    IMPRESSION:  1.  No evidence of acute intracranial abnormality.  2.  Acute unstable multicolumn fracture of the cervical spine through the C7  vertebral body with associated widening of the C7-T1 facet joint on the right  and widening of the interspinous ligament at C7-T1. This is consistent with an  unstable/multicolumn fracture of the cervical spine and MRI is recommended for  further evaluation.      Finding:    Other   Acuity: Critical  Status:  CLOSED    Critical read back was performed and results were read back by ANTONI DUNBAR,  5/31/2025 5:35 PM.                     Narrative:    CLINICAL HISTORY:  Head trauma, minor (Age >= 65y)  Neck trauma (Age >= 65y)  Fall, found down    COMMENT:  CT examination of the brain were obtained with axial images without IV contrast.  Sagittal and coronal reformats were performed.  CT examination of the cervical spine was performed with axial images without IV  contrast. Sagittal and coronal reformats were performed.  CT DOSE:  One or more dose reduction techniques (e.g. automated exposure  control, adjustment of the mA and/or kV according to patient size, use of  iterative reconstruction technique) utilized for this examination.    COMPARISON:    6/18/2023 head CT, 2/14/2022 cervical spine and head CT    Head:  There is no evidence of acute transcortical infarction, intracranial hemorrhage,  or extra-axial fluid collection.  There is no midline shift or mass effect.  The ventricles, cisterns, and sulci demonstrate involutional change. There are  subcortical, periventricular, and deep white matter hypodensities which are  nonspecific but are likely due to chronic  microangiopathic changes. There are  intracranial vascular calcifications.  The visualized osseous structures appear normal.  The visualized paranasal sinuses and the mastoid air cells are grossly clear.  The visualized orbits are normal in appearance. There are lens replacements  bilaterally.  There is a right parieto-occipital scalp laceration/hematoma.    Cervical spine:  There is straightening of the cervical lordosis. There are multilevel  postsurgical changes of the cervical spine, not appreciably changed from  2/14/2022, with posterior fixation from C4 through C7 with associated  laminectomies. There are anterior bridging osteophytes.  A linear lucency consistent with nondisplaced fracture is identified through the  right aspect of C7 including through anterior bridging osteophyte as well as the  corner of the vertebral body, mostly on the right but also appearing to extend  through the left aspect of the inferior C7 vertebral body. There is associated  widening of the right C7-T1 facet joint and widening of the interspinous  ligament at C7-T1 as compared with the prior study. This is therefore consistent  with an unstable multicolumn fracture. MRI is recommended for further  evaluation. Underlying osteopenia is suggested as well.                                       CT CERVICAL SPINE WITHOUT IV CONTRAST (Final result)  Result time 05/31/25 17:36:57      Final result                   Impression:    IMPRESSION:  1.  No evidence of acute intracranial abnormality.  2.  Acute unstable multicolumn fracture of the cervical spine through the C7  vertebral body with associated widening of the C7-T1 facet joint on the right  and widening of the interspinous ligament at C7-T1. This is consistent with an  unstable/multicolumn fracture of the cervical spine and MRI is recommended for  further evaluation.      Finding:    Other   Acuity: Critical  Status:  CLOSED    Critical read back was performed and results were read  back by ANTONI DUNBAR,  5/31/2025 5:35 PM.                     Narrative:    CLINICAL HISTORY:  Head trauma, minor (Age >= 65y)  Neck trauma (Age >= 65y)  Fall, found down    COMMENT:  CT examination of the brain were obtained with axial images without IV contrast.  Sagittal and coronal reformats were performed.  CT examination of the cervical spine was performed with axial images without IV  contrast. Sagittal and coronal reformats were performed.  CT DOSE:  One or more dose reduction techniques (e.g. automated exposure  control, adjustment of the mA and/or kV according to patient size, use of  iterative reconstruction technique) utilized for this examination.    COMPARISON:    6/18/2023 head CT, 2/14/2022 cervical spine and head CT    Head:  There is no evidence of acute transcortical infarction, intracranial hemorrhage,  or extra-axial fluid collection.  There is no midline shift or mass effect.  The ventricles, cisterns, and sulci demonstrate involutional change. There are  subcortical, periventricular, and deep white matter hypodensities which are  nonspecific but are likely due to chronic microangiopathic changes. There are  intracranial vascular calcifications.  The visualized osseous structures appear normal.  The visualized paranasal sinuses and the mastoid air cells are grossly clear.  The visualized orbits are normal in appearance. There are lens replacements  bilaterally.  There is a right parieto-occipital scalp laceration/hematoma.    Cervical spine:  There is straightening of the cervical lordosis. There are multilevel  postsurgical changes of the cervical spine, not appreciably changed from  2/14/2022, with posterior fixation from C4 through C7 with associated  laminectomies. There are anterior bridging osteophytes.  A linear lucency consistent with nondisplaced fracture is identified through the  right aspect of C7 including through anterior bridging osteophyte as well as the  corner of the  vertebral body, mostly on the right but also appearing to extend  through the left aspect of the inferior C7 vertebral body. There is associated  widening of the right C7-T1 facet joint and widening of the interspinous  ligament at C7-T1 as compared with the prior study. This is therefore consistent  with an unstable multicolumn fracture. MRI is recommended for further  evaluation. Underlying osteopenia is suggested as well.                                       X-RAY CHEST 1 VIEW (Final result)  Result time 05/31/25 14:36:13      Final result                   Impression:    IMPRESSION:  No active disease is seen in the chest.               Narrative:    CLINICAL HISTORY:   Fall, leukocytosis.    COMMENT:    A single frontal view of the chest was obtained using portable  technique.  Comparison with a similar study from 6/18/2023.    The heart and pulmonary vascularity appear within normal limits.  The lungs are  free of fluid and infiltrate.  There are thoracic spine degenerative changes.  There are atherosclerotic calcifications of the thoracic aorta.  There are  shoulder joint degenerative changes.  Partially imaged are instrumented  posterior fusion changes in the cervical spine.                                       X-RAY THORACIC SPINE 2 VIEWS (Final result)  Result time 05/31/25 13:36:13      Final result                   Impression:    IMPRESSION: Thoracic spondylosis and hyperostosis.  No acute osseous  abnormality.                   Narrative:    CLINICAL HISTORY: Fall, back pain.    COMMENT: Three views of the thoracic spine were obtained. No similar prior  studies are available for comparison.    Normal thoracic kyphosis.  No subluxation.  Vertebral bodies are normal in  height.  There is multilevel diffuse loss of intervertebral disc height.  Flowing ventral osteophytes noted at multiple levels.  No evidence of a  prevertebral or paravertebral soft tissue mass or abnormal  calcification.  Atherosclerotic calcifications of the thoracic aorta without evidence of  aneurysm.                                      ECG 12 lead   Independent Interpretation by ED Provider   NSR at 73 bpm. No STEMI.           Scoring tools                                  ED Course & MDM   MDM / ED COURSE / CLINICAL IMPRESSION / DISPO     Medical Decision Making  76 y/o male here s/p fall last night, on ground overnight. No LOC. + upper back/neck pain. CT with unstable C7 fracture. C collar placed. Patient neurovascularly intact. Elevated CK and trop from being floor overnight. Otherwise alert and oriented. Not on thinners. CTH unremarkable. Patient accepted for transfer to [place].    Amount and/or Complexity of Data Reviewed  Labs: ordered. Decision-making details documented in ED Course.  Radiology: ordered and independent interpretation performed. Decision-making details documented in ED Course.  ECG/medicine tests: ordered and independent interpretation performed. Decision-making details documented in ED Course.  Discussion of management or test interpretation with external provider(s): Trauma Dr. Bates  Radiology    Risk  Prescription drug management.  Decision regarding hospitalization.        ED Course as of 05/31/25 1803   Sat May 31, 2025   1340 WBC(!): 14.55  elevated [CP]   1341 X-RAY THORACIC SPINE 2 VIEWS  Thoracic spondylosis and hyperostosis.  No acute osseous  abnormality.   [CP]   1355 Total CK(!): 1,118 [CP]   1359 Tdap 2022 [CP]   1439 X-RAY CHEST 1 VIEW  No active disease is seen in the chest. [CP]   1456 High Sens Troponin I(!): 18.4 [CP]   1614 High Sens Troponin I(!): 20.7  Delta flat [CP]   1739 Call from radiology, unstable C7 fracture. C-collar placed. Call out to trauma. [CP]   1739 CT CERVICAL SPINE WITHOUT IV CONTRAST   Acute unstable multicolumn fracture of the cervical spine through the C7  vertebral body with associated widening of the C7-T1 facet joint on the right  and  widening of the interspinous ligament at C7-T1. This is consistent with an  unstable/multicolumn fracture of the cervical spine and MRI is recommended for  further evaluation. [CP]   6885 Spoke with Dr. Bates at [place] and accepts patient for transfer. Sign out given to SRUTHI Montana in ED as well. [CP]      ED Course User Index  [CP] Serenity Villasenor PA C     Clinical Impression      Closed nondisplaced fracture of seventh cervical vertebra, unspecified fracture morphology, initial encounter (CMS/[facility])   Fall, initial encounter     _________________       ED Disposition   Transfer to Another Facility                       Serenity Villasenor PA C  05/31/25 8113

## 2025-06-01 ENCOUNTER — APPOINTMENT (INPATIENT)
Dept: RADIOLOGY | Facility: HOSPITAL | Age: 75
DRG: 472 | End: 2025-06-01
Attending: NEUROLOGICAL SURGERY
Payer: MEDICARE

## 2025-06-01 ENCOUNTER — ANESTHESIA EVENT (INPATIENT)
Dept: OPERATING ROOM | Facility: HOSPITAL | Age: 75
DRG: 472 | End: 2025-06-01
Payer: MEDICARE

## 2025-06-01 ENCOUNTER — ANESTHESIA (INPATIENT)
Dept: OPERATING ROOM | Facility: HOSPITAL | Age: 75
DRG: 472 | End: 2025-06-01
Payer: MEDICARE

## 2025-06-01 PROBLEM — S12.601A CLOSED NONDISPLACED FRACTURE OF SEVENTH CERVICAL VERTEBRA (CMS/HCC): Status: ACTIVE | Noted: 2025-06-01

## 2025-06-01 LAB
ABO + RH BLD: NORMAL
ANION GAP SERPL CALC-SCNC: 10 MEQ/L (ref 3–15)
ANION GAP SERPL CALC-SCNC: 11 MEQ/L (ref 3–15)
ANION GAP SERPL CALC-SCNC: 7 MEQ/L (ref 3–15)
APTT PPP: 27 SEC (ref 23–35)
ATRIAL RATE: 84
BLD GP AB SCN SERPL QL: NEGATIVE
BUN SERPL-MCNC: 19 MG/DL (ref 7–25)
BUN SERPL-MCNC: 23 MG/DL (ref 7–25)
BUN SERPL-MCNC: 23 MG/DL (ref 7–25)
CALCIUM SERPL-MCNC: 8.1 MG/DL (ref 8.6–10.3)
CALCIUM SERPL-MCNC: 8.4 MG/DL (ref 8.6–10.3)
CALCIUM SERPL-MCNC: 8.5 MG/DL (ref 8.6–10.3)
CHLORIDE SERPL-SCNC: 102 MEQ/L (ref 98–107)
CHLORIDE SERPL-SCNC: 103 MEQ/L (ref 98–107)
CHLORIDE SERPL-SCNC: 104 MEQ/L (ref 98–107)
CK SERPL-CCNC: 1108 U/L (ref 30–223)
CK SERPL-CCNC: 882 U/L (ref 30–223)
CO2 SERPL-SCNC: 23 MEQ/L (ref 21–31)
CO2 SERPL-SCNC: 26 MEQ/L (ref 21–31)
CO2 SERPL-SCNC: 27 MEQ/L (ref 21–31)
CREAT SERPL-MCNC: 1.2 MG/DL (ref 0.7–1.3)
CREAT SERPL-MCNC: 1.5 MG/DL (ref 0.7–1.3)
CREAT SERPL-MCNC: 1.5 MG/DL (ref 0.7–1.3)
D AG BLD QL: POSITIVE
EGFRCR SERPLBLD CKD-EPI 2021: 47.7 ML/MIN/1.73M*2
EGFRCR SERPLBLD CKD-EPI 2021: 47.7 ML/MIN/1.73M*2
EGFRCR SERPLBLD CKD-EPI 2021: >60 ML/MIN/1.73M*2
ERYTHROCYTE [DISTWIDTH] IN BLOOD BY AUTOMATED COUNT: 13.3 % (ref 11.6–14.4)
ERYTHROCYTE [DISTWIDTH] IN BLOOD BY AUTOMATED COUNT: 13.7 % (ref 11.6–14.4)
ERYTHROCYTE [DISTWIDTH] IN BLOOD BY AUTOMATED COUNT: 13.8 % (ref 11.6–14.4)
GLUCOSE BLD-MCNC: 127 MG/DL (ref 70–99)
GLUCOSE BLD-MCNC: 140 MG/DL (ref 70–99)
GLUCOSE BLD-MCNC: 171 MG/DL (ref 70–99)
GLUCOSE BLD-MCNC: 183 MG/DL (ref 70–99)
GLUCOSE BLD-MCNC: 189 MG/DL (ref 70–99)
GLUCOSE SERPL-MCNC: 137 MG/DL (ref 70–99)
GLUCOSE SERPL-MCNC: 192 MG/DL (ref 70–99)
GLUCOSE SERPL-MCNC: 209 MG/DL (ref 70–99)
HCT VFR BLD AUTO: 36 % (ref 40.1–51)
HCT VFR BLD AUTO: 36.1 % (ref 40.1–51)
HCT VFR BLD AUTO: 37.3 % (ref 40.1–51)
HGB BLD-MCNC: 12.4 G/DL (ref 13.7–17.5)
HGB BLD-MCNC: 12.5 G/DL (ref 13.7–17.5)
HGB BLD-MCNC: 12.9 G/DL (ref 13.7–17.5)
INR PPP: 1.2
LABORATORY COMMENT REPORT: NORMAL
MAGNESIUM SERPL-MCNC: 1.9 MG/DL (ref 1.8–2.5)
MAGNESIUM SERPL-MCNC: 1.9 MG/DL (ref 1.8–2.5)
MCH RBC QN AUTO: 32 PG (ref 28–33.2)
MCH RBC QN AUTO: 32.7 PG (ref 28–33.2)
MCH RBC QN AUTO: 32.7 PG (ref 28–33.2)
MCHC RBC AUTO-ENTMCNC: 34.4 G/DL (ref 32.2–36.5)
MCHC RBC AUTO-ENTMCNC: 34.6 G/DL (ref 32.2–36.5)
MCHC RBC AUTO-ENTMCNC: 34.6 G/DL (ref 32.2–36.5)
MCV RBC AUTO: 92.8 FL (ref 83–98)
MCV RBC AUTO: 94.4 FL (ref 83–98)
MCV RBC AUTO: 94.5 FL (ref 83–98)
P AXIS: 85
PHOSPHATE SERPL-MCNC: 4.7 MG/DL (ref 2.4–4.7)
PLATELET # BLD AUTO: 247 K/UL (ref 150–350)
PLATELET # BLD AUTO: 249 K/UL (ref 150–350)
PLATELET # BLD AUTO: 261 K/UL (ref 150–350)
PMV BLD AUTO: 10.5 FL (ref 9.4–12.4)
PMV BLD AUTO: 10.9 FL (ref 9.4–12.4)
PMV BLD AUTO: 10.9 FL (ref 9.4–12.4)
POCT TEST: ABNORMAL
POTASSIUM SERPL-SCNC: 3.2 MEQ/L (ref 3.5–5.1)
POTASSIUM SERPL-SCNC: 3.2 MEQ/L (ref 3.5–5.1)
POTASSIUM SERPL-SCNC: 3.6 MEQ/L (ref 3.5–5.1)
PR INTERVAL: 198
PROTHROMBIN TIME: 14.9 SEC (ref 12.2–14.5)
QRS DURATION: 106
QT INTERVAL: 400
QTC CALCULATION(BAZETT): 472
R AXIS: -20
RBC # BLD AUTO: 3.82 M/UL (ref 4.5–5.8)
RBC # BLD AUTO: 3.88 M/UL (ref 4.5–5.8)
RBC # BLD AUTO: 3.95 M/UL (ref 4.5–5.8)
SODIUM SERPL-SCNC: 136 MEQ/L (ref 136–145)
SODIUM SERPL-SCNC: 137 MEQ/L (ref 136–145)
SODIUM SERPL-SCNC: 140 MEQ/L (ref 136–145)
SPECIMEN EXP DATE BLD: NORMAL
T WAVE AXIS: -44
VENTRICULAR RATE: 84
WBC # BLD AUTO: 11.2 K/UL (ref 3.8–10.5)
WBC # BLD AUTO: 12.11 K/UL (ref 3.8–10.5)
WBC # BLD AUTO: 15.64 K/UL (ref 3.8–10.5)

## 2025-06-01 PROCEDURE — 200200 PR NO CHARGE: Performed by: NEUROLOGICAL SURGERY

## 2025-06-01 PROCEDURE — 63600000 HC DRUGS/DETAIL CODE: Mod: JZ

## 2025-06-01 PROCEDURE — 0RG4071 FUSION OF CERVICOTHORACIC VERTEBRAL JOINT WITH AUTOLOGOUS TISSUE SUBSTITUTE, POSTERIOR APPROACH, POSTERIOR COLUMN, OPEN APPROACH: ICD-10-PCS | Performed by: NEUROLOGICAL SURGERY

## 2025-06-01 PROCEDURE — 99222 1ST HOSP IP/OBS MODERATE 55: CPT | Mod: 57 | Performed by: NEUROLOGICAL SURGERY

## 2025-06-01 PROCEDURE — 80048 BASIC METABOLIC PNL TOTAL CA: CPT | Performed by: PHYSICIAN ASSISTANT

## 2025-06-01 PROCEDURE — 85027 COMPLETE CBC AUTOMATED: CPT | Performed by: PHYSICIAN ASSISTANT

## 2025-06-01 PROCEDURE — 20931 SP BONE ALGRFT STRUCT ADD-ON: CPT | Performed by: NEUROLOGICAL SURGERY

## 2025-06-01 PROCEDURE — 36000014 HC OR LEVEL 4 EA ADDL MIN: Performed by: NEUROLOGICAL SURGERY

## 2025-06-01 PROCEDURE — 84100 ASSAY OF PHOSPHORUS: CPT

## 2025-06-01 PROCEDURE — 8E0WXBZ COMPUTER ASSISTED PROCEDURE OF TRUNK REGION: ICD-10-PCS | Performed by: NEUROLOGICAL SURGERY

## 2025-06-01 PROCEDURE — 63600000 HC DRUGS/DETAIL CODE: Mod: JZ | Performed by: PHYSICIAN ASSISTANT

## 2025-06-01 PROCEDURE — 63700000 HC SELF-ADMINISTRABLE DRUG: Performed by: NEUROLOGICAL SURGERY

## 2025-06-01 PROCEDURE — 00CU0ZZ EXTIRPATION OF MATTER FROM SPINAL CANAL, OPEN APPROACH: ICD-10-PCS | Performed by: NEUROLOGICAL SURGERY

## 2025-06-01 PROCEDURE — 25000000 HC PHARMACY GENERAL: Performed by: NURSE ANESTHETIST, CERTIFIED REGISTERED

## 2025-06-01 PROCEDURE — 27200000 HC STERILE SUPPLY: Performed by: NEUROLOGICAL SURGERY

## 2025-06-01 PROCEDURE — 61783 SCAN PROC SPINAL: CPT | Performed by: NEUROLOGICAL SURGERY

## 2025-06-01 PROCEDURE — 63600000 HC DRUGS/DETAIL CODE: Performed by: NURSE ANESTHETIST, CERTIFIED REGISTERED

## 2025-06-01 PROCEDURE — 82550 ASSAY OF CK (CPK): CPT

## 2025-06-01 PROCEDURE — 22600 ARTHRD PST TQ 1NTRSPC CRV: CPT | Performed by: NEUROLOGICAL SURGERY

## 2025-06-01 PROCEDURE — 83735 ASSAY OF MAGNESIUM: CPT | Performed by: PHYSICIAN ASSISTANT

## 2025-06-01 PROCEDURE — 63600000 HC DRUGS/DETAIL CODE: Mod: JZ | Performed by: NEUROLOGICAL SURGERY

## 2025-06-01 PROCEDURE — 20936 SP BONE AGRFT LOCAL ADD-ON: CPT | Performed by: NEUROLOGICAL SURGERY

## 2025-06-01 PROCEDURE — 01N10ZZ RELEASE CERVICAL NERVE, OPEN APPROACH: ICD-10-PCS | Performed by: NEUROLOGICAL SURGERY

## 2025-06-01 PROCEDURE — 0PS304Z REPOSITION CERVICAL VERTEBRA WITH INTERNAL FIXATION DEVICE, OPEN APPROACH: ICD-10-PCS | Performed by: NEUROLOGICAL SURGERY

## 2025-06-01 PROCEDURE — 80048 BASIC METABOLIC PNL TOTAL CA: CPT

## 2025-06-01 PROCEDURE — 86923 COMPATIBILITY TEST ELECTRIC: CPT

## 2025-06-01 PROCEDURE — 25800000 HC PHARMACY IV SOLUTIONS: Performed by: NURSE ANESTHETIST, CERTIFIED REGISTERED

## 2025-06-01 PROCEDURE — 25800000 HC PHARMACY IV SOLUTIONS

## 2025-06-01 PROCEDURE — 63600000 HC DRUGS/DETAIL CODE

## 2025-06-01 PROCEDURE — 63265 EXCISE INTRASPINL LESION CRV: CPT | Performed by: NEUROLOGICAL SURGERY

## 2025-06-01 PROCEDURE — C1713 ANCHOR/SCREW BN/BN,TIS/BN: HCPCS | Performed by: NEUROLOGICAL SURGERY

## 2025-06-01 PROCEDURE — 82374 ASSAY BLOOD CARBON DIOXIDE: CPT

## 2025-06-01 PROCEDURE — 27800000 HC SUPPLY/IMPLANTS: Performed by: NEUROLOGICAL SURGERY

## 2025-06-01 PROCEDURE — 86901 BLOOD TYPING SEROLOGIC RH(D): CPT

## 2025-06-01 PROCEDURE — 20000000 HC ROOM AND CARE ICU

## 2025-06-01 PROCEDURE — 25800000 HC PHARMACY IV SOLUTIONS: Performed by: PHYSICIAN ASSISTANT

## 2025-06-01 PROCEDURE — 63700000 HC SELF-ADMINISTRABLE DRUG

## 2025-06-01 PROCEDURE — 86900 BLOOD TYPING SEROLOGIC ABO: CPT

## 2025-06-01 PROCEDURE — 36415 COLL VENOUS BLD VENIPUNCTURE: CPT

## 2025-06-01 PROCEDURE — 22326 TREAT NECK SPINE FRACTURE: CPT | Performed by: NEUROLOGICAL SURGERY

## 2025-06-01 PROCEDURE — 85027 COMPLETE CBC AUTOMATED: CPT

## 2025-06-01 PROCEDURE — 85730 THROMBOPLASTIN TIME PARTIAL: CPT | Performed by: NEUROLOGICAL SURGERY

## 2025-06-01 PROCEDURE — 37000001 HC ANESTHESIA GENERAL: Performed by: NEUROLOGICAL SURGERY

## 2025-06-01 PROCEDURE — 36000004 HC OR LEVEL 4 INITIAL 30MIN: Performed by: NEUROLOGICAL SURGERY

## 2025-06-01 PROCEDURE — 85610 PROTHROMBIN TIME: CPT | Performed by: NEUROLOGICAL SURGERY

## 2025-06-01 PROCEDURE — 20930 SP BONE ALGRFT MORSEL ADD-ON: CPT | Performed by: NEUROLOGICAL SURGERY

## 2025-06-01 PROCEDURE — 83735 ASSAY OF MAGNESIUM: CPT

## 2025-06-01 PROCEDURE — 25000000 HC PHARMACY GENERAL: Performed by: NEUROLOGICAL SURGERY

## 2025-06-01 PROCEDURE — 3E0U0GB INTRODUCTION OF RECOMBINANT BONE MORPHOGENETIC PROTEIN INTO JOINTS, OPEN APPROACH: ICD-10-PCS | Performed by: NEUROLOGICAL SURGERY

## 2025-06-01 PROCEDURE — 72128 CT CHEST SPINE W/O DYE: CPT

## 2025-06-01 PROCEDURE — 22843 INSERT SPINE FIXATION DEVICE: CPT | Performed by: NEUROLOGICAL SURGERY

## 2025-06-01 DEVICE — IMPLANTABLE DEVICE: Type: IMPLANTABLE DEVICE | Site: SPINE CERVICAL | Status: FUNCTIONAL

## 2025-06-01 DEVICE — CHIPS CANCELLOUS 15CC 1-8MM: Type: IMPLANTABLE DEVICE | Site: SPINE CERVICAL | Status: FUNCTIONAL

## 2025-06-01 DEVICE — INFUSE BONE GRAFT X-SMALL: Type: IMPLANTABLE DEVICE | Site: SPINE CERVICAL | Status: FUNCTIONAL

## 2025-06-01 DEVICE — 4.0 POLY SCREW 4.0X20: Type: IMPLANTABLE DEVICE | Site: SPINE CERVICAL | Status: FUNCTIONAL

## 2025-06-01 DEVICE — GRAFT BONE SUBSTITUTE 10CC DBM  FIBER PLIAFX PRIME: Type: IMPLANTABLE DEVICE | Site: SPINE CERVICAL | Status: FUNCTIONAL

## 2025-06-01 DEVICE — SET SCREW: Type: IMPLANTABLE DEVICE | Site: SPINE CERVICAL | Status: FUNCTIONAL

## 2025-06-01 DEVICE — CORNERSTONE MICRO 6 X 8 X 10: Type: IMPLANTABLE DEVICE | Site: SPINE CERVICAL | Status: FUNCTIONAL

## 2025-06-01 RX ORDER — SODIUM CHLORIDE, SODIUM GLUCONATE, SODIUM ACETATE, POTASSIUM CHLORIDE AND MAGNESIUM CHLORIDE 30; 37; 368; 526; 502 MG/100ML; MG/100ML; MG/100ML; MG/100ML; MG/100ML
INJECTION, SOLUTION INTRAVENOUS CONTINUOUS PRN
Status: DISCONTINUED | OUTPATIENT
Start: 2025-06-01 | End: 2025-06-01 | Stop reason: SURG

## 2025-06-01 RX ORDER — DEXAMETHASONE SODIUM PHOSPHATE 4 MG/ML
INJECTION, SOLUTION INTRA-ARTICULAR; INTRALESIONAL; INTRAMUSCULAR; INTRAVENOUS; SOFT TISSUE AS NEEDED
Status: DISCONTINUED | OUTPATIENT
Start: 2025-06-01 | End: 2025-06-01 | Stop reason: SURG

## 2025-06-01 RX ORDER — INSULIN LISPRO 100 [IU]/ML
2-18 INJECTION, SOLUTION INTRAVENOUS; SUBCUTANEOUS EVERY 6 HOURS
Status: DISCONTINUED | OUTPATIENT
Start: 2025-06-01 | End: 2025-06-04

## 2025-06-01 RX ORDER — HEPARIN SODIUM 5000 [USP'U]/ML
5000 INJECTION, SOLUTION INTRAVENOUS; SUBCUTANEOUS EVERY 8 HOURS
Status: DISCONTINUED | OUTPATIENT
Start: 2025-06-01 | End: 2025-06-04 | Stop reason: HOSPADM

## 2025-06-01 RX ORDER — LABETALOL HCL 20 MG/4 ML
SYRINGE (ML) INTRAVENOUS AS NEEDED
Status: DISCONTINUED | OUTPATIENT
Start: 2025-06-01 | End: 2025-06-01 | Stop reason: SURG

## 2025-06-01 RX ORDER — PROPOFOL 10 MG/ML
INJECTION, EMULSION INTRAVENOUS AS NEEDED
Status: DISCONTINUED | OUTPATIENT
Start: 2025-06-01 | End: 2025-06-01 | Stop reason: SURG

## 2025-06-01 RX ORDER — VANCOMYCIN/0.9 % SOD CHLORIDE 1.5G/250ML
1.5 PLASTIC BAG, INJECTION (ML) INTRAVENOUS
Status: COMPLETED | OUTPATIENT
Start: 2025-06-02 | End: 2025-06-02

## 2025-06-01 RX ORDER — METOPROLOL TARTRATE 50 MG/1
50 TABLET ORAL 2 TIMES DAILY
Status: DISCONTINUED | OUTPATIENT
Start: 2025-06-01 | End: 2025-06-04 | Stop reason: HOSPADM

## 2025-06-01 RX ORDER — SODIUM CHLORIDE, SODIUM LACTATE, POTASSIUM CHLORIDE, CALCIUM CHLORIDE 600; 310; 30; 20 MG/100ML; MG/100ML; MG/100ML; MG/100ML
INJECTION, SOLUTION INTRAVENOUS CONTINUOUS
Status: DISCONTINUED | OUTPATIENT
Start: 2025-06-01 | End: 2025-06-02

## 2025-06-01 RX ORDER — VANCOMYCIN/0.9 % SOD CHLORIDE 1.5G/250ML
1500 PLASTIC BAG, INJECTION (ML) INTRAVENOUS ONCE
Status: COMPLETED | OUTPATIENT
Start: 2025-06-01 | End: 2025-06-01

## 2025-06-01 RX ORDER — POVIDONE-IODINE 10 MG/G
OINTMENT TOPICAL
Status: DISCONTINUED | OUTPATIENT
Start: 2025-06-01 | End: 2025-06-01 | Stop reason: HOSPADM

## 2025-06-01 RX ORDER — ROCURONIUM BROMIDE 10 MG/ML
INJECTION, SOLUTION INTRAVENOUS AS NEEDED
Status: DISCONTINUED | OUTPATIENT
Start: 2025-06-01 | End: 2025-06-01 | Stop reason: SURG

## 2025-06-01 RX ORDER — POTASSIUM CHLORIDE 14.9 MG/ML
20 INJECTION INTRAVENOUS ONCE
Status: COMPLETED | OUTPATIENT
Start: 2025-06-01 | End: 2025-06-01

## 2025-06-01 RX ORDER — PHENYLEPHRINE HYDROCHLORIDE 10 MG/ML
INJECTION INTRAVENOUS AS NEEDED
Status: DISCONTINUED | OUTPATIENT
Start: 2025-06-01 | End: 2025-06-01 | Stop reason: SURG

## 2025-06-01 RX ORDER — HYDROMORPHONE HYDROCHLORIDE 1 MG/ML
0.5 INJECTION, SOLUTION INTRAMUSCULAR; INTRAVENOUS; SUBCUTANEOUS
Status: CANCELLED | OUTPATIENT
Start: 2025-06-01

## 2025-06-01 RX ORDER — FINASTERIDE 5 MG/1
5 TABLET, FILM COATED ORAL DAILY
Status: DISCONTINUED | OUTPATIENT
Start: 2025-06-01 | End: 2025-06-04 | Stop reason: HOSPADM

## 2025-06-01 RX ORDER — HYDROMORPHONE HYDROCHLORIDE 1 MG/ML
INJECTION, SOLUTION INTRAMUSCULAR; INTRAVENOUS; SUBCUTANEOUS AS NEEDED
Status: DISCONTINUED | OUTPATIENT
Start: 2025-06-01 | End: 2025-06-01 | Stop reason: SURG

## 2025-06-01 RX ORDER — PHENYLEPHRINE HCL IN 0.9% NACL 50MG/250ML
PLASTIC BAG, INJECTION (ML) INTRAVENOUS CONTINUOUS PRN
Status: DISCONTINUED | OUTPATIENT
Start: 2025-06-01 | End: 2025-06-01 | Stop reason: SURG

## 2025-06-01 RX ORDER — TIMOLOL MALEATE 5 MG/ML
1 SOLUTION/ DROPS OPHTHALMIC DAILY
Status: DISCONTINUED | OUTPATIENT
Start: 2025-06-01 | End: 2025-06-04 | Stop reason: HOSPADM

## 2025-06-01 RX ORDER — SODIUM CHLORIDE 9 MG/ML
5 INJECTION, SOLUTION INTRAVENOUS AS NEEDED
Status: DISCONTINUED | OUTPATIENT
Start: 2025-06-01 | End: 2025-06-02

## 2025-06-01 RX ORDER — DEXTROSE 40 %
15-30 GEL (GRAM) ORAL AS NEEDED
Status: CANCELLED | OUTPATIENT
Start: 2025-06-01

## 2025-06-01 RX ORDER — METOPROLOL TARTRATE 50 MG/1
50 TABLET ORAL 2 TIMES DAILY
Status: DISCONTINUED | OUTPATIENT
Start: 2025-06-01 | End: 2025-06-01

## 2025-06-01 RX ORDER — LOSARTAN POTASSIUM 100 MG/1
100 TABLET ORAL DAILY
Status: DISCONTINUED | OUTPATIENT
Start: 2025-06-01 | End: 2025-06-01

## 2025-06-01 RX ORDER — VANCOMYCIN HYDROCHLORIDE 1 G/20ML
INJECTION, POWDER, LYOPHILIZED, FOR SOLUTION INTRAVENOUS
Status: DISCONTINUED | OUTPATIENT
Start: 2025-06-01 | End: 2025-06-01 | Stop reason: HOSPADM

## 2025-06-01 RX ORDER — FENTANYL CITRATE 50 UG/ML
INJECTION, SOLUTION INTRAMUSCULAR; INTRAVENOUS AS NEEDED
Status: DISCONTINUED | OUTPATIENT
Start: 2025-06-01 | End: 2025-06-01 | Stop reason: SURG

## 2025-06-01 RX ORDER — MIDAZOLAM HYDROCHLORIDE 2 MG/2ML
INJECTION, SOLUTION INTRAMUSCULAR; INTRAVENOUS AS NEEDED
Status: DISCONTINUED | OUTPATIENT
Start: 2025-06-01 | End: 2025-06-01 | Stop reason: SURG

## 2025-06-01 RX ORDER — ATORVASTATIN CALCIUM 20 MG/1
20 TABLET, FILM COATED ORAL DAILY
Status: DISCONTINUED | OUTPATIENT
Start: 2025-06-01 | End: 2025-06-04 | Stop reason: HOSPADM

## 2025-06-01 RX ORDER — LIDOCAINE HYDROCHLORIDE 10 MG/ML
INJECTION, SOLUTION INFILTRATION; PERINEURAL AS NEEDED
Status: DISCONTINUED | OUTPATIENT
Start: 2025-06-01 | End: 2025-06-01 | Stop reason: SURG

## 2025-06-01 RX ORDER — ADHESIVE BANDAGE 7/8"
15-30 BANDAGE TOPICAL AS NEEDED
Status: CANCELLED | OUTPATIENT
Start: 2025-06-01

## 2025-06-01 RX ORDER — DEXTROSE 50 % IN WATER (D50W) INTRAVENOUS SYRINGE
25 AS NEEDED
Status: CANCELLED | OUTPATIENT
Start: 2025-06-01

## 2025-06-01 RX ORDER — TAMSULOSIN HYDROCHLORIDE 0.4 MG/1
0.4 CAPSULE ORAL NIGHTLY
Status: DISCONTINUED | OUTPATIENT
Start: 2025-06-01 | End: 2025-06-04 | Stop reason: HOSPADM

## 2025-06-01 RX ORDER — FENTANYL CITRATE 50 UG/ML
50 INJECTION, SOLUTION INTRAMUSCULAR; INTRAVENOUS EVERY 5 MIN PRN
Status: CANCELLED | OUTPATIENT
Start: 2025-06-01

## 2025-06-01 RX ORDER — FELODIPINE 5 MG/1
10 TABLET, EXTENDED RELEASE ORAL DAILY
Status: DISCONTINUED | OUTPATIENT
Start: 2025-06-01 | End: 2025-06-04 | Stop reason: HOSPADM

## 2025-06-01 RX ORDER — ONDANSETRON HYDROCHLORIDE 2 MG/ML
INJECTION, SOLUTION INTRAVENOUS AS NEEDED
Status: DISCONTINUED | OUTPATIENT
Start: 2025-06-01 | End: 2025-06-01 | Stop reason: SURG

## 2025-06-01 RX ORDER — SODIUM CHLORIDE 9 MG/ML
INJECTION, SOLUTION INTRAVENOUS CONTINUOUS PRN
Status: DISCONTINUED | OUTPATIENT
Start: 2025-06-01 | End: 2025-06-01 | Stop reason: SURG

## 2025-06-01 RX ORDER — BUPIVACAINE HYDROCHLORIDE AND EPINEPHRINE 5; 5 MG/ML; UG/ML
INJECTION, SOLUTION EPIDURAL; INTRACAUDAL; PERINEURAL
Status: DISCONTINUED | OUTPATIENT
Start: 2025-06-01 | End: 2025-06-01 | Stop reason: HOSPADM

## 2025-06-01 RX ORDER — LOSARTAN POTASSIUM 100 MG/1
100 TABLET ORAL DAILY
Status: DISCONTINUED | OUTPATIENT
Start: 2025-06-01 | End: 2025-06-04 | Stop reason: HOSPADM

## 2025-06-01 RX ADMIN — NICARDIPINE HYDROCHLORIDE 5 MG/HR: 25 INJECTION, SOLUTION INTRAVENOUS at 19:29

## 2025-06-01 RX ADMIN — SODIUM CHLORIDE: 9 INJECTION, SOLUTION INTRAVENOUS at 13:04

## 2025-06-01 RX ADMIN — OXYCODONE HYDROCHLORIDE 5 MG: 5 TABLET ORAL at 22:16

## 2025-06-01 RX ADMIN — HYDRALAZINE HYDROCHLORIDE 10 MG: 20 INJECTION INTRAMUSCULAR; INTRAVENOUS at 12:05

## 2025-06-01 RX ADMIN — HEPARIN SODIUM 5000 UNITS: 5000 INJECTION, SOLUTION INTRAVENOUS; SUBCUTANEOUS at 22:09

## 2025-06-01 RX ADMIN — REMIFENTANIL HYDROCHLORIDE 0.23 MCG/KG/MIN: 1 INJECTION, POWDER, LYOPHILIZED, FOR SOLUTION INTRAVENOUS at 13:07

## 2025-06-01 RX ADMIN — PROPOFOL 200 MG: 10 INJECTION, EMULSION INTRAVENOUS at 13:09

## 2025-06-01 RX ADMIN — HYDROMORPHONE HYDROCHLORIDE 0.2 MG: 0.2 INJECTION, SOLUTION INTRAMUSCULAR; INTRAVENOUS; SUBCUTANEOUS at 10:37

## 2025-06-01 RX ADMIN — ROCURONIUM BROMIDE 50 MG: 10 INJECTION INTRAVENOUS at 14:08

## 2025-06-01 RX ADMIN — NICARDIPINE HYDROCHLORIDE 10 MG/HR: 25 INJECTION, SOLUTION INTRAVENOUS at 23:52

## 2025-06-01 RX ADMIN — TRANEXAMIC ACID 1000 MG: 100 INJECTION, SOLUTION INTRAVENOUS at 14:11

## 2025-06-01 RX ADMIN — HYDROMORPHONE HYDROCHLORIDE 1 MG: 1 INJECTION, SOLUTION INTRAMUSCULAR; INTRAVENOUS; SUBCUTANEOUS at 15:11

## 2025-06-01 RX ADMIN — INSULIN LISPRO 4 UNITS: 100 INJECTION, SOLUTION INTRAVENOUS; SUBCUTANEOUS at 23:32

## 2025-06-01 RX ADMIN — CEFAZOLIN 1 G: 2 INJECTION, POWDER, FOR SOLUTION INTRAMUSCULAR; INTRAVENOUS at 17:22

## 2025-06-01 RX ADMIN — TAMSULOSIN HYDROCHLORIDE 0.4 MG: 0.4 CAPSULE ORAL at 22:09

## 2025-06-01 RX ADMIN — LOSARTAN POTASSIUM 100 MG: 100 TABLET, FILM COATED ORAL at 06:51

## 2025-06-01 RX ADMIN — VANCOMYCIN HYDROCHLORIDE 1500 MG: 1 INJECTION, POWDER, LYOPHILIZED, FOR SOLUTION INTRAVENOUS at 13:20

## 2025-06-01 RX ADMIN — FENTANYL CITRATE 50 MCG: 50 INJECTION, SOLUTION INTRAMUSCULAR; INTRAVENOUS at 13:30

## 2025-06-01 RX ADMIN — MIDAZOLAM 1 MG: 1 INJECTION INTRAMUSCULAR; INTRAVENOUS at 13:04

## 2025-06-01 RX ADMIN — ONDANSETRON 4 MG: 2 INJECTION INTRAMUSCULAR; INTRAVENOUS at 13:20

## 2025-06-01 RX ADMIN — Medication 50 MCG/MIN: at 13:07

## 2025-06-01 RX ADMIN — DEXAMETHASONE SODIUM PHOSPHATE 4 MG: 4 INJECTION, SOLUTION INTRA-ARTICULAR; INTRALESIONAL; INTRAMUSCULAR; INTRAVENOUS; SOFT TISSUE at 13:20

## 2025-06-01 RX ADMIN — MIDAZOLAM 1 MG: 1 INJECTION INTRAMUSCULAR; INTRAVENOUS at 13:08

## 2025-06-01 RX ADMIN — ROCURONIUM BROMIDE 50 MG: 10 INJECTION INTRAVENOUS at 14:04

## 2025-06-01 RX ADMIN — HYDROMORPHONE HYDROCHLORIDE 0.2 MG: 0.2 INJECTION, SOLUTION INTRAMUSCULAR; INTRAVENOUS; SUBCUTANEOUS at 19:23

## 2025-06-01 RX ADMIN — INSULIN LISPRO 4 UNITS: 100 INJECTION, SOLUTION INTRAVENOUS; SUBCUTANEOUS at 02:34

## 2025-06-01 RX ADMIN — LIDOCAINE HYDROCHLORIDE 5 ML: 10 INJECTION, SOLUTION INFILTRATION; PERINEURAL at 13:09

## 2025-06-01 RX ADMIN — PROPOFOL 150 MCG/KG/MIN: 10 INJECTION, EMULSION INTRAVENOUS at 13:07

## 2025-06-01 RX ADMIN — METOPROLOL TARTRATE 50 MG: 50 TABLET, FILM COATED ORAL at 06:51

## 2025-06-01 RX ADMIN — POTASSIUM CHLORIDE 20 MEQ: 14.9 INJECTION, SOLUTION INTRAVENOUS at 01:52

## 2025-06-01 RX ADMIN — LABETALOL HYDROCHLORIDE 20 MG: 5 INJECTION, SOLUTION INTRAVENOUS at 17:50

## 2025-06-01 RX ADMIN — FENTANYL CITRATE 50 MCG: 50 INJECTION, SOLUTION INTRAMUSCULAR; INTRAVENOUS at 13:09

## 2025-06-01 RX ADMIN — HYDRALAZINE HYDROCHLORIDE 10 MG: 20 INJECTION INTRAMUSCULAR; INTRAVENOUS at 06:11

## 2025-06-01 RX ADMIN — ACETAMINOPHEN 650 MG: 325 TABLET ORAL at 06:08

## 2025-06-01 RX ADMIN — ACETAMINOPHEN 650 MG: 325 TABLET ORAL at 20:50

## 2025-06-01 RX ADMIN — SUGAMMADEX 400 MG: 100 INJECTION, SOLUTION INTRAVENOUS at 14:55

## 2025-06-01 RX ADMIN — NICARDIPINE HYDROCHLORIDE 12.5 MG/HR: 25 INJECTION, SOLUTION INTRAVENOUS at 21:05

## 2025-06-01 RX ADMIN — SODIUM CHLORIDE, SODIUM GLUCONATE, SODIUM ACETATE, POTASSIUM CHLORIDE AND MAGNESIUM CHLORIDE: 526; 502; 368; 37; 30 INJECTION, SOLUTION INTRAVENOUS at 13:04

## 2025-06-01 RX ADMIN — ACETAMINOPHEN 650 MG: 325 TABLET ORAL at 02:34

## 2025-06-01 RX ADMIN — METOPROLOL TARTRATE 50 MG: 50 TABLET, FILM COATED ORAL at 20:49

## 2025-06-01 RX ADMIN — SODIUM CHLORIDE, POTASSIUM CHLORIDE, SODIUM LACTATE AND CALCIUM CHLORIDE: 600; 310; 30; 20 INJECTION, SOLUTION INTRAVENOUS at 19:57

## 2025-06-01 RX ADMIN — CEFAZOLIN 2 G: 2 INJECTION, POWDER, FOR SOLUTION INTRAMUSCULAR; INTRAVENOUS at 13:30

## 2025-06-01 RX ADMIN — PHENYLEPHRINE HYDROCHLORIDE 100 MCG: 10 INJECTION INTRAVENOUS at 13:18

## 2025-06-01 RX ADMIN — SUCCINYLCHOLINE CHLORIDE 100 MG: 20 INJECTION, SOLUTION INTRAMUSCULAR; INTRAVENOUS at 13:09

## 2025-06-01 ASSESSMENT — COGNITIVE AND FUNCTIONAL STATUS - GENERAL
MOVING TO AND FROM BED TO CHAIR: 1 - TOTAL
CLIMB 3 TO 5 STEPS WITH RAILING: 1 - TOTAL
WALKING IN HOSPITAL ROOM: 1 - TOTAL
STANDING UP FROM CHAIR USING ARMS: 1 - TOTAL

## 2025-06-01 NOTE — ANESTHESIA PROCEDURE NOTES
Arterial Line:    Start time: 6/1/2025 1:15 PM  End time: 6/1/2025 1:20 PM    An arterial line was placed in the OR for the following indication(s): continuous blood pressure monitoring and blood sampling needed.    A 20 G (size), 1 and 3/4 inch (length), Angiocath (type) catheter was placed,   Seldinger technique used, into the Left radial artery, secured by Tegaderm.      Procedure performed using ultrasound guidance and surface landmarks.  Image not captured or stored.  Image visualization comments: Ultrasound used to visualize the placement of wire and/or needle in appropriate artery.    Events:  patient tolerated procedure well with no complications.    The supervising anesthesiologist was   Performed By: anesthesiologist  Attending: Guy Noble MD  CRNA:Natalia Galvin CRNA

## 2025-06-01 NOTE — PROGRESS NOTES
Patient: Aneesh Brito  Location: [facility] Care Unit 3226  MRN:  [id number]  Today's date:  6/1/2025    Attempted to see patient for therapy. Unable due to medical hold (per neurosurgery note with 3-column Chance fracture at C7.  This will be an unstable injury that requires ORIF in the form of extending his posterior fusion down to T3. On schedule for OR. OT will need new orders post op.).

## 2025-06-01 NOTE — INTERVAL H&P NOTE
Addendum on day of surgery:  I have evaluated the patient.  I have reviewed the H & P confirmed there is no change in exam or symptoms.  I have reviewed the patient's imaging again.  I have identified and marked the patient.  We will proceed with the above surgery as planned.

## 2025-06-01 NOTE — CONSULTS
Neurosurgery Consult Note    I personally evaluated the patient at 1030 on 6/1/2025.  I have reviewed his chart and imaging and discussed the case with the trauma team.  His wife and son are also at bedside.  I reviewed his imaging and explained his situation and the treatment to all 3 of them.    CC: Fall with neck pain    HPI   Aneesh Brito is a 75 y.o. male with DISH who is had a prior C3-C6 laminectomy and posterolateral instrumented fusion over 10 years ago by Dr. Maurer.  He suffered a fall yesterday and was not able to get up on his own and spent the night on the floor.  He was brought to the [facility] emergency department where imaging was obtained.  He reports neck pain in the lower cervical and upper thoracic region.  No LOC, N/V, vision loss, hearing loss, double vision, dizziness.  Denies weakness, radicular pain, numbness or paresthesias in arms, hands or legs.  Denies mid or lower back pain.  Denies bowel or bladder dysfunction or seizure activity.    Medical History:   Past Medical History:   Diagnosis Date    Colonic adenoma     Diverticulosis of colon     Enlarged prostate with lower urinary tract symptoms (LUTS)     Glaucoma     Hypertension     Melanoma of skin (CMS/HCC)     Osteoarthritis of knee     Overweight     Spinal stenosis of lumbar region     Type 2 diabetes mellitus (CMS/HCC)        Surgical History:   Past Surgical History   Procedure Laterality Date    Appendectomy      Colonoscopy  01/10/2012    diverticulosis    Colonoscopy w/ polypectomy  02/09/2022    Hip arthroplasty Bilateral     Knee arthroplasty Left     Knee arthroplasty Right 02/02/2022       Family History:  Reviewed and deemed non-pertinent to current admission.    Social History:   Social History     Socioeconomic History    Marital status:    Occupational History    Occupation: retired teacher   Tobacco Use    Smoking status: Never    Smokeless tobacco: Never   Substance and Sexual Activity    Alcohol use: Yes      Comment: BEER, 1 CONSUMED DAILY    Drug use: Never     Social Drivers of Health     Financial Resource Strain: Low Risk  (6/19/2023)    Overall Financial Resource Strain (CARDIA)     Difficulty of Paying Living Expenses: Not hard at all   Food Insecurity: No Food Insecurity (5/31/2025)    Hunger Vital Sign     Worried About Running Out of Food in the Last Year: Never true     Ran Out of Food in the Last Year: Never true   Transportation Needs: No Transportation Needs (6/19/2023)    PRAPARE - Transportation     Lack of Transportation (Medical): No     Lack of Transportation (Non-Medical): No   Housing Stability: Low Risk  (6/19/2023)    Housing Stability Vital Sign     Unable to Pay for Housing in the Last Year: No     Number of Places Lived in the Last Year: 1     Unstable Housing in the Last Year: No        Allergies: No known allergies    Current Medications[1]    Review of Systems:  Negative    Vital Signs for the last 24 hours:  Temp:  [36.8 °C (98.2 °F)-37.6 °C (99.7 °F)] 37.4 °C (99.3 °F)  Heart Rate:  [53-97] 62  Resp:  [13-28] 20  BP: (135-190)/() 177/81     Oxygen:   Oxygen Therapy: None (Room air)       Physical Exam    The patient is lying comfortably in bed with an Aspen collar that is well-fitting.  Awake, alert, oriented to person, place, time and situation; speech and fund of knowledge normal. Head NC/AT.  PERRL, extra-ocular movements intact, face symmetric, tongue protrudes to midline, no nystagmus or diplopia. Neck is shows no JVD.  Breathing comfortably without distress, tachypnea, wheezing or use of accessory muscles.  Heart has a regular rate and rhythm. Abdomen ND.   Skin is warm, well perfused, with good capillary refill.  Limbs show no deformities or edema.    Neurological examination:    PERRL, extra-ocular movements intact, face symmetric, tongue protrudes to midline, no nystagmus or diplopia.  Motor:     RUE:  D  5/5, B  5/5, T 5/5, WE 5/5, HG 5/5, IH 5/5   LUE:  D  5/5, B  5/5, T  "5/5, WE 5/5, HG 5/5, IH 5/5   RLE:  IP  5/5, Q  5/5, DF 5/5, EHL 5/5, PF 5/5   LLE:  IP  5/5, Q  5/5, DF 5/5, EHL 5/5, PF 5/5  Reflexes:  Normoreflexive, no Poe's or Babinski signs, no clonus  Sensory exam:  Intact to light touch, pain, temperature and JPS testing  Gait deferred.      Data Review    Labs    Na 136, K 3.6, Cr 1.5, WBC 11.2, Hgb 12.5, , coag panel normal.  CK 1118    Imaging    I reviewed his head CT (which is negative for any acute injury).  And cervical CT and ordered him a thoracic CT for completeness given the presence of DISH resulting in autofusion down to the thoracic spine.  He appears to be autofused from C3 down to T10 and he has had a cervical laminectomy and posterolateral instrumented fusion from C3 down to C6 which appears solid.  He then has a 3 column Chance fracture at the bottom of C7 that extends from the anterior osteophyte, through the C7-T1 disc space and there is splaying of the posterior facets which was not present on prior imaging.  I do not see any thoracic fractures and the DISH mass appears solid from T1 down to T10.    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  He is surgically fused from C3-C6 but he is autofused from C3 down to T10.  He then has a 3 column Chance fracture at the C7-T1 level.  These typically are highly unstable fractures due to the long lever arms as described above.  I do think he will require ORIF in the form of C3-T3 posterolateral instrumented fusion.  He is already fused at all levels but he will require refusion of the C7-T1 segment where the 3 column injury arises.  We may be able to hook into the hardware " on one side between the C5 and C6 screws given there may be a gap large enough but we can remove and replace any hardware as needed given that he has quite large facets where we can place larger diameter and longer screws if needed for bony purchase.    We discussed the risks benefits and alternatives of surgery in detail.  The risks that we discussed include those of general anesthesia, medical complications, infection, blood loss possibly requiring transfusion, vascular or other organ injury, swallowing difficulty, neurological worsening, stroke, coma, death, failure of fusion, failure to alleviate preoperative symptoms, failure to improve neurological loss of present, need for further surgery in the future either due to failed fusion or adjacent segment degeneration at the levels above or below.  In his case, he does not require any decompression of the spinal cord and we will use a navigation system to place the hardware.  The risk of hardware misplacement is extremely low and the chance of successful arthrodesis is quite high in patients with his condition as there is already a tendency for the bones to want to stick back together.    We will proceed with surgery this afternoon.  I expect him to be in the hospital for 3 to 4 days with deep and superficial drains in place given the large number of vertebral levels involved, his body habitus, diabetes (which can lead to increased rates of wound infection or dehiscence) and prior surgery.    All questions were answered and informed consent was obtained.    Code Status: Full Code             [1]   Current Inpatient Medications   Medication Dose Route Frequency Provider Last Rate Last Admin    acetaminophen (TYLENOL) tablet 650 mg  650 mg oral q4h Gucci Sanders PA C   650 mg at 06/01/25 0608    atorvastatin (LIPITOR) tablet 20 mg  20 mg oral Daily Mariela Gutiérrez PA C        glucose chewable tablet 15-30 g of dextrose  15-30 g of dextrose oral PRN  Gucci Carrillo PA C        Or    dextrose 40 % oral gel 15-30 g of dextrose  15-30 g of dextrose oral PRN Gucci Carrillo PA C        Or    glucagon (GLUCAGEN) injection 1 mg  1 mg intramuscular PRN Gucci Carrillo PA C        Or    dextrose 50 % in water (D50) injection 12.5 g  25 mL intravenous PRN Gucci Carrillo PA C        felodipine (PLENDIL) 24 hr ER tablet 10 mg  10 mg oral Daily Mariela Gutiérrez PA C        finasteride (PROSCAR) tablet 5 mg  5 mg oral Daily Mariela Gutiérrez PA C        hydrALAZINE (APRESOLINE) injection 10 mg  10 mg intravenous q4h PRN Mariela Gutiérrez PA C   10 mg at 06/01/25 0611    oxyCODONE (ROXICODONE) immediate release tablet 5 mg  5 mg oral q4h PRN Gucci Carrillo PA C        And    HYDROmorphone (DILAUDID) 0.2 mg/mL injection 0.2 mg  0.2 mg intravenous q4h PRN Gucci Carrillo PA C   0.2 mg at 06/01/25 1037    insulin lispro U-100 (HumaLOG) pen 2-18 Units  2-18 Units subcutaneous q6h KOFI Mariela Gutiérrez PA C   4 Units at 06/01/25 0234    lactated ringer's infusion   intravenous Continuous Mariela Gutiérrez PA C 60 mL/hr at 06/01/25 1000 Rate Verify at 06/01/25 1000    losartan (COZAAR) tablet 100 mg  100 mg oral Daily Gucci Carrillo PA C   100 mg at 06/01/25 0651    metoprolol tartrate (LOPRESSOR) tablet 50 mg  50 mg oral BID Gucci Carrillo PA C   50 mg at 06/01/25 0651    netarsudiL-latanoprost 0.02-0.005 % drops 1 drop  1 drop Both Eyes Nightly Mariela Gutiérrez PA C        ondansetron ODT (ZOFRAN-ODT) disintegrating tablet 4 mg  4 mg oral q8h PRN Gucci Carrillo PA C        polyethylene glycol (MIRALAX) 17 gram packet 17 g  17 g oral Daily Gucci Carrillo PA C        timolol (TIMOPTIC) 0.5 % ophthalmic solution 1 drop  1 drop Both Eyes Daily Mariela Gutiérrez PA C

## 2025-06-01 NOTE — ANESTHESIA PREPROCEDURE EVALUATION
Relevant Problems   CARDIOVASCULAR   (+) Hypertension, benign      MUSCULOSKELETAL   (+) Osteoarthritis of knee      URINARY SYSTEM   (+) Enlarged prostate with lower urinary tract symptoms (LUTS)      Other   (+) Type 2 diabetes mellitus without complications (CMS/[facility])       Anesthesia ROS/MED HX    Anesthesia History - neg  Pulmonary - neg  Neuro/Psych - neg  Cardiovascular   hypertension   ECG reviewed  Hematological - neg  GI/Hepatic- neg  Musculoskeletal- neg  Renal Disease   acute renal failure  Endo/Other   Diabetes       HPI: 75M with HTN and previous back surgery presents with DISH. Going to OR urgently today for fixation. Denies issues with prior anesthetics.     Allergies:   Allergies   Allergen Reactions    No Known Allergies        ROS: Denies any chest pain or shortness of breath    PMH: Past Medical History[1]    PSH:   Past Surgical History   Procedure Laterality Date    Appendectomy      Colonoscopy  01/10/2012    diverticulosis    Colonoscopy w/ polypectomy  02/09/2022    Hip arthroplasty Bilateral     Knee arthroplasty Left     Knee arthroplasty Right 02/02/2022       Current Facility-Administered Medications on File Prior to Encounter   Medication    [COMPLETED] morphine injection 4 mg    [COMPLETED] ondansetron (ZOFRAN) injection 4 mg    [COMPLETED] sodium chloride 0.9 % bolus 500 mL     Current Outpatient Medications on File Prior to Encounter   Medication Sig    atorvastatin (LIPITOR) 20 mg tablet Take 1 tablet (20 mg total) by mouth daily.    felodipine (PLENDIL) 10 mg 24 hr tablet Take 1 tablet (10 mg total) by mouth daily.    finasteride (PROSCAR) 5 mg tablet TAKE ONE TABLET BY MOUTH ONE TIME DAILY    losartan (COZAAR) 100 mg tablet Take 1 tablet (100 mg total) by mouth daily.    metFORMIN (GLUCOPHAGE) 500 mg tablet Take 1 tablet (500 mg total) by mouth 2 (two) times a day with meals.    metoprolol tartrate (LOPRESSOR) 50 mg tablet Take 1 tablet (50 mg total) by mouth 2 (two) times a day.     ROCKLATAN 0.02-0.005 % drops Administer 1 drop into both eyes nightly. Wait a few minutes in between eye drops     silodosin (RAPAFLO) 8 mg capsule Take 1 capsule (8 mg total) by mouth daily with breakfast.    timolol (TIMOPTIC) 0.5 % ophthalmic solution 1 drop daily.       CBC Results         06/01/25 06/01/25 05/31/25     0602 0032 1315    WBC 11.20 12.11 14.55    RBC 3.82 3.88 4.34    HGB 12.5 12.4 14.2    HCT 36.1 36.0 39.5    MCV 94.5 92.8 91.0    MCH 32.7 32.0 32.7    MCHC 34.6 34.4 35.9     261 282          BMP Results         06/01/25 06/01/25 05/31/25     0602 0032 1315     140 136    K 3.6 3.2 3.4    Cl 102 104 100    CO2 27 26 26    Glucose 137 209 145    BUN 23 19 16    Creatinine 1.5 1.2 1.0    Calcium 8.4 8.5 8.9    Anion Gap 7 10 10    EGFR 47.7 >60.0 >60.0           Comment for K at 1315 on 05/31/25: Results obtained on plasma. Plasma Potassium values may be up to 0.4 mEQ/L less than serum values. The differences may be greater for patients with high platelet or white cell counts.    Comment for EGFR at 0602 on 06/01/25: Calculation based on the Chronic Kidney Disease Epidemiology Collaboration (CKD-EPI) equation refit without adjustment for race.    Comment for EGFR at 0032 on 06/01/25: Calculation based on the Chronic Kidney Disease Epidemiology Collaboration (CKD-EPI) equation refit without adjustment for race.    Comment for EGFR at 1315 on 05/31/25: Calculation based on the Chronic Kidney Disease Epidemiology Collaboration (CKD-EPI) equation refit without adjustment for race.                    Physical Exam    Airway   Mallampati: II   TM distance: >3 FB   Neck ROM: full  Cardiovascular - normal   Rhythm: regular   Rate: normalPulmonary - normal   clear to auscultation  Dental - normal        Anesthesia Plan    Plan: general    Technique: total IV anesthesia and general endotracheal     Lines and Monitors: additional IV and arterial line     3 ASA  Anesthetic plan and risks  discussed with: patient  Induction:    intravenous   Postop Plan:   Patient Disposition: inpatient floor planned admission  Comments:    Plan: ROSSY Landers two large bore IV's           [1]   Past Medical History:  Diagnosis Date    Colonic adenoma     Diverticulosis of colon     Enlarged prostate with lower urinary tract symptoms (LUTS)     Glaucoma     Hypertension     Melanoma of skin (CMS/HCC)     Osteoarthritis of knee     Overweight     Spinal stenosis of lumbar region     Type 2 diabetes mellitus (CMS/HCC)

## 2025-06-01 NOTE — ANESTHESIOLOGIST PRE-PROCEDURE ATTESTATION
Pre-Procedure Patient Identification:  I am the Primary Anesthesiologist and have identified the patient on 06/01/25 at 1:02 PM.   I have confirmed the procedure(s) will be performed by the following surgeon/proceduralist Raza Benjamin MD.

## 2025-06-01 NOTE — PERIOPERATIVE NURSING NOTE
Attempted to contact emergency contact to inform them of surgery start.  No answer.  Will try again.

## 2025-06-01 NOTE — PERIOPERATIVE NURSING NOTE
3rd attempt to contact family to give update on surgery.  No answer.  Both phone numbers listed in emergency contact information were called.  Dr Benjamin made aware

## 2025-06-01 NOTE — PROGRESS NOTES
Patient: Aneesh Brito  Location: [facility] Unit [facility]  MRN:  [id number]  Today's date:  6/1/2025    Attempted to see patient for therapy. Unable due to medical hold. Per neurosurgery note with 3-column Chance fracture at C7. This will be an unstable injury that requires ORIF in the form of extending his posterior fusion down to T3. On schedule for OR. PT will continue to follow and plan to evaluate post op. PT will need new orders post op.

## 2025-06-01 NOTE — OR SURGEON
Pre-Procedure patient identification:  I am the primary operating surgeon/proceduralist and I have reviewed the applicable pathology reports and radiology studies for this procedure. I have identified the patient and confirmed levels are C7 ORIF and C3 to T3 fusion on 06/01/25 at 12:56 PM Raza Benjamin MD  Phone Number: [phone]

## 2025-06-01 NOTE — OP NOTE
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  2) Prior C3 to C6 laminectomy and posterolateral instrumented fusion  3) C7-T1 Chance fracture with posterior ligamentous disruption  4) Significant soft tissue, muscle, ligament and fascial injury in cervicothoracic region and fascial dehiscence at prior operative site     PROCEDURE:    1)   ORIF C7-T1 Chance fracture  2)   C7-T1 laminectomy and evacuation of epidural hematoma  3)   C7-T1 posterolateral fusion  4)   C3 to C6 removal and replacement of instrumentation  5)   C3 to T3 posterolateral instrumentation   6)   Use of stereotactic navigation (O-Arm and Stealth systems) for placement of posterior instrumentation  7)   Fusion with local autograft, morselized allograft and Infuse (BMP)  8)   Fusion with structural allograft  9)   White of local autograft for fusion  10) Bone marrow aspirate for fusion  11) Complex wound closure with bilateral glenis-fascial-cutaneous flap advancement (for tension-free closure of wound/skin)     SURGEON:  Raza Benjamin MD     ANESTHESIA:  General endotracheal.     ESTIMATED BLOOD LOSS:  200 mL.     SPECIMEN:  None.     COMPLICATIONS:  None.     DRAIN:  Deep and superficial 10 mm flat TYRESE drains     FINDINGS:  Kranthi instability at C7-T1 due to posterior ligamentous disruption and anteiror column fractures at C7-T1 with significant widening of the C7-T1 facets.  There was also significant trauma to the subcutaneous tissues, paraspinal musculature and fascia in addition to what appeared to be a chronic fascial dehiscence of the posterior cervical fascia and musculature at the site of the prior laminectomy and fusion.  There was also complete disruption of the supraspinous, interspinous and  ligamentum flavum complex at C7-T1 with an epidural hematoma underneath..     INDICATIONS FOR SURGERY:     The details of the patient's history, presentation, and examination are documented in detail in my office note and history and physical form. The patient understood the risks and rationale of surgery and consented to the procedure.       OPERATIVE PROCEDURE:     The patient was brought into the operating room. All lines and monitors were placed by the anesthesia team.  The patient was induced with general anesthesia and intubated with a GlideScope without manipulation of neck.  Electrodes were placed by the [facility] monitoring group. Baseline sensory and motor values were obtained.  All values remained stable throughout all portions of the case.  The patient was then placed in Wells 3-point head fixation and positioned prone on an Sammy table.   All pressure points were appropriately padded. The neck was placed in a neutral position of neck alignment.  Fluoroscopy confirmed alignment prior to removing the posterior portion of the Aspen collar.      The posterior aspect of the neck was then scrubbed, prepped and draped in the usual sterile fashion. AP and lateral fluoroscopy was used to chelsey out an incision spanning the C3 through T3 levels. I injected a total of 20 mL of 0.5% bupivacaine with epinephrine. I used a 10 blade was used to incise the skin.  I ellipsed out the old wide incision for improved wound healing and cosmesis.      Upon opening the skin, there was clear significant hematoma, bruising and trauma to the subcutaneous tissues.  There was significant fascial dehiscence and hemorrhage at the fascia and underlying muscle.  There was extensive injury and hemorrhage within the paraspinal musculature as well.  Yes hemostasis was obtained in the pulps of muscular tissue.  Certain areas of necrotic muscle were debrided.  Bovie electrocautery was used to obtain hemostasis and perform a  subperiosteal dissection exposing the posterior elements from C3 to T3 and the existing hardware from C3-C6.  Retractors were placed.      There was clear disruption of the posterior ligamentous complex at C7-T1 including the supraspinous ligament, interspinous ligament in the ligamentum flavum.  There was epidural hematoma visible.  I removed a portion of the C7 inferior lamina and the superior aspect of T1 to remove the visible epidural hematoma and inspect for further extension.  There was no active bleeding epidurally.  I then removed a portion of the C7 spinous process and dorsal lamina was essentially protruding out into a large defect from the laminectomy superiorly from C3-C6 in the large gap to T1.  Any bone removed was used for fusion as locally harvested autograft.     There was laurent instability across the C7-T1 level due to the fracture and there was avulsion/disruption of the C7-T1 ligamentous complex and widening and instability of the facets.   A B3 bur on Midas Anup was then used to drill out the facets at C7-T1 bilaterally to prepare them for arthrodesis.  We then tamped in structural allograft shims (6 X 8 X 10 mm) into the facet joints (which conferred excellent stability across the fractured segment) and we placed a very small portion of an Infuse sponge into the joints.       We then remove the existing hardware.    We then performed a spin with the O-Arm and registered the images on the S8 system (Stealth) after attaching the navigation array clamp to the exposed spinous process at T2.  Instruments were registered and we confirmed good navigation results using anatomical landmarks.     We then used navigated taps to create paths for longer screws.  The screws were removed for 12 to 14 mm length and 3.5 diameter.  They were essentially replaced with 4.0 x 24 mm screws except on the left side at C3 where we made an entirely new hole and placed a 3.5 x 24 mm screw due to the very overgrown lateral  mass.  All screws had good purchase.     Using anatomical landmarks and navigation, we then made starting points for the T1, T2 and T3 pedicle screws.  We then cannulated the pedicles with navigated Lenke probes and used a 3.5 (or 4.0) mm tap to prepare.  We then placed 4.5 X 34 mm screws bilaterally at T1, and 5.0 X 38 mm in length at T2 bilaterally and 4.5 X 38 mm at T3 bilaterally. All screws had excellent purchase.  A second spin confirmed proper position of all implants.  The system of Symphony by [employer].  Bone marrow was aspirated from the thoracic pedicles/bodies and applied to the PliaFix allograft.  4.0 X 140 mm rods were then cut and contoured to the appropriate length and curvature and secured to the screws with set screws which were final tightened.  I elected to use cobalt chrome for added rigidity on the right side and performed a hybrid by using a 4.0 mm diameter titanium robyn on the left side.  A final spin of the O-Arm confirmed proper position of all implants and stable alignment.     The wound was irrigated with Ancef irrigation at this time and throughout the case. Bipolar electrocautery and FloSeal were used to obtain hemostasis. Vancomycin powder was placed in the deep and superficial spaces during closure.  The posterior elements from C7 and T1 were decorticated with a high speed daine and further bone graft was applied for a posterolateral fusion.       Hemostasis was obtained with bipolar electrocautery. An 11 blade was used to make a stab incision just superior and to the right of the incision, and a 10 mm flat Elvis-Harkins drain was then tunneled into the subfascial space and secured in place with 2-0 Monosof sutures.     As mentioned above, he had extensive injury to the muscles, fascia and a prior fascial dehiscence and separation of the muscle superiorly.  I therefore had to create dissection plane in between the muscle layers and the overlying fascia to allow for a tension-free  closure of the wound and skin.  I used bovie electrocautery to raise muscle, fascia and skin flaps to allow for a multilevel tension-free complex wound closure (glenis-fascial-cutaneous flap advancement).  The paraspinal musculature and muscular fascia, and cervicothoracic fascia were then closed with multiple layers of #2 Ticron sutures.  A total of 20 mL of 0.5% lidocaine with epinephrine was then injected into the paraspinal musculature and subcutaneous tissues for postoperative analgesia. Bovie electrocautery was then used to raise subcutaneous flaps to allow for a tension-free closure of the skin.  A second 10 mm flat Elvis-Harkins drain was then tunneled into the subcutaneous space and secured in a similar fashion.  The deep dermal layer was then closed with inverted interrupted 2-0 Polysorb sutures. A 3-0 Polysorb subcuticular suture was then used to close the skin. Mastisol and Steri-Strips were then placed over the incision, followed by a silver-impregnated Mepilex dressing.     All counts of needles, sponges, Cottonoids, and instruments were correct. The patient was then flipped supine onto a hospital bed, extubated without difficulty and transferred to the ICU in stable clinical and neurological status.    Raza Benjamin MD

## 2025-06-01 NOTE — PROGRESS NOTES
74 yo M w/ DISH (autofusion) across many segments of his spine with a prior C3-6 posterior cervical fusion.  Now with 3-column Chance fracture at C7.  This will be an unstable injury that requires ORIF in the form of extending his posterior fusion down to T3.  I will order a thoracic CT to look into his lower DISH segments as his prior thoracic XR suggests he is autofused down into lower T spine.

## 2025-06-01 NOTE — H&P (VIEW-ONLY)
Neurosurgery Consult Note    I personally evaluated the patient at 1030 on 6/1/2025.  I have reviewed his chart and imaging and discussed the case with the trauma team.  His wife and son are also at bedside.  I reviewed his imaging and explained his situation and the treatment to all 3 of them.    CC: Fall with neck pain    HPI   Aneesh Brito is a 75 y.o. male with DISH who is had a prior C3-C6 laminectomy and posterolateral instrumented fusion over 10 years ago by Dr. Maurer.  He suffered a fall yesterday and was not able to get up on his own and spent the night on the floor.  He was brought to the [facility] emergency department where imaging was obtained.  He reports neck pain in the lower cervical and upper thoracic region.  No LOC, N/V, vision loss, hearing loss, double vision, dizziness.  Denies weakness, radicular pain, numbness or paresthesias in arms, hands or legs.  Denies mid or lower back pain.  Denies bowel or bladder dysfunction or seizure activity.    Medical History:   Past Medical History:   Diagnosis Date    Colonic adenoma     Diverticulosis of colon     Enlarged prostate with lower urinary tract symptoms (LUTS)     Glaucoma     Hypertension     Melanoma of skin (CMS/HCC)     Osteoarthritis of knee     Overweight     Spinal stenosis of lumbar region     Type 2 diabetes mellitus (CMS/HCC)        Surgical History:   Past Surgical History   Procedure Laterality Date    Appendectomy      Colonoscopy  01/10/2012    diverticulosis    Colonoscopy w/ polypectomy  02/09/2022    Hip arthroplasty Bilateral     Knee arthroplasty Left     Knee arthroplasty Right 02/02/2022       Family History:  Reviewed and deemed non-pertinent to current admission.    Social History:   Social History     Socioeconomic History    Marital status:    Occupational History    Occupation: retired teacher   Tobacco Use    Smoking status: Never    Smokeless tobacco: Never   Substance and Sexual Activity    Alcohol use: Yes      Comment: BEER, 1 CONSUMED DAILY    Drug use: Never     Social Drivers of Health     Financial Resource Strain: Low Risk  (6/19/2023)    Overall Financial Resource Strain (CARDIA)     Difficulty of Paying Living Expenses: Not hard at all   Food Insecurity: No Food Insecurity (5/31/2025)    Hunger Vital Sign     Worried About Running Out of Food in the Last Year: Never true     Ran Out of Food in the Last Year: Never true   Transportation Needs: No Transportation Needs (6/19/2023)    PRAPARE - Transportation     Lack of Transportation (Medical): No     Lack of Transportation (Non-Medical): No   Housing Stability: Low Risk  (6/19/2023)    Housing Stability Vital Sign     Unable to Pay for Housing in the Last Year: No     Number of Places Lived in the Last Year: 1     Unstable Housing in the Last Year: No        Allergies: No known allergies    Current Medications[1]    Review of Systems:  Negative    Vital Signs for the last 24 hours:  Temp:  [36.8 °C (98.2 °F)-37.6 °C (99.7 °F)] 37.4 °C (99.3 °F)  Heart Rate:  [53-97] 62  Resp:  [13-28] 20  BP: (135-190)/() 177/81     Oxygen:   Oxygen Therapy: None (Room air)       Physical Exam    The patient is lying comfortably in bed with an Aspen collar that is well-fitting.  Awake, alert, oriented to person, place, time and situation; speech and fund of knowledge normal. Head NC/AT.  PERRL, extra-ocular movements intact, face symmetric, tongue protrudes to midline, no nystagmus or diplopia. Neck is shows no JVD.  Breathing comfortably without distress, tachypnea, wheezing or use of accessory muscles.  Heart has a regular rate and rhythm. Abdomen ND.   Skin is warm, well perfused, with good capillary refill.  Limbs show no deformities or edema.    Neurological examination:    PERRL, extra-ocular movements intact, face symmetric, tongue protrudes to midline, no nystagmus or diplopia.  Motor:     RUE:  D  5/5, B  5/5, T 5/5, WE 5/5, HG 5/5, IH 5/5   LUE:  D  5/5, B  5/5, T  "5/5, WE 5/5, HG 5/5, IH 5/5   RLE:  IP  5/5, Q  5/5, DF 5/5, EHL 5/5, PF 5/5   LLE:  IP  5/5, Q  5/5, DF 5/5, EHL 5/5, PF 5/5  Reflexes:  Normoreflexive, no Poe's or Babinski signs, no clonus  Sensory exam:  Intact to light touch, pain, temperature and JPS testing  Gait deferred.      Data Review    Labs    Na 136, K 3.6, Cr 1.5, WBC 11.2, Hgb 12.5, , coag panel normal.  CK 1118    Imaging    I reviewed his head CT (which is negative for any acute injury).  And cervical CT and ordered him a thoracic CT for completeness given the presence of DISH resulting in autofusion down to the thoracic spine.  He appears to be autofused from C3 down to T10 and he has had a cervical laminectomy and posterolateral instrumented fusion from C3 down to C6 which appears solid.  He then has a 3 column Chance fracture at the bottom of C7 that extends from the anterior osteophyte, through the C7-T1 disc space and there is splaying of the posterior facets which was not present on prior imaging.  I do not see any thoracic fractures and the DISH mass appears solid from T1 down to T10.    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  He is surgically fused from C3-C6 but he is autofused from C3 down to T10.  He then has a 3 column Chance fracture at the C7-T1 level.  These typically are highly unstable fractures due to the long lever arms as described above.  I do think he will require ORIF in the form of C3-T3 posterolateral instrumented fusion.  He is already fused at all levels but he will require refusion of the C7-T1 segment where the 3 column injury arises.  We may be able to hook into the hardware " on one side between the C5 and C6 screws given there may be a gap large enough but we can remove and replace any hardware as needed given that he has quite large facets where we can place larger diameter and longer screws if needed for bony purchase.    We discussed the risks benefits and alternatives of surgery in detail.  The risks that we discussed include those of general anesthesia, medical complications, infection, blood loss possibly requiring transfusion, vascular or other organ injury, swallowing difficulty, neurological worsening, stroke, coma, death, failure of fusion, failure to alleviate preoperative symptoms, failure to improve neurological loss of present, need for further surgery in the future either due to failed fusion or adjacent segment degeneration at the levels above or below.  In his case, he does not require any decompression of the spinal cord and we will use a navigation system to place the hardware.  The risk of hardware misplacement is extremely low and the chance of successful arthrodesis is quite high in patients with his condition as there is already a tendency for the bones to want to stick back together.    We will proceed with surgery this afternoon.  I expect him to be in the hospital for 3 to 4 days with deep and superficial drains in place given the large number of vertebral levels involved, his body habitus, diabetes (which can lead to increased rates of wound infection or dehiscence) and prior surgery.    All questions were answered and informed consent was obtained.    Code Status: Full Code             [1]   Current Inpatient Medications   Medication Dose Route Frequency Provider Last Rate Last Admin    acetaminophen (TYLENOL) tablet 650 mg  650 mg oral q4h Gucci Sanders PA C   650 mg at 06/01/25 0608    atorvastatin (LIPITOR) tablet 20 mg  20 mg oral Daily Mariela Gutiérrez PA C        glucose chewable tablet 15-30 g of dextrose  15-30 g of dextrose oral PRN  Gucci Carrillo PA C        Or    dextrose 40 % oral gel 15-30 g of dextrose  15-30 g of dextrose oral PRN Gucci Carrillo PA C        Or    glucagon (GLUCAGEN) injection 1 mg  1 mg intramuscular PRN Gucci Carrillo PA C        Or    dextrose 50 % in water (D50) injection 12.5 g  25 mL intravenous PRN Gucci Carrillo PA C        felodipine (PLENDIL) 24 hr ER tablet 10 mg  10 mg oral Daily Mariela Gutiérrez PA C        finasteride (PROSCAR) tablet 5 mg  5 mg oral Daily Mariela Gutiérrez PA C        hydrALAZINE (APRESOLINE) injection 10 mg  10 mg intravenous q4h PRN Mariela Gutiérrez PA C   10 mg at 06/01/25 0611    oxyCODONE (ROXICODONE) immediate release tablet 5 mg  5 mg oral q4h PRN Gucci Carrillo PA C        And    HYDROmorphone (DILAUDID) 0.2 mg/mL injection 0.2 mg  0.2 mg intravenous q4h PRN Gucci Carrillo PA C   0.2 mg at 06/01/25 1037    insulin lispro U-100 (HumaLOG) pen 2-18 Units  2-18 Units subcutaneous q6h KOFI Mariela Gutiérrez PA C   4 Units at 06/01/25 0234    lactated ringer's infusion   intravenous Continuous Mariela Gutiérrez PA C 60 mL/hr at 06/01/25 1000 Rate Verify at 06/01/25 1000    losartan (COZAAR) tablet 100 mg  100 mg oral Daily Gucci Carrillo PA C   100 mg at 06/01/25 0651    metoprolol tartrate (LOPRESSOR) tablet 50 mg  50 mg oral BID Gucci Carrillo PA C   50 mg at 06/01/25 0651    netarsudiL-latanoprost 0.02-0.005 % drops 1 drop  1 drop Both Eyes Nightly Mariela Gutiérrez PA C        ondansetron ODT (ZOFRAN-ODT) disintegrating tablet 4 mg  4 mg oral q8h PRN Gucci Carrillo PA C        polyethylene glycol (MIRALAX) 17 gram packet 17 g  17 g oral Daily Gucci Carrillo PA C        timolol (TIMOPTIC) 0.5 % ophthalmic solution 1 drop  1 drop Both Eyes Daily Mariela Gutiérrez PA C

## 2025-06-01 NOTE — H&P
Trauma Surgery History and Physical      HPI     Patient is a 75 y.o. male who presents with mechanical fall at home but he was alone and unable to get up so he was on the floor all night. He presented to [facility] where he had a CT of the head and C spine showing C7 fracture and no TBI. He was transferred to [facility] for trauma and NS eval. Of note labs did show elevated CK.    Medical History:   Past Medical History:   Diagnosis Date    Colonic adenoma     Diverticulosis of colon     Enlarged prostate with lower urinary tract symptoms (LUTS)     Glaucoma     Hypertension     Melanoma of skin (CMS/HCC)     Osteoarthritis of knee     Overweight     Spinal stenosis of lumbar region     Type 2 diabetes mellitus (CMS/HCC)        Surgical History:   Past Surgical History   Procedure Laterality Date    Appendectomy      Colonoscopy  01/10/2012    diverticulosis    Colonoscopy w/ polypectomy  02/09/2022    Hip arthroplasty Bilateral     Knee arthroplasty Left     Knee arthroplasty Right 02/02/2022       Social History:   Social History     Socioeconomic History    Marital status:    Occupational History    Occupation: retired teacher   Tobacco Use    Smoking status: Never    Smokeless tobacco: Never   Substance and Sexual Activity    Alcohol use: Yes     Comment: BEER, 1 CONSUMED DAILY    Drug use: Never     Social Drivers of Health     Financial Resource Strain: Low Risk  (6/19/2023)    Overall Financial Resource Strain (CARDIA)     Difficulty of Paying Living Expenses: Not hard at all   Food Insecurity: No Food Insecurity (5/31/2025)    Hunger Vital Sign     Worried About Running Out of Food in the Last Year: Never true     Ran Out of Food in the Last Year: Never true   Transportation Needs: No Transportation Needs (6/19/2023)    PRAPARE - Transportation     Lack of Transportation (Medical): No     Lack of Transportation (Non-Medical): No   Housing Stability: Low Risk  (6/19/2023)    Housing Stability Vital Sign      Unable to Pay for Housing in the Last Year: No     Number of Places Lived in the Last Year: 1     Unstable Housing in the Last Year: No       Family History:   Family History   Problem Relation Name Age of Onset    Breast cancer Biological Mother         Allergies: No known allergies    Home Medications:   acetaminophen (TYLENOL) tablet 650 mg  650 mg oral q4h [facility]    glucose chewable tablet 15-30 g of dextrose  15-30 g of dextrose oral PRN    Or    dextrose 40 % oral gel 15-30 g of dextrose  15-30 g of dextrose oral PRN    Or    glucagon (GLUCAGEN) injection 1 mg  1 mg intramuscular PRN    Or    dextrose 50 % in water (D50) injection 12.5 g  25 mL intravenous PRN    hydrALAZINE (APRESOLINE) injection 10 mg  10 mg intravenous q4h PRN    oxyCODONE (ROXICODONE) immediate release tablet 5 mg  5 mg oral q4h PRN    And    HYDROmorphone (DILAUDID) 0.2 mg/mL injection 0.2 mg  0.2 mg intravenous q4h PRN    lactated ringer's infusion   intravenous Continuous    ondansetron ODT (ZOFRAN-ODT) disintegrating tablet 4 mg  4 mg oral q8h PRN    [START ON 6/1/2025] polyethylene glycol (MIRALAX) 17 gram packet 17 g  17 g oral Daily       Current Medications:  Current Facility-Administered Medications   Medication Dose Route Frequency Provider Last Rate Last Admin    acetaminophen (TYLENOL) tablet 650 mg  650 mg oral q4h [facility] Gucci Carrillo PA C        glucose chewable tablet 15-30 g of dextrose  15-30 g of dextrose oral PRN Gucci Carrillo PA C        Or    dextrose 40 % oral gel 15-30 g of dextrose  15-30 g of dextrose oral PRN Gucci Carrillo PA C        Or    glucagon (GLUCAGEN) injection 1 mg  1 mg intramuscular PRN Gucci Carrillo PA C        Or    dextrose 50 % in water (D50) injection 12.5 g  25 mL intravenous PRN Gucci Carrillo PA C        hydrALAZINE (APRESOLINE) injection 10 mg  10 mg intravenous q4h PRN Mariela Gutiérrez PA C        oxyCODONE (ROXICODONE) immediate release  tablet 5 mg  5 mg oral q4h PRN Gucci Carrillo PA C        And    HYDROmorphone (DILAUDID) 0.2 mg/mL injection 0.2 mg  0.2 mg intravenous q4h PRN Gucci Carrillo PA C        lactated ringer's infusion   intravenous Continuous Mariela Gutiérrez PA C        ondansetron ODT (ZOFRAN-ODT) disintegrating tablet 4 mg  4 mg oral q8h PRN Gucci Carrillo PA C        [START ON 6/1/2025] polyethylene glycol (MIRALAX) 17 gram packet 17 g  17 g oral Daily Gucci Carrillo PA C           Review of Systems  Pertinent items are noted in HPI.    Objective     Vital Signs for the last 24 hours:  Temp:  [36.8 °C (98.2 °F)-37.6 °C (99.7 °F)] 37.6 °C (99.7 °F)  Heart Rate:  [73-89] 76  Resp:  [16-18] 16  BP: (139-185)/() 175/95    Physicial Exam    General appearance: alert, appears stated age, and cooperative  Head: normocephalic, without obvious abnormality, atraumatic  Eyes: conjunctivae/corneas clear. PERRL, EOM's intact. Fundi benign.  Ears: normal TM's and external ear canals both ears  Nose: Nares normal. Septum midline. Mucosa normal. No drainage or sinus tenderness.  Throat: lips, mucosa, and tongue normal; teeth and gums normal  Neck:  tender lower c spine  Back:  no other midline tenderness or stepoffs  Lungs: clear to auscultation bilaterally  Chest wall: no tenderness  Heart: regular rate and rhythm, S1, S2 normal, no murmur, click, rub or gallop  Abdomen: soft, non-tender; bowel sounds normal; no masses, no organomegaly    Extremities: edema BL, baseline compartments soft  Pulses: 2+ and symmetric  Skin: Skin color, texture, turgor normal. No rashes or lesions    Neurologic: Grossly normal  No focal deficits, intact strength and sensation, GCS 15    Labs  No new labs.    Imaging  I have reviewed the Imaging from the last 24 hrs.      Assessment/Plan     Code Status: Full Code    Estimated discharge date: 6/5/2025    Aneesh Brito is a 75 y.o. male s/p fall with a C7  fracture    Aspen collar in place  NS consult for eval, will likely need MRI  Q1 neurochecks  Recheck CK, IVF   Ok for diet, npo at midnight pending NS eval    Sarahy Bates MD

## 2025-06-01 NOTE — PROGRESS NOTES
Aneesh Brito is a 75 y.o. male s/p fall with C7 fracture    No acute events  No c/o SOB nausea, pain controlled    Vitals:    06/01/25 0700   BP: (!) 175/77   Pulse: 79   Resp: 17   Temp:    SpO2: 96%      Labs show mild increase in cr but CK improved    On exam  AAOx3   No focal deficits  No resp distress  Abdomen soft  BL peripheral edema, compartments soft    Collar at all times  Awaiting NS eval, will need fixation, T spine CT  Pain control  NPO pending NS   Likely ok for DVT ppx    Sarahy Bates MD

## 2025-06-01 NOTE — ANESTHESIA PROCEDURE NOTES
Airway  Reason: elective    Start Time: 6/1/2025 1:09 PM  Stop Time: 6/1/2025 1:11 PM    Airway not difficult    General Information and Staff   Patient location during procedure: OR  Anesthesiologist: Guy Noble MD  Resident/CRNA: Natalia Galvin CRNA  Performed: resident/CRNA   Performed by: Natalia Galvin CRNA  Authorized by: Guy Noble MD        Patient Condition  Indications for airway management: anesthesia and airway protection  Patient position: sniffing  MILS maintained throughout  Sedation level: deep     Final Airway Details   Preoxygenated: yes  Final airway type: endotracheal airway  Successful airway: ETT and ETT wEVAC  Cuffed: yes   Successful intubation technique: video laryngoscopy  Adjuncts used in placement: intubating stylet  Endotracheal tube insertion site: oral  Blade: Victor Hugo  Blade size: #3  ETT size (mm): 7.5  Cormack-Lehane Classification: grade I - full view of glottis  Placement verified by: chest auscultation and capnometry   Measured from: teeth  ETT to teeth (cm): 23  Ventilation between attempts: none  Number of attempts at approach: 1  Number of other approaches attempted: 0  Atraumatic airway insertion

## 2025-06-02 ENCOUNTER — BMR PREADMISSION ASSESSMENT (INPATIENT)
Dept: ADMISSIONS | Facility: REHABILITATION | Age: 75
DRG: 561 | End: 2025-06-02
Payer: MEDICARE

## 2025-06-02 LAB
ANION GAP SERPL CALC-SCNC: 8 MEQ/L (ref 3–15)
BUN SERPL-MCNC: 23 MG/DL (ref 7–25)
CALCIUM SERPL-MCNC: 7.7 MG/DL (ref 8.6–10.3)
CHLORIDE SERPL-SCNC: 106 MEQ/L (ref 98–107)
CO2 SERPL-SCNC: 23 MEQ/L (ref 21–31)
CREAT SERPL-MCNC: 1.2 MG/DL (ref 0.7–1.3)
CROSSMATCH: NORMAL
CROSSMATCH: NORMAL
EGFRCR SERPLBLD CKD-EPI 2021: >60 ML/MIN/1.73M*2
ERYTHROCYTE [DISTWIDTH] IN BLOOD BY AUTOMATED COUNT: 13.8 % (ref 11.6–14.4)
ERYTHROCYTE [DISTWIDTH] IN BLOOD BY AUTOMATED COUNT: 13.9 % (ref 11.6–14.4)
GLUCOSE BLD-MCNC: 155 MG/DL (ref 70–99)
GLUCOSE BLD-MCNC: 162 MG/DL (ref 70–99)
GLUCOSE BLD-MCNC: 164 MG/DL (ref 70–99)
GLUCOSE SERPL-MCNC: 169 MG/DL (ref 70–99)
HCT VFR BLD AUTO: 33.2 % (ref 40.1–51)
HCT VFR BLD AUTO: 34.7 % (ref 40.1–51)
HGB BLD-MCNC: 11.6 G/DL (ref 13.7–17.5)
HGB BLD-MCNC: 11.8 G/DL (ref 13.7–17.5)
ISBT CODE: 5100
ISBT CODE: 5100
MAGNESIUM SERPL-MCNC: 1.8 MG/DL (ref 1.8–2.5)
MCH RBC QN AUTO: 32.2 PG (ref 28–33.2)
MCH RBC QN AUTO: 33 PG (ref 28–33.2)
MCHC RBC AUTO-ENTMCNC: 34 G/DL (ref 32.2–36.5)
MCHC RBC AUTO-ENTMCNC: 34.9 G/DL (ref 32.2–36.5)
MCV RBC AUTO: 94.6 FL (ref 83–98)
MCV RBC AUTO: 94.8 FL (ref 83–98)
PHOSPHATE SERPL-MCNC: 4 MG/DL (ref 2.4–4.7)
PLATELET # BLD AUTO: 206 K/UL (ref 150–350)
PLATELET # BLD AUTO: 253 K/UL (ref 150–350)
PMV BLD AUTO: 10.5 FL (ref 9.4–12.4)
PMV BLD AUTO: 10.7 FL (ref 9.4–12.4)
POCT TEST: ABNORMAL
POTASSIUM SERPL-SCNC: 3.2 MEQ/L (ref 3.5–5.1)
PRODUCT CODE: NORMAL
PRODUCT CODE: NORMAL
PRODUCT STATUS: NORMAL
PRODUCT STATUS: NORMAL
RBC # BLD AUTO: 3.51 M/UL (ref 4.5–5.8)
RBC # BLD AUTO: 3.66 M/UL (ref 4.5–5.8)
SODIUM SERPL-SCNC: 137 MEQ/L (ref 136–145)
SPECIMEN EXP DATE BLD: NORMAL
SPECIMEN EXP DATE BLD: NORMAL
UNIT ABO: NORMAL
UNIT ABO: NORMAL
UNIT ID: NORMAL
UNIT ID: NORMAL
UNIT RH: POSITIVE
UNIT RH: POSITIVE
WBC # BLD AUTO: 12.58 K/UL (ref 3.8–10.5)
WBC # BLD AUTO: 17.41 K/UL (ref 3.8–10.5)

## 2025-06-02 PROCEDURE — 63700000 HC SELF-ADMINISTRABLE DRUG

## 2025-06-02 PROCEDURE — 63600000 HC DRUGS/DETAIL CODE: Mod: JZ

## 2025-06-02 PROCEDURE — 20600000 HC ROOM AND CARE INTERMEDIATE/TELEMETRY

## 2025-06-02 PROCEDURE — 63600000 HC DRUGS/DETAIL CODE: Mod: JZ | Performed by: PHYSICIAN ASSISTANT

## 2025-06-02 PROCEDURE — 25800000 HC PHARMACY IV SOLUTIONS: Performed by: PHYSICIAN ASSISTANT

## 2025-06-02 PROCEDURE — 25800000 HC PHARMACY IV SOLUTIONS

## 2025-06-02 PROCEDURE — 36415 COLL VENOUS BLD VENIPUNCTURE: CPT

## 2025-06-02 PROCEDURE — 84100 ASSAY OF PHOSPHORUS: CPT

## 2025-06-02 PROCEDURE — 85027 COMPLETE CBC AUTOMATED: CPT

## 2025-06-02 PROCEDURE — 25000000 HC PHARMACY GENERAL

## 2025-06-02 PROCEDURE — 80048 BASIC METABOLIC PNL TOTAL CA: CPT

## 2025-06-02 PROCEDURE — 97530 THERAPEUTIC ACTIVITIES: CPT | Mod: GP

## 2025-06-02 PROCEDURE — 97166 OT EVAL MOD COMPLEX 45 MIN: CPT | Mod: GO

## 2025-06-02 PROCEDURE — 63600000 HC DRUGS/DETAIL CODE

## 2025-06-02 PROCEDURE — 200200 PR NO CHARGE: Performed by: NEUROLOGICAL SURGERY

## 2025-06-02 PROCEDURE — 97535 SELF CARE MNGMENT TRAINING: CPT | Mod: GO

## 2025-06-02 PROCEDURE — 20000000 HC ROOM AND CARE ICU

## 2025-06-02 PROCEDURE — 99232 SBSQ HOSP IP/OBS MODERATE 35: CPT | Performed by: SURGERY

## 2025-06-02 PROCEDURE — 83735 ASSAY OF MAGNESIUM: CPT

## 2025-06-02 PROCEDURE — 97162 PT EVAL MOD COMPLEX 30 MIN: CPT | Mod: GP

## 2025-06-02 RX ORDER — POTASSIUM CHLORIDE 20 MEQ/1
20 TABLET, EXTENDED RELEASE ORAL ONCE
Status: COMPLETED | OUTPATIENT
Start: 2025-06-02 | End: 2025-06-02

## 2025-06-02 RX ORDER — LANOLIN ALCOHOL/MO/W.PET/CERES
200 CREAM (GRAM) TOPICAL ONCE
Status: COMPLETED | OUTPATIENT
Start: 2025-06-02 | End: 2025-06-02

## 2025-06-02 RX ADMIN — HYDRALAZINE HYDROCHLORIDE 10 MG: 20 INJECTION INTRAMUSCULAR; INTRAVENOUS at 21:37

## 2025-06-02 RX ADMIN — ACETAMINOPHEN 650 MG: 325 TABLET ORAL at 12:00

## 2025-06-02 RX ADMIN — ATORVASTATIN CALCIUM 20 MG: 20 TABLET, FILM COATED ORAL at 08:24

## 2025-06-02 RX ADMIN — NICARDIPINE HYDROCHLORIDE 7.5 MG/HR: 25 INJECTION, SOLUTION INTRAVENOUS at 06:26

## 2025-06-02 RX ADMIN — ACETAMINOPHEN 650 MG: 325 TABLET ORAL at 21:34

## 2025-06-02 RX ADMIN — OXYCODONE HYDROCHLORIDE 5 MG: 5 TABLET ORAL at 02:17

## 2025-06-02 RX ADMIN — HEPARIN SODIUM 5000 UNITS: 5000 INJECTION, SOLUTION INTRAVENOUS; SUBCUTANEOUS at 21:35

## 2025-06-02 RX ADMIN — ACETAMINOPHEN 650 MG: 325 TABLET ORAL at 17:02

## 2025-06-02 RX ADMIN — ACETAMINOPHEN 650 MG: 325 TABLET ORAL at 01:54

## 2025-06-02 RX ADMIN — TIMOLOL MALEATE 1 DROP: 5 SOLUTION/ DROPS OPHTHALMIC at 08:23

## 2025-06-02 RX ADMIN — INSULIN LISPRO 2 UNITS: 100 INJECTION, SOLUTION INTRAVENOUS; SUBCUTANEOUS at 05:53

## 2025-06-02 RX ADMIN — OXYCODONE HYDROCHLORIDE 5 MG: 5 TABLET ORAL at 11:58

## 2025-06-02 RX ADMIN — FELODIPINE 10 MG: 5 TABLET, FILM COATED, EXTENDED RELEASE ORAL at 08:23

## 2025-06-02 RX ADMIN — OXYCODONE HYDROCHLORIDE 5 MG: 5 TABLET ORAL at 06:24

## 2025-06-02 RX ADMIN — CEFAZOLIN 2 G: 2 INJECTION, POWDER, FOR SOLUTION INTRAMUSCULAR; INTRAVENOUS at 08:25

## 2025-06-02 RX ADMIN — OXYCODONE HYDROCHLORIDE 5 MG: 5 TABLET ORAL at 18:38

## 2025-06-02 RX ADMIN — POTASSIUM CHLORIDE 20 MEQ: 1500 TABLET, EXTENDED RELEASE ORAL at 06:24

## 2025-06-02 RX ADMIN — HEPARIN SODIUM 5000 UNITS: 5000 INJECTION, SOLUTION INTRAVENOUS; SUBCUTANEOUS at 14:43

## 2025-06-02 RX ADMIN — INSULIN LISPRO 2 UNITS: 100 INJECTION, SOLUTION INTRAVENOUS; SUBCUTANEOUS at 12:21

## 2025-06-02 RX ADMIN — POTASSIUM CHLORIDE 20 MEQ: 1500 TABLET, EXTENDED RELEASE ORAL at 11:59

## 2025-06-02 RX ADMIN — NICARDIPINE HYDROCHLORIDE 10 MG/HR: 25 INJECTION, SOLUTION INTRAVENOUS at 02:18

## 2025-06-02 RX ADMIN — Medication 200 MG: at 06:23

## 2025-06-02 RX ADMIN — POLYETHYLENE GLYCOL 3350 17 G: 17 POWDER, FOR SOLUTION ORAL at 08:23

## 2025-06-02 RX ADMIN — LOSARTAN POTASSIUM 100 MG: 100 TABLET, FILM COATED ORAL at 08:25

## 2025-06-02 RX ADMIN — ACETAMINOPHEN 650 MG: 325 TABLET ORAL at 08:24

## 2025-06-02 RX ADMIN — FINASTERIDE 5 MG: 5 TABLET, FILM COATED ORAL at 08:24

## 2025-06-02 RX ADMIN — METOPROLOL TARTRATE 50 MG: 50 TABLET, FILM COATED ORAL at 19:42

## 2025-06-02 RX ADMIN — INSULIN LISPRO 2 UNITS: 100 INJECTION, SOLUTION INTRAVENOUS; SUBCUTANEOUS at 17:02

## 2025-06-02 RX ADMIN — ACETAMINOPHEN 650 MG: 325 TABLET ORAL at 05:08

## 2025-06-02 RX ADMIN — CEFAZOLIN 2 G: 2 INJECTION, POWDER, FOR SOLUTION INTRAMUSCULAR; INTRAVENOUS at 01:56

## 2025-06-02 RX ADMIN — WATER 1500 MG: 1 INJECTION INTRAMUSCULAR; INTRAVENOUS; SUBCUTANEOUS at 02:44

## 2025-06-02 RX ADMIN — NICARDIPINE HYDROCHLORIDE 7.5 MG/HR: 25 INJECTION, SOLUTION INTRAVENOUS at 08:25

## 2025-06-02 RX ADMIN — HEPARIN SODIUM 5000 UNITS: 5000 INJECTION, SOLUTION INTRAVENOUS; SUBCUTANEOUS at 05:09

## 2025-06-02 RX ADMIN — TAMSULOSIN HYDROCHLORIDE 0.4 MG: 0.4 CAPSULE ORAL at 21:35

## 2025-06-02 RX ADMIN — METOPROLOL TARTRATE 50 MG: 50 TABLET, FILM COATED ORAL at 08:24

## 2025-06-02 ASSESSMENT — COGNITIVE AND FUNCTIONAL STATUS - GENERAL
STANDING UP FROM CHAIR USING ARMS: 2 - A LOT
AFFECT: WFL
WALKING IN HOSPITAL ROOM: 2 - A LOT
DRESSING REGULAR LOWER BODY CLOTHING: 2 - A LOT
CLIMB 3 TO 5 STEPS WITH RAILING: 1 - TOTAL
STANDING UP FROM CHAIR USING ARMS: 2 - A LOT
DRESSING REGULAR UPPER BODY CLOTHING: 3 - A LITTLE
CLIMB 3 TO 5 STEPS WITH RAILING: 1 - TOTAL
MOVING TO AND FROM BED TO CHAIR: 1 - TOTAL
TOILETING: 2 - A LOT
MOVING TO AND FROM BED TO CHAIR: 2 - A LOT
AFFECT: WFL
WALKING IN HOSPITAL ROOM: 2 - A LOT
WALKING IN HOSPITAL ROOM: 1 - TOTAL
HELP NEEDED FOR BATHING: 2 - A LOT
MOVING TO AND FROM BED TO CHAIR: 2 - A LOT
EATING MEALS: 3 - A LITTLE
HELP NEEDED FOR PERSONAL GROOMING: 3 - A LITTLE
STANDING UP FROM CHAIR USING ARMS: 1 - TOTAL
CLIMB 3 TO 5 STEPS WITH RAILING: 1 - TOTAL

## 2025-06-02 NOTE — PLAN OF CARE
Problem: Adult Inpatient Plan of Care  Goal: Plan of Care Review  Outcome: Progressing  Flowsheets (Taken 6/2/2025 1253)  Progress: improving  Outcome Evaluation: PT eval complete  Plan of Care Reviewed With: patient     Problem: Mobility Impairment  Goal: Optimal Mobility  Outcome: Progressing

## 2025-06-02 NOTE — PROGRESS NOTES
TRAUMA SURGERY DAILY PROGRESS NOTE     PATIENT NAME:  Aneesh Brito        YOB: 1950  AGE:  75 y.o.     GENDER: male  MRN:  [id number]          PATIENT #: [id number]    CHIEF COMPLAINT   No chief complaint on file.      PRIMARY CARE PHYSICIAN   Kamar Palacios MD    SUBJECTIVE   Looks great!  Sitting up and eating.   Pain controlled.       REVIEW OF SYSTEMS   Review of Systems    As above.   VITAL SIGNS   Temperature: Temp (24hrs), Av.7 °C (98.1 °F), Min:36.5 °C (97.7 °F), Max:36.9 °C (98.4 °F)     BP Max:  Systolic (24hrs), Av , Min:122 , Max:202      BP Perez:  Diastolic (24hrs), Av, Min:57, Max:146    Recent:  Patient Vitals for the past 4 hrs:   BP Pulse Resp SpO2   25 0900 (!) 143/62 75 (!) 32 (!) 91 %   25 0824 (!) 124/58 -- -- --   25 0823 (!) 124/58 -- -- --   25 0814 -- 72 -- --        I/Os:  I/O last 3 completed shifts:  In: 6193.1 [I.V.:4783.1; IV Piggyback:1410]  Out: 2015 [Urine:1680; Drains:135; Blood:200]  I/O this shift:  In: 255 [I.V.:155; IV Piggyback:100]  Out: 125 [Urine:125]     MEDICATIONS     Current Facility-Administered Medications:     acetaminophen (TYLENOL) tablet 650 mg, 650 mg, oral, q4h [facility], Cristina Geiger PA C, 650 mg at 25 08    atorvastatin (LIPITOR) tablet 20 mg, 20 mg, oral, Daily, Cristina Geiger PA C, 20 mg at 25 0824    glucose chewable tablet 15-30 g of dextrose, 15-30 g of dextrose, oral, PRN **OR** dextrose 40 % oral gel 15-30 g of dextrose, 15-30 g of dextrose, oral, PRN **OR** glucagon (GLUCAGEN) injection 1 mg, 1 mg, intramuscular, PRN **OR** dextrose 50 % in water (D50) injection 12.5 g, 25 mL, intravenous, PRN, Cristina Geiger PA C    felodipine (PLENDIL) 24 hr ER tablet 10 mg, 10 mg, oral, Daily, Cristina Geiger PA C, 10 mg at 25 0823    finasteride (PROSCAR) tablet 5 mg, 5 mg, oral, Daily, Cristina Geiger PA C, 5 mg at 25 0824    heparin (porcine)  5,000 unit/mL injection 5,000 Units, 5,000 Units, subcutaneous, q8h [facility], Cristina Geiger PA C, 5,000 Units at 06/02/25 0509    hydrALAZINE (APRESOLINE) injection 10 mg, 10 mg, intravenous, q4h PRN, Cristina Geiger PA C, 10 mg at 06/01/25 1205    oxyCODONE (ROXICODONE) immediate release tablet 5 mg, 5 mg, oral, q4h PRN, 5 mg at 06/02/25 0624 **AND** HYDROmorphone (DILAUDID) 0.2 mg/mL injection 0.2 mg, 0.2 mg, intravenous, q4h PRN, Cristina Geiger PA C, 0.2 mg at 06/01/25 1923    insulin lispro U-100 (HumaLOG) pen 2-18 Units, 2-18 Units, subcutaneous, q6h KOFI, Cristina Geiger PA C, 2 Units at 06/02/25 0553    losartan (COZAAR) tablet 100 mg, 100 mg, oral, Daily, Cristina Geiger PA C, 100 mg at 06/02/25 0825    metoprolol tartrate (LOPRESSOR) tablet 50 mg, 50 mg, oral, BID, Cristina Geiger PA C, 50 mg at 06/02/25 0824    netarsudiL-latanoprost 0.02-0.005 % drops 1 drop, 1 drop, Both Eyes, Nightly, Cristina Geiger PA C, 1 drop at 06/01/25 2209    niCARdipine (CARDENE) 25 mg/250 mL (0.1 mg/mL) in NSS, 0-15 mg/hr, intravenous, Titrated, Cristina Geiger PA C, Last Rate: 75 mL/hr at 06/02/25 0825, 7.5 mg/hr at 06/02/25 0825    ondansetron ODT (ZOFRAN-ODT) disintegrating tablet 4 mg, 4 mg, oral, q8h PRN, Cristina Geiger PA C    polyethylene glycol (MIRALAX) 17 gram packet 17 g, 17 g, oral, Daily, Cristina Geiger PA C, 17 g at 06/02/25 0823    tamsulosin (FLOMAX) 24 hr ER capsule 0.4 mg, 0.4 mg, oral, Nightly, Cristina Geiger PA C, 0.4 mg at 06/01/25 2209    timolol (TIMOPTIC) 0.5 % ophthalmic solution 1 drop, 1 drop, Both Eyes, Daily, Cristina Geiger PA C, 1 drop at 06/02/25 0823    MEDICATIONS:  Infusions:     niCARdipine  0-15 mg/hr 7.5 mg/hr (06/02/25 0825)          Scheduled:    acetaminophen  650 mg oral q4h KOFI    atorvastatin  20 mg oral Daily    felodipine  10 mg oral Daily    finasteride  5 mg oral Daily    heparin (porcine)  5,000  Units subcutaneous q8h [facility]    insulin lispro U-100  2-18 Units subcutaneous q6h [facility]    losartan  100 mg oral Daily    metoprolol tartrate  50 mg oral BID    netarsudiL-latanoprost  1 drop Both Eyes Nightly    polyethylene glycol  17 g oral Daily    tamsulosin  0.4 mg oral Nightly    timolol  1 drop Both Eyes Daily     PRN:   glucose, 15-30 g of dextrose, PRN   Or  dextrose, 15-30 g of dextrose, PRN   Or  glucagon, 1 mg, PRN   Or  dextrose 50 % in water (D50), 25 mL, PRN  hydrALAZINE, 10 mg, q4h PRN  oxyCODONE, 5 mg, q4h PRN   And  HYDROmorphone, 0.2 mg, q4h PRN  ondansetron ODT, 4 mg, q8h PRN         PHYSICAL EXAM    Physical Exam    NAD  Non-toxic.   Awake, alert, and following commands.   RRR  CTAB/L  Soft  Extremities warm.   IMPRESSION/PLAN   75 y.o. y/o male s/p fall.   Fall  Cervical spine fracture in setting of prior cervical spine laminectomy. He is now s/p ORIF, C7-T1 laminectomy/evacuation of EDH/fusion/removal and replacement of instrumentation. Neurosurgery efforts and follow-up appreciated. Aspen collar when in chair or ambulating.   Pain control.   Regular diet - tolerating and swallowing easily.   Laboratory evaluation reviewed to time of note - WBC 12.58 from 15.64, Hgb 11.6 from 12.9, BUN/Cr 23/1.2.   VTE prophylaxis -heparin SQ.   PT/OT        AUTHOR:  John Alvarez MD  Trauma Service pager #[phone], [phone]  6/2/2025  10:13 AM

## 2025-06-02 NOTE — PLAN OF CARE
Problem: Adult Inpatient Plan of Care  Goal: Plan of Care Review  6/2/2025 1307 by Sarah Centeno, OT  Outcome: Progressing  6/2/2025 1307 by Sarah Centeno, OT  Outcome: Progressing  Flowsheets (Taken 6/2/2025 1307)  Progress: improving  Outcome Evaluation: OT eval complete  Plan of Care Reviewed With: patient

## 2025-06-02 NOTE — PROGRESS NOTES
Occupational Therapy -  Initial Evaluation     Patient: Aneesh Brito  Location: [facility] Unit 3207  MRN: [id number]  Today's date: 6/2/2025    HISTORY OF PRESENT ILLNESS     Aneesh is a 75 y.o. male admitted on 5/31/2025 with Traumatic closed fracture of C7 vertebra with minimal displacement, initial encounter (CMS/[facility]) [S12.600A]. Principal problem is Traumatic closed fracture of C7 vertebra with minimal displacement, initial encounter (CMS/[facility]).    Past Medical History  Aneesh has a past medical history of Colonic adenoma, Diverticulosis of colon, Enlarged prostate with lower urinary tract symptoms (LUTS), Glaucoma, Hypertension, Melanoma of skin (CMS/[facility]), Osteoarthritis of knee, Overweight, Spinal stenosis of lumbar region, and Type 2 diabetes mellitus (CMS/[facility]).    History of Present Illness   75 y.o. male with DISH who is had a prior C3-C6 laminectomy and posterolateral instrumented fusion over 10 years ago by Dr. Maurer.  He suffered a fall yesterday and was not able to get up on his own and spent the night on the floor.  He was brought to [place] transferred to  for surgery.    He is surgically fused from C3-C6 but he is autofused from C3 down to T10 . He then has a 3 column Chance fracture at the C7-T1 level.   CT head/neck: IMPRESSION:  1.  No evidence of acute intracranial abnormality.  2.  Acute unstable multicolumn fracture of the cervical spine through the C7  vertebral body with associated widening of the C7-T1 facet joint on the right  and widening of the interspinous ligament at C7-T1. This is consistent with an  unstable/multicolumn fracture of the cervical spine and MRI is recommended for  further evaluation    6/1:ORIF C7 fracture 2) removal and replacement of C3-6 posterior intrumentation 3) C3 to T3 posterolaterral instrumented fusion  Depuy + removal set SMA C-arm O-Arm     PRIOR LEVEL OF FUNCTION AND LIVING ENVIRONMENT     Prior Level of Function      Flowsheet  Row Most Recent Value   Dominant Hand left   Ambulation assistive equipment   Transferring assistive equipment   Toileting independent   Bathing independent   Dressing independent   Eating independent   IADLs independent   Driving/Transportation    Prior Level of Function Comment Pt reports being independent with use of SPC inside and Rollator to the dining jenkins. Independent ADLs. Denies other falls. Independent ADLs. (+) driving. Retired teacher   Assistive Device Currently Used at Home shower chair, grab bar, walker, front-wheeled, cane, straight        Prior Living Environment      Flowsheet Row Most Recent Value   People in Home spouse   Current Living Arrangements independent living facility   Living Environment Comment Pt reports living at [facility] with his spouse. WIS with SC/GB     Occupational Profile      Flowsheet Row Most Recent Value   Successful Occupations retired    Occupational History/Life Experiences lives at [facility]       VITALS AND PAIN     OT Vitals      Date/Time Pulse Resp SpO2 Pt Activity O2 Therapy BP MAP BP Location BP Method Pt Position [facility]   06/02/25 1041 56 -- 93 % At rest None (Room air) 132/60 -- Right upper arm Automatic Lying LF   06/02/25 1100 -- -- -- -- -- -- -- Right upper arm Automatic Sitting LF   06/02/25 1100 68 27 90 % -- -- 130/60 86 mmHg -- -- -- ALR          OT Pain      Date/Time Pain Type Side/Orientation Rating: Rest Rating: Activity Interventions [facility]   06/02/25 1041 Pain Assessment neck 0 3 position adjusted LF               Objective     OBJECTIVE     Start time:  1039  End time:  1102  Session Length: 23 min  Mode of Treatment: co-treatment  Reason for co-treatment: Ax2  post op    General Observations  Patient received upright, in bed. He was agreeable to therapy, no issues or concerns identified by nurse prior to session. aspen bedside, x2 TYRESE    Precautions: fall, spinal, head of bed elevated 30 degrees,  brace worn when out of bed (aspen OOB/ambulating)              OT Eval and Treat - 06/02/25 1040          Cognition    Orientation Status oriented x 4     Affect/Mental Status WFL     Follows Commands follows multi-step commands;over 90% accuracy     Cognitive Function WFL     Comment, Cognition very pleasant and receptive, motivated to participate        Vision Assessment/Intervention    Vision Assessment WFL        Hearing Assessment    Hearing Status WFL        Sensory Assessment    Sensory Assessment sensation intact, upper extremities        Upper Extremity Assessment    UE Assessment ROM and Strength WFL except        Upper Extremity Range of Motion    Shoulder, Left (ROM) AAROM to 110 deg, AROM ~40 deg     Shoulder, Right (ROM) ~50 deg AROM, AAROM to 90 deg        Upper Extremity Strength    Shoulder, Left (Strength) 2+     Elbow, Left (Strength) 2+     Shoulder, Right (Strength) 2+     Elbow, Right (Strength) 2+        Bed Mobility    Bed Mobility Activities supine to sit;sit to supine     [place] maximum assist (25-49% patient effort);2 person assist     Safety/Cues technique;hand placement;sequencing     Assistive Device head of bed elevated;bed rails     Comment assist at trunk and BLE via log roll, aspen donne prior to mobility        Mobility Belt    Mobility Belt Used During Session no - medical contraindication     Reason Mobility Belt Not Used TYRESE drain placement        Sit/Stand Transfer    Surface edge of bed;chair with arm rests     [place] moderate assist (50-74% patient effort);2 person assist     Safety/Cues sequencing;proper use of assistive device;hand placement     Assistive Device walker, front-wheeled        Surface-to-Surface Transfers    Transfer Location bed to chair     [place] moderate assist (50-74% patient effort);2 person assist     Safety/Cues sequencing;hand placement;technique     Assistive Device walker, front-wheeled        Lower Body Dressing    Tasks socks      [place] dependent (less than 25% patient effort)     Position edge of bed sitting     Comment limited by sitting balance        Toileting    Adaptive Equipment catheter, urethral        Self-Feeding    Tasks liquids to mouth     [place] supervision;set up     Position supported sitting     Adaptive Equipment none        Balance    Static Sitting Balance mild impairment;sitting, edge of bed     Dynamic Sitting Balance moderate impairment;sitting, edge of bed     Sit to Stand Dynamic Balance moderate impairment;supported     Static Standing Balance moderate impairment;supported     Dynamic Standing Balance moderate impairment;supported     Balance Interventions occupation based/functional task     Comment, Balance w/ rw        Impairments/Safety Issues    Impairments Affecting Function balance;endurance/activity tolerance;strength;pain;range of motion (ROM);postural/trunk control     Functional Endurance fair  +                                              Education Documentation  Rehabilitation Therapy, taught by Sarah Centeno, OT at 6/2/2025  1:08 PM.  Learner: Patient  Readiness: Acceptance  Method: Explanation, Demonstration  Response: Verbalizes Understanding, Demonstrated Understanding  Comment: donning aspen/aspen wearing schedule, log roll / cspine prec, ADL safety, role of rehab    Treatment Plan, taught by Sarah Centeno, OT at 6/2/2025  1:08 PM.  Learner: Patient  Readiness: Acceptance  Method: Explanation, Demonstration  Response: Verbalizes Understanding, Demonstrated Understanding  Comment: donning aspen/aspen wearing schedule, log roll / cspine prec, ADL safety, role of rehab        Session Outcome  Patient upright, in chair at end of session, chair alarm on, personal items in reach. Nursing notified about patient's performance and patient's position.    AM-PAC™ - ADL (Current Function)     Putting on/taking off regular lower body clothing 2 - A Lot   Bathing 2 - A Lot   Toileting 2 - A Lot    Putting on/taking off regular upper body clothing 3 - A Little   Help for taking care of personal grooming 3 - A Little   Eating meals 3 - A Little   AM-PAC™ ADL Score 15          ASSESSMENT AND PLAN     Progress Summary  OT chayo complete. [facility] 15. Pt s/p C3-C6 lami to wear [place] OOB. Pt is limited by deficits in strength and ROM in UE, balance, endurance and pain. Pt requires MOD A x x2 for STS from EOB. Pt is MOD A x2 for transfer to chair w/ rw. Pt MAX A x2 for bed mobility. Pt is DEP for LBD at EOB. Pt Set up w/ SUP for feeding. Rec cont OT to incr indep w/ ADL and transfer    Patient/Family Therapy Goal Statement: home    OT Plan      Flowsheet Row Most Recent Value   Rehab Potential good, to achieve stated therapy goals at 06/02/2025 1040   Therapy Frequency 4 times/wk at 06/02/2025 1040   Planned Therapy Interventions neuromuscular control/coordination retraining, patient/caregiver education/training, transfer/mobility retraining, activity tolerance training, adaptive equipment training, BADL retraining, ROM/therapeutic exercise, occupation/activity based interventions at 06/02/2025 1040       OT Discharge Recommendations      Flowsheet Row Most Recent Value   OT Recommended Discharge Disposition acute rehab/Inpatient Rehab Facility at 06/02/2025 1040   Anticipated Equipment Needs if Discharged Home (OT) none at 06/02/2025 1040            OT Goals      Flowsheet Row Most Recent Value   Bed Mobility Goal 1    Activity/Assistive Device bed mobility activities, all at 06/02/2025 1040   [place] modified independence at 06/02/2025 1040   Time Frame by discharge at 06/02/2025 1040   Progress/Outcome goal ongoing at 06/02/2025 1040   Transfer Goal 1    Activity/Assistive Device sit-to-stand/stand-to-sit, bed-to-chair/chair-to-bed, toilet at 06/02/2025 1040   [place] modified independence at 06/02/2025 1040   Time Frame by discharge at 06/02/2025 1040   Progress/Outcome goal ongoing at 06/02/2025 1040    Dressing Goal 1    Activity/Adaptive Equipment dressing skills, all at 06/02/2025 1040   [place] modified independence at 06/02/2025 1040   Time Frame by discharge at 06/02/2025 1040   Progress/Outcome goal ongoing at 06/02/2025 1040   Toileting Goal 1    Activity/Assistive Device toileting skills, all at 06/02/2025 1040   [place] modified independence at 06/02/2025 1040   Time Frame by discharge at 06/02/2025 1040   Progress/Outcome goal ongoing at 06/02/2025 1040   Grooming Goal 1    Activity/Assistive Device grooming skills, all at 06/02/2025 1040   [place] modified independence at 06/02/2025 1040   Time Frame by discharge at 06/02/2025 1040   Progress/Outcome goal ongoing at 06/02/2025 1040

## 2025-06-02 NOTE — PROGRESS NOTES
POD#1 s/p ORIF C7-T1 Chance fracture in the setting of DISH with stabilization from C3-T3     Preop symptoms of mechanical/fracture pain have dramatically improved.  He certainly has incisional/muscular pain but he reports this is well-controlled with the medication regimen.    Denies numbness, weakness, etc.       AF, VSS  MAEW with 5/5 strength  Sensation intact.  Incision c/d/i.    TYRESE:  115/20 cc     A/P:  Neurologically stable and symptomatically improved after ORIF and C3-T3 fusion.    - DVT proph with SQH, SCDs and mobilization    - PT/OT    - Continue current multimodality pain regimen    - Xrays tomorrow    - ADAT     - Aspen collar when in chair or ambulating.    - No evidence of SCI.  Cleared for transfer out of ICU.

## 2025-06-02 NOTE — PROGRESS NOTES
Physical Therapy -  Initial Evaluation     Patient: Aneesh Brito  Location: [facility] Unit 3207  MRN: [id number]  Today's date: 6/2/2025    HISTORY OF PRESENT ILLNESS     Aneesh is a 75 y.o. male admitted on 5/31/2025 with Traumatic closed fracture of C7 vertebra with minimal displacement, initial encounter (CMS/[facility]) [S12.600A]. Principal problem is Traumatic closed fracture of C7 vertebra with minimal displacement, initial encounter (CMS/[facility]).    Past Medical History  Aneesh has a past medical history of Colonic adenoma, Diverticulosis of colon, Enlarged prostate with lower urinary tract symptoms (LUTS), Glaucoma, Hypertension, Melanoma of skin (CMS/[facility]), Osteoarthritis of knee, Overweight, Spinal stenosis of lumbar region, and Type 2 diabetes mellitus (CMS/[facility]).    History of Present Illness   75 y.o. male with DISH who is had a prior C3-C6 laminectomy and posterolateral instrumented fusion over 10 years ago by Dr. Maurer.  He suffered a fall yesterday and was not able to get up on his own and spent the night on the floor.  He was brought to [place] transferred to  for surgery.    He is surgically fused from C3-C6 but he is autofused from C3 down to T10 . He then has a 3 column Chance fracture at the C7-T1 level.   CT head/neck: IMPRESSION:  1.  No evidence of acute intracranial abnormality.  2.  Acute unstable multicolumn fracture of the cervical spine through the C7  vertebral body with associated widening of the C7-T1 facet joint on the right  and widening of the interspinous ligament at C7-T1. This is consistent with an  unstable/multicolumn fracture of the cervical spine and MRI is recommended for  further evaluation    6/1:ORIF C7 fracture 2) removal and replacement of C3-6 posterior intrumentation 3) C3 to T3 posterolaterral instrumented fusion  Depuy + removal set SMA C-arm O-Arm     PRIOR LEVEL OF FUNCTION AND LIVING ENVIRONMENT     Prior Level of Function      Flowsheet Row  Most Recent Value   Dominant Hand left   Ambulation assistive equipment   Transferring assistive equipment   Toileting independent   Bathing independent   Dressing independent   Eating independent   IADLs independent   Driving/Transportation    Prior Level of Function Comment Pt reports being independent with use of SPC inside and Rollator to the dining jenkins. Independent ADLs. Denies other falls. Independent ADLs. (+) driving. Retired teacher   Assistive Device Currently Used at Home shower chair, grab bar, walker, front-wheeled, cane, straight   History of Falls: Fall with injury in the past year    Prior Living Environment      Flowsheet Row Most Recent Value   People in Home spouse   Current Living Arrangements independent living facility   Living Environment Comment Pt reports living at [facility] with his spouse. WIS with SC/GB       VITALS AND PAIN     PT Vitals      Date/Time Pulse Resp SpO2 Pt Activity O2 Therapy BP MAP BP Location BP Method Pt Position [facility]   06/02/25 1041 56 -- 93 % At rest None (Room air) 132/60 -- Right upper arm Automatic Lying LF   06/02/25 1100 -- -- -- -- -- -- -- Right upper arm Automatic Sitting LF   06/02/25 1100 68 27 90 % -- -- 130/60 86 mmHg -- -- -- ALR          PT Pain      Date/Time Pain Type Location Rating: Rest Rating: Activity Interventions [facility]   06/02/25 1041 Pain Assessment neck 0 3 position adjusted LF               Objective     OBJECTIVE     Start time:  1038  End time:  1102  Session Length: 24 min  Mode of Treatment: co-treatment  Reason for co-treatment: skilled assist x2, PT focus on mobility    General Observations  Patient received in bed. He was agreeable to therapy, no issues or concerns identified by nurse prior to session. in bed, [place] at bedside. 2 TYRESE drains in place    Precautions: fall, spinal, head of bed elevated 30 degrees, brace worn when out of bed (Aspen when in chair/ambulating)        Services  Do You Speak a  Language Other Than English at Home?: no      PT Eval and Treat - 06/02/25 1038          Cognition    Orientation Status oriented x 4     Affect/Mental Status WFL     Follows Commands follows one-step commands;over 90% accuracy     Cognitive Function WFL     Comment, Cognition plesant and receptive to care, eager to participate        Vision Assessment/Intervention    Vision Assessment WFL        Hearing Assessment    Hearing Status WFL        Sensory Assessment    Sensory Assessment sensation intact, lower extremities        Lower Extremity Assessment    LE Assessment ROM and Strength WFL except     General Observations grossly assessed, approx. 4-/5        Bed Mobility    Bed Mobility Activities left;supine to sit     [place] maximum assist (25-49% patient effort);2 person assist     Safety/Cues sequencing;technique;hand placement;maintaining precautions     Assistive Device head of bed elevated;bed rails;draw sheet     Comment OOB to the L via log roll technique. Aspen collar donned prior to mobility. Increased time/assist required to scoot towards EOB        Mobility Belt    Mobility Belt Used During Session no - other (see comments)     Reason Mobility Belt Not Used TYRESE drain placement        Sit/Stand Transfer    Surface edge of bed     [place] moderate assist (50-74% patient effort);2 person assist     Safety/Cues technique;sequencing;hand placement     Assistive Device walker, front-wheeled     Transfer Comments from EOB, increased time to scoot towards EOB        Surface-to-Surface Transfers    Transfer Location bed to chair     Transfer Technique stand step     [place] moderate assist (50-74% patient effort);2 person assist     Safety/Cues technique;sequencing;hand placement     Assistive Device walker, front-wheeled     Transfer Comments to the L, shuffling steps, cueing for sequencing        Gait Training    [place], Gait moderate assist (50-74% patient effort);2 person assist      Safety/Cues technique;sequencing;hand placement     Assistive Device walker, front-wheeled     Distance in Feet 2 feet     Pattern step-to     Deviations/Abnormal Patterns gait speed decreased;step length decreased;stride length decreased;base of support, narrow;festinating/shuffling     Comment NBOS, shuffling steps, decreased heel strike        Balance    Static Sitting Balance unsupported;mild impairment;sitting, edge of bed     Dynamic Sitting Balance moderate impairment;unsupported;sitting, edge of bed     Sit to Stand Dynamic Balance moderate impairment;supported;standing     Static Standing Balance moderate impairment;standing;supported     Dynamic Standing Balance moderate impairment;standing;supported     Comment, Balance BUE support on RW, mod A x 2 transfers        Impairments/Safety Issues    Impairments Affecting Function balance;endurance/activity tolerance;strength;pain                                              Education Documentation  Risk Factors, taught by Letha Benjamin PT at 6/2/2025 12:53 PM.  Learner: Patient  Readiness: Acceptance  Method: Explanation  Response: Verbalizes Understanding  Comment: PT role, POC, goals, use of RW, [place] OOB, spinal precautions        Session Outcome  Patient upright, in chair at end of session, chair alarm on, all needs met, call light in reach, personal items in reach. Nursing notified about patient's performance, patient's response to therapy/activity, and patient's position.    AM-PAC™ - Mobility (Current Function)     Turning form your back to your side while in flat bed without using bedrails 2 - A Lot   Moving from lying on your back to sitting on the side of a flat bed without using bedrails 2 - A Lot   Moving to and from a bed to a chair 2 - A Lot   Standing up from a chair using your arms 2 - A Lot   To walk in a hospital room 2 - A Lot   Climbing 3-5 steps with a railing 1 - Total   AM-PAC™ Mobility Score 11          ASSESSMENT AND PLAN      Progress Summary  Pt seen for PT initial eval, [facility] 11. Pt requires max A x 2 supine to sit, mod A x 2 STS and mod A x 2 bed to chair transfers with use of RW. Pt educated on spinal precautions and log roll technique, as well as need for [place] OOB. Pt reports being independent with use of SPC/Rollator at baseline. At this time, rec IRF upon d/c once medically stable. Rec continued skilled PT services during acute stay.    Patient/Family Therapy Goals Statement: get better    PT Plan      Flowsheet Row Most Recent Value   Rehab Potential good, to achieve stated therapy goals at 06/02/2025 1038   Therapy Frequency 4 times/wk at 06/02/2025 1038   Planned Therapy Interventions balance training, bed mobility training, gait training, patient/family education, strengthening, transfer training at 06/02/2025 1038       PT Discharge Recommendations      Flowsheet Row Most Recent Value   PT Recommended Discharge Disposition acute rehab/Inpatient Rehab Facility at 06/02/2025 1038   Anticipated Equipment Needs if Discharged Home (PT) walker, front-wheeled, wheelchair, commode, 3-in-1 at 06/02/2025 1038            PT Goals      Flowsheet Row Most Recent Value   Bed Mobility Goal 1    Activity/Assistive Device bed mobility activities, all at 06/02/2025 1038   [place] modified independence at 06/02/2025 1038   Time Frame by discharge at 06/02/2025 1038   Progress/Outcome goal ongoing at 06/02/2025 1038   Transfer Goal 1    Activity/Assistive Device sit-to-stand/stand-to-sit, bed-to-chair/chair-to-bed, walker, front-wheeled at 06/02/2025 1038   [place] modified independence at 06/02/2025 1038   Time Frame by discharge at 06/02/2025 1038   Progress/Outcome goal ongoing at 06/02/2025 1038   Gait Training Goal 1    Activity/Assistive Device gait (walking locomotion), assistive device use, walker, front-wheeled at 06/02/2025 1038   [place] modified independence at 06/02/2025 1038   Distance 100 at 06/02/2025 1038    Time Frame by discharge at 06/02/2025 1038   Progress/Outcome goal ongoing at 06/02/2025 1038

## 2025-06-02 NOTE — PLAN OF CARE
Care Coordination Admission Assessment Note    General Information:  Readmission Within the last 30 days: no previous admission in last 30 days  Does patient have a :    Patient-Specific Goals (include timeframe): Medical stability    Living Arrangements:  Arrived From: home, other (see comments)  Current Living Arrangements: independent living facility  People in Home: spouse, facility resident  Home Accessibility: wheelchair accessible  Living Arrangement Comments: [place] live [place] [facility]    Housing Stability and Utility Access (SDOH):  In the last 12 months, was there a time when you were not able to pay the mortgage or rent on time?: No  In the past 12 months, how many times have you moved?: 1  At any time in the past 12 months, were you homeless or living in a shelter (including now)?: No  In the past 12 months has the electric, gas, oil, or water company threatened to shut off services in your home?: No    Functional Status Prior to Admission:   Assistive Device/Animal Currently Used at Home: grab bar, shower chair, cane, straight (rollator)  Functional Status Comments:    IADL Comments: patient is independent with ADLs and walks with rollator or cane     Supports and Services:  Current Outpatient/Agency/Support Group: other (see comments) (Outpatient PT at [facility])  Type of Current Home Care Services:    History of home care episode or rehab stay:      Discharge Needs Assessment:   Concerns to be Addressed: discharge planning  Current Discharge Risk: dependent with mobility/activities of daily living, physical impairment  Anticipated Changes Related to Illness:      Patient/Family Anticipated Discharge Plan:  Patient/Family Anticipates Transition To: home  Patient/Family Anticipated Services at Transition: , rehabilitation services    Connection to Community  Patient declined offered resources.    Patient Choice:   Offered/Gave Vendor List: no        Anticipated Discharge Plan:  Met with patient. Provided education and contact information for Care Coordination services.: yes  Anticipated Discharge Disposition: acute rehab/Inpatient Rehab Facility     Transportation Needs (SDOH):  Transportation Concerns: none  Transportation Anticipated: health plan transportation  Is Out of Hospital DNR needed at discharge?: no    In the past 12 months, has lack of transportation kept you from medical appointments or from getting medications?: No  In the past 12 months, has lack of transportation kept you from meetings, work, or from getting things needed for daily living?: No    Concerns - comments: CCRN met with the patient at the bedside and explained role.  Demographics, PCP and pharmacy verified.  The patient explained that he and his wife moved to [facility] in September.  The patient is independent with bathing and dressing.  He walks with a rollator at baseline but is able to walk all around the facility.  He enjoys playing shuffle board,cornNekted and [place] ball during the week.  DME WIS, GB,SC cane and rollator.  PM&R has been placed.  Mary Behler CCRN

## 2025-06-02 NOTE — HOSPITAL COURSE
Aneesh is a 75 y.o. male admitted on 5/31/2025 with Traumatic closed fracture of C7 vertebra with minimal displacement, initial encounter (CMS/[facility]) [S12.600A]. Principal problem is Traumatic closed fracture of C7 vertebra with minimal displacement, initial encounter (CMS/[facility]).    Past Medical History  Aneesh has a past medical history of Colonic adenoma, Diverticulosis of colon, Enlarged prostate with lower urinary tract symptoms (LUTS), Glaucoma, Hypertension, Melanoma of skin (CMS/[facility]), Osteoarthritis of knee, Overweight, Spinal stenosis of lumbar region, and Type 2 diabetes mellitus (CMS/[facility]).    History of Present Illness   75 y.o. male with DISH who is had a prior C3-C6 laminectomy and posterolateral instrumented fusion over 10 years ago by Dr. Maurer.  He suffered a fall yesterday and was not able to get up on his own and spent the night on the floor.  He was brought to [place] transferred to  for surgery.    He is surgically fused from C3-C6 but he is autofused from C3 down to T10 . He then has a 3 column Chance fracture at the C7-T1 level.   CT head/neck: IMPRESSION:  1.  No evidence of acute intracranial abnormality.  2.  Acute unstable multicolumn fracture of the cervical spine through the C7  vertebral body with associated widening of the C7-T1 facet joint on the right  and widening of the interspinous ligament at C7-T1. This is consistent with an  unstable/multicolumn fracture of the cervical spine and MRI is recommended for  further evaluation    6/1:ORIF C7 fracture 2) removal and replacement of C3-6 posterior intrumentation 3) C3 to T3 posterolaterral instrumented fusion  Depuy + removal set [facility] C-arm O-Arm

## 2025-06-03 ENCOUNTER — APPOINTMENT (INPATIENT)
Dept: RADIOLOGY | Facility: HOSPITAL | Age: 75
DRG: 472 | End: 2025-06-03
Payer: MEDICARE

## 2025-06-03 LAB
ANION GAP SERPL CALC-SCNC: 8 MEQ/L (ref 3–15)
BUN SERPL-MCNC: 21 MG/DL (ref 7–25)
CALCIUM SERPL-MCNC: 8 MG/DL (ref 8.6–10.3)
CHLORIDE SERPL-SCNC: 104 MEQ/L (ref 98–107)
CO2 SERPL-SCNC: 22 MEQ/L (ref 21–31)
CREAT SERPL-MCNC: 1.1 MG/DL (ref 0.7–1.3)
EGFRCR SERPLBLD CKD-EPI 2021: >60 ML/MIN/1.73M*2
ERYTHROCYTE [DISTWIDTH] IN BLOOD BY AUTOMATED COUNT: 13.9 % (ref 11.6–14.4)
GLUCOSE BLD-MCNC: 147 MG/DL (ref 70–99)
GLUCOSE BLD-MCNC: 148 MG/DL (ref 70–99)
GLUCOSE BLD-MCNC: 195 MG/DL (ref 70–99)
GLUCOSE BLD-MCNC: 200 MG/DL (ref 70–99)
GLUCOSE SERPL-MCNC: 184 MG/DL (ref 70–99)
HCT VFR BLD AUTO: 33.7 % (ref 40.1–51)
HGB BLD-MCNC: 11.4 G/DL (ref 13.7–17.5)
MAGNESIUM SERPL-MCNC: 1.8 MG/DL (ref 1.8–2.5)
MCH RBC QN AUTO: 32.3 PG (ref 28–33.2)
MCHC RBC AUTO-ENTMCNC: 33.8 G/DL (ref 32.2–36.5)
MCV RBC AUTO: 95.5 FL (ref 83–98)
PHOSPHATE SERPL-MCNC: 3 MG/DL (ref 2.4–4.7)
PLATELET # BLD AUTO: 220 K/UL (ref 150–350)
PMV BLD AUTO: 10.7 FL (ref 9.4–12.4)
POCT TEST: ABNORMAL
POTASSIUM SERPL-SCNC: 3.2 MEQ/L (ref 3.5–5.1)
RBC # BLD AUTO: 3.53 M/UL (ref 4.5–5.8)
SODIUM SERPL-SCNC: 134 MEQ/L (ref 136–145)
WBC # BLD AUTO: 12.07 K/UL (ref 3.8–10.5)

## 2025-06-03 PROCEDURE — 83735 ASSAY OF MAGNESIUM: CPT

## 2025-06-03 PROCEDURE — 97535 SELF CARE MNGMENT TRAINING: CPT | Mod: GO

## 2025-06-03 PROCEDURE — 63700000 HC SELF-ADMINISTRABLE DRUG: Performed by: NURSE PRACTITIONER

## 2025-06-03 PROCEDURE — 200200 PR NO CHARGE: Performed by: NEUROLOGICAL SURGERY

## 2025-06-03 PROCEDURE — 84100 ASSAY OF PHOSPHORUS: CPT

## 2025-06-03 PROCEDURE — 63700000 HC SELF-ADMINISTRABLE DRUG

## 2025-06-03 PROCEDURE — 25800000 HC PHARMACY IV SOLUTIONS

## 2025-06-03 PROCEDURE — 63600000 HC DRUGS/DETAIL CODE

## 2025-06-03 PROCEDURE — 72040 X-RAY EXAM NECK SPINE 2-3 VW: CPT

## 2025-06-03 PROCEDURE — 36415 COLL VENOUS BLD VENIPUNCTURE: CPT

## 2025-06-03 PROCEDURE — 20600000 HC ROOM AND CARE INTERMEDIATE/TELEMETRY

## 2025-06-03 PROCEDURE — 97530 THERAPEUTIC ACTIVITIES: CPT | Mod: GP

## 2025-06-03 PROCEDURE — 80048 BASIC METABOLIC PNL TOTAL CA: CPT

## 2025-06-03 PROCEDURE — 99232 SBSQ HOSP IP/OBS MODERATE 35: CPT | Performed by: SURGERY

## 2025-06-03 PROCEDURE — 63600000 HC DRUGS/DETAIL CODE: Mod: JZ

## 2025-06-03 PROCEDURE — 85027 COMPLETE CBC AUTOMATED: CPT

## 2025-06-03 RX ORDER — POTASSIUM CHLORIDE 20 MEQ/1
40 TABLET, EXTENDED RELEASE ORAL ONCE
Status: COMPLETED | OUTPATIENT
Start: 2025-06-03 | End: 2025-06-03

## 2025-06-03 RX ORDER — LISINOPRIL 10 MG/1
10 TABLET ORAL DAILY
Status: DISCONTINUED | OUTPATIENT
Start: 2025-06-03 | End: 2025-06-04 | Stop reason: HOSPADM

## 2025-06-03 RX ORDER — METFORMIN HYDROCHLORIDE 500 MG/1
500 TABLET ORAL
Status: DISCONTINUED | OUTPATIENT
Start: 2025-06-03 | End: 2025-06-04 | Stop reason: HOSPADM

## 2025-06-03 RX ORDER — LANOLIN ALCOHOL/MO/W.PET/CERES
800 CREAM (GRAM) TOPICAL ONCE
Status: COMPLETED | OUTPATIENT
Start: 2025-06-03 | End: 2025-06-03

## 2025-06-03 RX ORDER — BISACODYL 10 MG/1
10 SUPPOSITORY RECTAL DAILY PRN
Status: DISCONTINUED | OUTPATIENT
Start: 2025-06-03 | End: 2025-06-04 | Stop reason: HOSPADM

## 2025-06-03 RX ORDER — ARTIFICIAL TEARS 1; 2; 3 MG/ML; MG/ML; MG/ML
1 SOLUTION/ DROPS OPHTHALMIC 4 TIMES DAILY PRN
Status: DISCONTINUED | OUTPATIENT
Start: 2025-06-03 | End: 2025-06-04 | Stop reason: HOSPADM

## 2025-06-03 RX ORDER — LANOLIN ALCOHOL/MO/W.PET/CERES
200 CREAM (GRAM) TOPICAL DAILY
Status: COMPLETED | OUTPATIENT
Start: 2025-06-03 | End: 2025-06-03

## 2025-06-03 RX ORDER — DEXTROMETHORPHAN POLISTIREX 30 MG/5 ML
133 SUSPENSION, EXTENDED RELEASE 12 HR ORAL ONCE
Status: COMPLETED | OUTPATIENT
Start: 2025-06-03 | End: 2025-06-03

## 2025-06-03 RX ORDER — TAMSULOSIN HYDROCHLORIDE 0.4 MG/1
0.4 CAPSULE ORAL DAILY
Status: DISCONTINUED | OUTPATIENT
Start: 2025-06-03 | End: 2025-06-03

## 2025-06-03 RX ORDER — HYDRALAZINE HYDROCHLORIDE 25 MG/1
25 TABLET, FILM COATED ORAL EVERY 6 HOURS PRN
Status: DISCONTINUED | OUTPATIENT
Start: 2025-06-03 | End: 2025-06-04 | Stop reason: HOSPADM

## 2025-06-03 RX ORDER — HYDRALAZINE HYDROCHLORIDE 25 MG/1
25 TABLET, FILM COATED ORAL EVERY 6 HOURS PRN
Status: DISCONTINUED | OUTPATIENT
Start: 2025-06-03 | End: 2025-06-03

## 2025-06-03 RX ADMIN — METOPROLOL TARTRATE 50 MG: 50 TABLET, FILM COATED ORAL at 20:41

## 2025-06-03 RX ADMIN — ACETAMINOPHEN 650 MG: 325 TABLET ORAL at 13:08

## 2025-06-03 RX ADMIN — HEPARIN SODIUM 5000 UNITS: 5000 INJECTION, SOLUTION INTRAVENOUS; SUBCUTANEOUS at 14:35

## 2025-06-03 RX ADMIN — NICARDIPINE HYDROCHLORIDE 5 MG/HR: 25 INJECTION, SOLUTION INTRAVENOUS at 10:30

## 2025-06-03 RX ADMIN — METOPROLOL TARTRATE 50 MG: 50 TABLET, FILM COATED ORAL at 09:04

## 2025-06-03 RX ADMIN — NICARDIPINE HYDROCHLORIDE 5 MG/HR: 25 INJECTION, SOLUTION INTRAVENOUS at 00:10

## 2025-06-03 RX ADMIN — POTASSIUM CHLORIDE 40 MEQ: 1500 TABLET, EXTENDED RELEASE ORAL at 11:18

## 2025-06-03 RX ADMIN — ACETAMINOPHEN 650 MG: 325 TABLET ORAL at 05:33

## 2025-06-03 RX ADMIN — HEPARIN SODIUM 5000 UNITS: 5000 INJECTION, SOLUTION INTRAVENOUS; SUBCUTANEOUS at 20:42

## 2025-06-03 RX ADMIN — Medication 800 MG: at 18:19

## 2025-06-03 RX ADMIN — TIMOLOL MALEATE 1 DROP: 5 SOLUTION/ DROPS OPHTHALMIC at 09:09

## 2025-06-03 RX ADMIN — METFORMIN HYDROCHLORIDE 500 MG: 500 TABLET ORAL at 09:04

## 2025-06-03 RX ADMIN — LOSARTAN POTASSIUM 100 MG: 100 TABLET, FILM COATED ORAL at 09:05

## 2025-06-03 RX ADMIN — MINERAL OIL 133 ML: 100 ENEMA RECTAL at 22:08

## 2025-06-03 RX ADMIN — FINASTERIDE 5 MG: 5 TABLET, FILM COATED ORAL at 09:04

## 2025-06-03 RX ADMIN — POLYETHYLENE GLYCOL 3350 17 G: 17 POWDER, FOR SOLUTION ORAL at 09:03

## 2025-06-03 RX ADMIN — Medication 200 MG: at 11:18

## 2025-06-03 RX ADMIN — FELODIPINE 10 MG: 5 TABLET, FILM COATED, EXTENDED RELEASE ORAL at 09:05

## 2025-06-03 RX ADMIN — ATORVASTATIN CALCIUM 20 MG: 20 TABLET, FILM COATED ORAL at 09:04

## 2025-06-03 RX ADMIN — OXYCODONE HYDROCHLORIDE 5 MG: 5 TABLET ORAL at 09:05

## 2025-06-03 RX ADMIN — ACETAMINOPHEN 650 MG: 325 TABLET ORAL at 20:41

## 2025-06-03 RX ADMIN — TAMSULOSIN HYDROCHLORIDE 0.4 MG: 0.4 CAPSULE ORAL at 20:41

## 2025-06-03 RX ADMIN — OXYCODONE HYDROCHLORIDE 5 MG: 5 TABLET ORAL at 03:52

## 2025-06-03 RX ADMIN — ACETAMINOPHEN 650 MG: 325 TABLET ORAL at 17:33

## 2025-06-03 RX ADMIN — METFORMIN HYDROCHLORIDE 500 MG: 500 TABLET ORAL at 17:33

## 2025-06-03 RX ADMIN — INSULIN LISPRO 4 UNITS: 100 INJECTION, SOLUTION INTRAVENOUS; SUBCUTANEOUS at 13:03

## 2025-06-03 RX ADMIN — ACETAMINOPHEN 650 MG: 325 TABLET ORAL at 09:05

## 2025-06-03 RX ADMIN — INSULIN LISPRO 4 UNITS: 100 INJECTION, SOLUTION INTRAVENOUS; SUBCUTANEOUS at 00:05

## 2025-06-03 RX ADMIN — OXYCODONE HYDROCHLORIDE 5 MG: 5 TABLET ORAL at 14:34

## 2025-06-03 RX ADMIN — NICARDIPINE HYDROCHLORIDE 5 MG/HR: 25 INJECTION, SOLUTION INTRAVENOUS at 06:00

## 2025-06-03 RX ADMIN — HEPARIN SODIUM 5000 UNITS: 5000 INJECTION, SOLUTION INTRAVENOUS; SUBCUTANEOUS at 05:34

## 2025-06-03 RX ADMIN — LISINOPRIL 10 MG: 10 TABLET ORAL at 09:05

## 2025-06-03 ASSESSMENT — COGNITIVE AND FUNCTIONAL STATUS - GENERAL
STANDING UP FROM CHAIR USING ARMS: 2 - A LOT
MOVING TO AND FROM BED TO CHAIR: 2 - A LOT
MOVING TO AND FROM BED TO CHAIR: 2 - A LOT
STANDING UP FROM CHAIR USING ARMS: 2 - A LOT
AFFECT: WFL
AFFECT: WFL
CLIMB 3 TO 5 STEPS WITH RAILING: 1 - TOTAL
EATING MEALS: 3 - A LITTLE
CLIMB 3 TO 5 STEPS WITH RAILING: 1 - TOTAL
HELP NEEDED FOR BATHING: 2 - A LOT
WALKING IN HOSPITAL ROOM: 2 - A LOT
DRESSING REGULAR UPPER BODY CLOTHING: 3 - A LITTLE
TOILETING: 2 - A LOT
DRESSING REGULAR LOWER BODY CLOTHING: 2 - A LOT
HELP NEEDED FOR PERSONAL GROOMING: 3 - A LITTLE
WALKING IN HOSPITAL ROOM: 2 - A LOT

## 2025-06-03 NOTE — PLAN OF CARE
Plan of Care Review  Plan of Care Reviewed With: patient  Progress: improving  Outcome Evaluation: OOB to chair able to take a few steps with walker. Apen collar on while OOB and ambulating. Tolerating diet. No Bm after miralax. Mag/mineral oil enema ordered. RLE weak with dorsi/plantar flexion unchanged. TYRESE drains intact.

## 2025-06-03 NOTE — HOSPITAL COURSE
History of Present Illness  Aneesh is a 75 y.o. male with a history of colon carcinoma, BPH, hypertension, glaucoma, type 2 diabetes, history of a cervical fusion over 10 years ago admitted after a fall inability to get up on his own.  He was seen at [facility] where imaging studies were performed which demonstrated DISH as well as a Chance fracture at the bottom of C7.  He was transferred to [facility] for neurosurgery.  He was taken to the OR on 6/1 by Dr. Benjamin and underwent an ORIF of the C7 Chance fracture with removal and replacement of the C3-6 posterior instrumentation as well as a C3-T3 posterior lateral fusion.  Drains remain in place.  He is on subcu heparin for DVT prophylaxis.  Currently on a nicardipine drip for blood pressure control.  He states pain is well-controlled.  He denies any other complaints of headache or dizziness, chest pain or shortness of breath.  He is voiding on his own.  He has not had a bowel movement yet.  He was seen by PT and OT needing moderate assistance of 2 to transfer sit to stand.  He ambulated 2 feet with moderate assistance of 2 using a rolling walker.  Pertinent radiology results reviewed. Pertinent lab results reviewed.    75-year-old male with an ADL and ambulatory dysfunction admitted following a fall found to have a C7 Chance fracture superimposed on DISH.  He is now status post ORIF of C7 with a cervical fusion.  He is on heparin for DVT prophylaxis  Currently requires moderate assistance for for transfers and bed mobility.  He was independent prior to this admission.  Given his current functional deficits and need for close medical monitoring recommendation is referral to acute level inpatient rehab for comprehensive rehab program of 3 hours daily physical and occupational therapies with medical management and oversight by physiatry.  He is at risk for complications including DVT, UTI, wound infection, skin breakdown, change in mental status and  therefore will require daily physician evaluations.  Reasonable goals are to work on and improve his mobility including transfers standing and ambulation, assess ADLs and need for adaptive, family training and education and establish a safe discharge plan.    Pt lives at [facility] w/ his wife Yas.  There is a WIS with SC/GB.  Pt was completely Independent using a SPC in his apartment and a rollator to negotiate around the [facility] [place].  Pt was able to drive    Active Inpatient Medications  Facility-Administered Medications Ordered in Other Visits   Medication Dose Route Frequency Provider Last Rate Last Admin    acetaminophen (TYLENOL) tablet 650 mg  650 mg oral q4h [facility] Cristina Geiger PA C   650 mg at 06/03/25 0905    atorvastatin (LIPITOR) tablet 20 mg  20 mg oral Daily Cristina Geiger PA C   20 mg at 06/03/25 0904    glucose chewable tablet 15-30 g of dextrose  15-30 g of dextrose oral PRN Cristina Geiger PA C        Or    dextrose 40 % oral gel 15-30 g of dextrose  15-30 g of dextrose oral PRN Cristina Geiger PA C        Or    glucagon (GLUCAGEN) injection 1 mg  1 mg intramuscular PRN Cristina Geiger PA C        Or    dextrose 50 % in water (D50) injection 12.5 g  25 mL intravenous PRN Cristina Geiger PA C        felodipine (PLENDIL) 24 hr ER tablet 10 mg  10 mg oral Daily Cristina Geiger PA C   10 mg at 06/03/25 0905    finasteride (PROSCAR) tablet 5 mg  5 mg oral Daily Cristina Geiger PA C   5 mg at 06/03/25 0904    heparin (porcine) 5,000 unit/mL injection 5,000 Units  5,000 Units subcutaneous q8h [facility] Cristina Geiger PA C   5,000 Units at 06/03/25 0534    oxyCODONE (ROXICODONE) immediate release tablet 5 mg  5 mg oral q4h PRN Cristina Geiger PA C   5 mg at 06/03/25 0905    And    HYDROmorphone (DILAUDID) 0.2 mg/mL injection 0.2 mg  0.2 mg intravenous q4h PRN Cristina Geiger PA C   0.2 mg at 06/01/25 1923    insulin lispro U-100  (HumaLOG) pen 2-18 Units  2-18 Units subcutaneous q6h [facility] Cristina Geiger PA C   4 Units at 06/03/25 0005    lisinopriL (PRINIVIL) tablet 10 mg  10 mg oral Daily BrunildaViktoria CRNP   10 mg at 06/03/25 0905    losartan (COZAAR) tablet 100 mg  100 mg oral Daily Cristina Geiger PA C   100 mg at 06/03/25 0905    metFORMIN (GLUCOPHAGE) tablet 500 mg  500 mg oral BID with breakfast and dinner Viktoria Worley CRNP   500 mg at 06/03/25 0904    metoprolol tartrate (LOPRESSOR) tablet 50 mg  50 mg oral BID Cristina Geiger PA C   50 mg at 06/03/25 0904    netarsudiL-latanoprost 0.02-0.005 % drops 1 drop  1 drop Both Eyes Nightly Cristina Geiger PA C   1 drop at 06/02/25 2235    niCARdipine (CARDENE) 25 mg/250 mL (0.1 mg/mL) in NSS  0-15 mg/hr intravenous Titrated Cristina Geiger PA C 75 mL/hr at 06/03/25 0906 7.5 mg/hr at 06/03/25 0906    ondansetron ODT (ZOFRAN-ODT) disintegrating tablet 4 mg  4 mg oral q8h PRN Cristina Geiger PA C        polyethylene glycol (MIRALAX) 17 gram packet 17 g  17 g oral Daily Cristina Geiger PA C   17 g at 06/03/25 0903    tamsulosin (FLOMAX) 24 hr ER capsule 0.4 mg  0.4 mg oral Nightly Cristina Geiger PA C   0.4 mg at 06/02/25 2135    timolol (TIMOPTIC) 0.5 % ophthalmic solution 1 drop  1 drop Both Eyes Daily Cristina Geiger PA C   1 drop at 06/03/25 0909       Diagnostics  Study Date Result   X Ray thoracic 5/31/25 Thoracic spondylosis and hyperostosis.  No acute osseous  abnormality.      Chest Xray 5/31/25 No active disease is seen in the chest.        CTH w/o IV contrast 5/31/25 No evidence of acute intracranial abnormality.  2.  Acute unstable multicolumn fracture of the cervical spine through the C7  vertebral body with associated widening of the C7-T1 facet joint on the right  and widening of the interspinous ligament at C7-T1. This is consistent with an  unstable/multicolumn fracture of the cervical spine and MRI is  recommended for  further evaluation.   CT thoracic w/o IV contrast 6/1/25 . No acute thoracic spine fracture or traumatic malalignment. MRI remains  recommended for evaluation of the C7 fracture and C7-T1 articulations.  2. Posterior osteophytosis at T8-T9 causes at least moderate osseous narrowing  of the central spinal canal.  3. Mild CHF with trace bilateral pleural effusions and overlying subsegmental  atelectasis, not well evaluated due to respiratory motion artifact. Recommend  outpatient chest CT in 2-4 weeks.     Current Plan of Care at Referring Hospital  6/4/25 - NEUROSURGERY - Suly Mckeon PA-C    A/P:  Neurologically stable and symptomatically improved after ORIF of C7 frx and C3-T3 fusion.       - DVT proph with SQH, SCDs and mobilization    - PT/OT    - Continue current multimodality pain regimen    - Xrays show stable placement of hardware    - ADAT     - Aspen collar when in chair or ambulating    - Drains removed. May shower and wash incision     - Sutures to be removed in 7-10 days    6/4/25 - TRAUMA - Orlando Alvarez MD  75 y.o. y/o male s/p fall.   Fall  Cervical spine fracture - prior cervical laminectomy. He is s/p ORIF, C7-T1 laminectomy/EDH evacuation/fusion/removal and replacement of instrumentation. Drain mgmt per neurosurgery.   Constipation - no abdominal pain or tenderness whatsoever. Abdomen is very soft. Continue bowel regimen - try suppository today.   Regular diet.   OOB/ambulate.   Laboratory evaluation reviewed to time of note - WBC 11.85 from 12.07, Hgb 11.3 from 11.4, stable mild hyponatremia at 134, hypokalemia at 3.3 (replete), BUN/Cr 22/1.1.   VTE prophylaxis - heparin SQ.   PT/OT      Follow-Up Appointments  7/3/25  New Patient Appointment  10:25 AM  (40 min.)  Juan Ramon Abad MD  [facility]  Neurosurgery at [facility]

## 2025-06-03 NOTE — PROGRESS NOTES
Occupational Therapy -  Daily Treatment/Progress Note     Patient: Aneesh Brito  Location: [facility]  MRN: [id number]  Today's date: 6/3/2025    HISTORY OF PRESENT ILLNESS     Aneesh is a 75 y.o. male admitted on 5/31/2025 with Traumatic closed fracture of C7 vertebra with minimal displacement, initial encounter (CMS/[facility]) [S12.600A]. Principal problem is Traumatic closed fracture of C7 vertebra with minimal displacement, initial encounter (CMS/[facility]).    Past Medical History  Aneesh has a past medical history of Colonic adenoma, Diverticulosis of colon, Enlarged prostate with lower urinary tract symptoms (LUTS), Glaucoma, Hypertension, Melanoma of skin (CMS/[facility]), Osteoarthritis of knee, Overweight, Spinal stenosis of lumbar region, and Type 2 diabetes mellitus (CMS/[facility]).    History of Present Illness   75 y.o. male with DISH who is had a prior C3-C6 laminectomy and posterolateral instrumented fusion over 10 years ago by Dr. Maurer.  He suffered a fall yesterday and was not able to get up on his own and spent the night on the floor.  He was brought to [place] transferred to  for surgery.    He is surgically fused from C3-C6 but he is autofused from C3 down to T10 . He then has a 3 column Chance fracture at the C7-T1 level.   CT head/neck: IMPRESSION:  1.  No evidence of acute intracranial abnormality.  2.  Acute unstable multicolumn fracture of the cervical spine through the C7  vertebral body with associated widening of the C7-T1 facet joint on the right  and widening of the interspinous ligament at C7-T1. This is consistent with an  unstable/multicolumn fracture of the cervical spine and MRI is recommended for  further evaluation    6/1:ORIF C7 fracture 2) removal and replacement of C3-6 posterior intrumentation 3) C3 to T3 posterolaterral instrumented fusion  Depuy + removal set [facility] C-arm O-Arm     PRIOR LEVEL OF FUNCTION AND LIVING ENVIRONMENT     Prior Level of Function       Flowsheet Row Most Recent Value   Dominant Hand left   Ambulation assistive equipment   Transferring assistive equipment   Toileting independent   Bathing independent   Dressing independent   Eating independent   IADLs independent   Driving/Transportation    Prior Level of Function Comment Pt reports being independent with use of SPC inside and Rollator to the dining jenkins. Independent ADLs. Denies other falls. Independent ADLs. (+) driving. Retired teacher   Assistive Device Currently Used at Home grab bar, shower chair, cane, straight  [rollator]        Prior Living Environment      Flowsheet Row Most Recent Value   People in Home spouse, facility resident   Current Living Arrangements independent living facility   Home Accessibility wheelchair accessible   Living Environment Comment Pt reports living at [facility] with his spouse. WIS with SC/GB     Occupational Profile      Flowsheet Row Most Recent Value   Successful Occupations retired    Occupational History/Life Experiences lives at [facility]       VITALS AND PAIN     OT Vitals      Date/Time Pulse SpO2 Pt Activity O2 Therapy O2 Del Method O2 Flow Rate BP MAP BP Method Pt Position [facility]   06/03/25 1130 58 93 % At rest Supplemental oxygen Nasal cannula 3 L/min 129/71 -- Automatic Lying    06/03/25 1140 66 90 % -- -- -- -- 132/71 93 mmHg -- -- DM   06/03/25 1140 -- -- -- -- -- -- -- -- Automatic Sitting    06/03/25 1147 66 -- -- -- -- -- 148/70 -- Automatic Sitting DD          OT Pain      Date/Time Pain Type Side/Orientation Rating: Rest Rating: Activity Interventions [facility]   06/03/25 1130 Pain Assessment neck 1 3 care clustered DD               Objective     OBJECTIVE     Start time:  1127  End time:  1148  Session Length: 21 min  Mode of Treatment: co-treatment  Reason for co-treatment: Ax2    General Observations  Patient received upright, in bed. He was no issues or concerns identified by nurse prior  to session, agreeable to therapy. x2 TYRESE drains, daughter bedside, [place] at bedside    Precautions: fall, brace worn when out of bed, spinal, other (see comments), head of bed elevated 30 degrees (aspen OOB, soft collar comfort)              OT Eval and Treat - 06/03/25 1127          Cognition    Orientation Status oriented x 3     Affect/Mental Status WFL     Follows Commands WFL;over 90% accuracy     Cognitive Function WFL     Comment, Cognition cooperative and motivated to participate        Bed Mobility    Bed Mobility Activities supine to sit;sit to supine     Esmeralda moderate assist (50-74% patient effort);2 person assist     Safety/Cues sequencing;technique;hand placement     Assistive Device head of bed elevated;bed rails     Comment via log roll. aspen donned prior to OOB        Mobility Belt    Mobility Belt Used During Session yes        Sit/Stand Transfer    Surface chair with arm rests;edge of bed     Esmeralda moderate assist (50-74% patient effort);2 person assist     Safety/Cues sequencing;proper use of assistive device;hand placement     Assistive Device walker, front-wheeled     Transfer Comments MOD A x2 from bed, MOD A x1 frm chair  slow transitions        Surface-to-Surface Transfers    Transfer Location bed to chair     Transfer Technique stand step     Esmeralda moderate assist (50-74% patient effort);2 person assist     Safety/Cues proper use of assistive device;hand placement     Assistive Device walker, front-wheeled        Functional Mobility    Distance in room/bathroom     Functional Mobility Esmeralda moderate assist (50-74% patient effort);2 person assist     Safety/Cues technique;hand placement;proper use of assistive device     Assistive Device walker, front-wheeled     Functional Mobility Comments +chair follow        Lower Body Dressing    Tasks don;socks     Esmeralda dependent (less than 25% patient effort)     Position supported sitting        Self-Feeding     Tasks liquids to mouth     [place] supervision;set up     Position supported sitting        Balance    Static Sitting Balance mild impairment;sitting, edge of bed     Dynamic Sitting Balance mild impairment;sitting, edge of bed     Sit to Stand Dynamic Balance moderate impairment;supported     Static Standing Balance moderate impairment;supported     Dynamic Standing Balance moderate impairment;supported     Comment, Balance w/ rw        Impairments/Safety Issues    Impairments Affecting Function balance;endurance/activity tolerance;strength;pain;range of motion (ROM);postural/trunk control     Functional Endurance fair 4L O2 with rest breaks     Comment, Safety Issues/Impairments chair follow d/t early fatigue                                         Session Outcome  Patient upright, in chair at end of session, chair alarm on, personal items in reach. Nursing notified about patient's performance and patient's position.    AM-PAC™ - ADL (Current Function)     Putting on/taking off regular lower body clothing 2 - A Lot   Bathing 2 - A Lot   Toileting 2 - A Lot   Putting on/taking off regular upper body clothing 3 - A Little   Help for taking care of personal grooming 3 - A Little   Eating meals 3 - A Little   AM-PAC™ ADL Score 15          ASSESSMENT AND PLAN     Progress Summary  OT f/u tx complete. [facility] 15. Pt progressing w transfers to chair MOD A x2, STS from chair MOD  Ax1. Pt is MOD A x2 w/ chair follow for short household distance mobilty, Pt able to complete feeding w set up. Pt is DEP for LBD. Pt would benefit from cont OT to incr indep w/ ADL and transfer as pt cont to have deficits in balance, strength, endurance, and ROM.    Patient/Family Therapy Goal Statement: home    OT Plan      Flowsheet Row Most Recent Value   Rehab Potential good, to achieve stated therapy goals at 06/02/2025 1040   Therapy Frequency 4 times/wk at 06/02/2025 1040   Planned Therapy Interventions neuromuscular  control/coordination retraining, patient/caregiver education/training, transfer/mobility retraining, activity tolerance training, adaptive equipment training, BADL retraining, ROM/therapeutic exercise, occupation/activity based interventions at 06/02/2025 1040       OT Discharge Recommendations      Flowsheet Row Most Recent Value   OT Recommended Discharge Disposition acute rehab/Inpatient Rehab Facility at 06/02/2025 1040   Anticipated Equipment Needs if Discharged Home (OT) none at 06/02/2025 1040            OT Goals      Flowsheet Row Most Recent Value   Bed Mobility Goal 1    Activity/Assistive Device bed mobility activities, all at 06/02/2025 1040   [place] modified independence at 06/02/2025 1040   Time Frame by discharge at 06/02/2025 1040   Progress/Outcome goal ongoing at 06/02/2025 1040   Transfer Goal 1    Activity/Assistive Device sit-to-stand/stand-to-sit, bed-to-chair/chair-to-bed, toilet at 06/02/2025 1040   [place] modified independence at 06/02/2025 1040   Time Frame by discharge at 06/02/2025 1040   Progress/Outcome goal ongoing at 06/02/2025 1040   Dressing Goal 1    Activity/Adaptive Equipment dressing skills, all at 06/02/2025 1040   [place] modified independence at 06/02/2025 1040   Time Frame by discharge at 06/02/2025 1040   Progress/Outcome goal ongoing at 06/02/2025 1040   Toileting Goal 1    Activity/Assistive Device toileting skills, all at 06/02/2025 1040   [place] modified independence at 06/02/2025 1040   Time Frame by discharge at 06/02/2025 1040   Progress/Outcome goal ongoing at 06/02/2025 1040   Grooming Goal 1    Activity/Assistive Device grooming skills, all at 06/02/2025 1040   [place] modified independence at 06/02/2025 1040   Time Frame by discharge at 06/02/2025 1040   Progress/Outcome goal ongoing at 06/02/2025 1040

## 2025-06-03 NOTE — PROGRESS NOTES
Physical Therapy -  Daily Treatment/Progress Note     Patient: Aneesh Brito  Location: [facility]  MRN: [id number]  Today's date: 6/3/2025    HISTORY OF PRESENT ILLNESS     Aneesh is a 75 y.o. male admitted on 5/31/2025 with Traumatic closed fracture of C7 vertebra with minimal displacement, initial encounter (CMS/[facility]) [S12.600A]. Principal problem is Traumatic closed fracture of C7 vertebra with minimal displacement, initial encounter (CMS/[facility]).    Past Medical History  Aneesh has a past medical history of Colonic adenoma, Diverticulosis of colon, Enlarged prostate with lower urinary tract symptoms (LUTS), Glaucoma, Hypertension, Melanoma of skin (CMS/[facility]), Osteoarthritis of knee, Overweight, Spinal stenosis of lumbar region, and Type 2 diabetes mellitus (CMS/[facility]).    History of Present Illness   75 y.o. male with DISH who is had a prior C3-C6 laminectomy and posterolateral instrumented fusion over 10 years ago by Dr. Maurer.  He suffered a fall yesterday and was not able to get up on his own and spent the night on the floor.  He was brought to [place] transferred to  for surgery.    He is surgically fused from C3-C6 but he is autofused from C3 down to T10 . He then has a 3 column Chance fracture at the C7-T1 level.   CT head/neck: IMPRESSION:  1.  No evidence of acute intracranial abnormality.  2.  Acute unstable multicolumn fracture of the cervical spine through the C7  vertebral body with associated widening of the C7-T1 facet joint on the right  and widening of the interspinous ligament at C7-T1. This is consistent with an  unstable/multicolumn fracture of the cervical spine and MRI is recommended for  further evaluation    6/1:ORIF C7 fracture 2) removal and replacement of C3-6 posterior intrumentation 3) C3 to T3 posterolaterral instrumented fusion  Depuy + removal set SMA C-arm O-Arm     PRIOR LEVEL OF FUNCTION AND LIVING ENVIRONMENT     Prior Level of Function       Flowsheet Row Most Recent Value   Dominant Hand left   Ambulation assistive equipment   Transferring assistive equipment   Toileting independent   Bathing independent   Dressing independent   Eating independent   IADLs independent   Driving/Transportation    Prior Level of Function Comment Pt reports being independent with use of SPC inside and Rollator to the dining jenkins. Independent ADLs. Denies other falls. Independent ADLs. (+) driving. Retired teacher   Assistive Device Currently Used at Home grab bar, shower chair, cane, straight  [rollator]        Prior Living Environment      Flowsheet Row Most Recent Value   People in Home spouse, facility resident   Current Living Arrangements independent living facility   Home Accessibility wheelchair accessible   Living Environment Comment Pt reports living at [facility] with his spouse. WIS with SC/GB       VITALS AND PAIN     PT Vitals      Date/Time Pulse SpO2 Pt Activity O2 Therapy O2 Del Method O2 Flow Rate BP MAP BP Method Pt Position [facility]   06/03/25 1130 58 93 % At rest Supplemental oxygen Nasal cannula 3 L/min 129/71 -- Automatic Lying DD   06/03/25 1140 66 90 % -- -- -- -- 132/71 93 mmHg -- -- DM   06/03/25 1140 -- -- -- -- -- -- -- -- Automatic Sitting DD   06/03/25 1147 66 -- -- -- -- -- 148/70 -- Automatic Sitting DD          PT Pain      Date/Time Pain Type Location Rating: Rest Rating: Activity Interventions [facility]   06/03/25 1130 Pain Assessment neck 1 3 care clustered DD               Objective     OBJECTIVE     Start time:  1128  End time:  1148  Session Length: 20 min  Mode of Treatment: co-treatment  Reason for co-treatment: skilled Ax2, maximize outcomes    General Observations  Patient received in bed. He was agreeable to therapy, no issues or concerns identified by nurse prior to session. daughter present, [place] at bedside, + TYRESE drains    Precautions: fall, brace worn when out of bed, spinal, other (see comments), head of  bed elevated 30 degrees ([place] when OOB, soft collar for comfort in bed)              PT Eval and Treat - 06/03/25 1128          Cognition    Orientation Status oriented x 3     Affect/Mental Status WFL     Follows Commands follows one-step commands;verbal cues/prompting required     Comment, Cognition pleasant, cooperative, receptive to cues and education.        Bed Mobility    Bed Mobility Activities supine to sit;left     [place] moderate assist (50-74% patient effort);2 person assist     Safety/Cues technique;sequencing;hand placement     Assistive Device head of bed elevated;bed rails     Comment Aspen donned prior to mobility. pt was able to complete supine to sitting via log roll technique. requires cues for hand placement and assist to bring trunk into upright seated posture.        Mobility Belt    Mobility Belt Used During Session yes        Sit/Stand Transfer    Surface edge of bed;chair with arm rests     [place] moderate assist (50-74% patient effort);2 person assist;1 person assist     Safety/Cues technique;sequencing;hand placement     Assistive Device walker, front-wheeled     Transfer Comments mod Ax2 to stand from EOB, mod Ax1 to stand from chair. demonstrates good LE strength to participate.        Gait Training    [place], Gait moderate assist (50-74% patient effort);2 person assist     Safety/Cues technique;sequencing     Assistive Device walker, front-wheeled     Distance in Feet 4 feet   + 3 ft    Pattern step-to     Deviations/Abnormal Patterns gait speed decreased;step length decreased;stride length decreased;festinating/shuffling     Comment pt demonstrates decreased BLE clearance when attempting to take steps in room. requires mod Ax2 and cues for sequence when taking steps from bed to chair with RW. pt was then able to trial steps forward with mod Ax2 and RW + chair follow. demonstrates increased posterior lean and decreased step length, limiting further distance.         Balance    Static Sitting Balance mild impairment;sitting, edge of bed     Dynamic Sitting Balance mild impairment;sitting, edge of bed     Sit to Stand Dynamic Balance moderate impairment;supported     Static Standing Balance moderate impairment;supported     Dynamic Standing Balance moderate impairment;supported     Comment, Balance supported = RW        Impairments/Safety Issues    Impairments Affecting Function balance;endurance/activity tolerance;strength;pain;range of motion (ROM);postural/trunk control     Functional Endurance SpO2 89-92% on 3 L supplemental O2. benefits from rest beaks.                                              Education Documentation  Joint Mobility/Strength, taught by Aaliyah Srivastava, PT at 6/3/2025 12:21 PM.  Learner: Patient  Readiness: Acceptance  Method: Explanation  Response: Verbalizes Understanding  Comment: role of acute PT, PT POC, spinal precautions, bed mobility, transfers, safety, rehab at discharge, use of RW, posture, use of call bell    Assistive/Adaptive Devices, taught by Aaliyah Srivastava, PT at 6/3/2025 12:21 PM.  Learner: Patient  Readiness: Acceptance  Method: Explanation  Response: Verbalizes Understanding  Comment: role of acute PT, PT POC, spinal precautions, bed mobility, transfers, safety, rehab at discharge, use of RW, posture, use of call bell        Session Outcome  Patient in chair at end of session, chair alarm on, personal items in reach, call light in reach, all needs met. Nursing notified about patient's performance, patient's position, and patient's response to therapy/activity.    AM-PAC™ - Mobility (Current Function)     Turning form your back to your side while in flat bed without using bedrails 3 - A Little   Moving from lying on your back to sitting on the side of a flat bed without using bedrails 2 - A Lot   Moving to and from a bed to a chair 2 - A Lot   Standing up from a chair using your arms 2 - A Lot   To walk in a hospital  room 2 - A Lot   Climbing 3-5 steps with a railing 1 - Total   AM-PAC™ Mobility Score 12          ASSESSMENT AND PLAN     Progress Summary  PT treat completed. pt requires mod Ax2 for bed mobility, mod Ax1-2 for sit <> stand transfers, and mod Ax2 for short distance ambulation with RW and chair follow. pt is limited by deficits in balance, endurance, and strength limiting functional mobility and independence. demonstrates posterior lean and decreased BLE clearance during ambulation requiring cues to correct. recommend continued skilled PT services to progress mobility and maximize safety. continue to recommend acute rehab at discharge. [facility] 12/24.    Patient/Family Therapy Goals Statement: get better    PT Plan      Flowsheet Row Most Recent Value   Rehab Potential good, to achieve stated therapy goals at 06/02/2025 1038   Therapy Frequency 4 times/wk at 06/02/2025 1038   Planned Therapy Interventions balance training, bed mobility training, gait training, patient/family education, strengthening, transfer training at 06/02/2025 1038       PT Discharge Recommendations      Flowsheet Row Most Recent Value   PT Recommended Discharge Disposition acute rehab/Inpatient Rehab Facility at 06/03/2025 1128   Anticipated Equipment Needs if Discharged Home (PT) walker, front-wheeled, wheelchair, commode, 3-in-1 at 06/02/2025 1038            PT Goals      Flowsheet Row Most Recent Value   Bed Mobility Goal 1    Activity/Assistive Device bed mobility activities, all at 06/02/2025 1038   [place] modified independence at 06/02/2025 1038   Time Frame by discharge at 06/02/2025 1038   Progress/Outcome goal ongoing at 06/02/2025 1038   Transfer Goal 1    Activity/Assistive Device sit-to-stand/stand-to-sit, bed-to-chair/chair-to-bed, walker, front-wheeled at 06/02/2025 1038   [place] modified independence at 06/02/2025 1038   Time Frame by discharge at 06/02/2025 1038   Progress/Outcome goal ongoing at 06/02/2025 1038   Gait  Training Goal 1    Activity/Assistive Device gait (walking locomotion), assistive device use, walker, front-wheeled at 06/02/2025 1038   [place] modified independence at 06/02/2025 1038   Distance 100 at 06/02/2025 1038   Time Frame by discharge at 06/02/2025 1038   Progress/Outcome goal ongoing at 06/02/2025 1038

## 2025-06-03 NOTE — SUBJECTIVE & OBJECTIVE
"Daily Progress Note    Subjective     Interval History: none.       Objective     Vital signs in last 24 hours:  Temp:  [36.3 °C (97.4 °F)-37.1 °C (98.8 °F)] 37.1 °C (98.8 °F)  Heart Rate:  [56-79] 78  Resp:  [12-32] 20  BP: (112-167)/(7-92) 152/73      Intake/Output Summary (Last 24 hours) at 6/3/2025 0757  Last data filed at 6/3/2025 0536  Gross per 24 hour   Intake 1494.17 ml   Output 425 ml   Net 1069.17 ml     Intake/Output this shift:  No intake/output data recorded.    Labs    I have reviewed the patients labs until the time of note; no clinical concerns    Imaging  I have indepedently reviewed patient's imaging; any concerning findings adressed below    VTE Assessment  Increased risk from baseline; VTE Prophylaxis as ordered      Physical Exam:  Visit Vitals  BP (!) 152/73 (BP Location: Left upper arm, Patient Position: Lying)   Pulse 78   Temp 37.1 °C (98.8 °F) (Oral)   Resp 20   Ht 1.803 m (5' 11\")   Wt 106 kg (234 lb)   SpO2 94%   BMI 32.64 kg/m²       General Appearance:    Alert, cooperative, no distress, appears stated age   Head:    Normocephalic, without obvious abnormality, atraumatic   Eyes:    PERRL, conjunctiva/corneas clear, EOM's intact,       Ears:    Normal TM's and external ear canals, both ears   Nose:   Nares normal, septum midline, mucosa normal, no drainage    or sinus   tenderness   Throat:   Lips, mucosa, and tongue normal; teeth and gums normal   Neck:   Supple, symmetrical, trachea midline, no adenopathy;        thyroid:  No enlargement/tenderness/nodules; no carotid    bruit or JVD   Back:     Symmetric, no curvature, ROM normal, no CVA tenderness   Lungs:     Clear to auscultation bilaterally, respirations unlabored   Chest wall:    No tenderness or deformity   Heart:    Regular rate and rhythm, S1 and S2 normal, no murmur, rub   or gallop   Abdomen:     Soft, non-tender, bowel sounds active all four quadrants,     no masses, no organomegaly          "   Extremities:  Musculoskeletal:   Extremities normal, atraumatic, no cyanosis or edema    No injury or deformity   Pulses:   2+ and symmetric all extremities   Skin:   Skin color, texture, turgor normal, no rashes or lesions   Lymph nodes:   Cervical, supraclavicular, and axillary nodes normal   Neurologic:    Behavior/  Emotional:   CNII-XII intact. Normal strength, sensation and reflexes       throughout    Appropriate, cooperative

## 2025-06-03 NOTE — PROGRESS NOTES
Daily Neurosurgery Progress Note     No overnight events.  Overall feels fairly well. Reports he had a good night sleep. His incisional pain is well controlled. No new radicular pain, paresthesias or changes in function.      Temp:  [36.6 °C (97.8 °F)-37.1 °C (98.8 °F)] 36.9 °C (98.4 °F)  Heart Rate:  [56-79] 72  Resp:  [14-32] 20  BP: (112-167)/(7-92) 152/73  SpO2:  [89 %-95 %] 94 %  Oxygen Therapy: Supplemental oxygen  O2 Delivery Method: Nasal cannula  O2 Flow Rate (L/min):  [2 L/min-3 L/min] 3 L/min     Intake/Output                   06/02/25 0700 - 06/03/25 0659     1588-8756 7882-8048 Total              Intake    P.O.  720  -- 720    I.V.  664.2  10 674.2    I.V. 10 10 20    Volume (mL) Nicardipine 509.2 -- 509.2    Volume (mL) (lactated ringer's infusion) 145 -- 145    IV Piggyback  100  -- 100    Total Intake 1484.2 10 1494.2       Output    Urine  250  600 850    Drains  50  25 75    Output (mL) (Drain Left;Posterior Neck 10 Fr.) 30 10 40    Output (mL) (Drain Right;Posterior Neck 10 Fr.) 20 15 35    Total Output 300 625 925       Net I/O     1184.2 -615 569.2            Drains: TYRESE  drain in place x 2 with 75 cc total, 40/35     General: lying supine in bed in no acute distress    Neuro: A&O x 3, interacting appr with fluent speech  Cn: EOMI, no facial asymmetry, tongue midline  Motor: 5/5 throughout all 4 ext without pronator drift  Sensation: Intact and equal to light touch all 4 ext    Incision: clean, dry, intact, without erythema or drainage     Scheduled Meds:   acetaminophen  650 mg oral q4h KOFI    atorvastatin  20 mg oral Daily    felodipine  10 mg oral Daily    finasteride  5 mg oral Daily    heparin (porcine)  5,000 Units subcutaneous q8h KOFI    insulin lispro U-100  2-18 Units subcutaneous q6h KOFI    lisinopriL  10 mg oral Daily    losartan  100 mg oral Daily    metFORMIN  500 mg oral BID with breakfast and dinner    metoprolol tartrate  50 mg oral BID    netarsudiL-latanoprost  1 drop Both Eyes  Nightly    polyethylene glycol  17 g oral Daily    tamsulosin  0.4 mg oral Nightly    timolol  1 drop Both Eyes Daily     Continuous Infusions:   niCARdipine  0-15 mg/hr 5 mg/hr (06/03/25 0600)     PRN Meds:.  glucose **OR** dextrose **OR** glucagon **OR** dextrose 50 % in water (D50)    oxyCODONE **AND** HYDROmorphone    ondansetron ODT    CBC Results         06/02/25 06/02/25 06/01/25     1336 0523 1955    WBC 17.41 12.58 15.64    RBC 3.66 3.51 3.95    HGB 11.8 11.6 12.9    HCT 34.7 33.2 37.3    MCV 94.8 94.6 94.4    MCH 32.2 33.0 32.7    MCHC 34.0 34.9 34.6     206 247          BMP Results         06/02/25 06/01/25 06/01/25     0523 1955 0602     137 136    K 3.2 3.2 3.6    Cl 106 103 102    CO2 23 23 27    Glucose 169 192 137    BUN 23 23 23    Creatinine 1.2 1.5 1.5    Calcium 7.7 8.1 8.4    Anion Gap 8 11 7    EGFR >60.0 47.7 47.7           Comment for EGFR at 0523 on 06/02/25: Calculation based on the Chronic Kidney Disease Epidemiology Collaboration (CKD-EPI) equation refit without adjustment for race.    Comment for EGFR at 1955 on 06/01/25: Calculation based on the Chronic Kidney Disease Epidemiology Collaboration (CKD-EPI) equation refit without adjustment for race.    Comment for EGFR at 0602 on 06/01/25: Calculation based on the Chronic Kidney Disease Epidemiology Collaboration (CKD-EPI) equation refit without adjustment for race.          Results from last 7 days   Lab Units 06/01/25  1117   INR  1.2   PTT sec 27       Microbiology Results       ** No results found for the last 720 hours. **            Assessment & Plan:  A/P:  Neurologically stable and symptomatically improved after ORIF and C3-T3 fusion.    - DVT proph with SQH, SCDs and mobilization    - PT/OT    - Continue current multimodality pain regimen    - Xrays today    - ADAT     - Aspen collar when in chair or ambulating.    - Please call neurosurgery with questions, concerns    Case discussed with attending who agrees with  above assessment and plan.    Arleth Phillip PA-C   [id number]

## 2025-06-03 NOTE — CONSULTS
Physical Medicine and Rehabilitation Consult Note    Subjective     Aneesh Brito is a 75 y.o. male who was admitted for Traumatic closed fracture of C7 vertebra with minimal displacement, initial encounter (CMS/[facility]) [S12.600A]. Patient was referred by Dr Bates for evaluate rehab needs. Patient is a 75-year-old male with a history of colon carcinoma, BPH, hypertension, glaucoma, type 2 diabetes, history of a cervical fusion over 10 years ago admitted after a fall inability to get up on his own.  He was seen at [facility] where imaging studies were performed which demonstrated DISH as well as a Chance fracture at the bottom of C7.  He was transferred to [facility] for neurosurgery.  He was taken to the OR on 6/1 by Dr. Benjamin and underwent an ORIF of the C7 Chance fracture with removal and replacement of the C3-6 posterior instrumentation as well as a C3-T3 posterior lateral fusion.  Drains remain in place.  He is on subcu heparin for DVT prophylaxis.  Currently on a nicardipine drip for blood pressure control.  He states pain is well-controlled.  He denies any other complaints of headache or dizziness, chest pain or shortness of breath.  He is voiding on his own.  He has not had a bowel movement yet.  He was seen by PT and OT needing moderate assistance of 2 to transfer sit to stand.  He ambulated 2 feet with moderate assistance of 2 using a rolling walker.  Pertinent radiology results reviewed. Pertinent lab results reviewed..    Medical History:   Past Medical History:   Diagnosis Date    Colonic adenoma     Diverticulosis of colon     Enlarged prostate with lower urinary tract symptoms (LUTS)     Glaucoma     Hypertension     Melanoma of skin (CMS/[facility])     Osteoarthritis of knee     Overweight     Spinal stenosis of lumbar region     Type 2 diabetes mellitus (CMS/[facility])        Surgical History:   Past Surgical History   Procedure Laterality Date    1) ORIF C7 fracture 2) removal and replacement of  C3-6 posterior intrumentation 3) C3 to T3 posterolaterral instrumented fusion  Depuy + removal set SMA C-arm O-Arm N/A 6/1/2025    Performed by Raza Benjamin MD at  OR    Appendectomy      Cervical fusion  06/01/2025    Colonoscopy  01/10/2012    diverticulosis    Colonoscopy w/ polypectomy  02/09/2022    Hip arthroplasty Bilateral     Knee arthroplasty Left     Knee arthroplasty Right 02/02/2022       Social History:   Social History   He lives with his wife in an independent living facility.  He was ambulatory with a cane for short distances and uses a rollator for long distances.  He is independent with ADLs.  He is able to operate a motor vehicle.  Social History Narrative    Not on file     Lives with:    Prior Function Level: Prior Level of Function  Dominant Hand: left  Ambulation: assistive equipment  Transferring: assistive equipment  Toileting: independent  Bathing: independent  Dressing: independent  Eating: independent  IADLs: independent  Driving/Transportation:   History of Falls: Fall with injury in the past year  Prior Level of Function Comment: Pt reports being independent with use of SPC inside and Rollator to the dining jenkins. Independent ADLs. Denies other falls. Independent ADLs. (+) driving. Retired teacher  Family History:   Family History   Problem Relation Name Age of Onset    Breast cancer Biological Mother       History also provided by:   Allergies: No known allergies    Current Medications[1]     Medication List        ASK your doctor about these medications      atorvastatin 20 mg tablet  Commonly known as: LIPITOR  Take 1 tablet (20 mg total) by mouth daily.  Dose: 20 mg     felodipine 10 mg 24 hr tablet  Commonly known as: PLENDIL  Take 1 tablet (10 mg total) by mouth daily.  Dose: 10 mg     finasteride 5 mg tablet  Commonly known as: PROSCAR  TAKE ONE TABLET BY MOUTH ONE TIME DAILY  Dose: 5 mg     losartan 100 mg tablet  Commonly known as: COZAAR  Take 1  "tablet (100 mg total) by mouth daily.  Dose: 100 mg     metFORMIN 500 mg tablet  Commonly known as: GLUCOPHAGE  Take 1 tablet (500 mg total) by mouth 2 (two) times a day with meals.  Dose: 500 mg     metoprolol tartrate 50 mg tablet  Commonly known as: LOPRESSOR  Take 1 tablet (50 mg total) by mouth 2 (two) times a day.  Dose: 50 mg     ROCKLATAN 0.02-0.005 % drops  Administer 1 drop into both eyes nightly. Wait a few minutes in between eye drops  Dose: 1 drop  Generic drug: netarsudiL-latanoprost     silodosin 8 mg capsule  Commonly known as: RAPAFLO  Take 1 capsule (8 mg total) by mouth daily with breakfast.  Dose: 8 mg     timolol 0.5 % ophthalmic solution  Commonly known as: TIMOPTIC  1 drop daily.  Dose: 1 drop            Review of Systems  Pertinent items are noted in HPI.    Objective   Labs  I have reviewed the patient's labs.  Significant abnormals are WBCs are 17.41, potassium is 3.2.    Imaging  Not applicable    Telemetry:   I have independently reviewed the patient's telemtry. All telemetry are within normal limits.    Physical Exam  Visit Vitals  BP (!) 147/66 (BP Location: Left upper arm, Patient Position: Lying)   Pulse 90   Temp 37 °C (98.6 °F) (Oral)   Resp 20   Ht 1.803 m (5' 11\")   Wt 106 kg (234 lb)   SpO2 (!) 91%   BMI 32.64 kg/m²     General appearance: well-developed, well-nourished, pleasant, and cooperative  Neck: Drain in place  Lungs: clear to auscultation bilaterally  Heart: regular rate and rhythm, S1, S2 normal, no murmur, click, rub or gallop  Abdomen: soft, non-tender, bowel sounds normal, no masses/no organomegaly, and obese  Extremities: Lower extremity edema  Neurologic: motor:no focal weakness        Assessment   75 y.o. male being consulted for evaluate rehab needs  Plan of care was discussed with patient and            Plan     Mr. Brito is a 75-year-old male with an ADL and ambulatory dysfunction admitted following a fall found to have a C7 Chance fracture " superimposed on DISH.  He is now status post ORIF of C7 with a cervical fusion.  He is on heparin for DVT prophylaxis  Currently requires moderate assistance for for transfers and bed mobility.  He was independent prior to this admission.  Given his current functional deficits and need for close medical monitoring recommendation is referral to acute level inpatient rehab for comprehensive rehab program of 3 hours daily physical and occupational therapies with medical management and oversight by physiatry.  He is at risk for complications including DVT, UTI, wound infection, skin breakdown, change in mental status and therefore will require daily physician evaluations.  Reasonable goals are to work on and improve his mobility including transfers standing and ambulation, assess ADLs and need for adaptive, family training and education and establish a safe discharge plan.  His estimated length of stay is 14 days.  Case management will assist with discharge planning.    The following should be addressed before transition to inpatient rehab:    No surgeries or invasive procedures planned before discharge   No significant radiographic or diagnostic evaluations planned or pending   No IV pain and BP medications     Pending the above criteria are met all nonessential appointments should be scheduled post discharge from [facility] to minimize disruptions to rehab progress.  The clinical team at [facility] can address:    Suture & staple removals   Removal of and management of some TYRESE drains   Incision care   Daily neuro checks            [1]   Current Inpatient Medications   Medication Dose Route Frequency Provider Last Rate Last Admin    acetaminophen (TYLENOL) tablet 650 mg  650 mg oral q4h [facility] Cristina Geiger PA C   650 mg at 06/03/25 0533    atorvastatin (LIPITOR) tablet 20 mg  20 mg oral Daily Cristina Geiger PA C   20 mg at 06/02/25 0824    glucose chewable tablet 15-30 g of dextrose   15-30 g of dextrose oral PRN Cristina Geiger PA C        Or    dextrose 40 % oral gel 15-30 g of dextrose  15-30 g of dextrose oral PRN Cristina Geiger PA C        Or    glucagon (GLUCAGEN) injection 1 mg  1 mg intramuscular PRN Cristina Geiger PA C        Or    dextrose 50 % in water (D50) injection 12.5 g  25 mL intravenous PRN Cristina Geiger PA C        felodipine (PLENDIL) 24 hr ER tablet 10 mg  10 mg oral Daily Cristina Geiger PA C   10 mg at 06/02/25 0823    finasteride (PROSCAR) tablet 5 mg  5 mg oral Daily Cristina Geiger PA C   5 mg at 06/02/25 0824    heparin (porcine) 5,000 unit/mL injection 5,000 Units  5,000 Units subcutaneous q8h [facility] Cristina Geiger PA C   5,000 Units at 06/03/25 0534    oxyCODONE (ROXICODONE) immediate release tablet 5 mg  5 mg oral q4h PRN Cristina Geiger PA C   5 mg at 06/03/25 0352    And    HYDROmorphone (DILAUDID) 0.2 mg/mL injection 0.2 mg  0.2 mg intravenous q4h PRN Cristina Geiger PA C   0.2 mg at 06/01/25 1923    insulin lispro U-100 (HumaLOG) pen 2-18 Units  2-18 Units subcutaneous q6h [facility] Cristina Geiger PA C   4 Units at 06/03/25 0005    lisinopriL (PRINIVIL) tablet 10 mg  10 mg oral Daily Viktoria Worley CRNP        losartan (COZAAR) tablet 100 mg  100 mg oral Daily Cristina Geiger PA C   100 mg at 06/02/25 0825    metFORMIN (GLUCOPHAGE) tablet 500 mg  500 mg oral BID with breakfast and dinner Viktoria Worley CRNP        metoprolol tartrate (LOPRESSOR) tablet 50 mg  50 mg oral BID Cristina Geiger PA C   50 mg at 06/02/25 1942    netarsudiL-latanoprost 0.02-0.005 % drops 1 drop  1 drop Both Eyes Nightly Cristina Geiger PA C   1 drop at 06/02/25 2235    niCARdipine (CARDENE) 25 mg/250 mL (0.1 mg/mL) in NSS  0-15 mg/hr intravenous Titrated Cristina Geiger PA C 50 mL/hr at 06/03/25 0800 5 mg/hr at 06/03/25 0800    ondansetron ODT (ZOFRAN-ODT) disintegrating tablet 4 mg  4 mg  oral q8h PRN Cristina Geiger PA C        polyethylene glycol (MIRALAX) 17 gram packet 17 g  17 g oral Daily Cristina Geiger PA C   17 g at 06/02/25 0823    tamsulosin (FLOMAX) 24 hr ER capsule 0.4 mg  0.4 mg oral Nightly Cristina Geiger PA C   0.4 mg at 06/02/25 2135    timolol (TIMOPTIC) 0.5 % ophthalmic solution 1 drop  1 drop Both Eyes Daily Cristina Geiger PA C   1 drop at 06/02/25 0823

## 2025-06-03 NOTE — PROGRESS NOTES
"Daily Progress Note    Daily Progress Note    Subjective    Interval History: none.       Objective    Vital signs in last 24 hours:  Temp:  [36.3 °C (97.4 °F)-37.1 °C (98.8 °F)] 37.1 °C (98.8 °F)  Heart Rate:  [56-79] 78  Resp:  [12-32] 20  BP: (112-167)/(7-92) 152/73      Intake/Output Summary (Last 24 hours) at 6/3/2025 0757  Last data filed at 6/3/2025 0536  Gross per 24 hour   Intake 1494.17 ml   Output 425 ml   Net 1069.17 ml     Intake/Output this shift:  No intake/output data recorded.    Labs    I have reviewed the patients labs until the time of note; no clinical concerns    Imaging  I have indepedently reviewed patient's imaging; any concerning findings adressed below    VTE Assessment  Increased risk from baseline; VTE Prophylaxis as ordered      Physical Exam:  Visit Vitals  BP (!) 152/73 (BP Location: Left upper arm, Patient Position: Lying)   Pulse 78   Temp 37.1 °C (98.8 °F) (Oral)   Resp 20   Ht 1.803 m (5' 11\")   Wt 106 kg (234 lb)   SpO2 94%   BMI 32.64 kg/m²       General Appearance:    Alert, cooperative, no distress, appears stated age   Head:    Normocephalic, without obvious abnormality, atraumatic   Eyes:    PERRL, conjunctiva/corneas clear, EOM's intact,       Ears:    Normal TM's and external ear canals, both ears   Nose:   Nares normal, septum midline, mucosa normal, no drainage    or sinus   tenderness   Throat:   Lips, mucosa, and tongue normal; teeth and gums normal   Neck:   Supple, symmetrical, trachea midline, no adenopathy;        thyroid:  No enlargement/tenderness/nodules; no carotid    bruit or JVD   Back:     Symmetric, no curvature, ROM normal, no CVA tenderness   Lungs:     Clear to auscultation bilaterally, respirations unlabored   Chest wall:    No tenderness or deformity   Heart:    Regular rate and rhythm, S1 and S2 normal, no murmur, rub   or gallop   Abdomen:     Soft, non-tender, bowel sounds active all four quadrants,     no masses, no organomegaly          "   Extremities:  Musculoskeletal:   Extremities normal, atraumatic, no cyanosis or edema    No injury or deformity   Pulses:   2+ and symmetric all extremities   Skin:   Skin color, texture, turgor normal, no rashes or lesions   Lymph nodes:   Cervical, supraclavicular, and axillary nodes normal   Neurologic:    Behavior/  Emotional:   CNII-XII intact. Normal strength, sensation and reflexes       throughout    Appropriate, cooperative         Assessment & Plan  Assessment & Plan  Traumatic closed fracture of C7 vertebra with minimal displacement, initial encounter (CMS/[facility])  Status post ORIF of his cervical spine.  Drain is still in place.  Rehab has been consulted.  Continue PT OT  Hypertension, benign  Patient still on 5 nicardipine.  His losartan has been restarted    Expected Discharge Date:  6/5/2025

## 2025-06-03 NOTE — DISCHARGE INSTRUCTIONS
Trauma  Discharge Instructions    Patient Active Problem List   Diagnosis    Diverticulosis of colon    Enlarged prostate with lower urinary tract symptoms (LUTS)    Hypertension, benign    Malignant melanoma of skin (CMS/[facility])    Osteoarthritis of knee    Overweight    Spinal stenosis of lumbar region    Chronic kidney disease, stage I    Dry eye syndrome of bilateral lacrimal glands    Primary open-angle glaucoma, bilateral, indeterminate stage    Bilateral pseudophakia    Tear film insufficiency    Pure hypercholesterolemia    Hyponatremia    Aneurysm of iliac artery (CMS/[facility])    Glomerulosclerosis    Generalized weakness    Lumbar degenerative disc disease    Type 2 diabetes mellitus without complications (CMS/[facility])    Traumatic closed fracture of C7 vertebra with minimal displacement, initial encounter (CMS/[facility])    Closed nondisplaced fracture of seventh cervical vertebra (CMS/[facility])       ACTIVITY:     Avoid strenuous activity, no heavy lifting and no straining.     Do not drive or operate heavy machinery until cleared by physician.     You may have a mild headache, mild depression or trouble concentrating or dizziness. If symptoms get worse or last more than 2 weeks call physician.     Walk as much as possible. Your goal should be 150 feet 3 times a day.     Aspen collar to be worn when OOB to chair or ambulating.  No need to wear in bed.  Can wear soft collar for comfort.     Use incentive spirometer as directed 10 times every hour while awake.     DO NOT DRINK ALCOHOL!      DO NOT SMOKE! Smoking delays healing and increases the risk of complications.       MEDICATIONS:    If you were given a prescription for pain medication, take your pain medicine as prescribed with food if possible. You can expect to have pain during the healing process and it will improve.     DO NOT TAKE PAIN OR SEDATIVE MEDICATIONS NOT PRESCRIBED AT YOUR DISCHARGE.    DO NOT DRIVE WHILE TAKING NARCOTIC PAIN MEDICATION      **HOLD ALL  Anticoagulation Medication until cleared by Neurosurgery.**                              **See electronic medication reconciliation form**                 **Please be aware, the TRAUMA team does NOT refill prescriptions without an in-person follow up visit**                 **Routine prescriptions should be resume and refilled by your Primary Care Team, please make the your primary pharmacy aware**    You may not take Motrin, Advil, Ibuprofen until cleared by Neurosurgery.    Tylenol every 4 - 6 hours as needed for pain. DO NOT EXCEED 4000 MG OR 4 GRAMS OF TYLENOL IN 1 DAY.     Some patients find that they have some difficulty with constipation. This is due to  a combination of things but mostly due to the pain medication you are taking. The pain medications, along with a decrease in activity, can sometimes lead to discomfort from constipation. You should eat plenty of fresh fruits, vegetables, drink fruit juices and drink plenty of water.    You may take Colace 1 tablet 2 times a day as need for constipation. DO NOT TAKE IF DIARRHEA DEVELOPS.    You may take Senokot 1 tablet 2 times a day as needed for constipation. DO NOT TAKE IF DIARRHEA DEVELOPS.      DRESSINGS AND WOUND CARE:    Wash wounds using light pressure.    It is important to have a friend or family member check your incision/wound for the first week. Call our office immediately if you develop signs and symptoms of infection, including drainage, redness, swelling and/or fever greater than 101 degrees.     No tub baths, swimming, Jacuzzi tubs, hot tubs or any other activity that would require submersion of your incision/wound in water for 2 weeks.    You may shower. Let the water run over the incision and dab dry.     Do not apply ointments, antibacterial salves, lotions, powders, creams or scar-reducing medications to the incision/wound unless given directed by physician.    Return for the staples/sutures to be removed in 5-7 days from the face or scalp  and 10-14 days from everywhere else.      FOLLOW-UP:    Call Ramiro Messina MD (Trauma) at [phone] (office number) to make an appointment as needed.    Call Raza Benjamin MD (Neurosurgery) at [phone] to make an appointment in 2 weeks.    Call your family doctor in 1-2 weeks for general follow up of your injuries and admission to the hospital.      Incidental Findings:       The following will require follow up with your Primary Care Physician         None    Call your doctor or return to the Emergency Room of you develop any of the following:     Persistent nausea and vomiting   Increasing headache pain   Chest Pain   Shortness of Breath   Drainage from incision or wound   Increased redness around incision or wound   Foul odor from incision or wound   Fever above 101 degrees or chills   Difficulty or inability to urinate   Intense pain not relieved with pain medication   Change in mental status    Difficulty or decreased ability performing activities of daily living

## 2025-06-04 ENCOUNTER — HOSPITAL ENCOUNTER (INPATIENT)
Facility: REHABILITATION | Age: 75
DRG: 561 | End: 2025-06-04
Attending: PHYSICAL MEDICINE & REHABILITATION | Admitting: PHYSICAL MEDICINE & REHABILITATION
Payer: MEDICARE

## 2025-06-04 VITALS
BODY MASS INDEX: 32.76 KG/M2 | WEIGHT: 234 LBS | HEART RATE: 65 BPM | OXYGEN SATURATION: 95 % | RESPIRATION RATE: 19 BRPM | HEIGHT: 71 IN | TEMPERATURE: 98.2 F | DIASTOLIC BLOOD PRESSURE: 61 MMHG | SYSTOLIC BLOOD PRESSURE: 130 MMHG

## 2025-06-04 DIAGNOSIS — M43.23 FUSION OF SPINE OF CERVICOTHORACIC REGION: Primary | ICD-10-CM
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
## 2025-06-04 LAB
ANION GAP SERPL CALC-SCNC: 8 MEQ/L (ref 3–15)
BUN SERPL-MCNC: 22 MG/DL (ref 7–25)
CALCIUM SERPL-MCNC: 8.4 MG/DL (ref 8.6–10.3)
CHLORIDE SERPL-SCNC: 103 MEQ/L (ref 98–107)
CO2 SERPL-SCNC: 23 MEQ/L (ref 21–31)
CREAT SERPL-MCNC: 1.1 MG/DL (ref 0.7–1.3)
EGFRCR SERPLBLD CKD-EPI 2021: >60 ML/MIN/1.73M*2
ERYTHROCYTE [DISTWIDTH] IN BLOOD BY AUTOMATED COUNT: 13.6 % (ref 11.6–14.4)
GLUCOSE BLD-MCNC: 125 MG/DL (ref 70–99)
GLUCOSE BLD-MCNC: 128 MG/DL (ref 70–99)
GLUCOSE BLD-MCNC: 131 MG/DL (ref 70–99)
GLUCOSE BLD-MCNC: 131 MG/DL (ref 70–99)
GLUCOSE BLD-MCNC: 153 MG/DL (ref 70–99)
GLUCOSE BLD-MCNC: 159 MG/DL (ref 70–99)
GLUCOSE BLD-MCNC: 177 MG/DL (ref 70–99)
GLUCOSE SERPL-MCNC: 143 MG/DL (ref 70–99)
HCT VFR BLD AUTO: 33.7 % (ref 40.1–51)
HGB BLD-MCNC: 11.3 G/DL (ref 13.7–17.5)
MAGNESIUM SERPL-MCNC: 1.9 MG/DL (ref 1.8–2.5)
MCH RBC QN AUTO: 31.6 PG (ref 28–33.2)
MCHC RBC AUTO-ENTMCNC: 33.5 G/DL (ref 32.2–36.5)
MCV RBC AUTO: 94.1 FL (ref 83–98)
PHOSPHATE SERPL-MCNC: 3.2 MG/DL (ref 2.4–4.7)
PLATELET # BLD AUTO: 236 K/UL (ref 150–350)
PMV BLD AUTO: 10.5 FL (ref 9.4–12.4)
POCT TEST: ABNORMAL
POTASSIUM SERPL-SCNC: 3.3 MEQ/L (ref 3.5–5.1)
RBC # BLD AUTO: 3.58 M/UL (ref 4.5–5.8)
SODIUM SERPL-SCNC: 134 MEQ/L (ref 136–145)
WBC # BLD AUTO: 11.85 K/UL (ref 3.8–10.5)

## 2025-06-04 PROCEDURE — 63700000 HC SELF-ADMINISTRABLE DRUG: Performed by: INTERNAL MEDICINE

## 2025-06-04 PROCEDURE — 85027 COMPLETE CBC AUTOMATED: CPT

## 2025-06-04 PROCEDURE — 200200 PR NO CHARGE: Performed by: NEUROLOGICAL SURGERY

## 2025-06-04 PROCEDURE — 99238 HOSP IP/OBS DSCHRG MGMT 30/<: CPT | Performed by: PHYSICIAN ASSISTANT

## 2025-06-04 PROCEDURE — 63600000 HC DRUGS/DETAIL CODE

## 2025-06-04 PROCEDURE — 12800001 HC ROOM AND CARE SEMIPRIVATE REHAB-BRAIN INJ

## 2025-06-04 PROCEDURE — 63600000 HC DRUGS/DETAIL CODE: Mod: JZ | Performed by: INTERNAL MEDICINE

## 2025-06-04 PROCEDURE — 84100 ASSAY OF PHOSPHORUS: CPT

## 2025-06-04 PROCEDURE — 99999 PR OFFICE/OUTPT VISIT,PROCEDURE ONLY: CPT | Performed by: PHYSICIAN ASSISTANT

## 2025-06-04 PROCEDURE — 63600000 HC DRUGS/DETAIL CODE: Performed by: PHYSICAL MEDICINE & REHABILITATION

## 2025-06-04 PROCEDURE — 99999 PR OFFICE/OUTPT VISIT,PROCEDURE ONLY: CPT | Performed by: SURGERY

## 2025-06-04 PROCEDURE — 36415 COLL VENOUS BLD VENIPUNCTURE: CPT

## 2025-06-04 PROCEDURE — 83735 ASSAY OF MAGNESIUM: CPT

## 2025-06-04 PROCEDURE — 63700000 HC SELF-ADMINISTRABLE DRUG

## 2025-06-04 PROCEDURE — 63700000 HC SELF-ADMINISTRABLE DRUG: Performed by: NURSE PRACTITIONER

## 2025-06-04 PROCEDURE — 80048 BASIC METABOLIC PNL TOTAL CA: CPT

## 2025-06-04 PROCEDURE — 12800000 HC ROOM AND CARE SEMIPRIVATE REHAB

## 2025-06-04 RX ORDER — ACETAMINOPHEN 325 MG/1
650 TABLET ORAL EVERY 4 HOURS
Status: DISCONTINUED | OUTPATIENT
Start: 2025-06-04 | End: 2025-06-05

## 2025-06-04 RX ORDER — TAMSULOSIN HYDROCHLORIDE 0.4 MG/1
0.4 CAPSULE ORAL NIGHTLY
Status: ON HOLD
Start: 2025-06-04 | End: 2025-08-30

## 2025-06-04 RX ORDER — LOSARTAN POTASSIUM 100 MG/1
100 TABLET ORAL DAILY
Status: DISPENSED | OUTPATIENT
Start: 2025-06-05

## 2025-06-04 RX ORDER — TAMSULOSIN HYDROCHLORIDE 0.4 MG/1
0.4 CAPSULE ORAL NIGHTLY
Status: DISPENSED | OUTPATIENT
Start: 2025-06-04

## 2025-06-04 RX ORDER — INSULIN LISPRO 100 [IU]/ML
2-18 INJECTION, SOLUTION INTRAVENOUS; SUBCUTANEOUS
Status: DISCONTINUED | OUTPATIENT
Start: 2025-06-04 | End: 2025-06-04 | Stop reason: HOSPADM

## 2025-06-04 RX ORDER — METFORMIN HYDROCHLORIDE 500 MG/1
500 TABLET ORAL
Status: DISCONTINUED | OUTPATIENT
Start: 2025-06-05 | End: 2025-06-04 | Stop reason: SDUPTHER

## 2025-06-04 RX ORDER — METOPROLOL TARTRATE 50 MG/1
50 TABLET ORAL 2 TIMES DAILY
Status: DISPENSED | OUTPATIENT
Start: 2025-06-04

## 2025-06-04 RX ORDER — POLYETHYLENE GLYCOL 3350 17 G/17G
17 POWDER, FOR SOLUTION ORAL DAILY
Status: ON HOLD
Start: 2025-06-05 | End: 2025-06-08

## 2025-06-04 RX ORDER — HYDRALAZINE HYDROCHLORIDE 25 MG/1
25 TABLET, FILM COATED ORAL EVERY 6 HOURS PRN
Status: ON HOLD
Start: 2025-06-04 | End: 2025-07-04

## 2025-06-04 RX ORDER — ACETAMINOPHEN 325 MG/1
650 TABLET ORAL EVERY 4 HOURS
Status: ON HOLD
Start: 2025-06-04 | End: 2025-07-04

## 2025-06-04 RX ORDER — INSULIN LISPRO 100 [IU]/ML
2-10 INJECTION, SOLUTION INTRAVENOUS; SUBCUTANEOUS
Status: DISCONTINUED | OUTPATIENT
Start: 2025-06-04 | End: 2025-06-05

## 2025-06-04 RX ORDER — DEXTROSE 40 %
15-30 GEL (GRAM) ORAL AS NEEDED
Status: DISCONTINUED | OUTPATIENT
Start: 2025-06-04 | End: 2025-06-04 | Stop reason: SDUPTHER

## 2025-06-04 RX ORDER — DEXTROSE 50 % IN WATER (D50W) INTRAVENOUS SYRINGE
25 AS NEEDED
Status: ACTIVE | OUTPATIENT
Start: 2025-06-04

## 2025-06-04 RX ORDER — POTASSIUM CHLORIDE 20 MEQ/1
40 TABLET, EXTENDED RELEASE ORAL ONCE
Status: COMPLETED | OUTPATIENT
Start: 2025-06-04 | End: 2025-06-04

## 2025-06-04 RX ORDER — OXYCODONE HYDROCHLORIDE 5 MG/1
5 TABLET ORAL EVERY 4 HOURS PRN
Refills: 0 | Status: DISPENSED | OUTPATIENT
Start: 2025-06-04 | End: 2025-06-09

## 2025-06-04 RX ORDER — FINASTERIDE 5 MG/1
5 TABLET, FILM COATED ORAL DAILY
Status: DISPENSED | OUTPATIENT
Start: 2025-06-05

## 2025-06-04 RX ORDER — HYDRALAZINE HYDROCHLORIDE 25 MG/1
25 TABLET, FILM COATED ORAL EVERY 6 HOURS PRN
Status: DISPENSED | OUTPATIENT
Start: 2025-06-04

## 2025-06-04 RX ORDER — HEPARIN SODIUM 5000 [USP'U]/ML
5000 INJECTION, SOLUTION INTRAVENOUS; SUBCUTANEOUS EVERY 8 HOURS
Status: ON HOLD
Start: 2025-06-04 | End: 2025-08-31

## 2025-06-04 RX ORDER — FELODIPINE 5 MG/1
10 TABLET, EXTENDED RELEASE ORAL DAILY
Status: DISPENSED | OUTPATIENT
Start: 2025-06-05

## 2025-06-04 RX ORDER — OXYCODONE HYDROCHLORIDE 5 MG/1
5 TABLET ORAL EVERY 4 HOURS PRN
Status: ON HOLD
Start: 2025-06-04 | End: 2025-06-09

## 2025-06-04 RX ORDER — POLYETHYLENE GLYCOL 3350 17 G/17G
17 POWDER, FOR SOLUTION ORAL DAILY
Status: DISPENSED | OUTPATIENT
Start: 2025-06-05

## 2025-06-04 RX ORDER — DEXTROSE 40 %
15-30 GEL (GRAM) ORAL AS NEEDED
Status: ACTIVE | OUTPATIENT
Start: 2025-06-04

## 2025-06-04 RX ORDER — ENOXAPARIN SODIUM 100 MG/ML
40 INJECTION SUBCUTANEOUS
Status: DISPENSED | OUTPATIENT
Start: 2025-06-04

## 2025-06-04 RX ORDER — DEXTROSE 50 % IN WATER (D50W) INTRAVENOUS SYRINGE
25 AS NEEDED
Status: DISCONTINUED | OUTPATIENT
Start: 2025-06-04 | End: 2025-06-04 | Stop reason: SDUPTHER

## 2025-06-04 RX ORDER — TIMOLOL MALEATE 5 MG/ML
1 SOLUTION/ DROPS OPHTHALMIC DAILY
Status: DISPENSED | OUTPATIENT
Start: 2025-06-05

## 2025-06-04 RX ORDER — LISINOPRIL 10 MG/1
10 TABLET ORAL DAILY
Qty: 30 TABLET | Refills: 0 | Status: ON HOLD | OUTPATIENT
Start: 2025-06-05 | End: 2025-07-05

## 2025-06-04 RX ORDER — LISINOPRIL 10 MG/1
10 TABLET ORAL DAILY
Status: DISPENSED | OUTPATIENT
Start: 2025-06-05

## 2025-06-04 RX ORDER — METFORMIN HYDROCHLORIDE 500 MG/1
500 TABLET ORAL
Status: DISPENSED | OUTPATIENT
Start: 2025-06-05

## 2025-06-04 RX ORDER — ATORVASTATIN CALCIUM 20 MG/1
20 TABLET, FILM COATED ORAL DAILY
Status: DISPENSED | OUTPATIENT
Start: 2025-06-05

## 2025-06-04 RX ORDER — ADHESIVE BANDAGE 7/8"
15-30 BANDAGE TOPICAL AS NEEDED
Status: ACTIVE | OUTPATIENT
Start: 2025-06-04

## 2025-06-04 RX ORDER — ADHESIVE BANDAGE 7/8"
15-30 BANDAGE TOPICAL AS NEEDED
Status: DISCONTINUED | OUTPATIENT
Start: 2025-06-04 | End: 2025-06-04 | Stop reason: SDUPTHER

## 2025-06-04 RX ORDER — METFORMIN HYDROCHLORIDE 500 MG/1
500 TABLET ORAL
Status: DISCONTINUED | OUTPATIENT
Start: 2025-06-04 | End: 2025-06-04 | Stop reason: SDUPTHER

## 2025-06-04 RX ADMIN — FELODIPINE 10 MG: 5 TABLET, FILM COATED, EXTENDED RELEASE ORAL at 08:29

## 2025-06-04 RX ADMIN — METOPROLOL TARTRATE 50 MG: 50 TABLET, FILM COATED ORAL at 20:36

## 2025-06-04 RX ADMIN — POTASSIUM CHLORIDE 40 MEQ: 1500 TABLET, EXTENDED RELEASE ORAL at 08:34

## 2025-06-04 RX ADMIN — TIMOLOL MALEATE 1 DROP: 5 SOLUTION/ DROPS OPHTHALMIC at 08:31

## 2025-06-04 RX ADMIN — ACETAMINOPHEN 650 MG: 325 TABLET ORAL at 00:24

## 2025-06-04 RX ADMIN — ACETAMINOPHEN 650 MG: 325 TABLET ORAL at 20:36

## 2025-06-04 RX ADMIN — FINASTERIDE 5 MG: 5 TABLET, FILM COATED ORAL at 08:31

## 2025-06-04 RX ADMIN — OXYCODONE HYDROCHLORIDE 5 MG: 5 TABLET ORAL at 07:37

## 2025-06-04 RX ADMIN — LOSARTAN POTASSIUM 100 MG: 100 TABLET, FILM COATED ORAL at 08:30

## 2025-06-04 RX ADMIN — OXYCODONE 5 MG: 5 TABLET ORAL at 22:03

## 2025-06-04 RX ADMIN — POLYETHYLENE GLYCOL 3350 17 G: 17 POWDER, FOR SOLUTION ORAL at 08:29

## 2025-06-04 RX ADMIN — TAMSULOSIN HYDROCHLORIDE 0.4 MG: 0.4 CAPSULE ORAL at 20:36

## 2025-06-04 RX ADMIN — METFORMIN HYDROCHLORIDE 500 MG: 500 TABLET ORAL at 08:30

## 2025-06-04 RX ADMIN — HYDRALAZINE HYDROCHLORIDE 25 MG: 25 TABLET ORAL at 12:48

## 2025-06-04 RX ADMIN — INSULIN LISPRO 2 UNITS: 100 INJECTION, SOLUTION INTRAVENOUS; SUBCUTANEOUS at 00:25

## 2025-06-04 RX ADMIN — ACETAMINOPHEN 650 MG: 325 TABLET ORAL at 08:30

## 2025-06-04 RX ADMIN — METFORMIN HYDROCHLORIDE 500 MG: 500 TABLET ORAL at 16:48

## 2025-06-04 RX ADMIN — ENOXAPARIN SODIUM 40 MG: 40 INJECTION SUBCUTANEOUS at 20:36

## 2025-06-04 RX ADMIN — HEPARIN SODIUM 5000 UNITS: 5000 INJECTION, SOLUTION INTRAVENOUS; SUBCUTANEOUS at 05:24

## 2025-06-04 RX ADMIN — HEPARIN SODIUM 5000 UNITS: 5000 INJECTION, SOLUTION INTRAVENOUS; SUBCUTANEOUS at 13:46

## 2025-06-04 RX ADMIN — BISACODYL 10 MG: 10 SUPPOSITORY RECTAL at 11:15

## 2025-06-04 RX ADMIN — ACETAMINOPHEN 650 MG: 325 TABLET ORAL at 16:48

## 2025-06-04 RX ADMIN — METOPROLOL TARTRATE 50 MG: 50 TABLET, FILM COATED ORAL at 08:31

## 2025-06-04 RX ADMIN — ACETAMINOPHEN 650 MG: 325 TABLET ORAL at 12:48

## 2025-06-04 RX ADMIN — LISINOPRIL 10 MG: 10 TABLET ORAL at 08:30

## 2025-06-04 RX ADMIN — INSULIN LISPRO 2 UNITS: 100 INJECTION, SOLUTION INTRAVENOUS; SUBCUTANEOUS at 16:47

## 2025-06-04 RX ADMIN — ATORVASTATIN CALCIUM 20 MG: 20 TABLET, FILM COATED ORAL at 08:31

## 2025-06-04 ASSESSMENT — COGNITIVE AND FUNCTIONAL STATUS - GENERAL
WALKING IN HOSPITAL ROOM: 2 - A LOT
STANDING UP FROM CHAIR USING ARMS: 2 - A LOT
MOVING TO AND FROM BED TO CHAIR: 2 - A LOT
CLIMB 3 TO 5 STEPS WITH RAILING: 1 - TOTAL

## 2025-06-04 ASSESSMENT — PATIENT HEALTH QUESTIONNAIRE - PHQ9: SUM OF ALL RESPONSES TO PHQ9 QUESTIONS 1 & 2: 0

## 2025-06-04 NOTE — PLAN OF CARE
Care Coordination Discharge Plan Note     Discharge Needs Assessment  Concerns to be Addressed: discharge planning  Current Discharge Risk: dependent with mobility/activities of daily living, physical impairment    Anticipated Discharge Plan  Anticipated Discharge Disposition: acute rehab/Inpatient Rehab Facility       Patient Choice  Offered/Gave Vendor List: no       ---------------------------------------------------------------------------------------------------------------------    Interdisciplinary Discharge Plan Review:  Participants:nursing, physical therapy, social work/services, family/lay caregiver, , family, occupational therapy, physician, patient, advanced practice provider    Concerns Comments: The patient was reviewed at trauma rounds and is stable for discharge.  TYRESE drain pulled and patient moved his bowels.  The patient is agreeable to [facility] and was notified that he needs to bring his [facility] gtts to [facility]. Report number Given to RN jay begum [phone].  [facility] trip # [id number] scheduled for 1700.  Mary Behler CCRN    Discharge Plan:   Disposition/Destination: [facility] / Inpatient Rehab Facility  Discharge Facility:   Destination - Admitted Since 5/31/2025       Service Provider Services Address Phone Fax Patient Preferred Last Updated    [facility] [address] [place] [address] [phone] -- -- Behler, Mary E, RN 6/4/2025 1342          Community Resources:      Discharge Transportation:  Is Out of Hospital DNR needed at Discharge: no  Does patient need discharge transport? Yes  Type of Transportation: basic life support  What day is the transport expected?: 06/04/25  What time is the transport expected?: 1700

## 2025-06-04 NOTE — NURSING NOTE
Pt discharged to [facility] with all personal belongings. Discharge instructions sent with transport staff. Pt's daughter present. Report called to Abigail CARLSON.

## 2025-06-04 NOTE — BMR PREADMISSION NOTE
[facility]  [facility] Assessment    Patient Name:   Aneesh Brito  YOB: 1950    Referral Date:   06/02/25  Evaluation Date:    06/04/25  Referring Facility Admission Date: 05/31/25  Referring Facility: [facility]   Referring Provider: Raza Benjamin MD     ASSESSMENT AND PLAN   Aneesh Brito is a 75 y.o. male who is now below functional baseline and has medical complexity due to ORIF C7-T1 Chance fracture in the setting of DISH with stabilization from C3-T3 with an anticipated impairment group of neurologic condition.    He demonstrates impairments in balance, strength, coordination, endurance/activity tolerance, mobility, pain, safety awareness, self-care affecting functional independence.    Aneesh is at risk for the following complications:     Acute change in mental status due to multiple medications   Cardiac event due to history of cardiac conditions   Constipation due to opioid medication, impaired mobility, and colon/rectal cancer    DVT/PE due to impaired mobility and post-surgical state   Falls due to impaired balance and known history of falls   Hemorrhage/bleed due to anti-coagulation medication       Skin breakdown/pressure ulcers due to impaired mobility and obesity   Uncontrolled pain due to opioid medication    Wound infection due to surgical incision and obesity         In addition to the above noted risks, Aneesh requires daily rehabilitation physician oversight to prevent, monitor and manage medication side effects, abnormal vital signs, acute changes in mental status, abnormal lab values, functional decline, complications of infection, recurrent infection, exacerbation of chronic medical comorbidities, bowel and bladder dysfunction, respiratory insufficiency, new onset seizures, abnormal muscle tone, psychosocial dysfunction, sleep disorders, development of skin breakdown/pressure injury, worsening of wounds, and pain.    Aneesh requires and will  benefit from a course of comprehensive inpatient rehabilitation including medical consultative services, occupational therapy, and physical therapy.    Anticipated frequency and intensity of services include  Occupational Therapy 1.5 hours per day at a frequency of 5-7 times per week  Physical Therapy 1.5 hours per day at a frequency of 5-7 times per week        Aneesh is willing to participate in the rehab program and requires, can tolerate and will benefit from 3 hours of therapy at least 5 days per week  . These needs would not be able to be met at a lower level of care.    By discharge, Aneesh is expected to demonstrate functional improvement with goals including Independent for self-care, Independent for transfers, Independent for locomotion, Independent for communication, and Independent for social cognition.     The projected length of stay in inpatient rehab is 14 days and he is expected to discharge to home with home health with the following:  Type of Home Care Services: home OT, home PT, nursing    Pertinent History of Current Functional Problem:  History of Present Illness  Aneesh is a 75 y.o. male with a history of colon carcinoma, BPH, hypertension, glaucoma, type 2 diabetes, history of a cervical fusion over 10 years ago admitted after a fall inability to get up on his own.  He was seen at [facility] where imaging studies were performed which demonstrated DISH as well as a Chance fracture at the bottom of C7.  He was transferred to [facility] for neurosurgery.  He was taken to the OR on 6/1 by Dr. Benjamin and underwent an ORIF of the C7 Chance fracture with removal and replacement of the C3-6 posterior instrumentation as well as a C3-T3 posterior lateral fusion.  Drains remain in place.  He is on subcu heparin for DVT prophylaxis.  Currently on a nicardipine drip for blood pressure control.  He states pain is well-controlled.  He denies any other complaints of headache or dizziness, chest  pain or shortness of breath.  He is voiding on his own.  He has not had a bowel movement yet.  He was seen by PT and OT needing moderate assistance of 2 to transfer sit to stand.  He ambulated 2 feet with moderate assistance of 2 using a rolling walker.  Pertinent radiology results reviewed. Pertinent lab results reviewed.    75-year-old male with an ADL and ambulatory dysfunction admitted following a fall found to have a C7 Chance fracture superimposed on DISH.  He is now status post ORIF of C7 with a cervical fusion.  He is on heparin for DVT prophylaxis  Currently requires moderate assistance for for transfers and bed mobility.  He was independent prior to this admission.  Given his current functional deficits and need for close medical monitoring recommendation is referral to acute level inpatient rehab for comprehensive rehab program of 3 hours daily physical and occupational therapies with medical management and oversight by physiatry.  He is at risk for complications including DVT, UTI, wound infection, skin breakdown, change in mental status and therefore will require daily physician evaluations.  Reasonable goals are to work on and improve his mobility including transfers standing and ambulation, assess ADLs and need for adaptive, family training and education and establish a safe discharge plan.    Pt lives at [facility] w/ his wife Yas.  There is a WIS with SC/GB.  Pt was completely Independent using a SPC in his apartment and a rollator to negotiate around the hospitals.  Pt was able to drive    Active Inpatient Medications  Facility-Administered Medications Ordered in Other Visits   Medication Dose Route Frequency Provider Last Rate Last Admin    acetaminophen (TYLENOL) tablet 650 mg  650 mg oral q4h [facility] Cristina Geiger PA C   650 mg at 06/03/25 0905    atorvastatin (LIPITOR) tablet 20 mg  20 mg oral Daily Cristina Geiger PA C   20 mg at 06/03/25 0904    glucose chewable tablet  15-30 g of dextrose  15-30 g of dextrose oral PRN Cristina Geiger PA C        Or    dextrose 40 % oral gel 15-30 g of dextrose  15-30 g of dextrose oral PRN Cristina Geiger PA C        Or    glucagon (GLUCAGEN) injection 1 mg  1 mg intramuscular PRN Cristina Geiger PA C        Or    dextrose 50 % in water (D50) injection 12.5 g  25 mL intravenous PRN Cristina Geiger PA C        felodipine (PLENDIL) 24 hr ER tablet 10 mg  10 mg oral Daily Cristina Geiger PA C   10 mg at 06/03/25 0905    finasteride (PROSCAR) tablet 5 mg  5 mg oral Daily Cristina Geiger PA C   5 mg at 06/03/25 0904    heparin (porcine) 5,000 unit/mL injection 5,000 Units  5,000 Units subcutaneous q8h [facility] Cristina Geiger PA C   5,000 Units at 06/03/25 0534    oxyCODONE (ROXICODONE) immediate release tablet 5 mg  5 mg oral q4h PRN Cristina Geiger PA C   5 mg at 06/03/25 0905    And    HYDROmorphone (DILAUDID) 0.2 mg/mL injection 0.2 mg  0.2 mg intravenous q4h PRN Cristina Geiger PA C   0.2 mg at 06/01/25 1923    insulin lispro U-100 (HumaLOG) pen 2-18 Units  2-18 Units subcutaneous q6h [facility] Cristina Geiger PA C   4 Units at 06/03/25 0005    lisinopriL (PRINIVIL) tablet 10 mg  10 mg oral Daily Viktoria Worley CRNP   10 mg at 06/03/25 0905    losartan (COZAAR) tablet 100 mg  100 mg oral Daily Cristina Geiger PA C   100 mg at 06/03/25 0905    metFORMIN (GLUCOPHAGE) tablet 500 mg  500 mg oral BID with breakfast and dinner Viktoria Worley CRNP   500 mg at 06/03/25 0904    metoprolol tartrate (LOPRESSOR) tablet 50 mg  50 mg oral BID Cristina Geiger PA C   50 mg at 06/03/25 0904    netarsudiL-latanoprost 0.02-0.005 % drops 1 drop  1 drop Both Eyes Nightly Cristina Geiger PA C   1 drop at 06/02/25 8153    niCARdipine (CARDENE) 25 mg/250 mL (0.1 mg/mL) in NSS  0-15 mg/hr intravenous Titrated Cristina Geiger PA C 75 mL/hr at 06/03/25 0906 7.5 mg/hr at 06/03/25 0906     ondansetron ODT (ZOFRAN-ODT) disintegrating tablet 4 mg  4 mg oral q8h PRN Cristina Geiger PA C        polyethylene glycol (MIRALAX) 17 gram packet 17 g  17 g oral Daily Cristina Geiger PA C   17 g at 06/03/25 0903    tamsulosin (FLOMAX) 24 hr ER capsule 0.4 mg  0.4 mg oral Nightly Cristina Geiger PA C   0.4 mg at 06/02/25 2135    timolol (TIMOPTIC) 0.5 % ophthalmic solution 1 drop  1 drop Both Eyes Daily Cristina Geiger PA C   1 drop at 06/03/25 0909       Diagnostics  Study Date Result   X Ray thoracic 5/31/25 Thoracic spondylosis and hyperostosis.  No acute osseous  abnormality.      Chest Xray 5/31/25 No active disease is seen in the chest.        CTH w/o IV contrast 5/31/25 No evidence of acute intracranial abnormality.  2.  Acute unstable multicolumn fracture of the cervical spine through the C7  vertebral body with associated widening of the C7-T1 facet joint on the right  and widening of the interspinous ligament at C7-T1. This is consistent with an  unstable/multicolumn fracture of the cervical spine and MRI is recommended for  further evaluation.   CT thoracic w/o IV contrast 6/1/25 . No acute thoracic spine fracture or traumatic malalignment. MRI remains  recommended for evaluation of the C7 fracture and C7-T1 articulations.  2. Posterior osteophytosis at T8-T9 causes at least moderate osseous narrowing  of the central spinal canal.  3. Mild CHF with trace bilateral pleural effusions and overlying subsegmental  atelectasis, not well evaluated due to respiratory motion artifact. Recommend  outpatient chest CT in 2-4 weeks.     Current Plan of Care at Referring Hospital  6/4/25 - NEUROSURGERY - Suly Mckeon PA-C    A/P:  Neurologically stable and symptomatically improved after ORIF of C7 frx and C3-T3 fusion.       - DVT proph with SQH, SCDs and mobilization    - PT/OT    - Continue current multimodality pain regimen    - Xrays show stable placement of hardware    - ADAT     -  Aspen collar when in chair or ambulating    - Drains removed. May shower and wash incision     - Sutures to be removed in 7-10 days    6/4/25 - TRAUMA - Orlando Alvarez MD  75 y.o. y/o male s/p fall.   Fall  Cervical spine fracture - prior cervical laminectomy. He is s/p ORIF, C7-T1 laminectomy/EDH evacuation/fusion/removal and replacement of instrumentation. Drain mgmt per neurosurgery.   Constipation - no abdominal pain or tenderness whatsoever. Abdomen is very soft. Continue bowel regimen - try suppository today.   Regular diet.   OOB/ambulate.   Laboratory evaluation reviewed to time of note - WBC 11.85 from 12.07, Hgb 11.3 from 11.4, stable mild hyponatremia at 134, hypokalemia at 3.3 (replete), BUN/Cr 22/1.1.   VTE prophylaxis - heparin SQ.   PT/OT      Follow-Up Appointments  7/3/25  New Patient Appointment  10:25 AM  (40 min.)  Juan Ramon Abad MD  [facility]  Neurosurgery at     OBJECTIVE   Current DVT Prophylaxis  Chemoprophylaxis: heparin subcutaneous  Dose/Frequency/Route: 5,000 subQ8  Date/Time of Most Recent Dose: 6/4/25 @ 05:24    Vitals:        Temp:  [36.3 °C (97.3 °F)-37.1 °C (98.8 °F)] 36.7 °C (98.1 °F)  Heart Rate:  [] 96  BP: (104-202)/(7-92) 177/84  SpO2:  [89 %-98 %] 96 %  Lab Results:  Chemistry  (Last 3 results in the past 7 days)        06/04/25 0445 06/03/25  0902   06/02/25  0523        Sodium 134   134   137       Potassium, Bld 3.3   3.2   3.2       BUN 22   21   23       Creatinine 1.1   1.1   1.2       Glucose 143   184   169       CO2 23   22   23       Chloride 103   104   106             Hepatic         05/31/25  1315        ALT (SGPT) 15       AST (SGOT) 32       Alkaline Phosphatase 83       Bilirubin, Direct       Bilirubin, Total 1.1             Hematologic  (Last 3 results in the past 7 days)        06/04/25 0445 06/03/25  0902   06/02/25  1336        WBC 11.85   12.07   17.41       Neutrophils Relative           Hemoglobin  11.3   11.4   11.8       Hematocrit 33.7   33.7   34.7       Platelets 236   220   253       Protime           INR                   Prior Living Environment      Flowsheet Row Most Recent Value   People in Home spouse, facility resident   Current Living Arrangements independent living facility   Living Environment Comment Pt reports living at [facility] with his spouse. WIS with SC/GB     Prior Level of Function      Flowsheet Row Most Recent Value   Ambulation assistive equipment   Transferring assistive equipment   Bathing independent   Dressing independent   Eating independent   Communication understands/communicates without difficulty   Prior Level of Function Comment Pt reports being independent with use of SPC inside and Rollator to the dining jenkins. Independent ADLs. Denies other falls. Independent ADLs. (+) driving. Retired teacher   Assistive Device Currently Used at Home grab bar, shower chair, cane, straight     Current Status:  Current Diet: regular, thin liquids  Last Bowel Movement: 06/04/25   Current Function       Row Name 06/02/25 1038       Cognition    Orientation Status oriented x 4    Affect/Mental Status WFL    Follows Commands follows one-step commands;over 90% accuracy    Cognitive Function WFL    Comment, Cognition plesant and receptive to care, eager to participate       Vision Assessment/Intervention    Vision Assessment WFL       Hearing Assessment    Hearing Status WFL       Sensory Assessment    Sensory Assessment sensation intact, lower extremities       Lower Extremity Assessment    LE Assessment X    General Observations grossly assessed, approx. 4-/5       Bed Mobility    Bed Mobility Activities left;supine to sit    [place] maximum assist (25-49% patient effort);2 person assist    Safety/Cues sequencing;technique;hand placement;maintaining precautions    Assistive Device head of bed elevated;bed rails;draw sheet    Comment OOB to the L via log roll technique. Aspen  collar donned prior to mobility. Increased time/assist required to scoot towards EOB       Sit/Stand Transfer    Surface edge of bed    [place] moderate assist (50-74% patient effort);2 person assist    Safety/Cues technique;sequencing;hand placement    Assistive Device walker, front-wheeled    Transfer Comments from EOB, increased time to scoot towards EOB       Surface-to-Surface Transfers    Transfer Location bed to chair    Transfer Technique stand step    [place] moderate assist (50-74% patient effort);2 person assist    Safety/Cues technique;sequencing;hand placement    Assistive Device walker, front-wheeled    Transfer Comments to the L, shuffling steps, cueing for sequencing       Gait Training    [place], Gait moderate assist (50-74% patient effort);2 person assist    Safety/Cues technique;sequencing;hand placement    Assistive Device walker, front-wheeled    Distance in Feet 2 feet    Pattern step-to    Deviations/Abnormal Patterns gait speed decreased;step length decreased;stride length decreased;base of support, narrow;festinating/shuffling    Comment NBOS, shuffling steps, decreased heel strike       Balance    Static Sitting Balance unsupported;mild impairment;sitting, edge of bed    Dynamic Sitting Balance moderate impairment;unsupported;sitting, edge of bed    Sit to Stand Dynamic Balance moderate impairment;supported;standing    Static Standing Balance moderate impairment;standing;supported    Dynamic Standing Balance moderate impairment;standing;supported    Comment, Balance BUE support on RW, mod A x 2 transfers       Impairments/Safety Issues    Impairments Affecting Function balance;endurance/activity tolerance;strength;pain      Row Name 06/02/25 1040       Cognition    Orientation Status oriented x 4    Affect/Mental Status WFL    Follows Commands follows multi-step commands;over 90% accuracy    Cognitive Function WFL    Comment, Cognition very pleasant and receptive, motivated  to participate       Vision Assessment/Intervention    Vision Assessment WFL       Hearing Assessment    Hearing Status WFL       Sensory Assessment    Sensory Assessment sensation intact, upper extremities       Upper Extremity Assessment    UE Assessment X       Left Shoulder    Shoulder, Left (ROM) AAROM to 110 deg, AROM ~40 deg    Shoulder, Left (Strength) 2+       Left Elbow    Elbow, Left (Strength) 2+       Right Shoulder    Shoulder, Right (ROM) ~50 deg AROM, AAROM to 90 deg    Shoulder, Right (Strength) 2+       Right Elbow    Elbow, Right (Strength) 2+       Motor Skills    Functional Endurance fair  +       Bed Mobility    Bed Mobility Activities supine to sit;sit to supine    [place] maximum assist (25-49% patient effort);2 person assist    Safety/Cues technique;hand placement;sequencing    Assistive Device head of bed elevated;bed rails    Comment assist at trunk and BLE via log roll, aspen donne prior to mobility       Sit/Stand Transfer    Surface edge of bed;chair with arm rests    [place] moderate assist (50-74% patient effort);2 person assist    Safety/Cues sequencing;proper use of assistive device;hand placement    Assistive Device walker, front-wheeled       Surface-to-Surface Transfers    Transfer Location bed to chair    [place] moderate assist (50-74% patient effort);2 person assist    Safety/Cues sequencing;hand placement;technique    Assistive Device walker, front-wheeled       Lower Body Dressing    Tasks socks    [place] dependent (less than 25% patient effort)    Position edge of bed sitting    Comment limited by sitting balance       Toileting    Adaptive Equipment catheter, urethral       Self-Feeding    Tasks liquids to mouth    [place] supervision;set up    Position supported sitting    Adaptive Equipment none       Balance    Static Sitting Balance mild impairment;sitting, edge of bed    Dynamic Sitting Balance moderate impairment;sitting, edge of bed    Sit  to Stand Dynamic Balance moderate impairment;supported    Static Standing Balance moderate impairment;supported    Dynamic Standing Balance moderate impairment;supported    Balance Interventions occupation based/functional task    Comment, Balance w/ rw       Impairments/Safety Issues    Impairments Affecting Function balance;endurance/activity tolerance;strength;pain;range of motion (ROM);postural/trunk control      Row Name 06/03/25 1127       Cognition    Orientation Status oriented x 3    Affect/Mental Status WFL    Follows Commands WFL;over 90% accuracy    Cognitive Function WFL    Comment, Cognition cooperative and motivated to participate       Motor Skills    Functional Endurance fair 4L O2 with rest breaks       Bed Mobility    Bed Mobility Activities supine to sit;sit to supine    [place] moderate assist (50-74% patient effort);2 person assist    Safety/Cues sequencing;technique;hand placement    Assistive Device head of bed elevated;bed rails    Comment via log roll. aspen donned prior to OOB       Sit/Stand Transfer    Surface chair with arm rests;edge of bed    [place] moderate assist (50-74% patient effort);2 person assist    Safety/Cues sequencing;proper use of assistive device;hand placement    Assistive Device walker, front-wheeled    Transfer Comments MOD A x2 from bed, MOD A x1 frm chair  slow transitions       Surface-to-Surface Transfers    Transfer Location bed to chair    Transfer Technique stand step    [place] moderate assist (50-74% patient effort);2 person assist    Safety/Cues proper use of assistive device;hand placement    Assistive Device walker, front-wheeled       Lower Body Dressing    Tasks don;socks    [place] dependent (less than 25% patient effort)    Position supported sitting       Self-Feeding    Tasks liquids to mouth    [place] supervision;set up    Position supported sitting       Balance    Static Sitting Balance mild impairment;sitting, edge of  bed    Dynamic Sitting Balance mild impairment;sitting, edge of bed    Sit to Stand Dynamic Balance moderate impairment;supported    Static Standing Balance moderate impairment;supported    Dynamic Standing Balance moderate impairment;supported    Comment, Balance w/ rw       Impairments/Safety Issues    Impairments Affecting Function balance;endurance/activity tolerance;strength;pain;range of motion (ROM);postural/trunk control    Comment, Safety Issues/Impairments chair follow d/t early fatigue      Row Name 06/03/25 1128       Cognition    Orientation Status oriented x 3    Affect/Mental Status WFL    Follows Commands follows one-step commands;verbal cues/prompting required    Comment, Cognition pleasant, cooperative, receptive to cues and education.       Motor Skills    Functional Endurance SpO2 89-92% on 3 L supplemental O2. benefits from rest beaks.       Bed Mobility    Bed Mobility Activities supine to sit;left    [place] moderate assist (50-74% patient effort);2 person assist    Safety/Cues technique;sequencing;hand placement    Assistive Device head of bed elevated;bed rails    Comment Aspen donned prior to mobility. pt was able to complete supine to sitting via log roll technique. requires cues for hand placement and assist to bring trunk into upright seated posture.       Sit/Stand Transfer    Surface edge of bed;chair with arm rests    [place] moderate assist (50-74% patient effort);2 person assist;1 person assist    Safety/Cues technique;sequencing;hand placement    Assistive Device walker, front-wheeled    Transfer Comments mod Ax2 to stand from EOB, mod Ax1 to stand from chair. demonstrates good LE strength to participate.       Gait Training    [place], Gait moderate assist (50-74% patient effort);2 person assist    Safety/Cues technique;sequencing    Assistive Device walker, front-wheeled    Distance in Feet 4 feet  + 3 ft    Pattern step-to    Deviations/Abnormal Patterns gait  speed decreased;step length decreased;stride length decreased;festinating/shuffling    Comment pt demonstrates decreased BLE clearance when attempting to take steps in room. requires mod Ax2 and cues for sequence when taking steps from bed to chair with RW. pt was then able to trial steps forward with mod Ax2 and RW + chair follow. demonstrates increased posterior lean and decreased step length, limiting further distance.       Balance    Static Sitting Balance mild impairment;sitting, edge of bed    Dynamic Sitting Balance mild impairment;sitting, edge of bed    Sit to Stand Dynamic Balance moderate impairment;supported    Static Standing Balance moderate impairment;supported    Dynamic Standing Balance moderate impairment;supported    Comment, Balance supported = RW       Impairments/Safety Issues    Impairments Affecting Function balance;endurance/activity tolerance;strength;pain;range of motion (ROM);postural/trunk control          Special Needs:       Existing Precautions/Restrictions: fall, brace worn when out of bed, spinal, other (see comments) (ASPEN collar OOB, soft collar in bed for comfort.  HOB 30 degrees)         Do You Speak a Language Other Than English at Home?: no  Spiritual, Cultural Beliefs, Restorationist Practices, Values that Affect Care: no

## 2025-06-04 NOTE — PROGRESS NOTES
TRAUMA SURGERY DAILY PROGRESS NOTE     PATIENT NAME:  Aneesh Brito        YOB: 1950  AGE:  75 y.o.     GENDER: male  MRN:  [id number]          PATIENT #: [id number]    CHIEF COMPLAINT   No chief complaint on file.      PRIMARY CARE PHYSICIAN   Kamar Palacios MD    SUBJECTIVE   Feeling better.   Eating well.   Has not moved his bowels - enema yesterday. No abdominal pain. No N/V.     REVIEW OF SYSTEMS   Review of Systems    As above.   VITAL SIGNS   Temperature: Temp (24hrs), Av.7 °C (98 °F), Min:36.3 °C (97.3 °F), Max:36.9 °C (98.4 °F)     BP Max:  Systolic (24hrs), Av , Min:104 , Max:183      BP Perez:  Diastolic (24hrs), Av, Min:55, Max:91    Recent:  Patient Vitals for the past 4 hrs:   BP Temp Temp src Pulse SpO2   25 0745 (!) 176/82 36.7 °C (98.1 °F) Oral 84 95 %   25 0714 -- -- -- 85 --        I/Os:  I/O last 3 completed shifts:  In: 1635.4 [P.O.:960; I.V.:675.4]  Out: 3250 [Urine:3150; Drains:100]  No intake/output data recorded.     MEDICATIONS     Current Facility-Administered Medications:     acetaminophen (TYLENOL) tablet 650 mg, 650 mg, oral, q4h [facility], Cristina Geiger PA C, 650 mg at 25 0830    artificial tears (dextran-hpm-glyc) (GENTEAL TEARS) 0.1-0.3-0.2 % eye drops 1 drop, 1 drop, Right Eye, 4x daily PRN, Viktoria Worley CRNP    atorvastatin (LIPITOR) tablet 20 mg, 20 mg, oral, Daily, Cristina Geiger PA C, 20 mg at 25 0831    bisacodyL (DULCOLAX) 10 mg suppository 10 mg, 10 mg, rectal, Daily PRN, Viktoria Worley CRNP    glucose chewable tablet 15-30 g of dextrose, 15-30 g of dextrose, oral, PRN **OR** dextrose 40 % oral gel 15-30 g of dextrose, 15-30 g of dextrose, oral, PRN **OR** glucagon (GLUCAGEN) injection 1 mg, 1 mg, intramuscular, PRN **OR** dextrose 50 % in water (D50) injection 12.5 g, 25 mL, intravenous, PRN, Cristina Geiger PA C    felodipine (PLENDIL) 24 hr ER tablet 10 mg, 10 mg, oral, Daily, Juanjo  SRUTHI Evans, 10 mg at 06/04/25 0829    finasteride (PROSCAR) tablet 5 mg, 5 mg, oral, Daily, Cristina Geiger PA C, 5 mg at 06/04/25 0831    heparin (porcine) 5,000 unit/mL injection 5,000 Units, 5,000 Units, subcutaneous, q8h [facility], Cristina Geiger PA C, 5,000 Units at 06/04/25 0524    hydrALAZINE (APRESOLINE) tablet 25 mg, 25 mg, oral, q6h PRN, Viktoria Worley CRNP    insulin lispro U-100 (HumaLOG) pen 2-18 Units, 2-18 Units, subcutaneous, Before meals & nightly, Cristina Geiger PA C    lisinopriL (PRINIVIL) tablet 10 mg, 10 mg, oral, Daily, Viktoria Worley CRNP, 10 mg at 06/04/25 0830    losartan (COZAAR) tablet 100 mg, 100 mg, oral, Daily, Cristina Geiger PA C, 100 mg at 06/04/25 0830    metFORMIN (GLUCOPHAGE) tablet 500 mg, 500 mg, oral, BID with breakfast and dinner, Viktoria Worley CRNP, 500 mg at 06/04/25 0830    metoprolol tartrate (LOPRESSOR) tablet 50 mg, 50 mg, oral, BID, Cristina Geiger PA C, 50 mg at 06/04/25 0831    netarsudiL-latanoprost 0.02-0.005 % drops 1 drop, 1 drop, Both Eyes, Nightly, Cristina eGiger PA C, 1 drop at 06/03/25 2043    ondansetron ODT (ZOFRAN-ODT) disintegrating tablet 4 mg, 4 mg, oral, q8h PRN, Cristina Geiger PA C    oxyCODONE (ROXICODONE) immediate release tablet 5 mg, 5 mg, oral, q4h PRN, 5 mg at 06/04/25 0737 **AND** [DISCONTINUED] HYDROmorphone (DILAUDID) 0.2 mg/mL injection 0.2 mg, 0.2 mg, intravenous, q4h PRN, Cristina Geiger PA C, 0.2 mg at 06/01/25 1923    polyethylene glycol (MIRALAX) 17 gram packet 17 g, 17 g, oral, Daily, Cristina Geiger PA C, 17 g at 06/04/25 0829    tamsulosin (FLOMAX) 24 hr ER capsule 0.4 mg, 0.4 mg, oral, Nightly, Cristina Geiger PA C, 0.4 mg at 06/03/25 2041    timolol (TIMOPTIC) 0.5 % ophthalmic solution 1 drop, 1 drop, Both Eyes, Daily, Cristina Geiger PA C, 1 drop at 06/04/25 0831    MEDICATIONS:  Infusions:         Scheduled:    acetaminophen  650 mg oral q4h KOFI     atorvastatin  20 mg oral Daily    felodipine  10 mg oral Daily    finasteride  5 mg oral Daily    heparin (porcine)  5,000 Units subcutaneous q8h KOFI    insulin lispro U-100  2-18 Units subcutaneous Before meals & nightly    lisinopriL  10 mg oral Daily    losartan  100 mg oral Daily    metFORMIN  500 mg oral BID with breakfast and dinner    metoprolol tartrate  50 mg oral BID    netarsudiL-latanoprost  1 drop Both Eyes Nightly    polyethylene glycol  17 g oral Daily    tamsulosin  0.4 mg oral Nightly    timolol  1 drop Both Eyes Daily     PRN:   artificial tears, 1 drop, 4x daily PRN  bisacodyL, 10 mg, Daily PRN  glucose, 15-30 g of dextrose, PRN   Or  dextrose, 15-30 g of dextrose, PRN   Or  glucagon, 1 mg, PRN   Or  dextrose 50 % in water (D50), 25 mL, PRN  hydrALAZINE, 25 mg, q6h PRN  ondansetron ODT, 4 mg, q8h PRN  oxyCODONE, 5 mg, q4h PRN         PHYSICAL EXAM    Physical Exam    NAD; AAO x 3  Drains in place - s/s drainage.   CTAB/L  RRR  Very soft and non-tender.   Extremities warm and well-perfused.   IMPRESSION/PLAN   75 y.o. y/o male s/p fall.   Fall  Cervical spine fracture - prior cervical laminectomy. He is s/p ORIF, C7-T1 laminectomy/EDH evacuation/fusion/removal and replacement of instrumentation. Drain mgmt per neurosurgery.   Constipation - no abdominal pain or tenderness whatsoever. Abdomen is very soft. Continue bowel regimen - try suppository today.   Regular diet.   OOB/ambulate.   Laboratory evaluation reviewed to time of note - WBC 11.85 from 12.07, Hgb 11.3 from 11.4, stable mild hyponatremia at 134, hypokalemia at 3.3 (replete), BUN/Cr 22/1.1.   VTE prophylaxis - heparin SQ.   PT/OT      AUTHOR:  John Alvarez MD  Trauma Service pager #[phone]  6/4/2025  8:49 AM

## 2025-06-04 NOTE — PROGRESS NOTES
Daily Neurosurgery Progress Note     No overnight events.  Overall feels fairly well. No bm yet, starting to be uncomfortable.     Neck pain controlled. No new radiculopathy/weakness.     Temp:  [36.3 °C (97.3 °F)-36.9 °C (98.4 °F)] 36.7 °C (98.1 °F)  Heart Rate:  [56-96] 96  Resp:  [16-20] 20  BP: (104-183)/(55-84) 177/84  SpO2:  [92 %-96 %] 96 %  Oxygen Therapy: Supplemental oxygen  O2 Delivery Method: Nasal cannula  O2 Flow Rate (L/min):  [2 L/min-3 L/min] 2 L/min     Intake/Output                         06/03/25 0700 - 06/04/25 0659 06/04/25 0700 - 06/05/25 0659     1279-3190 5437-1806 Total 7839-3404 4767-8403 Total                 Intake    P.O.  960  -- 960  480  -- 480    I.V.  665.4  -- 665.4  --  -- --    Volume (mL) Nicardipine 665.4 -- 665.4 -- -- --    Total Intake 1625.4 -- 1625.4 480 -- 480       Output    Urine  650  1900 2550  850  -- 850    Drains  30  45 75  --  -- --    Output (mL) (Drain Left;Posterior Neck 10 Fr.) 5 30 35 -- -- --    Output (mL) (Drain Right;Posterior Neck 10 Fr.) 25 15 40 -- -- --    Total Output 680 1945 2625 850 -- 850       Net I/O     945.4 -1945 -999.6 -370 -- -370            Drains: TYRESE  drain in place x 2, 40cc/35cc - removed  Incision is c/d/I, flat     General: lying supine in bed in no acute distress    Neuro: A&O x 3, interacting appr with fluent speech  Cn: EOMI, no facial asymmetry, tongue midline  Motor: 5/5 throughout all 4 ext without pronator drift  Sensation: Intact and equal to light touch all 4 ext      Scheduled Meds:   acetaminophen  650 mg oral q4h KOFI    atorvastatin  20 mg oral Daily    felodipine  10 mg oral Daily    finasteride  5 mg oral Daily    heparin (porcine)  5,000 Units subcutaneous q8h [facility]    insulin lispro U-100  2-18 Units subcutaneous Before meals & nightly    lisinopriL  10 mg oral Daily    losartan  100 mg oral Daily    metFORMIN  500 mg oral BID with breakfast and dinner    metoprolol tartrate  50 mg oral BID    netarsudiL-latanoprost   1 drop Both Eyes Nightly    polyethylene glycol  17 g oral Daily    tamsulosin  0.4 mg oral Nightly    timolol  1 drop Both Eyes Daily     Continuous Infusions:      PRN Meds:.  artificial tears    bisacodyL    glucose **OR** dextrose **OR** glucagon **OR** dextrose 50 % in water (D50)    hydrALAZINE    ondansetron ODT    oxyCODONE **AND** [DISCONTINUED] HYDROmorphone    CBC Results         06/04/25 06/03/25 06/02/25     0445 0902 1336    WBC 11.85 12.07 17.41    RBC 3.58 3.53 3.66    HGB 11.3 11.4 11.8    HCT 33.7 33.7 34.7    MCV 94.1 95.5 94.8    MCH 31.6 32.3 32.2    MCHC 33.5 33.8 34.0     220 253     BMP Results         06/04/25 06/03/25 06/02/25     0445 0902 0523     134 137    K 3.3 3.2 3.2    Cl 103 104 106    CO2 23 22 23    Glucose 143 184 169    BUN 22 21 23    Creatinine 1.1 1.1 1.2    Calcium 8.4 8.0 7.7    Anion Gap 8 8 8    EGFR >60.0 >60.0 >60.0     Results from last 7 days   Lab Units 06/01/25  1117   INR  1.2   PTT sec 27     Lab Results   Component Value Date    ALBUMIN 3.9 05/31/2025    BILITOT 1.1 05/31/2025    ALKPHOS 83 05/31/2025    BILIDIR 0.1 06/18/2023    AST 32 05/31/2025    ALT 15 05/31/2025    PROTEIN 7.1 05/31/2025     Assessment & Plan:  A/P:  Neurologically stable and symptomatically improved after ORIF of C7 frx and C3-T3 fusion.      - DVT proph with SQH, SCDs and mobilization    - PT/OT    - Continue current multimodality pain regimen    - Xrays show stable placement of hardware    - ADAT     - Aspen collar when in chair or ambulating    - Drains removed. May shower and wash incision     - Sutures to be removed in 7-10 days

## 2025-06-04 NOTE — DISCHARGE SUMMARY
TRAUMA SURGERY DISCHARGE SUMMARY     PATIENT NAME:  Aneesh Brito YOB: 1950    AGE:  75 y.o.  GENDER: male   MRN:  [id number]  PATIENT #: [id number]     Admission date: 5/31/2025    Discharge date: 6/4/2025     Admitting Physician: Sarahy Bates MD     Discharge Providers: Caesar Morocho PA-C; John Alvarez MD     Admitting Diagnoses:   1. Closed displaced fracture of seventh cervical vertebra, unspecified fracture morphology, initial encounter (CMS/[facility])        Discharge Diagnoses:    Patient Active Problem List   Diagnosis    Diverticulosis of colon    Enlarged prostate with lower urinary tract symptoms (LUTS)    Hypertension, benign    Malignant melanoma of skin (CMS/[facility])    Osteoarthritis of knee    Overweight    Spinal stenosis of lumbar region    Chronic kidney disease, stage I    Dry eye syndrome of bilateral lacrimal glands    Primary open-angle glaucoma, bilateral, indeterminate stage    Bilateral pseudophakia    Tear film insufficiency    Pure hypercholesterolemia    Hyponatremia    Aneurysm of iliac artery (CMS/[facility])    Glomerulosclerosis    Generalized weakness    Lumbar degenerative disc disease    Type 2 diabetes mellitus without complications (CMS/[facility])    Traumatic closed fracture of C7 vertebra with minimal displacement, initial encounter (CMS/[facility])    Closed nondisplaced fracture of seventh cervical vertebra (CMS/[facility])       Procedures:    1.) ORIF C7 fracture   2.) removal and replacement of C3-6 posterior intrumentation   3.) C3 to T3 posterolaterral instrumented fusion  Depuy + removal set [facility] C-arm O-Arm      Allergies:  Allergies   Allergen Reactions    No Known Allergies        Hospital Course: 75 y.o. male who was brought to [facility]'s ED on 5/31/2025 s/p *** sustaining the above-listed injuries.    Neurosurgery was consulted and recommended surgical fixation do to the extent of the fracture with ligamental injury. Patient was taken to the Operating Room for ORIF C7  - T1 Chance Fracture, C7 - T1 laminectomy and evacuation of epidural hematoma, C7 - T1  posterolateral fusion, C3 - C6 removal and replacement of instrumentation, C3 to T3 posterolateral instrumentation. Patient did well post op.     Patient required Cardene for blood pressure control post operatively, we added Lisinopril to his blood pressure management and oral Hydralazine PRN.    PT/OT/PMR was consulted and recommended acute rehab.      The patient was discharged on 6/4/2025 to acute rehab in stable condition.     Trauma CPGs followed as per Protocol:    Emergent reversal of OAC in life-threatening bleeds:  N/A    Emergent reversal of antiplatelets in life-threatening bleeds:  N/A    Appropriate Antibiotics for Open fractures: N/A    C-Spine Clearance:  NO - Patient to wear ASPEN Collar    VTE Prophylaxis:  YES.  Heparin SQ 5000 units every 8 hours to continue at [facility] until patient is walking 150 feet three times a day.    Antibiotic Use in Abdominal Trauma:  N/A    Splenic Vaccines given:  N/A    Discharge Medications:     Medication List        START taking these medications      acetaminophen 325 mg tablet  Commonly known as: TYLENOL  Take 2 tablets (650 mg total) by mouth every 4 (four) hours.  Dose: 650 mg     heparin (porcine) 5,000 unit/mL injection  Inject 1 mL (5,000 Units total) under the skin every 8 (eight) hours for 262 doses.  Dose: 5,000 Units     hydrALAZINE 25 mg tablet  Commonly known as: APRESOLINE  Take 1 tablet (25 mg total) by mouth every 6 (six) hours as needed (SBP >170).  Dose: 25 mg     lisinopriL 10 mg tablet  Commonly known as: PRINIVIL  Start taking on: June 5, 2025  Take 1 tablet (10 mg total) by mouth daily.  Dose: 10 mg     oxyCODONE 5 mg immediate release tablet  Commonly known as: ROXICODONE  Take 1 tablet (5 mg total) by mouth every 4 (four) hours as needed for pain (INITIAL dose. Pain or anticipated pain (i.e. during dressing changes, PT/OT/ambulation, ADL's, turning,  suctioning).) for up to 5 days.  Dose: 5 mg     polyethylene glycol 17 gram packet  Commonly known as: MIRALAX  Start taking on: June 5, 2025  Take 17 g by mouth daily for 3 days.  Dose: 17 g     tamsulosin 0.4 mg capsule  Commonly known as: FLOMAX  Take 1 capsule (0.4 mg total) by mouth nightly for 87 doses.  Dose: 0.4 mg            CONTINUE taking these medications      atorvastatin 20 mg tablet  Commonly known as: LIPITOR  Take 1 tablet (20 mg total) by mouth daily.  Dose: 20 mg     felodipine 10 mg 24 hr tablet  Commonly known as: PLENDIL  Take 1 tablet (10 mg total) by mouth daily.  Dose: 10 mg     finasteride 5 mg tablet  Commonly known as: PROSCAR  TAKE ONE TABLET BY MOUTH ONE TIME DAILY  Dose: 5 mg     losartan 100 mg tablet  Commonly known as: COZAAR  Take 1 tablet (100 mg total) by mouth daily.  Dose: 100 mg     metFORMIN 500 mg tablet  Commonly known as: GLUCOPHAGE  Take 1 tablet (500 mg total) by mouth 2 (two) times a day with meals.  Dose: 500 mg     metoprolol tartrate 50 mg tablet  Commonly known as: LOPRESSOR  Take 1 tablet (50 mg total) by mouth 2 (two) times a day.  Dose: 50 mg     ROCKLATAN 0.02-0.005 % drops  Administer 1 drop into both eyes nightly. Wait a few minutes in between eye drops  Dose: 1 drop  Generic drug: netarsudiL-latanoprost     silodosin 8 mg capsule  Commonly known as: RAPAFLO  Take 1 capsule (8 mg total) by mouth daily with breakfast.  Dose: 8 mg     timolol 0.5 % ophthalmic solution  Commonly known as: TIMOPTIC  1 drop daily.  Dose: 1 drop               Home Medications:   acetaminophen (TYLENOL) tablet 650 mg  650 mg oral q4h KOFI    artificial tears (dextran-hpm-glyc) (GENTEAL TEARS) 0.1-0.3-0.2 % eye drops 1 drop  1 drop Right Eye 4x daily PRN    atorvastatin (LIPITOR) tablet 20 mg  20 mg oral Daily    bisacodyL (DULCOLAX) 10 mg suppository 10 mg  10 mg rectal Daily PRN    glucose chewable tablet 15-30 g of dextrose  15-30 g of dextrose oral PRN    Or    dextrose 40 % oral  gel 15-30 g of dextrose  15-30 g of dextrose oral PRN    Or    glucagon (GLUCAGEN) injection 1 mg  1 mg intramuscular PRN    Or    dextrose 50 % in water (D50) injection 12.5 g  25 mL intravenous PRN    felodipine (PLENDIL) 24 hr ER tablet 10 mg  10 mg oral Daily    finasteride (PROSCAR) tablet 5 mg  5 mg oral Daily    heparin (porcine) 5,000 unit/mL injection 5,000 Units  5,000 Units subcutaneous q8h KOFI    hydrALAZINE (APRESOLINE) tablet 25 mg  25 mg oral q6h PRN    insulin lispro U-100 (HumaLOG) pen 2-18 Units  2-18 Units subcutaneous Before meals & nightly    lisinopriL (PRINIVIL) tablet 10 mg  10 mg oral Daily    losartan (COZAAR) tablet 100 mg  100 mg oral Daily    metFORMIN (GLUCOPHAGE) tablet 500 mg  500 mg oral BID with breakfast and dinner    metoprolol tartrate (LOPRESSOR) tablet 50 mg  50 mg oral BID    netarsudiL-latanoprost 0.02-0.005 % drops 1 drop  1 drop Both Eyes Nightly    ondansetron ODT (ZOFRAN-ODT) disintegrating tablet 4 mg  4 mg oral q8h PRN    oxyCODONE (ROXICODONE) immediate release tablet 5 mg  5 mg oral q4h PRN    polyethylene glycol (MIRALAX) 17 gram packet 17 g  17 g oral Daily    tamsulosin (FLOMAX) 24 hr ER capsule 0.4 mg  0.4 mg oral Nightly    timolol (TIMOPTIC) 0.5 % ophthalmic solution 1 drop  1 drop Both Eyes Daily       Imaging:  X-RAY CERVICAL SPINE 2 OR 3 VIEWS  Result Date: 6/3/2025  CLINICAL HISTORY: Status post operative fixation in the lower thoracic/upper lumbar spine.. COMMENT: Three radiographs of the cervical spine were obtained 6/3/2025. COMPARISON: Cervical spine radiographs from 5/5/2014 and CT of the cervical spine from 5/31/2025 There has been interval postsurgical change of extension of the previously noted posterior fixation in the lower cervical spine, with postsurgical changes extending from C3 through at least the midthoracic spine, noting significantly limited evaluation due to patient positioning.  Is grade 1 anterolisthesis of C2 on C3, which appears new  from the prior examination, however this may be artifactual.  The fixation hardware appears grossly appropriately positioned, again noting significantly limited evaluation.  Surgical drains are noted.     IMPRESSION: Significantly limited evaluation.  See comment.     FL FLUOROSCOPY TECHNICAL ASSISTANCE  Result Date: 6/1/2025  Please see OR Operative Report    CT THORACIC SPINE WITHOUT IV CONTRAST  Result Date: 6/1/2025  STUDY:    Noncontrast CT examination of the thoracic spine performed. Sagittal and coronal reformations rendered from axial source images. Images reviewed in bone and soft tissue windows. CT DOSE:  One or more dose reduction techniques (e.g. automated exposure control, adjustment of the mA and/or kV according to patient size, use of iterative reconstruction technique) utilized for this examination. CLINICAL HISTORY: C7 fracture. COMPARISON: 5/31/2025. COMMENT: Again seen is oblique fracture through the anterior osteophytes at C7 as well as the anterior inferior C7 vertebral body and interspinous widening C7-T1. Thoracic alignment:Anatomic. Vertebral bodies: Normal in height. Intervertebral discs: Normal in height. Thoracic spinal canal: Patent. Axial images: Posterior osteophytosis at T8-T9 causes at least moderate osseous narrowing of the central spinal canal. DISH; breaks in the anterior T10-T11 and T11-T12 appear corticated and likely chronic. Other: Potential gallstone. Probable renal cysts. Atrophy of the imaged pancreas. Renal hilar vascular calcifications versus tiny subcentimeter nonobstructing stones, as visualized. Cardia megaly with coronary arterial calcifications. Small bilateral pleural effusions with overlying subsegmental atelectasis. There may be mild pulmonary edema. Evaluation for pulmonary nodules is difficult due to respiratory motion artifact.     IMPRESSION: 1. No acute thoracic spine fracture or traumatic malalignment. MRI remains recommended for evaluation of the C7 fracture  and C7-T1 articulations. 2. Posterior osteophytosis at T8-T9 causes at least moderate osseous narrowing of the central spinal canal. 3. Mild CHF with trace bilateral pleural effusions and overlying subsegmental atelectasis, not well evaluated due to respiratory motion artifact. Recommend outpatient chest CT in 2-4 weeks.    CT HEAD WITHOUT IV CONTRAST  Result Date: 5/31/2025  CLINICAL HISTORY: Head trauma, minor (Age >= 65y) Neck trauma (Age >= 65y) Fall, found down COMMENT: CT examination of the brain were obtained with axial images without IV contrast. Sagittal and coronal reformats were performed. CT examination of the cervical spine was performed with axial images without IV contrast. Sagittal and coronal reformats were performed. CT DOSE:  One or more dose reduction techniques (e.g. automated exposure control, adjustment of the mA and/or kV according to patient size, use of iterative reconstruction technique) utilized for this examination. COMPARISON:    6/18/2023 head CT, 2/14/2022 cervical spine and head CT Head: There is no evidence of acute transcortical infarction, intracranial hemorrhage, or extra-axial fluid collection. There is no midline shift or mass effect. The ventricles, cisterns, and sulci demonstrate involutional change. There are subcortical, periventricular, and deep white matter hypodensities which are nonspecific but are likely due to chronic microangiopathic changes. There are intracranial vascular calcifications. The visualized osseous structures appear normal. The visualized paranasal sinuses and the mastoid air cells are grossly clear. The visualized orbits are normal in appearance. There are lens replacements bilaterally. There is a right parieto-occipital scalp laceration/hematoma. Cervical spine: There is straightening of the cervical lordosis. There are multilevel postsurgical changes of the cervical spine, not appreciably changed from 2/14/2022, with posterior fixation from C4  through C7 with associated laminectomies. There are anterior bridging osteophytes. A linear lucency consistent with nondisplaced fracture is identified through the right aspect of C7 including through anterior bridging osteophyte as well as the corner of the vertebral body, mostly on the right but also appearing to extend through the left aspect of the inferior C7 vertebral body. There is associated widening of the right C7-T1 facet joint and widening of the interspinous ligament at C7-T1 as compared with the prior study. This is therefore consistent with an unstable multicolumn fracture. MRI is recommended for further evaluation. Underlying osteopenia is suggested as well.     IMPRESSION: 1.  No evidence of acute intracranial abnormality. 2.  Acute unstable multicolumn fracture of the cervical spine through the C7 vertebral body with associated widening of the C7-T1 facet joint on the right and widening of the interspinous ligament at C7-T1. This is consistent with an unstable/multicolumn fracture of the cervical spine and MRI is recommended for further evaluation. Finding:    Other   Acuity: Critical  Status:  CLOSED Critical read back was performed and results were read back by ANTONI DUNBAR, 5/31/2025 5:35 PM.     CT CERVICAL SPINE WITHOUT IV CONTRAST  Result Date: 5/31/2025  CLINICAL HISTORY: Head trauma, minor (Age >= 65y) Neck trauma (Age >= 65y) Fall, found down COMMENT: CT examination of the brain were obtained with axial images without IV contrast. Sagittal and coronal reformats were performed. CT examination of the cervical spine was performed with axial images without IV contrast. Sagittal and coronal reformats were performed. CT DOSE:  One or more dose reduction techniques (e.g. automated exposure control, adjustment of the mA and/or kV according to patient size, use of iterative reconstruction technique) utilized for this examination. COMPARISON:    6/18/2023 head CT, 2/14/2022 cervical spine and  head CT Head: There is no evidence of acute transcortical infarction, intracranial hemorrhage, or extra-axial fluid collection. There is no midline shift or mass effect. The ventricles, cisterns, and sulci demonstrate involutional change. There are subcortical, periventricular, and deep white matter hypodensities which are nonspecific but are likely due to chronic microangiopathic changes. There are intracranial vascular calcifications. The visualized osseous structures appear normal. The visualized paranasal sinuses and the mastoid air cells are grossly clear. The visualized orbits are normal in appearance. There are lens replacements bilaterally. There is a right parieto-occipital scalp laceration/hematoma. Cervical spine: There is straightening of the cervical lordosis. There are multilevel postsurgical changes of the cervical spine, not appreciably changed from 2/14/2022, with posterior fixation from C4 through C7 with associated laminectomies. There are anterior bridging osteophytes. A linear lucency consistent with nondisplaced fracture is identified through the right aspect of C7 including through anterior bridging osteophyte as well as the corner of the vertebral body, mostly on the right but also appearing to extend through the left aspect of the inferior C7 vertebral body. There is associated widening of the right C7-T1 facet joint and widening of the interspinous ligament at C7-T1 as compared with the prior study. This is therefore consistent with an unstable multicolumn fracture. MRI is recommended for further evaluation. Underlying osteopenia is suggested as well.     IMPRESSION: 1.  No evidence of acute intracranial abnormality. 2.  Acute unstable multicolumn fracture of the cervical spine through the C7 vertebral body with associated widening of the C7-T1 facet joint on the right and widening of the interspinous ligament at C7-T1. This is consistent with an unstable/multicolumn fracture of the  cervical spine and MRI is recommended for further evaluation. Finding:    Other   Acuity: Critical  Status:  CLOSED Critical read back was performed and results were read back by ANTONI DUNBAR, 5/31/2025 5:35 PM.     X-RAY CHEST 1 VIEW  Result Date: 5/31/2025  CLINICAL HISTORY:   Fall, leukocytosis. COMMENT:    A single frontal view of the chest was obtained using portable technique.  Comparison with a similar study from 6/18/2023. The heart and pulmonary vascularity appear within normal limits.  The lungs are free of fluid and infiltrate.  There are thoracic spine degenerative changes. There are atherosclerotic calcifications of the thoracic aorta.  There are shoulder joint degenerative changes.  Partially imaged are instrumented posterior fusion changes in the cervical spine.     IMPRESSION: No active disease is seen in the chest.    X-RAY THORACIC SPINE 2 VIEWS  Result Date: 5/31/2025  CLINICAL HISTORY: Fall, back pain. COMMENT: Three views of the thoracic spine were obtained. No similar prior studies are available for comparison. Normal thoracic kyphosis.  No subluxation.  Vertebral bodies are normal in height.  There is multilevel diffuse loss of intervertebral disc height. Flowing ventral osteophytes noted at multiple levels.  No evidence of a prevertebral or paravertebral soft tissue mass or abnormal calcification. Atherosclerotic calcifications of the thoracic aorta without evidence of aneurysm.     IMPRESSION: Thoracic spondylosis and hyperostosis.  No acute osseous abnormality.       Follow-Up Appointments:    Call Ramiro Messina MD (Trauma) at [phone] (office number) to make an appointment as needed.     Call Raza Benjamin MD (Neurosurgery) at [phone] to make an appointment in 2 weeks.     Call your family doctor in 1-2 weeks for general follow up of your injuries and admission to the hospital.      Disposition:   Acute Inpatient Rehab    The patient's medication instructions, follow-up  instructions, activity restrictions, and symptom management were explained to the patient and/or family prior to discharge and patient/family demonstrated a satisfactory understanding.

## 2025-06-04 NOTE — PLAN OF CARE
Problem: Adult Inpatient Plan of Care  Goal: Plan of Care Review  Flowsheets (Taken 6/4/2025 8911)  Progress: improving  Outcome Evaluation: Pt AAOx4. Some pain to neck, Tylenol given as ordered. Able to stand and pivot with walker and staff assistance. Aspen collar on when OOB. Enema given, passing gas. No BM. Resting in bed. Call bell within reach.  Plan of Care Reviewed With: patient   Plan of Care Review  Plan of Care Reviewed With: patient  Progress: improving  Outcome Evaluation: Pt AAOx4. Some pain to neck, Tylenol given as ordered. Able to stand and pivot with walker and staff assistance. Aspen collar on when OOB. Enema given, passing gas. No BM. Resting in bed. Call bell within reach.

## 2025-06-04 NOTE — PLAN OF CARE
Plan of Care Review  Plan of Care Reviewed With: patient  Progress: improving  Outcome Evaluation: Pt orienting to new facility

## 2025-06-05 ENCOUNTER — APPOINTMENT (INPATIENT)
Dept: PHYSICAL THERAPY | Facility: REHABILITATION | Age: 75
DRG: 560 | End: 2025-06-05
Payer: MEDICARE

## 2025-06-05 ENCOUNTER — APPOINTMENT (INPATIENT)
Dept: OCCUPATIONAL THERAPY | Facility: REHABILITATION | Age: 75
DRG: 560 | End: 2025-06-05
Payer: MEDICARE

## 2025-06-05 LAB
25(OH)D3 SERPL-MCNC: 13 NG/ML (ref 30–100)
ALBUMIN SERPL-MCNC: 3.4 G/DL (ref 3.5–5.7)
ALP SERPL-CCNC: 115 IU/L (ref 34–125)
ALT SERPL-CCNC: 15 IU/L (ref 7–52)
ANION GAP SERPL CALC-SCNC: 9 MEQ/L (ref 3–15)
AST SERPL-CCNC: 28 IU/L (ref 13–39)
BASOPHILS # BLD: 0.07 K/UL (ref 0.01–0.1)
BASOPHILS NFR BLD: 0.7 %
BILIRUB DIRECT SERPL-MCNC: 0.2 MG/DL
BILIRUB SERPL-MCNC: 0.7 MG/DL (ref 0.3–1.2)
BUN SERPL-MCNC: 20 MG/DL (ref 7–25)
CALCIUM SERPL-MCNC: 8.7 MG/DL (ref 8.6–10.3)
CHLORIDE SERPL-SCNC: 101 MEQ/L (ref 98–107)
CK SERPL-CCNC: 124 U/L (ref 30–223)
CO2 SERPL-SCNC: 25 MEQ/L (ref 21–31)
CREAT SERPL-MCNC: 0.9 MG/DL (ref 0.7–1.3)
DIFFERENTIAL METHOD BLD: ABNORMAL
EGFRCR SERPLBLD CKD-EPI 2021: >60 ML/MIN/1.73M*2
EOSINOPHIL # BLD: 0.13 K/UL (ref 0.04–0.54)
EOSINOPHIL NFR BLD: 1.2 %
ERYTHROCYTE [DISTWIDTH] IN BLOOD BY AUTOMATED COUNT: 13.6 % (ref 11.6–14.4)
GLUCOSE BLD-MCNC: 132 MG/DL (ref 70–99)
GLUCOSE BLD-MCNC: 134 MG/DL (ref 70–99)
GLUCOSE BLD-MCNC: 159 MG/DL (ref 70–99)
GLUCOSE SERPL-MCNC: 126 MG/DL (ref 70–99)
HCT VFR BLD AUTO: 35.8 % (ref 40.1–51)
HGB BLD-MCNC: 12.2 G/DL (ref 13.7–17.5)
IMM GRANULOCYTES # BLD AUTO: 0.07 K/UL (ref 0–0.08)
IMM GRANULOCYTES NFR BLD AUTO: 0.7 %
LYMPHOCYTES # BLD: 1.56 K/UL (ref 1.2–3.5)
LYMPHOCYTES NFR BLD: 14.6 %
MCH RBC QN AUTO: 32.1 PG (ref 28–33.2)
MCHC RBC AUTO-ENTMCNC: 34.1 G/DL (ref 32.2–36.5)
MCV RBC AUTO: 94.2 FL (ref 83–98)
MONOCYTES # BLD: 1.31 K/UL (ref 0.3–1)
MONOCYTES NFR BLD: 12.2 %
NEUTROPHILS # BLD: 7.57 K/UL (ref 1.7–7)
NEUTS SEG NFR BLD: 70.6 %
NRBC BLD-RTO: 0 %
PLATELET # BLD AUTO: 264 K/UL (ref 150–350)
PMV BLD AUTO: 11 FL (ref 9.4–12.4)
POCT TEST: ABNORMAL
POTASSIUM SERPL-SCNC: 3.7 MEQ/L (ref 3.5–5.1)
PROT SERPL-MCNC: 6.2 G/DL (ref 6–8.2)
RBC # BLD AUTO: 3.8 M/UL (ref 4.5–5.8)
SODIUM SERPL-SCNC: 135 MEQ/L (ref 136–145)
WBC # BLD AUTO: 10.71 K/UL (ref 3.8–10.5)

## 2025-06-05 PROCEDURE — 97163 PT EVAL HIGH COMPLEX 45 MIN: CPT | Mod: GP

## 2025-06-05 PROCEDURE — 97530 THERAPEUTIC ACTIVITIES: CPT | Mod: GP

## 2025-06-05 PROCEDURE — 82306 VITAMIN D 25 HYDROXY: CPT | Performed by: HOSPITALIST

## 2025-06-05 PROCEDURE — 80048 BASIC METABOLIC PNL TOTAL CA: CPT | Performed by: INTERNAL MEDICINE

## 2025-06-05 PROCEDURE — 63700000 HC SELF-ADMINISTRABLE DRUG: Performed by: HOSPITALIST

## 2025-06-05 PROCEDURE — 85025 COMPLETE CBC W/AUTO DIFF WBC: CPT | Performed by: INTERNAL MEDICINE

## 2025-06-05 PROCEDURE — 97167 OT EVAL HIGH COMPLEX 60 MIN: CPT | Mod: GO

## 2025-06-05 PROCEDURE — 82550 ASSAY OF CK (CPK): CPT | Performed by: HOSPITALIST

## 2025-06-05 PROCEDURE — 63600000 HC DRUGS/DETAIL CODE: Mod: JZ | Performed by: INTERNAL MEDICINE

## 2025-06-05 PROCEDURE — 63700000 HC SELF-ADMINISTRABLE DRUG: Performed by: INTERNAL MEDICINE

## 2025-06-05 PROCEDURE — 12800000 HC ROOM AND CARE SEMIPRIVATE REHAB

## 2025-06-05 PROCEDURE — 80076 HEPATIC FUNCTION PANEL: CPT | Performed by: HOSPITALIST

## 2025-06-05 PROCEDURE — 36415 COLL VENOUS BLD VENIPUNCTURE: CPT | Performed by: INTERNAL MEDICINE

## 2025-06-05 PROCEDURE — 12800001 HC ROOM AND CARE SEMIPRIVATE REHAB-BRAIN INJ

## 2025-06-05 RX ORDER — AMOXICILLIN 250 MG
2 CAPSULE ORAL 2 TIMES DAILY
Status: DISPENSED | OUTPATIENT
Start: 2025-06-05

## 2025-06-05 RX ORDER — LANOLIN ALCOHOL/MO/W.PET/CERES
1000 CREAM (GRAM) TOPICAL DAILY
Status: DISPENSED | OUTPATIENT
Start: 2025-06-05

## 2025-06-05 RX ORDER — AMOXICILLIN 250 MG
2 CAPSULE ORAL DAILY PRN
Status: DISCONTINUED | OUTPATIENT
Start: 2025-06-05 | End: 2025-06-05

## 2025-06-05 RX ORDER — PANTOPRAZOLE SODIUM 40 MG/1
40 TABLET, DELAYED RELEASE ORAL
Status: DISPENSED | OUTPATIENT
Start: 2025-06-06

## 2025-06-05 RX ORDER — BISACODYL 10 MG/1
10 SUPPOSITORY RECTAL DAILY PRN
Status: ACTIVE | OUTPATIENT
Start: 2025-06-05

## 2025-06-05 RX ORDER — ACETAMINOPHEN 325 MG/1
650 TABLET ORAL EVERY 6 HOURS
Status: DISPENSED | OUTPATIENT
Start: 2025-06-05

## 2025-06-05 RX ORDER — INSULIN LISPRO 100 [IU]/ML
2-5 INJECTION, SOLUTION INTRAVENOUS; SUBCUTANEOUS
Status: ACTIVE | OUTPATIENT
Start: 2025-06-05

## 2025-06-05 RX ORDER — ALUMINUM HYDROXIDE, MAGNESIUM HYDROXIDE, AND SIMETHICONE 1200; 120; 1200 MG/30ML; MG/30ML; MG/30ML
30 SUSPENSION ORAL EVERY 6 HOURS PRN
Status: ACTIVE | OUTPATIENT
Start: 2025-06-05

## 2025-06-05 RX ADMIN — METOPROLOL TARTRATE 50 MG: 50 TABLET, FILM COATED ORAL at 08:57

## 2025-06-05 RX ADMIN — ACETAMINOPHEN 650 MG: 325 TABLET ORAL at 08:58

## 2025-06-05 RX ADMIN — TIMOLOL MALEATE 1 DROP: 5 SOLUTION/ DROPS OPHTHALMIC at 12:55

## 2025-06-05 RX ADMIN — ACETAMINOPHEN 650 MG: 325 TABLET ORAL at 05:25

## 2025-06-05 RX ADMIN — OXYCODONE 5 MG: 5 TABLET ORAL at 09:03

## 2025-06-05 RX ADMIN — FELODIPINE 10 MG: 5 TABLET, FILM COATED, EXTENDED RELEASE ORAL at 08:58

## 2025-06-05 RX ADMIN — LISINOPRIL 10 MG: 10 TABLET ORAL at 08:57

## 2025-06-05 RX ADMIN — ACETAMINOPHEN 650 MG: 325 TABLET ORAL at 12:55

## 2025-06-05 RX ADMIN — METFORMIN HYDROCHLORIDE 500 MG: 500 TABLET ORAL at 17:16

## 2025-06-05 RX ADMIN — TAMSULOSIN HYDROCHLORIDE 0.4 MG: 0.4 CAPSULE ORAL at 20:21

## 2025-06-05 RX ADMIN — METFORMIN HYDROCHLORIDE 500 MG: 500 TABLET ORAL at 08:57

## 2025-06-05 RX ADMIN — OXYCODONE 5 MG: 5 TABLET ORAL at 22:17

## 2025-06-05 RX ADMIN — ACETAMINOPHEN 650 MG: 325 TABLET ORAL at 17:16

## 2025-06-05 RX ADMIN — METOPROLOL TARTRATE 50 MG: 50 TABLET, FILM COATED ORAL at 20:21

## 2025-06-05 RX ADMIN — LOSARTAN POTASSIUM 100 MG: 100 TABLET, FILM COATED ORAL at 08:58

## 2025-06-05 RX ADMIN — CYANOCOBALAMIN TAB 1000 MCG 1000 MCG: 1000 TAB at 17:16

## 2025-06-05 RX ADMIN — POLYETHYLENE GLYCOL 3350 17 G: 17 POWDER, FOR SOLUTION ORAL at 08:59

## 2025-06-05 RX ADMIN — ENOXAPARIN SODIUM 40 MG: 40 INJECTION SUBCUTANEOUS at 17:16

## 2025-06-05 RX ADMIN — ATORVASTATIN CALCIUM 20 MG: 20 TABLET, FILM COATED ORAL at 08:57

## 2025-06-05 RX ADMIN — FINASTERIDE 5 MG: 5 TABLET, FILM COATED ORAL at 08:58

## 2025-06-05 ASSESSMENT — COGNITIVE AND FUNCTIONAL STATUS - GENERAL: AFFECT: WFL

## 2025-06-05 NOTE — PROGRESS NOTES
IM X-Cover    This patient is a 75 y.o. yo male presenting tonight from [facility] for acute rehabilitation following a fall and close displaced fracture of the seventh cervical vertebra requiring ORIF    The patient presented on May 31 after having a fall he was taken to the OR on the first with Dr. Benjamin underwent ORIF of the C7 fracture with removal and replacement of the C3-C6 posterior instrumentation as well as a C3 T3 posterior lateral fusion drains remain in place overall did well and was able to progress with physical therapy    This patient has a past medical history notable for: Colon carcinoma, BPH, hypertension, glaucoma, type 2 diabetes, history of cervical fusion 10 years ago    Tonight, the only complaint is fatigue and difficulty raising his hands above his head    Otherwise, the patient denies headache, chest pain or pressure, dyspnea, cough, abdominal pain, nausea, vomiting, or other symptoms.    VS as per EMR. HEENT WNL. NECK no JVD. CV RRR no m/r. RESP CTAB no r/r/w. ABD s/nd/nt NABS. EXT no c/c/e.  Skin warm dry. NEURO stable from xfer records.    Medications reconciled. Diet ordered. BMR order set utilized. Case d/w patient and nursing.  Full plan in AM per primary team. Patient is in STABLE condition tonight.  Difficulty lifting his hands above his head bilateral lower extremity diffuse weakness not focal.    CODE STATUS is full code full tube and he would want his wife Beatrice and to be the surrogate decision maker if need be

## 2025-06-05 NOTE — PLAN OF CARE
Individualized Plan of Care    Aneesh Brito is admitted to [facility] for comprehensive inpatient rehabilitation for neurologic condition status post fall, unstable 3 column C7 Chance fracture, status post C7 ORIF, removal of prior C3-6 hardware and C3-T1 PLIF by neurosurgery, history of gait instability and multiple falls, peripheral neuropathy, class I obesity, multiple medical problems with functional deficits in balance; strength; coordination; endurance/activity tolerance; mobility; pain; safety awareness; self-care. Patient is receiving the following services: medical consultative services; occupational therapy; physical therapy, rehabitation nursing care, case management evaluation, psychology services.     Frequency of Treatment (OT): 5-7 times per week  Intensity of Treatment (OT): 60-90 minutes per day  Frequency of Treatment (PT): 5-7 times per week  Intensity of Treatment (PT): 60-90 minutes per day       Active medical management is required for  Patient Active Problem List   Diagnosis    Diverticulosis of colon    Enlarged prostate with lower urinary tract symptoms (LUTS)    Hypertension, benign    Malignant melanoma of skin (CMS/HCC)    Osteoarthritis of knee    Overweight    Spinal stenosis of lumbar region    Chronic kidney disease, stage I    Dry eye syndrome of bilateral lacrimal glands    Primary open-angle glaucoma, bilateral, indeterminate stage    Bilateral pseudophakia    Tear film insufficiency    Pure hypercholesterolemia    Hyponatremia    Aneurysm of iliac artery (CMS/HCC)    Glomerulosclerosis    Generalized weakness    Lumbar degenerative disc disease    Type 2 diabetes mellitus without complications (CMS/HCC)    Traumatic closed fracture of C7 vertebra with minimal displacement, initial encounter (CMS/HCC)    Closed nondisplaced fracture of seventh cervical vertebra (CMS/HCC)    Falls, sequela       Risk for Complications  Aneesh is at risk for the following  complications:     Acute change in mental status due to multiple medications   Cardiac event due to history of cardiac conditions   Constipation due to opioid medication, impaired mobility, and colon/rectal cancer    DVT/PE due to impaired mobility and post-surgical state   Falls due to impaired balance and known history of falls   Hemorrhage/bleed due to anti-coagulation medication       Skin breakdown/pressure ulcers due to impaired mobility and obesity   Uncontrolled pain due to opioid medication    Wound infection due to surgical incision and obesity         Aneesh's medical prognosis is good to achieve the stated goals below.    Expected Level of Function  Self-Care: Independent  Transfers: Independent  Locomotion: Independent  Communication: Independent  Social Cognition: Independent      Goals  IRF PT Goals      Flowsheet Row Most Recent Value   Bed Mobility Goal 1    Activity/Assistive Device rolling to left, rolling to right, sit to supine/supine to sit at 06/05/2025 0902   [place] minimum assist (75% or more patient effort) at 06/05/2025 0902   Time Frame short-term goal (STG), 1 week at 06/05/2025 0902   Bed Mobility Goal 2    Activity/Assistive Device rolling to left, rolling to right, sit to supine/supine to sit at 06/05/2025 0902   [place] modified independence at 06/05/2025 0902   Time Frame long-term goal (LTG), 3 weeks at 06/05/2025 0902   Transfer Goal 1    Activity/Assistive Device sit-to-stand/stand-to-sit, stand pivot at 06/05/2025 0902   [place] minimum assist (75% or more patient effort) at 06/05/2025 0902   Time Frame short-term goal (STG), 1 week at 06/05/2025 0902   Transfer Goal 2    Activity/Assistive Device sit-to-stand/stand-to-sit, stand pivot at 06/05/2025 0902   [place] modified independence at 06/05/2025 0902   Time Frame long-term goal (LTG), 3 weeks at 06/05/2025 0902   Gait/Walking Locomotion Goal 1    Activity/Assistive Device gait (walking locomotion) at  06/05/2025 0902   Distance 50 feet at 06/05/2025 0902   [place] minimum assist (75% or more patient effort) at 06/05/2025 0902   Time Frame short-term goal (STG), 1 week at 06/05/2025 0902   Gait/Walking Locomotion Goal 2    Activity/Assistive Device gait (walking locomotion) at 06/05/2025 0902   Distance 150 feet at 06/05/2025 0902   [place] supervision required at 06/05/2025 0902   Time Frame long-term goal (LTG), 3 weeks at 06/05/2025 0902     IRF OT Goals      Flowsheet Row Most Recent Value   Transfer Goal 1    Activity/Assistive Device toilet, commode, 3-in-1 at 06/05/2025 1033   [place] minimum assist (75% or more patient effort) at 06/05/2025 1033   Time Frame short-term goal (STG), 5 - 7 days at 06/05/2025 1033   Transfer Goal 2    Activity/Assistive Device toilet, commode, 3-in-1 at 06/05/2025 1033   [place] modified independence at 06/05/2025 1033   Time Frame long-term goal (LTG), 21 days or less at 06/05/2025 1033   Transfer Goal 3    Activity/Assistive Device shower, tub bench at 06/05/2025 1033   [place] minimum assist (75% or more patient effort) at 06/05/2025 1033   Time Frame short-term goal (STG), 5 - 7 days at 06/05/2025 1033   Transfer Goal 4    Activity/Assistive Device shower, tub bench at 06/05/2025 1033   [place] modified independence at 06/05/2025 1033   Time Frame long-term goal (LTG), 21 days or less at 06/05/2025 1033   Bathing Goal 1    Activity/Assistive Device bathing skills, all at 06/05/2025 1033   [place] minimum assist (75% or more patient effort) at 06/05/2025 1033   Time Frame short-term goal (STG), 5 - 7 days at 06/05/2025 1033   Bathing Goal 2    Activity/Assistive Device bathing skills, all at 06/05/2025 1033   [place] modified independence at 06/05/2025 1033   Time Frame long-term goal (LTG), 21 days or less at 06/05/2025 1033   UB Dressing Goal 1    Activity/Assistive Device upper body dressing at 06/05/2025 1033   [place]  maximum assist (25-49% patient effort) at 06/05/2025 1033   Time Frame short-term goal (STG), 5 - 7 days at 06/05/2025 1033   Progress/Outcome goal no longer appropriate at 06/05/2025 1033   UB Dressing Goal 2    Activity/Assistive Device upper body dressing at 06/05/2025 1033   [place] modified independence at 06/05/2025 1033   Time Frame long-term goal (LTG), 21 days or less at 06/05/2025 1033   LB Dressing Goal 1    Activity/Assistive Device lower body dressing at 06/05/2025 1033   [place] maximum assist (25-49% patient effort) at 06/05/2025 1033   Time Frame short-term goal (STG), 5 - 7 days at 06/05/2025 1033   LB Dressing Goal 2    Activity/Assistive Device lower body dressing at 06/05/2025 1033   [place] modified independence at 06/05/2025 1033   Time Frame long-term goal (LTG), 21 days or less at 06/05/2025 1033   Grooming Goal 1    Activity/Assistive Device grooming skills, all at 06/05/2025 1033   [place] set-up required at 06/05/2025 1033   Time Frame short-term goal (STG), 5 - 7 days at 06/05/2025 1033   Grooming Goal 2    Activity/Assistive Device grooming skills, all at 06/05/2025 1033   [place] modified independence at 06/05/2025 1033   Time Frame long-term goal (LTG), 21 days or less at 06/05/2025 1033   Toileting Goal 1    Activity/Assistive Device toileting skills, all at 06/05/2025 1033   [place] maximum assist (25-49% patient effort) at 06/05/2025 1033   Time Frame short-term goal (STG), 5 - 7 days at 06/05/2025 1033   Toileting Goal 2    Activity/Assistive Device toileting skills, all at 06/05/2025 1033   [place] modified independence at 06/05/2025 1033   Time Frame long-term goal (LTG), 21 days or less at 06/05/2025 1033       Anticipated Discharge Plan  home with home health    Expected length of stay: 14 days

## 2025-06-05 NOTE — H&P
Patient Name: Aneesh Brito  MRN: [id number]  : 1950  Admission Date: 2025    Chief Complaint:    Dysfunction in ambulation, transfers and self-care status post fall, unstable 3 column C7 Chance fracture, status post C7 ORIF, removal of prior C3-6 hardware and C3-T1 PLIF by neurosurgery, history of gait instability and multiple falls, peripheral neuropathy, class I obesity, multiple medical problems. (Patient was admitted to [facility] late in the evening on 25. Records reviewed on 25. Patient evaluated on 25 at about 3:10 PM. Full H&P done on 25. Plan of care discussed with patient, his wife and son at bedside.  Discussed with nurse.)    History of Present Illness:    Mr. Aneesh Brito is a 75-year-old left handed white male with chronic conditions significant for hypertension, hyperlipidemia, diabetes mellitus type 2, BPH, glaucoma, colon cancer, gait instability, multiple falls in the home environment for which he was seeing a neurologist for workup of falls, history of prior bilateral hip replacements and bilateral knee replacements, spinal stenosis status post previous C3-C6 laminectomy and posterolateral instrumented fusion over 10 years ago by Dr. Maurer, presented to the ER at [facility] on 25 after suffering from a mechanical fall while at home and was unable to get up so he was on the floor all night until he was found.  At [facility] ER and he reported neck pain and pain in the lower cervical and upper thoracic region.  Imaging studies revealed C7 Chance fracture and findings of DISH (diffuse idiopathic skeletal hyperostosis ).  He was subsequently transferred to [facility] for neurosurgical evaluation where he was admitted on 25.  He was evaluated by Dr. Benjamin from neurosurgery, noted to have a 3 column Chance fracture at C7-T1 level, given highly unstable fracture was recommended surgical intervention by neurosurgery. He underwent  C7 ORIF, removal of C3-6 hardware and C3-T1 PLIF by neurosurgery Dr. Raza Benjamin on 6/1/25.  Postoperatively is allowed weightbearing as tolerated on both lower extremities.  He has been given Aspen collar for use when out of bed in chair and when ambulating.  He can use soft cervical collar in the bed.  I discussed spinal precautions with him.  Postoperative course was significant for anemia but he did not require transfusion.  His pain is managed with Tylenol and Oxycodone.  He had elevated blood pressure requiring IV Hydralazine but blood pressure improved after surgery and hypertension was managed with managed with Felodipine, Lisinopril, Losartan and Metoprolol.  He is continued on Lipitor for hyperlipidemia.  He has history of BPH and remains on Tamsulosin and Finasteride.  He was on Netarsudil-Latanoprost and Timoptic eye drops for glaucoma.  He had elevated blood sugars related to Decadron perioperatively and required insulin and subsequently blood sugars improved and he is managed with Metformin now. He had elevated CK of 1118 on admission due to rhabdomyolysis from being down on floor prior to admission. He was treated with IV fluids and CK improved.  I discussed his recent clinical course with patient, his wife and son who is a pediatric physician and his son indicated that patient was being evaluated by neurologist Dr. Hussein Haider regarding his gait instability and falls, and lumbar spine MRI was done, EMG studies which showed peripheral neuropathy as well as muscle biopsy was being planned of the right quadriceps to evaluate  his proximal weakness in bilateral lower extremities.  He is unable to do active straight leg raise in bilateral lower extremities in bed, unable to localize position sense in his left great toe and I also discussed with patient, his wife and son at bedside.  I also mentioned to them that neurology will be conservative at him during his stay at [facility] and that he should  follow up with Dr. Hussein Haider from neurology on outpatient basis for further evaluation and workup as appropriate.  He is on subcutaneous Heparin and SCDs for DVT prophylaxis.  He is able to tolerate regular with thins consistency diet.  On 6/4/25, hemoglobin was 11.3, WBC count was 11.85, platelets were 236, BUN was 22, creatinine was 1.1, sodium was 134 and potassium was 3.3.  He has been needing assistance for mobility, transfers, self-care. I have reviewed the preadmission screening and concur with that information and there are no significant changes from patient's preadmission screening. He is transferred to [facility] on 6/4/25 for further rehabilitation care.       Past Medical History:    Past Medical History:   Diagnosis Date    C7 cervical fracture (CMS/HCC) 05/31/2025    Colonic adenoma     Diverticulosis of colon     Enlarged prostate with lower urinary tract symptoms (LUTS)     Glaucoma     Hypertension     Melanoma of skin (CMS/HCC)     Osteoarthritis of knee     Overweight     Spinal stenosis of lumbar region     Type 2 diabetes mellitus (CMS/[facility])        Past Surgical History:    Past Surgical History   Procedure Laterality Date    1) ORIF C7 fracture 2) removal and replacement of C3-6 posterior intrumentation 3) C3 to T3 posterolaterral instrumented fusion  Depuy + removal set SMA C-arm O-Arm N/A 6/1/2025    Performed by Raza Benjamin MD at  OR    Appendectomy      Cervical fusion  06/01/2025    1) ORIF C7 fracture 2) removal and replacement of C3-6 posterior intrumentation 3) C3 to T3 posterolaterral instrumented fusion    Colonoscopy  01/10/2012    diverticulosis    Colonoscopy w/ polypectomy  02/09/2022    Hip arthroplasty Bilateral     Knee arthroplasty Left     Knee arthroplasty Right 02/02/2022       Medications:    Current Facility-Administered Medications   Medication Dose Route Frequency    acetaminophen  650 mg oral q6h KOFI    alum-mag hydroxide-simeth  30 mL oral q6h  PRN    atorvastatin  20 mg oral Daily    bisacodyL  10 mg rectal Daily PRN    glucose  15-30 g of dextrose oral PRN    Or    dextrose  15-30 g of dextrose oral PRN    Or    glucagon  1 mg intramuscular PRN    Or    dextrose 50 % in water (D50)  25 mL intravenous PRN    enoxaparin  40 mg subcutaneous Daily (6p)    felodipine  10 mg oral Daily    finasteride  5 mg oral Daily    hydrALAZINE  25 mg oral q6h PRN    insulin lispro U-100  2-5 Units subcutaneous BID AC    lisinopriL  10 mg oral Daily    losartan  100 mg oral Daily    metFORMIN  500 mg oral BID with breakfast and dinner    metoprolol tartrate  50 mg oral BID    netarsudiL-latanoprost  1 drop Right Eye Nightly    oxyCODONE  5 mg oral q4h PRN    polyethylene glycol  17 g oral Daily    sennosides-docusate sodium  2 tablet oral Daily PRN    tamsulosin  0.4 mg oral Nightly    timolol  1 drop Both Eyes Daily       Allergies:    Allergies   Allergen Reactions    No Known Allergies        Family History:    Family History   Problem Relation Name Age of Onset    Breast cancer Biological Mother         Social History:    The patient is  and lives with his wife in a fourth floor elevator accessible apartment at [facility]. No steps to enter, bedroom and bathroom on the same level. 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  He denies smoking, drinks beer socially infrequently and denies using recreational drugs.    Prior Living Arrangement:   People in Home: spouse  Name(s) of People in Home: Korina  Current Living Arrangements: independent living facility  Home Accessibility: wheelchair accessible  Living Environment Comment: PTA residing c wife in ILF ([facility]). 1 floor set up. Shower stall c shower chair & grab bars. Reports no DME at toilet - information to be verified c pt. wife  Number of Stairs, Main Entrance: none  Patient Bedroom Access Comment: PTA  residing c wife in [facility] ([facility] [address] [facility] 1 floor set up. Shower stall c shower chair & grab bars. Reports no DME at toilet - information to be verified c pt. wife  Bathroom Access Comment: PTA residing c wife in [facility] ([facility] [address] 1 floor set up. Shower stall c shower chair & grab bars. Reports no DME at toilet - information to be verified c pt. wife  Number of Stairs, Within Home, Primary: none      Premorbid Function:   Dominant Hand: left  Ambulation: assistive equipment (rollator)  Transferring: assistive equipment (rollator)  Toileting: independent  Bathing: independent  Dressing: independent  Eating: independent  IADLs: independent  Driving/Transportation:   Communication: understands/communicates without difficulty  Assistive Device/Animal Currently Used at Home: cane, straight; walker, 4-wheeled; shower chair  Prior Level of Function Comment: Pt reports being independent with use of SPC inside and Rollator to the dining jenkins. Independent ADLs. Denies other falls. Independent ADLs. (+) driving. Retired teacher      Functional History:    The patient is a left-hand dominant person. He was independent in ADLs and ambulation prior to his hospitalization.  He does have history of multiple falls and was seeing a neurologist for workup for falls.    Review Of Systems:    A complete and comprehensive review of systems was performed. The patient denies any chest pain, shortness of breath. He has had postoperative constipation and says he does not know when his last bowel movement was.  He will be kept on bowel medications.  He indicates is able to void.  We will monitor post void residuals.  He has some pain in his neck after surgery.  He does report decreased balance and multiple falls as well as weakness in his lower extremities and was being evaluated by neurologist on outpatient basis.  Otherwise, a complete and comprehensive review of systems is negative.    Physical Examination:    Blood  "pressure 117/63, pulse 60, temperature 36.5 °C (97.7 °F), temperature source Oral, resp. rate 20, height 1.803 m (5' 11\"), weight 109 kg (241 lb), SpO2 94%.    Body mass index is 33.61 kg/m².    General Appearance: Not in acute distress  Head/Ear/Nose/Throat: Normocephalic; Atraumatic.   Eye: EOMI; PERRL.   Neck: Healing incision posterior cervicothoracic spine.  Dressing in place.    Respiratory: Decreased breath sounds at bases.   Cardiovascular: RRR; Normal S1, S2.   Gastrointestinal: Soft; NT; +BS.   Extremities: Bilateral lower extremity edema noted.    Musculoskeletal: Functional active range of motion in both upper extremities except some limitation in left shoulder and is unable to lift left arm up overhead.  Some limitation of active range of motion in both hips and both knees, which is chronic according to patient and his wife at bedside.  Patient is unable to do active straight leg raise and either lower extremity.  He has had previous bilateral hip and bilateral knee replacements.  His wife mentions patient was lifting his lower extremities manually with his arms while getting in the car and after he sat down in the seat inside the car manually left each leg to bring them in the car.   Neurological: AAO ×3. Speech is fluent. Cranial nerve examination does not reveal any gross facial asymmetry. Strength testing about 4/5 strength in both upper extremities except left shoulder is 2-/5 and abduction and forward flexion.  Bilateral hip flexion is 2-/5.  Bilateral quadriceps are 2+/5 with the thighs supported.  Bilateral ankle dorsi and plantar flexion is 3+/5.  He denies significant numbness in his legs and feet at this time.  He is grossly able to localize position sense in his right great toe but not so in his left great toe.  He is able to localize vibration sense in both great toes.  Deep tendon reflexes are hypoactive and symmetric bilaterally.  Plantars are flexor.  Coordination is functional upper " extremities.  Both knee jerks were not tested.  Behavior/Emotional: Appropriate; Cooperative.   Skin: Healing incision posterior cervicothoracic spine.  Dressing in place.  Some abrasions noted on right upper extremity.  Left forearm has bruising ecchymosis noted.  Small wound noted on sacrum/coccyx at least stage II decubitus cannot rule out DTI.      Assessment and Plan:    ASSESSMENT PLAN:  1. 75-year-old left handed white male with chronic conditions significant for hypertension, hyperlipidemia, diabetes mellitus type 2, BPH, glaucoma, colon cancer, gait instability, multiple falls in the home environment for which he was seeing a neurologist for workup of falls, history of prior bilateral hip replacements and bilateral knee replacements, spinal stenosis status post previous C3-C6 laminectomy and posterolateral instrumented fusion over 10 years ago by Dr. Maurer, presented to the ER at [facility] on 5/31/25 after suffering from a mechanical fall while at home and was unable to get up so he was on the floor all night until he was found.  At [facility] ER and he reported neck pain and pain in the lower cervical and upper thoracic region.  Imaging studies revealed C7 Chance fracture and findings of DISH (diffuse idiopathic skeletal hyperostosis ).  He was subsequently transferred to [facility] for neurosurgical evaluation where he was admitted on 5/31/25.  He was evaluated by Dr. Benjamin from neurosurgery, noted to have a 3 column Chance fracture at C7-T1 level, given highly unstable fracture was recommended surgical intervention by neurosurgery. He underwent C7 ORIF, removal of C3-6 hardware and C3-T1 PLIF by neurosurgery Dr. Raza Benjamin on 6/1/25.  Postoperatively is allowed weightbearing as tolerated on both lower extremities.  He has been given Aspen collar for use when out of bed in chair and when ambulating.  He can use soft cervical collar in the bed.  I discussed spinal precautions with  him.  Postoperative course was significant for anemia but he did not require transfusion.  His pain is managed with Tylenol and Oxycodone.  He had elevated blood pressure requiring IV Hydralazine but blood pressure improved after surgery and hypertension was managed with managed with Felodipine, Lisinopril, Losartan and Metoprolol.  He is continued on Lipitor for hyperlipidemia.  He has history of BPH and remains on Tamsulosin and Finasteride.  He was on Netarsudil-Latanoprost and Timoptic eye drops for glaucoma.  He had elevated blood sugars related to Decadron perioperatively and required insulin and subsequently blood sugars improved and he is managed with Metformin now. He had elevated CK of 1118 on admission due to rhabdomyolysis from being down on floor prior to admission. He was treated with IV fluids and CK improved.  I discussed his recent clinical course with patient, his wife and son who is a pediatric physician and his son indicated that patient was being evaluated by neurologist Dr. Hussein Haider regarding his gait instability and falls, and lumbar spine MRI was done, EMG studies which showed peripheral neuropathy as well as muscle biopsy was being planned of the right quadriceps to evaluate  his proximal weakness in bilateral lower extremities.  He is unable to do active straight leg raise in bilateral lower extremities in bed, unable to localize position sense in his left great toe and I also discussed with patient, his wife and son at bedside.  I also mentioned to them that neurology will be conservative at him during his stay at [facility] and that he should follow up with Dr. Hussein Haider from neurology on outpatient basis for further evaluation and workup as appropriate.  He is on subcutaneous Heparin and SCDs for DVT prophylaxis.  He is able to tolerate regular with thins consistency diet.  On 6/4/25, hemoglobin was 11.3, WBC count was 11.85, platelets were 236, BUN was 22, creatinine was 1.1,  sodium was 134 and potassium was 3.3.  He has been needing assistance for mobility, transfers, self-care. He is transferred to [facility] on 6/4/25 for further rehabilitation care.    2. DVT prophylaxis - on subcutaneous Lovenox.  On SCDs.    3. Neurosurgery - status post fall, unstable 3 column C7 Chance fracture, status post C7 ORIF, removal of prior C3-6 hardware and C3-T1 PLIF by neurosurgery, history of gait instability and multiple falls, peripheral neuropathy, class I obesity, multiple medical problems - continue PT, OT, psychology.  Follow falls precautions, cardiac precautions, aspiration precautions, spinal precautions.  Aspen collar to neck when out of bed.  Monitor healing of posterior cervical incision.  Neurology was consulted.    4. GI - On Protonix for GI prophylaxis. On Senokot-S.  On MiraLAX.  On PRN Dulcolax suppository.  On PRN Maalox.    5.  -on Proscar.  On Flomax.    6. CVS - on Plendil.  On Lisinopril.  On Cozaar.  On Lopressor.  On PRN Hydralazine.    7. Pulmonary - encourage incentive spirometry.    8. Hematology - monitor hemoglobin, platelets.      9. Pain -on Tylenol.  On PRN Oxycodone.    10. Skin - Healing incision posterior cervicothoracic spine.  Dressing in place.  Some abrasions noted on right upper extremity.  Left forearm has bruising ecchymosis noted.  Small wound noted on sacrum/coccyx at least stage II decubitus cannot rule out DTI.    11. F/E/N - on vitamin B-12.  BMI 33.63 consistent with class I obesity.  Nutrition was consulted.    12. Diabetes mellitus type 2  - on sliding scale Humalog coverage.  On Glucophage.  On hypoglycemic protocol.    13. Ophthalmology- He was on NetarsudiL-latanoprost and Timoptic eye drops for glaucoma    14. Hyperlipidemia - on Lipitor.    15. Rehabilitation medicine - Continue comprehensive rehabilitation care. Continue PT, OT, psychology.  Follow falls precautions, cardiac precautions, aspiration precautions, spinal  precautions.  Aspen collar to neck when out of bed.  Monitor healing of posterior cervical incision.  Neurology was consulted.    16. Reviewed labs today.    17. Consultations - Dr. Falk will be consulted from internal medicine standpoint.  Dr. Rosales from neurology was consulted.  Nutrition will be consulted.  Will consult psychology.  He was recommend outpatient follow-up with neurosurgery, neurology, primary care physician and other appropriate physicians.    Estimated Length Of Stay:    Will be about 14 days, and will be adjusted in team meetings depending upon functional status and progress in therapy.     Goals Of This Stay:    Goals of this stay would be to increase his strength; improve his endurance; maximize his independence in transfers, ambulation, self care, keeping him weight-bearing as tolerated on both lower extremities; evaluate the need for assistive devices and adaptive equipment; do patient and family education; do caregiver training as appropriate; monitor him medically, monitor healing of his incision and try to control his pain.     Post Admission Physician Evaluation:    Aneesh Brito is admitted to [facility] for comprehensive inpatient rehabilitation for neurologic condition status post fall, unstable 3 column C7 Chance fracture, status post C7 ORIF, removal of prior C3-6 hardware and C3-T1 PLIF by neurosurgery, history of gait instability and multiple falls, peripheral neuropathy, class I obesity, multiple medical problems with functional deficits in balance; strength; coordination; endurance/activity tolerance; mobility; pain; safety awareness; self-care. Patient is receiving the following services: medical consultative services; occupational therapy; physical therapy, rehabitation nursing care, case management evaluation, psychology services.    Active medical management is required for:  Patient Active Problem List   Diagnosis    Diverticulosis of colon     Enlarged prostate with lower urinary tract symptoms (LUTS)    Hypertension, benign    Malignant melanoma of skin (CMS/[facility])    Osteoarthritis of knee    Overweight    Spinal stenosis of lumbar region    Chronic kidney disease, stage I    Dry eye syndrome of bilateral lacrimal glands    Primary open-angle glaucoma, bilateral, indeterminate stage    Bilateral pseudophakia    Tear film insufficiency    Pure hypercholesterolemia    Hyponatremia    Aneurysm of iliac artery (CMS/[facility])    Glomerulosclerosis    Generalized weakness    Lumbar degenerative disc disease    Type 2 diabetes mellitus without complications (CMS/[facility])    Traumatic closed fracture of C7 vertebra with minimal displacement, initial encounter (CMS/[facility])    Closed nondisplaced fracture of seventh cervical vertebra (CMS/[facility])    Falls, sequela       Premorbid Function  Dominant Hand: left  Ambulation: assistive equipment (rollator)  Transferring: assistive equipment (rollator)  Toileting: independent  Bathing: independent  Dressing: independent  Eating: independent  IADLs: independent  Driving/Transportation:   Communication: understands/communicates without difficulty  Assistive Device/Animal Currently Used at Home: cane, straight; walker, 4-wheeled; shower chair  Prior Level of Function Comment: Pt reports being independent with use of SPC inside and Rollator to the dining jenkins. Independent ADLs. Denies other falls. Independent ADLs. (+) driving. Retired teacher      Current Function  Gait  [place], Gait: dependent (less than 25% patient effort)  Assistive Device: walker, front-wheeled  Distance in Feet: 10 feet  Comment: 10ft x 1 w RW and mod A at pelvis/trunk for balance due to general unsteadiness, 1-2 episodes of LOB and +short shuffling step length    Stairs  [place], Stairs: unable to assess  Comment: unable to assess due to balance deficits    Wheelchair  Method of Locomotion: bimanual (upper extremity)  propulsion  [place], Forward Propulsion: dependent (less than 25% patient effort)  [place], Steering Activities: dependent (less than 25% patient effort)  [place], Turning Activities: dependent (less than 25% patient effort)  Comment, Wheelchair Mobility: D due to spinal precautions/neck pain    Transfers  Bed Mobility  Bed Mobility Activities: supine to sit; left  [place], Roll Left: minimum assist (75% or more patient effort)  [place], Roll Right: minimum assist (75% or more patient effort)  [place], Supine to Sit: moderate assist (50-74% patient effort)  [place], Sit to Supine: moderate assist (50-74% patient effort)  Safety/Cues: technique; sequencing  Assistive Device: head of bed elevated; bed rails  Comment: performed on hospital bed, rolling L/R w min A for force production, sit<>supine w mod A for trunk support and LE management    Sit to Stand Transfer  [place], Sit to Stand Transfer: moderate assist (50-74% patient effort)  Safety/Cues: technique; hand placement  Assistive Device: none  Comment: From W/C; Mod A to rise & for balance    Stand to Sit Transfer  [place], Stand to Sit Transfer: moderate assist (50-74% patient effort)  Safety/Cues: technique; hand placement  Assistive Device: none  Comment: To W/C; Mod A for balance & controlled lowering    Car Transfer  [place]: unable to assess  Comment: unable to asssess due to time constraints, will assess in PM session      Toilet Transfer  Transfer Technique: stand pivot  [place]: moderate assist (50-74% patient effort)  Safety/Cues: technique; hand placement  Assistive Device: commode, 3-in-1  Comment: SPT to/from W/C & commode over toilet; Mod A for balance & control x 2 during session    Shower Transfer  Transfer Technique: stand pivot  [place]: moderate assist (50-74% patient effort)  Safety/Cues: technique; hand placement  Assistive Device: tub bench; grab bars/tub rail  Comment: SPT to/from  W/C & tub bench; Mod A for balance & control      Self Care  Bathing  [place]: moderate assist (50-74% patient effort)  Setup Assistance: adaptive equipment setup; adjust water temperature/flow; open containers; obtain supplies  Adaptive Equipment: grab bar/tub rail; hand-held shower spray hose; tub bench  Comment: Full wet shower completed seated on tub bench c Mod A overall. Bandage on neck/upper back covered c waterproof dressing and dressign removed after shower c bandage intact    Upper Body Dressing  Tasks: don; pull over garment  [place]: dependent (less than 25% patient effort)  Adaptive Equipment: none  Comment: UB dressing completed seated in W/C c Total A overall. Pt. able to assist threading arms but overall completes less than 25% effort    Lower Body Dressing  Tasks: don; pants/bottoms; socks; underwear  [place]: dependent (less than 25% patient effort)  Comment: LB dressing completed seated in W/C & in stance at anterior bed rail c Mod A for balance & total A for clothing management    Grooming  Tasks: oral care (brushing teeth, cleaning dentures)  [place]: close supervision  Adaptive Equipment: none  Comment: Performed seated in W/C at sink    Toileting  Tasks: adjust/manage clothing  [place]: dependent (less than 25% patient effort)  Adaptive Equipment: commode, 3-in-1  Comment: Attempted toileting seated on commode over toilet though unsuccessful c attempt; total A for clothing management & Mod A for balance in stance. Later in session independently verbalized toileting needs, continent of bowel, Total A for hygiene and clothing managment, Mod A for balance in stance    Self-Feeding  Tasks: liquids to mouth  [place]: supervision; set up  Adaptive Equipment: none      Cognition  Cognition  Orientation Status: oriented x 4  Affect/Mental Status: WFL  Follows Commands: WFL  Cognitive Function: WFL  Comment, Cognition: Increased processing time, cues for attention to task  at hand at times, difficulty c problem solving/reasoning at times      Communication     Swallow       Risk for Complications  Aneesh is at risk for the following complications:     Acute change in mental status due to multiple medications   Cardiac event due to history of cardiac conditions   Constipation due to opioid medication, impaired mobility, and colon/rectal cancer    DVT/PE due to impaired mobility and post-surgical state   Falls due to impaired balance and known history of falls   Hemorrhage/bleed due to anti-coagulation medication       Skin breakdown/pressure ulcers due to impaired mobility and obesity   Uncontrolled pain due to opioid medication    Wound infection due to surgical incision and obesity         Expected Level of Function  Self-Care: Independent  Transfers: Independent  Locomotion: Independent  Communication: Independent  Social Cognition: Independent      Anticipated Discharge Plan  home with home health    I have reviewed the pre-admission screening and there are no relevant changes.    Expected length of stay: 14 days        Chase Anna MD  6/5/2025      This encounter was completed utilizing the Direct Speech Voice Recognition Software. Grammatical errors, random word insertions, pronoun errors, and incomplete sentences are occasional consequences of the system due to software limitations, ambient noises, and hardware issues. Such errors may be missed prior to affixing electronic signature. Any questions or concerns about the content, text, or information contained within the body of this dictation should be directly addressed to the physician for clarification. If you have any questions or concerns please do not hesitate to call me directly via EPIC chat, page, or email.

## 2025-06-05 NOTE — PLAN OF CARE
Care Coordination Admission Assessment Note    General Information:  Readmission Within the last 30 days: unable to assess  Does patient have a : No  Patient-Specific Goals (include timeframe): be able to walk fro apt to dinning jenkins and do car tx's    Living Arrangements:  Arrived From: hospital  Current Living Arrangements: independent living facility  People in Home: spouse  Home Accessibility: wheelchair accessible  Living Arrangement Comments: 1 floor living with elevators all around    Housing Stability and Utility Access (SDOH):  In the last 12 months, was there a time when you were not able to pay the mortgage or rent on time?: No  In the past 12 months, how many times have you moved?: 1  At any time in the past 12 months, were you homeless or living in a shelter (including now)?: No  In the past 12 months has the electric, gas, oil, or water company threatened to shut off services in your home?: No    Functional Status Prior to Admission:   Assistive Device/Animal Currently Used at Home: cane, straight, walker, front-wheeled, shower chair (had held shower and rollator)  Functional Status Comments: independent with cane in apt and rollator community distances  IADL Comments: see above     Supports and Services:  Current Outpatient/Agency/Support Group: none  Type of Current Home Care Services: none  History of home care episode or rehab stay: uses [facility] for PT  lives with sife  3 adult kids    Discharge Needs Assessment:   Concerns to be Addressed: care coordination/care conferences, caregiver training, discharge planning  Current Discharge Risk: physical impairment  Anticipated Changes Related to Illness: inability to care for self    Patient/Family Anticipated Discharge Plan:  Patient/Family Anticipates Transition To: home with family  Patient/Family Anticipated Services at Transition: home health care    Connection to Community  Not applicable    Patient Choice:    Offered/Gave Vendor List: yes  Patient and/or patient guardian/advocate was made aware of their right to choose a provider. A list of eligible providers was presented and reviewed with the patient and/or patient guardian/advocate in written and/or verbal form. The list delineates providers in the patient’s desired geographic area who are participating in the Medicare program and/or providers contracted with the patient’s primary insurance. The Medicare list and quality ratings were obtained from the Medicare.gov [medicare.gov] website.    Anticipated Discharge Plan:  Met with patient. Provided education and contact information for Care Coordination services.: yes  Anticipated Discharge Disposition: home with home health  Type of Home Care Services: home PT, home OT, nursing    Transportation Needs (SDOH):  Transportation Concerns: none  Transportation Anticipated: family or friend will provide  Is Out of Hospital DNR needed at discharge?:      In the past 12 months, has lack of transportation kept you from medical appointments or from getting medications?: No  In the past 12 months, has lack of transportation kept you from meetings, work, or from getting things needed for daily living?: No    Concerns - comments:  met with pt and spoke with wife and provided education and cm contact info

## 2025-06-05 NOTE — PROGRESS NOTES
Patient: Aneesh Brito  Location: [facility] [address]  MRN: [id number]  Today's date: 6/5/2025    No notes on file    PT Vitals      Date/Time Pulse HR Source SpO2 Pt Activity O2 Therapy O2 Del Method O2 Flow Rate BP BP Location BP Method Pt Position [facility]   06/05/25 0903 84 Monitor 96 % At rest Supplemental oxygen Nasal cannula 2 L/min 175/75 Left upper arm Manual Lying    06/05/25 0955 78 Monitor 94 % At rest None (Room air) -- -- 140/86 Left upper arm Automatic Sitting PJ          PT Pain      Date/Time Pain Type Location Rating: Rest Rating: Activity Interventions [facility]   06/05/25 0902 Pain Assessment neck 4 -- diversional activity provided    06/05/25 0903 Pain Assessment neck 4 -- -- DMP   06/05/25 0955 Pain Reassessment neck 4 5 relaxation techniques promoted PJ                  Prior Living Environment      Flowsheet Row Most Recent Value   People in Home spouse   Current Living Arrangements independent living facility   Home Accessibility wheelchair accessible   Living Environment Comment PTA residing c wife in [facility]). 1 floor set up. Shower stall c shower chair & grab bars. Reports no DME at toilet - information to be verified c pt. wife   Number of Stairs, Main Entrance 0   Patient Bedroom Access Comment PTA residing c wife in [facility]). 1 floor set up. Shower stall c shower chair & grab bars. Reports no DME at toilet - information to be verified c pt. wife   Bathroom Access Comment PTA residing c wife in [facility]). 1 floor set up. Shower stall c shower chair & grab bars. Reports no DME at toilet - information to be verified c pt. wife   Number of Stairs, Within Home, Primary 0       Prior Level of Function      Flowsheet Row Most Recent Value   Dominant Hand left   Ambulation assistive equipment  [rollator]   Transferring assistive equipment  [rollator]   Toileting independent   Bathing independent   Dressing independent   Eating independent    IADLs independent   Driving/Transportation    Communication understands/communicates without difficulty   Prior Level of Function Comment Pt reports being independent with use of SPC inside and Rollator to the dining jenkins. Independent ADLs. Denies other falls. Independent ADLs. (+) driving. Retired teacher   Assistive Device Currently Used at Home cane, straight, walker, 4-wheeled, shower chair        IRF PT Evaluation and Treatment - 06/05/25 0902          PT Time Calculation    Start Time 0900     Stop Time 1000     Time Calculation (min) 60 min        Session Details    Document Type Initial Evaluation        General Information    Patient Profile Reviewed yes     General Observations of Patient Pt received supine in bed     Existing Precautions/Restrictions fall;brace worn when out of bed;head of bed elevated 30 degrees;spinal   [place] OOB, soft collar for comfort       Mobility Belt    Mobility Belt Used During Session yes        Orthosis Neck Cervical Collar    Orthosis Properties Date Obtained: 06/01/25 Time Obtained: 1803 Location: Neck Type: Cervical Collar Therapeutic Indications: stabilization and support Wearing Schedule: wear when out of bed       Orthosis Neck Cervical Collar    Orthosis Properties Date Obtained: 06/01/25 Time Obtained: 0000 Location: Neck Type: Cervical Collar Features: Hard Description: [place] collar OOB don seated; [place] collar for shower Therapeutic Indications: stabilization and support Wearing Schedule: wear when out of bed        Services    Preferred Language English     Is an  Needed/Used? No        Prior Level of Function    IADLs independent     Driving/Transportation         Living Environment    Name(s) of People in Home Korina     Primary Care Provided by self        Cognition/Psychosocial    Affect/Mental Status WFL     Orientation Status oriented x 4     Follows Commands WFL        Sensory Assessment  (Somatosensory)    Left LE Sensory Assessment general sensation;light touch awareness;light touch localization;intact;proprioception;impaired   proprioception 2/5 at 1st MTP, 5/5 at ankle    Right LE Sensory Assessment general sensation;light touch awareness;light touch localization;intact;proprioception;impaired   proprioception 2/5 at 1st MTP, 5/5 at ankle    Sensory Subjective Reports other (see comments)   denies any sensation changes       Range of Motion (ROM)    Left Lower Extremity (ROM) left LE ROM is WFL except;ankle     Ankle, Left (ROM) to neutral DF     Right Lower Extremity (ROM) right LE ROM is WFL except;ankle     Ankle, Right (ROM) to neutral DF        Strength (Manual Muscle Testing)    Left Lower Extremity Strength hip;knee;ankle     Hip, Left (Strength) flex 3-/5, ext 3-/5, abd/add 3-/5, ER/IR 3-/5     Knee, Left (Strength) flex 4/5, ext 4/5     Ankle, Left (Strength) DF 4+/5, PF 4+/5, EV/INV 4+/5     Right Lower Extremity Strength hip;knee;ankle     Hip, Right (Strength) flex 3-/5, ext 3-/5, abd/add 3-/5, ER/IR 3-/5     Knee, Right (Strength) flex 4/5, ext 4/5     Ankle, Right (Strength) DF 4/5, PF 4/5, EV/INV 4/5        Bed Mobility    [place], Roll Left minimum assist (75% or more patient effort)     [place], Roll Right minimum assist (75% or more patient effort)     [place], Supine to Sit moderate assist (50-74% patient effort)     [place], Sit to Supine moderate assist (50-74% patient effort)     Safety/Cues technique;sequencing     Assistive Device head of bed elevated;bed rails     Comment performed on hospital bed, rolling L/R w min A for force production, sit<>supine w mod A for trunk support and LE management        Transfers    Transfers stand pivot transfer        Sit to Stand Transfer    [place], Sit to Stand Transfer moderate assist (50-74% patient effort)     Safety/Cues hand placement;preparatory posture;technique     Assistive Device walker, front-wheeled      Comment w/c and EOB to stance w mod A for balance and pelvic lift        Stand to Sit Transfer    [place], Stand to Sit Transfer moderate assist (50-74% patient effort)     Safety/Cues hand placement;technique     Assistive Device walker, front-wheeled     Comment stance to w/c and EOB w mod A for controlled descent        Car Transfer    [place] unable to assess     Comment unable to asssess due to time constraints, will assess in PM session        Gait Training    [place], Gait dependent (less than 25% patient effort)     Safety/Cues increased time to complete     Assistive Device walker, front-wheeled     Distance in Feet 10 feet     Pattern step-to     Deviations/Abnormal Patterns bilateral deviations;base of support, narrow;gait speed decreased;step length decreased;weight shifting decreased     Bilateral Gait Deviations heel strike decreased     Comment 10ft x 1 w RW and mod A at pelvis/trunk for balance due to general unsteadiness, 1-2 episodes of LOB and +short shuffling step length     [place], Picking Up Object unable to assess   unable to assess due to balance deficits       Curb Negotiation    [place] unable to assess     Comment unable to assess due to balance deficits        Rough/Uneven Surface Gait Skills    [place] unable to assess     Comment unable to assess due to balance deficits        Stairs Training    [place], Stairs unable to assess     Comment unable to assess due to balance deficits        Wheelchair Mobility/Management    Method of Locomotion bimanual (upper extremity) propulsion     Wheelchair Type manual, lightweight     [place], Forward Propulsion dependent (less than 25% patient effort)     [place], Steering Activities dependent (less than 25% patient effort)     [place], Turning Activities dependent (less than 25% patient effort)     Comment, Wheelchair Mobility D due to spinal precautions/neck pain        Balance    Static  Sitting Balance mild impairment;unsupported     Dynamic Sitting Balance moderate impairment;unsupported     Sit to Stand Dynamic Balance moderate impairment;supported     Static Standing Balance moderate impairment;supported     Dynamic Standing Balance moderate impairment;supported        Motor Skills    Coordination bilateral;lower extremity;gross motor deficit     Functional Endurance fair (-)     Muscle Tone bilateral;lower extremity(s);WFL        Postural Deviations    Postural Deviations head and neck;shoulder;upper back     Head and Neck forward head     Shoulder left shoulder forward;right shoulder forward     Upper Back abducted scapulae;kyphosis        Gait/Walking Locomotion Goal 1    Distance 50 feet        Gait/Walking Locomotion Goal 2    Distance 150 feet        Patient/Family Goals    Patient's Goals For Discharge return home;take care of myself at home;return to all previous roles/activities        Therapy Assessment/Plan (PT)    Rehab Potential/Prognosis (PT) good, to achieve stated therapy goals     Frequency of Treatment (PT) 5-7 times per week     Intensity of Treatment (PT) 60-90 minutes per day     Estimated Duration of Therapy (PT) 3 weeks     Planned Therapy Interventions balance training;bed mobility training;gait training;home exercise program;manual therapy techniques;motor coordination training;neuromuscular re-education;orthotic fitting/training;patient/family education;postural re-education;ROM (range of motion);stair training;strengthening;stretching;transfer training;wheelchair management/propulsion training     Comment, Therapy Assessment/Plan (PT) Aneesh is a 75 y.o. male with a history of colon carcinoma, BPH, hypertension, glaucoma, type 2 diabetes, history of a cervical fusion over 10 years ago admitted after a fall inability to get up on his own.  He was seen at [facility] [address] where imaging studies were performed which demonstrated DISH as well as a Chance fracture at the  bottom of C7.  He was transferred to [facility] for neurosurgery. He was taken to the OR on 6/1 by Dr. Benjamin and underwent an ORIF of the C7 Chance fracture with removal and replacement of the C3-6 posterior instrumentation as well as a C3-T3 posterior lateral fusion. Pt admitted to [facility] on 6/4/25. On IE pt required min A rolling L/R, mod A for sit<>supine, mod A for sit<>stand and SPTs. and dependent for ambulation up to 10ft. Pt limited by decreased strength/endurance, balance impairments, proprioceptive/sensory deficits, coordination deficits, spinal precautions, and pain. Pt would benefit from comprehensive IP physical therapy services 60-90 min/day for 5-7 days/week to maximize independence and address impairments. Goals established and POC initiated.        Daily Progress Summary (PT)    Recommendations (PT) FTSTS or 10MWT, car transfer, trial wider wheelchair                     Education Documentation  Treatment Plan, taught by Bandar Tillman PT at 6/5/2025 12:32 PM.  Learner: Patient  Readiness: Acceptance  Method: Explanation  Response: Verbalizes Understanding  Comment: PT education on diagnosis, prognosis, and POC.          IRF PT Goals      Flowsheet Row Most Recent Value   Bed Mobility Goal 1    Activity/Assistive Device rolling to left, rolling to right, sit to supine/supine to sit at 06/05/2025 0902   [place] minimum assist (75% or more patient effort) at 06/05/2025 0902   Time Frame short-term goal (STG), 1 week at 06/05/2025 0902   Bed Mobility Goal 2    Activity/Assistive Device rolling to left, rolling to right, sit to supine/supine to sit at 06/05/2025 0902   [place] modified independence at 06/05/2025 0902   Time Frame long-term goal (LTG), 3 weeks at 06/05/2025 0902   Transfer Goal 1    Activity/Assistive Device sit-to-stand/stand-to-sit, stand pivot at 06/05/2025 0902   [place] minimum assist (75% or more patient effort) at 06/05/2025 0902   Time Frame short-term goal  (STG), 1 week at 06/05/2025 0902   Transfer Goal 2    Activity/Assistive Device sit-to-stand/stand-to-sit, stand pivot at 06/05/2025 0902   [place] modified independence at 06/05/2025 0902   Time Frame long-term goal (LTG), 3 weeks at 06/05/2025 0902   Gait/Walking Locomotion Goal 1    Activity/Assistive Device gait (walking locomotion) at 06/05/2025 0902   Distance 50 feet at 06/05/2025 0902   [place] minimum assist (75% or more patient effort) at 06/05/2025 0902   Time Frame short-term goal (STG), 1 week at 06/05/2025 0902   Gait/Walking Locomotion Goal 2    Activity/Assistive Device gait (walking locomotion) at 06/05/2025 0902   Distance 150 feet at 06/05/2025 0902   [place] supervision required at 06/05/2025 0902   Time Frame long-term goal (LTG), 3 weeks at 06/05/2025 0902

## 2025-06-05 NOTE — DISCHARGE INSTRUCTIONS
Please contact your surgeon or PCP immediately upon discharge to arrange refills for pain medications or other controlled medications after the intial 5 day prescription given to you at discharge from rehab.    Please follow up with your PCP to review any new medications and refills are to be prescribed by your PCP.

## 2025-06-05 NOTE — PLAN OF CARE
"  Problem: Rehabilitation (IRF) Plan of Care  Goal: Plan of Care Review  Flowsheets (Taken 6/5/2025 1223)  Outcome Evaluation: OT IE completed. POC established & goals discussed, pt. verbalized understanding  Plan of Care Reviewed With: patient     Problem: Rehabilitation (IRF) Plan of Care  Goal: Patient-Specific Goal (Individualized)  Flowsheets (Taken 6/5/2025 1223)  Patient/Family-Specific Goals (Include Timeframe): \"walk\"     "

## 2025-06-05 NOTE — PLAN OF CARE
Plan of Care Review  Plan of Care Reviewed With: patient  Progress: improving  Outcome Evaluation: patient is alert and oriented, continent of bowel/bladder, oxycodone given this am for pain, [place]pen collar OOB, surgical dressing intact

## 2025-06-05 NOTE — PROGRESS NOTES
Patient: Aneesh Brito  Location: [facility] [address]  MRN: [id number]  Today's date: 6/5/2025    No notes on file    OT Vitals      Date/Time Pulse HR Source BP BP Location BP Method Pt Position [facility]   06/05/25 1034 56 Monitor 122/58 Left upper arm Automatic Sitting [facility]   06/05/25 1155 60 Monitor 117/63 Left upper arm Automatic Sitting [facility]          OT Pain      Date/Time Pain Type Side/Orientation Rating: Rest Rating: Activity Interventions [facility]   06/05/25 1034 Pain Assessment neck 2 - mild pain 2 - mild pain premedicated for activity [facility]   06/05/25 1155 Pain Reassessment neck 2 - mild pain 2 - mild pain premedicated for activity [facility]                 Prior Living Environment      Flowsheet Row Most Recent Value   People in Home spouse   Current Living Arrangements independent living facility   Home Accessibility wheelchair accessible   Living Environment Comment PTA residing c wife in [facility] 1 floor set up. Shower stall c shower chair & grab bars. Reports no DME at toilet - information to be verified c pt. wife   Number of Stairs, Main Entrance 0   Patient Bedroom Access Comment PTA residing c wife in [facility] 1 floor set up. Shower stall c shower chair & grab bars. Reports no DME at toilet - information to be verified c pt. wife   Bathroom Access Comment PTA residing c wife in [facility] 1 floor set up. Shower stall c shower chair & grab bars. Reports no DME at toilet - information to be verified c pt. wife   Number of Stairs, Within Home, Primary 0       Prior Level of Function      Flowsheet Row Most Recent Value   Dominant Hand left   Ambulation assistive equipment  [rollator]   Transferring assistive equipment  [rollator]   Toileting independent   Bathing independent   Dressing independent   Eating independent   IADLs independent   Driving/Transportation    Communication understands/communicates without difficulty   Prior Level of  "Function Comment Pt reports being independent with use of SPC inside and Rollator to the dining jenkins. Independent ADLs. Denies other falls. Independent ADLs. (+) driving. Retired teacher   Assistive Device Currently Used at Home cane, straight, walker, 4-wheeled, shower chair       Occupational Profile      Flowsheet Row Most Recent Value   Successful Occupations Enjoys participating in the activities at [employer] - shuffle board and corn hole   Occupational History/Life Experiences PTA independent c ADLs. 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   Patient Goals \"Walk\"        IRF OT Evaluation and Treatment - 06/05/25 1033          OT Time Calculation    Start Time 1030     Stop Time 1209     Time Calculation (min) 99 min        Session Details    Document Type Daily Treatment/Progress Note        General Information    General Observations of Patient pleasant, cooperative        Mobility Belt    Mobility Belt Used During Session yes        Orthotics    Orthotics (Trigger Row) Add Orthosis LDA        Orthosis Neck Cervical Collar    Orthosis Properties Date Obtained: 06/01/25 Time Obtained: 1803 Location: Neck Type: Cervical Collar Therapeutic Indications: stabilization and support Wearing Schedule: wear when out of bed       Orthosis Neck Cervical Collar    Orthosis Properties Date Obtained: 06/01/25 Time Obtained: 0000 Location: Neck Type: Cervical Collar Features: Hard Description: Apen collar OOB don seated; [place] collar for shower Therapeutic Indications: stabilization and support Wearing Schedule: wear when out of bed        Services    Do You Speak a Language Other Than English at Home? no        Prior Level of Function    IADLs independent     Driving/Transportation         Cognition/Psychosocial    Comment, Cognition Increased processing time, cues for attention to " "task at hand at times, difficulty c problem solving/reasoning at times        Cognitive Pattern Assessment    Cognitive Pattern Assessment Used BIMS        Brief Interview for Mental Status (BIMS)    Repetition of Three Words (First Attempt) 3     Temporal Orientation: Year Correct     Temporal Orientation: Month Accurate within 5 days     Temporal Orientation: Day Correct     Recall: \"Sock\" Yes, no cue required     Recall: \"Blue\" Yes, no cue required     Recall: \"Bed\" Yes, no cue required     BIMS Summary Score 15        Confusion Assessment Method (CAM)    Is there evidence of an acute change in mental status from the patient's baseline? No     Inattention Behavior not present     Disorganized thinking Behavior not present     Altered level of consciousness Behavior not present        Vision Assessment/Intervention    Vision Assessment WFL     Impact of Vision Impairment on Function Denies visual changes, uses reading glasses occassionally        Sensory Assessment    Left UE Sensory Assessment light touch awareness;light touch localization;proprioception;intact     Right UE Sensory Assessment light touch awareness;proprioception;light touch localization;intact        Range of Motion (ROM)    Left Upper Extremity (ROM) left UE ROM is WFL except;shoulder     Shoulder, Left (ROM) PROM WFL all planes. AROM full in extension, adduction, horizontal abd/adduction and internal/external rotation. AROM seated 45 degrees flexion and 10 degrees abduction.     Right Upper Extremity (ROM) right UE ROM is WFL except;shoulder     Shoulder, Right (ROM) PROM WFL all planes. AROM full in extension, adduction, horizontal abd/adduction and internal/external rotation. AROM seated 90 degrees flexion and 10 degrees abduction.        Strength (Manual Muscle Testing)    Shoulder, Left (Strength) 2-/5 flexion & abduction. 3/5 internal/external rotation, horizontal abd/adduction, adduction, extension     Elbow, Left (Strength) 4/5     " Wrist, Left (Strength) 4/5     Hand, Left (Strength) 4/5     Shoulder, Right (Strength) 2-/5 flexion & abduction. 3/5 internal/external rotation, horizontal abd/adduction, adduction, extension     Elbow, Right (Strength) 4/5     Wrist, Right (Strength) 4/5     Hand, Right (Strength) 4/5        Sit to Stand Transfer    [place], Sit to Stand Transfer moderate assist (50-74% patient effort)     Safety/Cues technique;hand placement     Assistive Device none     Comment From W/C; Mod A to rise & for balance        Stand to Sit Transfer    [place], Stand to Sit Transfer moderate assist (50-74% patient effort)     Safety/Cues technique;hand placement     Assistive Device none     Comment To W/C; Mod A for balance & controlled lowering        Toilet Transfer    Transfer Technique stand pivot     [place] moderate assist (50-74% patient effort)     Safety/Cues technique;hand placement     Assistive Device commode, 3-in-1     Comment SPT to/from W/C & commode over toilet; Mod A for balance & control x 2 during session        Shower Transfer    Transfer Technique stand pivot     [place] moderate assist (50-74% patient effort)     Safety/Cues technique;hand placement     Assistive Device tub bench;grab bars/tub rail     Comment SPT to/from W/C & tub bench; Mod A for balance & control        Balance    Comment, Balance OT: CLS unsupported sitting; Mod A for balance in stance throughout ADL completion        Motor Skills    Coordination bilateral;upper extremity;other (see comments)   TBA    Functional Endurance fair        Bathing    Shower Provided? Yes     [place] moderate assist (50-74% patient effort)     Position supported sitting;unsupported sitting     Setup Assistance adaptive equipment setup;adjust water temperature/flow;open containers;obtain supplies     Adaptive Equipment grab bar/tub rail;hand-held shower spray hose;tub bench     Comment Full wet shower completed seated on tub bench c Mod A  overall. Bandage on neck/upper back covered c waterproof dressing and dressign removed after shower c bandage intact        Upper Body Dressing    Tasks don;pull over garment     [place] dependent (less than 25% patient effort)     Position supported sitting     Adaptive Equipment none     Comment UB dressing completed seated in W/C c Total A overall. Pt. able to assist threading arms but overall completes less than 25% effort        Lower Body Dressing    Tasks don;pants/bottoms;socks;underwear     [place] dependent (less than 25% patient effort)     Position supported sitting;supported standing     [place], Footwear dependent (less than 25% patient effort)     Comment LB dressing completed seated in W/C & in stance at anterior bed rail c Mod A for balance & total A for clothing management        Grooming    Tasks oral care (brushing teeth, cleaning dentures)     [place] close supervision     Position supported sitting;sink side     Adaptive Equipment none     [place], Oral Hygiene close supervision     Comment Performed seated in W/C at sink        Toileting    Tasks adjust/manage clothing     [place] dependent (less than 25% patient effort)     Position unsupported sitting;supported standing     Adaptive Equipment commode, 3-in-1     Comment Attempted toileting seated on commode over toilet though unsuccessful c attempt; total A for clothing management & Mod A for balance in stance. Later in session independently verbalized toileting needs, continent of bowel, Total A for hygiene and clothing managment, Mod A for balance in stance        Box and Blocks    Box and Blocks - Right --   TBA    Box and Blocks - Left --   TBA        Assessment    Right Hand Dynamometer Position --   TBA    Left Hand Dynamometer Position --   TBA       Patient Specific Functional Scale (PSFS)    PSFS ACTIVITY 1 Walking     PSFS ANSWER 1 5     PSFS ACTIVITY 2 Standing with good balance     PSFS ANSWER 2  6     PSFS ACTIVITY 3 Going to bathroom/showering independently     PSFS ANSWER 3 5     PSFS Sum of Activity Scores 16     PSFS Number of Activities 3     PSFS Percent Score 53.33 %        Patient/Family Goals    Patient's Goals For Discharge take care of myself at home     Family Goals For Discharge other (see comments)   family not present for IE       Therapy Assessment/Plan (OT)    Rehab Potential/Prognosis (OT) good, to achieve stated therapy goals     Frequency of Treatment (OT) 5-7 times per week     Intensity of Treatment (OT) 60-90 minutes per day     Estimated Duration of Therapy (OT) 3 weeks     Problem List (OT) problems related to;balance;cognition;coordination;mobility;motor control;range of motion (ROM);strength;pain;postural control;other (see comments)   endurance/activity tolerance    Activity Limitations Related to Problem List BADLs not performed adequately or safely;IADLs not performed adequately or safely;unable to transfer safely;unable to ambulate safely;home management activity not performed adequately or safely     Planned Therapy Interventions BADL retraining;activity tolerance training;functional balance retraining;occupation/activity based interventions;patient/caregiver education/training;transfer/mobility retraining;strengthening exercise        Daily Progress Summary (OT)    Daily Outcome Statement Pt. is previously independent 75 y.o. male presenting to [facility] s/p cervical spinal surgery (ORIF C7-T1 Chance fracture in the setting of DISH with stabilization from C3-T3) 2* fall. OT IE completed. Requires Total A - CLS for completion of ADLS. Mod A SPT for functional transfers. Initial PSFS completed, recommend /pinch, box/blocks, 9 hole peg test. Impairments present in balance, strength, endurance, mobility, motor control. Would benefit from skilled therapy services for maximized safety & independence c ADLs & functional transfers.                     Education  Documentation  Treatment Plan, taught by Letha Uribe OT at 6/5/2025 12:21 PM.  Learner: Patient  Readiness: Acceptance  Method: Explanation  Response: Verbalizes Understanding  Comment: Rehab process, role of OT, daily schedule, use of call bell    Orientation to Care Setting, Routine, taught by Letha Uribe OT at 6/5/2025 12:21 PM.  Learner: Patient  Readiness: Acceptance  Method: Explanation  Response: Verbalizes Understanding  Comment: Rehab process, role of OT, daily schedule, use of call bell    Admission, Transition of Care, taught by Letha Uribe OT at 6/5/2025 12:21 PM.  Learner: Patient  Readiness: Acceptance  Method: Explanation  Response: Verbalizes Understanding  Comment: Rehab process, role of OT, daily schedule, use of call bell          IR[facility] OT Goals      Flowsheet Row Most Recent Value   Transfer Goal 1    Activity/Assistive Device toilet, commode, 3-in-1 at 06/05/2025 1033   [place] minimum assist (75% or more patient effort) at 06/05/2025 1033   Time Frame short-term goal (STG), 5 - 7 days at 06/05/2025 1033   Transfer Goal 2    Activity/Assistive Device toilet, commode, 3-in-1 at 06/05/2025 1033   [place] modified independence at 06/05/2025 1033   Time Frame long-term goal (LTG), 21 days or less at 06/05/2025 1033   Transfer Goal 3    Activity/Assistive Device shower, tub bench at 06/05/2025 1033   [place] minimum assist (75% or more patient effort) at 06/05/2025 1033   Time Frame short-term goal (STG), 5 - 7 days at 06/05/2025 1033   Transfer Goal 4    Activity/Assistive Device shower, tub bench at 06/05/2025 1033   [place] modified independence at 06/05/2025 1033   Time Frame long-term goal (LTG), 21 days or less at 06/05/2025 1033   Bathing Goal 1    Activity/Assistive Device bathing skills, all at 06/05/2025 1033   [place] minimum assist (75% or more patient effort) at 06/05/2025 1033   Time Frame short-term goal (STG), 5 - 7 days at 06/05/2025 4002    Bathing Goal 2    Activity/Assistive Device bathing skills, all at 06/05/2025 1033   [place] modified independence at 06/05/2025 1033   Time Frame long-term goal (LTG), 21 days or less at 06/05/2025 1033   UB Dressing Goal 1    Activity/Assistive Device upper body dressing at 06/05/2025 1033   [place] maximum assist (25-49% patient effort) at 06/05/2025 1033   Time Frame short-term goal (STG), 5 - 7 days at 06/05/2025 1033   Progress/Outcome goal no longer appropriate at 06/05/2025 1033   UB Dressing Goal 2    Activity/Assistive Device upper body dressing at 06/05/2025 1033   [place] modified independence at 06/05/2025 1033   Time Frame long-term goal (LTG), 21 days or less at 06/05/2025 1033   LB Dressing Goal 1    Activity/Assistive Device lower body dressing at 06/05/2025 1033   [place] maximum assist (25-49% patient effort) at 06/05/2025 1033   Time Frame short-term goal (STG), 5 - 7 days at 06/05/2025 1033   LB Dressing Goal 2    Activity/Assistive Device lower body dressing at 06/05/2025 1033   [place] modified independence at 06/05/2025 1033   Time Frame long-term goal (LTG), 21 days or less at 06/05/2025 1033   Grooming Goal 1    Activity/Assistive Device grooming skills, all at 06/05/2025 1033   [place] set-up required at 06/05/2025 1033   Time Frame short-term goal (STG), 5 - 7 days at 06/05/2025 1033   Grooming Goal 2    Activity/Assistive Device grooming skills, all at 06/05/2025 1033   [place] modified independence at 06/05/2025 1033   Time Frame long-term goal (LTG), 21 days or less at 06/05/2025 1033   Toileting Goal 1    Activity/Assistive Device toileting skills, all at 06/05/2025 1033   [place] maximum assist (25-49% patient effort) at 06/05/2025 1033   Time Frame short-term goal (STG), 5 - 7 days at 06/05/2025 1033   Toileting Goal 2    Activity/Assistive Device toileting skills, all at 06/05/2025 1033   [place] modified independence at 06/05/2025  1033   Time Frame long-term goal (LTG), 21 days or less at 06/05/2025 1033

## 2025-06-05 NOTE — CONSULTS
[facility] Internal Medicine  Consult Note    Subjective     Aneesh Brito is a 75 y.o. male who was admitted for Falls, sequela [W19.XXXS]. Patient was referred by Chase Anna MD for medical assessment and management     CC: Falls, sequela [W19.XXXS]    HPI: Aneesh Brito is a 75 y.o. male with HTN, HLD, DM2, colon ca, BPH, glaucoma, spinal stenosis s/p previous cervical fusion, presented to [facility] 5/31/25 s/p fall, found to have C7 fracture and transferred to [facility] where he underwent C7 ORIF, removal of C3-6 hardware and C3-T1 PLIF by neurosurgery Dr. Raza Benjamin on 6/1/25.    Post op he had anemia but did not require transfusion. He was put on SQ Heparin for DVT ppx. His pain was managed with Tylenol and Oxycodone.     He had elevated BP with surgery requiring IV Hydralzine but BP improved after surgery and HTN managed with Felodipine, Lisinopril, Losartan and Metoprolol.     He was on Lipitor for HLD. He was on Finasteride and Tamsulosin for BPH.  He was on netarsudiL-latanoprost and timoptic eye drops for glaucoma.     He had very elevated blood sugars alaina-op, related to IVFs and Decadron, and required insulin. Blood sugars improved and DM2 managed with Metformin.     He had elevated CK of 1118 on admission due to rhabdomyolysis from being down on floor prior to admission. He was treated with IVFs and CK improved.     The patient was evaluated by PM&R and found to have residual deficits in ambulation and ADLs due to Falls, sequela [W19.XXXS] and came to [facility] on 6/4/2025 for acute inpatient rehab.     SUBJECTIVE:  Patient interviewed and examined    Pain stable, no new numbness or weakness, some constipation, no abdominal pain or nausea, no dysuria, no chest pain, palpitations, dyspnea or fevers      ROS: as above, otherwise noncontributory  Current meds and allergies reviewed    Outside records reviewed. Pertinent radiology results reviewed. Pertinent lab results reviewed.    Medical  History:   Past Medical History:   Diagnosis Date    C7 cervical fracture (CMS/HCC) 05/31/2025    Colonic adenoma     Diverticulosis of colon     Enlarged prostate with lower urinary tract symptoms (LUTS)     Glaucoma     Hypertension     Melanoma of skin (CMS/HCC)     Osteoarthritis of knee     Overweight     Spinal stenosis of lumbar region     Type 2 diabetes mellitus (CMS/HCC)        Surgical History:   Past Surgical History   Procedure Laterality Date    1) ORIF C7 fracture 2) removal and replacement of C3-6 posterior intrumentation 3) C3 to T3 posterolaterral instrumented fusion  Depuy + removal set SMA C-arm O-Arm N/A 6/1/2025    Performed by Raza Benjamin MD at  OR    Appendectomy      Cervical fusion  06/01/2025    1) ORIF C7 fracture 2) removal and replacement of C3-6 posterior intrumentation 3) C3 to T3 posterolaterral instrumented fusion    Colonoscopy  01/10/2012    diverticulosis    Colonoscopy w/ polypectomy  02/09/2022    Hip arthroplasty Bilateral     Knee arthroplasty Left     Knee arthroplasty Right 02/02/2022       Allergies: No known allergies      Current Facility-Administered Medications   Medication Dose Route Frequency    acetaminophen  650 mg oral q6h KOFI    alum-mag hydroxide-simeth  30 mL oral q6h PRN    atorvastatin  20 mg oral Daily    bisacodyL  10 mg rectal Daily PRN    glucose  15-30 g of dextrose oral PRN    Or    dextrose  15-30 g of dextrose oral PRN    Or    glucagon  1 mg intramuscular PRN    Or    dextrose 50 % in water (D50)  25 mL intravenous PRN    enoxaparin  40 mg subcutaneous Daily (6p)    felodipine  10 mg oral Daily    finasteride  5 mg oral Daily    hydrALAZINE  25 mg oral q6h PRN    insulin lispro U-100  2-5 Units subcutaneous BID AC    lisinopriL  10 mg oral Daily    losartan  100 mg oral Daily    metFORMIN  500 mg oral BID with breakfast and dinner    metoprolol tartrate  50 mg oral BID    netarsudiL-latanoprost  1 drop Right Eye Nightly    oxyCODONE  5 mg  oral q4h PRN    polyethylene glycol  17 g oral Daily    sennosides-docusate sodium  2 tablet oral Daily PRN    tamsulosin  0.4 mg oral Nightly    timolol  1 drop Both Eyes Daily         Social History:   Social History     Socioeconomic History    Marital status:      Spouse name: None    Number of children: None    Years of education: None    Highest education level: None   Occupational History    Occupation: retired teacher   Tobacco Use    Smoking status: Never    Smokeless tobacco: Never   Substance and Sexual Activity    Alcohol use: Yes     Comment: BEER, 1 CONSUMED DAILY    Drug use: Never     Social Drivers of Health     Financial Resource Strain: Low Risk  (6/19/2023)    Overall Financial Resource Strain (CARDIA)     Difficulty of Paying Living Expenses: Not hard at all   Food Insecurity: No Food Insecurity (6/4/2025)    Hunger Vital Sign     Worried About Running Out of Food in the Last Year: Never true     Ran Out of Food in the Last Year: Never true   Transportation Needs: No Transportation Needs (6/5/2025)    PRAPARE - Transportation     Lack of Transportation (Medical): No     Lack of Transportation (Non-Medical): No   Housing Stability: Low Risk  (6/5/2025)    Housing Stability Vital Sign     Unable to Pay for Housing in the Last Year: No     Number of Times Moved in the Last Year: 1     Homeless in the Last Year: No       Family History:   Family History   Problem Relation Name Age of Onset    Breast cancer Biological Mother         Review of Systems    Constitutional: negative for fevers  Eyes: negative for visual disturbance  Ears, nose, mouth, throat, and face: negative for nasal congestion  Respiratory: negative for cough, dyspnea on exertion and wheezing  Cardiovascular: negative for chest pain and palpitations  Gastrointestinal: positive for constipation, negative for abdominal pain, nausea and vomiting  Genitourinary:negative for decreased stream and painful  urination  Integument/breast: negative for rash and itching  Musculoskeletal: pain stable with meds  Neurological: no new numbness or weakness  Behavioral/Psych: mood stable      Vital signs in last 24 hours:  Temp:  [36.3 °C (97.4 °F)-36.8 °C (98.2 °F)] 36.6 °C (97.9 °F)  Heart Rate:  [56-87] 72  Resp:  [18-20] 18  BP: (117-185)/(58-91) 134/66  Vital signs reviewed 06/05/25 4:18 PM    Objective     Physical Exam    Physical Exam  Vitals and nursing note reviewed.   Constitutional:       Appearance: Normal appearance.   HENT:      Head: Normocephalic and atraumatic.      Right Ear: External ear normal.      Left Ear: External ear normal.      Nose: Nose normal.      Mouth/Throat:      Pharynx: Oropharynx is clear.   Eyes:      Conjunctiva/sclera: Conjunctivae normal.   Neck:      Comments: S/p cervical fusion  Cardiovascular:      Rate and Rhythm: Normal rate.      Pulses: Normal pulses.   Pulmonary:      Effort: Pulmonary effort is normal.   Abdominal:      General: Abdomen is flat.   Musculoskeletal:      Right lower leg: Edema present.      Left lower leg: Edema present.   Skin:     Findings: Lesion (right arm, right ear, sacral wounds as documented by nursing) present.      Comments: Surgical site intact   Neurological:      Mental Status: He is alert and oriented to person, place, and time.           Labs  I have reviewed the patient's pertinent labs.  Significant abnormals are leukocytosis, anemia, hyponatremia.  Results from last 7 days   Lab Units 06/05/25  0536 06/04/25 0445 06/03/25  0902   WBC K/uL 10.71* 11.85* 12.07*   HEMOGLOBIN g/dL 12.2* 11.3* 11.4*   HEMATOCRIT % 35.8* 33.7* 33.7*   PLATELETS K/uL 264 236 220     Results from last 7 days   Lab Units 06/05/25  0536 06/04/25  0445 06/03/25  0902 06/01/25  0032 05/31/25  1315   SODIUM mEQ/L 135* 134* 134*   < > 136   POTASSIUM mEQ/L 3.7 3.3* 3.2*   < > 3.4*   CHLORIDE mEQ/L 101 103 104   < > 100   CO2 mEQ/L 25 23 22   < > 26   BUN mg/dL 20 22 21   < >  16   CREATININE mg/dL 0.9 1.1 1.1   < > 1.0   CALCIUM mg/dL 8.7 8.4* 8.0*   < > 8.9   ALBUMIN g/dL 3.4*  --   --   --  3.9   BILIRUBIN TOTAL mg/dL 0.7  --   --   --  1.1   ALK PHOS IU/L 115  --   --   --  83   ALT IU/L 15  --   --   --  15   AST IU/L 28  --   --   --  32   GLUCOSE mg/dL 126* 143* 184*   < > 145*    < > = values in this interval not displayed.     Results from last 7 days   Lab Units 06/05/25  0536 06/01/25  0602 06/01/25  0032   CK TOTAL U/L 124 882* 1,108*      Lab Results   Component Value Date    EHHWOOSY32 219 05/12/2025       Imaging  Radiology reports reviewed.     6/3/25: XR c-spine (post op):  There has been interval postsurgical change of extension of the previously noted  posterior fixation in the lower cervical spine, with postsurgical changes  extending from C3 through at least the midthoracic spine, noting significantly  limited evaluation due to patient positioning.  Is grade 1 anterolisthesis of C2  on C3, which appears new from the prior examination, however this may be  artifactual.  The fixation hardware appears grossly appropriately positioned,  again noting significantly limited evaluation.  Surgical drains are noted.   --  IMPRESSION: Significantly limited evaluation.  See comment.    6/1/25: CT thoracic spine:  IMPRESSION:  1. No acute thoracic spine fracture or traumatic malalignment. MRI remains  recommended for evaluation of the C7 fracture and C7-T1 articulations.  2. Posterior osteophytosis at T8-T9 causes at least moderate osseous narrowing  of the central spinal canal.  3. Mild CHF with trace bilateral pleural effusions and overlying subsegmental  atelectasis, not well evaluated due to respiratory motion artifact. Recommend  outpatient chest CT in 2-4 weeks.    5/31/25:CT head/neck:  IMPRESSION:  1.  No evidence of acute intracranial abnormality.  2.  Acute unstable multicolumn fracture of the cervical spine through the C7  vertebral body with associated widening of the  C7-T1 facet joint on the right  and widening of the interspinous ligament at C7-T1. This is consistent with an  unstable/multicolumn fracture of the cervical spine and MRI is recommended for  further evaluation.    5/31/25: CXR:  The heart and pulmonary vascularity appear within normal limits.  The lungs are  free of fluid and infiltrate.  There are thoracic spine degenerative changes.  There are atherosclerotic calcifications of the thoracic aorta.  There are  shoulder joint degenerative changes.  Partially imaged are instrumented  posterior fusion changes in the cervical spine.   --  IMPRESSION:  No active disease is seen in the chest.    ECG/Telemetry  Cardiology reports reviewed.     5/31/25: ECG:  Normal sinus rhythm with sinus arrhythmia   Incomplete left bundle branch block   Moderate voltage criteria for LVH, may be normal variant if age is < 37 yrs ( R in aVL , Sokolow-Jackson )   Nonspecific ST and T wave abnormality   Abnormal ECG     Assessment/Plan   ASSESSMENT/PLAN  75 y.o. malewith HTN, HLD, DM2, colon ca, BPH, glaucoma, spinal stenosis s/p previous cervical fusion, presented to [facility] 5/31/25 s/p fall, found to have C7 fracture and transferred to [facility] where he underwent C7 ORIF, removal of C3-6 hardware and C3-T1 PLIF by neurosurgery Dr. Raza Benjamin on 6/1/25    1. Neuro:  # Cervical stenosis  # acute C7 fx  # DISH  -6/1/25s/p C7 ORIF, removal of C3-6 hardware and C3-T1 PLIF by neurosurgery Dr. Raza Benjamin   -pain managed with Tylenol and Oxycodone  -check vitamin d level     2. Vasc:  # DVT ppx  -bilat LE edema  -SCDs and SQ Heparin for DVT ppx     3. Heme:  # ABLA  -post op anemia  -5/12/25 B12 <400, added oral B12  -on multivitamin  # leukocytosis  -reactive vs due to Decadron  -follow CBC     4. Renal:  -increased risk of dehydration and electrolyte changes  -follow BMP, Mg     5. Gi:  # constipation  -Miralax for bowels  # ulcer ppx  -Protonix ulcer ppx     6. Gu:  #  BPH  -on Proscar and Flomax  -increased risk of urine retention  -check PVRs     7. Cardiac:  # HTN  -on Felodipine, Lisinopril, Losartan and Metoprolol  -watch for orthostatic hypotension  -use cardiac precautions in therapy     8. Pulm:  -incentive spirometry for atelectasis     9. Derm:  # multiple wounds  -surgical wounds  -wounds on right arm, right ear, sacrum as documented by nursing  -consulted [employer] for skin assessment     10. Nutrition:  -Adult Diet Regular; Thin Liquids; Consistent Carbohydrate 2000   -consulted Nutrition for assessment and education    11. Endo:  # HLD  -on Lipitor  # DM2  -had steroid induced hyperglycemia due to Decadron, now improved  -on Metformin  -accuchecks BID AC with Lispro scale for BG>200  -hypoglycemia protocol    12. Psych:  # anxiety/depression  -consulted Psychology for support    13. Ophth:  # glaucoma  -on netarsudiL-latanoprost and timoptic eye drops    14. Dispo:  -code status: Full Code      plan discussed with patient, nurse, case management and Chase Anna MD    Orders personally entered in CPOE     I have queried the state Prescription Drug Monitoring Program [PDMP] and found no suspicious PDMP activity     Chandler Falk MD  06/05/25  4:33 PM

## 2025-06-05 NOTE — PLAN OF CARE
Plan of Care Review  Plan of Care Reviewed With: patient  Progress: improving  Outcome Evaluation: Alert and oriented x4. Continent of bladder, PVRs being monitored. No BM this shift, last BM 6/4/25. Continues on scheduled Tylenol with PRN Oxycodone administeed x1 for severe breakthrough neck pain. Surgical dressing intact to neck incision, sutures seen underneath. Aspen collar worn OOB. Skin assessment completed with redness noted to sacrum, dry abrasions to RUE, diffuse bruising to LUE, and bruising/dry laceration to right ear. Accuchecks QID, no s/s of hyper/hypo-glycemia noted. Sleeping quietly in bed overnight. Fall and safety measures maintained.

## 2025-06-05 NOTE — HOSPITAL COURSE
History of Present Illness    Aneesh is a 75 y.o. male with a history of colon carcinoma, BPH, hypertension, glaucoma, type 2 diabetes, history of a cervical fusion over 10 years ago admitted after a fall inability to get up on his own.  He was seen at [facility] where imaging studies were performed which demonstrated DISH as well as a Chance fracture at the bottom of C7.  He was transferred to [facility] for neurosurgery.    He was taken to the OR on 6/1 by Dr. Benjamin and underwent an ORIF of the C7 Chance fracture with removal and replacement of the C3-6 posterior instrumentation as well as a C3-T3 posterior lateral fusion.  Drains remain in place.  He is on subcu heparin for DVT prophylaxis.  Currently on a nicardipine drip for blood pressure control.  He states pain is well-controlled.  He denies any other complaints of headache or dizziness, chest pain or shortness of breath.  He is voiding on his own.  He has not had a bowel movement yet.    He was seen by PT and OT needing moderate assistance of 2 to transfer sit to stand.  He ambulated 2 feet with moderate assistance of 2 using a rolling walker.  Pertinent radiology results reviewed. Pertinent lab results reviewed.      Past Medical History  Aneesh has a past medical history of C7 cervical fracture (CMS/HCC) (05/31/2025), Colonic adenoma, Diverticulosis of colon, Enlarged prostate with lower urinary tract symptoms (LUTS), Glaucoma, Hypertension, Melanoma of skin (CMS/HCC), Osteoarthritis of knee, Overweight, Spinal stenosis of lumbar region, and Type 2 diabetes mellitus (CMS/HCC).

## 2025-06-05 NOTE — PLAN OF CARE
Problem: Rehabilitation (IRF) Plan of Care  Goal: Plan of Care Review  Flowsheets (Taken 6/5/2025 1233)  Progress: improving  Outcome Evaluation: PT IE completed and POC established. Goals reviewed with patient and he verbalized understanding.  Plan of Care Reviewed With: patient     Problem: Rehabilitation (IRF) Plan of Care  Goal: Patient-Specific Goal (Individualized)  Flowsheets (Taken 6/5/2025 1233)  Patient/Family-Specific Goals (Include Timeframe): walk

## 2025-06-06 ENCOUNTER — APPOINTMENT (INPATIENT)
Dept: OCCUPATIONAL THERAPY | Facility: REHABILITATION | Age: 75
DRG: 560 | End: 2025-06-06
Payer: MEDICARE

## 2025-06-06 ENCOUNTER — APPOINTMENT (INPATIENT)
Dept: WOUND CARE | Facility: REHABILITATION | Age: 75
DRG: 560 | End: 2025-06-06
Payer: MEDICARE

## 2025-06-06 ENCOUNTER — APPOINTMENT (INPATIENT)
Dept: PHYSICAL THERAPY | Facility: REHABILITATION | Age: 75
DRG: 560 | End: 2025-06-06
Payer: MEDICARE

## 2025-06-06 PROBLEM — M43.23: Status: ACTIVE | Noted: 2025-06-06

## 2025-06-06 PROBLEM — R29.6 MULTIPLE FALLS: Status: ACTIVE | Noted: 2025-06-06

## 2025-06-06 PROBLEM — S12.600D: Status: ACTIVE | Noted: 2025-06-01

## 2025-06-06 LAB
GLUCOSE BLD-MCNC: 117 MG/DL (ref 70–99)
GLUCOSE BLD-MCNC: 121 MG/DL (ref 70–99)
POCT TEST: ABNORMAL
POCT TEST: ABNORMAL

## 2025-06-06 PROCEDURE — 63600000 HC DRUGS/DETAIL CODE: Mod: JZ | Performed by: INTERNAL MEDICINE

## 2025-06-06 PROCEDURE — 12800000 HC ROOM AND CARE SEMIPRIVATE REHAB

## 2025-06-06 PROCEDURE — 63700000 HC SELF-ADMINISTRABLE DRUG: Performed by: HOSPITALIST

## 2025-06-06 PROCEDURE — 12800001 HC ROOM AND CARE SEMIPRIVATE REHAB-BRAIN INJ

## 2025-06-06 PROCEDURE — 97116 GAIT TRAINING THERAPY: CPT | Mod: GP,CQ

## 2025-06-06 PROCEDURE — 63700000 HC SELF-ADMINISTRABLE DRUG: Performed by: PHYSICAL MEDICINE & REHABILITATION

## 2025-06-06 PROCEDURE — 97530 THERAPEUTIC ACTIVITIES: CPT | Mod: GP,CQ

## 2025-06-06 PROCEDURE — 97530 THERAPEUTIC ACTIVITIES: CPT | Mod: GO

## 2025-06-06 PROCEDURE — 63700000 HC SELF-ADMINISTRABLE DRUG: Performed by: INTERNAL MEDICINE

## 2025-06-06 PROCEDURE — 97110 THERAPEUTIC EXERCISES: CPT | Mod: GO

## 2025-06-06 RX ORDER — CHOLECALCIFEROL (VITAMIN D3) 25 MCG
25 TABLET ORAL DAILY
Status: DISPENSED | OUTPATIENT
Start: 2025-06-06

## 2025-06-06 RX ADMIN — CYANOCOBALAMIN TAB 1000 MCG 1000 MCG: 1000 TAB at 08:48

## 2025-06-06 RX ADMIN — TAMSULOSIN HYDROCHLORIDE 0.4 MG: 0.4 CAPSULE ORAL at 20:34

## 2025-06-06 RX ADMIN — ENOXAPARIN SODIUM 40 MG: 40 INJECTION SUBCUTANEOUS at 17:12

## 2025-06-06 RX ADMIN — OXYCODONE 5 MG: 5 TABLET ORAL at 03:49

## 2025-06-06 RX ADMIN — FELODIPINE 10 MG: 5 TABLET, FILM COATED, EXTENDED RELEASE ORAL at 08:47

## 2025-06-06 RX ADMIN — TIMOLOL MALEATE 1 DROP: 5 SOLUTION/ DROPS OPHTHALMIC at 08:51

## 2025-06-06 RX ADMIN — PANTOPRAZOLE SODIUM 40 MG: 40 TABLET, DELAYED RELEASE ORAL at 08:47

## 2025-06-06 RX ADMIN — Medication 25 MCG: at 12:44

## 2025-06-06 RX ADMIN — OXYCODONE 5 MG: 5 TABLET ORAL at 20:34

## 2025-06-06 RX ADMIN — METFORMIN HYDROCHLORIDE 500 MG: 500 TABLET ORAL at 08:48

## 2025-06-06 RX ADMIN — METOPROLOL TARTRATE 50 MG: 50 TABLET, FILM COATED ORAL at 08:48

## 2025-06-06 RX ADMIN — ACETAMINOPHEN 650 MG: 325 TABLET ORAL at 17:11

## 2025-06-06 RX ADMIN — ATORVASTATIN CALCIUM 20 MG: 20 TABLET, FILM COATED ORAL at 08:47

## 2025-06-06 RX ADMIN — METFORMIN HYDROCHLORIDE 500 MG: 500 TABLET ORAL at 17:11

## 2025-06-06 RX ADMIN — METOPROLOL TARTRATE 50 MG: 50 TABLET, FILM COATED ORAL at 20:34

## 2025-06-06 RX ADMIN — ACETAMINOPHEN 650 MG: 325 TABLET ORAL at 12:44

## 2025-06-06 RX ADMIN — ACETAMINOPHEN 650 MG: 325 TABLET ORAL at 05:48

## 2025-06-06 RX ADMIN — LOSARTAN POTASSIUM 100 MG: 100 TABLET, FILM COATED ORAL at 08:47

## 2025-06-06 RX ADMIN — ACETAMINOPHEN 650 MG: 325 TABLET ORAL at 23:38

## 2025-06-06 RX ADMIN — LISINOPRIL 10 MG: 10 TABLET ORAL at 08:47

## 2025-06-06 RX ADMIN — FINASTERIDE 5 MG: 5 TABLET, FILM COATED ORAL at 08:48

## 2025-06-06 RX ADMIN — OXYCODONE 5 MG: 5 TABLET ORAL at 08:53

## 2025-06-06 RX ADMIN — SENNOSIDES AND DOCUSATE SODIUM 2 TABLET: 50; 8.6 TABLET ORAL at 20:34

## 2025-06-06 ASSESSMENT — PAIN SCALES - GENERAL: PAIN_LEVEL: 0

## 2025-06-06 NOTE — ANESTHESIA POSTPROCEDURE EVALUATION
Patient: Aneesh Brito    Procedure Summary       Date: 06/01/25 Room / Location:  OR 5 / [facility] OR    Anesthesia Start: 1304 Anesthesia Stop: 1812    Procedure: 1) ORIF C7 fracture 2) removal and replacement of C3-6 posterior intrumentation 3) C3 to T3 posterolaterral instrumented fusion  Depuy + removal set SMA C-arm O-Arm (Spine Cervical) Diagnosis:       Closed nondisplaced fracture of seventh cervical vertebra, unspecified fracture morphology, initial encounter (CMS/[facility])      (Closed nondisplaced fracture of seventh cervical vertebra, unspecified fracture morphology, initial encounter (CMS/[facility]) [S12.601A])    Surgeons: Raza Benjamin MD Responsible Provider: Guy Noble MD    Anesthesia Type: general ASA Status: 3            Anesthesia Type: general  PACU Vitals    No data found in the last 10 encounters.           Anesthesia Post Evaluation    Pain score: 0  Pain management: adequate  Patient location during evaluation: PACU  Patient participation: complete - patient participated  Level of consciousness: awake and alert  Cardiovascular status: acceptable  Airway Patency: adequate  Respiratory status: acceptable  Hydration status: acceptable  Anesthetic complications: no

## 2025-06-06 NOTE — CONSULTS
Wound Ostomy Continence Note    Subjective    HPI Patient is a 75 y.o. male who was admitted on 6/4/2025 with a diagnosis of Falls, sequela [W19.XXXS].    Problem list Principal Problem:    Falls, sequela     PMH/PSH Past Medical History:   Diagnosis Date    C7 cervical fracture (CMS/[facility]) 05/31/2025    Colonic adenoma     Diverticulosis of colon     Enlarged prostate with lower urinary tract symptoms (LUTS)     Glaucoma     Hypertension     Melanoma of skin (CMS/[facility])     Osteoarthritis of knee     Overweight     Spinal stenosis of lumbar region     Type 2 diabetes mellitus (CMS/[facility])      Past Surgical History   Procedure Laterality Date    1) ORIF C7 fracture 2) removal and replacement of C3-6 posterior intrumentation 3) C3 to T3 posterolaterral instrumented fusion  Depuy + removal set SMA C-arm O-Arm N/A 6/1/2025    Performed by Raza Benjamin MD at  OR    Appendectomy      Cervical fusion  06/01/2025    1) ORIF C7 fracture 2) removal and replacement of C3-6 posterior intrumentation 3) C3 to T3 posterolaterral instrumented fusion    Colonoscopy  01/10/2012    diverticulosis    Colonoscopy w/ polypectomy  02/09/2022    Hip arthroplasty Bilateral     Knee arthroplasty Left     Knee arthroplasty Right 02/02/2022      Assessment and Recommendation                06/06/25 0715   Wound Laceration Right Ear   Date First Assessed/Time First Assessed: 06/01/25 0325   Present on Original Admission: Yes  Primary Wound Type: Laceration  Wound Location Orientation: Right  Location: Ear   Wound WDL ex   Wound Appearance dry;other (see comments)  (scabbed, surgical glue, stable)   Periwound Appearance Intact   Dressing   (open to air)   Wound Length (cm) 2 cm   Wound Width (cm) 2 cm   Wound Surface Area (cm^2) 3.14 cm^2   Recommendation monitor bid, open to air   Wound Anterior;Right;Upper Arm   Date First Assessed/Time First Assessed: 06/04/25 8583   Wound Location Orientation: Anterior;Right;Upper  Location: Arm   Wound  WDL WDL   Dressing open to air   Recommendation monitor bid, open to air   Wound Anterior;Left;Lower;Proximal Arm   Date First Assessed/Time First Assessed: 06/04/25 2213   Wound Location Orientation: Anterior;Left;Lower;Proximal  Location: Arm   Wound WDL WDL  (ecchymosis)   Dressing   (open to air)   Recommendation monitor bid, open to air   Wound Pressure Injury Coccyx   Date First Assessed/Time First Assessed: 06/04/25 2216   Present on Original Admission: Yes  Primary Wound Type: Pressure Injury  Location: Coccyx   Pressure Injury Stage   (indeterminate for stage 1 PI versus erythema)   Dressing open to air   Wound Length (cm) 6 cm   Wound Width (cm) 0.3 cm   Wound Depth (cm) 0 cm   Wound Surface Area (cm^2) 1.41 cm^2   Wound Volume (cm^3) 0 cm^3   Recommendation monitor bid, foamif indicated   Wound Pressure Injury Left Buttock   Date First Assessed/Time First Assessed: 06/06/25 0739   Primary Wound Type: Pressure Injury  Wound Location Orientation: Left  Location: Buttock   Pressure Injury Stage   (indeterminate for PI stage 1 versus erythema)   Wound WDL ex   Wound Length (cm) 1.3 cm   Wound Width (cm) 0.2 cm   Wound Surface Area (cm^2) 0.2 cm^2   Recommendation foam, assess bid, change q 2 days a week or prn         Seen for skin assessment. PI prevention education given.   Labs: 6/5/25: WBC: 10.71.  Large bruise left medial arm.     Face, pink, patient states this is normal for him.     Right arm, multiple scabs, stable, open to air, measures 1.8cm x .2cm, 1.2cm x .3cm and 3cm x 3cm      Left  buttock, pink, red, measures, 1.3cm x 2cm. Indeterminate for PI stage 1 versus erythema or MASD      Coccyx, pink, measures 6cm x 3cm, indeterminate for PI stage 1 versus erythema Versus MASD.           Right ear, laceration, scabbed, surgical glue, WNL, open to air, stable.     Suggest: Maintain the PREVENT bundle for PI prevention. The most recent Adama score is an 18. This patient is at risk for the development  "of a PI. Offload heels from bed, turn q 2 hours in bed, weight shift in wheelchair, q 30 minutes.  Monitor skin under neck collar, bid, when in bed.   Foam to buttocks, monitor bid, change q 2 days a week or prn.   Monitor all scabbed wounds, bid, open to air.    Wound Prevention Bundle                                                            Positioning- If clinically able:  Reposition at a minimum of 2 hours.  Adjust to avoid lying on medical devices. Use positioning devices to offload bony prominences. When out of bed, use pressure redistribution cushions. Monitor time out of bed and encourage repositioning. Use transfer aids to reduce friction and shear. Use \"turn assist\" function on bed surface if equipped.                  Risk Assessment:   Utilize risk assessment scale per hospital guidelines.  Identify additional risk factors, for example, medications and comorbidities. Match interventions to subscales of hospital's Risk assessment scale.  Monitor laboratory values. Communicate risk to interdisciplinary healthcare team. Consider use of prophylactic silicone foam sacral dressing.                  Evaluate Surface/Pressure Redistribution:   Utilize \"less is best\" with linen usage.                Vigilant Skin Inspection:   Inspect skin from head to toe, including areas under removable medical devices and dressings. Communicate skin issues to healthcare team and consult resources as needed.                   Evaluate incontinence/moisture/temperature:   Offer toileting when appropriate. Keep skin clean and dry (microclimate). Use moisture barrier products for incontinence care. Diapers/briefs for out of bed or off unit. Use absorbent under pads that wick away and hold moisture. Intervene for fecal and/or urinary incontinence (consider external containment devices. Use skin emollients (dry skin).                  Nutrition:   Consult dietician for at risk patients. Assist with menu preferences and feeding. " Encourage snacks, fluid and supplements with rounding. Accurately record all intake where indicated.           Teaching Patients and Families:   Encourage patients and families to partner with staff in preventing pressure injuries. Give patients and families pressure injury education, assess their understanding of education given, and document education.        Traci Rudd RN  6/6/2025  11:15 AM                                                                            WOC consult is completed. Signing off. Please re-consult if indicated.   Date: 06/06/25  Signature: Traci Rudd RN

## 2025-06-06 NOTE — PROGRESS NOTES
Patient: Aneesh Brito  Location: [facility]  MRN: [id number]  Today's date: 6/5/2025    History of Present Illness    Aneesh is a 75 y.o. male with a history of colon carcinoma, BPH, hypertension, glaucoma, type 2 diabetes, history of a cervical fusion over 10 years ago admitted after a fall inability to get up on his own.  He was seen at [facility] where imaging studies were performed which demonstrated DISH as well as a Chance fracture at the bottom of C7.  He was transferred to [facility] for neurosurgery.    He was taken to the OR on 6/1 by Dr. Benjamin and underwent an ORIF of the C7 Chance fracture with removal and replacement of the C3-6 posterior instrumentation as well as a C3-T3 posterior lateral fusion.  Drains remain in place.  He is on subcu heparin for DVT prophylaxis.  Currently on a nicardipine drip for blood pressure control.  He states pain is well-controlled.  He denies any other complaints of headache or dizziness, chest pain or shortness of breath.  He is voiding on his own.  He has not had a bowel movement yet.    He was seen by PT and OT needing moderate assistance of 2 to transfer sit to stand.  He ambulated 2 feet with moderate assistance of 2 using a rolling walker.  Pertinent radiology results reviewed. Pertinent lab results reviewed.      Past Medical History  Aneesh has a past medical history of C7 cervical fracture (CMS/HCC) (05/31/2025), Colonic adenoma, Diverticulosis of colon, Enlarged prostate with lower urinary tract symptoms (LUTS), Glaucoma, Hypertension, Melanoma of skin (CMS/HCC), Osteoarthritis of knee, Overweight, Spinal stenosis of lumbar region, and Type 2 diabetes mellitus (CMS/HCC).    PT Vitals      Date/Time Pulse HR Source BP BP Location BP Method Pt Position [facility]   06/05/25 1531 74 Monitor 184/84 Left upper arm Automatic Lying PJ          PT Pain      Date/Time Pain Type Side/Orientation Location Rating: Rest Rating: Activity  Interventions [facility]   06/05/25 1531 Pain Assessment posterior neck 4 5 diversional activity provided    06/05/25 1559 Pain Reassessment -- -- 4 5 relaxation techniques promoted PJ                  Prior Living Environment      Flowsheet Row Most Recent Value   People in Home spouse   Current Living Arrangements independent living facility   Home Accessibility wheelchair accessible   Living Environment Comment PTA residing c wife in [facility] ([facility]). 1 floor set up. Shower stall c shower chair & grab bars. Reports no DME at toilet - information to be verified c pt. wife   Number of Stairs, Main Entrance 0   Patient Bedroom Access Comment PTA residing c wife in [facility]"" ([facility]). 1 floor set up. Shower stall c shower chair & grab bars. Reports no DME at toilet - information to be verified c pt. wife   Bathroom Access Comment PTA residing c wife in [facility]\"" ([facility]). 1 floor set up. Shower stall c shower chair & grab bars. Reports no DME at toilet - information to be verified c pt. wife   Number of Stairs, Within Home, Primary 0       Prior Level of Function      Flowsheet Row Most Recent Value   Dominant Hand left   Ambulation assistive equipment  [rollator]   Transferring assistive equipment  [rollator]   Toileting independent   Bathing independent   Dressing independent   Eating independent   IADLs independent   Driving/Transportation    Communication understands/communicates without difficulty   Prior Level of Function Comment Pt reports being independent with use of SPC inside and Rollator to the dining jenkins. Independent ADLs. Denies other falls. Independent ADLs. (+) driving. Retired teacher   Assistive Device Currently Used at Home cane, straight, walker, 4-wheeled, shower chair        IRF PT Evaluation and Treatment - 06/05/25 1531          PT Time Calculation    Start Time 1530     Stop Time 1605     Time Calculation (min) 35 min        Session Details    Document Type Daily  Treatment/Progress Note        General Information    Patient Profile Reviewed yes     General Observations of Patient Pt received supine in bed, family present. Upon rolling patient to perform bed mobility, patient found to be incontinent of b/b        Mobility Belt    Mobility Belt Used During Session yes        Orthosis Neck Cervical Collar    Orthosis Properties Date Obtained: 06/01/25 Time Obtained: 1803 Location: Neck Type: Cervical Collar Therapeutic Indications: stabilization and support Wearing Schedule: wear when out of bed       Orthosis Neck Cervical Collar    Orthosis Properties Date Obtained: 06/01/25 Time Obtained: 0000 Location: Neck Type: Cervical Collar Features: Hard Description: [place] collar OOB don seated; [place] collar for shower Therapeutic Indications: stabilization and support Wearing Schedule: wear when out of bed       Bed Mobility    [place], Roll Left minimum assist (75% or more patient effort)     [place], Roll Right minimum assist (75% or more patient effort)     [place], Supine to Sit moderate assist (50-74% patient effort)     Safety/Cues maintaining precautions     Assistive Device head of bed elevated;bed rails     Comment performed on hospital bed, rolling w min A for force production, supine to sit w mod A for trunk support and LE management        Sit to Stand Transfer    [place], Sit to Stand Transfer moderate assist (50-74% patient effort)     Safety/Cues technique;hand placement     Assistive Device walker, front-wheeled     Comment w/c and EOB to stance w mod A for balance and pelvic lift        Stand to Sit Transfer    [place], Stand to Sit Transfer moderate assist (50-74% patient effort)     Safety/Cues hand placement;technique     Assistive Device walker, front-wheeled     Comment stance to w/c and EOB w mod A for controlled descent        Stand Pivot Transfer    [place], Stand Pivot/Stand Step Transfer moderate assist (50-74% patient  effort)     Safety/Cues increased time to complete;technique;preparatory posture     Assistive Device walker, front-wheeled     Comment w/c<>EOB w mod A for balance due to general unsteadiness        Therapeutic Interventions    Comment, Therapeutic Intervention Increased time spent doffing/donning clothes and assisting with lower body hygiene after episodes of urinary/fecal incontinence x 25 min        Daily Progress Summary (PT)    Daily Outcome Statement Session limited due to incontinence of b/b. Time spent cleaning patient and redressing in clean clothes. Linens changed while patient transferred to wheelchair. Nursing instructed to return patient to bed due to narrow wheelchair. Order to be placed for more appropriate fit.                          IRF PT Goals      Flowsheet Row Most Recent Value   Bed Mobility Goal 1    Activity/Assistive Device rolling to left, rolling to right, sit to supine/supine to sit at 06/05/2025 0902   [place] minimum assist (75% or more patient effort) at 06/05/2025 0902   Time Frame short-term goal (STG), 1 week at 06/05/2025 0902   Bed Mobility Goal 2    Activity/Assistive Device rolling to left, rolling to right, sit to supine/supine to sit at 06/05/2025 0902   [place] modified independence at 06/05/2025 0902   Time Frame long-term goal (LTG), 3 weeks at 06/05/2025 0902   Transfer Goal 1    Activity/Assistive Device sit-to-stand/stand-to-sit, stand pivot at 06/05/2025 0902   [place] minimum assist (75% or more patient effort) at 06/05/2025 0902   Time Frame short-term goal (STG), 1 week at 06/05/2025 0902   Transfer Goal 2    Activity/Assistive Device sit-to-stand/stand-to-sit, stand pivot at 06/05/2025 0902   [place] modified independence at 06/05/2025 0902   Time Frame long-term goal (LTG), 3 weeks at 06/05/2025 0902   Gait/Walking Locomotion Goal 1    Activity/Assistive Device gait (walking locomotion) at 06/05/2025 0902   Distance 50 feet at 06/05/2025 0902    [place] minimum assist (75% or more patient effort) at 06/05/2025 0902   Time Frame short-term goal (STG), 1 week at 06/05/2025 0902   Gait/Walking Locomotion Goal 2    Activity/Assistive Device gait (walking locomotion) at 06/05/2025 0902   Distance 150 feet at 06/05/2025 0902   [place] supervision required at 06/05/2025 0902   Time Frame long-term goal (LTG), 3 weeks at 06/05/2025 0902

## 2025-06-06 NOTE — PROGRESS NOTES
Patient: Aneesh Brito  Location: [facility]  MRN: [id number]  Today's date: 6/6/2025    History of Present Illness    Aneesh is a 75 y.o. male with a history of colon carcinoma, BPH, hypertension, glaucoma, type 2 diabetes, history of a cervical fusion over 10 years ago admitted after a fall inability to get up on his own.  He was seen at [facility] where imaging studies were performed which demonstrated DISH as well as a Chance fracture at the bottom of C7.  He was transferred to [facility] for neurosurgery.    He was taken to the OR on 6/1 by Dr. Benjamin and underwent an ORIF of the C7 Chance fracture with removal and replacement of the C3-6 posterior instrumentation as well as a C3-T3 posterior lateral fusion.  Drains remain in place.  He is on subcu heparin for DVT prophylaxis.  Currently on a nicardipine drip for blood pressure control.  He states pain is well-controlled.  He denies any other complaints of headache or dizziness, chest pain or shortness of breath.  He is voiding on his own.  He has not had a bowel movement yet.    He was seen by PT and OT needing moderate assistance of 2 to transfer sit to stand.  He ambulated 2 feet with moderate assistance of 2 using a rolling walker.  Pertinent radiology results reviewed. Pertinent lab results reviewed.      Past Medical History  Aneesh has a past medical history of C7 cervical fracture (CMS/HCC) (05/31/2025), Colonic adenoma, Diverticulosis of colon, Enlarged prostate with lower urinary tract symptoms (LUTS), Glaucoma, Hypertension, Melanoma of skin (CMS/HCC), Osteoarthritis of knee, Overweight, Spinal stenosis of lumbar region, and Type 2 diabetes mellitus (CMS/HCC).              Prior Living Environment      Flowsheet Row Most Recent Value   People in Home spouse   Current Living Arrangements independent living facility   Home Accessibility wheelchair accessible   Living Environment Comment PTA residing c wife  in [facility] ([facility]). 1 floor set up. Shower stall c shower chair & grab bars. Reports no DME at toilet - information to be verified c pt. wife   Number of Stairs, Main Entrance 0   Patient Bedroom Access Comment PTA residing c wife in [facility] ([facility]). 1 floor set up. Shower stall c shower chair & grab bars. Reports no DME at toilet - information to be verified c pt. wife   Bathroom Access Comment PTA residing c wife in [facility] ([facility]). 1 floor set up. Shower stall c shower chair & grab bars. Reports no DME at toilet - information to be verified c pt. wife   Number of Stairs, Within Home, Primary 0       Prior Level of Function      Flowsheet Row Most Recent Value   Dominant Hand left   Ambulation assistive equipment  [rollator]   Transferring assistive equipment  [rollator]   Toileting independent   Bathing independent   Dressing independent   Eating independent   IADLs independent   Driving/Transportation    Communication understands/communicates without difficulty   Prior Level of Function Comment Pt reports being independent with use of SPC inside and Rollator to the dining jenkins. Independent ADLs. Denies other falls. Independent ADLs. (+) driving. Retired teacher   Assistive Device Currently Used at Home cane, straight, walker, 4-wheeled, shower chair          06/06/25 1010 06/06/25 1056   Pain/Comfort/Sleep   Pain Charting Type Pain Assessment Pain Reassessment   Preferred Pain Scale number (Numeric Rating Pain Scale) number (Numeric Rating Pain Scale)   (0-10) Pain Rating: Rest 5 0   Pain Body Location neck neck   Vitals   Heart Rate 60 63   Heart Rate Source Monitor Monitor   BP (!) 147/79 (!) 149/63   BP Location Left upper arm Left upper arm   BP Method Automatic Automatic   Patient Position Sitting Sitting        06/06/25 1000   PT Time Calculation   Start Time 1005   Stop Time 1105   Time Calculation (min) 60 min   Session Details   Document Type Daily Treatment/Progress  Note   General Information   Patient Profile Reviewed yes   Patient/Family/Caregiver Comments/Observations Pt. daughter present in room at beginning of session.   General Observations of Patient Pt. recieved sitting in wc in room, direct hand off from PCT pleasant and agreeable to PT.   Mobility Belt   Mobility Belt Used During Session yes   Orthosis Neck Cervical Collar   Date Obtained/Time Obtained: 06/01/25 7214   Location: Neck  Type: Cervical Collar  Therapeutic Indications: stabilization and support  Wearing Schedule: wear when out of bed   Adjustment(s) Completed positioning strategies implemented  (Cervical collar loose at beginning of session. Side velcro adjusted for comfort due to paitents chin falling under orthosis. Fit and function approprate.)   Adjustment(s) Outcome adjustment effective   Sit to Stand Transfer   [place], Sit to Stand Transfer moderate assist (50-74% patient effort)   Safety/Cues technique;hand placement;increased time to complete   Assistive Device walker, front-wheeled   Comment from wc to stance with RW, Mod A for pelvic rise and steadying   Stand to Sit Transfer   [place], Stand to Sit Transfer moderate assist (50-74% patient effort)   Safety/Cues hand placement;technique;increased time to complete   Assistive Device walker, front-wheeled   Comment to wc from stance with RW, Mod A for controlled decent and VC's for handplacement   Stand Pivot Transfer   [place], Stand Pivot/Stand Step Transfer moderate assist (50-74% patient effort)   Safety/Cues increased time to complete;preparatory posture;proper use of assistive device;sequencing   Assistive Device walker, front-wheeled   Comment SPT with RW, Mod A for lateral wt shifting and steadying due to impared balance.   Car Transfer   Transfer Technique stand pivot   [place] moderate assist (50-74% patient effort)   Safety/Cues sequencing;technique;preparatory posture;hand placement;initiation;increased time to  complete;moderate;verbal cues   Assistive Device walker, front-wheeled   Comment Mod A x 1 SPT, wc <> mock car passanger side in therapy gym with RW. Pt. requires increased VC's for sequencing, technique, increased time to complete, controlled decent and BLE management. Demo reviewed prior to transfer, Pt. requires increased practice for handplacement, controlled decent, and sequencing.   Gait Training   [place], Gait dependent (less than 25% patient effort)   Safety/Cues increased time to complete   Assistive Device walker, front-wheeled   Distance in Feet 20 feet   Pattern step-to   Deviations/Abnormal Patterns bilateral deviations;base of support, narrow;gait speed decreased;step length decreased;weight shifting decreased   Bilateral Gait Deviations heel strike decreased   Comment 20 ft x 1. Mod A x 1 for balance and lateral wt shifting. Dep due to WC follow for safety and fatigue. Pt presents with small shuffled steps and increased posterior lean during ambulation   Outcome Measures   Outcome Measures 10 Meter Walk Test (Self-Selected Velocity)   10 Meter Walk Test (Self-Selected Velocity)   Trial One: Ten Meter Walk Test (sec) 265 seconds   Trial One: Gait Speed (m/s) 0.02 m/s   Daily Progress Summary (PT)   Daily Outcome Statement 10MWT trialed during session and incomplete due to time constraints. Car transfer trialed during session. Mod A overall for functional mobility. Pt. requires increased VC's for sequencing and technique during session. Plan to trial 10MWT again as able. Continue to progress as able per PT POC.                   IRF PT Goals      Flowsheet Row Most Recent Value   Bed Mobility Goal 1    Activity/Assistive Device rolling to left, rolling to right, sit to supine/supine to sit at 06/05/2025 0902   [place] minimum assist (75% or more patient effort) at 06/05/2025 0902   Time Frame short-term goal (STG), 1 week at 06/05/2025 0902   Bed Mobility Goal 2    Activity/Assistive Device  rolling to left, rolling to right, sit to supine/supine to sit at 06/05/2025 0902   [place] modified independence at 06/05/2025 0902   Time Frame long-term goal (LTG), 3 weeks at 06/05/2025 0902   Transfer Goal 1    Activity/Assistive Device sit-to-stand/stand-to-sit, stand pivot at 06/05/2025 0902   [place] minimum assist (75% or more patient effort) at 06/05/2025 0902   Time Frame short-term goal (STG), 1 week at 06/05/2025 0902   Transfer Goal 2    Activity/Assistive Device sit-to-stand/stand-to-sit, stand pivot at 06/05/2025 0902   [place] modified independence at 06/05/2025 0902   Time Frame long-term goal (LTG), 3 weeks at 06/05/2025 0902   Gait/Walking Locomotion Goal 1    Activity/Assistive Device gait (walking locomotion) at 06/05/2025 0902   Distance 50 feet at 06/05/2025 0902   [place] minimum assist (75% or more patient effort) at 06/05/2025 0902   Time Frame short-term goal (STG), 1 week at 06/05/2025 0902   Gait/Walking Locomotion Goal 2    Activity/Assistive Device gait (walking locomotion) at 06/05/2025 0902   Distance 150 feet at 06/05/2025 0902   [place] supervision required at 06/05/2025 0902   Time Frame long-term goal (LTG), 3 weeks at 06/05/2025 0902

## 2025-06-06 NOTE — PROGRESS NOTES
[facility] Progress Note          Patient was seen and examined.   Attestation Notes: Face to face encounter completed    Aneesh Brito is a 75 y.o. male who was admitted for Falls, sequela [W19.XXXS]. Patient was referred by Chase Anna MD for medical assessment and management      CC: Falls, sequela [W19.XXXS]     HPI: Aneesh Brito is a 75 y.o. male with HTN, HLD, DM2, colon ca, BPH, glaucoma, spinal stenosis s/p previous cervical fusion, presented to [facility] 5/31/25 s/p fall, found to have C7 fracture and transferred to [facility] where he underwent C7 ORIF, removal of C3-6 hardware and C3-T1 PLIF by neurosurgery Dr. Raza Benjamin on 6/1/25.     Post op he had anemia but did not require transfusion. He was put on SQ Heparin for DVT ppx. His pain was managed with Tylenol and Oxycodone.      He had elevated BP with surgery requiring IV Hydralzine but BP improved after surgery and HTN managed with Felodipine, Lisinopril, Losartan and Metoprolol.      He was on Lipitor for HLD. He was on Finasteride and Tamsulosin for BPH.  He was on netarsudiL-latanoprost and timoptic eye drops for glaucoma.      He had very elevated blood sugars alaina-op, related to IVFs and Decadron, and required insulin. Blood sugars improved and DM2 managed with Metformin.      He had elevated CK of 1118 on admission due to rhabdomyolysis from being down on floor prior to admission. He was treated with IVFs and CK improved.      The patient was evaluated by PM&R and found to have residual deficits in ambulation and ADLs due to Falls, sequela [W19.XXXS] and came to [facility] on 6/4/2025 for acute inpatient rehab.     [facility] hospital course:    He participated in therapy. He was seen by Nutrition, WOEDD. Vit D low, started oral supplement.     SUBJECTIVE:  Patient interviewed and examined    Vit D low at 13, started on vit D3 25 mcg po daily.    Blood sugars are improved with Metformin.    Noted by  nursing that patient is using [place] cath at  for incontinence and is having high volume urine output at night with some urine retention requiring intermitten cath     Pain stable, no new numbness or weakness, some constipation, no abdominal pain or nausea, no dysuria, no chest pain, palpitations, dyspnea or fevers    Review of Systems:  All other systems reviewed and negative except as noted in the HPI.    Current meds and allergies reviewed  Current Facility-Administered Medications   Medication Dose Route Frequency    acetaminophen  650 mg oral q6h KOFI    alum-mag hydroxide-simeth  30 mL oral q6h PRN    atorvastatin  20 mg oral Daily    bisacodyL  10 mg rectal Daily PRN    cholecalciferol (vitamin D3)  25 mcg oral Daily    cyanocobalamin  1,000 mcg oral Daily    glucose  15-30 g of dextrose oral PRN    Or    dextrose  15-30 g of dextrose oral PRN    Or    glucagon  1 mg intramuscular PRN    Or    dextrose 50 % in water (D50)  25 mL intravenous PRN    enoxaparin  40 mg subcutaneous Daily (6p)    felodipine  10 mg oral Daily    finasteride  5 mg oral Daily    hydrALAZINE  25 mg oral q6h PRN    insulin lispro U-100  2-5 Units subcutaneous BID AC    lisinopriL  10 mg oral Daily    losartan  100 mg oral Daily    metFORMIN  500 mg oral BID with breakfast and dinner    metoprolol tartrate  50 mg oral BID    netarsudiL-latanoprost  1 drop Right Eye Nightly    oxyCODONE  5 mg oral q4h PRN    pantoprazole  40 mg oral Daily before breakfast    polyethylene glycol  17 g oral Daily    sennosides-docusate sodium  2 tablet oral BID    tamsulosin  0.4 mg oral Nightly    timolol  1 drop Both Eyes Daily        Past Medical History:   Diagnosis Date    C7 cervical fracture (CMS/HCC) 05/31/2025    Colonic adenoma     Diverticulosis of colon     Enlarged prostate with lower urinary tract symptoms (LUTS)     Glaucoma     Hypertension     Melanoma of skin (CMS/HCC)     Osteoarthritis of knee     Overweight     Spinal stenosis of  lumbar region     Type 2 diabetes mellitus (CMS/HCC)      Past Surgical History   Procedure Laterality Date    1) ORIF C7 fracture 2) removal and replacement of C3-6 posterior intrumentation 3) C3 to T3 posterolaterral instrumented fusion  Depuy + removal set SMA C-arm O-Arm N/A 6/1/2025    Performed by Raza Benjamin MD at [facility] OR    Appendectomy      Cervical fusion  06/01/2025    1) ORIF C7 fracture 2) removal and replacement of C3-6 posterior intrumentation 3) C3 to T3 posterolaterral instrumented fusion    Colonoscopy  01/10/2012    diverticulosis    Colonoscopy w/ polypectomy  02/09/2022    Hip arthroplasty Bilateral     Knee arthroplasty Left     Knee arthroplasty Right 02/02/2022     Social History     Tobacco Use    Smoking status: Never    Smokeless tobacco: Never   Substance Use Topics    Alcohol use: Yes     Comment: BEER, 1 CONSUMED DAILY    Drug use: Never      Family History   Problem Relation Name Age of Onset    Breast cancer Biological Mother         Vital signs in last 24 hours:  Temp:  [36.6 °C (97.9 °F)-37.1 °C (98.8 °F)] 36.6 °C (97.9 °F)  Heart Rate:  [57-78] 62  Resp:  [18-19] 19  BP: (134-190)/(62-88) 139/73  Vital signs reviewed 06/06/25 2:45 PM    Physical Exam  Vitals and nursing note reviewed.   Constitutional:       Appearance: Normal appearance.   HENT:      Head: Normocephalic and atraumatic.      Right Ear: External ear normal.      Left Ear: External ear normal.      Nose: Nose normal.      Mouth/Throat:      Pharynx: Oropharynx is clear.   Eyes:      Conjunctiva/sclera: Conjunctivae normal.   Neck:      Comments: S/p cervical fusion in collar  Cardiovascular:      Rate and Rhythm: Normal rate.      Pulses: Normal pulses.   Pulmonary:      Effort: Pulmonary effort is normal.   Abdominal:      General: Abdomen is flat.   Musculoskeletal:      Right lower leg: Edema present.      Left lower leg: Edema present.   Skin:     Findings: Lesion (right arm, right ear, sacral wounds as  documented by nursing) present.   Neurological:      Mental Status: He is alert and oriented to person, place, and time.                 Objective    Labs:  I have reviewed the patient's labs.  Significant abnormals are anemia, leukocytosis, hyponatremia.  Results from last 7 days   Lab Units 06/05/25  0536   WBC K/uL 10.71*   HEMOGLOBIN g/dL 12.2*   HEMATOCRIT % 35.8*   PLATELETS K/uL 264     Results from last 7 days   Lab Units 06/05/25  0536   SODIUM mEQ/L 135*   POTASSIUM mEQ/L 3.7   CHLORIDE mEQ/L 101   CO2 mEQ/L 25   BUN mg/dL 20   CREATININE mg/dL 0.9   CALCIUM mg/dL 8.7   ALBUMIN g/dL 3.4*   BILIRUBIN TOTAL mg/dL 0.7   ALK PHOS IU/L 115   ALT IU/L 15   AST IU/L 28   GLUCOSE mg/dL 126*     Results from last 7 days   Lab Units 06/04/25  0445   MAGNESIUM mg/dL 1.9      Results from last 7 days   Lab Units 06/05/25  0536   CK TOTAL U/L 124     6/5/25: vit d 25-oh: 13 (low)    Blood sugars: [phone]    Imaging:  Not applicable        ASSESSMENT/PLAN:    75 y.o. malewith HTN, HLD, DM2, colon ca, BPH, glaucoma, spinal stenosis s/p previous cervical fusion, presented to [facility] 5/31/25 s/p fall, found to have C7 fracture and transferred to [facility] where he underwent C7 ORIF, removal of C3-6 hardware and C3-T1 PLIF by neurosurgery Dr. Raza Benjamin on 6/1/25     1. Neuro:  # Cervical stenosis  # acute C7 fx  # DISH  -6/1/25s/p C7 ORIF, removal of C3-6 hardware and C3-T1 PLIF by neurosurgery Dr. Raza Benjamin   -pain managed with Tylenol and Oxycodone     2. Vasc:  # DVT ppx  -bilat LE edema  -SCDs and SQ Heparin for DVT ppx     3. Heme:  # ABLA  -post op anemia  -5/12/25 B12 <400, added oral B12  -on multivitamin  # leukocytosis  -reactive vs due to Decadron  -follow CBC     4. Renal:  -increased risk of dehydration and electrolyte changes  -follow BMP, Mg     5. Gi:  # constipation  -Miralax for bowels  # ulcer ppx  -Protonix ulcer ppx     6. Gu:  # BPH  -on Proscar and Flomax  -using [place] at  for incontinence  -having high volume urine output at night with some urine retention requiring intermitten cath     7. Cardiac:  # HTN  -on Felodipine, Lisinopril, Losartan and Metoprolol  -watch for orthostatic hypotension  -use cardiac precautions in therapy     8. Pulm:  -incentive spirometry for atelectasis     9. Derm:  # multiple wounds  -surgical wounds  -wounds on right arm, right ear, sacrum as documented by nursing  -seen by [employer] for skin assessment  -wound care per nursing and WOCN consult     10. Nutrition:  -Adult Diet Regular; Thin Liquids; Consistent Carbohydrate 2000   -sen by Nutrition for assessment and education     11. Endo:  # HLD  -on Lipitor  # DM2  -had steroid induced hyperglycemia due to Decadron, now improved  -on Metformin  -accuchecks BID AC with Lispro scale for BG>200  -hypoglycemia protocol     12. Psych:  # anxiety/depression  -consulted Psychology for support     13. Musculoskeletal:  # rhabdomyolysis  -clinically resolved  -6/5/25 CK back to normal at 124  # vit D deficiency  -6/5/25 Vit D low at 13, started on vit D3 25 mcg po daily.    14. Ophth:  # glaucoma  -on netarsudiL-latanoprost and timoptic eye drops     14. Dispo:  -code status: Full Code        plan discussed with patient, nurse, case management and Chase Anna MD Scott Sapperstein, MD  6/6/2025  2:45 PM

## 2025-06-06 NOTE — CONSULTS
110/110D    Clinical Course: Patient is a 75 y.o. male who was admitted on 6/4/2025 with a diagnosis of Falls, sequela [W19.XXXS].     Nutrition Interventions/Recommendations:   Continue 2000 kcal CC diet, can liberalize to NCS if POCs remain < 180mg/dL  Will send [employer] strawberry-banana yogurt smoothie with dinner daily (150 kcals, 17g CHO, 10g protein)  Encouraged nutrition for healing and recovery  Explained diabetic menus and carb goals  Obtain weekly weight and update in Epic  Please document % of meals/supplements consumed in flowsheets  Will monitor po intake, weight, labs, POCs and skin  At nutrition risk level 1    Past Medical History:   Diagnosis Date    C7 cervical fracture (CMS/HCC) 05/31/2025    Colonic adenoma     Diverticulosis of colon     Enlarged prostate with lower urinary tract symptoms (LUTS)     Glaucoma     Hypertension     Melanoma of skin (CMS/HCC)     Osteoarthritis of knee     Overweight     Spinal stenosis of lumbar region     Type 2 diabetes mellitus (CMS/HCC)      Past Surgical History   Procedure Laterality Date    1) ORIF C7 fracture 2) removal and replacement of C3-6 posterior intrumentation 3) C3 to T3 posterolaterral instrumented fusion  Depuy + removal set SMA C-arm O-Arm N/A 6/1/2025    Performed by Raza Benjamin MD at  OR    Appendectomy      Cervical fusion  06/01/2025    1) ORIF C7 fracture 2) removal and replacement of C3-6 posterior intrumentation 3) C3 to T3 posterolaterral instrumented fusion    Colonoscopy  01/10/2012    diverticulosis    Colonoscopy w/ polypectomy  02/09/2022    Hip arthroplasty Bilateral     Knee arthroplasty Left     Knee arthroplasty Right 02/02/2022        Adult Nutrition Assessment - 06/06/25 1200          Charting Type    Nutrition Charting Type Nutrition Brief Assessment     RD Assessment Date 06/06/25     Nutrition Status Classification Mild nutritional compromise     RD Preferred Follow Up Date 6/16-6/20     Time Spent (Minutes) 40         Reason for Assessment    Reason For Assessment physician consult        Nutrition/Diet History    Typical Food/Fluid Intake Regular diet at home     Appetite Prior to Admission Good-50-75%     Food Preferences None provided     Cultural/Holiness Preferences None     Supplemental Drinks/Foods/Additives Would like yogurt smoothie with dinner     Vitamin/Mineral/Herbal Supplements B12      Strength --   strong hand  b/l    Food Allergies NKFA     Factors Affecting Nutritional Intake taste altered        Usual Body Weight (UBW)    Usual Body Weight 104 kg (230 lb)     Weight Loss --   No weight loss noted       Body Mass Index (BMI)    BMI Assessment BMI 30-34.9: obesity grade I        Labs/Procedures/Meds    Lab Results Reviewed reviewed, pertinent     Lab Results Comments 6/5 Na 135L, glucose 126H        Medications    Pertinent Medications Reviewed reviewed, pertinent     Pertinent Medications Comments lipitor, SSI, lisinopril, losartan, metformin, miralax, senokot-s        Physical Findings    Overall Physical Appearance obese   wearing cervical collar    Gastrointestinal --   No GI complaints    Skin pressure injury;surgical incision;edema   Stg 1 - coccyx 6x0.3, Stg 1 - L butt 1.2x0.2, ear laceration, neck incision       Nutrition Order    Nutrition Order meets nutritional requirements     Nutrition Order Comments 2000 kcal CC        PES Statement    Nutritional Needs Met? Yes                CMP Results         06/05/25 06/04/25 06/03/25     0536 0445 0902     134 134    K 3.7 3.3 3.2    Cl 101 103 104    CO2 25 23 22    Glucose 126 143 184    BUN 20 22 21    Creatinine 0.9 1.1 1.1    Calcium 8.7 8.4 8.0    Anion Gap 9 8 8    AST 28 -- --    ALT 15 -- --    Albumin 3.4 -- --    EGFR >60.0 >60.0 >60.0           Comment for EGFR at 0536 on 06/05/25: Calculation based on the Chronic Kidney Disease Epidemiology Collaboration (CKD-EPI) equation refit without adjustment for race.    Comment for EGFR at  "0445 on 06/04/25: Calculation based on the Chronic Kidney Disease Epidemiology Collaboration (CKD-EPI) equation refit without adjustment for race.    Comment for EGFR at 0902 on 06/03/25: Calculation based on the Chronic Kidney Disease Epidemiology Collaboration (CKD-EPI) equation refit without adjustment for race.          Lab Results   Component Value Date    ALT 15 06/05/2025    AST 28 06/05/2025    ALKPHOS 115 06/05/2025    BILITOT 0.7 06/05/2025     Lab Results   Component Value Date    KYRILUDM08 219 05/12/2025     Lab Results   Component Value Date    WBC 10.71 (H) 06/05/2025    HGB 12.2 (L) 06/05/2025    HCT 35.8 (L) 06/05/2025    MCV 94.2 06/05/2025     06/05/2025     Lab Results   Component Value Date    CHOL 116 02/24/2025    CHOL 97 02/23/2024    CHOL 120 02/22/2023     Lab Results   Component Value Date    HDL 40 02/24/2025    HDL 37 (L) 02/23/2024    HDL 37 (L) 02/22/2023     Lab Results   Component Value Date    LDLCALC 52 02/24/2025    LDLCALC 37 02/23/2024    LDLCALC 58 02/22/2023     Lab Results   Component Value Date    TRIG 121 02/24/2025    TRIG 115 02/23/2024    TRIG 123 02/22/2023     No results found for: \"CHOLHDL\"  Lab Results   Component Value Date    CALCIUM 8.7 06/05/2025    PHOS 3.2 06/04/2025     Glucose Results         05/19/25     1051    HBG A1C 6.7    EST AVG GLUCOSE 146           Comment for HBG A1C at 1051 on 05/19/25: In the absence of an established diagnosis of Diabetes Mellitus, HbA1c levels between 5.7% and 6.4% are indicative of increased risk for developing Diabetes(Pre-Diabetes). Levels of 6.5% or greater are diagnostic for Diabetes Mellitus.    Comment for EST AVG GLUCOSE at 1051 on 05/19/25: Estimate of average glucose concentration continuously over 24 hours for previous 2 to 3 months(Per ADA Recommendation).           acetaminophen  650 mg oral q6h KOFI    atorvastatin  20 mg oral Daily    cholecalciferol (vitamin D3)  25 mcg oral Daily    cyanocobalamin  1,000 " mcg oral Daily    enoxaparin  40 mg subcutaneous Daily (6p)    felodipine  10 mg oral Daily    finasteride  5 mg oral Daily    insulin lispro U-100  2-5 Units subcutaneous BID AC    lisinopriL  10 mg oral Daily    losartan  100 mg oral Daily    metFORMIN  500 mg oral BID with breakfast and dinner    metoprolol tartrate  50 mg oral BID    netarsudiL-latanoprost  1 drop Right Eye Nightly    pantoprazole  40 mg oral Daily before breakfast    polyethylene glycol  17 g oral Daily    sennosides-docusate sodium  2 tablet oral BID    tamsulosin  0.4 mg oral Nightly    timolol  1 drop Both Eyes Daily            Dietary Orders   (From admission, onward)                 Start     Ordered    06/04/25 2010  Adult Diet Regular; Thin Liquids; Consistent Carbohydrate 2000; RD/LDN may adjust order  Diet effective now        References:    IDDSI Diet reference   Question Answer Comment   Diet Texture Regular    Fluid Consistency: Thin Liquids    Diabetic Restrictions: Consistent Carbohydrate 2000    Delegation of Authority. Diet orders written by PA/Sanjuanita may not be adjusted by RD/LDNs. RD/LDN may adjust order        06/04/25 2010                  Wt Readings from Last 3 Encounters:   06/04/25 109 kg (241 lb)   06/01/25 106 kg (234 lb)   05/31/25 108 kg (238 lb 1.6 oz)       Weights (last 7 days)       Date/Time Weight    06/04/25 1815 109 kg (241 lb)            Clinical Comments:  Patient and daughter report fair appetite. RN flowsheets report that patient is eating 100% of meals. Daughter states patient has been having some taste alterations while in the hospital. No weight loss noted, patient does however have swelling in b/l LE which may be masking weight loss (weight is 11# above UBW).  PO and protein intake encouraged for healing, strength and energy for therapy.  Diabetic menus and carb goals discussed and diabetic alt list provided.      Date: 06/06/25  Signature: Na Mancuso RD

## 2025-06-06 NOTE — PROGRESS NOTES
Patient: Aneesh Brito  Location: [facility] [address]  MRN: [id number]  Today's date: 6/6/2025    History of Present Illness    Aneesh is a 75 y.o. male with a history of colon carcinoma, BPH, hypertension, glaucoma, type 2 diabetes, history of a cervical fusion over 10 years ago admitted after a fall inability to get up on his own.  He was seen at [facility] where imaging studies were performed which demonstrated DISH as well as a Chance fracture at the bottom of C7.  He was transferred to [facility] for neurosurgery.    He was taken to the OR on 6/1 by Dr. Benjamin and underwent an ORIF of the C7 Chance fracture with removal and replacement of the C3-6 posterior instrumentation as well as a C3-T3 posterior lateral fusion.  Drains remain in place.  He is on subcu heparin for DVT prophylaxis.  Currently on a nicardipine drip for blood pressure control.  He states pain is well-controlled.  He denies any other complaints of headache or dizziness, chest pain or shortness of breath.  He is voiding on his own.  He has not had a bowel movement yet.    He was seen by PT and OT needing moderate assistance of 2 to transfer sit to stand.  He ambulated 2 feet with moderate assistance of 2 using a rolling walker.  Pertinent radiology results reviewed. Pertinent lab results reviewed.      Past Medical History  Aneesh has a past medical history of C7 cervical fracture (CMS/HCC) (05/31/2025), Colonic adenoma, Diverticulosis of colon, Enlarged prostate with lower urinary tract symptoms (LUTS), Glaucoma, Hypertension, Melanoma of skin (CMS/HCC), Osteoarthritis of knee, Overweight, Spinal stenosis of lumbar region, and Type 2 diabetes mellitus (CMS/HCC).    OT Vitals      Date/Time Pulse HR Source BP BP Location BP Method Pt Position Who   06/06/25 1312 62 Monitor 139/73 Left upper arm Automatic Sitting [facility]          OT Pain      Date/Time Pain Type Side/Orientation Rating: Rest Rating: Activity  Interventions [facility]   06/06/25 1312 Pain Assessment neck 6 - moderate-severe pain 6 - moderate-severe pain premedicated for activity [facility]   06/06/25 1428 Pain Reassessment neck 4 - moderate pain 4 - moderate pain premedicated for activity [facility]                 Prior Living Environment      Flowsheet Row Most Recent Value   People in Home spouse   Current Living Arrangements independent living facility   Home Accessibility wheelchair accessible   Living Environment Comment PTA residing c wife in [facility] 1 floor set up. Shower stall c shower chair & grab bars. Reports no DME at toilet - information to be verified c pt. wife   Number of Stairs, Main Entrance 0   Patient Bedroom Access Comment PTA residing c wife in [facility] ([facility] 1 floor set up. Shower stall c shower chair & grab bars. Reports no DME at toilet - information to be verified c pt. wife   Bathroom Access Comment PTA residing c wife in [facility] ([facility] 1 floor set up. Shower stall c shower chair & grab bars. Reports no DME at toilet - information to be verified c pt. wife   Number of Stairs, Within Home, Primary 0       Prior Level of Function      Flowsheet Row Most Recent Value   Dominant Hand left   Ambulation assistive equipment  [rollator]   Transferring assistive equipment  [rollator]   Toileting independent   Bathing independent   Dressing independent   Eating independent   IADLs independent   Driving/Transportation    Communication understands/communicates without difficulty   Prior Level of Function Comment Pt reports being independent with use of SPC inside and Rollator to the dining jenkins. Independent ADLs. Denies other falls. Independent ADLs. (+) driving. Retired teacher   Assistive Device Currently Used at Home cane, straight, walker, 4-wheeled, shower chair       Occupational Profile      Flowsheet Row Most Recent Value   Successful Occupations Enjoys participating in the activities at [employer] -  "shuffle board and corn hole   Occupational History/Life Experiences PTA independent c ADLs. 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   Patient Goals \"Walk\"        IRF OT Evaluation and Treatment - 06/06/25 1319          OT Time Calculation    Start Time 1300     Stop Time 1430     Time Calculation (min) 90 min        Session Details    Document Type Daily Treatment/Progress Note        General Information    Patient/Family/Caregiver Comments/Observations pt. wife present to observe treatment session     General Observations of Patient pleasant, cooperative; fatigued        Mobility Belt    Mobility Belt Used During Session yes        Orthosis Neck Cervical Collar    Orthosis Properties Date Obtained: 06/01/25 Time Obtained: 1803 Location: Neck Type: Cervical Collar Therapeutic Indications: stabilization and support Wearing Schedule: wear when out of bed       Orthosis Neck Cervical Collar    Orthosis Properties Date Obtained: 06/01/25 Time Obtained: 0000 Location: Neck Type: Cervical Collar Features: Hard Description: Apen collar OOB don seated; [place] collar for shower Therapeutic Indications: stabilization and support Wearing Schedule: wear when out of bed       Bed Mobility    Comment OT: mod A supine to/from sit on therapy mat and sit to supine in hospital bed        Sit to Stand Transfer    [place], Sit to Stand Transfer moderate assist (50-74% patient effort)     Safety/Cues technique;hand placement     Assistive Device other (see comments)   KALA walker    Comment From W/C to KALA walker x 3; Mod A to rise & for balance        Stand to Sit Transfer    [place], Stand to Sit Transfer moderate assist (50-74% patient effort)     Safety/Cues technique;hand placement     Assistive Device other (see comments)   KALA walker    Comment From KALA walker to W/C; Mod A for balance & controlled lowering        " Stand Pivot Transfer    [place], Stand Pivot/Stand Step Transfer moderate assist (50-74% patient effort)     Safety/Cues technique     Assistive Device none     Comment SPT from manual W/C to recliner W/C and to/from W/C & EOM and to/from recliner W/C & EOB; Mod A for balance & control. Cues for upright posture        Toilet Transfer    Transfer Technique stand pivot     [place] moderate assist (50-74% patient effort)     Safety/Cues technique     Assistive Device commode, 3-in-1     Comment Pt. recieved on commode over toilet just finishing toileting c PCT. SPT from commode over toilet to W/C; Mod A for balance & control        Wheelchair Mobility/Management    Comment, Wheelchair Mobility Pt. recieved in ill fitting 18 x 18 manual W/C. Transitioned to recliner W/C c increased width. Improved fit noted & pt. reporting increased comfort        Balance    Balance Interventions occupation based/functional task     Comment, Balance In stance c KALA walker and diversional UE coordination task anterior on vertical plane to promote upright posture, 2-3 mins x 3 c seated rest breaks in between; min A level        Motor Skills    Motor Control/Coordination Interventions occupation/activity based treatment     Comment, Motor Skills Diversional coordination task while in stance for functional reach + FMC for placement of various sized rubber washers onto vertical dowels c height ranging from chest to head level x 25 repetitions each R & L UE        Upper Extremity (Therapeutic Exercise)    Exercise Position/Type supine;AAROM (active assistive range of motion)     Range of Motion Exercises bilateral;shoulder flexion/extension;shoulder horizontal abduction/adduction     Weight/Resistance other (see comments)   unweighted dowel    Reps and Sets 2 sets x 10 repetitions; 1 set x 5 repetitions     Comment Performed supine on therapy mat        Grooming    Tasks washes, rinses and dries hands     [place] close  supervision     Position supported sitting;sink side     Adaptive Equipment none     Comment Performed seated in W/C at sink        Outcome Measures    Results, 9 Hole Peg Test of Fine Motor Coordination R UE 43.18 seconds, L UE 31.1 seconds        Box and Blocks    Box and Blocks - Right 31     Box and Blocks - Left 34         Assessment    Right Hand Dynamometer Position 2     Left Hand Dynamometer Position 2     Right  - Trial 1 30     Right  - Trial 2 30     Right  - Trial 3 30     Right  - Average of 3 Trials 30     Right  - Pain with Testing No     Left  - Trial 1 30     Left  - Trial 2 30     Left  - Trial 3 25     Left  - Average of 3 Trials 28.33     Left  - Pain with Testing No        Daily Progress Summary (OT)    Daily Outcome Statement Good participation in treatment session. Min A standing balance c limitations in tolerance 2* fatigue. B UE baseline outcomes c scores below age/gender norms. Continue OT Per POC                          IRF OT Goals      Flowsheet Row Most Recent Value   Transfer Goal 1    Activity/Assistive Device toilet, commode, 3-in-1 at 06/05/2025 1033   [place] minimum assist (75% or more patient effort) at 06/05/2025 1033   Time Frame short-term goal (STG), 5 - 7 days at 06/05/2025 1033   Transfer Goal 2    Activity/Assistive Device toilet, commode, 3-in-1 at 06/05/2025 1033   [place] modified independence at 06/05/2025 1033   Time Frame long-term goal (LTG), 21 days or less at 06/05/2025 1033   Transfer Goal 3    Activity/Assistive Device shower, tub bench at 06/05/2025 1033   [place] minimum assist (75% or more patient effort) at 06/05/2025 1033   Time Frame short-term goal (STG), 5 - 7 days at 06/05/2025 1033   Transfer Goal 4    Activity/Assistive Device shower, tub bench at 06/05/2025 1033   [place] modified independence at 06/05/2025 1033   Time Frame long-term goal (LTG), 21 days or less at 06/05/2025 1033    Bathing Goal 1    Activity/Assistive Device bathing skills, all at 06/05/2025 1033   [place] minimum assist (75% or more patient effort) at 06/05/2025 1033   Time Frame short-term goal (STG), 5 - 7 days at 06/05/2025 1033   Bathing Goal 2    Activity/Assistive Device bathing skills, all at 06/05/2025 1033   [place] modified independence at 06/05/2025 1033   Time Frame long-term goal (LTG), 21 days or less at 06/05/2025 1033   UB Dressing Goal 1    Activity/Assistive Device upper body dressing at 06/05/2025 1033   [place] maximum assist (25-49% patient effort) at 06/05/2025 1033   Time Frame short-term goal (STG), 5 - 7 days at 06/05/2025 1033   Progress/Outcome goal no longer appropriate at 06/05/2025 1033   UB Dressing Goal 2    Activity/Assistive Device upper body dressing at 06/05/2025 1033   [place] modified independence at 06/05/2025 1033   Time Frame long-term goal (LTG), 21 days or less at 06/05/2025 1033   LB Dressing Goal 1    Activity/Assistive Device lower body dressing at 06/05/2025 1033   [place] maximum assist (25-49% patient effort) at 06/05/2025 1033   Time Frame short-term goal (STG), 5 - 7 days at 06/05/2025 1033   LB Dressing Goal 2    Activity/Assistive Device lower body dressing at 06/05/2025 1033   [place] modified independence at 06/05/2025 1033   Time Frame long-term goal (LTG), 21 days or less at 06/05/2025 1033   Grooming Goal 1    Activity/Assistive Device grooming skills, all at 06/05/2025 1033   [place] set-up required at 06/05/2025 1033   Time Frame short-term goal (STG), 5 - 7 days at 06/05/2025 1033   Grooming Goal 2    Activity/Assistive Device grooming skills, all at 06/05/2025 1033   [place] modified independence at 06/05/2025 1033   Time Frame long-term goal (LTG), 21 days or less at 06/05/2025 1033   Toileting Goal 1    Activity/Assistive Device toileting skills, all at 06/05/2025 1033   [place] maximum assist (25-49% patient effort)  at 06/05/2025 1033   Time Frame short-term goal (STG), 5 - 7 days at 06/05/2025 1033   Toileting Goal 2    Activity/Assistive Device toileting skills, all at 06/05/2025 1033   [place] modified independence at 06/05/2025 1033   Time Frame long-term goal (LTG), 21 days or less at 06/05/2025 1033

## 2025-06-06 NOTE — PLAN OF CARE
Plan of Care Review  Plan of Care Reviewed With: patient  Progress: improving  Outcome Evaluation: patient remains free from falls, using call bell appropriately, voided this am, high PVR, cath for 500 of clear yellow urine, continent of bowel/bladder, oxycodone given at 9am, aspen collar maintained OOB

## 2025-06-06 NOTE — PROGRESS NOTES
Subjective    Patient seen and examined on rounds.  Chart reviewed.  Events overnight noted.  History reviewed briefly with patient.    CC: Deficits in mobility, transfers, self-care status post fall, unstable 3 column C7 Chance fracture, status post C7 ORIF, removal of prior C3-6 hardware and C3-T1 PLIF by neurosurgery, history of gait instability and multiple falls, peripheral neuropathy, multiple medical problems.    HPI:  Mr. Aneesh Brito is a 75-year-old left handed white male with chronic conditions significant for hypertension, hyperlipidemia, diabetes mellitus type 2, BPH, glaucoma, colon cancer, gait instability, multiple falls in the home environment for which he was seeing a neurologist for workup of falls, history of prior bilateral hip replacements and bilateral knee replacements, spinal stenosis status post previous C3-C6 laminectomy and posterolateral instrumented fusion over 10 years ago by Dr. Maurer, presented to the ER at [facility] on 5/31/25 after suffering from a mechanical fall while at home and was unable to get up so he was on the floor all night until he was found.  At [facility] ER and he reported neck pain and pain in the lower cervical and upper thoracic region.  Imaging studies revealed C7 Chance fracture and findings of DISH (diffuse idiopathic skeletal hyperostosis ).  He was subsequently transferred to [facility] for neurosurgical evaluation where he was admitted on 5/31/25.  He was evaluated by Dr. Benjamin from neurosurgery, noted to have a 3 column Chance fracture at C7-T1 level, given highly unstable fracture was recommended surgical intervention by neurosurgery. He underwent C7 ORIF, removal of C3-6 hardware and C3-T1 PLIF by neurosurgery Dr. Raza Benjamin on 6/1/25.  Postoperatively is allowed weightbearing as tolerated on both lower extremities.  He has been given Aspen collar for use when out of bed in chair and when ambulating.  He can use soft cervical  collar in the bed.  I discussed spinal precautions with him.  Postoperative course was significant for anemia but he did not require transfusion.  His pain is managed with Tylenol and Oxycodone.  He had elevated blood pressure requiring IV Hydralazine but blood pressure improved after surgery and hypertension was managed with managed with Felodipine, Lisinopril, Losartan and Metoprolol.  He is continued on Lipitor for hyperlipidemia.  He has history of BPH and remains on Tamsulosin and Finasteride.  He was on Netarsudil-Latanoprost and Timoptic eye drops for glaucoma.  He had elevated blood sugars related to Decadron perioperatively and required insulin and subsequently blood sugars improved and he is managed with Metformin now. He had elevated CK of 1118 on admission due to rhabdomyolysis from being down on floor prior to admission. He was treated with IV fluids and CK improved.  I discussed his recent clinical course with patient, his wife and son who is a pediatric physician and his son indicated that patient was being evaluated by neurologist Dr. Hussein Haider regarding his gait instability and falls, and lumbar spine MRI was done, EMG studies which showed peripheral neuropathy as well as muscle biopsy was being planned of the right quadriceps to evaluate  his proximal weakness in bilateral lower extremities.  He is unable to do active straight leg raise in bilateral lower extremities in bed, unable to localize position sense in his left great toe and I also discussed with patient, his wife and son at bedside.  I also mentioned to them that neurology will be conservative at him during his stay at [facility] and that he should follow up with Dr. Hussein Haider from neurology on outpatient basis for further evaluation and workup as appropriate.  He is on subcutaneous Heparin and SCDs for DVT prophylaxis.  He is able to tolerate regular with thins consistency diet.  On 6/4/25, hemoglobin was 11.3, WBC count was  "11.85, platelets were 236, BUN was 22, creatinine was 1.1, sodium was 134 and potassium was 3.3.  He has been needing assistance for mobility, transfers, self-care. He is transferred to [facility] on 6/4/25 for further rehabilitation care.     SUBJ: Tolerating therapies per endurance.  Slept well last night.  Noted to have elevated PVR.  Requiring moderate assistance for sit to stand transfer with physical therapy.  Able to ambulate 20 feet with front wheeled walker dependent gait with physical therapy.Discussed with nurse.      ROS: Denies chest pain or shortness of breath. Other ROS negative. Past, family, social history is unchanged.    Current Functional Status:   Bed mobility:   [place], Supine to Sit: moderate assist (50-74% patient effort)   [place], Sit to Supine: moderate assist (50-74% patient effort)   Transfers:    [place], Sit to Stand Transfer: moderate assist (50-74% patient effort)  [place], Stand to Sit Transfer: moderate assist (50-74% patient effort)   [place], Stand Pivot/Stand Step Transfer: moderate assist (50-74% patient effort)   Gait:   [place], Gait: dependent (less than 25% patient effort)  Assistive Device: walker, front-wheeled   Distance in Feet: 20 feet    Bathing:   Clothier: moderate assist (50-74% patient effort)   Toileting:   Clothier: dependent (less than 25% patient effort)   Upper body dressing:   Clothier: dependent (less than 25% patient effort)   Lower body dressing:   Clothier: dependent (less than 25% patient effort)       Functional Progress:    Functional status reviewed. Overall, patient's functional status is improving.      Physical Exam      Blood pressure 139/73, pulse 62, temperature 36.6 °C (97.9 °F), temperature source Oral, resp. rate 19, height 1.803 m (5' 11\"), weight 109 kg (241 lb), SpO2 94%.    Body mass index is 33.61 kg/m².    General Appearance: Not in acute distress  Head/Ear/Nose/Throat: " Normocephalic; Atraumatic.   Eye: EOMI; PERRL.   Neck: Healing incision posterior cervicothoracic spine.  Dressing in place.    Respiratory: Decreased breath sounds at bases.   Cardiovascular: RRR; Normal S1, S2.   Gastrointestinal: Soft; NT; +BS.   Extremities: Bilateral lower extremity edema noted.    Musculoskeletal: Functional active range of motion in both upper extremities except some limitation in left shoulder and is unable to lift left arm up overhead.  Some limitation of active range of motion in both hips and both knees, which is chronic according to patient and his wife at bedside.  Patient is unable to do active straight leg raise and either lower extremity.  He has had previous bilateral hip and bilateral knee replacements.  His wife mentions patient was lifting his lower extremities manually with his arms while getting in the car and after he sat down in the seat inside the car manually left each leg to bring them in the car.   Neurological: AAO ×3. Speech is fluent. Cranial nerve examination does not reveal any gross facial asymmetry. Strength testing about 4/5 strength in both upper extremities except left shoulder is 2-/5 and abduction and forward flexion.  Bilateral hip flexion is 2-/5.  Bilateral quadriceps are 2+/5 with the thighs supported.  Bilateral ankle dorsi and plantar flexion is 3+/5.  He denies significant numbness in his legs and feet at this time.  He is grossly able to localize position sense in his right great toe but not so in his left great toe.  He is able to localize vibration sense in both great toes.  Deep tendon reflexes are hypoactive and symmetric bilaterally.  Plantars are flexor.  Coordination is functional upper extremities.  Both knee jerks were not tested.  Behavior/Emotional: Appropriate; Cooperative.   Skin: Healing incision posterior cervicothoracic spine.  Dressing in place.  Some abrasions noted on right upper extremity.  Left forearm has bruising ecchymosis  noted.  Small wound noted on sacrum/coccyx at least stage II decubitus cannot rule out DTI.      Current Facility-Administered Medications:     acetaminophen (TYLENOL) tablet 650 mg, 650 mg, oral, q6h KOFI, Chandler Falk MD, 650 mg at 06/06/25 1244    alum-mag hydroxide-simeth (MAALOX) 200-200-20 mg/5 mL suspension 30 mL, 30 mL, oral, q6h PRN, Chandler Falk MD    atorvastatin (LIPITOR) tablet 20 mg, 20 mg, oral, Daily, Robert Hawkins MD, 20 mg at 06/06/25 0847    bisacodyL (DULCOLAX) 10 mg suppository 10 mg, 10 mg, rectal, Daily PRN, Chandler Falk MD    cholecalciferol (vitamin D3) tablet 25 mcg, 25 mcg, oral, Daily, Chandler Falk MD, 25 mcg at 06/06/25 1244    cyanocobalamin (VITAMIN B12) tablet 1,000 mcg, 1,000 mcg, oral, Daily, Chandler Falk MD, 1,000 mcg at 06/06/25 0848    glucose chewable tablet 15-30 g of dextrose, 15-30 g of dextrose, oral, PRN **OR** dextrose 40 % oral gel 15-30 g of dextrose, 15-30 g of dextrose, oral, PRN **OR** glucagon (GLUCAGEN) injection 1 mg, 1 mg, intramuscular, PRN **OR** dextrose 50 % in water (D50) injection 12.5 g, 25 mL, intravenous, PRN, Chase Anna MD    enoxaparin (LOVENOX) syringe 40 mg, 40 mg, subcutaneous, Daily (6p), Robert Hawkins MD, 40 mg at 06/05/25 1716    felodipine (PLENDIL) 24 hr ER tablet 10 mg, 10 mg, oral, Daily, Chandler Falk MD, 10 mg at 06/06/25 0847    finasteride (PROSCAR) tablet 5 mg, 5 mg, oral, Daily, Robert Hawkins MD, 5 mg at 06/06/25 0848    hydrALAZINE (APRESOLINE) tablet 25 mg, 25 mg, oral, q6h PRN, Robert Hawkins MD    insulin lispro U-100 (HumaLOG) pen 2-5 Units, 2-5 Units, subcutaneous, BID AC, Chandler Falk MD    lisinopriL (PRINIVIL) tablet 10 mg, 10 mg, oral, Daily, Chandler Falk MD, 10 mg at 06/06/25 0847    losartan (COZAAR) tablet 100 mg, 100 mg, oral, Daily, Robert Hawkins MD, 100 mg at 06/06/25 0847    metFORMIN (GLUCOPHAGE) tablet 500 mg, 500 mg, oral, BID with breakfast and dinner,  Robert Hawkins MD, 500 mg at 06/06/25 0848    metoprolol tartrate (LOPRESSOR) tablet 50 mg, 50 mg, oral, BID, Chandler Falk MD, 50 mg at 06/06/25 0848    netarsudiL-latanoprost 0.02-0.005 % drops 1 drop, 1 drop, Right Eye, Nightly, Chandler Falk MD, 1 drop at 06/05/25 2023    oxyCODONE (ROXICODONE) immediate release tablet 5 mg, 5 mg, oral, q4h PRN, Robert Hawkins MD, 5 mg at 06/06/25 0853    pantoprazole (PROTONIX) tablet,delayed release (DR/EC) 40 mg, 40 mg, oral, Daily before breakfast, Chandler Falk MD, 40 mg at 06/06/25 0847    polyethylene glycol (MIRALAX) 17 gram packet 17 g, 17 g, oral, Daily, Robert Hawkins MD, 17 g at 06/05/25 0859    sennosides-docusate sodium (SENOKOT-S) 8.6-50 mg per tablet 2 tablet, 2 tablet, oral, BID, Chase Anna MD    tamsulosin (FLOMAX) 24 hr ER capsule 0.4 mg, 0.4 mg, oral, Nightly, Robert Hawkins MD, 0.4 mg at 06/05/25 2021    timolol (TIMOPTIC) 0.5 % ophthalmic solution 1 drop, 1 drop, Both Eyes, Daily, Robert Hawkins MD, 1 drop at 06/06/25 0851       Labs / Radiology    Lab Results   Component Value Date    WBC 10.71 (H) 06/05/2025    HGB 12.2 (L) 06/05/2025    HCT 35.8 (L) 06/05/2025    MCV 94.2 06/05/2025     06/05/2025     Lab Results   Component Value Date    GLUCOSE 126 (H) 06/05/2025    CALCIUM 8.7 06/05/2025     (L) 06/05/2025    K 3.7 06/05/2025    CO2 25 06/05/2025     06/05/2025    BUN 20 06/05/2025    CREATININE 0.9 06/05/2025       Assessment and Plan    ASSESSMENT PLAN:  1. 75-year-old left handed white male with chronic conditions significant for hypertension, hyperlipidemia, diabetes mellitus type 2, BPH, glaucoma, colon cancer, gait instability, multiple falls in the home environment for which he was seeing a neurologist for workup of falls, history of prior bilateral hip replacements and bilateral knee replacements, spinal stenosis status post previous C3-C6 laminectomy and posterolateral instrumented fusion over 10 years  ago by Dr. Maurer, presented to the ER at [facility] on 5/31/25 after suffering from a mechanical fall while at home and was unable to get up so he was on the floor all night until he was found.  At [facility] ER and he reported neck pain and pain in the lower cervical and upper thoracic region.  Imaging studies revealed C7 Chance fracture and findings of DISH (diffuse idiopathic skeletal hyperostosis ).  He was subsequently transferred to [facility] for neurosurgical evaluation where he was admitted on 5/31/25.  He was evaluated by Dr. Benjamin from neurosurgery, noted to have a 3 column Chance fracture at C7-T1 level, given highly unstable fracture was recommended surgical intervention by neurosurgery. He underwent C7 ORIF, removal of C3-6 hardware and C3-T1 PLIF by neurosurgery Dr. Raza Benjamin on 6/1/25.  Postoperatively is allowed weightbearing as tolerated on both lower extremities.  He has been given Aspen collar for use when out of bed in chair and when ambulating.  He can use soft cervical collar in the bed.  I discussed spinal precautions with him.  Postoperative course was significant for anemia but he did not require transfusion.  His pain is managed with Tylenol and Oxycodone.  He had elevated blood pressure requiring IV Hydralazine but blood pressure improved after surgery and hypertension was managed with managed with Felodipine, Lisinopril, Losartan and Metoprolol.  He is continued on Lipitor for hyperlipidemia.  He has history of BPH and remains on Tamsulosin and Finasteride.  He was on Netarsudil-Latanoprost and Timoptic eye drops for glaucoma.  He had elevated blood sugars related to Decadron perioperatively and required insulin and subsequently blood sugars improved and he is managed with Metformin now. He had elevated CK of 1118 on admission due to rhabdomyolysis from being down on floor prior to admission. He was treated with IV fluids and CK improved.  I discussed his recent  clinical course with patient, his wife and son who is a pediatric physician and his son indicated that patient was being evaluated by neurologist Dr. Hussein Haider regarding his gait instability and falls, and lumbar spine MRI was done, EMG studies which showed peripheral neuropathy as well as muscle biopsy was being planned of the right quadriceps to evaluate  his proximal weakness in bilateral lower extremities.  He is unable to do active straight leg raise in bilateral lower extremities in bed, unable to localize position sense in his left great toe and I also discussed with patient, his wife and son at bedside.  I also mentioned to them that neurology will be conservative at him during his stay at [place] [facility] and that he should follow up with Dr. Hussein Haider from neurology on outpatient basis for further evaluation and workup as appropriate.  He is on subcutaneous Heparin and SCDs for DVT prophylaxis.  He is able to tolerate regular with thins consistency diet.  On 6/4/25, hemoglobin was 11.3, WBC count was 11.85, platelets were 236, BUN was 22, creatinine was 1.1, sodium was 134 and potassium was 3.3.  He has been needing assistance for mobility, transfers, self-care. He is transferred to [place] [facility] on 6/4/25 for further rehabilitation care.     2. DVT prophylaxis - on subcutaneous Lovenox.  On SCDs.  Platelets 264 on 6/5/2025.     3. Neurosurgery - status post fall, unstable 3 column C7 Chance fracture, status post C7 ORIF, removal of prior C3-6 hardware and C3-T1 PLIF by neurosurgery, history of gait instability and multiple falls, peripheral neuropathy, class I obesity, multiple medical problems - continue PT, OT, psychology.  Follow falls precautions, cardiac precautions, aspiration precautions, spinal precautions.  Aspen collar to neck when out of bed.  Monitor healing of posterior cervical incision.  Neurology was consulted.     4. GI - On Protonix for GI prophylaxis. On Senokot-S.  On  MiraLAX.  On PRN Dulcolax suppository.  On PRN Maalox.     5.  -on Proscar.  On Flomax.     6. CVS - on Plendil.  On Lisinopril.  On Cozaar.  On Lopressor.  On PRN Hydralazine.     7. Pulmonary - encourage incentive spirometry.     8. Hematology - monitor hemoglobin, platelets.  Hemoglobin 12.2, WBC 10.71 on 6/5/2025.     9. Pain -on Tylenol.  On PRN Oxycodone.     10. Skin - Healing incision posterior cervicothoracic spine.  Dressing in place.  Some abrasions noted on right upper extremity.  Left forearm has bruising ecchymosis noted.  Small wound noted on sacrum/coccyx at least stage II decubitus cannot rule out DTI.     11. F/E/N - on vitamin B-12.  BMI 33.63 consistent with class I obesity.  Nutrition was consulted.     12. Diabetes mellitus type 2  - on sliding scale Humalog coverage.  On Glucophage.  On hypoglycemic protocol.     13. Ophthalmology- He was on NetarsudiL-latanoprost and Timoptic eye drops for glaucoma     14. Hyperlipidemia - on Lipitor.     15. Rehabilitation medicine - Continue comprehensive rehabilitation care. Continue PT, OT, psychology.  Follow falls precautions, cardiac precautions, aspiration precautions, spinal precautions.  Aspen collar to neck when out of bed.  Monitor healing of posterior cervical incision.  Neurology was consulted.Tolerating therapies per endurance on 6/6/2025.  Slept well last night.  Noted to have elevated PVR.  Requiring moderate assistance for sit to stand transfer with physical therapy.  Able to ambulate 20 feet with front wheeled walker dependent gait with physical therapy on 6/6/2025. Discussed with nurse on 6/6/2025.     16. Reviewed labs today.  BUN 20, creatinine 0.9, sodium 135, potassium 3.7 on 6/5/2025.           Chase Anna MD  6/6/2025      This encounter was completed utilizing the Direct Speech Voice Recognition Software. Grammatical errors, random word insertions, pronoun errors, and incomplete sentences are occasional consequences of the  system due to software limitations, ambient noises, and hardware issues. Such errors may be missed prior to affixing electronic signature. Any questions or concerns about the content, text, or information contained within the body of this dictation should be directly addressed to the physician for clarification. If you have any questions or concerns please do not hesitate to call me directly via EPIC chat, page, or email.

## 2025-06-07 ENCOUNTER — APPOINTMENT (INPATIENT)
Dept: PHYSICAL THERAPY | Facility: REHABILITATION | Age: 75
DRG: 560 | End: 2025-06-07
Payer: MEDICARE

## 2025-06-07 ENCOUNTER — APPOINTMENT (INPATIENT)
Dept: OCCUPATIONAL THERAPY | Facility: REHABILITATION | Age: 75
DRG: 560 | End: 2025-06-07
Payer: MEDICARE

## 2025-06-07 LAB
GLUCOSE BLD-MCNC: 118 MG/DL (ref 70–99)
GLUCOSE BLD-MCNC: 127 MG/DL (ref 70–99)
POCT TEST: ABNORMAL
POCT TEST: ABNORMAL

## 2025-06-07 PROCEDURE — 97530 THERAPEUTIC ACTIVITIES: CPT | Mod: GP,CQ

## 2025-06-07 PROCEDURE — 97116 GAIT TRAINING THERAPY: CPT | Mod: GP,CQ

## 2025-06-07 PROCEDURE — 63700000 HC SELF-ADMINISTRABLE DRUG: Performed by: INTERNAL MEDICINE

## 2025-06-07 PROCEDURE — 12800001 HC ROOM AND CARE SEMIPRIVATE REHAB-BRAIN INJ

## 2025-06-07 PROCEDURE — 63700000 HC SELF-ADMINISTRABLE DRUG: Performed by: HOSPITALIST

## 2025-06-07 PROCEDURE — 97530 THERAPEUTIC ACTIVITIES: CPT | Mod: GO

## 2025-06-07 PROCEDURE — 12800000 HC ROOM AND CARE SEMIPRIVATE REHAB

## 2025-06-07 PROCEDURE — 63700000 HC SELF-ADMINISTRABLE DRUG: Performed by: PHYSICAL MEDICINE & REHABILITATION

## 2025-06-07 PROCEDURE — 97110 THERAPEUTIC EXERCISES: CPT | Mod: GO

## 2025-06-07 PROCEDURE — 63600000 HC DRUGS/DETAIL CODE: Mod: JZ | Performed by: INTERNAL MEDICINE

## 2025-06-07 PROCEDURE — 97110 THERAPEUTIC EXERCISES: CPT | Mod: GP,CQ

## 2025-06-07 PROCEDURE — 97112 NEUROMUSCULAR REEDUCATION: CPT | Mod: GP,CQ

## 2025-06-07 RX ADMIN — PANTOPRAZOLE SODIUM 40 MG: 40 TABLET, DELAYED RELEASE ORAL at 08:26

## 2025-06-07 RX ADMIN — Medication 25 MCG: at 08:26

## 2025-06-07 RX ADMIN — TIMOLOL MALEATE 1 DROP: 5 SOLUTION/ DROPS OPHTHALMIC at 08:29

## 2025-06-07 RX ADMIN — OXYCODONE 5 MG: 5 TABLET ORAL at 20:09

## 2025-06-07 RX ADMIN — METOPROLOL TARTRATE 50 MG: 50 TABLET, FILM COATED ORAL at 08:26

## 2025-06-07 RX ADMIN — TAMSULOSIN HYDROCHLORIDE 0.4 MG: 0.4 CAPSULE ORAL at 20:06

## 2025-06-07 RX ADMIN — METFORMIN HYDROCHLORIDE 500 MG: 500 TABLET ORAL at 17:12

## 2025-06-07 RX ADMIN — OXYCODONE 5 MG: 5 TABLET ORAL at 08:26

## 2025-06-07 RX ADMIN — METFORMIN HYDROCHLORIDE 500 MG: 500 TABLET ORAL at 08:26

## 2025-06-07 RX ADMIN — LISINOPRIL 10 MG: 10 TABLET ORAL at 08:26

## 2025-06-07 RX ADMIN — HYDRALAZINE HYDROCHLORIDE 25 MG: 25 TABLET ORAL at 20:06

## 2025-06-07 RX ADMIN — FELODIPINE 10 MG: 5 TABLET, FILM COATED, EXTENDED RELEASE ORAL at 08:26

## 2025-06-07 RX ADMIN — ACETAMINOPHEN 650 MG: 325 TABLET ORAL at 12:32

## 2025-06-07 RX ADMIN — ACETAMINOPHEN 650 MG: 325 TABLET ORAL at 17:11

## 2025-06-07 RX ADMIN — LOSARTAN POTASSIUM 100 MG: 100 TABLET, FILM COATED ORAL at 08:26

## 2025-06-07 RX ADMIN — SENNOSIDES AND DOCUSATE SODIUM 2 TABLET: 50; 8.6 TABLET ORAL at 20:07

## 2025-06-07 RX ADMIN — ENOXAPARIN SODIUM 40 MG: 40 INJECTION SUBCUTANEOUS at 17:11

## 2025-06-07 RX ADMIN — FINASTERIDE 5 MG: 5 TABLET, FILM COATED ORAL at 08:26

## 2025-06-07 RX ADMIN — METOPROLOL TARTRATE 50 MG: 50 TABLET, FILM COATED ORAL at 20:06

## 2025-06-07 RX ADMIN — ATORVASTATIN CALCIUM 20 MG: 20 TABLET, FILM COATED ORAL at 08:26

## 2025-06-07 RX ADMIN — ACETAMINOPHEN 650 MG: 325 TABLET ORAL at 05:44

## 2025-06-07 RX ADMIN — CYANOCOBALAMIN TAB 1000 MCG 1000 MCG: 1000 TAB at 08:26

## 2025-06-07 NOTE — PROGRESS NOTES
Subjective    Patient seen and examined on rounds.  Chart reviewed.  Events overnight noted.  History reviewed briefly with patient.    CC: Deficits in mobility, transfers, self-care status post fall, unstable 3 column C7 Chance fracture, status post C7 ORIF, removal of prior C3-6 hardware and C3-T1 PLIF by neurosurgery, history of gait instability and multiple falls, peripheral neuropathy, multiple medical problems.    HPI:  Mr. Aneesh Brito is a 75-year-old left handed white male with chronic conditions significant for hypertension, hyperlipidemia, diabetes mellitus type 2, BPH, glaucoma, colon cancer, gait instability, multiple falls in the home environment for which he was seeing a neurologist for workup of falls, history of prior bilateral hip replacements and bilateral knee replacements, spinal stenosis status post previous C3-C6 laminectomy and posterolateral instrumented fusion over 10 years ago by Dr. Maurer, presented to the ER at [facility] on 5/31/25 after suffering from a mechanical fall while at home and was unable to get up so he was on the floor all night until he was found.  At [facility] ER and he reported neck pain and pain in the lower cervical and upper thoracic region.  Imaging studies revealed C7 Chance fracture and findings of DISH (diffuse idiopathic skeletal hyperostosis ).  He was subsequently transferred to [facility] for neurosurgical evaluation where he was admitted on 5/31/25.  He was evaluated by Dr. Benjamin from neurosurgery, noted to have a 3 column Chance fracture at C7-T1 level, given highly unstable fracture was recommended surgical intervention by neurosurgery. He underwent C7 ORIF, removal of C3-6 hardware and C3-T1 PLIF by neurosurgery Dr. Raza Benjamin on 6/1/25.  Postoperatively is allowed weightbearing as tolerated on both lower extremities.  He has been given Aspen collar for use when out of bed in chair and when ambulating.  He can use soft cervical  collar in the bed.  I discussed spinal precautions with him.  Postoperative course was significant for anemia but he did not require transfusion.  His pain is managed with Tylenol and Oxycodone.  He had elevated blood pressure requiring IV Hydralazine but blood pressure improved after surgery and hypertension was managed with managed with Felodipine, Lisinopril, Losartan and Metoprolol.  He is continued on Lipitor for hyperlipidemia.  He has history of BPH and remains on Tamsulosin and Finasteride.  He was on Netarsudil-Latanoprost and Timoptic eye drops for glaucoma.  He had elevated blood sugars related to Decadron perioperatively and required insulin and subsequently blood sugars improved and he is managed with Metformin now. He had elevated CK of 1118 on admission due to rhabdomyolysis from being down on floor prior to admission. He was treated with IV fluids and CK improved.  I discussed his recent clinical course with patient, his wife and son who is a pediatric physician and his son indicated that patient was being evaluated by neurologist Dr. Hussein Haider regarding his gait instability and falls, and lumbar spine MRI was done, EMG studies which showed peripheral neuropathy as well as muscle biopsy was being planned of the right quadriceps to evaluate  his proximal weakness in bilateral lower extremities.  He is unable to do active straight leg raise in bilateral lower extremities in bed, unable to localize position sense in his left great toe and I also discussed with patient, his wife and son at bedside.  I also mentioned to them that neurology will be conservative at him during his stay at [facility] and that he should follow up with Dr. Hussein Haider from neurology on outpatient basis for further evaluation and workup as appropriate.  He is on subcutaneous Heparin and SCDs for DVT prophylaxis.  He is able to tolerate regular with thins consistency diet.  On 6/4/25, hemoglobin was 11.3, WBC count was  "11.85, platelets were 236, BUN was 22, creatinine was 1.1, sodium was 134 and potassium was 3.3.  He has been needing assistance for mobility, transfers, self-care. He is transferred to [facility] on 6/4/25 for further rehabilitation care.     SUBJ: Working on ADLs with occupational therapy.  Dependent for upper and lower body dressing, requiring minimal assistance for toileting, moderate assistance for bathing with occupational therapy.  Discussed with nurse regarding his PVRs.  Using [place] catheter at nighttime.  Discussed with patient.    ROS: Denies chest pain or shortness of breath. Other ROS negative. Past, family, social history is unchanged.    Current Functional Status:   Bed mobility:   Coral, Supine to Sit: moderate assist (50-74% patient effort)   Coral, Sit to Supine: moderate assist (50-74% patient effort)   Transfers:    Coral, Sit to Stand Transfer: moderate assist (50-74% patient effort)  Coral, Stand to Sit Transfer: moderate assist (50-74% patient effort)   Coral, Stand Pivot/Stand Step Transfer: moderate assist (50-74% patient effort)   Gait:   Coral, Gait: dependent (less than 25% patient effort)  Assistive Device: walker, front-wheeled   Distance in Feet: 20 feet    Bathing:   Coral: moderate assist (50-74% patient effort)   Toileting:   Coral: minimum assist (75% or more patient effort)   Upper body dressing:   Coral: dependent (less than 25% patient effort)   Lower body dressing:   Coral: dependent (less than 25% patient effort)       Functional Progress:    Functional status reviewed. Overall, patient's functional status is improving.      Physical Exam      Blood pressure 130/71, pulse 68, temperature 36.5 °C (97.7 °F), temperature source Oral, resp. rate 18, height 1.803 m (5' 11\"), weight 109 kg (241 lb), SpO2 95%.    Body mass index is 33.61 kg/m².    General Appearance: Not in acute " distress  Head/Ear/Nose/Throat: Normocephalic; Atraumatic.   Eye: EOMI; PERRL.   Neck: Healing incision posterior cervicothoracic spine.  Dressing in place.    Respiratory: Decreased breath sounds at bases.   Cardiovascular: RRR; Normal S1, S2.   Gastrointestinal: Soft; NT; +BS.   Extremities: Bilateral lower extremity edema noted.    Musculoskeletal: Functional active range of motion in both upper extremities except some limitation in left shoulder and is unable to lift left arm up overhead.  Some limitation of active range of motion in both hips and both knees, which is chronic according to patient and his wife at bedside.  Patient is unable to do active straight leg raise and either lower extremity.  He has had previous bilateral hip and bilateral knee replacements.  His wife mentions patient was lifting his lower extremities manually with his arms while getting in the car and after he sat down in the seat inside the car manually left each leg to bring them in the car.   Neurological: AAO ×3. Speech is fluent. Cranial nerve examination does not reveal any gross facial asymmetry. Strength testing about 4/5 strength in both upper extremities except left shoulder is 2-/5 and abduction and forward flexion.  Bilateral hip flexion is 2-/5.  Bilateral quadriceps are 2+/5 with the thighs supported.  Bilateral ankle dorsi and plantar flexion is 3+/5.  He denies significant numbness in his legs and feet at this time.  He is grossly able to localize position sense in his right great toe but not so in his left great toe.  He is able to localize vibration sense in both great toes.  Deep tendon reflexes are hypoactive and symmetric bilaterally.  Plantars are flexor.  Coordination is functional upper extremities.  Both knee jerks were not tested.  Behavior/Emotional: Appropriate; Cooperative.   Skin: Healing incision posterior cervicothoracic spine.  Dressing in place.  Some abrasions noted on right upper extremity.  Left  forearm has bruising ecchymosis noted.  Small wound noted on sacrum/coccyx at least stage II decubitus cannot rule out DTI.      Current Facility-Administered Medications:     acetaminophen (TYLENOL) tablet 650 mg, 650 mg, oral, q6h KOFI, Chandler Falk MD, 650 mg at 06/07/25 1232    alum-mag hydroxide-simeth (MAALOX) 200-200-20 mg/5 mL suspension 30 mL, 30 mL, oral, q6h PRN, Chandler Falk MD    atorvastatin (LIPITOR) tablet 20 mg, 20 mg, oral, Daily, Robert Hawkins MD, 20 mg at 06/07/25 0826    bisacodyL (DULCOLAX) 10 mg suppository 10 mg, 10 mg, rectal, Daily PRN, Chandler Falk MD    cholecalciferol (vitamin D3) tablet 25 mcg, 25 mcg, oral, Daily, Chandler Falk MD, 25 mcg at 06/07/25 0826    cyanocobalamin (VITAMIN B12) tablet 1,000 mcg, 1,000 mcg, oral, Daily, Chandler Falk MD, 1,000 mcg at 06/07/25 0826    glucose chewable tablet 15-30 g of dextrose, 15-30 g of dextrose, oral, PRN **OR** dextrose 40 % oral gel 15-30 g of dextrose, 15-30 g of dextrose, oral, PRN **OR** glucagon (GLUCAGEN) injection 1 mg, 1 mg, intramuscular, PRN **OR** dextrose 50 % in water (D50) injection 12.5 g, 25 mL, intravenous, PRN, Chase Anna MD    enoxaparin (LOVENOX) syringe 40 mg, 40 mg, subcutaneous, Daily (6p), Robert Hawkins MD, 40 mg at 06/06/25 1712    felodipine (PLENDIL) 24 hr ER tablet 10 mg, 10 mg, oral, Daily, Chandler Falk MD, 10 mg at 06/07/25 0826    finasteride (PROSCAR) tablet 5 mg, 5 mg, oral, Daily, Robert Hawkins MD, 5 mg at 06/07/25 0826    hydrALAZINE (APRESOLINE) tablet 25 mg, 25 mg, oral, q6h PRN, Robert Hawkins MD    insulin lispro U-100 (HumaLOG) pen 2-5 Units, 2-5 Units, subcutaneous, BID AC, Chandler Falk MD    lisinopriL (PRINIVIL) tablet 10 mg, 10 mg, oral, Daily, Chandler Falk MD, 10 mg at 06/07/25 0826    losartan (COZAAR) tablet 100 mg, 100 mg, oral, Daily, Robert Hawkins MD, 100 mg at 06/07/25 0826    metFORMIN (GLUCOPHAGE) tablet 500 mg, 500 mg, oral,  BID with breakfast and dinner, Robert Hawkins MD, 500 mg at 06/07/25 0826    metoprolol tartrate (LOPRESSOR) tablet 50 mg, 50 mg, oral, BID, Chandler Falk MD, 50 mg at 06/07/25 0826    netarsudiL-latanoprost 0.02-0.005 % drops 1 drop, 1 drop, Right Eye, Nightly, Chandler Falk MD, 1 drop at 06/06/25 2035    oxyCODONE (ROXICODONE) immediate release tablet 5 mg, 5 mg, oral, q4h PRN, Robert Hawkins MD, 5 mg at 06/07/25 0826    pantoprazole (PROTONIX) tablet,delayed release (DR/EC) 40 mg, 40 mg, oral, Daily before breakfast, Chandler Falk MD, 40 mg at 06/07/25 0826    polyethylene glycol (MIRALAX) 17 gram packet 17 g, 17 g, oral, Daily, Robert Hawkins MD, 17 g at 06/05/25 0859    sennosides-docusate sodium (SENOKOT-S) 8.6-50 mg per tablet 2 tablet, 2 tablet, oral, BID, Chase Anna MD, 2 tablet at 06/06/25 2034    tamsulosin (FLOMAX) 24 hr ER capsule 0.4 mg, 0.4 mg, oral, Nightly, Robert Hawkins MD, 0.4 mg at 06/06/25 2034    timolol (TIMOPTIC) 0.5 % ophthalmic solution 1 drop, 1 drop, Both Eyes, Daily, Robert Hawkins MD, 1 drop at 06/07/25 0829       Labs / Radiology    Lab Results   Component Value Date    WBC 10.71 (H) 06/05/2025    HGB 12.2 (L) 06/05/2025    HCT 35.8 (L) 06/05/2025    MCV 94.2 06/05/2025     06/05/2025     Lab Results   Component Value Date    GLUCOSE 126 (H) 06/05/2025    CALCIUM 8.7 06/05/2025     (L) 06/05/2025    K 3.7 06/05/2025    CO2 25 06/05/2025     06/05/2025    BUN 20 06/05/2025    CREATININE 0.9 06/05/2025       Assessment and Plan    ASSESSMENT PLAN:  1. 75-year-old left handed white male with chronic conditions significant for hypertension, hyperlipidemia, diabetes mellitus type 2, BPH, glaucoma, colon cancer, gait instability, multiple falls in the home environment for which he was seeing a neurologist for workup of falls, history of prior bilateral hip replacements and bilateral knee replacements, spinal stenosis status post previous C3-C6  laminectomy and posterolateral instrumented fusion over 10 years ago by Dr. Maurer, presented to the ER at [facility] on 5/31/25 after suffering from a mechanical fall while at home and was unable to get up so he was on the floor all night until he was found.  At [facility] ER and he reported neck pain and pain in the lower cervical and upper thoracic region.  Imaging studies revealed C7 Chance fracture and findings of DISH (diffuse idiopathic skeletal hyperostosis ).  He was subsequently transferred to [facility] for neurosurgical evaluation where he was admitted on 5/31/25.  He was evaluated by Dr. Benjamin from neurosurgery, noted to have a 3 column Chance fracture at C7-T1 level, given highly unstable fracture was recommended surgical intervention by neurosurgery. He underwent C7 ORIF, removal of C3-6 hardware and C3-T1 PLIF by neurosurgery Dr. Raza Benjamin on 6/1/25.  Postoperatively is allowed weightbearing as tolerated on both lower extremities.  He has been given Aspen collar for use when out of bed in chair and when ambulating.  He can use soft cervical collar in the bed.  I discussed spinal precautions with him.  Postoperative course was significant for anemia but he did not require transfusion.  His pain is managed with Tylenol and Oxycodone.  He had elevated blood pressure requiring IV Hydralazine but blood pressure improved after surgery and hypertension was managed with managed with Felodipine, Lisinopril, Losartan and Metoprolol.  He is continued on Lipitor for hyperlipidemia.  He has history of BPH and remains on Tamsulosin and Finasteride.  He was on Netarsudil-Latanoprost and Timoptic eye drops for glaucoma.  He had elevated blood sugars related to Decadron perioperatively and required insulin and subsequently blood sugars improved and he is managed with Metformin now. He had elevated CK of 1118 on admission due to rhabdomyolysis from being down on floor prior to admission. He was  treated with IV fluids and CK improved.  I discussed his recent clinical course with patient, his wife and son who is a pediatric physician and his son indicated that patient was being evaluated by neurologist Dr. Hussein Haider regarding his gait instability and falls, and lumbar spine MRI was done, EMG studies which showed peripheral neuropathy as well as muscle biopsy was being planned of the right quadriceps to evaluate  his proximal weakness in bilateral lower extremities.  He is unable to do active straight leg raise in bilateral lower extremities in bed, unable to localize position sense in his left great toe and I also discussed with patient, his wife and son at bedside.  I also mentioned to them that neurology will be conservative at him during his stay at [facility] and that he should follow up with Dr. Hussein Haider from neurology on outpatient basis for further evaluation and workup as appropriate.  He is on subcutaneous Heparin and SCDs for DVT prophylaxis.  He is able to tolerate regular with thins consistency diet.  On 6/4/25, hemoglobin was 11.3, WBC count was 11.85, platelets were 236, BUN was 22, creatinine was 1.1, sodium was 134 and potassium was 3.3.  He has been needing assistance for mobility, transfers, self-care. He is transferred to [facility] on 6/4/25 for further rehabilitation care.     2. DVT prophylaxis - on subcutaneous Lovenox.  On SCDs.  Platelets 264 on 6/5/2025.     3. Neurosurgery - status post fall, unstable 3 column C7 Chance fracture, status post C7 ORIF, removal of prior C3-6 hardware and C3-T1 PLIF by neurosurgery, history of gait instability and multiple falls, peripheral neuropathy, class I obesity, multiple medical problems - continue PT, OT, psychology.  Follow falls precautions, cardiac precautions, aspiration precautions, spinal precautions.  Aspen collar to neck when out of bed.  Monitor healing of posterior cervical incision.  Neurology was consulted.      4. GI - On Protonix for GI prophylaxis. On Senokot-S.  On MiraLAX.  On PRN Dulcolax suppository.  On PRN Maalox.     5.  -on Proscar.  On Flomax.     6. CVS - on Plendil.  On Lisinopril.  On Cozaar.  On Lopressor.  On PRN Hydralazine.     7. Pulmonary - encourage incentive spirometry.     8. Hematology - monitor hemoglobin, platelets.  Hemoglobin 12.2, WBC 10.71 on 6/5/2025.     9. Pain -on Tylenol.  On PRN Oxycodone.     10. Skin - Healing incision posterior cervicothoracic spine.  Dressing in place.  Some abrasions noted on right upper extremity.  Left forearm has bruising ecchymosis noted.  Small wound noted on sacrum/coccyx at least stage II decubitus cannot rule out DTI.     11. F/E/N - on vitamin B-12.  BMI 33.63 consistent with class I obesity.  Nutrition was consulted.     12. Diabetes mellitus type 2  - on sliding scale Humalog coverage.  On Glucophage.  On hypoglycemic protocol.     13. Ophthalmology- He was on NetarsudiL-latanoprost and Timoptic eye drops for glaucoma     14. Hyperlipidemia - on Lipitor.     15. Rehabilitation medicine - Continue comprehensive rehabilitation care. Continue PT, OT, psychology.  Follow falls precautions, cardiac precautions, aspiration precautions, spinal precautions.  Aspen collar to neck when out of bed.  Monitor healing of posterior cervical incision.  Neurology was consulted.Tolerating therapies per endurance on 6/6/2025.  Slept well last night.  Noted to have elevated PVR.  Requiring moderate assistance for sit to stand transfer with physical therapy.  Able to ambulate 20 feet with front wheeled walker dependent gait with physical therapy on 6/6/2025. Discussed with nurse on 6/6/2025. Working on ADLs with occupational therapy on 6/7/2025.  Dependent for upper and lower body dressing, requiring minimal assistance for toileting, moderate assistance for bathing with occupational therapy on 6/7/2025.  Discussed with nurse regarding his PVRs on 6/7/2025.  Using [place]  catheter at nighttime.  Discussed with patient on 6/7/2025.     16. Reviewed labs today.  BUN 20, creatinine 0.9, sodium 135, potassium 3.7 on 6/5/2025.          Chase Anna MD  6/7/2025      This encounter was completed utilizing the Direct Speech Voice Recognition Software. Grammatical errors, random word insertions, pronoun errors, and incomplete sentences are occasional consequences of the system due to software limitations, ambient noises, and hardware issues. Such errors may be missed prior to affixing electronic signature. Any questions or concerns about the content, text, or information contained within the body of this dictation should be directly addressed to the physician for clarification. If you have any questions or concerns please do not hesitate to call me directly via EPIC chat, page, or email.

## 2025-06-07 NOTE — PLAN OF CARE
Plan of Care Review  Plan of Care Reviewed With: patient  Progress: improving  Outcome Evaluation: Alert and oriented x4. Continent of bowel, mostly continent of bladder. PVRs being monitored for retention, double voiding encouraged when utilizing urinal. Utilizing a [place] catheter at HS. Continues on scheduled Tylenol, with PRN Oxycodone administered x1 this shift for breakthrough pain. Neck incision has surgical dressing intact. Aspen collar won OOB. Sleeping quietly in bed overnight. Fall and safety measures maintained.

## 2025-06-07 NOTE — PROGRESS NOTES
Patient: Aneesh Brito  Location: [facility]  MRN: [id number]  Today's date: 6/7/2025    History of Present Illness    Aneesh is a 75 y.o. male with a history of colon carcinoma, BPH, hypertension, glaucoma, type 2 diabetes, history of a cervical fusion over 10 years ago admitted after a fall inability to get up on his own.  He was seen at [facility] where imaging studies were performed which demonstrated DISH as well as a Chance fracture at the bottom of C7.  He was transferred to [facility] for neurosurgery.    He was taken to the OR on 6/1 by Dr. Benjamin and underwent an ORIF of the C7 Chance fracture with removal and replacement of the C3-6 posterior instrumentation as well as a C3-T3 posterior lateral fusion.  Drains remain in place.  He is on subcu heparin for DVT prophylaxis.  Currently on a nicardipine drip for blood pressure control.  He states pain is well-controlled.  He denies any other complaints of headache or dizziness, chest pain or shortness of breath.  He is voiding on his own.  He has not had a bowel movement yet.    He was seen by PT and OT needing moderate assistance of 2 to transfer sit to stand.  He ambulated 2 feet with moderate assistance of 2 using a rolling walker.  Pertinent radiology results reviewed. Pertinent lab results reviewed.      Past Medical History  Aneesh has a past medical history of C7 cervical fracture (CMS/HCC) (05/31/2025), Colonic adenoma, Diverticulosis of colon, Enlarged prostate with lower urinary tract symptoms (LUTS), Glaucoma, Hypertension, Melanoma of skin (CMS/HCC), Osteoarthritis of knee, Overweight, Spinal stenosis of lumbar region, and Type 2 diabetes mellitus (CMS/HCC).    PT Vitals      Date/Time Pulse HR Source BP BP Location BP Method Pt Position [facility]   06/07/25 1505 67 Monitor 144/79 Left upper arm Automatic Sitting KP          PT Pain      Date/Time Pain Type Rating: Rest Rating: Activity [facility]   06/07/25 1505  Pain Assessment 0 0 KP   06/07/25 1530 Post Activity 0 0 KP                  Prior Living Environment      Flowsheet Row Most Recent Value   People in Home spouse   Current Living Arrangements independent living facility   Home Accessibility wheelchair accessible   Living Environment Comment PTA residing c wife in [facility] ([facility] 1 floor set up. Shower stall c shower chair & grab bars. Reports no DME at toilet - information to be verified c pt. wife   Number of Stairs, Main Entrance 0   Patient Bedroom Access Comment PTA residing c wife in [facility] ([facility] 1 floor set up. Shower stall c shower chair & grab bars. Reports no DME at toilet - information to be verified c pt. wife   Bathroom Access Comment PTA residing c wife in [facility] ([facility] 1 floor set up. Shower stall c shower chair & grab bars. Reports no DME at toilet - information to be verified c pt. wife   Number of Stairs, Within Home, Primary 0       Prior Level of Function      Flowsheet Row Most Recent Value   Dominant Hand left   Ambulation assistive equipment  [rollator]   Transferring assistive equipment  [rollator]   Toileting independent   Bathing independent   Dressing independent   Eating independent   IADLs independent   Driving/Transportation    Communication understands/communicates without difficulty   Prior Level of Function Comment Pt reports being independent with use of SPC inside and Rollator to the dining jenkins. Independent ADLs. Denies other falls. Independent ADLs. (+) driving. Retired teacher   Assistive Device Currently Used at Home cane, straight, walker, 4-wheeled, shower chair        IRF PT Evaluation and Treatment - 06/07/25 1503          PT Time Calculation    Start Time 1503     Stop Time 1533     Time Calculation (min) 30 min        Session Details    Document Type Daily Treatment/Progress Note        General Information    Patient/Family/Caregiver Comments/Observations Pt's wife (Yas) and  son (Jerry) present to observe session        Mobility Belt    Mobility Belt Used During Session yes        Orthosis Neck Cervical Collar    Orthosis Properties Date Obtained: 06/01/25 Time Obtained: 1803 Location: Neck Type: Cervical Collar Therapeutic Indications: stabilization and support Wearing Schedule: wear when out of bed       Orthosis Neck Cervical Collar    Orthosis Properties Date Obtained: 06/01/25 Time Obtained: 0000 Location: Neck Type: Cervical Collar Features: Hard Description: Apen collar OOB don seated; [place] collar for shower Therapeutic Indications: stabilization and support Wearing Schedule: wear when out of bed       Transfers    Transfers stand pivot transfer        Sit to Stand Transfer    [place], Sit to Stand Transfer minimum assist (75% or more patient effort)     Safety/Cues increased time to complete;hand placement     Assistive Device walker, front-wheeled     Comment from w/c to RW, Min A for anterior WS and balance        Stand to Sit Transfer    [place], Stand to Sit Transfer minimum assist (75% or more patient effort)     Safety/Cues increased time to complete;technique     Assistive Device walker, front-wheeled     Comment to w/c, Min A for controlled descent with VC for hand placement        Stand Pivot Transfer    [place], Stand Pivot/Stand Step Transfer moderate assist (50-74% patient effort)     Safety/Cues increased time to complete;technique;proper use of assistive device;hand placement     Assistive Device walker, front-wheeled     Comment Mod A for controlled WS and postural control, VC for closer proximity to RW.        Gait Training    [place], Gait moderate assist (50-74% patient effort)     Safety/Cues increased time to complete;gait deviations;sequencing     Assistive Device walker, front-wheeled     Distance in Feet 78 feet     Pattern step-to;step-through     Deviations/Abnormal Patterns step length decreased;stride length decreased;weight  shifting decreased;gait speed decreased;avery decreased     Bilateral Gait Deviations heel strike decreased     Comment 78' x1 with RW and Mod A at pelvis for controlled WS and balance. VC for upright posture/forward gaze and heel strike to increase foot clearance and step length        Balance    Comment, Balance Standing at RW with U/L UE support, pt engaged in beach ball tap/catch/toss activity with Min/Mod A for balance d/t postural sway/posterior lean.        Lower Extremity (Therapeutic Exercise)    Exercise Position/Type seated;resistive exercises     General Exercise bilateral     Weight/Resistance resistance band   orange    Reps and Sets 2x10     Comment 1) hip ABD resisted with TB 2) hip ADD with isometric ball squeeze 3) LAQ with 3 sec isometric hold        Daily Progress Summary (PT)    Daily Outcome Statement Pt progressing to longer distance ambulation and requires decreased manual assist to perform STS this session. Family present to observe. Pt appropriately challenged by dynamic standing tasks with U/L UE support. Continue per POC with emphasis on endurance, proximal LE strengthening, balance, and gait training to maximize independence and minimize fall risk.                          IRF PT Goals      Flowsheet Row Most Recent Value   Bed Mobility Goal 1    Activity/Assistive Device rolling to left, rolling to right, sit to supine/supine to sit at 06/05/2025 0902   [place] minimum assist (75% or more patient effort) at 06/05/2025 0902   Time Frame short-term goal (STG), 1 week at 06/05/2025 0902   Bed Mobility Goal 2    Activity/Assistive Device rolling to left, rolling to right, sit to supine/supine to sit at 06/05/2025 0902   [place] modified independence at 06/05/2025 0902   Time Frame long-term goal (LTG), 3 weeks at 06/05/2025 0902   Transfer Goal 1    Activity/Assistive Device sit-to-stand/stand-to-sit, stand pivot at 06/05/2025 0902   [place] minimum assist (75% or more  patient effort) at 06/05/2025 0902   Time Frame short-term goal (STG), 1 week at 06/05/2025 0902   Transfer Goal 2    Activity/Assistive Device sit-to-stand/stand-to-sit, stand pivot at 06/05/2025 0902   [place] modified independence at 06/05/2025 0902   Time Frame long-term goal (LTG), 3 weeks at 06/05/2025 0902   Gait/Walking Locomotion Goal 1    Activity/Assistive Device gait (walking locomotion) at 06/05/2025 0902   Distance 50 feet at 06/05/2025 0902   [place] minimum assist (75% or more patient effort) at 06/05/2025 0902   Time Frame short-term goal (STG), 1 week at 06/05/2025 0902   Gait/Walking Locomotion Goal 2    Activity/Assistive Device gait (walking locomotion) at 06/05/2025 0902   Distance 150 feet at 06/05/2025 0902   [place] supervision required at 06/05/2025 0902   Time Frame long-term goal (LTG), 3 weeks at 06/05/2025 0902

## 2025-06-07 NOTE — PROGRESS NOTES
Patient: Aneesh Brito  Location: [facility]  MRN: [id number]  Today's date: 6/7/2025    History of Present Illness    Aneesh is a 75 y.o. male with a history of colon carcinoma, BPH, hypertension, glaucoma, type 2 diabetes, history of a cervical fusion over 10 years ago admitted after a fall inability to get up on his own.  He was seen at [facility] where imaging studies were performed which demonstrated DISH as well as a Chance fracture at the bottom of C7.  He was transferred to [facility] for neurosurgery.    He was taken to the OR on 6/1 by Dr. Benjamin and underwent an ORIF of the C7 Chance fracture with removal and replacement of the C3-6 posterior instrumentation as well as a C3-T3 posterior lateral fusion.  Drains remain in place.  He is on subcu heparin for DVT prophylaxis.  Currently on a nicardipine drip for blood pressure control.  He states pain is well-controlled.  He denies any other complaints of headache or dizziness, chest pain or shortness of breath.  He is voiding on his own.  He has not had a bowel movement yet.    He was seen by PT and OT needing moderate assistance of 2 to transfer sit to stand.  He ambulated 2 feet with moderate assistance of 2 using a rolling walker.  Pertinent radiology results reviewed. Pertinent lab results reviewed.      Past Medical History  Aneesh has a past medical history of C7 cervical fracture (CMS/HCC) (05/31/2025), Colonic adenoma, Diverticulosis of colon, Enlarged prostate with lower urinary tract symptoms (LUTS), Glaucoma, Hypertension, Melanoma of skin (CMS/HCC), Osteoarthritis of knee, Overweight, Spinal stenosis of lumbar region, and Type 2 diabetes mellitus (CMS/HCC).    OT Vitals      Date/Time Pulse HR Source SpO2 Pt Activity O2 Therapy BP BP Location BP Method Pt Position Who   06/07/25 0858 66 Monitor 95 % At rest None (Room air) 164/74 Left upper arm Automatic Sitting TB          OT Pain      Date/Time Pain  Type Side/Orientation Location Rating: Rest Rating: Activity Description Interventions Response To Interventions [facility]   06/07/25 0858 Pain Assessment neck posterior 6 6 incisional;aching premedicated for activity;diversional activity provided -- TB   06/07/25 1019 Pain Reassessment neck posterior 5 5 incisional;aching diversional activity provided pain goal met TB                 Prior Living Environment      Flowsheet Row Most Recent Value   People in Home spouse   Current Living Arrangements independent living facility   Home Accessibility wheelchair accessible   Living Environment Comment PTA residing c wife in [facility] 1 floor set up. Shower stall c shower chair & grab bars. Reports no DME at toilet - information to be verified c pt. wife   Number of Stairs, Main Entrance 0   Patient Bedroom Access Comment PTA residing c wife in [facility] ([facility] 1 floor set up. Shower stall c shower chair & grab bars. Reports no DME at toilet - information to be verified c pt. wife   Bathroom Access Comment PTA residing c wife in [facility] ([facility] 1 floor set up. Shower stall c shower chair & grab bars. Reports no DME at toilet - information to be verified c pt. wife   Number of Stairs, Within Home, Primary 0       Prior Level of Function      Flowsheet Row Most Recent Value   Dominant Hand left   Ambulation assistive equipment  [rollator]   Transferring assistive equipment  [rollator]   Toileting independent   Bathing independent   Dressing independent   Eating independent   IADLs independent   Driving/Transportation    Communication understands/communicates without difficulty   Prior Level of Function Comment Pt reports being independent with use of SPC inside and Rollator to the dining jenkins. Independent ADLs. Denies other falls. Independent ADLs. (+) driving. Retired teacher   Assistive Device Currently Used at Home cane, straight, walker, 4-wheeled, shower chair       Occupational  "Profile      Flowsheet Row Most Recent Value   Successful Occupations Enjoys participating in the activities at [employer] - shuffle board and corn hole   Occupational History/Life Experiences PTA independent c ADLs. 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   Patient Goals \"Walk\"        IRF OT Evaluation and Treatment - 06/07/25 0858          OT Time Calculation    Start Time 0858     Stop Time 1028     Time Calculation (min) 90 min        Session Details    Document Type Daily Treatment/Progress Note        General Information    General Observations of Patient Pt receieved seated upright in w/c, pleasant and agreeable to therapy        Mobility Belt    Mobility Belt Used During Session yes        Orthosis Neck Cervical Collar    Orthosis Properties Date Obtained: 06/01/25 Time Obtained: 1803 Location: Neck Type: Cervical Collar Therapeutic Indications: stabilization and support Wearing Schedule: wear when out of bed       Orthosis Neck Cervical Collar    Orthosis Properties Date Obtained: 06/01/25 Time Obtained: 0000 Location: Neck Type: Cervical Collar Features: Hard Description: Apen collar OOB don seated; [place] collar for shower Therapeutic Indications: stabilization and support Wearing Schedule: wear when out of bed       Sit to Stand Transfer    [place], Sit to Stand Transfer moderate assist (50-74% patient effort)     Safety/Cues increased time to complete;hand placement;technique     Assistive Device walker, front-wheeled     Comment from w/c, EOM        Stand to Sit Transfer    [place], Stand to Sit Transfer moderate assist (50-74% patient effort)     Safety/Cues increased time to complete;hand placement;technique     Assistive Device walker, front-wheeled     Comment to w/c, EOM        Stand Pivot Transfer    [place], Stand Pivot/Stand Step Transfer moderate assist (50-74% patient " effort)     Safety/Cues increased time to complete;proper use of assistive device;technique     Assistive Device walker, front-wheeled     Comment SPT w/c to/from EOM        Toilet Transfer    Transfer Technique sit/stand     [place] moderate assist (50-74% patient effort)     Safety/Cues increased time to complete;technique     Assistive Device accessible height toilet;grab bars/safety frame     Comment mod A for anterior shift        Balance    Comment, Balance OT: Unsupported sitting EOM Cl S: Supported standing c RW min-steadying A for balance, in stance completing small peg design, x1 trial pt started losing balance at 3min retirement through design, second attempt completed x2min, then x1 trial completed 75% in 5 min stance, second attempt to finish x2min.        Upper Extremity (Therapeutic Exercise)    Exercise Position/Type supine;AAROM (active assistive range of motion)     General Exercise bilateral     Range of Motion Exercises shoulder flexion/extension;shoulder abduction/adduction;shoulder external/internal rotation;elbow flexion/extension;forearm supination/pronation     Reps and Sets 2x10     Comment completed supine on mat chest press c unweighted dowel robyn, rest break provided between sets        Core Strength (Therapeutic Exercise)    Core Strength, Position supine     Core Strength abdominal bracing     Reps and Sets 3x10     Comment completed supine on mat SL knee-chest c rest break        Grooming    Tasks washes, rinses and dries hands     [place] close supervision     Position supported sitting;sink side     Comment completed hand hygiene seated in w/c at sink        Toileting    Tasks adjust/manage clothing;perform bladder hygiene     [place] minimum assist (75% or more patient effort)     Safety/Cues increased time to complete;compensatory techniques;energy conservation     Position supported standing     Setup Assistance adaptive equipment setup     Adaptive Equipment grab  bar/safety frame;accessible height toilet     Comment pt continent bladder in stance at toilet utilizing GBs and assist for balance; pt able to perform CM in stance        Daily Progress Summary (OT)    Symptoms Noted During/After Treatment fatigue;increased pain     Daily Outcome Statement Session c focus on endurance, standing tolerance/balance, motor skills, strengthening, and functional transfers. Pt c good balance and endurance throughout tasks.Cont to address limitations c standing tolerance                          IRF OT Goals      Flowsheet Row Most Recent Value   Transfer Goal 1    Activity/Assistive Device toilet, commode, 3-in-1 at 06/05/2025 1033   [place] minimum assist (75% or more patient effort) at 06/05/2025 1033   Time Frame short-term goal (STG), 5 - 7 days at 06/05/2025 1033   Transfer Goal 2    Activity/Assistive Device toilet, commode, 3-in-1 at 06/05/2025 1033   [place] modified independence at 06/05/2025 1033   Time Frame long-term goal (LTG), 21 days or less at 06/05/2025 1033   Transfer Goal 3    Activity/Assistive Device shower, tub bench at 06/05/2025 1033   [place] minimum assist (75% or more patient effort) at 06/05/2025 1033   Time Frame short-term goal (STG), 5 - 7 days at 06/05/2025 1033   Transfer Goal 4    Activity/Assistive Device shower, tub bench at 06/05/2025 1033   [place] modified independence at 06/05/2025 1033   Time Frame long-term goal (LTG), 21 days or less at 06/05/2025 1033   Bathing Goal 1    Activity/Assistive Device bathing skills, all at 06/05/2025 1033   [place] minimum assist (75% or more patient effort) at 06/05/2025 1033   Time Frame short-term goal (STG), 5 - 7 days at 06/05/2025 1033   Bathing Goal 2    Activity/Assistive Device bathing skills, all at 06/05/2025 1033   [place] modified independence at 06/05/2025 1033   Time Frame long-term goal (LTG), 21 days or less at 06/05/2025 1033   UB Dressing Goal 1    Activity/Assistive  Device upper body dressing at 06/05/2025 1033   [place] maximum assist (25-49% patient effort) at 06/05/2025 1033   Time Frame short-term goal (STG), 5 - 7 days at 06/05/2025 1033   Progress/Outcome goal no longer appropriate at 06/05/2025 1033   UB Dressing Goal 2    Activity/Assistive Device upper body dressing at 06/05/2025 1033   [place] modified independence at 06/05/2025 1033   Time Frame long-term goal (LTG), 21 days or less at 06/05/2025 1033   LB Dressing Goal 1    Activity/Assistive Device lower body dressing at 06/05/2025 1033   [place] maximum assist (25-49% patient effort) at 06/05/2025 1033   Time Frame short-term goal (STG), 5 - 7 days at 06/05/2025 1033   LB Dressing Goal 2    Activity/Assistive Device lower body dressing at 06/05/2025 1033   [place] modified independence at 06/05/2025 1033   Time Frame long-term goal (LTG), 21 days or less at 06/05/2025 1033   Grooming Goal 1    Activity/Assistive Device grooming skills, all at 06/05/2025 1033   [place] set-up required at 06/05/2025 1033   Time Frame short-term goal (STG), 5 - 7 days at 06/05/2025 1033   Grooming Goal 2    Activity/Assistive Device grooming skills, all at 06/05/2025 1033   [place] modified independence at 06/05/2025 1033   Time Frame long-term goal (LTG), 21 days or less at 06/05/2025 1033   Toileting Goal 1    Activity/Assistive Device toileting skills, all at 06/05/2025 1033   [place] maximum assist (25-49% patient effort) at 06/05/2025 1033   Time Frame short-term goal (STG), 5 - 7 days at 06/05/2025 1033   Toileting Goal 2    Activity/Assistive Device toileting skills, all at 06/05/2025 1033   [place] modified independence at 06/05/2025 1033   Time Frame long-term goal (LTG), 21 days or less at 06/05/2025 1033

## 2025-06-07 NOTE — PLAN OF CARE
Plan of Care Review  Plan of Care Reviewed With: patient  Progress: improving  Outcome Evaluation: Patient AAOX4, continent of bladder however does occasionally spill urinal.  PVRs continue to be monitored, scan was 288 at lunch and patient was able to void roughly 300.  Utilizes [place] catheter at HS per patient request.  Tylenol and oxy used for pain management.  Neck incision has surgical dressing with disolvable sutures present at proximal end of incision.  [place] collar worn when OOB. BID accuchecks, Foam present on sacrum for area that is indeterminate for masd vs stg. 1.  On lovenox for increased VTE risk.

## 2025-06-07 NOTE — PROGRESS NOTES
Patient: Aneesh Brito  Location: [facility]  MRN: [id number]  Today's date: 6/7/2025    History of Present Illness    Aneesh is a 75 y.o. male with a history of colon carcinoma, BPH, hypertension, glaucoma, type 2 diabetes, history of a cervical fusion over 10 years ago admitted after a fall inability to get up on his own.  He was seen at [facility] where imaging studies were performed which demonstrated DISH as well as a Chance fracture at the bottom of C7.  He was transferred to [facility] for neurosurgery.    He was taken to the OR on 6/1 by Dr. Benjamin and underwent an ORIF of the C7 Chance fracture with removal and replacement of the C3-6 posterior instrumentation as well as a C3-T3 posterior lateral fusion.  Drains remain in place.  He is on subcu heparin for DVT prophylaxis.  Currently on a nicardipine drip for blood pressure control.  He states pain is well-controlled.  He denies any other complaints of headache or dizziness, chest pain or shortness of breath.  He is voiding on his own.  He has not had a bowel movement yet.    He was seen by PT and OT needing moderate assistance of 2 to transfer sit to stand.  He ambulated 2 feet with moderate assistance of 2 using a rolling walker.  Pertinent radiology results reviewed. Pertinent lab results reviewed.      Past Medical History  Aneesh has a past medical history of C7 cervical fracture (CMS/HCC) (05/31/2025), Colonic adenoma, Diverticulosis of colon, Enlarged prostate with lower urinary tract symptoms (LUTS), Glaucoma, Hypertension, Melanoma of skin (CMS/HCC), Osteoarthritis of knee, Overweight, Spinal stenosis of lumbar region, and Type 2 diabetes mellitus (CMS/HCC).    PT Vitals      Date/Time Pulse HR Source Pt Activity O2 Therapy BP BP Location BP Method Pt Position [facility]   06/07/25 1322 68 Monitor -- -- 130/71 Left upper arm Automatic Sitting KP          PT Pain      Date/Time Pain Type Acceptable Pain Level  Side/Orientation Location Rating: Rest Rating: Activity Description Interventions [facility]   06/07/25 1302 Pain Assessment -- -- -- 0 0 -- -- KP   06/07/25 1322 Pain Assessment -- -- -- 0 0 -- -- KP                  Prior Living Environment      Flowsheet Row Most Recent Value   People in Home spouse   Current Living Arrangements independent living facility   Home Accessibility wheelchair accessible   Living Environment Comment PTA residing c wife in [facility] 1 floor set up. Shower stall c shower chair & grab bars. Reports no DME at toilet - information to be verified c pt. wife   Number of Stairs, Main Entrance 0   Patient Bedroom Access Comment PTA residing c wife in [facility] ([facility] 1 floor set up. Shower stall c shower chair & grab bars. Reports no DME at toilet - information to be verified c pt. wife   Bathroom Access Comment PTA residing c wife in [facility] ([facility] 1 floor set up. Shower stall c shower chair & grab bars. Reports no DME at toilet - information to be verified c pt. wife   Number of Stairs, Within Home, Primary 0       Prior Level of Function      Flowsheet Row Most Recent Value   Dominant Hand left   Ambulation assistive equipment  [rollator]   Transferring assistive equipment  [rollator]   Toileting independent   Bathing independent   Dressing independent   Eating independent   IADLs independent   Driving/Transportation    Communication understands/communicates without difficulty   Prior Level of Function Comment Pt reports being independent with use of SPC inside and Rollator to the dining jenkins. Independent ADLs. Denies other falls. Independent ADLs. (+) driving. Retired teacher   Assistive Device Currently Used at Home cane, straight, walker, 4-wheeled, shower chair        IRF PT Evaluation and Treatment - 06/07/25 1300          PT Time Calculation    Start Time 1300     Stop Time 1400     Time Calculation (min) 60 min        Session Details    Document Type  Daily Treatment/Progress Note        General Information    General Observations of Patient Pt received supine in bed, agreeable to participate. Upon initial stand OOB, noted that pt was incontinent of bladder and required hygiene/clothing change prior to leaving room.        Mobility Belt    Mobility Belt Used During Session yes        Orthosis Neck Cervical Collar    Orthosis Properties Date Obtained: 06/01/25 Time Obtained: 1803 Location: Neck Type: Cervical Collar Therapeutic Indications: stabilization and support Wearing Schedule: wear when out of bed       Orthosis Neck Cervical Collar    Orthosis Properties Date Obtained: 06/01/25 Time Obtained: 0000 Location: Neck Type: Cervical Collar Features: Hard Description: [place] collar OOB don seated; [place] collar for shower Therapeutic Indications: stabilization and support Wearing Schedule: wear when out of bed       Bed Mobility    [place], Roll Left minimum assist (75% or more patient effort)     [place], Supine to Sit moderate assist (50-74% patient effort)     Safety/Cues maintaining precautions;technique;increased time to complete     Assistive Device head of bed elevated;bed rails     Comment log rolling and supine>sit performed in hospital bed, Min/Mod A for LE and trunk control.        Transfers    Transfers stand pivot transfer        Bed to Chair Transfer    [place], Bed to Chair moderate assist (50-74% patient effort)     Safety/Cues increased time to complete;technique;proper use of assistive device     Assistive Device walker, front-wheeled     Comment EOB>w/c with RW and Mod A at pelvis for WS, balance and RW management.        Sit to Stand Transfer    [place], Sit to Stand Transfer moderate assist (50-74% patient effort)     Safety/Cues increased time to complete;hand placement     Assistive Device walker, front-wheeled     Comment from EOB and w/c to RW, Mod A for pelvic lift and anterior WS. Emerging Min A by end of session  with continued practice and cueing.        Stand to Sit Transfer    [place], Stand to Sit Transfer moderate assist (50-74% patient effort)     Safety/Cues increased time to complete;hand placement;technique     Assistive Device walker, front-wheeled     Comment to w/c and EOM, Mod A for eccentric control with VC for hand placement to help control descent. Emerging Min A by end of session with continued practice and cueing.        Stand Pivot Transfer    [place], Stand Pivot/Stand Step Transfer moderate assist (50-74% patient effort)     Safety/Cues increased time to complete;technique;proper use of assistive device     Assistive Device walker, front-wheeled     Comment to/from w/c via amb approach, Mod A for balance and WS. VC for closer proximity to RW while pivoting and backing up.        Gait Training    [place], Gait dependent (less than 25% patient effort);additional person to manage equipment     Safety/Cues increased time to complete;gait deviations;proper use of assistive device;sequencing     Assistive Device walker, front-wheeled     Distance in Feet 60 feet     Pattern step-to;step-through     Deviations/Abnormal Patterns bilateral deviations;base of support, narrow;festinating/shuffling;gait speed decreased;step length decreased;stride length decreased;weight shifting decreased     Bilateral Gait Deviations heel strike decreased     Comment 60'x1 with RW, Mod/Min A at pelvis for balance and WS + w/c follow for safety d/t endurance deficits. VC for upright posture/forward gaze, and heel strike to promote more reciprocal step pattern.        Balance    Comment, Balance Standing at RW with U/L UE support, pt engaged in anterolateral functional reach outside MARLA to grab + toss bean bags. Min/Mod A for balance d/t posterior lean.        Motor Skills    Motor Control/Coordination Interventions stepping/walking        Advanced Stepping/Walking Interventions    Stepping/Walking Interventions box  "stepping     Box Stepping (Stepping/Walking Interventions) Standing at RW, repeated toe taps to 4\" block 2x10 BLE, Min A at pelvis for WS, balance, and maintenance of COM over MARLA.        10 Meter Walk Test (Self-Selected Velocity)    Trial One: Ten Meter Walk Test (sec) 130 seconds     Assistive Device walker, front-wheeled     Trial One: Gait Speed (m/s) 0.05 m/s        Daily Progress Summary (PT)    Daily Outcome Statement Beginning of session limited by incontinence care. 10MWT score demo's clinically significant improvement in gait speed from previous trial yesterday. Pt progressing to performance of transfers with Min A by end of session. Will benefit from proximal LE strengthening and balance training to improve stance stability and minimize fall risk. Continue per POC.                          IRF PT Goals      Flowsheet Row Most Recent Value   Bed Mobility Goal 1    Activity/Assistive Device rolling to left, rolling to right, sit to supine/supine to sit at 06/05/2025 0902   [place] minimum assist (75% or more patient effort) at 06/05/2025 0902   Time Frame short-term goal (STG), 1 week at 06/05/2025 0902   Bed Mobility Goal 2    Activity/Assistive Device rolling to left, rolling to right, sit to supine/supine to sit at 06/05/2025 0902   [place] modified independence at 06/05/2025 0902   Time Frame long-term goal (LTG), 3 weeks at 06/05/2025 0902   Transfer Goal 1    Activity/Assistive Device sit-to-stand/stand-to-sit, stand pivot at 06/05/2025 0902   [place] minimum assist (75% or more patient effort) at 06/05/2025 0902   Time Frame short-term goal (STG), 1 week at 06/05/2025 0902   Transfer Goal 2    Activity/Assistive Device sit-to-stand/stand-to-sit, stand pivot at 06/05/2025 0902   [place] modified independence at 06/05/2025 0902   Time Frame long-term goal (LTG), 3 weeks at 06/05/2025 0902   Gait/Walking Locomotion Goal 1    Activity/Assistive Device gait (walking locomotion) at " 06/05/2025 0902   Distance 50 feet at 06/05/2025 0902   [place] minimum assist (75% or more patient effort) at 06/05/2025 0902   Time Frame short-term goal (STG), 1 week at 06/05/2025 0902   Gait/Walking Locomotion Goal 2    Activity/Assistive Device gait (walking locomotion) at 06/05/2025 0902   Distance 150 feet at 06/05/2025 0902   [place] supervision required at 06/05/2025 0902   Time Frame long-term goal (LTG), 3 weeks at 06/05/2025 0902

## 2025-06-08 VITALS
OXYGEN SATURATION: 94 % | WEIGHT: 241 LBS | DIASTOLIC BLOOD PRESSURE: 72 MMHG | HEART RATE: 65 BPM | TEMPERATURE: 97.6 F | RESPIRATION RATE: 18 BRPM | SYSTOLIC BLOOD PRESSURE: 158 MMHG | HEIGHT: 71 IN | BODY MASS INDEX: 33.74 KG/M2

## 2025-06-08 LAB
GLUCOSE BLD-MCNC: 129 MG/DL (ref 70–99)
GLUCOSE BLD-MCNC: 136 MG/DL (ref 70–99)
GLUCOSE BLD-MCNC: 149 MG/DL (ref 70–99)
POCT TEST: ABNORMAL

## 2025-06-08 PROCEDURE — 63700000 HC SELF-ADMINISTRABLE DRUG: Performed by: HOSPITALIST

## 2025-06-08 PROCEDURE — 12800001 HC ROOM AND CARE SEMIPRIVATE REHAB-BRAIN INJ

## 2025-06-08 PROCEDURE — 63600000 HC DRUGS/DETAIL CODE: Mod: JZ | Performed by: INTERNAL MEDICINE

## 2025-06-08 PROCEDURE — 63700000 HC SELF-ADMINISTRABLE DRUG: Performed by: INTERNAL MEDICINE

## 2025-06-08 PROCEDURE — 63700000 HC SELF-ADMINISTRABLE DRUG: Performed by: PHYSICAL MEDICINE & REHABILITATION

## 2025-06-08 PROCEDURE — 12800000 HC ROOM AND CARE SEMIPRIVATE REHAB

## 2025-06-08 RX ADMIN — ACETAMINOPHEN 650 MG: 325 TABLET ORAL at 11:55

## 2025-06-08 RX ADMIN — ACETAMINOPHEN 650 MG: 325 TABLET ORAL at 06:06

## 2025-06-08 RX ADMIN — OXYCODONE 5 MG: 5 TABLET ORAL at 20:29

## 2025-06-08 RX ADMIN — HYDRALAZINE HYDROCHLORIDE 25 MG: 25 TABLET ORAL at 18:49

## 2025-06-08 RX ADMIN — TAMSULOSIN HYDROCHLORIDE 0.4 MG: 0.4 CAPSULE ORAL at 20:24

## 2025-06-08 RX ADMIN — OXYCODONE 5 MG: 5 TABLET ORAL at 08:32

## 2025-06-08 RX ADMIN — FINASTERIDE 5 MG: 5 TABLET, FILM COATED ORAL at 08:33

## 2025-06-08 RX ADMIN — ENOXAPARIN SODIUM 40 MG: 40 INJECTION SUBCUTANEOUS at 17:18

## 2025-06-08 RX ADMIN — ACETAMINOPHEN 650 MG: 325 TABLET ORAL at 00:35

## 2025-06-08 RX ADMIN — METFORMIN HYDROCHLORIDE 500 MG: 500 TABLET ORAL at 08:33

## 2025-06-08 RX ADMIN — LISINOPRIL 10 MG: 10 TABLET ORAL at 08:33

## 2025-06-08 RX ADMIN — TIMOLOL MALEATE 1 DROP: 5 SOLUTION/ DROPS OPHTHALMIC at 08:35

## 2025-06-08 RX ADMIN — METOPROLOL TARTRATE 50 MG: 50 TABLET, FILM COATED ORAL at 08:33

## 2025-06-08 RX ADMIN — Medication 25 MCG: at 08:33

## 2025-06-08 RX ADMIN — SENNOSIDES AND DOCUSATE SODIUM 2 TABLET: 50; 8.6 TABLET ORAL at 08:33

## 2025-06-08 RX ADMIN — ACETAMINOPHEN 650 MG: 325 TABLET ORAL at 17:17

## 2025-06-08 RX ADMIN — FELODIPINE 10 MG: 5 TABLET, FILM COATED, EXTENDED RELEASE ORAL at 08:33

## 2025-06-08 RX ADMIN — OXYCODONE 5 MG: 5 TABLET ORAL at 03:12

## 2025-06-08 RX ADMIN — CYANOCOBALAMIN TAB 1000 MCG 1000 MCG: 1000 TAB at 08:33

## 2025-06-08 RX ADMIN — ATORVASTATIN CALCIUM 20 MG: 20 TABLET, FILM COATED ORAL at 08:33

## 2025-06-08 RX ADMIN — METFORMIN HYDROCHLORIDE 500 MG: 500 TABLET ORAL at 16:30

## 2025-06-08 RX ADMIN — METOPROLOL TARTRATE 50 MG: 50 TABLET, FILM COATED ORAL at 20:24

## 2025-06-08 RX ADMIN — HYDRALAZINE HYDROCHLORIDE 25 MG: 25 TABLET ORAL at 08:32

## 2025-06-08 RX ADMIN — LOSARTAN POTASSIUM 100 MG: 100 TABLET, FILM COATED ORAL at 08:33

## 2025-06-08 RX ADMIN — PANTOPRAZOLE SODIUM 40 MG: 40 TABLET, DELAYED RELEASE ORAL at 08:34

## 2025-06-08 RX ADMIN — SENNOSIDES AND DOCUSATE SODIUM 2 TABLET: 50; 8.6 TABLET ORAL at 20:24

## 2025-06-08 NOTE — PLAN OF CARE
Plan of Care Review  Plan of Care Reviewed With: patient  Progress: improving  Outcome Evaluation: Alert and oriented x4. Continent of bowel and bladder. Continues on scheduled Tyelnol with PRN Oxycodone admnistered x2 this shift for breakthrough neck pain. Repositioned in bed for comfort. Surgical dressing is CDI over neck incision. Aspen collar worn OOB. Accuchecks monitored BID with no s/s of hyper/hypo-glycemia noted. Sleeping quietly in bed overnight. Fall and safety measures maintained.

## 2025-06-08 NOTE — PLAN OF CARE
Plan of Care Review  Plan of Care Reviewed With: patient  Progress: improving  Outcome Evaluation: Patient AAOX4, continent of bowel and bladder with PVRs below 350 but continue to be monitored. Surgical dressing is clean, dry and intact. Aspen collar worn when OOB.  BID accuchecks no coverage needed.  Pain managed with oxy and tylenol. On lovenox for increased VTE risk.  Patient does have episodes of hypertension blood pressure 220/90 this morning, medication and hydralozine administered blood pressure went down 190/80 30 minutes later.

## 2025-06-09 ENCOUNTER — APPOINTMENT (INPATIENT)
Dept: PHYSICAL THERAPY | Facility: REHABILITATION | Age: 75
DRG: 560 | End: 2025-06-09
Payer: MEDICARE

## 2025-06-09 ENCOUNTER — APPOINTMENT (INPATIENT)
Dept: OCCUPATIONAL THERAPY | Facility: REHABILITATION | Age: 75
DRG: 560 | End: 2025-06-09
Payer: MEDICARE

## 2025-06-09 PROBLEM — S12.600A: Status: ACTIVE | Noted: 2025-06-09

## 2025-06-09 LAB
ALBUMIN SERPL-MCNC: 3.2 G/DL (ref 3.5–5.7)
ALP SERPL-CCNC: 133 IU/L (ref 34–125)
ALT SERPL-CCNC: 46 IU/L (ref 7–52)
ANION GAP SERPL CALC-SCNC: 5 MEQ/L (ref 3–15)
AST SERPL-CCNC: 38 IU/L (ref 13–39)
BILIRUB SERPL-MCNC: 0.5 MG/DL (ref 0.3–1.2)
BUN SERPL-MCNC: 22 MG/DL (ref 7–25)
CALCIUM SERPL-MCNC: 8.6 MG/DL (ref 8.6–10.3)
CHLORIDE SERPL-SCNC: 101 MEQ/L (ref 98–107)
CO2 SERPL-SCNC: 26 MEQ/L (ref 21–31)
CREAT SERPL-MCNC: 1.1 MG/DL (ref 0.7–1.3)
EGFRCR SERPLBLD CKD-EPI 2021: >60 ML/MIN/1.73M*2
ERYTHROCYTE [DISTWIDTH] IN BLOOD BY AUTOMATED COUNT: 13.5 % (ref 11.6–14.4)
GLUCOSE BLD-MCNC: 127 MG/DL (ref 70–99)
GLUCOSE BLD-MCNC: 150 MG/DL (ref 70–99)
GLUCOSE SERPL-MCNC: 120 MG/DL (ref 70–99)
HCT VFR BLD AUTO: 32.7 % (ref 40.1–51)
HGB BLD-MCNC: 11 G/DL (ref 13.7–17.5)
MCH RBC QN AUTO: 31.3 PG (ref 28–33.2)
MCHC RBC AUTO-ENTMCNC: 33.6 G/DL (ref 32.2–36.5)
MCV RBC AUTO: 93.2 FL (ref 83–98)
PLATELET # BLD AUTO: 337 K/UL (ref 150–350)
PMV BLD AUTO: 10.1 FL (ref 9.4–12.4)
POCT TEST: ABNORMAL
POCT TEST: ABNORMAL
POTASSIUM SERPL-SCNC: 3.4 MEQ/L (ref 3.5–5.1)
PROT SERPL-MCNC: 5.7 G/DL (ref 6–8.2)
RBC # BLD AUTO: 3.51 M/UL (ref 4.5–5.8)
SODIUM SERPL-SCNC: 132 MEQ/L (ref 136–145)
WBC # BLD AUTO: 7.59 K/UL (ref 3.8–10.5)

## 2025-06-09 PROCEDURE — 97116 GAIT TRAINING THERAPY: CPT | Mod: GP

## 2025-06-09 PROCEDURE — 85027 COMPLETE CBC AUTOMATED: CPT | Performed by: HOSPITALIST

## 2025-06-09 PROCEDURE — 97110 THERAPEUTIC EXERCISES: CPT | Mod: GO

## 2025-06-09 PROCEDURE — 63700000 HC SELF-ADMINISTRABLE DRUG: Performed by: HOSPITALIST

## 2025-06-09 PROCEDURE — 80053 COMPREHEN METABOLIC PANEL: CPT | Performed by: HOSPITALIST

## 2025-06-09 PROCEDURE — 97530 THERAPEUTIC ACTIVITIES: CPT | Mod: GO,CO

## 2025-06-09 PROCEDURE — 97530 THERAPEUTIC ACTIVITIES: CPT | Mod: GP

## 2025-06-09 PROCEDURE — 63600000 HC DRUGS/DETAIL CODE: Mod: JZ | Performed by: INTERNAL MEDICINE

## 2025-06-09 PROCEDURE — 63700000 HC SELF-ADMINISTRABLE DRUG: Performed by: STUDENT IN AN ORGANIZED HEALTH CARE EDUCATION/TRAINING PROGRAM

## 2025-06-09 PROCEDURE — 12800000 HC ROOM AND CARE SEMIPRIVATE REHAB

## 2025-06-09 PROCEDURE — 12800001 HC ROOM AND CARE SEMIPRIVATE REHAB-BRAIN INJ

## 2025-06-09 PROCEDURE — 97110 THERAPEUTIC EXERCISES: CPT | Mod: GP

## 2025-06-09 PROCEDURE — 63700000 HC SELF-ADMINISTRABLE DRUG: Performed by: INTERNAL MEDICINE

## 2025-06-09 PROCEDURE — 97112 NEUROMUSCULAR REEDUCATION: CPT | Mod: GP

## 2025-06-09 PROCEDURE — 63700000 HC SELF-ADMINISTRABLE DRUG: Performed by: PHYSICAL MEDICINE & REHABILITATION

## 2025-06-09 PROCEDURE — 36415 COLL VENOUS BLD VENIPUNCTURE: CPT | Performed by: HOSPITALIST

## 2025-06-09 RX ORDER — OXYCODONE HYDROCHLORIDE 5 MG/1
5 TABLET ORAL EVERY 4 HOURS PRN
Refills: 0 | Status: DISCONTINUED | OUTPATIENT
Start: 2025-06-09 | End: 2025-06-09

## 2025-06-09 RX ORDER — OXYCODONE HYDROCHLORIDE 5 MG/1
5 TABLET ORAL EVERY 4 HOURS PRN
Refills: 0 | Status: DISPENSED | OUTPATIENT
Start: 2025-06-09 | End: 2025-06-10

## 2025-06-09 RX ORDER — POTASSIUM CHLORIDE 20 MEQ/1
20 TABLET, EXTENDED RELEASE ORAL ONCE
Status: COMPLETED | OUTPATIENT
Start: 2025-06-09 | End: 2025-06-09

## 2025-06-09 RX ADMIN — SENNOSIDES AND DOCUSATE SODIUM 2 TABLET: 50; 8.6 TABLET ORAL at 08:33

## 2025-06-09 RX ADMIN — PANTOPRAZOLE SODIUM 40 MG: 40 TABLET, DELAYED RELEASE ORAL at 08:33

## 2025-06-09 RX ADMIN — METOPROLOL TARTRATE 50 MG: 50 TABLET, FILM COATED ORAL at 20:16

## 2025-06-09 RX ADMIN — METFORMIN HYDROCHLORIDE 500 MG: 500 TABLET ORAL at 17:27

## 2025-06-09 RX ADMIN — ACETAMINOPHEN 650 MG: 325 TABLET ORAL at 00:33

## 2025-06-09 RX ADMIN — ENOXAPARIN SODIUM 40 MG: 40 INJECTION SUBCUTANEOUS at 17:28

## 2025-06-09 RX ADMIN — ATORVASTATIN CALCIUM 20 MG: 20 TABLET, FILM COATED ORAL at 08:32

## 2025-06-09 RX ADMIN — FINASTERIDE 5 MG: 5 TABLET, FILM COATED ORAL at 08:32

## 2025-06-09 RX ADMIN — TAMSULOSIN HYDROCHLORIDE 0.4 MG: 0.4 CAPSULE ORAL at 20:16

## 2025-06-09 RX ADMIN — ACETAMINOPHEN 650 MG: 325 TABLET ORAL at 17:28

## 2025-06-09 RX ADMIN — METOPROLOL TARTRATE 50 MG: 50 TABLET, FILM COATED ORAL at 08:32

## 2025-06-09 RX ADMIN — FELODIPINE 10 MG: 5 TABLET, FILM COATED, EXTENDED RELEASE ORAL at 08:32

## 2025-06-09 RX ADMIN — CYANOCOBALAMIN TAB 1000 MCG 1000 MCG: 1000 TAB at 08:33

## 2025-06-09 RX ADMIN — ACETAMINOPHEN 650 MG: 325 TABLET ORAL at 12:17

## 2025-06-09 RX ADMIN — LOSARTAN POTASSIUM 100 MG: 100 TABLET, FILM COATED ORAL at 08:33

## 2025-06-09 RX ADMIN — Medication 25 MCG: at 08:32

## 2025-06-09 RX ADMIN — POTASSIUM CHLORIDE 20 MEQ: 1500 TABLET, EXTENDED RELEASE ORAL at 17:28

## 2025-06-09 RX ADMIN — OXYCODONE HYDROCHLORIDE 5 MG: 5 TABLET ORAL at 22:36

## 2025-06-09 RX ADMIN — SENNOSIDES AND DOCUSATE SODIUM 2 TABLET: 50; 8.6 TABLET ORAL at 20:16

## 2025-06-09 RX ADMIN — OXYCODONE 5 MG: 5 TABLET ORAL at 08:39

## 2025-06-09 RX ADMIN — LISINOPRIL 10 MG: 10 TABLET ORAL at 08:33

## 2025-06-09 RX ADMIN — TIMOLOL MALEATE 1 DROP: 5 SOLUTION/ DROPS OPHTHALMIC at 08:36

## 2025-06-09 RX ADMIN — ACETAMINOPHEN 650 MG: 325 TABLET ORAL at 05:52

## 2025-06-09 RX ADMIN — METFORMIN HYDROCHLORIDE 500 MG: 500 TABLET ORAL at 08:32

## 2025-06-09 NOTE — PROGRESS NOTES
Subjective    Patient seen and examined on rounds.  Chart reviewed.  Events overnight noted.  History reviewed briefly with patient.    CC: Deficits in mobility, transfers, self-care status post fall, unstable 3 column C7 Chance fracture, status post C7 ORIF, removal of prior C3-6 hardware and C3-T1 PLIF by neurosurgery, history of gait instability and multiple falls, peripheral neuropathy, multiple medical problems.    HPI:  Mr. Aneesh Brito is a 75-year-old left handed white male with chronic conditions significant for hypertension, hyperlipidemia, diabetes mellitus type 2, BPH, glaucoma, colon cancer, gait instability, multiple falls in the home environment for which he was seeing a neurologist for workup of falls, history of prior bilateral hip replacements and bilateral knee replacements, spinal stenosis status post previous C3-C6 laminectomy and posterolateral instrumented fusion over 10 years ago by Dr. Maurer, presented to the ER at [facility] on 5/31/25 after suffering from a mechanical fall while at home and was unable to get up so he was on the floor all night until he was found.  At [facility] ER and he reported neck pain and pain in the lower cervical and upper thoracic region.  Imaging studies revealed C7 Chance fracture and findings of DISH (diffuse idiopathic skeletal hyperostosis ).  He was subsequently transferred to [facility] for neurosurgical evaluation where he was admitted on 5/31/25.  He was evaluated by Dr. Benjamin from neurosurgery, noted to have a 3 column Chance fracture at C7-T1 level, given highly unstable fracture was recommended surgical intervention by neurosurgery. He underwent C7 ORIF, removal of C3-6 hardware and C3-T1 PLIF by neurosurgery Dr. Raza Benjamin on 6/1/25.  Postoperatively is allowed weightbearing as tolerated on both lower extremities.  He has been given Aspen collar for use when out of bed in chair and when ambulating.  He can use soft cervical  collar in the bed.  I discussed spinal precautions with him.  Postoperative course was significant for anemia but he did not require transfusion.  His pain is managed with Tylenol and Oxycodone.  He had elevated blood pressure requiring IV Hydralazine but blood pressure improved after surgery and hypertension was managed with managed with Felodipine, Lisinopril, Losartan and Metoprolol.  He is continued on Lipitor for hyperlipidemia.  He has history of BPH and remains on Tamsulosin and Finasteride.  He was on Netarsudil-Latanoprost and Timoptic eye drops for glaucoma.  He had elevated blood sugars related to Decadron perioperatively and required insulin and subsequently blood sugars improved and he is managed with Metformin now. He had elevated CK of 1118 on admission due to rhabdomyolysis from being down on floor prior to admission. He was treated with IV fluids and CK improved.  I discussed his recent clinical course with patient, his wife and son who is a pediatric physician and his son indicated that patient was being evaluated by neurologist Dr. Hussein Haider regarding his gait instability and falls, and lumbar spine MRI was done, EMG studies which showed peripheral neuropathy as well as muscle biopsy was being planned of the right quadriceps to evaluate  his proximal weakness in bilateral lower extremities.  He is unable to do active straight leg raise in bilateral lower extremities in bed, unable to localize position sense in his left great toe and I also discussed with patient, his wife and son at bedside.  I also mentioned to them that neurology will be conservative at him during his stay at [facility] and that he should follow up with Dr. Hussein Haider from neurology on outpatient basis for further evaluation and workup as appropriate.  He is on subcutaneous Heparin and SCDs for DVT prophylaxis.  He is able to tolerate regular with thins consistency diet.  On 6/4/25, hemoglobin was 11.3, WBC count was  "11.85, platelets were 236, BUN was 22, creatinine was 1.1, sodium was 134 and potassium was 3.3.  He has been needing assistance for mobility, transfers, self-care. He is transferred to [facility] on 6/4/25 for further rehabilitation care.     SUBJ: Discussed with patient and with his wife with him.  They feel he is making progress in therapy.  Requiring minimal assistance for sit to stand transfer with physical therapy.  Able to ambulate 100 feet with front wheeled walker with minimal assistance with physical therapy.  Reviewed labs today.    ROS: Denies chest pain or shortness of breath. Other ROS negative. Past, family, social history is unchanged.    Current Functional Status:   Bed mobility:   Harmon, Supine to Sit: moderate assist (50-74% patient effort)   Harmon, Sit to Supine: moderate assist (50-74% patient effort)   Transfers:    Harmon, Sit to Stand Transfer: minimum assist (75% or more patient effort)  Harmon, Stand to Sit Transfer: minimum assist (75% or more patient effort)   Harmon, Stand Pivot/Stand Step Transfer: minimum assist (75% or more patient effort)   Gait:   Harmon, Gait: minimum assist (75% or more patient effort)  Assistive Device: walker, front-wheeled   Distance in Feet: 100 feet    Bathing:   Harmon: moderate assist (50-74% patient effort)   Toileting:   Harmon: minimum assist (75% or more patient effort)   Upper body dressing:   Harmon: dependent (less than 25% patient effort)   Lower body dressing:   Harmon: dependent (less than 25% patient effort)       Functional Progress:    Functional status reviewed. Overall, patient's functional status is improving.      Physical Exam      Blood pressure (!) 180/82, pulse 71, temperature 37.1 °C (98.7 °F), temperature source Oral, resp. rate 19, height 1.803 m (5' 11\"), weight 109 kg (241 lb), SpO2 96%.    Body mass index is 33.61 kg/m².    General Appearance: Not in acute " distress  Head/Ear/Nose/Throat: Normocephalic; Atraumatic.   Eye: EOMI; PERRL.   Neck: Healing incision posterior cervicothoracic spine.  Dressing in place.    Respiratory: Decreased breath sounds at bases.   Cardiovascular: RRR; Normal S1, S2.   Gastrointestinal: Soft; NT; +BS.   Extremities: Bilateral lower extremity edema noted.    Musculoskeletal: Functional active range of motion in both upper extremities except some limitation in left shoulder and is unable to lift left arm up overhead.  Some limitation of active range of motion in both hips and both knees, which is chronic according to patient and his wife at bedside.  Patient is unable to do active straight leg raise and either lower extremity.  He has had previous bilateral hip and bilateral knee replacements.  His wife mentions patient was lifting his lower extremities manually with his arms while getting in the car and after he sat down in the seat inside the car manually left each leg to bring them in the car.   Neurological: AAO ×3. Speech is fluent. Cranial nerve examination does not reveal any gross facial asymmetry. Strength testing about 4/5 strength in both upper extremities except left shoulder is 2-/5 and abduction and forward flexion.  Bilateral hip flexion is 2-/5.  Bilateral quadriceps are 2+/5 with the thighs supported.  Bilateral ankle dorsi and plantar flexion is 3+/5.  He denies significant numbness in his legs and feet at this time.  He is grossly able to localize position sense in his right great toe but not so in his left great toe.  He is able to localize vibration sense in both great toes.  Deep tendon reflexes are hypoactive and symmetric bilaterally.  Plantars are flexor.  Coordination is functional upper extremities.  Both knee jerks were not tested.  Behavior/Emotional: Appropriate; Cooperative.   Skin: Healing incision posterior cervicothoracic spine.  Dressing in place.  Some abrasions noted on right upper extremity.  Left  forearm has bruising ecchymosis noted.  Small wound noted on sacrum/coccyx at least stage II decubitus cannot rule out DTI.      Current Facility-Administered Medications:     acetaminophen (TYLENOL) tablet 650 mg, 650 mg, oral, q6h KOFI, Chandler Falk MD, 650 mg at 06/09/25 1728    alum-mag hydroxide-simeth (MAALOX) 200-200-20 mg/5 mL suspension 30 mL, 30 mL, oral, q6h PRN, Chandler Falk MD    atorvastatin (LIPITOR) tablet 20 mg, 20 mg, oral, Daily, Robert Hawkins MD, 20 mg at 06/09/25 0832    bisacodyL (DULCOLAX) 10 mg suppository 10 mg, 10 mg, rectal, Daily PRN, Chandler Falk MD    cholecalciferol (vitamin D3) tablet 25 mcg, 25 mcg, oral, Daily, Chandler Falk MD, 25 mcg at 06/09/25 0832    cyanocobalamin (VITAMIN B12) tablet 1,000 mcg, 1,000 mcg, oral, Daily, Chnadler Falk MD, 1,000 mcg at 06/09/25 0833    glucose chewable tablet 15-30 g of dextrose, 15-30 g of dextrose, oral, PRN **OR** dextrose 40 % oral gel 15-30 g of dextrose, 15-30 g of dextrose, oral, PRN **OR** glucagon (GLUCAGEN) injection 1 mg, 1 mg, intramuscular, PRN **OR** dextrose 50 % in water (D50) injection 12.5 g, 25 mL, intravenous, PRN, Chase Anna MD    enoxaparin (LOVENOX) syringe 40 mg, 40 mg, subcutaneous, Daily (6p), Robert Hawkins MD, 40 mg at 06/09/25 1728    felodipine (PLENDIL) 24 hr ER tablet 10 mg, 10 mg, oral, Daily, Chandler Falk MD, 10 mg at 06/09/25 0832    finasteride (PROSCAR) tablet 5 mg, 5 mg, oral, Daily, Robert Hawkins MD, 5 mg at 06/09/25 0832    hydrALAZINE (APRESOLINE) tablet 25 mg, 25 mg, oral, q6h PRN, Robert Hawkins MD, 25 mg at 06/08/25 1849    insulin lispro U-100 (HumaLOG) pen 2-5 Units, 2-5 Units, subcutaneous, BID AC, Chandler Falk MD    lisinopriL (PRINIVIL) tablet 10 mg, 10 mg, oral, Daily, Chandler Falk MD, 10 mg at 06/09/25 0833    losartan (COZAAR) tablet 100 mg, 100 mg, oral, Daily, Robert Hawkins MD, 100 mg at 06/09/25 0833    metFORMIN (GLUCOPHAGE) tablet  500 mg, 500 mg, oral, BID with breakfast and dinner, Robert Hawkins MD, 500 mg at 06/09/25 1727    metoprolol tartrate (LOPRESSOR) tablet 50 mg, 50 mg, oral, BID, Chandler Falk MD, 50 mg at 06/09/25 0832    netarsudiL-latanoprost 0.02-0.005 % drops 1 drop, 1 drop, Right Eye, Nightly, Chandler Falk MD, 1 drop at 06/08/25 2024    oxyCODONE (ROXICODONE) immediate release tablet 5 mg, 5 mg, oral, q4h PRN, Robert Hawkins MD, 5 mg at 06/09/25 0839    pantoprazole (PROTONIX) tablet,delayed release (DR/EC) 40 mg, 40 mg, oral, Daily before breakfast, Chandler Falk MD, 40 mg at 06/09/25 0833    polyethylene glycol (MIRALAX) 17 gram packet 17 g, 17 g, oral, Daily, Robert Hawkins MD, 17 g at 06/05/25 0859    sennosides-docusate sodium (SENOKOT-S) 8.6-50 mg per tablet 2 tablet, 2 tablet, oral, BID, Chase Anna MD, 2 tablet at 06/09/25 0833    tamsulosin (FLOMAX) 24 hr ER capsule 0.4 mg, 0.4 mg, oral, Nightly, Robert Hawkins MD, 0.4 mg at 06/08/25 2024    timolol (TIMOPTIC) 0.5 % ophthalmic solution 1 drop, 1 drop, Both Eyes, Daily, Robert Hawkins MD, 1 drop at 06/09/25 0836       Labs / Radiology    Lab Results   Component Value Date    WBC 7.59 06/09/2025    HGB 11.0 (L) 06/09/2025    HCT 32.7 (L) 06/09/2025    MCV 93.2 06/09/2025     06/09/2025     Lab Results   Component Value Date    GLUCOSE 120 (H) 06/09/2025    CALCIUM 8.6 06/09/2025     (L) 06/09/2025    K 3.4 (L) 06/09/2025    CO2 26 06/09/2025     06/09/2025    BUN 22 06/09/2025    CREATININE 1.1 06/09/2025       Assessment and Plan    ASSESSMENT PLAN:  1. 75-year-old left handed white male with chronic conditions significant for hypertension, hyperlipidemia, diabetes mellitus type 2, BPH, glaucoma, colon cancer, gait instability, multiple falls in the home environment for which he was seeing a neurologist for workup of falls, history of prior bilateral hip replacements and bilateral knee replacements, spinal stenosis status post  previous C3-C6 laminectomy and posterolateral instrumented fusion over 10 years ago by Dr. Maurer, presented to the ER at [facility] on 5/31/25 after suffering from a mechanical fall while at home and was unable to get up so he was on the floor all night until he was found.  At [facility] ER and he reported neck pain and pain in the lower cervical and upper thoracic region.  Imaging studies revealed C7 Chance fracture and findings of DISH (diffuse idiopathic skeletal hyperostosis ).  He was subsequently transferred to [facility] for neurosurgical evaluation where he was admitted on 5/31/25.  He was evaluated by Dr. Benjamin from neurosurgery, noted to have a 3 column Chance fracture at C7-T1 level, given highly unstable fracture was recommended surgical intervention by neurosurgery. He underwent C7 ORIF, removal of C3-6 hardware and C3-T1 PLIF by neurosurgery Dr. Raza Benjamin on 6/1/25.  Postoperatively is allowed weightbearing as tolerated on both lower extremities.  He has been given Aspen collar for use when out of bed in chair and when ambulating.  He can use soft cervical collar in the bed.  I discussed spinal precautions with him.  Postoperative course was significant for anemia but he did not require transfusion.  His pain is managed with Tylenol and Oxycodone.  He had elevated blood pressure requiring IV Hydralazine but blood pressure improved after surgery and hypertension was managed with managed with Felodipine, Lisinopril, Losartan and Metoprolol.  He is continued on Lipitor for hyperlipidemia.  He has history of BPH and remains on Tamsulosin and Finasteride.  He was on Netarsudil-Latanoprost and Timoptic eye drops for glaucoma.  He had elevated blood sugars related to Decadron perioperatively and required insulin and subsequently blood sugars improved and he is managed with Metformin now. He had elevated CK of 1118 on admission due to rhabdomyolysis from being down on floor prior to  admission. He was treated with IV fluids and CK improved.  I discussed his recent clinical course with patient, his wife and son who is a pediatric physician and his son indicated that patient was being evaluated by neurologist Dr. Hussein Haider regarding his gait instability and falls, and lumbar spine MRI was done, EMG studies which showed peripheral neuropathy as well as muscle biopsy was being planned of the right quadriceps to evaluate  his proximal weakness in bilateral lower extremities.  He is unable to do active straight leg raise in bilateral lower extremities in bed, unable to localize position sense in his left great toe and I also discussed with patient, his wife and son at bedside.  I also mentioned to them that neurology will be conservative at him during his stay at [facility] and that he should follow up with Dr. Hussein Haider from neurology on outpatient basis for further evaluation and workup as appropriate.  He is on subcutaneous Heparin and SCDs for DVT prophylaxis.  He is able to tolerate regular with thins consistency diet.  On 6/4/25, hemoglobin was 11.3, WBC count was 11.85, platelets were 236, BUN was 22, creatinine was 1.1, sodium was 134 and potassium was 3.3.  He has been needing assistance for mobility, transfers, self-care. He is transferred to [facility] on 6/4/25 for further rehabilitation care.     2. DVT prophylaxis - on subcutaneous Lovenox.  On SCDs.  Platelets 264 on 6/5/2025.  Platelets 307 on 6/9/2025.     3. Neurosurgery - status post fall, unstable 3 column C7 Chance fracture, status post C7 ORIF, removal of prior C3-6 hardware and C3-T1 PLIF by neurosurgery, history of gait instability and multiple falls, peripheral neuropathy, class I obesity, multiple medical problems - continue PT, OT, psychology.  Follow falls precautions, cardiac precautions, aspiration precautions, spinal precautions.  Aspen collar to neck when out of bed.  Monitor healing of posterior  cervical incision.  Neurology was consulted.     4. GI - On Protonix for GI prophylaxis. On Senokot-S.  On MiraLAX.  On PRN Dulcolax suppository.  On PRN Maalox.     5.  -on Proscar.  On Flomax.     6. CVS - on Plendil.  On Lisinopril.  On Cozaar.  On Lopressor.  On PRN Hydralazine.     7. Pulmonary - encourage incentive spirometry.     8. Hematology - monitor hemoglobin, platelets.  Hemoglobin 12.2, WBC 10.71 on 6/5/2025.  Hemoglobin 11.0, WBC 7.59 on 6/9/2025.     9. Pain -on Tylenol.  On PRN Oxycodone.     10. Skin - Healing incision posterior cervicothoracic spine.  Dressing in place.  Some abrasions noted on right upper extremity.  Left forearm has bruising ecchymosis noted.  Small wound noted on sacrum/coccyx at least stage II decubitus cannot rule out DTI.     11. F/E/N - on vitamin B-12.  BMI 33.63 consistent with class I obesity.  Nutrition was consulted.     12. Diabetes mellitus type 2  - on sliding scale Humalog coverage.  On Glucophage.  On hypoglycemic protocol.     13. Ophthalmology- He was on NetarsudiL-latanoprost and Timoptic eye drops for glaucoma     14. Hyperlipidemia - on Lipitor.     15. Rehabilitation medicine - Continue comprehensive rehabilitation care. Continue PT, OT, psychology.  Follow falls precautions, cardiac precautions, aspiration precautions, spinal precautions.  Aspen collar to neck when out of bed.  Monitor healing of posterior cervical incision.  Neurology was consulted.Tolerating therapies per endurance on 6/6/2025.  Slept well last night.  Noted to have elevated PVR.  Requiring moderate assistance for sit to stand transfer with physical therapy.  Able to ambulate 20 feet with front wheeled walker dependent gait with physical therapy on 6/6/2025. Discussed with nurse on 6/6/2025. Working on ADLs with occupational therapy on 6/7/2025.  Dependent for upper and lower body dressing, requiring minimal assistance for toileting, moderate assistance for bathing with occupational  therapy on 6/7/2025.  Discussed with nurse regarding his PVRs on 6/7/2025.  Using [place] catheter at nighttime.  Discussed with patient on 6/7/2025.Discussed with patient and with his wife with him on 6/9/2025.  They feel he is making progress in therapy.  Requiring minimal assistance for sit to stand transfer with physical therapy.  Able to ambulate 100 feet with front wheeled walker with minimal assistance with physical therapy.  Reviewed labs on 6/9/2025.     16. Reviewed labs today.  BUN 20, creatinine 0.9, sodium 135, potassium 3.7 on 6/5/2025.  BUN 22, creatinine 1.1, sodium 132, potassium 3.4 on 6/9/2025.          Chase Anna MD  6/9/2025      This encounter was completed utilizing the Direct Speech Voice Recognition Software. Grammatical errors, random word insertions, pronoun errors, and incomplete sentences are occasional consequences of the system due to software limitations, ambient noises, and hardware issues. Such errors may be missed prior to affixing electronic signature. Any questions or concerns about the content, text, or information contained within the body of this dictation should be directly addressed to the physician for clarification. If you have any questions or concerns please do not hesitate to call me directly via EPIC chat, page, or email.

## 2025-06-09 NOTE — PROGRESS NOTES
Patient: Aneesh Brito  Location: [facility] [address]  MRN: [id number]  Today's date: 6/9/2025    History of Present Illness    Aneesh is a 75 y.o. male with a history of colon carcinoma, BPH, hypertension, glaucoma, type 2 diabetes, history of a cervical fusion over 10 years ago admitted after a fall inability to get up on his own.  He was seen at [facility] where imaging studies were performed which demonstrated DISH as well as a Chance fracture at the bottom of C7.  He was transferred to [facility] for neurosurgery.    He was taken to the OR on 6/1 by Dr. Benjamin and underwent an ORIF of the C7 Chance fracture with removal and replacement of the C3-6 posterior instrumentation as well as a C3-T3 posterior lateral fusion.  Drains remain in place.  He is on subcu heparin for DVT prophylaxis.  Currently on a nicardipine drip for blood pressure control.  He states pain is well-controlled.  He denies any other complaints of headache or dizziness, chest pain or shortness of breath.  He is voiding on his own.  He has not had a bowel movement yet.    He was seen by PT and OT needing moderate assistance of 2 to transfer sit to stand.  He ambulated 2 feet with moderate assistance of 2 using a rolling walker.  Pertinent radiology results reviewed. Pertinent lab results reviewed.      Past Medical History  Aneesh has a past medical history of C7 cervical fracture (CMS/HCC) (05/31/2025), Colonic adenoma, Diverticulosis of colon, Enlarged prostate with lower urinary tract symptoms (LUTS), Glaucoma, Hypertension, Melanoma of skin (CMS/HCC), Osteoarthritis of knee, Overweight, Spinal stenosis of lumbar region, and Type 2 diabetes mellitus (CMS/HCC).    PT Vitals      Date/Time Pulse HR Source BP BP Location BP Method Pt Position [facility]   06/09/25 1105 56 Monitor 115/80 Left upper arm Manual Sitting PJ          PT Pain      Date/Time Pain Type Side/Orientation Location Rating: Rest Rating: Activity  Description Interventions [facility]   06/09/25 1105 Pain Assessment posterior neck 2 - mild pain 2 - mild pain incisional diversional activity provided    06/09/25 1129 Pain Reassessment posterior neck 2 - mild pain 2 - mild pain incisional relaxation techniques promoted PJ                  Prior Living Environment      Flowsheet Row Most Recent Value   People in Home spouse   Current Living Arrangements independent living facility   Home Accessibility wheelchair accessible   Living Environment Comment PTA residing c wife in [facility] 1 floor set up. Shower stall c shower chair & grab bars. Reports no DME at toilet - information to be verified c pt. wife   Number of Stairs, Main Entrance 0   Patient Bedroom Access Comment PTA residing c wife in [facility] ([facility] 1 floor set up. Shower stall c shower chair & grab bars. Reports no DME at toilet - information to be verified c pt. wife   Bathroom Access Comment PTA residing c wife in [facility] ([facility] 1 floor set up. Shower stall c shower chair & grab bars. Reports no DME at toilet - information to be verified c pt. wife   Number of Stairs, Within Home, Primary 0       Prior Level of Function      Flowsheet Row Most Recent Value   Dominant Hand left   Ambulation assistive equipment  [rollator]   Transferring assistive equipment  [rollator]   Toileting independent   Bathing independent   Dressing independent   Eating independent   IADLs independent   Driving/Transportation    Communication understands/communicates without difficulty   Prior Level of Function Comment Pt reports being independent with use of SPC inside and Rollator to the dining jenkins. Independent ADLs. Denies other falls. Independent ADLs. (+) driving. Retired teacher   Assistive Device Currently Used at Home cane, straight, walker, 4-wheeled, shower chair        IRF PT Evaluation and Treatment - 06/09/25 1108          PT Time Calculation    Start Time 1100     Stop Time  1130     Time Calculation (min) 30 min        Session Details    Document Type Daily Treatment/Progress Note        General Information    Patient Profile Reviewed yes     General Observations of Patient Pt received in w/c in gym        Mobility Belt    Mobility Belt Used During Session yes        Orthosis Neck Cervical Collar    Orthosis Properties Date Obtained: 06/01/25 Time Obtained: 1803 Location: Neck Type: Cervical Collar Therapeutic Indications: stabilization and support Wearing Schedule: wear when out of bed       Orthosis Neck Cervical Collar    Orthosis Properties Date Obtained: 06/01/25 Time Obtained: 0000 Location: Neck Type: Cervical Collar Features: Hard Description: [place] collar OOB don seated; [place] collar for shower Therapeutic Indications: stabilization and support Wearing Schedule: wear when out of bed       Sit to Stand Transfer    [place], Sit to Stand Transfer minimum assist (75% or more patient effort)     Safety/Cues increased time to complete     Assistive Device walker, front-wheeled     Comment w/c to stance w min A for balance        Stand to Sit Transfer    [place], Stand to Sit Transfer minimum assist (75% or more patient effort)     Safety/Cues increased time to complete     Assistive Device walker, front-wheeled     Comment stance to w/c  w min A for controlled descent        Stand Pivot Transfer    [place], Stand Pivot/Stand Step Transfer minimum assist (75% or more patient effort)     Safety/Cues increased time to complete     Assistive Device walker, front-wheeled     Comment via amb approach w min A for balance due to general unsteadiness        Gait Training    [place], Gait minimum assist (75% or more patient effort)     Safety/Cues increased time to complete;technique     Assistive Device walker, front-wheeled     Distance in Feet 80 feet     Pattern step-to;step-through     Deviations/Abnormal Patterns step length decreased;stride length  "decreased;weight shifting decreased;gait speed decreased;avery decreased     Bilateral Gait Deviations heel strike decreased     Comment 80ft x 1 w RW and min A for balance due to dec heel strike, general unsteadiness.        Curb Negotiation    [place] minimum assist (75% or more patient effort)     Assistive Device walker, front-wheeled     Curb Height 6 inches     Comment up/down 6\" curb/step w min A for balance and VCs for stepping closer        Stairs Training    [place], Stairs minimum assist (75% or more patient effort)     Safety/Cues sequencing;technique     Assistive Device railing     Handrail Location (Stairs) both sides     Number of Stairs 4     Stair Height 6 inches     Ascending Stairs Technique step-to-step   LLE lead    Descending Stairs Technique step-to-step   RLE lead    Comment up/down 4(6\") stairs w min A for balance due to general unsteadiness, VCs for sequencing and technique.        Daily Progress Summary (PT)    Daily Outcome Statement Introduced stairs and curb/step this session. Pt progressed to min A for transfers, ambulation, and elevations. Cont POC.                          IRF PT Goals      Flowsheet Row Most Recent Value   Bed Mobility Goal 1    Activity/Assistive Device rolling to left, rolling to right, sit to supine/supine to sit at 06/05/2025 0902   [place] minimum assist (75% or more patient effort) at 06/05/2025 0902   Time Frame short-term goal (STG), 1 week at 06/05/2025 0902   Bed Mobility Goal 2    Activity/Assistive Device rolling to left, rolling to right, sit to supine/supine to sit at 06/05/2025 0902   [place] modified independence at 06/05/2025 0902   Time Frame long-term goal (LTG), 3 weeks at 06/05/2025 0902   Transfer Goal 1    Activity/Assistive Device sit-to-stand/stand-to-sit, stand pivot at 06/05/2025 0902   [place] minimum assist (75% or more patient effort) at 06/05/2025 0902   Time Frame short-term goal (STG), 1 week at 06/05/2025 " 0902   Transfer Goal 2    Activity/Assistive Device sit-to-stand/stand-to-sit, stand pivot at 06/05/2025 0902   [place] modified independence at 06/05/2025 0902   Time Frame long-term goal (LTG), 3 weeks at 06/05/2025 0902   Gait/Walking Locomotion Goal 1    Activity/Assistive Device gait (walking locomotion) at 06/05/2025 0902   Distance 50 feet at 06/05/2025 0902   [place] minimum assist (75% or more patient effort) at 06/05/2025 0902   Time Frame short-term goal (STG), 1 week at 06/05/2025 0902   Gait/Walking Locomotion Goal 2    Activity/Assistive Device gait (walking locomotion) at 06/05/2025 0902   Distance 150 feet at 06/05/2025 0902   [place] supervision required at 06/05/2025 0902   Time Frame long-term goal (LTG), 3 weeks at 06/05/2025 0902

## 2025-06-09 NOTE — CONSULTS
Aneesh Brito  [id number]  6/8/2025    PODIATRY CONSULT    Falls, sequela [W19.XXXS]  Fusion of spine of cervicothoracic region [M43.23]    Reason for referral: ingrown toenail    HPI : 75-year-old left handed white male with chronic conditions significant for hypertension, hyperlipidemia, diabetes mellitus type 2, BPH, glaucoma, colon cancer, gait instability, multiple falls in the home environment for which he was seeing a neurologist for workup of falls, history of prior bilateral hip replacements and bilateral knee replacements, spinal stenosis status post previous C3-C6 laminectomy and posterolateral instrumented fusion over 10 years ago by Dr. Maurer, presented to the ER at [facility] on 5/31/25 after suffering from a mechanical fall while at home and was unable to get up so he was on the floor all night until he was found. At [facility] ER and he reported neck pain and pain in the lower cervical and upper thoracic region. Imaging studies revealed C7 Chance fracture and findings of DISH (diffuse idiopathic skeletal hyperostosis ). He was subsequently transferred to [facility] for neurosurgical evaluation where he was admitted on 5/31/25. He was evaluated by Dr. Benjamin from neurosurgery, noted to have a 3 column Chance fracture at C7-T1 level, given highly unstable fracture was recommended surgical intervention by neurosurgery. He underwent C7 ORIF, removal of C3-6 hardware and C3-T1 PLIF by neurosurgery Dr. Raza Benjamin on 6/1/25. Postoperatively is allowed weightbearing as tolerated on both lower extremities. He has been given [place] collar for use when out of bed in chair and when ambulating. He can use soft cervical collar in the bed.  Postoperative course was significant for anemia but he did not require transfusion. His pain is managed with Tylenol and Oxycodone. He had elevated blood pressure requiring IV Hydralazine but blood pressure improved after surgery and hypertension was  managed with managed with Felodipine, Lisinopril, Losartan and Metoprolol. He is continued on Lipitor for hyperlipidemia. He has history of BPH and remains on Tamsulosin and Finasteride. He was on Netarsudil-Latanoprost and Timoptic eye drops for glaucoma. He had elevated blood sugars related to Decadron perioperatively and required insulin and subsequently blood sugars improved and he is managed with Metformin now. He had elevated CK of 1118 on admission due to rhabdomyolysis from being down on floor prior to admission. He was treated with IV fluids and CK improved. I discussed his recent clinical course with patient, his wife and son who is a pediatric physician and his son indicated that patient was being evaluated by neurologist Dr. Hussein Haider regarding his gait instability and falls, and lumbar spine MRI was done, EMG studies which showed peripheral neuropathy as well as muscle biopsy was being planned of the right quadriceps to evaluate his proximal weakness in bilateral lower extremities. He is unable to do active straight leg raise in bilateral lower extremities in bed, unable to localize position sense in his left great toe  .   He is on subcutaneous Heparin and SCDs for DVT prophylaxis. He is able to tolerate regular with thins consistency diet. On 6/4/25, hemoglobin was 11.3, WBC count was 11.85, platelets were 236, BUN was 22, creatinine was 1.1, sodium was 134 and potassium was 3.3. He has been needing assistance for mobility, transfers, self-care.   He is transferred to [place] on 6/4/25 for further rehabilitation care.         Subjective:  Patient was referred by Dr. Anna for ingrown toenail. Patient is complaining of ingrown toenail.        Allergies:   Allergies   Allergen Reactions    No Known Allergies          Allergies List Reviewed:  yes      Medications:   Current Facility-Administered Medications:     acetaminophen (TYLENOL) tablet 650 mg, 650 mg, oral, q6h [facility],  Chandler Falk MD, 650 mg at 06/08/25 1717    alum-mag hydroxide-simeth (MAALOX) 200-200-20 mg/5 mL suspension 30 mL, 30 mL, oral, q6h PRN, Chandler Falk MD    atorvastatin (LIPITOR) tablet 20 mg, 20 mg, oral, Daily, Robert Hawkins MD, 20 mg at 06/08/25 0833    bisacodyL (DULCOLAX) 10 mg suppository 10 mg, 10 mg, rectal, Daily PRN, Chandler Falk MD    cholecalciferol (vitamin D3) tablet 25 mcg, 25 mcg, oral, Daily, Chandler Falk MD, 25 mcg at 06/08/25 0833    cyanocobalamin (VITAMIN B12) tablet 1,000 mcg, 1,000 mcg, oral, Daily, Chandler Falk MD, 1,000 mcg at 06/08/25 0833    glucose chewable tablet 15-30 g of dextrose, 15-30 g of dextrose, oral, PRN **OR** dextrose 40 % oral gel 15-30 g of dextrose, 15-30 g of dextrose, oral, PRN **OR** glucagon (GLUCAGEN) injection 1 mg, 1 mg, intramuscular, PRN **OR** dextrose 50 % in water (D50) injection 12.5 g, 25 mL, intravenous, PRN, Chase Anna MD    enoxaparin (LOVENOX) syringe 40 mg, 40 mg, subcutaneous, Daily (6p), Robert Hawkins MD, 40 mg at 06/08/25 1718    felodipine (PLENDIL) 24 hr ER tablet 10 mg, 10 mg, oral, Daily, Chandler Falk MD, 10 mg at 06/08/25 0833    finasteride (PROSCAR) tablet 5 mg, 5 mg, oral, Daily, Robert Hawkins MD, 5 mg at 06/08/25 0833    hydrALAZINE (APRESOLINE) tablet 25 mg, 25 mg, oral, q6h PRN, Robert Hawkins MD, 25 mg at 06/08/25 1849    insulin lispro U-100 (HumaLOG) pen 2-5 Units, 2-5 Units, subcutaneous, BID AC, Chandler Falk MD    lisinopriL (PRINIVIL) tablet 10 mg, 10 mg, oral, Daily, Chandler Falk MD, 10 mg at 06/08/25 0833    losartan (COZAAR) tablet 100 mg, 100 mg, oral, Daily, Robert Hawkins MD, 100 mg at 06/08/25 0833    metFORMIN (GLUCOPHAGE) tablet 500 mg, 500 mg, oral, BID with breakfast and dinner, Robert Hawkins MD, 500 mg at 06/08/25 1630    metoprolol tartrate (LOPRESSOR) tablet 50 mg, 50 mg, oral, BID, Chandler Falk MD, 50 mg at 06/08/25 2024    netarsudiL-latanoprost  0.02-0.005 % drops 1 drop, 1 drop, Right Eye, Nightly, Chandler Falk MD, 1 drop at 06/08/25 2024    oxyCODONE (ROXICODONE) immediate release tablet 5 mg, 5 mg, oral, q4h PRN, Robert Hawkins MD, 5 mg at 06/08/25 2029    pantoprazole (PROTONIX) tablet,delayed release (DR/EC) 40 mg, 40 mg, oral, Daily before breakfast, Chandler Falk MD, 40 mg at 06/08/25 0834    polyethylene glycol (MIRALAX) 17 gram packet 17 g, 17 g, oral, Daily, Robert Hawkins MD, 17 g at 06/05/25 0859    sennosides-docusate sodium (SENOKOT-S) 8.6-50 mg per tablet 2 tablet, 2 tablet, oral, BID, Chase Anna MD, 2 tablet at 06/08/25 2024    tamsulosin (FLOMAX) 24 hr ER capsule 0.4 mg, 0.4 mg, oral, Nightly, Robert Hawkins MD, 0.4 mg at 06/08/25 2024    timolol (TIMOPTIC) 0.5 % ophthalmic solution 1 drop, 1 drop, Both Eyes, Daily, Robert Hawkins MD, 1 drop at 06/08/25 0835      Home Medications Reconcile: yes      Current Medication orders reviewed: yes      Medical History:    Active Ambulatory Problems     Diagnosis Date Noted    Diverticulosis of colon 09/10/2014    Enlarged prostate with lower urinary tract symptoms (LUTS) 09/10/2014    Hypertension, benign 09/10/2014    Malignant melanoma of skin (CMS/[facility]) 09/10/2014    Osteoarthritis of knee 09/10/2014    Overweight 09/10/2014    Spinal stenosis of lumbar region 09/10/2014    Chronic kidney disease, stage I 03/20/2019    Dry eye syndrome of bilateral lacrimal glands 09/25/2019    Primary open-angle glaucoma, bilateral, indeterminate stage 09/25/2019    Bilateral pseudophakia 09/25/2019    Tear film insufficiency 01/20/2021    Pure hypercholesterolemia 04/28/2021    Hyponatremia 01/11/2022    Aneurysm of iliac artery (CMS/[facility]) 02/16/2022    Glomerulosclerosis 02/16/2022    Generalized weakness 06/19/2023    Lumbar degenerative disc disease 04/25/2024    Type 2 diabetes mellitus without complications (CMS/[facility]) 05/19/2025    Traumatic closed fracture of C7 vertebra with minimal  displacement, initial encounter (CMS/HCC) 05/31/2025    Closed C7 fracture without spinal cord injury, with routine healing, subsequent encounter 06/01/2025    Falls, sequela 06/04/2025     Resolved Ambulatory Problems     Diagnosis Date Noted    Type 2 diabetes mellitus with diabetic chronic kidney disease (CMS/[facility]) 09/10/2014    CKD (chronic kidney disease) 03/21/2019    Hypokalemia 03/21/2019    Contact dermatitis of left eyelid 09/25/2019    Contact dermatitis of right eyelid 09/25/2019    Weakness 01/11/2022    Fall 01/11/2022    Fever 01/11/2022    Diarrhea 01/11/2022    C. difficile colitis 01/12/2022    Campylobacter diarrhea 01/14/2022    COVID-19 virus infection 06/19/2023    COVID-19 virus infection 06/19/2023     Past Medical History:   Diagnosis Date    C7 cervical fracture (CMS/HCC) 05/31/2025    Colonic adenoma     Glaucoma     Hypertension     Melanoma of skin (CMS/HCC)     Type 2 diabetes mellitus (CMS/HCC)        Social History     Socioeconomic History    Marital status:      Spouse name: Not on file    Number of children: Not on file    Years of education: Not on file    Highest education level: Not on file   Occupational History    Occupation: retired teacher   Tobacco Use    Smoking status: Never    Smokeless tobacco: Never   Substance and Sexual Activity    Alcohol use: Yes     Comment: BEER, 1 CONSUMED DAILY    Drug use: Never    Sexual activity: Not on file   Other Topics Concern    Not on file   Social History Narrative    Not on file     Social Drivers of Health     Financial Resource Strain: Low Risk  (6/19/2023)    Overall Financial Resource Strain (CARDIA)     Difficulty of Paying Living Expenses: Not hard at all   Food Insecurity: No Food Insecurity (6/4/2025)    Hunger Vital Sign     Worried About Running Out of Food in the Last Year: Never true     Ran Out of Food in the Last Year: Never true   Transportation Needs: No Transportation Needs (6/5/2025)    PRAPARE -  Transportation     Lack of Transportation (Medical): No     Lack of Transportation (Non-Medical): No   Physical Activity: Not on file   Stress: Not on file   Social Connections: Not on file   Intimate Partner Violence: Not on file   Housing Stability: Low Risk  (6/5/2025)    Housing Stability Vital Sign     Unable to Pay for Housing in the Last Year: No     Number of Times Moved in the Last Year: 1     Homeless in the Last Year: No       Past Surgical History   Procedure Laterality Date    1) ORIF C7 fracture 2) removal and replacement of C3-6 posterior intrumentation 3) C3 to T3 posterolaterral instrumented fusion  Depuy + removal set SMA C-arm O-Arm N/A 6/1/2025    Performed by Raza Benjamin MD at  OR    Appendectomy      Cervical fusion  06/01/2025    1) ORIF C7 fracture 2) removal and replacement of C3-6 posterior intrumentation 3) C3 to T3 posterolaterral instrumented fusion    Colonoscopy  01/10/2012    diverticulosis    Colonoscopy w/ polypectomy  02/09/2022    Hip arthroplasty Bilateral     Knee arthroplasty Left     Knee arthroplasty Right 02/02/2022         Review of Systems - Negative except noted above.                Physical Exam: palpable DP nonpalpable PT bilateral feet.  Decreased texture, temperature, and turgor bilateral feet.  Decreased digital hair bilateral feet.  Mild edema bilateral lower extremity.  Xerotic skin bilateral feet.  No open lesions bilateral.  Elongated, thickened, yellow nails with subungual debris  >6.  Tenderness with palpation of affected toenails.  Ingrown great toenails.   Digital contracture noted toe 2-5 bilateral.   Hallux valgus deformity bilateral.  Epicritic sensation intact bilateral feet.        Lab Results   Component Value Date    WBC 10.71 (H) 06/05/2025    HGB 12.2 (L) 06/05/2025    HCT 35.8 (L) 06/05/2025    MCV 94.2 06/05/2025     06/05/2025       Lab Results   Component Value Date    GLUCOSE 126 (H) 06/05/2025    CALCIUM 8.7 06/05/2025    NA  135 (L) 06/05/2025    K 3.7 06/05/2025    CO2 25 06/05/2025     06/05/2025    BUN 20 06/05/2025    CREATININE 0.9 06/05/2025       X-RAY CERVICAL SPINE 2 OR 3 VIEWS  Result Date: 6/3/2025  IMPRESSION: Significantly limited evaluation.  See comment.     CT THORACIC SPINE WITHOUT IV CONTRAST  Result Date: 6/1/2025  IMPRESSION: 1. No acute thoracic spine fracture or traumatic malalignment. MRI remains recommended for evaluation of the C7 fracture and C7-T1 articulations. 2. Posterior osteophytosis at T8-T9 causes at least moderate osseous narrowing of the central spinal canal. 3. Mild CHF with trace bilateral pleural effusions and overlying subsegmental atelectasis, not well evaluated due to respiratory motion artifact. Recommend outpatient chest CT in 2-4 weeks.    CT HEAD WITHOUT IV CONTRAST  CT HEAD WITHOUT IV CONTRAST, CT CERVICAL SPINE WITHOUT IV CONTRAST  Result Date: 5/31/2025  IMPRESSION: 1.  No evidence of acute intracranial abnormality. 2.  Acute unstable multicolumn fracture of the cervical spine through the C7 vertebral body with associated widening of the C7-T1 facet joint on the right and widening of the interspinous ligament at C7-T1. This is consistent with an unstable/multicolumn fracture of the cervical spine and MRI is recommended for further evaluation. Finding:    Other   Acuity: Critical  Status:  CLOSED Critical read back was performed and results were read back by ANTONI DUNBAR, 5/31/2025 5:35 PM.     X-RAY CHEST 1 VIEW  Result Date: 5/31/2025  IMPRESSION: No active disease is seen in the chest.    X-RAY THORACIC SPINE 2 VIEWS  Result Date: 5/31/2025  IMPRESSION: Thoracic spondylosis and hyperostosis.  No acute osseous abnormality.       Pathology Results       ** No results found for the last 720 hours. **          Microbiology Results       ** No results found for the last 720 hours. **            Temp:  [36.4 °C (97.6 °F)-36.9 °C (98.4 °F)] 36.4 °C (97.6 °F)  Heart Rate:  [64-65]  65  Resp:  [18] 18  BP: (162-227)/(72-94) 176/82      Pertinent radiology and labs reviewed      Impression: 75-year-old  male with pmh of hypertension, hyperlipidemia, diabetes mellitus type 2, BPH, glaucoma, colon cancer, gait instability, multiple falls admitted for rehab now with onychomycosis 6, ingrown nails without clinical signs of infection.    Plan: Nails debrided >6.  Ingrown nails removed.  Follow-up evaluation 2-3 months.            Raza Early DPM    6/8/2025      9:13 PM

## 2025-06-09 NOTE — PROGRESS NOTES
[facility] Progress Note          Patient was seen and examined.   Attestation Notes: Face to face encounter completed    Aneesh Brito is a 75 y.o. male who was admitted for Falls, sequela [W19.XXXS]. Patient was referred by Chase Anna MD for medical assessment and management      CC: Falls, sequela [W19.XXXS]     HPI: Aneesh Brito is a 75 y.o. male with HTN, HLD, DM2, colon ca, BPH, glaucoma, spinal stenosis s/p previous cervical fusion, presented to [facility] 5/31/25 s/p fall, found to have C7 fracture and transferred to [facility] where he underwent C7 ORIF, removal of C3-6 hardware and C3-T1 PLIF by neurosurgery Dr. Raza Benjamin on 6/1/25.  Post op he had anemia but did not require transfusion. He was put on SQ Heparin for DVT ppx.   His pain was managed with Tylenol and Oxycodone.   He had elevated BP with surgery requiring IV Hydralzine but BP improved after surgery and HTN managed with Felodipine, Lisinopril, Losartan and Metoprolol.   He was on Lipitor for HLD.  He was on Finasteride and Tamsulosin for BPH.    He was on netarsudiL-latanoprost and timoptic eye drops for glaucoma.   He had very elevated blood sugars alaina-op, related to IVFs and Decadron, and required insulin. Blood sugars improved and DM2 managed with Metformin.   He had elevated CK of 1118 on admission due to rhabdomyolysis from being down on floor prior to admission. He was treated with IVFs and CK improved.   He was admitted to [facility] on 6/4/2025 for acute inpatient rehab.     [facility] hospital course:    He participated in therapy. He was seen by Nutrition, STACY. Vit D low, started oral supplement.     SUBJECTIVE:  Patient interviewed and examined  Feeling well this morning, offers no new concerns through the weekend  Nursing staff did report elevated Bps, required prn dose of hydralazine; will request manual monitoring of VS for improved accuracy    Review of Systems:  All other systems  reviewed and negative except as noted in the HPI.    Current meds and allergies reviewed  Current Facility-Administered Medications   Medication Dose Route Frequency    acetaminophen  650 mg oral q6h KOFI    alum-mag hydroxide-simeth  30 mL oral q6h PRN    atorvastatin  20 mg oral Daily    bisacodyL  10 mg rectal Daily PRN    cholecalciferol (vitamin D3)  25 mcg oral Daily    cyanocobalamin  1,000 mcg oral Daily    glucose  15-30 g of dextrose oral PRN    Or    dextrose  15-30 g of dextrose oral PRN    Or    glucagon  1 mg intramuscular PRN    Or    dextrose 50 % in water (D50)  25 mL intravenous PRN    enoxaparin  40 mg subcutaneous Daily (6p)    felodipine  10 mg oral Daily    finasteride  5 mg oral Daily    hydrALAZINE  25 mg oral q6h PRN    insulin lispro U-100  2-5 Units subcutaneous BID AC    lisinopriL  10 mg oral Daily    losartan  100 mg oral Daily    metFORMIN  500 mg oral BID with breakfast and dinner    metoprolol tartrate  50 mg oral BID    netarsudiL-latanoprost  1 drop Right Eye Nightly    oxyCODONE  5 mg oral q4h PRN    pantoprazole  40 mg oral Daily before breakfast    polyethylene glycol  17 g oral Daily    sennosides-docusate sodium  2 tablet oral BID    tamsulosin  0.4 mg oral Nightly    timolol  1 drop Both Eyes Daily        Past Medical History:   Diagnosis Date    C7 cervical fracture (CMS/HCC) 05/31/2025    Colonic adenoma     Diverticulosis of colon     Enlarged prostate with lower urinary tract symptoms (LUTS)     Glaucoma     Hypertension     Melanoma of skin (CMS/HCC)     Osteoarthritis of knee     Overweight     Spinal stenosis of lumbar region     Type 2 diabetes mellitus (CMS/HCC)      Past Surgical History   Procedure Laterality Date    1) ORIF C7 fracture 2) removal and replacement of C3-6 posterior intrumentation 3) C3 to T3 posterolaterral instrumented fusion  Depuy + removal set SMA C-arm O-Arm N/A 6/1/2025    Performed by Raza Benjamin MD at  OR    Appendectomy       Cervical fusion  06/01/2025    1) ORIF C7 fracture 2) removal and replacement of C3-6 posterior intrumentation 3) C3 to T3 posterolaterral instrumented fusion    Colonoscopy  01/10/2012    diverticulosis    Colonoscopy w/ polypectomy  02/09/2022    Hip arthroplasty Bilateral     Knee arthroplasty Left     Knee arthroplasty Right 02/02/2022     Social History     Tobacco Use    Smoking status: Never    Smokeless tobacco: Never   Substance Use Topics    Alcohol use: Yes     Comment: BEER, 1 CONSUMED DAILY    Drug use: Never      Family History   Problem Relation Name Age of Onset    Breast cancer Biological Mother         Vital signs in last 24 hours:  Temp:  [36.4 °C (97.6 °F)-36.9 °C (98.4 °F)] 36.4 °C (97.6 °F)  Heart Rate:  [56-65] 60  Resp:  [18] 18  BP: (115-176)/(60-83) 126/60  Vital signs reviewed 06/09/25 3:55 PM    Physical Exam  Vitals and nursing note reviewed.   Constitutional:       Appearance: Normal appearance.   HENT:      Head: Normocephalic and atraumatic.      Right Ear: External ear normal.      Left Ear: External ear normal.      Nose: Nose normal.      Mouth/Throat:      Pharynx: Oropharynx is clear.   Eyes:      Conjunctiva/sclera: Conjunctivae normal.   Neck:      Comments: S/p cervical fusion in collar  Cardiovascular:      Rate and Rhythm: Normal rate.      Pulses: Normal pulses.   Pulmonary:      Effort: Pulmonary effort is normal.   Abdominal:      General: Abdomen is flat.   Musculoskeletal:      Right lower leg: Edema present.      Left lower leg: Edema present.   Skin:     Findings: Lesion (right arm, right ear, sacral wounds as documented by nursing) present.   Neurological:      Mental Status: He is alert and oriented to person, place, and time.                 Objective    Labs:  I have reviewed the patient's labs.  Significant abnormals are anemia, hyponatremia, hypokalemia.  Results from last 7 days   Lab Units 06/09/25  0556   WBC K/uL 7.59   HEMOGLOBIN g/dL 11.0*   HEMATOCRIT %  32.7*   PLATELETS K/uL 337     Results from last 7 days   Lab Units 06/09/25  0556   SODIUM mEQ/L 132*   POTASSIUM mEQ/L 3.4*   CHLORIDE mEQ/L 101   CO2 mEQ/L 26   BUN mg/dL 22   CREATININE mg/dL 1.1   CALCIUM mg/dL 8.6   ALBUMIN g/dL 3.2*   BILIRUBIN TOTAL mg/dL 0.5   ALK PHOS IU/L 133*   ALT IU/L 46   AST IU/L 38   GLUCOSE mg/dL 120*     Results from last 7 days   Lab Units 06/04/25  0445   MAGNESIUM mg/dL 1.9      Results from last 7 days   Lab Units 06/05/25  0536   CK TOTAL U/L 124     6/5/25: vit d 25-oh: 13 (low)    Blood sugars: [phone]    Imaging:  Not applicable        ASSESSMENT/PLAN:    75 y.o. malewith HTN, HLD, DM2, colon ca, BPH, glaucoma, spinal stenosis s/p previous cervical fusion, presented to [facility] 5/31/25 s/p fall, found to have C7 fracture and transferred to [facility] where he underwent C7 ORIF, removal of C3-6 hardware and C3-T1 PLIF by neurosurgery Dr. Raza Benjamin on 6/1/25     1. Neuro:  # Cervical stenosis  # acute C7 fx  # DISH  -6/1/25s/p C7 ORIF, removal of C3-6 hardware and C3-T1 PLIF by neurosurgery Dr. Raza Benjamin   -pain managed with Tylenol and Oxycodone     2. Vasc:  # DVT ppx  -bilat LE edema  -SCDs and SQ Heparin for DVT ppx     3. Heme:  # ABLA  -post op anemia  -5/12/25 B12 <400, added oral B12  -hgb 6/9/25 11.0  -on multivitamin  # leukocytosis  -reactive vs due to Decadron  -resolved, WBC 7.59 (6/9)  -follow CBC     4. Renal:  -increased risk of dehydration and electrolyte changes  -electrolyte imbalance, hypokalemia, hyponatremia  -provide K supplementation; trend Na  -follow BMP, Mg     5. Gi:  # constipation  -Miralax for bowels  # ulcer ppx  -Protonix ulcer ppx     6. Gu:  # BPH  -on Proscar and Flomax  -using [place] cath at  for incontinence  -having high volume urine output at night with some urine retention requiring intermitten cath     7. Cardiac:  # HTN  -on Felodipine, Lisinopril, Losartan and Metoprolol  -request manual only  monitoring  -watch for orthostatic hypotension  -use cardiac precautions in therapy     8. Pulm:  -incentive spirometry for atelectasis     9. Derm:  # multiple wounds  -surgical wounds  -wounds on right arm, right ear, sacrum as documented by nursing  -seen by [employer] for skin assessment  -wound care per nursing and WOCN consult     10. Nutrition:  -Adult Diet Regular; Thin Liquids; Consistent Carbohydrate 2000   -sen by Nutrition for assessment and education     11. Endo:  # HLD  -on Lipitor  # DM2  -had steroid induced hyperglycemia due to Decadron, now improved  -on Metformin  -accuchecks BID AC with Lispro scale for BG>200  -hypoglycemia protocol     12. Psych:  # anxiety/depression  -consulted Psychology for support     13. Musculoskeletal:  # rhabdomyolysis  -clinically resolved  -6/5/25 CK back to normal at 124  # vit D deficiency  -6/5/25 Vit D low at 13, started on vit D3 25 mcg po daily.    14. Ophth:  # glaucoma  -on netarsudiL-latanoprost and timoptic eye drops     14. Dispo:  -code status: Full Code        plan discussed with patient, nurse, case management and Chase Anna MD Jeanne Lasota, MD  6/9/2025  3:55 PM

## 2025-06-09 NOTE — PROGRESS NOTES
Patient: Aneehs Brito  Location: [facility] [address]  MRN: [id number]  Today's date: 6/9/2025    History of Present Illness    Aneesh is a 75 y.o. male with a history of colon carcinoma, BPH, hypertension, glaucoma, type 2 diabetes, history of a cervical fusion over 10 years ago admitted after a fall inability to get up on his own.  He was seen at [facility] where imaging studies were performed which demonstrated DISH as well as a Chance fracture at the bottom of C7.  He was transferred to [facility] for neurosurgery.    He was taken to the OR on 6/1 by Dr. Benjamin and underwent an ORIF of the C7 Chance fracture with removal and replacement of the C3-6 posterior instrumentation as well as a C3-T3 posterior lateral fusion.  Drains remain in place.  He is on subcu heparin for DVT prophylaxis.  Currently on a nicardipine drip for blood pressure control.  He states pain is well-controlled.  He denies any other complaints of headache or dizziness, chest pain or shortness of breath.  He is voiding on his own.  He has not had a bowel movement yet.    He was seen by PT and OT needing moderate assistance of 2 to transfer sit to stand.  He ambulated 2 feet with moderate assistance of 2 using a rolling walker.  Pertinent radiology results reviewed. Pertinent lab results reviewed.      Past Medical History  Aneesh has a past medical history of C7 cervical fracture (CMS/HCC) (05/31/2025), Colonic adenoma, Diverticulosis of colon, Enlarged prostate with lower urinary tract symptoms (LUTS), Glaucoma, Hypertension, Melanoma of skin (CMS/HCC), Osteoarthritis of knee, Overweight, Spinal stenosis of lumbar region, and Type 2 diabetes mellitus (CMS/HCC).    PT Vitals      Date/Time Pulse HR Source BP BP Location BP Method Pt Position [facility]   06/09/25 1301 60 Monitor 126/60 Left upper arm Manual Sitting PJ          PT Pain      Date/Time Pain Type Side/Orientation Location Rating: Rest Rating: Activity  Interventions [facility]   06/09/25 1301 Pain Assessment posterior neck 3 3 diversional activity provided    06/09/25 1359 Pain Reassessment posterior neck 3 3 relaxation techniques promoted PJ                  Prior Living Environment      Flowsheet Row Most Recent Value   People in Home spouse   Current Living Arrangements independent living facility   Home Accessibility wheelchair accessible   Living Environment Comment PTA residing c wife in [facility] 1 floor set up. Shower stall c shower chair & grab bars. Reports no DME at toilet - information to be verified c pt. wife   Number of Stairs, Main Entrance 0   Patient Bedroom Access Comment PTA residing c wife in [facility] 1 floor set up. Shower stall c shower chair & grab bars. Reports no DME at toilet - information to be verified c pt. wife   Bathroom Access Comment PTA residing c wife in [facility] 1 floor set up. Shower stall c shower chair & grab bars. Reports no DME at toilet - information to be verified c pt. wife   Number of Stairs, Within Home, Primary 0       Prior Level of Function      Flowsheet Row Most Recent Value   Dominant Hand left   Ambulation assistive equipment  [rollator]   Transferring assistive equipment  [rollator]   Toileting independent   Bathing independent   Dressing independent   Eating independent   IADLs independent   Driving/Transportation    Communication understands/communicates without difficulty   Prior Level of Function Comment Pt reports being independent with use of SPC inside and Rollator to the dining jenkins. Independent ADLs. Denies other falls. Independent ADLs. (+) driving. Retired teacher   Assistive Device Currently Used at Home cane, straight, walker, 4-wheeled, shower chair        IRF PT Evaluation and Treatment - 06/09/25 1324          PT Time Calculation    Start Time 1300     Stop Time 1400     Time Calculation (min) 60 min        Session Details    Document Type  Daily Treatment/Progress Note        General Information    Patient Profile Reviewed yes     General Observations of Patient Pt received in w/c in gym        Mobility Belt    Mobility Belt Used During Session yes        Orthosis Neck Cervical Collar    Orthosis Properties Date Obtained: 06/01/25 Time Obtained: 1803 Location: Neck Type: Cervical Collar Therapeutic Indications: stabilization and support Wearing Schedule: wear when out of bed       Orthosis Neck Cervical Collar    Orthosis Properties Date Obtained: 06/01/25 Time Obtained: 0000 Location: Neck Type: Cervical Collar Features: Hard Description: [place] collar OOB don seated; [place] collar for shower Therapeutic Indications: stabilization and support Wearing Schedule: wear when out of bed       Sit to Stand Transfer    [place], Sit to Stand Transfer minimum assist (75% or more patient effort)     Safety/Cues increased time to complete     Assistive Device walker, front-wheeled     Comment w/c to stance w min A for balance        Stand to Sit Transfer    [place], Stand to Sit Transfer minimum assist (75% or more patient effort)     Safety/Cues increased time to complete     Assistive Device walker, front-wheeled     Comment stance to w/c w min A for controlled descent        Stand Pivot Transfer    [place], Stand Pivot/Stand Step Transfer minimum assist (75% or more patient effort)     Safety/Cues increased time to complete     Assistive Device walker, front-wheeled     Comment via amb approach w min A for balance due to general unsteadiness        Gait Training    [place], Gait minimum assist (75% or more patient effort)     Safety/Cues increased time to complete;technique     Assistive Device walker, front-wheeled     Distance in Feet 100 feet     Pattern step-to;step-through     Deviations/Abnormal Patterns step length decreased;stride length decreased;weight shifting decreased;gait speed decreased;avery decreased     Bilateral  "Gait Deviations heel strike decreased     Comment 100ft x 1 w RW and min A for balance due to dec heel strike, general unsteadiness.     [place] (Gait Trial 2) minimum assist (75% or more patient effort)     Safety/Cues (Gait Trial 2) increased time to complete;technique     Assistive Device (Gait Trial 2) walker, 4-wheeled     Distance in Feet (Gait Trial 2) 100 feet     Comment (Gait Trial 2) 100ft x 1 w rollator and min A for balance due to dec heel strike, general unsteadiness.        Balance    Balance Interventions standing;static;multisensory training     Comment, Balance 1) standing with L WBQC, alt marching 1x10, min A for balance 2) standing on airex foam w no UE support, tossing bean bags to target 1x15, min A for balance        Motor Skills    Motor Control/Coordination Interventions gross motor coordination activities     Comment, Motor Skills 1) alt toe taps to 4\" box, 2x10, min A for balance        Daily Progress Summary (PT)    Daily Outcome Statement Session focused on balance. Pt requesting trialing alternate ADs, as he already has cane and rollator at home. Pt struggled with balance activity with LBQC, but demonstrated good stability with use of rollator. Plan to continue use of RW with rollator as option for discharge.                        IRF PT Goals      Flowsheet Row Most Recent Value   Bed Mobility Goal 1    Activity/Assistive Device rolling to left, rolling to right, sit to supine/supine to sit at 06/05/2025 0902   [place] minimum assist (75% or more patient effort) at 06/05/2025 0902   Time Frame short-term goal (STG), 1 week at 06/05/2025 0902   Bed Mobility Goal 2    Activity/Assistive Device rolling to left, rolling to right, sit to supine/supine to sit at 06/05/2025 0902   [place] modified independence at 06/05/2025 0902   Time Frame long-term goal (LTG), 3 weeks at 06/05/2025 0902   Transfer Goal 1    Activity/Assistive Device sit-to-stand/stand-to-sit, stand pivot at " 06/05/2025 0902   [place] minimum assist (75% or more patient effort) at 06/05/2025 0902   Time Frame short-term goal (STG), 1 week at 06/05/2025 0902   Transfer Goal 2    Activity/Assistive Device sit-to-stand/stand-to-sit, stand pivot at 06/05/2025 0902   [place] modified independence at 06/05/2025 0902   Time Frame long-term goal (LTG), 3 weeks at 06/05/2025 0902   Gait/Walking Locomotion Goal 1    Activity/Assistive Device gait (walking locomotion) at 06/05/2025 0902   Distance 50 feet at 06/05/2025 0902   [place] minimum assist (75% or more patient effort) at 06/05/2025 0902   Time Frame short-term goal (STG), 1 week at 06/05/2025 0902   Gait/Walking Locomotion Goal 2    Activity/Assistive Device gait (walking locomotion) at 06/05/2025 0902   Distance 150 feet at 06/05/2025 0902   [place] supervision required at 06/05/2025 0902   Time Frame long-term goal (LTG), 3 weeks at 06/05/2025 0902

## 2025-06-09 NOTE — PROGRESS NOTES
Patient: Aneesh Brito  Location: [facility]  MRN: [id number]  Today's date: 6/9/2025    History of Present Illness    Aneesh is a 75 y.o. male with a history of colon carcinoma, BPH, hypertension, glaucoma, type 2 diabetes, history of a cervical fusion over 10 years ago admitted after a fall inability to get up on his own.  He was seen at [facility] where imaging studies were performed which demonstrated DISH as well as a Chance fracture at the bottom of C7.  He was transferred to [facility] for neurosurgery.    He was taken to the OR on 6/1 by Dr. Benjamin and underwent an ORIF of the C7 Chance fracture with removal and replacement of the C3-6 posterior instrumentation as well as a C3-T3 posterior lateral fusion.  Drains remain in place.  He is on subcu heparin for DVT prophylaxis.  Currently on a nicardipine drip for blood pressure control.  He states pain is well-controlled.  He denies any other complaints of headache or dizziness, chest pain or shortness of breath.  He is voiding on his own.  He has not had a bowel movement yet.    He was seen by PT and OT needing moderate assistance of 2 to transfer sit to stand.  He ambulated 2 feet with moderate assistance of 2 using a rolling walker.  Pertinent radiology results reviewed. Pertinent lab results reviewed.      Past Medical History  Aneesh has a past medical history of C7 cervical fracture (CMS/[facility]) (05/31/2025), Colonic adenoma, Diverticulosis of colon, Enlarged prostate with lower urinary tract symptoms (LUTS), Glaucoma, Hypertension, Melanoma of skin (CMS/[facility]), Osteoarthritis of knee, Overweight, Spinal stenosis of lumbar region, and Type 2 diabetes mellitus (CMS/[facility]).         06/09/25 0904 06/09/25 0929   Pain/Comfort/Sleep   Pain Charting Type Pain Assessment Pain Reassessment   Preferred Pain Scale number (Numeric Rating Pain Scale) number (Numeric Rating Pain Scale)   (0-10) Pain Rating: Rest 3 3   (0-10) Pain  Rating: Activity 3 3   Pain Side/Orientation posterior posterior   Pain Body Location neck neck   Pain Description incisional incisional   Pain Management Interventions premedicated for activity  --             Prior Living Environment      Flowsheet Row Most Recent Value   People in Home spouse   Current Living Arrangements independent living facility   Home Accessibility wheelchair accessible   Living Environment Comment PTA residing c wife in [facility] 1 floor set up. Shower stall c shower chair & grab bars. Reports no DME at toilet - information to be verified c pt. wife   Number of Stairs, Main Entrance 0   Patient Bedroom Access Comment PTA residing c wife in [facility] 1 floor set up. Shower stall c shower chair & grab bars. Reports no DME at toilet - information to be verified c pt. wife   Bathroom Access Comment PTA residing c wife in [facility] ([facility] 1 floor set up. Shower stall c shower chair & grab bars. Reports no DME at toilet - information to be verified c pt. wife   Number of Stairs, Within Home, Primary 0       Prior Level of Function      Flowsheet Row Most Recent Value   Dominant Hand left   Ambulation assistive equipment  [rollator]   Transferring assistive equipment  [rollator]   Toileting independent   Bathing independent   Dressing independent   Eating independent   IADLs independent   Driving/Transportation    Communication understands/communicates without difficulty   Prior Level of Function Comment Pt reports being independent with use of SPC inside and Rollator to the dining jenkins. Independent ADLs. Denies other falls. Independent ADLs. (+) driving. Retired teacher   Assistive Device Currently Used at Home cane, straight, walker, 4-wheeled, shower chair       Occupational Profile      Flowsheet Row Most Recent Value   Successful Occupations Enjoys participating in the activities at [employer] - shuffle board and corn hole   Occupational  "History/Life Experiences PTA independent c ADLs. 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   Patient Goals \"Walk\"               06/09/25 0921   OT Time Calculation   Start Time 0900   Stop Time 0930   Time Calculation (min) 30 min   Session Details   Document Type Daily Treatment/Progress Note   General Information   General Observations of Patient Pt received seated in w/c, agreeable to therapy   Mobility Belt   Mobility Belt Used During Session yes   Orthosis Neck Cervical Collar   Date Obtained/Time Obtained: 06/01/25 0000   Location: Neck  Type: Cervical Collar  Features: Hard  Description: Apen collar OOB don seated; [place] collar for shower  Therapeutic Indications: stabilization and support  Wearing Schedule: wear whe...   Skin Assessment intact   Sit to Stand Transfer   [place], Sit to Stand Transfer minimum assist (75% or more patient effort)   Assistive Device walker, front-wheeled   Comment from w/c   Stand to Sit Transfer   [place], Stand to Sit Transfer minimum assist (75% or more patient effort)   Assistive Device walker, front-wheeled   Comment to w/c   Balance   Comment, Balance OT: Pt able to tolerate standing for 4min, 3min, and 2min while completing UE TE, grossly steadying A   Upper Extremity (Therapeutic Exercise)   Exercise Position/Type standing;AAROM (active assistive range of motion)   Reps and Sets 2x10   Comment Performed towel slides at tabletop +45* incline to improve UE ROM/strength. VCs intermittently for increased amplitude of movements as pt was observed to perform shorter movements as he fatigued. Performed the following with rest breaks in between: forwards/backwards, L/R, clockwise, counterclockwise.   Daily Progress Summary (OT)   Daily Outcome Statement OT session focused on standing tolerance and UE TE via towel slides in all planes. Pt tolerated well, standing " for up to 4min at a time prior to requesting seated rest break. Continue OT POC.              IRF OT Goals      Flowsheet Row Most Recent Value   Transfer Goal 1    Activity/Assistive Device toilet, commode, 3-in-1 at 06/05/2025 1033   [place] minimum assist (75% or more patient effort) at 06/05/2025 1033   Time Frame short-term goal (STG), 5 - 7 days at 06/05/2025 1033   Transfer Goal 2    Activity/Assistive Device toilet, commode, 3-in-1 at 06/05/2025 1033   [place] modified independence at 06/05/2025 1033   Time Frame long-term goal (LTG), 21 days or less at 06/05/2025 1033   Transfer Goal 3    Activity/Assistive Device shower, tub bench at 06/05/2025 1033   [place] minimum assist (75% or more patient effort) at 06/05/2025 1033   Time Frame short-term goal (STG), 5 - 7 days at 06/05/2025 1033   Transfer Goal 4    Activity/Assistive Device shower, tub bench at 06/05/2025 1033   [place] modified independence at 06/05/2025 1033   Time Frame long-term goal (LTG), 21 days or less at 06/05/2025 1033   Bathing Goal 1    Activity/Assistive Device bathing skills, all at 06/05/2025 1033   [place] minimum assist (75% or more patient effort) at 06/05/2025 1033   Time Frame short-term goal (STG), 5 - 7 days at 06/05/2025 1033   Bathing Goal 2    Activity/Assistive Device bathing skills, all at 06/05/2025 1033   [place] modified independence at 06/05/2025 1033   Time Frame long-term goal (LTG), 21 days or less at 06/05/2025 1033   UB Dressing Goal 1    Activity/Assistive Device upper body dressing at 06/05/2025 1033   [place] maximum assist (25-49% patient effort) at 06/05/2025 1033   Time Frame short-term goal (STG), 5 - 7 days at 06/05/2025 1033   Progress/Outcome goal no longer appropriate at 06/05/2025 1033   UB Dressing Goal 2    Activity/Assistive Device upper body dressing at 06/05/2025 1033   [place] modified independence at 06/05/2025 1033   Time Frame long-term goal (LTG), 21  days or less at 06/05/2025 1033   LB Dressing Goal 1    Activity/Assistive Device lower body dressing at 06/05/2025 1033   [place] maximum assist (25-49% patient effort) at 06/05/2025 1033   Time Frame short-term goal (STG), 5 - 7 days at 06/05/2025 1033   LB Dressing Goal 2    Activity/Assistive Device lower body dressing at 06/05/2025 1033   [place] modified independence at 06/05/2025 1033   Time Frame long-term goal (LTG), 21 days or less at 06/05/2025 1033   Grooming Goal 1    Activity/Assistive Device grooming skills, all at 06/05/2025 1033   [place] set-up required at 06/05/2025 1033   Time Frame short-term goal (STG), 5 - 7 days at 06/05/2025 1033   Grooming Goal 2    Activity/Assistive Device grooming skills, all at 06/05/2025 1033   [place] modified independence at 06/05/2025 1033   Time Frame long-term goal (LTG), 21 days or less at 06/05/2025 1033   Toileting Goal 1    Activity/Assistive Device toileting skills, all at 06/05/2025 1033   [place] maximum assist (25-49% patient effort) at 06/05/2025 1033   Time Frame short-term goal (STG), 5 - 7 days at 06/05/2025 1033   Toileting Goal 2    Activity/Assistive Device toileting skills, all at 06/05/2025 1033   [place] modified independence at 06/05/2025 1033   Time Frame long-term goal (LTG), 21 days or less at 06/05/2025 1033

## 2025-06-09 NOTE — PLAN OF CARE
Plan of Care Review  Plan of Care Reviewed With: patient  Progress: improving  Outcome Evaluation: Alert and oriented x4. Continent of bowel. Mostly continent of bladder, uses a [place] catheter at HS. Pain managed with scheduled Tylenol and PRN Oxycodone. Surgical dressing CDI to posterior neck incision. Aspen collar worn OOB. BP remains elevated at times despite receiving scheduled and PRN HTN medications. On-call MD aware. BP decreased to 158/72 after PRN Hydralazine and scheduled Metoprolol were given. Sleeping quietly in bed overnight. Fall and safety measures maintained.

## 2025-06-09 NOTE — PROGRESS NOTES
Patient: Aneesh Brito  Location: [facility]  MRN: [id number]  Today's date: 6/9/2025    History of Present Illness    Aneesh is a 75 y.o. male with a history of colon carcinoma, BPH, hypertension, glaucoma, type 2 diabetes, history of a cervical fusion over 10 years ago admitted after a fall inability to get up on his own.  He was seen at [facility] where imaging studies were performed which demonstrated DISH as well as a Chance fracture at the bottom of C7.  He was transferred to [facility] for neurosurgery.    He was taken to the OR on 6/1 by Dr. Benjamin and underwent an ORIF of the C7 Chance fracture with removal and replacement of the C3-6 posterior instrumentation as well as a C3-T3 posterior lateral fusion.  Drains remain in place.  He is on subcu heparin for DVT prophylaxis.  Currently on a nicardipine drip for blood pressure control.  He states pain is well-controlled.  He denies any other complaints of headache or dizziness, chest pain or shortness of breath.  He is voiding on his own.  He has not had a bowel movement yet.    He was seen by PT and OT needing moderate assistance of 2 to transfer sit to stand.  He ambulated 2 feet with moderate assistance of 2 using a rolling walker.  Pertinent radiology results reviewed. Pertinent lab results reviewed.      Past Medical History  Aneesh has a past medical history of C7 cervical fracture (CMS/HCC) (05/31/2025), Colonic adenoma, Diverticulosis of colon, Enlarged prostate with lower urinary tract symptoms (LUTS), Glaucoma, Hypertension, Melanoma of skin (CMS/HCC), Osteoarthritis of knee, Overweight, Spinal stenosis of lumbar region, and Type 2 diabetes mellitus (CMS/HCC).    OT Vitals      Date/Time Pulse HR Source Pt Activity O2 Therapy BP BP Location BP Method Pt Position Who   06/09/25 1008 58 Monitor At rest None (Room air) 132/80 Left upper arm Manual Sitting CS   06/09/25 1054 57 Monitor At rest None (Room air)  124/81 Left upper arm Manual Sitting CS          OT Pain      Date/Time Pain Type Side/Orientation Location Rating: Rest Description [facility]   06/09/25 1008 Pain Assessment neck posterior 2 incisional CS   06/09/25 1054 Pain Reassessment neck posterior 2 incisional CS                 Prior Living Environment      Flowsheet Row Most Recent Value   People in Home spouse   Current Living Arrangements independent living facility   Home Accessibility wheelchair accessible   Living Environment Comment PTA residing c wife in [facility] ([facility] 1 floor set up. Shower stall c shower chair & grab bars. Reports no DME at toilet - information to be verified c pt. wife   Number of Stairs, Main Entrance 0   Patient Bedroom Access Comment PTA residing c wife in [facility]"" ([facility] 1 floor set up. Shower stall c shower chair & grab bars. Reports no DME at toilet - information to be verified c pt. wife   Bathroom Access Comment PTA residing c wife in \[facility]"" ([facility] 1 floor set up. Shower stall c shower chair & grab bars. Reports no DME at toilet - information to be verified c pt. wife   Number of Stairs, Within Home, Primary 0       Prior Level of Function      Flowsheet Row Most Recent Value   Dominant Hand left   Ambulation assistive equipment  [rollator]   Transferring assistive equipment  [rollator]   Toileting independent   Bathing independent   Dressing independent   Eating independent   IADLs independent   Driving/Transportation    Communication understands/communicates without difficulty   Prior Level of Function Comment Pt reports being independent with use of SPC inside and Rollator to the dining jenkins. Independent ADLs. Denies other falls. Independent ADLs. (+) driving. Retired teacher   Assistive Device Currently Used at Home cane, straight, walker, 4-wheeled, shower chair       Occupational Profile      Flowsheet Row Most Recent Value   Successful Occupations Enjoys participating in the  "activities at [email or web address] - shuffle board and corn hole   Occupational History/Life Experiences PTA independent c ADLs. 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   Patient Goals \"Walk\"        IRF OT Evaluation and Treatment - 06/09/25 1022          OT Time Calculation    Start Time 0958     Stop Time 1058     Time Calculation (min) 60 min        Session Details    Document Type Daily Treatment/Progress Note        General Information    Patient Profile Reviewed yes     General Observations of Patient Pt received seated w/c, agreeable to therapy.        Mobility Belt    Mobility Belt Used During Session yes        Orthosis Neck Cervical Collar    Orthosis Properties Date Obtained: 06/01/25 Time Obtained: 1803 Location: Neck Type: Cervical Collar Therapeutic Indications: stabilization and support Wearing Schedule: wear when out of bed       Orthosis Neck Cervical Collar    Orthosis Properties Date Obtained: 06/01/25 Time Obtained: 0000 Location: Neck Type: Cervical Collar Features: Hard Description: Apen collar OOB don seated; [place] collar for shower Therapeutic Indications: stabilization and support Wearing Schedule: wear when out of bed       Transfers    Transfers --        Sit to Stand Transfer    [place], Sit to Stand Transfer minimum assist (75% or more patient effort)     Safety/Cues increased time to complete;verbal cues;hand placement     Assistive Device walker, front-wheeled     Comment from w/c to stance with RW, Min A for balance, cues for hand placement.        Stand to Sit Transfer    [place], Stand to Sit Transfer minimum assist (75% or more patient effort)     Safety/Cues increased time to complete;verbal cues;hand placement;technique     Assistive Device walker, front-wheeled     Comment to w/c from stance with RW, Min A for controlled lowering cues for hand placement.        " Balance    Comment, Balance OT: 1. in stance with unilateral support on RW, pt engaged in tabletop activities. Pt tolerated standing 6 minutes 38 seconds and 3 minutes 28 seconds with steadying A for balance. Rest breaks after each activity.        Motor Skills    Comment, Motor Skills OT: 1. using L hand, pt placed pom poms on large pegs using tweezers with Mod difficulty and increase time. Occasional dropping of pom poms and use of R hand for manipulation. Pt then removed pom poms with tweezers using R hand with Min difficulty and increase time. 2. In stance unsupported pt folded various clothing items with steadying A for balance focusing on bilateral coordination. 3. Pt opened various household containers with Min difficulty and increase time. 4. Using each hand, pt gasped handful of various coins from jar and placed in coin slot on top of jar with increase time focusing on in hand manipulation, Min A to complete, noted increase difficulty with R hand.        Daily Progress Summary (OT)    Daily Outcome Statement Pt tolerated session well, session focused on FMC tasks, balance and standing tolerance. Pt overall steadying A for standing balance. Pt completed various FMC tasks with Min-Mod difficulty. Pt would continue to benefit from dynamic balance and standing tolerance. Cont with OT POC.                          IRF OT Goals      Flowsheet Row Most Recent Value   Transfer Goal 1    Activity/Assistive Device toilet, commode, 3-in-1 at 06/05/2025 1033   [place] minimum assist (75% or more patient effort) at 06/05/2025 1033   Time Frame short-term goal (STG), 5 - 7 days at 06/05/2025 1033   Transfer Goal 2    Activity/Assistive Device toilet, commode, 3-in-1 at 06/05/2025 1033   [place] modified independence at 06/05/2025 1033   Time Frame long-term goal (LTG), 21 days or less at 06/05/2025 1033   Transfer Goal 3    Activity/Assistive Device shower, tub bench at 06/05/2025 1033   [place] minimum  assist (75% or more patient effort) at 06/05/2025 1033   Time Frame short-term goal (STG), 5 - 7 days at 06/05/2025 1033   Transfer Goal 4    Activity/Assistive Device shower, tub bench at 06/05/2025 1033   [place] modified independence at 06/05/2025 1033   Time Frame long-term goal (LTG), 21 days or less at 06/05/2025 1033   Bathing Goal 1    Activity/Assistive Device bathing skills, all at 06/05/2025 1033   [place] minimum assist (75% or more patient effort) at 06/05/2025 1033   Time Frame short-term goal (STG), 5 - 7 days at 06/05/2025 1033   Bathing Goal 2    Activity/Assistive Device bathing skills, all at 06/05/2025 1033   [place] modified independence at 06/05/2025 1033   Time Frame long-term goal (LTG), 21 days or less at 06/05/2025 1033   UB Dressing Goal 1    Activity/Assistive Device upper body dressing at 06/05/2025 1033   [place] maximum assist (25-49% patient effort) at 06/05/2025 1033   Time Frame short-term goal (STG), 5 - 7 days at 06/05/2025 1033   Progress/Outcome goal no longer appropriate at 06/05/2025 1033   UB Dressing Goal 2    Activity/Assistive Device upper body dressing at 06/05/2025 1033   [place] modified independence at 06/05/2025 1033   Time Frame long-term goal (LTG), 21 days or less at 06/05/2025 1033   LB Dressing Goal 1    Activity/Assistive Device lower body dressing at 06/05/2025 1033   [place] maximum assist (25-49% patient effort) at 06/05/2025 1033   Time Frame short-term goal (STG), 5 - 7 days at 06/05/2025 1033   LB Dressing Goal 2    Activity/Assistive Device lower body dressing at 06/05/2025 1033   [place] modified independence at 06/05/2025 1033   Time Frame long-term goal (LTG), 21 days or less at 06/05/2025 1033   Grooming Goal 1    Activity/Assistive Device grooming skills, all at 06/05/2025 1033   [place] set-up required at 06/05/2025 1033   Time Frame short-term goal (STG), 5 - 7 days at 06/05/2025 1033   Grooming Goal 2     Activity/Assistive Device grooming skills, all at 06/05/2025 1033   [place] modified independence at 06/05/2025 1033   Time Frame long-term goal (LTG), 21 days or less at 06/05/2025 1033   Toileting Goal 1    Activity/Assistive Device toileting skills, all at 06/05/2025 1033   [place] maximum assist (25-49% patient effort) at 06/05/2025 1033   Time Frame short-term goal (STG), 5 - 7 days at 06/05/2025 1033   Toileting Goal 2    Activity/Assistive Device toileting skills, all at 06/05/2025 1033   [place] modified independence at 06/05/2025 1033   Time Frame long-term goal (LTG), 21 days or less at 06/05/2025 1033

## 2025-06-09 NOTE — PLAN OF CARE
Plan of Care Review  Plan of Care Reviewed With: patient  Progress: improving  Outcome Evaluation: patient remains free from falls, using call bell appropriately, aspen collar maintained OOB, continent of bowel/bladder this am, + BM this am, oxycodone given for pain this am,

## 2025-06-10 ENCOUNTER — RESULTS FOLLOW-UP (OUTPATIENT)
Dept: FAMILY MEDICINE | Facility: CLINIC | Age: 75
End: 2025-06-10

## 2025-06-10 ENCOUNTER — APPOINTMENT (INPATIENT)
Dept: OCCUPATIONAL THERAPY | Facility: REHABILITATION | Age: 75
DRG: 560 | End: 2025-06-10
Payer: MEDICARE

## 2025-06-10 ENCOUNTER — APPOINTMENT (INPATIENT)
Dept: PHYSICAL THERAPY | Facility: REHABILITATION | Age: 75
DRG: 560 | End: 2025-06-10
Payer: MEDICARE

## 2025-06-10 LAB
GLUCOSE BLD-MCNC: 105 MG/DL (ref 70–99)
GLUCOSE BLD-MCNC: 123 MG/DL (ref 70–99)
POCT TEST: ABNORMAL
POCT TEST: ABNORMAL

## 2025-06-10 PROCEDURE — 63700000 HC SELF-ADMINISTRABLE DRUG: Performed by: HOSPITALIST

## 2025-06-10 PROCEDURE — 63700000 HC SELF-ADMINISTRABLE DRUG: Performed by: INTERNAL MEDICINE

## 2025-06-10 PROCEDURE — 97530 THERAPEUTIC ACTIVITIES: CPT | Mod: GP

## 2025-06-10 PROCEDURE — 12800001 HC ROOM AND CARE SEMIPRIVATE REHAB-BRAIN INJ

## 2025-06-10 PROCEDURE — 97110 THERAPEUTIC EXERCISES: CPT | Mod: GP

## 2025-06-10 PROCEDURE — 97116 GAIT TRAINING THERAPY: CPT | Mod: GP

## 2025-06-10 PROCEDURE — 63600000 HC DRUGS/DETAIL CODE: Mod: JZ | Performed by: INTERNAL MEDICINE

## 2025-06-10 PROCEDURE — 63700000 HC SELF-ADMINISTRABLE DRUG: Performed by: STUDENT IN AN ORGANIZED HEALTH CARE EDUCATION/TRAINING PROGRAM

## 2025-06-10 PROCEDURE — 63700000 HC SELF-ADMINISTRABLE DRUG: Performed by: PHYSICAL MEDICINE & REHABILITATION

## 2025-06-10 PROCEDURE — 97112 NEUROMUSCULAR REEDUCATION: CPT | Mod: GP

## 2025-06-10 PROCEDURE — 97535 SELF CARE MNGMENT TRAINING: CPT | Mod: GO

## 2025-06-10 PROCEDURE — 12800000 HC ROOM AND CARE SEMIPRIVATE REHAB

## 2025-06-10 RX ORDER — OXYCODONE HYDROCHLORIDE 5 MG/1
5 TABLET ORAL EVERY 4 HOURS PRN
Refills: 0 | Status: DISPENSED | OUTPATIENT
Start: 2025-06-10 | End: 2025-06-11

## 2025-06-10 RX ADMIN — METOPROLOL TARTRATE 50 MG: 50 TABLET, FILM COATED ORAL at 20:39

## 2025-06-10 RX ADMIN — FELODIPINE 10 MG: 5 TABLET, FILM COATED, EXTENDED RELEASE ORAL at 08:26

## 2025-06-10 RX ADMIN — TIMOLOL MALEATE 1 DROP: 5 SOLUTION/ DROPS OPHTHALMIC at 08:31

## 2025-06-10 RX ADMIN — ACETAMINOPHEN 650 MG: 325 TABLET ORAL at 17:20

## 2025-06-10 RX ADMIN — ACETAMINOPHEN 650 MG: 325 TABLET ORAL at 06:16

## 2025-06-10 RX ADMIN — LISINOPRIL 10 MG: 10 TABLET ORAL at 08:27

## 2025-06-10 RX ADMIN — TAMSULOSIN HYDROCHLORIDE 0.4 MG: 0.4 CAPSULE ORAL at 20:39

## 2025-06-10 RX ADMIN — Medication 25 MCG: at 08:27

## 2025-06-10 RX ADMIN — LOSARTAN POTASSIUM 100 MG: 100 TABLET, FILM COATED ORAL at 08:27

## 2025-06-10 RX ADMIN — FINASTERIDE 5 MG: 5 TABLET, FILM COATED ORAL at 08:27

## 2025-06-10 RX ADMIN — CYANOCOBALAMIN TAB 1000 MCG 1000 MCG: 1000 TAB at 08:27

## 2025-06-10 RX ADMIN — OXYCODONE HYDROCHLORIDE 5 MG: 5 TABLET ORAL at 08:27

## 2025-06-10 RX ADMIN — ENOXAPARIN SODIUM 40 MG: 40 INJECTION SUBCUTANEOUS at 17:21

## 2025-06-10 RX ADMIN — ATORVASTATIN CALCIUM 20 MG: 20 TABLET, FILM COATED ORAL at 08:27

## 2025-06-10 RX ADMIN — METFORMIN HYDROCHLORIDE 500 MG: 500 TABLET ORAL at 08:27

## 2025-06-10 RX ADMIN — OXYCODONE HYDROCHLORIDE 5 MG: 5 TABLET ORAL at 20:53

## 2025-06-10 RX ADMIN — ACETAMINOPHEN 650 MG: 325 TABLET ORAL at 12:12

## 2025-06-10 RX ADMIN — SENNOSIDES AND DOCUSATE SODIUM 2 TABLET: 50; 8.6 TABLET ORAL at 08:28

## 2025-06-10 RX ADMIN — PANTOPRAZOLE SODIUM 40 MG: 40 TABLET, DELAYED RELEASE ORAL at 08:27

## 2025-06-10 RX ADMIN — ACETAMINOPHEN 650 MG: 325 TABLET ORAL at 00:14

## 2025-06-10 RX ADMIN — METFORMIN HYDROCHLORIDE 500 MG: 500 TABLET ORAL at 17:20

## 2025-06-10 RX ADMIN — METOPROLOL TARTRATE 50 MG: 50 TABLET, FILM COATED ORAL at 08:27

## 2025-06-10 NOTE — PLAN OF CARE
Plan of Care Review  Plan of Care Reviewed With: patient  Progress: improving  Outcome Evaluation: Alert and oriented x4, forgtefulness noted. Continent of bowel and bladder, utilizes a [place] catheter at HS. Pain managed with scheduled Tylenol and PRN Oxycodone. Dressing CDI over neck incision. Aspen collar worn OOB. Accuchecks BID with no s/s of hyper/hypo-glycemia noted. Sleeping quietly in bed overnight. Fall and safety measures maintained.

## 2025-06-10 NOTE — PROGRESS NOTES
Subjective    Patient seen and examined on rounds.  Chart reviewed.  Events overnight noted.  History reviewed briefly with patient.    CC: Deficits in mobility, transfers, self-care status post fall, unstable 3 column C7 Chance fracture, status post C7 ORIF, removal of prior C3-6 hardware and C3-T1 PLIF by neurosurgery, history of gait instability and multiple falls, peripheral neuropathy, multiple medical problems.    HPI:  Mr. Aneesh Brito is a 75-year-old left handed white male with chronic conditions significant for hypertension, hyperlipidemia, diabetes mellitus type 2, BPH, glaucoma, colon cancer, gait instability, multiple falls in the home environment for which he was seeing a neurologist for workup of falls, history of prior bilateral hip replacements and bilateral knee replacements, spinal stenosis status post previous C3-C6 laminectomy and posterolateral instrumented fusion over 10 years ago by Dr. Maurer, presented to the ER at [facility] on 5/31/25 after suffering from a mechanical fall while at home and was unable to get up so he was on the floor all night until he was found.  At [facility] ER and he reported neck pain and pain in the lower cervical and upper thoracic region.  Imaging studies revealed C7 Chance fracture and findings of DISH (diffuse idiopathic skeletal hyperostosis ).  He was subsequently transferred to [facility] for neurosurgical evaluation where he was admitted on 5/31/25.  He was evaluated by Dr. Benjamin from neurosurgery, noted to have a 3 column Chance fracture at C7-T1 level, given highly unstable fracture was recommended surgical intervention by neurosurgery. He underwent C7 ORIF, removal of C3-6 hardware and C3-T1 PLIF by neurosurgery Dr. Raza Benjamin on 6/1/25.  Postoperatively is allowed weightbearing as tolerated on both lower extremities.  He has been given Aspen collar for use when out of bed in chair and when ambulating.  He can use soft cervical  collar in the bed.  I discussed spinal precautions with him.  Postoperative course was significant for anemia but he did not require transfusion.  His pain is managed with Tylenol and Oxycodone.  He had elevated blood pressure requiring IV Hydralazine but blood pressure improved after surgery and hypertension was managed with managed with Felodipine, Lisinopril, Losartan and Metoprolol.  He is continued on Lipitor for hyperlipidemia.  He has history of BPH and remains on Tamsulosin and Finasteride.  He was on Netarsudil-Latanoprost and Timoptic eye drops for glaucoma.  He had elevated blood sugars related to Decadron perioperatively and required insulin and subsequently blood sugars improved and he is managed with Metformin now. He had elevated CK of 1118 on admission due to rhabdomyolysis from being down on floor prior to admission. He was treated with IV fluids and CK improved.  I discussed his recent clinical course with patient, his wife and son who is a pediatric physician and his son indicated that patient was being evaluated by neurologist Dr. Hussein Hadier regarding his gait instability and falls, and lumbar spine MRI was done, EMG studies which showed peripheral neuropathy as well as muscle biopsy was being planned of the right quadriceps to evaluate  his proximal weakness in bilateral lower extremities.  He is unable to do active straight leg raise in bilateral lower extremities in bed, unable to localize position sense in his left great toe and I also discussed with patient, his wife and son at bedside.  I also mentioned to them that neurology will be conservative at him during his stay at [facility] and that he should follow up with Dr. Hussein Haider from neurology on outpatient basis for further evaluation and workup as appropriate.  He is on subcutaneous Heparin and SCDs for DVT prophylaxis.  He is able to tolerate regular with thins consistency diet.  On 6/4/25, hemoglobin was 11.3, WBC count was  "11.85, platelets were 236, BUN was 22, creatinine was 1.1, sodium was 134 and potassium was 3.3.  He has been needing assistance for mobility, transfers, self-care. He is transferred to [facility] on 6/4/25 for further rehabilitation care.     SUBJ: Discussed patients progress in therapies with therapists in team meeting today.  Discussed in PCC. See PCC documentation.  Continent of bowel and bladder for nursing.    ROS: Denies chest pain or shortness of breath. Other ROS negative. Past, family, social history is unchanged.    Current Functional Status:   Bed mobility:   [place], Supine to Sit: close supervision   [place], Sit to Supine: touching/steadying assist   Transfers:    [place], Sit to Stand Transfer: minimum assist (75% or more patient effort)  [place], Stand to Sit Transfer: minimum assist (75% or more patient effort)   [place], Stand Pivot/Stand Step Transfer: minimum assist (75% or more patient effort)   Gait:   [place], Gait: minimum assist (75% or more patient effort)  Assistive Device: walker, front-wheeled   Distance in Feet: 100 feet    Bathing:   [place]: moderate assist (50-74% patient effort)   Toileting:   [place]: moderate assist (50-74% patient effort)   Upper body dressing:   [place]: moderate assist (50-74% patient effort)   Lower body dressing:   [place]: moderate assist (50-74% patient effort)       Functional Progress:    Functional status reviewed. Overall, patient's functional status is improving.      Physical Exam      Blood pressure (!) 158/78, pulse 69, temperature 36.4 °C (97.6 °F), temperature source Oral, resp. rate 18, height 1.803 m (5' 11\"), weight 109 kg (241 lb), SpO2 97%.    Body mass index is 33.61 kg/m².    General Appearance: Not in acute distress  Head/Ear/Nose/Throat: Normocephalic; Atraumatic.   Eye: EOMI; PERRL.   Neck: Healing incision posterior cervicothoracic spine.  Dressing in place.  "   Respiratory: Decreased breath sounds at bases.   Cardiovascular: RRR; Normal S1, S2.   Gastrointestinal: Soft; NT; +BS.   Extremities: Bilateral lower extremity edema noted.    Musculoskeletal: Functional active range of motion in both upper extremities except some limitation in left shoulder and is unable to lift left arm up overhead.  Some limitation of active range of motion in both hips and both knees, which is chronic according to patient and his wife at bedside.  Patient is unable to do active straight leg raise and either lower extremity.  He has had previous bilateral hip and bilateral knee replacements.  His wife mentions patient was lifting his lower extremities manually with his arms while getting in the car and after he sat down in the seat inside the car manually left each leg to bring them in the car.   Neurological: AAO ×3. Speech is fluent. Cranial nerve examination does not reveal any gross facial asymmetry. Strength testing about 4/5 strength in both upper extremities except left shoulder is 2-/5 and abduction and forward flexion.  Bilateral hip flexion is 2-/5.  Bilateral quadriceps are 2+/5 with the thighs supported.  Bilateral ankle dorsi and plantar flexion is 3+/5.  He denies significant numbness in his legs and feet at this time.  He is grossly able to localize position sense in his right great toe but not so in his left great toe.  He is able to localize vibration sense in both great toes.  Deep tendon reflexes are hypoactive and symmetric bilaterally.  Plantars are flexor.  Coordination is functional upper extremities.  Both knee jerks were not tested.  Behavior/Emotional: Appropriate; Cooperative.   Skin: Healing incision posterior cervicothoracic spine.  Dressing in place.  Some abrasions noted on right upper extremity.  Left forearm has bruising ecchymosis noted.  Small wound noted on sacrum/coccyx at least stage II decubitus cannot rule out DTI.      Current Facility-Administered  Medications:     acetaminophen (TYLENOL) tablet 650 mg, 650 mg, oral, q6h KOFI, Chandler Falk MD, 650 mg at 06/10/25 1212    alum-mag hydroxide-simeth (MAALOX) 200-200-20 mg/5 mL suspension 30 mL, 30 mL, oral, q6h PRN, Chandler Falk MD    atorvastatin (LIPITOR) tablet 20 mg, 20 mg, oral, Daily, Robert Hawkins MD, 20 mg at 06/10/25 0827    bisacodyL (DULCOLAX) 10 mg suppository 10 mg, 10 mg, rectal, Daily PRN, Chandler Falk MD    cholecalciferol (vitamin D3) tablet 25 mcg, 25 mcg, oral, Daily, Chandler Falk MD, 25 mcg at 06/10/25 0827    cyanocobalamin (VITAMIN B12) tablet 1,000 mcg, 1,000 mcg, oral, Daily, Chandler Falk MD, 1,000 mcg at 06/10/25 0827    glucose chewable tablet 15-30 g of dextrose, 15-30 g of dextrose, oral, PRN **OR** dextrose 40 % oral gel 15-30 g of dextrose, 15-30 g of dextrose, oral, PRN **OR** glucagon (GLUCAGEN) injection 1 mg, 1 mg, intramuscular, PRN **OR** dextrose 50 % in water (D50) injection 12.5 g, 25 mL, intravenous, PRN, Chase Anna MD    enoxaparin (LOVENOX) syringe 40 mg, 40 mg, subcutaneous, Daily (6p), Robert Hawkins MD, 40 mg at 06/09/25 1728    felodipine (PLENDIL) 24 hr ER tablet 10 mg, 10 mg, oral, Daily, Chandler Falk MD, 10 mg at 06/10/25 0826    finasteride (PROSCAR) tablet 5 mg, 5 mg, oral, Daily, Robert Hawkins MD, 5 mg at 06/10/25 0827    hydrALAZINE (APRESOLINE) tablet 25 mg, 25 mg, oral, q6h PRN, Robert Hawkins MD, 25 mg at 06/08/25 1849    insulin lispro U-100 (HumaLOG) pen 2-5 Units, 2-5 Units, subcutaneous, BID AC, Chandler Falk MD    lisinopriL (PRINIVIL) tablet 10 mg, 10 mg, oral, Daily, Chandler Falk MD, 10 mg at 06/10/25 0827    losartan (COZAAR) tablet 100 mg, 100 mg, oral, Daily, Robert Hawkins MD, 100 mg at 06/10/25 0827    metFORMIN (GLUCOPHAGE) tablet 500 mg, 500 mg, oral, BID with breakfast and dinner, Robert Hawkins MD, 500 mg at 06/10/25 0827    metoprolol tartrate (LOPRESSOR) tablet 50 mg, 50 mg, oral,  BID, Chandler Falk MD, 50 mg at 06/10/25 0827    netarsudiL-latanoprost 0.02-0.005 % drops 1 drop, 1 drop, Right Eye, Nightly, Chandler Falk MD, 1 drop at 06/09/25 2016    oxyCODONE (ROXICODONE) immediate release tablet 5 mg, 5 mg, oral, q4h PRN, Vanessa Santizo, , 5 mg at 06/10/25 0827    pantoprazole (PROTONIX) tablet,delayed release (DR/EC) 40 mg, 40 mg, oral, Daily before breakfast, Chandler Falk MD, 40 mg at 06/10/25 0827    polyethylene glycol (MIRALAX) 17 gram packet 17 g, 17 g, oral, Daily, Robert Hawkins MD, 17 g at 06/05/25 0859    sennosides-docusate sodium (SENOKOT-S) 8.6-50 mg per tablet 2 tablet, 2 tablet, oral, BID, Chase Anna MD, 2 tablet at 06/10/25 0828    tamsulosin (FLOMAX) 24 hr ER capsule 0.4 mg, 0.4 mg, oral, Nightly, Robert Hawkins MD, 0.4 mg at 06/09/25 2016    timolol (TIMOPTIC) 0.5 % ophthalmic solution 1 drop, 1 drop, Both Eyes, Daily, Robert Hawkins MD, 1 drop at 06/10/25 0831       Labs / Radiology    Lab Results   Component Value Date    WBC 7.59 06/09/2025    HGB 11.0 (L) 06/09/2025    HCT 32.7 (L) 06/09/2025    MCV 93.2 06/09/2025     06/09/2025     Lab Results   Component Value Date    GLUCOSE 120 (H) 06/09/2025    CALCIUM 8.6 06/09/2025     (L) 06/09/2025    K 3.4 (L) 06/09/2025    CO2 26 06/09/2025     06/09/2025    BUN 22 06/09/2025    CREATININE 1.1 06/09/2025       Assessment and Plan    ASSESSMENT PLAN:  1. 75-year-old left handed white male with chronic conditions significant for hypertension, hyperlipidemia, diabetes mellitus type 2, BPH, glaucoma, colon cancer, gait instability, multiple falls in the home environment for which he was seeing a neurologist for workup of falls, history of prior bilateral hip replacements and bilateral knee replacements, spinal stenosis status post previous C3-C6 laminectomy and posterolateral instrumented fusion over 10 years ago by Dr. Maurer, presented to the ER at [facility] on 5/31/25  after suffering from a mechanical fall while at home and was unable to get up so he was on the floor all night until he was found.  At [facility] ER and he reported neck pain and pain in the lower cervical and upper thoracic region.  Imaging studies revealed C7 Chance fracture and findings of DISH (diffuse idiopathic skeletal hyperostosis ).  He was subsequently transferred to [facility] for neurosurgical evaluation where he was admitted on 5/31/25.  He was evaluated by Dr. Benjamin from neurosurgery, noted to have a 3 column Chance fracture at C7-T1 level, given highly unstable fracture was recommended surgical intervention by neurosurgery. He underwent C7 ORIF, removal of C3-6 hardware and C3-T1 PLIF by neurosurgery Dr. Raza Benjamin on 6/1/25.  Postoperatively is allowed weightbearing as tolerated on both lower extremities.  He has been given Aspen collar for use when out of bed in chair and when ambulating.  He can use soft cervical collar in the bed.  I discussed spinal precautions with him.  Postoperative course was significant for anemia but he did not require transfusion.  His pain is managed with Tylenol and Oxycodone.  He had elevated blood pressure requiring IV Hydralazine but blood pressure improved after surgery and hypertension was managed with managed with Felodipine, Lisinopril, Losartan and Metoprolol.  He is continued on Lipitor for hyperlipidemia.  He has history of BPH and remains on Tamsulosin and Finasteride.  He was on Netarsudil-Latanoprost and Timoptic eye drops for glaucoma.  He had elevated blood sugars related to Decadron perioperatively and required insulin and subsequently blood sugars improved and he is managed with Metformin now. He had elevated CK of 1118 on admission due to rhabdomyolysis from being down on floor prior to admission. He was treated with IV fluids and CK improved.  I discussed his recent clinical course with patient, his wife and son who is a pediatric  physician and his son indicated that patient was being evaluated by neurologist Dr. Hussein Haider regarding his gait instability and falls, and lumbar spine MRI was done, EMG studies which showed peripheral neuropathy as well as muscle biopsy was being planned of the right quadriceps to evaluate  his proximal weakness in bilateral lower extremities.  He is unable to do active straight leg raise in bilateral lower extremities in bed, unable to localize position sense in his left great toe and I also discussed with patient, his wife and son at bedside.  I also mentioned to them that neurology will be conservative at him during his stay at [facility] and that he should follow up with Dr. Hussein Haider from neurology on outpatient basis for further evaluation and workup as appropriate.  He is on subcutaneous Heparin and SCDs for DVT prophylaxis.  He is able to tolerate regular with thins consistency diet.  On 6/4/25, hemoglobin was 11.3, WBC count was 11.85, platelets were 236, BUN was 22, creatinine was 1.1, sodium was 134 and potassium was 3.3.  He has been needing assistance for mobility, transfers, self-care. He is transferred to [facility] on 6/4/25 for further rehabilitation care.     2. DVT prophylaxis - on subcutaneous Lovenox.  On SCDs.  Platelets 264 on 6/5/2025.  Platelets 307 on 6/9/2025.     3. Neurosurgery - status post fall, unstable 3 column C7 Chance fracture, status post C7 ORIF, removal of prior C3-6 hardware and C3-T1 PLIF by neurosurgery, history of gait instability and multiple falls, peripheral neuropathy, class I obesity, multiple medical problems - continue PT, OT, psychology.  Follow falls precautions, cardiac precautions, aspiration precautions, spinal precautions.  Aspen collar to neck when out of bed.  Monitor healing of posterior cervical incision.  Neurology was consulted.     4. GI - On Protonix for GI prophylaxis. On Senokot-S.  On MiraLAX.  On PRN Dulcolax suppository.  On  PRN Maalox.     5.  -on Proscar.  On Flomax.     6. CVS - on Plendil.  On Lisinopril.  On Cozaar.  On Lopressor.  On PRN Hydralazine.     7. Pulmonary - encourage incentive spirometry.     8. Hematology - monitor hemoglobin, platelets.  Hemoglobin 12.2, WBC 10.71 on 6/5/2025.  Hemoglobin 11.0, WBC 7.59 on 6/9/2025.     9. Pain -on Tylenol.  On PRN Oxycodone.     10. Skin - Healing incision posterior cervicothoracic spine.  Dressing in place.  Some abrasions noted on right upper extremity.  Left forearm has bruising ecchymosis noted.  Small wound noted on sacrum/coccyx at least stage II decubitus cannot rule out DTI.     11. F/E/N - on vitamin B-12.  BMI 33.63 consistent with class I obesity.  Nutrition was consulted.     12. Diabetes mellitus type 2  - on sliding scale Humalog coverage.  On Glucophage.  On hypoglycemic protocol.     13. Ophthalmology- He was on NetarsudiL-latanoprost and Timoptic eye drops for glaucoma     14. Hyperlipidemia - on Lipitor.     15. Rehabilitation medicine - Continue comprehensive rehabilitation care. Continue PT, OT, psychology.  Follow falls precautions, cardiac precautions, aspiration precautions, spinal precautions.  Aspen collar to neck when out of bed.  Monitor healing of posterior cervical incision.  Neurology was consulted.Tolerating therapies per endurance on 6/6/2025.  Slept well last night.  Noted to have elevated PVR.  Requiring moderate assistance for sit to stand transfer with physical therapy.  Able to ambulate 20 feet with front wheeled walker dependent gait with physical therapy on 6/6/2025. Discussed with nurse on 6/6/2025. Working on ADLs with occupational therapy on 6/7/2025.  Dependent for upper and lower body dressing, requiring minimal assistance for toileting, moderate assistance for bathing with occupational therapy on 6/7/2025.  Discussed with nurse regarding his PVRs on 6/7/2025.  Using [place] catheter at nighttime.  Discussed with patient on  6/7/2025.Discussed with patient and with his wife with him on 6/9/2025.  They feel he is making progress in therapy.  Requiring minimal assistance for sit to stand transfer with physical therapy.  Able to ambulate 100 feet with front wheeled walker with minimal assistance with physical therapy.  Reviewed labs on 6/9/2025. Discussed patients progress in therapies with therapists in team meeting on 6/10/2025. Discussed in PCC. See PCC documentation.  Continent of bowel and bladder for nursing on 6/10/2025.     16. Reviewed labs today.  BUN 20, creatinine 0.9, sodium 135, potassium 3.7 on 6/5/2025.  BUN 22, creatinine 1.1, sodium 132, potassium 3.4 on 6/9/2025.          Chase Anna MD  6/10/2025      This encounter was completed utilizing the Direct Speech Voice Recognition Software. Grammatical errors, random word insertions, pronoun errors, and incomplete sentences are occasional consequences of the system due to software limitations, ambient noises, and hardware issues. Such errors may be missed prior to affixing electronic signature. Any questions or concerns about the content, text, or information contained within the body of this dictation should be directly addressed to the physician for clarification. If you have any questions or concerns please do not hesitate to call me directly via EPIC chat, page, or email.

## 2025-06-10 NOTE — PATIENT CARE CONFERENCE
Inpatient Rehabilitation Team Conference Note   Date: 6/10/2025  Time: 12:01 PM       Patient Name: Aneesh Brito     Medical Record Number: [id number]   YOB: 1950  Sex: Male      Room/Bed: 110/110D  Payor Info: Payor: MEDICARE / Plan: MEDICARE PART A & B / Product Type: Medicare /     Admitting Diagnosis: Falls, sequela [W19.XXXS]  Fusion of spine of cervicothoracic region [M43.23]   Admit Date/Time: 6/4/2025  6:07 PM    Principal Problem: Fusion of spine of cervicothoracic region    Patient Active Problem List    Diagnosis Date Noted    Closed displaced fracture of seventh cervical vertebra (CMS/formerly [facility]) 06/09/2025    Fusion of spine of cervicothoracic region 06/06/2025    Multiple falls 06/06/2025    Falls, sequela 06/04/2025    Closed C7 fracture without spinal cord injury, with routine healing, subsequent encounter 06/01/2025    Traumatic closed fracture of C7 vertebra with minimal displacement, initial encounter (CMS/formerly [facility]) 05/31/2025    Type 2 diabetes mellitus without complications (CMS/formerly [facility]) 05/19/2025    Lumbar degenerative disc disease 04/25/2024    Generalized weakness 06/19/2023    Aneurysm of iliac artery (CMS/formerly [facility]) 02/16/2022    Glomerulosclerosis 02/16/2022    Hyponatremia 01/11/2022    Pure hypercholesterolemia 04/28/2021    Tear film insufficiency 01/20/2021    Dry eye syndrome of bilateral lacrimal glands 09/25/2019    Primary open-angle glaucoma, bilateral, indeterminate stage 09/25/2019    Bilateral pseudophakia 09/25/2019    Chronic kidney disease, stage I 03/20/2019    Diverticulosis of colon 09/10/2014    Enlarged prostate with lower urinary tract symptoms (LUTS) 09/10/2014    Hypertension, benign 09/10/2014    Malignant melanoma of skin (CMS/formerly [facility]) 09/10/2014    Osteoarthritis of knee 09/10/2014    Overweight 09/10/2014    Spinal stenosis of lumbar region 09/10/2014       Premorbid Status:  Prior Level of Function      Flowsheet Row Most Recent Value   Dominant Hand left  "  Ambulation assistive equipment  [rollator]   Transferring assistive equipment  [rollator]   Toileting independent   Bathing independent   Dressing independent   Eating independent   IADLs independent   Driving/Transportation    Communication understands/communicates without difficulty   Prior Level of Function Comment Pt reports being independent with use of SPC inside and Rollator to the dining jenkins. Independent ADLs. Denies other falls. Independent ADLs. (+) driving. Retired teacher   Assistive Device Currently Used at Home cane, straight, walker, 4-wheeled, shower chair       Current Functional Status:  Mobility  Gait  [place], Gait: minimum assist (75% or more patient effort)  Assistive Device: walker, front-wheeled  Distance in Feet: 100 feet  Comment: 100ft x 1 w RW and min A for balance due to dec heel strike, general unsteadiness.    Stairs  [place], Stairs: minimum assist (75% or more patient effort)  Assistive Device: railing  Handrail Location (Stairs): both sides  Number of Stairs: 4  Stair Height: 6 inches  Comment: up/down 4(6\") stairs w min A for balance due to general unsteadiness, VCs for sequencing and technique.    Wheelchair  Method of Locomotion: bimanual (upper extremity) propulsion  [place], Forward Propulsion: dependent (less than 25% patient effort)  [place], Steering Activities: dependent (less than 25% patient effort)  [place], Turning Activities: dependent (less than 25% patient effort)  Comment, Wheelchair Mobility: Pt. recieved in ill fitting 18 x 18 manual W/C. Transitioned to recliner W/C c increased width. Improved fit noted & pt. reporting increased comfort    Transfers  Bed Mobility  Bed Mobility Activities: supine to sit; left  [place], Roll Left: minimum assist (75% or more patient effort)  [place], Roll Right: minimum assist (75% or more patient effort)  [place], Supine to Sit: moderate assist (50-74% patient " effort)  [place], Sit to Supine: moderate assist (50-74% patient effort)  Safety/Cues: maintaining precautions; technique; increased time to complete  Assistive Device: head of bed elevated; bed rails  Comment: OT: Min A supine to sit for trunk advancement    Bed to Chair Transfer  [place], Bed to Chair: moderate assist (50-74% patient effort)  Safety/Cues: increased time to complete; technique; proper use of assistive device  Assistive Device: walker, front-wheeled  Comment: EOB>w/c with RW and Mod A at pelvis for WS, balance and RW management.    Sit to Stand Transfer  [place], Sit to Stand Transfer: minimum assist (75% or more patient effort)  Safety/Cues: increased time to complete  Assistive Device: walker, front-wheeled  Comment: from EOB and w/c    Stand to Sit Transfer  [place], Stand to Sit Transfer: minimum assist (75% or more patient effort)  Safety/Cues: increased time to complete; verbal cues; hand placement  Assistive Device: walker, front-wheeled  Comment: to w/c    Stand Pivot Transfer  [place], Stand Pivot/Stand Step Transfer: minimum assist (75% or more patient effort)  Safety/Cues: increased time to complete  Assistive Device: walker, front-wheeled  Comment: via amb approach c RW to w/c    Car Transfer  Transfer Technique: stand pivot  [place]: moderate assist (50-74% patient effort)  Safety/Cues: sequencing; technique; preparatory posture; hand placement; initiation; increased time to complete; moderate; verbal cues  Assistive Device: walker, front-wheeled  Comment: Mod A x 1 SPT, wc <> mock car passanger side in therapy gym with RW. Pt. requires increased VC's for sequencing, technique, increased time to complete, controlled decent and BLE management. Demo reviewed prior to transfer, Pt. requires increased practice for handplacement, controlled decent, and sequencing.      Toilet Transfer  Transfer Technique: sit/stand  [place]: minimum assist (75% or more  patient effort)  Safety/Cues: increased time to complete; verbal cues; hand placement  Assistive Device: accessible height toilet; grab bars/safety frame; walker, front-wheeled  Comment: via amb approach c RW to toilet, use of B/L grab bars for descent/ascent    Shower Transfer  Transfer Technique: stand pivot  [place]: minimum assist (75% or more patient effort)  Safety/Cues: verbal cues; technique; proper use of assistive device; hand placement  Assistive Device: grab bars/tub rail; tub bench; walker, front-wheeled  Comment: via amb approach c RW to tub bench in barrier free shower      Self Care  Bathing  [place]: moderate assist (50-74% patient effort)  Setup Assistance: adaptive equipment setup  Adaptive Equipment: grab bar/tub rail; hand-held shower spray hose; tub bench  Comment: Completed full wet shower seated on tub bench. Lateral leaning to wash buttocks +A for thoroughness, also required A for washing/drying lower legs/feet.    Upper Body Dressing  Tasks: don; pull over garment  [place]: moderate assist (50-74% patient effort)  Adaptive Equipment: none  Comment: Performed amb level clothing retrieval from drawers using RW for balance +min A. Able to don pullover shirt without A. Total A to doff/don C-collar    Lower Body Dressing  Tasks: don; underwear; pants/bottoms; socks  [place]: moderate assist (50-74% patient effort)  Comment: Performed amb level clothing retrieval from drawers using RW for balance +min A. Required A for threading    Grooming  Tasks: washes, rinses and dries face; washes, rinses and dries hands; oral care (brushing teeth, cleaning dentures); brushes/shields hair  [place]: close supervision  Adaptive Equipment: none  Comment: Pt washed face/hands in context of wet shower. STanding sink side to perform oral care requiring steadying A for balance    Toileting  Tasks: adjust/manage clothing; perform bladder hygiene; perform bowel hygiene  [place]: moderate  assist (50-74% patient effort)  Safety/Cues: increased time to complete; compensatory techniques; energy conservation  Setup Assistance: adaptive equipment setup  Adaptive Equipment: grab bar/safety frame; accessible height toilet  Comment: Pt continent/voided bladder standing facing toilet with min A. Later in session, pt voided bowels seated on toilet, A for clothing management and thoroughness of posterior pericare following pt initiation    Self-Feeding  Tasks: liquids to mouth  [place]: supervision; set up      Cognition  Cognition  Orientation Status: oriented x 4  Affect/Mental Status: WFL  Follows Commands: WFL  Cognitive Function: WFL  Comment, Cognition: Increased processing time, cues for attention to task at hand at times, difficulty c problem solving/reasoning at times      Communication       Frequency of Treatment (OT): 5-7 times per week  Intensity of Treatment (OT): 60-90 minutes per day  Frequency of Treatment (PT): 5-7 times per week  Intensity of Treatment (PT): 60-90 minutes per day          Weekly Outcome Summaries:  Weekly Progress Summary (PT)  Progress Toward Functional Goals (PT): progressing toward functional goals as expected  Weekly Outcome Statement: Pt is grossly min/mod A for all mobility. Pt limited by decreased strength/endurance, balance impairments, proprioceptive/sensory deficits, coordination deficits, spinal precautions, and pain. He will need DME ordered prior to discharge. Pt would benefit from family trainnig. TIMMY 2 weeks.  Barriers to Overall Progress (PT): Pt limited by decreased strength/endurance, balance impairments, proprioceptive/sensory deficits, coordination deficits, spinal precautions, and pain.    Weekly Progress Summary (OT)  Progress Toward Functional Goals (OT): progressing toward functional goals as expected  Weekly Outcome Statement: Pt is a 75y.o. M who presented to ED following mechanical fall, s/p ORIF C7 Chance fracture with removal and replacement  of the C3-6 posterior instrumentation, C3-T3 posterior lateral fusion. Pt currently requires min A for amb level transfer c RW, mod A for ADLs. Pt's occupational performance is affected by balance, strength, RLE weakness, standing tolerance, spinal precautions, and pain. Recommend family training. Anticipate will require shower chair and possibly toilet safety frame at d/c.  Impairments Continuing to Limit Function: decreased ROM; decreased strength; impaired balance; pain  Recommendations:  dressing equipment training, schedule family training        Problem Resolution:  Comment, Barriers to Discharge: frequency w urination, forgetful, decreased strength, endurance, pain, spinal precautions, DVT prophlaxis  Comment, Facilitators for Discharge: family training, home health, shower chair, toilet safety frame RW use, [place] cath at night         Expected Level of Function  Self-Care: Independent  Sphincter Control: Independent  Transfers: Independent  Locomotion: Supervision or touching assistance  Communication: Independent  Social Cognition: Independent      Goals/Support System:  Patient/Family Goals  Patient's Goals For Discharge: take care of myself at home  Family Goals For Discharge: other (see comments) (family not present for IE)    IRF PT Goals      Flowsheet Row Most Recent Value   Bed Mobility Goal 1    Activity/Assistive Device rolling to left, rolling to right, sit to supine/supine to sit at 06/05/2025 0902   [place] minimum assist (75% or more patient effort) at 06/05/2025 0902   Time Frame short-term goal (STG), 1 week at 06/05/2025 0902   Progress/Outcome goal ongoing, goal revised this date  [will continue core strengthening/bed mobility training] at 06/10/2025 0850   Bed Mobility Goal 2    Activity/Assistive Device rolling to left, rolling to right, sit to supine/supine to sit at 06/05/2025 0902   [place] modified independence at 06/05/2025 0902   Time Frame long-term goal (LTG), 3 weeks  at 06/05/2025 0902   Progress/Outcome goal ongoing at 06/10/2025 0850   Transfer Goal 1    Activity/Assistive Device sit-to-stand/stand-to-sit, stand pivot at 06/05/2025 0902   [place] tactile cues required  [steadying] at 06/10/2025 0850   Time Frame short-term goal (STG), 1 week at 06/10/2025 0850   Progress/Outcome goal met, goal revised this date at 06/10/2025 0850   Transfer Goal 2    Activity/Assistive Device sit-to-stand/stand-to-sit, stand pivot at 06/05/2025 0902   [place] modified independence at 06/05/2025 0902   Time Frame long-term goal (LTG), 3 weeks at 06/05/2025 0902   Progress/Outcome goal ongoing at 06/10/2025 0850   Gait/Walking Locomotion Goal 1    Activity/Assistive Device gait (walking locomotion) at 06/05/2025 0902   Distance 50 feet at 06/05/2025 0902   [place] tactile cues required at 06/10/2025 0850   Time Frame short-term goal (STG), 1 week at 06/10/2025 0850   Progress/Outcome goal met, goal revised this date at 06/10/2025 0850   Gait/Walking Locomotion Goal 2    Activity/Assistive Device gait (walking locomotion) at 06/05/2025 0902   Distance 150 feet at 06/05/2025 0902   [place] supervision required at 06/05/2025 0902   Time Frame long-term goal (LTG), 3 weeks at 06/05/2025 0902   Progress/Outcome goal ongoing at 06/10/2025 0850   Stairs Goal 1    Activity/Assistive Device stairs, all skills at 06/10/2025 0850   Number of Stairs 8 at 06/10/2025 0850   [place] minimum assist (75% or more patient effort) at 06/10/2025 0850   Time Frame short-term goal (STG), 1 week at 06/10/2025 0850   Stairs Goal 2    Activity/Assistive Device stairs, all skills at 06/10/2025 0850   Number of Stairs 12 at 06/10/2025 0850   [place] supervision required at 06/10/2025 0850   Time Frame long-term goal (LTG), 2 weeks at 06/10/2025 0850     IRF OT Goals      Flowsheet Row Most Recent Value   Transfer Goal 1    Activity/Assistive Device toiletmack, 3-in-1 at 06/05/2025 1036    [place] supervision required at 06/10/2025 0830   Time Frame short-term goal (STG), 5 - 7 days at 06/05/2025 1033   Progress/Outcome goal met, goal revised this date at 06/10/2025 0830   Transfer Goal 2    Activity/Assistive Device toilet, commode, 3-in-1 at 06/05/2025 1033   [place] modified independence at 06/05/2025 1033   Time Frame long-term goal (LTG), 21 days or less at 06/05/2025 1033   Transfer Goal 3    Activity/Assistive Device shower, tub bench at 06/05/2025 1033   [place] supervision required at 06/10/2025 0830   Time Frame short-term goal (STG), 5 - 7 days at 06/05/2025 1033   Progress/Outcome goal met, goal revised this date at 06/10/2025 0830   Transfer Goal 4    Activity/Assistive Device shower, tub bench at 06/05/2025 1033   [place] modified independence at 06/05/2025 1033   Time Frame long-term goal (LTG), 21 days or less at 06/05/2025 1033   Bathing Goal 1    Activity/Assistive Device bathing skills, all at 06/05/2025 1033   [place] minimum assist (75% or more patient effort) at 06/10/2025 0830   Time Frame short-term goal (STG), 5 - 7 days at 06/05/2025 1033   Strategies/Barriers spinal precautions inhibiting forward reach towards BLE, plan to introduce LHAE at 06/10/2025 0830   Progress/Outcome goal ongoing at 06/10/2025 0830   Bathing Goal 2    Activity/Assistive Device bathing skills, all at 06/05/2025 1033   [place] modified independence at 06/05/2025 1033   Time Frame long-term goal (LTG), 21 days or less at 06/05/2025 1033   UB Dressing Goal 1    Activity/Assistive Device upper body dressing at 06/05/2025 1033   [place] supervision required at 06/10/2025 0830   Time Frame short-term goal (STG), 5 - 7 days at 06/05/2025 1033   Progress/Outcome goal met, goal revised this date at 06/10/2025 0830   UB Dressing Goal 2    Activity/Assistive Device upper body dressing at 06/05/2025 1033   [place] modified independence at 06/05/2025 1033   Time Frame  long-term goal (LTG), 21 days or less at 06/05/2025 1033   LB Dressing Goal 1    Activity/Assistive Device lower body dressing at 06/05/2025 1033   [place] minimum assist (75% or more patient effort) at 06/10/2025 0830   Time Frame short-term goal (STG), 5 - 7 days at 06/05/2025 1033   Progress/Outcome goal met, goal revised this date at 06/10/2025 0830   LB Dressing Goal 2    Activity/Assistive Device lower body dressing at 06/05/2025 1033   [place] modified independence at 06/05/2025 1033   Time Frame long-term goal (LTG), 21 days or less at 06/05/2025 1033   Grooming Goal 1    Activity/Assistive Device grooming skills, all at 06/05/2025 1033   [place] set-up required at 06/10/2025 0830   Time Frame short-term goal (STG), 5 - 7 days at 06/05/2025 1033   Strategies/Barriers standing tolerance/balance, still requiring A in stance at 06/10/2025 0830   Progress/Outcome goal ongoing at 06/10/2025 0830   Grooming Goal 2    Activity/Assistive Device grooming skills, all at 06/05/2025 1033   [place] modified independence at 06/05/2025 1033   Time Frame long-term goal (LTG), 21 days or less at 06/05/2025 1033   Toileting Goal 1    Activity/Assistive Device toileting skills, all at 06/05/2025 1033   [place] minimum assist (75% or more patient effort) at 06/10/2025 0830   Time Frame short-term goal (STG), 5 - 7 days at 06/05/2025 1033   Progress/Outcome goal met, goal revised this date at 06/10/2025 0830   Toileting Goal 2    Activity/Assistive Device toileting skills, all at 06/05/2025 1033   [place] modified independence at 06/05/2025 1033   Time Frame long-term goal (LTG), 21 days or less at 06/05/2025 1033       Discharge Planning:  Anticipated Discharge Disposition: home with home health  Type of Home Care Services: home PT;home OT;nursing  Equipment/Device Needs at Discharge  Assistive Device/Animal Currently Used at Home: cane, straight, walker, 4-wheeled, shower chair  Discharge  Planning  Type of Current Home Care Services: none  Does the patient need discharge transport arranged?: No    Anticipated Discharge Date: 6/20/2025    Needs Identified:       Team Members Present:     Rehab Attending Present:  Chase Anna MD    Care Coordinator Present:  Leti Styles LSW    Nurse Present:  Taylor Peña RN    OT Present:  Teodora Martinez OT    PT Present:  Rebekah Crawley PT    Psychologist Present:  Maryana Patterson, PhD        Next Team Conference Date: 06/17/25

## 2025-06-10 NOTE — PROGRESS NOTES
Patient: Aneesh Brito  Location: [facility]  MRN: [id number]  Today's date: 6/10/2025    History of Present Illness    Aneesh is a 75 y.o. male with a history of colon carcinoma, BPH, hypertension, glaucoma, type 2 diabetes, history of a cervical fusion over 10 years ago admitted after a fall inability to get up on his own.  [facility] which demonstrated DISH as well as a Chance fracture at the bottom of C7.  He was transferred to [facility] for neurosurgery.    He was taken to the OR on 6/1 by Dr. Benjamin and underwent an ORIF of the C7 Chance fracture with removal and replacement of the C3-6 posterior instrumentation as well as a C3-T3 posterior lateral fusion.  Drains remain in place.  He is on subcu heparin for DVT prophylaxis.  Currently on a nicardipine drip for blood pressure control.  He states pain is well-controlled.  He denies any other complaints of headache or dizziness, chest pain or shortness of breath.  He is voiding on his own.  He has not had a bowel movement yet.    He was seen by PT and OT needing moderate assistance of 2 to transfer sit to stand.  He ambulated 2 feet with moderate assistance of 2 using a rolling walker.  Pertinent radiology results reviewed. Pertinent lab results reviewed.      Past Medical History  Aneesh has a past medical history of C7 cervical fracture (CMS/[facility]) (05/31/2025), Colonic adenoma, Diverticulosis of colon, Enlarged prostate with lower urinary tract symptoms (LUTS), Glaucoma, Hypertension, Melanoma of skin (CMS/[facility]), Osteoarthritis of knee, Overweight, Spinal stenosis of lumbar region, and Type 2 diabetes mellitus (CMS/[facility]).       06/10/25 0657 06/10/25 0750   Pain/Comfort/Sleep   Pain Charting Type Pain Assessment Pain Reassessment   Preferred Pain Scale number (Numeric Rating Pain Scale) number (Numeric Rating Pain Scale)   (0-10) Pain Rating: Rest 2 3   (0-10) Pain  Rating: Activity 2 3   Pain Body Location neck neck   Vitals   BP (!) 172/77  --    BP Location Left upper arm  --    BP Method Automatic  --    Patient Position Lying  --                 Prior Living Environment      Flowsheet Row Most Recent Value   People in Home spouse   Current Living Arrangements independent living facility   Home Accessibility wheelchair accessible   Living Environment Comment PTA residing c wife in [facility] ([facility] 1 floor set up. Shower stall c shower chair & grab bars. Reports no DME at toilet - information to be verified c pt. wife   Number of Stairs, Main Entrance 0   Patient Bedroom Access Comment PTA residing c wife in [facility] ([facility] 1 floor set up. Shower stall c shower chair & grab bars. Reports no DME at toilet - information to be verified c pt. wife   Bathroom Access Comment PTA residing c wife in [facility] ([facility]). 1 floor set up. Shower stall c shower chair & grab bars. Reports no DME at toilet - information to be verified c pt. wife   Number of Stairs, Within Home, Primary 0       Prior Level of Function      Flowsheet Row Most Recent Value   Dominant Hand left   Ambulation assistive equipment  [rollator]   Transferring assistive equipment  [rollator]   Toileting independent   Bathing independent   Dressing independent   Eating independent   IADLs independent   Driving/Transportation    Communication understands/communicates without difficulty   Prior Level of Function Comment Pt reports being independent with use of SPC inside and Rollator to the dining jenkins. Independent ADLs. Denies other falls. Independent ADLs. (+) driving. Retired teacher   Assistive Device Currently Used at Home cane, straight, walker, 4-wheeled, shower chair       Occupational Profile      Flowsheet Row Most Recent Value   Successful Occupations Enjoys participating in the activities at [employer] - shuffle board and corn hole   Occupational History/Life Experiences  "PTA independent c ADLs. 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   Patient Goals \"Walk\"               06/10/25 0656   OT Time Calculation   Start Time 0651   Stop Time 0751   Time Calculation (min) 60 min   Session Details   Document Type Daily Treatment/Progress Note   General Information   General Observations of Patient Pt received lying in bed, agreeable to therapy   Mobility Belt   Mobility Belt Used During Session yes   Orthosis Neck Cervical Collar   Date Obtained/Time Obtained: 06/01/25 0000   Location: Neck  Type: Cervical Collar  Features: Hard  Description: Apen collar OOB don seated; [place] collar for shower  Therapeutic Indications: stabilization and support  Wearing Schedule: wear whe...   Skin Assessment intact   Bed Mobility   Comment OT: Min A supine to sit for trunk advancement   Sit to Stand Transfer   [place], Sit to Stand Transfer minimum assist (75% or more patient effort)   Safety/Cues increased time to complete   Assistive Device walker, front-wheeled   Comment from EOB and w/c   Stand to Sit Transfer   [place], Stand to Sit Transfer minimum assist (75% or more patient effort)   Safety/Cues increased time to complete;verbal cues;hand placement   Assistive Device walker, front-wheeled   Comment to w/c   Stand Pivot Transfer   [place], Stand Pivot/Stand Step Transfer minimum assist (75% or more patient effort)   Safety/Cues increased time to complete   Assistive Device walker, front-wheeled   Comment via amb approach c RW to w/c   Toilet Transfer   [place] minimum assist (75% or more patient effort)   Safety/Cues increased time to complete;verbal cues;hand placement   Assistive Device accessible height toilet;grab bars/safety frame;walker, front-wheeled   Comment via amb approach c RW to toilet, use of B/L grab bars for descent/ascent   Shower Transfer   [place] " minimum assist (75% or more patient effort)   Safety/Cues verbal cues;technique;proper use of assistive device;hand placement   Assistive Device grab bars/tub rail;tub bench;walker, front-wheeled   Comment via amb approach c RW to tub bench in barrier free shower   Impairments/Safety Issues   Comment, Safety Issues/Impairments OT: Amb short HHD c RW within pt's room/bathroom grossly steadying A   Balance   Comment, Balance OT: Min A in unsupported stance for balance during clothing management, urinating standing facing toilet, steadying A standing sink side   Bathing   Shower Provided? Yes   [place] moderate assist (50-74% patient effort)   Position supported sitting   Setup Assistance adaptive equipment setup   Adaptive Equipment grab bar/tub rail;hand-held shower spray hose;tub bench   Comment Completed full wet shower seated on tub bench. Lateral leaning to wash buttocks +A for thoroughness, also required A for washing/drying lower legs/feet.   Upper Body Dressing   Tasks don;pull over garment   [place] moderate assist (50-74% patient effort)   Position edge of bed sitting   Adaptive Equipment none   Comment Performed amb level clothing retrieval from drawers using RW for balance +min A. Able to don pullover shirt without A. Total A to doff/don C-collar   Lower Body Dressing   Tasks don;underwear;pants/bottoms;socks   [place] moderate assist (50-74% patient effort)   Position edge of bed sitting;supported standing   [place], Footwear dependent (less than 25% patient effort)   Comment Performed amb level clothing retrieval from drawers using RW for balance +min A. Required A for threading   Grooming   Tasks washes, rinses and dries face;washes, rinses and dries hands;oral care (brushing teeth, cleaning dentures);brushes/shields hair   [place] close supervision   Position supported sitting;supported standing;sink side   Adaptive Equipment none   [place], Oral Hygiene touching/steadying  assist   Comment Pt washed face/hands in context of wet shower. STanding sink side to perform oral care requiring steadying A for balance   Toileting   Tasks adjust/manage clothing;perform bladder hygiene;perform bowel hygiene   [place] moderate assist (50-74% patient effort)   Position supported sitting;supported standing   Adaptive Equipment grab bar/safety frame;accessible height toilet   Comment Pt continent/voided bladder standing facing toilet with min A. Later in session, pt voided bowels seated on toilet, A for clothing management and thoroughness of posterior pericare following pt initiation   Daily Progress Summary (OT)   Daily Outcome Statement OT session focused on full ADL including wet shower. Pt is progressing well towards STG, continues to demonstrate balance and strength deficits impacting performance. Plan to introduce LH dressing/bathing equipment to facilitate ind. Continue OT POC.              IRF OT Goals      Flowsheet Row Most Recent Value   Transfer Goal 1    Activity/Assistive Device toilet, commode, 3-in-1 at 06/05/2025 1033   [place] supervision required at 06/10/2025 0830   Time Frame short-term goal (STG), 5 - 7 days at 06/05/2025 1033   Progress/Outcome goal met, goal revised this date at 06/10/2025 0830   Transfer Goal 2    Activity/Assistive Device toilet, commode, 3-in-1 at 06/05/2025 1033   [place] modified independence at 06/05/2025 1033   Time Frame long-term goal (LTG), 21 days or less at 06/05/2025 1033   Transfer Goal 3    Activity/Assistive Device shower, tub bench at 06/05/2025 1033   [place] supervision required at 06/10/2025 0830   Time Frame short-term goal (STG), 5 - 7 days at 06/05/2025 1033   Progress/Outcome goal met, goal revised this date at 06/10/2025 0830   Transfer Goal 4    Activity/Assistive Device shower, tub bench at 06/05/2025 1033   [place] modified independence at 06/05/2025 1033   Time Frame long-term goal (LTG), 21 days or less  at 06/05/2025 1033   Bathing Goal 1    Activity/Assistive Device bathing skills, all at 06/05/2025 1033   [place] minimum assist (75% or more patient effort) at 06/10/2025 0830   Time Frame short-term goal (STG), 5 - 7 days at 06/05/2025 1033   Strategies/Barriers spinal precautions inhibiting forward reach towards BLE, plan to introduce LHAE at 06/10/2025 0830   Progress/Outcome goal ongoing at 06/10/2025 0830   Bathing Goal 2    Activity/Assistive Device bathing skills, all at 06/05/2025 1033   [place] modified independence at 06/05/2025 1033   Time Frame long-term goal (LTG), 21 days or less at 06/05/2025 1033   UB Dressing Goal 1    Activity/Assistive Device upper body dressing at 06/05/2025 1033   [place] supervision required at 06/10/2025 0830   Time Frame short-term goal (STG), 5 - 7 days at 06/05/2025 1033   Progress/Outcome goal met, goal revised this date at 06/10/2025 0830   UB Dressing Goal 2    Activity/Assistive Device upper body dressing at 06/05/2025 1033   [place] modified independence at 06/05/2025 1033   Time Frame long-term goal (LTG), 21 days or less at 06/05/2025 1033   LB Dressing Goal 1    Activity/Assistive Device lower body dressing at 06/05/2025 1033   [place] minimum assist (75% or more patient effort) at 06/10/2025 0830   Time Frame short-term goal (STG), 5 - 7 days at 06/05/2025 1033   Progress/Outcome goal met, goal revised this date at 06/10/2025 0830   LB Dressing Goal 2    Activity/Assistive Device lower body dressing at 06/05/2025 1033   [place] modified independence at 06/05/2025 1033   Time Frame long-term goal (LTG), 21 days or less at 06/05/2025 1033   Grooming Goal 1    Activity/Assistive Device grooming skills, all at 06/05/2025 1033   [place] set-up required at 06/10/2025 0830   Time Frame short-term goal (STG), 5 - 7 days at 06/05/2025 1033   Strategies/Barriers standing tolerance/balance, still requiring A in stance at 06/10/2025 8987    Progress/Outcome goal ongoing at 06/10/2025 0830   Grooming Goal 2    Activity/Assistive Device grooming skills, all at 06/05/2025 1033   [place] modified independence at 06/05/2025 1033   Time Frame long-term goal (LTG), 21 days or less at 06/05/2025 1033   Toileting Goal 1    Activity/Assistive Device toileting skills, all at 06/05/2025 1033   [place] minimum assist (75% or more patient effort) at 06/10/2025 0830   Time Frame short-term goal (STG), 5 - 7 days at 06/05/2025 1033   Progress/Outcome goal met, goal revised this date at 06/10/2025 0830   Toileting Goal 2    Activity/Assistive Device toileting skills, all at 06/05/2025 1033   [place] modified independence at 06/05/2025 1033   Time Frame long-term goal (LTG), 21 days or less at 06/05/2025 1033

## 2025-06-10 NOTE — PROGRESS NOTES
Patient: Aneesh Brito  Location: [facility]  MRN: [id number]  Today's date: 6/10/2025    History of Present Illness    Aneesh is a 75 y.o. male with a history of colon carcinoma, BPH, hypertension, glaucoma, type 2 diabetes, history of a cervical fusion over 10 years ago admitted after a fall inability to get up on his own.  He was seen at [facility] where imaging studies were performed which demonstrated DISH as well as a Chance fracture at the bottom of C7.  He was transferred to [facility] for neurosurgery.    He was taken to the OR on 6/1 by Dr. Benjamin and underwent an ORIF of the C7 Chance fracture with removal and replacement of the C3-6 posterior instrumentation as well as a C3-T3 posterior lateral fusion.  Drains remain in place.  He is on subcu heparin for DVT prophylaxis.  Currently on a nicardipine drip for blood pressure control.  He states pain is well-controlled.  He denies any other complaints of headache or dizziness, chest pain or shortness of breath.  He is voiding on his own.  He has not had a bowel movement yet.    He was seen by PT and OT needing moderate assistance of 2 to transfer sit to stand.  He ambulated 2 feet with moderate assistance of 2 using a rolling walker.  Pertinent radiology results reviewed. Pertinent lab results reviewed.      Past Medical History  Aneesh has a past medical history of C7 cervical fracture (CMS/[facility]) (05/31/2025), Colonic adenoma, Diverticulosis of colon, Enlarged prostate with lower urinary tract symptoms (LUTS), Glaucoma, Hypertension, Melanoma of skin (CMS/HCC), Osteoarthritis of knee, Overweight, Spinal stenosis of lumbar region, and Type 2 diabetes mellitus (CMS/[facility]).    PT Vitals      Date/Time Pulse HR Source SpO2 Pt Activity O2 Therapy BP BP Location BP Method Pt Position Who   06/10/25 0901 70 Monitor 93 % At rest None (Room air) 178/80 Left upper arm Manual Sitting PJ          PT Pain      Date/Time Pain  Type Location Rating: Rest Rating: Activity Interventions [facility]   06/10/25 0901 Pain Assessment neck 5 5 diversional activity provided    06/10/25 0929 Pain Reassessment neck 5 5 relaxation techniques promoted PJ                  Prior Living Environment      Flowsheet Row Most Recent Value   People in Home spouse   Current Living Arrangements independent living facility   Home Accessibility wheelchair accessible   Living Environment Comment PTA residing c wife in [facility] 1 floor set up. Shower stall c shower chair & grab bars. Reports no DME at toilet - information to be verified c pt. wife   Number of Stairs, Main Entrance 0   Patient Bedroom Access Comment PTA residing c wife in [facility] ([facility] 1 floor set up. Shower stall c shower chair & grab bars. Reports no DME at toilet - information to be verified c pt. wife   Bathroom Access Comment PTA residing c wife in [facility] ([facility] 1 floor set up. Shower stall c shower chair & grab bars. Reports no DME at toilet - information to be verified c pt. wife   Number of Stairs, Within Home, Primary 0       Prior Level of Function      Flowsheet Row Most Recent Value   Dominant Hand left   Ambulation assistive equipment  [rollator]   Transferring assistive equipment  [rollator]   Toileting independent   Bathing independent   Dressing independent   Eating independent   IADLs independent   Driving/Transportation    Communication understands/communicates without difficulty   Prior Level of Function Comment Pt reports being independent with use of SPC inside and Rollator to the dining jenkins. Independent ADLs. Denies other falls. Independent ADLs. (+) driving. Retired teacher   Assistive Device Currently Used at Home cane, straight, walker, 4-wheeled, shower chair        IRF PT Evaluation and Treatment - 06/10/25 0901          PT Time Calculation    Start Time 0900     Stop Time 0930     Time Calculation (min) 30 min        Session  Details    Document Type Daily Treatment/Progress Note        General Information    Patient Profile Reviewed yes     General Observations of Patient Pt received in w/c in room        Mobility Belt    Mobility Belt Used During Session yes        Orthosis Neck Cervical Collar    Orthosis Properties Date Obtained: 06/01/25 Time Obtained: 1803 Location: Neck Type: Cervical Collar Therapeutic Indications: stabilization and support Wearing Schedule: wear when out of bed       Orthosis Neck Cervical Collar    Orthosis Properties Date Obtained: 06/01/25 Time Obtained: 0000 Location: Neck Type: Cervical Collar Features: Hard Description: Apen collar OOB don seated; [place] collar for shower Therapeutic Indications: stabilization and support Wearing Schedule: wear when out of bed       Sit to Stand Transfer    [place], Sit to Stand Transfer minimum assist (75% or more patient effort)     Safety/Cues increased time to complete     Assistive Device walker, front-wheeled     Comment w/c to stance w min A for pelvic lift and balance        Stand to Sit Transfer    [place], Stand to Sit Transfer minimum assist (75% or more patient effort)     Safety/Cues increased time to complete     Assistive Device walker, front-wheeled     Comment stance to w/c w min A for controlled descent        Stand Pivot Transfer    [place], Stand Pivot/Stand Step Transfer minimum assist (75% or more patient effort)     Safety/Cues increased time to complete     Assistive Device walker, front-wheeled     Comment via amb approach w min A for balance        Gait Training    [place], Gait minimum assist (75% or more patient effort)     Safety/Cues increased time to complete;gait deviations     Assistive Device walker, front-wheeled     Distance in Feet 100 feet     Pattern step-to;step-through     Deviations/Abnormal Patterns step length decreased;stride length decreased;weight shifting decreased;gait speed decreased;avery  "decreased     Bilateral Gait Deviations heel strike decreased     Comment 100ft x 1 w RW and min A for balance due to dec heel strike and short step length        Balance    Balance Interventions standing;supported     Comment, Balance in // bars: 1) stepping over 6\" fawn + weight shift into anterior LE, 1x10, R/L, min A 2) static standing on airex foam w no UE support, beach ball taps 2x10, min A 3) static standing with on UE support, beach ball kicks 1x10 R/L, min/mod A        Daily Progress Summary (PT)    Daily Outcome Statement Session focused on balance. Pt tolerated all balance interventions well without significant LOB. Cont POC.                        IRF PT Goals      Flowsheet Row Most Recent Value   Bed Mobility Goal 1    Activity/Assistive Device rolling to left, rolling to right, sit to supine/supine to sit at 06/05/2025 0902   [place] minimum assist (75% or more patient effort) at 06/05/2025 0902   Time Frame short-term goal (STG), 1 week at 06/05/2025 0902   Progress/Outcome goal ongoing, goal revised this date  [will continue core strengthening/bed mobility training] at 06/10/2025 0850   Bed Mobility Goal 2    Activity/Assistive Device rolling to left, rolling to right, sit to supine/supine to sit at 06/05/2025 0902   [place] modified independence at 06/05/2025 0902   Time Frame long-term goal (LTG), 3 weeks at 06/05/2025 0902   Progress/Outcome goal ongoing at 06/10/2025 0850   Transfer Goal 1    Activity/Assistive Device sit-to-stand/stand-to-sit, stand pivot at 06/05/2025 0902   [place] tactile cues required  [steadying] at 06/10/2025 0850   Time Frame short-term goal (STG), 1 week at 06/10/2025 0850   Progress/Outcome goal met, goal revised this date at 06/10/2025 0850   Transfer Goal 2    Activity/Assistive Device sit-to-stand/stand-to-sit, stand pivot at 06/05/2025 0902   [place] modified independence at 06/05/2025 0902   Time Frame long-term goal (LTG), 3 weeks at " 06/05/2025 0902   Progress/Outcome goal ongoing at 06/10/2025 0850   Gait/Walking Locomotion Goal 1    Activity/Assistive Device gait (walking locomotion) at 06/05/2025 0902   Distance 50 feet at 06/05/2025 0902   [place] tactile cues required at 06/10/2025 0850   Time Frame short-term goal (STG), 1 week at 06/10/2025 0850   Progress/Outcome goal met, goal revised this date at 06/10/2025 0850   Gait/Walking Locomotion Goal 2    Activity/Assistive Device gait (walking locomotion) at 06/05/2025 0902   Distance 150 feet at 06/05/2025 0902   [place] supervision required at 06/05/2025 0902   Time Frame long-term goal (LTG), 3 weeks at 06/05/2025 0902   Progress/Outcome goal ongoing at 06/10/2025 0850   Stairs Goal 1    Activity/Assistive Device stairs, all skills at 06/10/2025 0850   Number of Stairs 8 at 06/10/2025 0850   [place] minimum assist (75% or more patient effort) at 06/10/2025 0850   Time Frame short-term goal (STG), 1 week at 06/10/2025 0850   Stairs Goal 2    Activity/Assistive Device stairs, all skills at 06/10/2025 0850   Number of Stairs 12 at 06/10/2025 0850   [place] supervision required at 06/10/2025 0850   Time Frame long-term goal (LTG), 2 weeks at 06/10/2025 0850

## 2025-06-10 NOTE — PROGRESS NOTES
Patient: Aneesh Brito  Location: [facility] [address]  MRN: [id number]  Today's date: 6/10/2025    History of Present Illness    Aneesh is a 75 y.o. male with a history of colon carcinoma, BPH, hypertension, glaucoma, type 2 diabetes, history of a cervical fusion over 10 years ago admitted after a fall inability to get up on his own.  He was seen at [facility] where imaging studies were performed which demonstrated DISH as well as a Chance fracture at the bottom of C7.  He was transferred to [facility] for neurosurgery.    He was taken to the OR on 6/1 by Dr. Benjamin and underwent an ORIF of the C7 Chance fracture with removal and replacement of the C3-6 posterior instrumentation as well as a C3-T3 posterior lateral fusion.  Drains remain in place.  He is on subcu heparin for DVT prophylaxis.  Currently on a nicardipine drip for blood pressure control.  He states pain is well-controlled.  He denies any other complaints of headache or dizziness, chest pain or shortness of breath.  He is voiding on his own.  He has not had a bowel movement yet.    He was seen by PT and OT needing moderate assistance of 2 to transfer sit to stand.  He ambulated 2 feet with moderate assistance of 2 using a rolling walker.  Pertinent radiology results reviewed. Pertinent lab results reviewed.      Past Medical History  Aneesh has a past medical history of C7 cervical fracture (CMS/HCC) (05/31/2025), Colonic adenoma, Diverticulosis of colon, Enlarged prostate with lower urinary tract symptoms (LUTS), Glaucoma, Hypertension, Melanoma of skin (CMS/HCC), Osteoarthritis of knee, Overweight, Spinal stenosis of lumbar region, and Type 2 diabetes mellitus (CMS/HCC).    OT Vitals      Date/Time BP BP Location BP Method Pt Position [facility]   06/10/25 1040 158/72 Left upper arm Manual Sitting RENZO          OT Pain      Date/Time Pain Type Side/Orientation Rating: Rest Interventions Who   06/10/25 1040 Pain Assessment  neck 3 premedicated for activity RENZO   06/10/25 1058 Pain Reassessment neck 3 other (see comments) returned to bed for fatigue and pain management RENZO                 Prior Living Environment      Flowsheet Row Most Recent Value   People in Home spouse   Current Living Arrangements independent living facility   Home Accessibility wheelchair accessible   Living Environment Comment PTA residing c wife in [facility] 1 floor set up. Shower stall c shower chair & grab bars. Reports no DME at toilet - information to be verified c pt. wife   Number of Stairs, Main Entrance 0   Patient Bedroom Access Comment PTA residing c wife in [facility] ([facility] 1 floor set up. Shower stall c shower chair & grab bars. Reports no DME at toilet - information to be verified c pt. wife   Bathroom Access Comment PTA residing c wife in [facility] ([facility] 1 floor set up. Shower stall c shower chair & grab bars. Reports no DME at toilet - information to be verified c pt. wife   Number of Stairs, Within Home, Primary 0       Prior Level of Function      Flowsheet Row Most Recent Value   Dominant Hand left   Ambulation assistive equipment  [rollator]   Transferring assistive equipment  [rollator]   Toileting independent   Bathing independent   Dressing independent   Eating independent   IADLs independent   Driving/Transportation    Communication understands/communicates without difficulty   Prior Level of Function Comment Pt reports being independent with use of SPC inside and Rollator to the dining jenkins. Independent ADLs. Denies other falls. Independent ADLs. (+) driving. Retired teacher   Assistive Device Currently Used at Home cane, straight, walker, 4-wheeled, shower chair       Occupational Profile      Flowsheet Row Most Recent Value   Successful Occupations Enjoys participating in the activities at [employer] - shuffle board and corn hole   Occupational History/Life Experiences PTA independent c ADLs.  "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   Patient Goals \"Walk\"        IRF OT Evaluation and Treatment - 06/10/25 1033          OT Time Calculation    Start Time 1032     Stop Time 1102     Time Calculation (min) 30 min        Session Details    Document Type Daily Treatment/Progress Note        General Information    Patient Profile Reviewed yes     General Observations of Patient Pt seated in w/c 3/10 pain in neck        Mobility Belt    Mobility Belt Used During Session yes        Orthosis Neck Cervical Collar    Orthosis Properties Date Obtained: 06/01/25 Time Obtained: 1803 Location: Neck Type: Cervical Collar Therapeutic Indications: stabilization and support Wearing Schedule: wear when out of bed       Orthosis Neck Cervical Collar    Orthosis Properties Date Obtained: 06/01/25 Time Obtained: 0000 Location: Neck Type: Cervical Collar Features: Hard Description: [place] collar OOB don seated; [place] collar for shower Therapeutic Indications: stabilization and support Wearing Schedule: wear when out of bed    Compliance/Wearing Issues compliant with wearing schedule        Bed Mobility    Equality, Sit to Supine minimum assist (75% or more patient effort)     Comment OT: Min A sit to supine in hospital bed        Chair to Bed Transfer    Equality, Chair to Bed minimum assist (75% or more patient effort)     Safety/Cues increased time to complete     Assistive Device walker, front-wheeled     Comment SPT w/c to bed with RW - Min A for balance        Bathing    Comment Issued long handled sponge and educated on use        Lower Body Dressing    Position supported sitting     Adaptive Equipment reacher;sock aid     Equality, Footwear minimum assist (75% or more patient effort)     Comment Min A to doff and don socks with reacher and sock aide - educated on use of reacher, sock aide and shoe horn     "    Daily Progress Summary (OT)    Daily Outcome Statement Pt tolerated session well - issued reacher, sock aid, shoe horn and long handle sponge and educated on use - pt will benefit from further trials of LE AE - continue POC                     Education Documentation  Equipment/Methods, taught by Autumn Elizabeth, OT at 6/10/2025 12:36 PM.  Learner: Patient  Readiness: Acceptance  Method: Explanation  Response: Verbalizes Understanding  Comment: Issued reacher, sock aid, shoe horn and long handled sponge and educated on use          IRF OT Goals      Flowsheet Row Most Recent Value   Transfer Goal 1    Activity/Assistive Device toilet, commode, 3-in-1 at 06/05/2025 1033   [place] supervision required at 06/10/2025 0830   Time Frame short-term goal (STG), 5 - 7 days at 06/05/2025 1033   Progress/Outcome goal met, goal revised this date at 06/10/2025 0830   Transfer Goal 2    Activity/Assistive Device toilet, commode, 3-in-1 at 06/05/2025 1033   [place] modified independence at 06/05/2025 1033   Time Frame long-term goal (LTG), 21 days or less at 06/05/2025 1033   Transfer Goal 3    Activity/Assistive Device shower, tub bench at 06/05/2025 1033   [place] supervision required at 06/10/2025 0830   Time Frame short-term goal (STG), 5 - 7 days at 06/05/2025 1033   Progress/Outcome goal met, goal revised this date at 06/10/2025 0830   Transfer Goal 4    Activity/Assistive Device shower, tub bench at 06/05/2025 1033   [place] modified independence at 06/05/2025 1033   Time Frame long-term goal (LTG), 21 days or less at 06/05/2025 1033   Bathing Goal 1    Activity/Assistive Device bathing skills, all at 06/05/2025 1033   [place] minimum assist (75% or more patient effort) at 06/10/2025 0830   Time Frame short-term goal (STG), 5 - 7 days at 06/05/2025 1033   Strategies/Barriers spinal precautions inhibiting forward reach towards BLE, plan to introduce LHAE at 06/10/2025 0830   Progress/Outcome  goal ongoing at 06/10/2025 0830   Bathing Goal 2    Activity/Assistive Device bathing skills, all at 06/05/2025 1033   [place] modified independence at 06/05/2025 1033   Time Frame long-term goal (LTG), 21 days or less at 06/05/2025 1033   UB Dressing Goal 1    Activity/Assistive Device upper body dressing at 06/05/2025 1033   [place] supervision required at 06/10/2025 0830   Time Frame short-term goal (STG), 5 - 7 days at 06/05/2025 1033   Progress/Outcome goal met, goal revised this date at 06/10/2025 0830   UB Dressing Goal 2    Activity/Assistive Device upper body dressing at 06/05/2025 1033   [place] modified independence at 06/05/2025 1033   Time Frame long-term goal (LTG), 21 days or less at 06/05/2025 1033   LB Dressing Goal 1    Activity/Assistive Device lower body dressing at 06/05/2025 1033   [place] minimum assist (75% or more patient effort) at 06/10/2025 0830   Time Frame short-term goal (STG), 5 - 7 days at 06/05/2025 1033   Progress/Outcome goal met, goal revised this date at 06/10/2025 0830   LB Dressing Goal 2    Activity/Assistive Device lower body dressing at 06/05/2025 1033   [place] modified independence at 06/05/2025 1033   Time Frame long-term goal (LTG), 21 days or less at 06/05/2025 1033   Grooming Goal 1    Activity/Assistive Device grooming skills, all at 06/05/2025 1033   [place] set-up required at 06/10/2025 0830   Time Frame short-term goal (STG), 5 - 7 days at 06/05/2025 1033   Strategies/Barriers standing tolerance/balance, still requiring A in stance at 06/10/2025 0830   Progress/Outcome goal ongoing at 06/10/2025 0830   Grooming Goal 2    Activity/Assistive Device grooming skills, all at 06/05/2025 1033   [place] modified independence at 06/05/2025 1033   Time Frame long-term goal (LTG), 21 days or less at 06/05/2025 1033   Toileting Goal 1    Activity/Assistive Device toileting skills, all at 06/05/2025 1033   [place] minimum assist (75% or  more patient effort) at 06/10/2025 0830   Time Frame short-term goal (STG), 5 - 7 days at 06/05/2025 1033   Progress/Outcome goal met, goal revised this date at 06/10/2025 0830   Toileting Goal 2    Activity/Assistive Device toileting skills, all at 06/05/2025 1033   [place] modified independence at 06/05/2025 1033   Time Frame long-term goal (LTG), 21 days or less at 06/05/2025 1033

## 2025-06-10 NOTE — PROGRESS NOTES
[facility] Progress Note          Patient was seen and examined.   Attestation Notes: Face to face encounter completed    Aneesh Brito is a 75 y.o. male who was admitted for Falls, sequela [W19.XXXS]. Patient was referred by Chase Anna MD for medical assessment and management      CC: Falls, sequela [W19.XXXS]     HPI: Aneesh Brito is a 75 y.o. male with HTN, HLD, DM2, colon ca, BPH, glaucoma, spinal stenosis s/p previous cervical fusion, presented to [facility] 5/31/25 s/p fall, found to have C7 fracture and transferred to [facility] where he underwent C7 ORIF, removal of C3-6 hardware and C3-T1 PLIF by neurosurgery Dr. Raza Benjamin on 6/1/25.  Post op he had anemia but did not require transfusion. He was put on SQ Heparin for DVT ppx.   His pain was managed with Tylenol and Oxycodone.   He had elevated BP with surgery requiring IV Hydralzine but BP improved after surgery and HTN managed with Felodipine, Lisinopril, Losartan and Metoprolol.   He was on Lipitor for HLD.  He was on Finasteride and Tamsulosin for BPH.    He was on netarsudiL-latanoprost and timoptic eye drops for glaucoma.   He had very elevated blood sugars alaina-op, related to IVFs and Decadron, and required insulin. Blood sugars improved and DM2 managed with Metformin.   He had elevated CK of 1118 on admission due to rhabdomyolysis from being down on floor prior to admission. He was treated with IVFs and CK improved.   He was admitted to [facility] on 6/4/2025 for acute inpatient rehab.     [facility] hospital course:    He participated in therapy. He was seen by Nutrition, STACY. Vit D low, started oral supplement.     SUBJECTIVE:  Feeling better today, noting Less elevation of BP  Denies headache, chest pain, back pain, dyspnea  Participating in therapies    Review of Systems:  All other systems reviewed and negative except as noted in the HPI.    Current meds and allergies reviewed  Current  Facility-Administered Medications   Medication Dose Route Frequency    acetaminophen  650 mg oral q6h KOFI    alum-mag hydroxide-simeth  30 mL oral q6h PRN    atorvastatin  20 mg oral Daily    bisacodyL  10 mg rectal Daily PRN    cholecalciferol (vitamin D3)  25 mcg oral Daily    cyanocobalamin  1,000 mcg oral Daily    glucose  15-30 g of dextrose oral PRN    Or    dextrose  15-30 g of dextrose oral PRN    Or    glucagon  1 mg intramuscular PRN    Or    dextrose 50 % in water (D50)  25 mL intravenous PRN    enoxaparin  40 mg subcutaneous Daily (6p)    felodipine  10 mg oral Daily    finasteride  5 mg oral Daily    hydrALAZINE  25 mg oral q6h PRN    insulin lispro U-100  2-5 Units subcutaneous BID AC    lisinopriL  10 mg oral Daily    losartan  100 mg oral Daily    metFORMIN  500 mg oral BID with breakfast and dinner    metoprolol tartrate  50 mg oral BID    netarsudiL-latanoprost  1 drop Right Eye Nightly    oxyCODONE  5 mg oral q4h PRN    pantoprazole  40 mg oral Daily before breakfast    polyethylene glycol  17 g oral Daily    sennosides-docusate sodium  2 tablet oral BID    tamsulosin  0.4 mg oral Nightly    timolol  1 drop Both Eyes Daily        Past Medical History:   Diagnosis Date    C7 cervical fracture (CMS/HCC) 05/31/2025    Colonic adenoma     Diverticulosis of colon     Enlarged prostate with lower urinary tract symptoms (LUTS)     Glaucoma     Hypertension     Melanoma of skin (CMS/HCC)     Osteoarthritis of knee     Overweight     Spinal stenosis of lumbar region     Type 2 diabetes mellitus (CMS/HCC)      Past Surgical History   Procedure Laterality Date    1) ORIF C7 fracture 2) removal and replacement of C3-6 posterior intrumentation 3) C3 to T3 posterolaterral instrumented fusion  Depuy + removal set [facility] C-arm O-Arm N/A 6/1/2025    Performed by Raza Benjamin MD at  OR    Appendectomy      Cervical fusion  06/01/2025    1) ORIF C7 fracture 2) removal and replacement of C3-6 posterior  intrumentation 3) C3 to T3 posterolaterral instrumented fusion    Colonoscopy  01/10/2012    diverticulosis    Colonoscopy w/ polypectomy  02/09/2022    Hip arthroplasty Bilateral     Knee arthroplasty Left     Knee arthroplasty Right 02/02/2022     Social History     Tobacco Use    Smoking status: Never    Smokeless tobacco: Never   Substance Use Topics    Alcohol use: Yes     Comment: BEER, 1 CONSUMED DAILY    Drug use: Never      Family History   Problem Relation Name Age of Onset    Breast cancer Biological Mother         Vital signs in last 24 hours:  Temp:  [36.4 °C (97.6 °F)-37.1 °C (98.7 °F)] 36.6 °C (97.9 °F)  Heart Rate:  [60-78] 60  Resp:  [18-19] 18  BP: (129-180)/(59-82) 129/59  Vital signs reviewed 06/10/25 2:24 PM    Physical Exam  Vitals and nursing note reviewed.   Constitutional:       Appearance: Normal appearance.   HENT:      Head: Normocephalic and atraumatic.      Right Ear: External ear normal.      Left Ear: External ear normal.      Nose: Nose normal.      Mouth/Throat:      Pharynx: Oropharynx is clear.   Eyes:      Conjunctiva/sclera: Conjunctivae normal.   Neck:      Comments: S/p cervical fusion in collar  Cardiovascular:      Rate and Rhythm: Normal rate.      Pulses: Normal pulses.   Pulmonary:      Effort: Pulmonary effort is normal.   Abdominal:      General: Abdomen is flat.   Musculoskeletal:      Right lower leg: Edema present.      Left lower leg: Edema present.   Skin:     Findings: Lesion (right arm, right ear, sacral wounds as documented by nursing) present.   Neurological:      Mental Status: He is alert and oriented to person, place, and time.                 Objective    Labs:  I have reviewed the patient's labs.  Significant abnormals are anemia, hyponatremia, hypokalemia.  Results from last 7 days   Lab Units 06/09/25  0556   WBC K/uL 7.59   HEMOGLOBIN g/dL 11.0*   HEMATOCRIT % 32.7*   PLATELETS K/uL 337     Results from last 7 days   Lab Units 06/09/25  0556   SODIUM  mEQ/L 132*   POTASSIUM mEQ/L 3.4*   CHLORIDE mEQ/L 101   CO2 mEQ/L 26   BUN mg/dL 22   CREATININE mg/dL 1.1   CALCIUM mg/dL 8.6   ALBUMIN g/dL 3.2*   BILIRUBIN TOTAL mg/dL 0.5   ALK PHOS IU/L 133*   ALT IU/L 46   AST IU/L 38   GLUCOSE mg/dL 120*     Results from last 7 days   Lab Units 06/04/25  0445   MAGNESIUM mg/dL 1.9      Results from last 7 days   Lab Units 06/05/25  0536   CK TOTAL U/L 124     6/5/25: vit d 25-oh: 13 (low)    Blood sugars: [phone]    Imaging:  Not applicable        ASSESSMENT/PLAN:    75 y.o. malewith HTN, HLD, DM2, colon ca, BPH, glaucoma, spinal stenosis s/p previous cervical fusion, presented to [facility] 5/31/25 s/p fall, found to have C7 fracture and transferred to [facility] where he underwent C7 ORIF, removal of C3-6 hardware and C3-T1 PLIF by neurosurgery Dr. Raza Benjamin on 6/1/25     1. Neuro:  # Cervical stenosis  # acute C7 fx  # DISH  -6/1/25s/p C7 ORIF, removal of C3-6 hardware and C3-T1 PLIF by neurosurgery Dr. Raza Benjamin   -pain managed with Tylenol and Oxycodone     2. Vasc:  # DVT ppx  -bilat LE edema  -SCDs and SQ Heparin for DVT ppx     3. Heme:  # ABLA  -post op anemia  -5/12/25 B12 <400, added oral B12  -hgb 6/9/25 11.0  -on multivitamin  # leukocytosis  -reactive vs due to Decadron  -resolved, WBC 7.59 (6/9)  -follow CBC     4. Renal:  -increased risk of dehydration and electrolyte changes  -electrolyte imbalance, hypokalemia, hyponatremia  -provide K supplementation; trend Na  -follow BMP, Mg     5. Gi:  # constipation  -Miralax for bowels  # ulcer ppx  -Protonix ulcer ppx     6. Gu:  # BPH  -on Proscar and Flomax  -using [place] cath at  for incontinence  -having high volume urine output at night with some urine retention requiring intermitten cath     7. Cardiac:  # HTN  -on Felodipine, Lisinopril, Losartan and   -BP labile, range 115-180/80-82; less hypertensive with manual monitoring  -request manual only monitoring  -watch for orthostatic  hypotension  -use cardiac precautions in therapy     8. Pulm:  -incentive spirometry for atelectasis     9. Derm:  # multiple wounds  -surgical wounds  -wounds on right arm, right ear, sacrum as documented by nursing  -seen by [facility] [facility] for skin assessment  -wound care per nursing and WOCN consult     10. Nutrition:  -Adult Diet Regular; Thin Liquids; Consistent Carbohydrate 2000   -sen by Nutrition for assessment and education     11. Endo:  # HLD  -on Lipitor  # DM2  -had steroid induced hyperglycemia due to Decadron, now improved  -on Metformin  -accuchecks BID AC with Lispro scale for BG>200  -POCT: 127-150  -hypoglycemia protocol     12. Psych:  # anxiety/depression  -consulted Psychology for support     13. Musculoskeletal:  # rhabdomyolysis  -clinically resolved  -6/5/25 CK back to normal at 124  # vit D deficiency  -6/5/25 Vit D low at 13, started on vit D3 25 mcg po daily.    14. Ophth:  # glaucoma  -on netarsudiL-latanoprost and timoptic eye drops     14. Dispo:  -code status: Full Code        plan discussed with patient, nurse, case management and Chase Anna MD Jeanne Lasota, MD  6/10/2025  2:24 PM

## 2025-06-10 NOTE — PROGRESS NOTES
Patient: Aneesh Brito  Location: [facility] [address]  MRN: [id number]  Today's date: 6/10/2025    History of Present Illness    Aneesh is a 75 y.o. male with a history of colon carcinoma, BPH, hypertension, glaucoma, type 2 diabetes, history of a cervical fusion over 10 years ago admitted after a fall inability to get up on his own.  He was seen at [facility] where imaging studies were performed which demonstrated DISH as well as a Chance fracture at the bottom of C7.  He was transferred to [facility] for neurosurgery.    He was taken to the OR on 6/1 by Dr. Benjamin and underwent an ORIF of the C7 Chance fracture with removal and replacement of the C3-6 posterior instrumentation as well as a C3-T3 posterior lateral fusion.  Drains remain in place.  He is on subcu heparin for DVT prophylaxis.  Currently on a nicardipine drip for blood pressure control.  He states pain is well-controlled.  He denies any other complaints of headache or dizziness, chest pain or shortness of breath.  He is voiding on his own.  He has not had a bowel movement yet.    He was seen by PT and OT needing moderate assistance of 2 to transfer sit to stand.  He ambulated 2 feet with moderate assistance of 2 using a rolling walker.  Pertinent radiology results reviewed. Pertinent lab results reviewed.      Past Medical History  Aneesh has a past medical history of C7 cervical fracture (CMS/HCC) (05/31/2025), Colonic adenoma, Diverticulosis of colon, Enlarged prostate with lower urinary tract symptoms (LUTS), Glaucoma, Hypertension, Melanoma of skin (CMS/HCC), Osteoarthritis of knee, Overweight, Spinal stenosis of lumbar region, and Type 2 diabetes mellitus (CMS/[facility]).    PT Vitals      Date/Time Pulse HR Source SpO2 Pt Activity O2 Therapy BP BP Location BP Method Pt Position Who   06/10/25 1336 60 Monitor 94 % At rest None (Room air) 129/59 Left upper arm Manual Sitting PJ          PT Pain      Date/Time Pain  Type Location Rating: Rest Rating: Activity Interventions [facility]   06/10/25 1336 Pain Assessment back 4 4 diversional activity provided    06/10/25 1359 Pain Reassessment back 4 5 relaxation techniques promoted PJ                  Prior Living Environment      Flowsheet Row Most Recent Value   People in Home spouse   Current Living Arrangements independent living facility   Home Accessibility wheelchair accessible   Living Environment Comment PTA residing c wife in [facility] ([facility] 1 floor set up. Shower stall c shower chair & grab bars. Reports no DME at toilet - information to be verified c pt. wife   Number of Stairs, Main Entrance 0   Patient Bedroom Access Comment PTA residing c wife in [facility] ([facility] 1 floor set up. Shower stall c shower chair & grab bars. Reports no DME at toilet - information to be verified c pt. wife   Bathroom Access Comment PTA residing c wife in [facility] ([facility] 1 floor set up. Shower stall c shower chair & grab bars. Reports no DME at toilet - information to be verified c pt. wife   Number of Stairs, Within Home, Primary 0       Prior Level of Function      Flowsheet Row Most Recent Value   Dominant Hand left   Ambulation assistive equipment  [rollator]   Transferring assistive equipment  [rollator]   Toileting independent   Bathing independent   Dressing independent   Eating independent   IADLs independent   Driving/Transportation    Communication understands/communicates without difficulty   Prior Level of Function Comment Pt reports being independent with use of SPC inside and Rollator to the dining jenkins. Independent ADLs. Denies other falls. Independent ADLs. (+) driving. Retired teacher   Assistive Device Currently Used at Home cane, straight, walker, 4-wheeled, shower chair        IRF PT Evaluation and Treatment - 06/10/25 1337          PT Time Calculation    Start Time 1300     Stop Time 1400     Time Calculation (min) 60 min        Session  Details    Document Type Daily Treatment/Progress Note     Mode of Treatment concurrent therapy        General Information    Patient Profile Reviewed yes     General Observations of Patient Pt received in w/c in gym        Mobility Belt    Mobility Belt Used During Session yes        Orthosis Neck Cervical Collar    Orthosis Properties Date Obtained: 06/01/25 Time Obtained: 1803 Location: Neck Type: Cervical Collar Therapeutic Indications: stabilization and support Wearing Schedule: wear when out of bed       Orthosis Neck Cervical Collar    Orthosis Properties Date Obtained: 06/01/25 Time Obtained: 0000 Location: Neck Type: Cervical Collar Features: Hard Description: Apen collar OOB don seated; [place] collar for shower Therapeutic Indications: stabilization and support Wearing Schedule: wear when out of bed       Bed Mobility    [place], Roll Left close supervision     [place], Roll Right close supervision     [place], Supine to Sit close supervision     [place], Sit to Supine touching/steadying assist     Safety/Cues maintaining precautions     Assistive Device none     Comment performed on gym mat, close S for rolling L/R, close/steadying A for sit<>supine for LE management. Performed via logroll        Sit to Stand Transfer    [place], Sit to Stand Transfer minimum assist (75% or more patient effort)     Safety/Cues increased time to complete     Assistive Device walker, front-wheeled     Comment w/c to stance w min A for pelvic lift and balance        Stand to Sit Transfer    [place], Stand to Sit Transfer minimum assist (75% or more patient effort)     Safety/Cues increased time to complete     Assistive Device walker, front-wheeled     Comment stance to w/c w min A for controlled descent        Stand Pivot Transfer    [place], Stand Pivot/Stand Step Transfer minimum assist (75% or more patient effort)     Safety/Cues increased time to complete     Assistive Device  walker, front-wheeled     Comment via amb approach w min A for balance        Gait Training    [place], Gait minimum assist (75% or more patient effort)     Safety/Cues increased time to complete     Assistive Device walker, front-wheeled     Distance in Feet 100 feet     Pattern step-to;step-through     Deviations/Abnormal Patterns step length decreased;stride length decreased;weight shifting decreased;gait speed decreased;avery decreased     Bilateral Gait Deviations heel strike decreased     Comment 100ft x 2 w RW and min A for balance due to general instability. VCs for fwd gaze and upright posture        Motor Skills    Motor Control/Coordination Interventions gross motor coordination activities     Comment, Motor Skills standing at RW with anterior mirror, cone taps cued by LE/color, 1x15, min A for balance        Advanced Stepping/Walking Interventions    Stepping/Walking Interventions side stepping     Side Stepping (Stepping/Walking Interventions) along half wall, 15ft x 2 R/L w min A for balance        Upper Extremity (Therapeutic Exercise)    Exercise Position/Type standing     General Exercise bilateral     Reps and Sets 2x10     Comment hip abd kicks        Daily Progress Summary (PT)    Daily Outcome Statement Session focused on functional mobility, strength and balance. Pt continues to walk with slow walking speed, but demonstrated improved stability and emerging as steadying assist. Cont POC.                        IRF PT Goals      Flowsheet Row Most Recent Value   Bed Mobility Goal 1    Activity/Assistive Device rolling to left, rolling to right, sit to supine/supine to sit at 06/05/2025 0902   [place] minimum assist (75% or more patient effort) at 06/05/2025 0902   Time Frame short-term goal (STG), 1 week at 06/05/2025 0902   Progress/Outcome goal ongoing, goal revised this date  [will continue core strengthening/bed mobility training] at 06/10/2025 0850   Bed Mobility Goal 2     Activity/Assistive Device rolling to left, rolling to right, sit to supine/supine to sit at 06/05/2025 0902   [place] modified independence at 06/05/2025 0902   Time Frame long-term goal (LTG), 3 weeks at 06/05/2025 0902   Progress/Outcome goal ongoing at 06/10/2025 0850   Transfer Goal 1    Activity/Assistive Device sit-to-stand/stand-to-sit, stand pivot at 06/05/2025 0902   [place] tactile cues required  [steadying] at 06/10/2025 0850   Time Frame short-term goal (STG), 1 week at 06/10/2025 0850   Progress/Outcome goal met, goal revised this date at 06/10/2025 0850   Transfer Goal 2    Activity/Assistive Device sit-to-stand/stand-to-sit, stand pivot at 06/05/2025 0902   [place] modified independence at 06/05/2025 0902   Time Frame long-term goal (LTG), 3 weeks at 06/05/2025 0902   Progress/Outcome goal ongoing at 06/10/2025 0850   Gait/Walking Locomotion Goal 1    Activity/Assistive Device gait (walking locomotion) at 06/05/2025 0902   Distance 50 feet at 06/05/2025 0902   [place] tactile cues required at 06/10/2025 0850   Time Frame short-term goal (STG), 1 week at 06/10/2025 0850   Progress/Outcome goal met, goal revised this date at 06/10/2025 0850   Gait/Walking Locomotion Goal 2    Activity/Assistive Device gait (walking locomotion) at 06/05/2025 0902   Distance 150 feet at 06/05/2025 0902   [place] supervision required at 06/05/2025 0902   Time Frame long-term goal (LTG), 3 weeks at 06/05/2025 0902   Progress/Outcome goal ongoing at 06/10/2025 0850   Stairs Goal 1    Activity/Assistive Device stairs, all skills at 06/10/2025 0850   Number of Stairs 8 at 06/10/2025 0850   [place] minimum assist (75% or more patient effort) at 06/10/2025 0850   Time Frame short-term goal (STG), 1 week at 06/10/2025 0850   Stairs Goal 2    Activity/Assistive Device stairs, all skills at 06/10/2025 0850   Number of Stairs 12 at 06/10/2025 0850   [place] supervision required at 06/10/2025 0850    Time Frame long-term goal (LTG), 2 weeks at 06/10/2025 4722

## 2025-06-10 NOTE — PLAN OF CARE
Plan of Care Review  Plan of Care Reviewed With: patient  Progress: improving  Outcome Evaluation: patient is alert and oriented, continent of bowel/bladder, aspen collar maintained OOB, blood sugar stable at 123 this am, multimodal approach to pain, neck incision - steristrips, DOMINGA

## 2025-06-11 ENCOUNTER — TELEPHONE (OUTPATIENT)
Dept: NEUROSURGERY | Facility: CLINIC | Age: 75
End: 2025-06-11
Payer: MEDICARE

## 2025-06-11 ENCOUNTER — APPOINTMENT (INPATIENT)
Dept: PHYSICAL THERAPY | Facility: REHABILITATION | Age: 75
DRG: 560 | End: 2025-06-11
Payer: MEDICARE

## 2025-06-11 ENCOUNTER — APPOINTMENT (INPATIENT)
Dept: OCCUPATIONAL THERAPY | Facility: REHABILITATION | Age: 75
DRG: 560 | End: 2025-06-11
Payer: MEDICARE

## 2025-06-11 LAB
ANION GAP SERPL CALC-SCNC: 8 MEQ/L (ref 3–15)
BUN SERPL-MCNC: 21 MG/DL (ref 7–25)
CALCIUM SERPL-MCNC: 8.4 MG/DL (ref 8.6–10.3)
CHLORIDE SERPL-SCNC: 101 MEQ/L (ref 98–107)
CO2 SERPL-SCNC: 26 MEQ/L (ref 21–31)
CREAT SERPL-MCNC: 1 MG/DL (ref 0.7–1.3)
EGFRCR SERPLBLD CKD-EPI 2021: >60 ML/MIN/1.73M*2
GLUCOSE BLD-MCNC: 110 MG/DL (ref 70–99)
GLUCOSE BLD-MCNC: 152 MG/DL (ref 70–99)
GLUCOSE SERPL-MCNC: 125 MG/DL (ref 70–99)
POCT TEST: ABNORMAL
POCT TEST: ABNORMAL
POTASSIUM SERPL-SCNC: 3.6 MEQ/L (ref 3.5–5.1)
SODIUM SERPL-SCNC: 135 MEQ/L (ref 136–145)

## 2025-06-11 PROCEDURE — 97116 GAIT TRAINING THERAPY: CPT | Mod: GP

## 2025-06-11 PROCEDURE — 63700000 HC SELF-ADMINISTRABLE DRUG: Performed by: PHYSICAL MEDICINE & REHABILITATION

## 2025-06-11 PROCEDURE — 97110 THERAPEUTIC EXERCISES: CPT | Mod: GO

## 2025-06-11 PROCEDURE — 97112 NEUROMUSCULAR REEDUCATION: CPT | Mod: GP

## 2025-06-11 PROCEDURE — 97530 THERAPEUTIC ACTIVITIES: CPT | Mod: GP

## 2025-06-11 PROCEDURE — 63700000 HC SELF-ADMINISTRABLE DRUG: Performed by: INTERNAL MEDICINE

## 2025-06-11 PROCEDURE — 63700000 HC SELF-ADMINISTRABLE DRUG: Performed by: HOSPITALIST

## 2025-06-11 PROCEDURE — 80048 BASIC METABOLIC PNL TOTAL CA: CPT | Performed by: HOSPITALIST

## 2025-06-11 PROCEDURE — 12800000 HC ROOM AND CARE SEMIPRIVATE REHAB

## 2025-06-11 PROCEDURE — 97530 THERAPEUTIC ACTIVITIES: CPT | Mod: GO

## 2025-06-11 PROCEDURE — 63600000 HC DRUGS/DETAIL CODE: Mod: JZ | Performed by: INTERNAL MEDICINE

## 2025-06-11 PROCEDURE — 12800001 HC ROOM AND CARE SEMIPRIVATE REHAB-BRAIN INJ

## 2025-06-11 PROCEDURE — 36415 COLL VENOUS BLD VENIPUNCTURE: CPT | Performed by: HOSPITALIST

## 2025-06-11 RX ORDER — LISINOPRIL 10 MG/1
10 TABLET ORAL 2 TIMES DAILY
Status: DISCONTINUED | OUTPATIENT
Start: 2025-06-11 | End: 2025-06-20 | Stop reason: HOSPADM

## 2025-06-11 RX ADMIN — ENOXAPARIN SODIUM 40 MG: 40 INJECTION SUBCUTANEOUS at 18:09

## 2025-06-11 RX ADMIN — TIMOLOL MALEATE 1 DROP: 5 SOLUTION/ DROPS OPHTHALMIC at 09:01

## 2025-06-11 RX ADMIN — SENNOSIDES AND DOCUSATE SODIUM 2 TABLET: 50; 8.6 TABLET ORAL at 08:58

## 2025-06-11 RX ADMIN — METOPROLOL TARTRATE 50 MG: 50 TABLET, FILM COATED ORAL at 08:58

## 2025-06-11 RX ADMIN — FINASTERIDE 5 MG: 5 TABLET, FILM COATED ORAL at 08:58

## 2025-06-11 RX ADMIN — LISINOPRIL 10 MG: 10 TABLET ORAL at 21:17

## 2025-06-11 RX ADMIN — ACETAMINOPHEN 650 MG: 325 TABLET ORAL at 23:29

## 2025-06-11 RX ADMIN — METOPROLOL TARTRATE 75 MG: 50 TABLET, FILM COATED ORAL at 21:17

## 2025-06-11 RX ADMIN — METFORMIN HYDROCHLORIDE 500 MG: 500 TABLET ORAL at 18:09

## 2025-06-11 RX ADMIN — PANTOPRAZOLE SODIUM 40 MG: 40 TABLET, DELAYED RELEASE ORAL at 08:59

## 2025-06-11 RX ADMIN — METFORMIN HYDROCHLORIDE 500 MG: 500 TABLET ORAL at 08:58

## 2025-06-11 RX ADMIN — CYANOCOBALAMIN TAB 1000 MCG 1000 MCG: 1000 TAB at 08:59

## 2025-06-11 RX ADMIN — Medication 25 MCG: at 08:58

## 2025-06-11 RX ADMIN — ATORVASTATIN CALCIUM 20 MG: 20 TABLET, FILM COATED ORAL at 08:58

## 2025-06-11 RX ADMIN — OXYCODONE HYDROCHLORIDE 5 MG: 5 TABLET ORAL at 09:04

## 2025-06-11 RX ADMIN — ACETAMINOPHEN 650 MG: 325 TABLET ORAL at 05:24

## 2025-06-11 RX ADMIN — OXYCODONE HYDROCHLORIDE 5 MG: 5 TABLET ORAL at 03:33

## 2025-06-11 RX ADMIN — ACETAMINOPHEN 650 MG: 325 TABLET ORAL at 12:44

## 2025-06-11 RX ADMIN — FELODIPINE 10 MG: 5 TABLET, FILM COATED, EXTENDED RELEASE ORAL at 08:58

## 2025-06-11 RX ADMIN — SENNOSIDES AND DOCUSATE SODIUM 2 TABLET: 50; 8.6 TABLET ORAL at 21:17

## 2025-06-11 RX ADMIN — LISINOPRIL 10 MG: 10 TABLET ORAL at 08:58

## 2025-06-11 RX ADMIN — TAMSULOSIN HYDROCHLORIDE 0.4 MG: 0.4 CAPSULE ORAL at 21:17

## 2025-06-11 RX ADMIN — ACETAMINOPHEN 650 MG: 325 TABLET ORAL at 18:09

## 2025-06-11 RX ADMIN — LOSARTAN POTASSIUM 100 MG: 100 TABLET, FILM COATED ORAL at 08:59

## 2025-06-11 NOTE — PROGRESS NOTES
[facility] Progress Note          Patient was seen and examined.   Attestation Notes: Face to face encounter completed    Aneesh Brito is a 75 y.o. male who was admitted for Falls, sequela [W19.XXXS]. Patient was referred by Chase Anna MD for medical assessment and management      CC: Falls, sequela [W19.XXXS]     HPI: Aneesh Brito is a 75 y.o. male with HTN, HLD, DM2, colon ca, BPH, glaucoma, spinal stenosis s/p previous cervical fusion, presented to [facility] 5/31/25 s/p fall, found to have C7 fracture and transferred to [facility] where he underwent C7 ORIF, removal of C3-6 hardware and C3-T1 PLIF by neurosurgery Dr. Raza Benjamin on 6/1/25.  Post op he had anemia but did not require transfusion. He was put on SQ Heparin for DVT ppx.   His pain was managed with Tylenol and Oxycodone.   He had elevated BP with surgery requiring IV Hydralzine but BP improved after surgery and HTN managed with Felodipine, Lisinopril, Losartan and Metoprolol.   He was on Lipitor for HLD.  He was on Finasteride and Tamsulosin for BPH.    He was on netarsudiL-latanoprost and timoptic eye drops for glaucoma.   He had very elevated blood sugars alaina-op, related to IVFs and Decadron, and required insulin. Blood sugars improved and DM2 managed with Metformin.   He had elevated CK of 1118 on admission due to rhabdomyolysis from being down on floor prior to admission. He was treated with IVFs and CK improved.   He was admitted to [facility] on 6/4/2025 for acute inpatient rehab.     [facility] hospital course:    He participated in therapy. He was seen by Nutrition, STACY. Vit D low, started oral supplement.     SUBJECTIVE:  Reported increased pain overnight; had not been given midnight pain medications (may have appeared to be sleeping), awoke with increased pain today  Feeling better after morning pain medications  Otherwise without new complaint today; Denies fever, chest pain, dyspnea, GI  upset    Review of Systems:  All other systems reviewed and negative except as noted in the HPI.    Current meds and allergies reviewed  Current Facility-Administered Medications   Medication Dose Route Frequency    acetaminophen  650 mg oral q6h KOFI    alum-mag hydroxide-simeth  30 mL oral q6h PRN    atorvastatin  20 mg oral Daily    bisacodyL  10 mg rectal Daily PRN    cholecalciferol (vitamin D3)  25 mcg oral Daily    cyanocobalamin  1,000 mcg oral Daily    glucose  15-30 g of dextrose oral PRN    Or    dextrose  15-30 g of dextrose oral PRN    Or    glucagon  1 mg intramuscular PRN    Or    dextrose 50 % in water (D50)  25 mL intravenous PRN    enoxaparin  40 mg subcutaneous Daily (6p)    felodipine  10 mg oral Daily    finasteride  5 mg oral Daily    hydrALAZINE  25 mg oral q6h PRN    insulin lispro U-100  2-5 Units subcutaneous BID AC    lisinopriL  10 mg oral Daily    losartan  100 mg oral Daily    metFORMIN  500 mg oral BID with breakfast and dinner    metoprolol tartrate  50 mg oral BID    netarsudiL-latanoprost  1 drop Right Eye Nightly    oxyCODONE  5 mg oral q4h PRN    pantoprazole  40 mg oral Daily before breakfast    polyethylene glycol  17 g oral Daily    sennosides-docusate sodium  2 tablet oral BID    tamsulosin  0.4 mg oral Nightly    timolol  1 drop Both Eyes Daily        Past Medical History:   Diagnosis Date    C7 cervical fracture (CMS/HCC) 05/31/2025    Colonic adenoma     Diverticulosis of colon     Enlarged prostate with lower urinary tract symptoms (LUTS)     Glaucoma     Hypertension     Melanoma of skin (CMS/HCC)     Osteoarthritis of knee     Overweight     Spinal stenosis of lumbar region     Type 2 diabetes mellitus (CMS/HCC)      Past Surgical History   Procedure Laterality Date    1) ORIF C7 fracture 2) removal and replacement of C3-6 posterior intrumentation 3) C3 to T3 posterolaterral instrumented fusion  Depuy + removal set [facility] C-arm O-Arm N/A 6/1/2025    Performed by Sourav  Raza NI MD at  OR    Appendectomy      Cervical fusion  06/01/2025    1) ORIF C7 fracture 2) removal and replacement of C3-6 posterior intrumentation 3) C3 to T3 posterolaterral instrumented fusion    Colonoscopy  01/10/2012    diverticulosis    Colonoscopy w/ polypectomy  02/09/2022    Hip arthroplasty Bilateral     Knee arthroplasty Left     Knee arthroplasty Right 02/02/2022     Social History     Tobacco Use    Smoking status: Never    Smokeless tobacco: Never   Substance Use Topics    Alcohol use: Yes     Comment: BEER, 1 CONSUMED DAILY    Drug use: Never      Family History   Problem Relation Name Age of Onset    Breast cancer Biological Mother         Vital signs in last 24 hours:  Temp:  [36.4 °C (97.6 °F)-37.2 °C (99 °F)] 37.2 °C (99 °F)  Heart Rate:  [64-69] 66  Resp:  [18] 18  BP: (148-168)/(60-88) 148/60  Vital signs reviewed 06/11/25 2:07 PM    Physical Exam  Vitals and nursing note reviewed.   Constitutional:       Appearance: Normal appearance.   HENT:      Head: Normocephalic and atraumatic.      Right Ear: External ear normal.      Left Ear: External ear normal.      Nose: Nose normal.      Mouth/Throat:      Pharynx: Oropharynx is clear.   Eyes:      Conjunctiva/sclera: Conjunctivae normal.   Neck:      Comments: S/p cervical fusion in collar  Cardiovascular:      Rate and Rhythm: Normal rate.      Pulses: Normal pulses.   Pulmonary:      Effort: Pulmonary effort is normal.   Abdominal:      General: Abdomen is flat.   Musculoskeletal:      Right lower leg: Edema present.      Left lower leg: Edema present.   Skin:     Findings: Lesion (right arm, right ear, sacral wounds as documented by nursing) present.   Neurological:      Mental Status: He is alert and oriented to person, place, and time.                 Objective    Labs:  I have reviewed the patient's labs.  Significant abnormals are anemia, hyponatremia, hypokalemia.  Results from last 7 days   Lab Units 06/09/25  0556   WBC K/uL 7.59    HEMOGLOBIN g/dL 11.0*   HEMATOCRIT % 32.7*   PLATELETS K/uL 337     Results from last 7 days   Lab Units 06/11/25  0523 06/09/25  0556   SODIUM mEQ/L 135* 132*   POTASSIUM mEQ/L 3.6 3.4*   CHLORIDE mEQ/L 101 101   CO2 mEQ/L 26 26   BUN mg/dL 21 22   CREATININE mg/dL 1.0 1.1   CALCIUM mg/dL 8.4* 8.6   ALBUMIN g/dL  --  3.2*   BILIRUBIN TOTAL mg/dL  --  0.5   ALK PHOS IU/L  --  133*   ALT IU/L  --  46   AST IU/L  --  38   GLUCOSE mg/dL 125* 120*            Results from last 7 days   Lab Units 06/05/25  0536   CK TOTAL U/L 124     6/5/25: vit d 25-oh: 13 (low)    POCT: 123-105    Imaging:  Not applicable        ASSESSMENT/PLAN:    75 y.o. malewith HTN, HLD, DM2, colon ca, BPH, glaucoma, spinal stenosis s/p previous cervical fusion, presented to [facility] 5/31/25 s/p fall, found to have C7 fracture and transferred to [facility] where he underwent C7 ORIF, removal of C3-6 hardware and C3-T1 PLIF by neurosurgery Dr. Raza Benjamin on 6/1/25     1. Neuro:  # Cervical stenosis  # acute C7 fx  # DISH  -6/1/25s/p C7 ORIF, removal of C3-6 hardware and C3-T1 PLIF by neurosurgery Dr. Raza Benjamin   -pain managed with Tylenol and Oxycodone     2. Vasc:  # DVT ppx  -bilat LE edema  -SCDs and SQ Heparin for DVT ppx     3. Heme:  # ABLA  -post op anemia  -5/12/25 B12 <400, added oral B12  -hgb 6/9/25 11.0  -on multivitamin     4. Renal:  -increased risk of dehydration and electrolyte changes  -electrolyte imbalance, hypokalemia, hyponatremia  -provide K supplementation; trend Na  -follow BMP, Mg     5. Gi:  # constipation  -Miralax for bowels  # ulcer ppx  -Protonix ulcer ppx     6. Gu:  # BPH  -on Proscar and Flomax  -using [place] cath at  for incontinence  -having high volume urine output at night with some urine retention requiring intermitten cath     7. Cardiac:  # HTN  -on Felodipine, Lisinopril, Losartan, metoprolol tartrate, tamsulosin  -titrate lisinopril and metoprolol to improve control  -BP labile, range  129-178/59-80  -manual only monitoring    -watch for orthostatic hypotension  -use cardiac precautions in therapy     8. Pulm:  -incentive spirometry for atelectasis     9. Derm:  # multiple wounds  -surgical wounds  -wounds on right arm, right ear, sacrum as documented by nursing  -seen by [facility] for skin assessment  -wound care per nursing and WOCN consult     10. Nutrition:  -Adult Diet Regular; Thin Liquids; Consistent Carbohydrate 2000   -sen by Nutrition for assessment and education     11. Endo:  # HLD  -on Lipitor  # DM2  -had steroid induced hyperglycemia due to Decadron, now improved  -on Metformin  -accuchecks BID AC with Lispro scale for BG>200  -hypoglycemia protocol     12. Psych:  # anxiety/depression  -consulted Psychology for support     13. Musculoskeletal:  # rhabdomyolysis  -clinically resolved  -6/5/25 CK back to normal at 124  # vit D deficiency  -6/5/25 Vit D low at 13, started on vit D3 25 mcg po daily.    14. Ophth:  # glaucoma  -on netarsudiL-latanoprost and timoptic eye drops     14. Dispo:  -code status: Full Code        plan discussed with patient, nurse, case management and Chase Anna MD Jeanne Lasota, MD  6/11/2025  2:07 PM

## 2025-06-11 NOTE — PROGRESS NOTES
Patient: Aneesh Brito  Location: [facility] [address]  MRN: [id number]  Today's date: 6/11/2025    History of Present Illness    Aneesh is a 75 y.o. male with a history of colon carcinoma, BPH, hypertension, glaucoma, type 2 diabetes, history of a cervical fusion over 10 years ago admitted after a fall inability to get up on his own.  He was seen at [facility] where imaging studies were performed which demonstrated DISH as well as a Chance fracture at the bottom of C7.  He was transferred to [facility] for neurosurgery.    He was taken to the OR on 6/1 by Dr. Benjamin and underwent an ORIF of the C7 Chance fracture with removal and replacement of the C3-6 posterior instrumentation as well as a C3-T3 posterior lateral fusion.  Drains remain in place.  He is on subcu heparin for DVT prophylaxis.  Currently on a nicardipine drip for blood pressure control.  He states pain is well-controlled.  He denies any other complaints of headache or dizziness, chest pain or shortness of breath.  He is voiding on his own.  He has not had a bowel movement yet.    He was seen by PT and OT needing moderate assistance of 2 to transfer sit to stand.  He ambulated 2 feet with moderate assistance of 2 using a rolling walker.  Pertinent radiology results reviewed. Pertinent lab results reviewed.      Past Medical History  Aneesh has a past medical history of C7 cervical fracture (CMS/HCC) (05/31/2025), Colonic adenoma, Diverticulosis of colon, Enlarged prostate with lower urinary tract symptoms (LUTS), Glaucoma, Hypertension, Melanoma of skin (CMS/HCC), Osteoarthritis of knee, Overweight, Spinal stenosis of lumbar region, and Type 2 diabetes mellitus (CMS/HCC).    PT Vitals      Date/Time Pulse HR Source SpO2 Pt Activity O2 Therapy BP BP Location BP Method Pt Position Who   06/11/25 1114 66 Monitor 96 % At rest None (Room air) 148/60 Right upper arm Automatic Sitting PJ          PT Pain      Date/Time Pain  Type Location Rating: Rest Rating: Activity Interventions [facility]   06/11/25 1114 Pain Assessment neck 5 5 diversional activity provided    06/11/25 1129 Pain Reassessment neck 5 5 relaxation techniques promoted PJ                  Prior Living Environment      Flowsheet Row Most Recent Value   People in Home spouse   Current Living Arrangements independent living facility   Home Accessibility wheelchair accessible   Living Environment Comment PTA residing c wife in [facility] 1 floor set up. Shower stall c shower chair & grab bars. Reports no DME at toilet - information to be verified c pt. wife   Number of Stairs, Main Entrance 0   Patient Bedroom Access Comment PTA residing c wife in [facility] ([facility] 1 floor set up. Shower stall c shower chair & grab bars. Reports no DME at toilet - information to be verified c pt. wife   Bathroom Access Comment PTA residing c wife in [facility] ([facility] 1 floor set up. Shower stall c shower chair & grab bars. Reports no DME at toilet - information to be verified c pt. wife   Number of Stairs, Within Home, Primary 0       Prior Level of Function      Flowsheet Row Most Recent Value   Dominant Hand left   Ambulation assistive equipment  [rollator]   Transferring assistive equipment  [rollator]   Toileting independent   Bathing independent   Dressing independent   Eating independent   IADLs independent   Driving/Transportation    Communication understands/communicates without difficulty   Prior Level of Function Comment Pt reports being independent with use of SPC inside and Rollator to the dining jenkins. Independent ADLs. Denies other falls. Independent ADLs. (+) driving. Retired teacher   Assistive Device Currently Used at Home cane, straight, walker, 4-wheeled, shower chair        IRF PT Evaluation and Treatment - 06/11/25 1116          PT Time Calculation    Start Time 1100     Stop Time 1130     Time Calculation (min) 30 min        Session  Details    Document Type Daily Treatment/Progress Note        General Information    Patient Profile Reviewed yes     General Observations of Patient Pt received in w/c in gym        Mobility Belt    Mobility Belt Used During Session yes        Orthosis Neck Cervical Collar    Orthosis Properties Date Obtained: 06/01/25 Time Obtained: 1803 Location: Neck Type: Cervical Collar Therapeutic Indications: stabilization and support Wearing Schedule: wear when out of bed       Orthosis Neck Cervical Collar    Orthosis Properties Date Obtained: 06/01/25 Time Obtained: 0000 Location: Neck Type: Cervical Collar Features: Hard Description: Apen collar OOB don seated; [place] collar for shower Therapeutic Indications: stabilization and support Wearing Schedule: wear when out of bed       Sit to Stand Transfer    [place], Sit to Stand Transfer close supervision     Safety/Cues technique     Assistive Device walker, front-wheeled     Comment w/c to stance w close S for balance        Stand to Sit Transfer    [place], Stand to Sit Transfer close supervision     Safety/Cues technique     Assistive Device walker, front-wheeled     Comment stance to w/c w close S for controlled descent        Stand Pivot Transfer    [place], Stand Pivot/Stand Step Transfer touching/steadying assist     Safety/Cues increased time to complete     Assistive Device walker, front-wheeled     Comment via amb approach w close S for balance due to general unsteadiness and BLE weakness        Gait Training    [place], Gait touching/steadying assist     Safety/Cues increased time to complete     Assistive Device walker, front-wheeled     Distance in Feet 135 feet     Pattern step-to;step-through     Deviations/Abnormal Patterns step length decreased;stride length decreased;weight shifting decreased;gait speed decreased;avery decreased     Bilateral Gait Deviations heel strike decreased     Comment 135ft x 1 w steadying A for balance  due to general unsteadiness and BLE weakness. Vcs for upright posture and fwd gaze        Rough/Uneven Surface Gait Skills    [place] touching/steadying assist     Assistive Device walker, front-wheeled     Distance in Feet 50 feet     Comment 50ft x 2 over outdoor sidewalk surfaces, steadying A for balance and VCs for avoiding obstacles        Balance    Balance Interventions static;standing     Comment, Balance static standing at anterior railing of pond, 1 x 4 min, steadying A        Daily Progress Summary (PT)    Daily Outcome Statement Pt progressed to 135ft ambulation this session with steadying A overall. Pt progressed to close S for sit<>stands. Introduced rough/uneven surfaces. Cont POC.                        IRF PT Goals      Flowsheet Row Most Recent Value   Bed Mobility Goal 1    Activity/Assistive Device rolling to left, rolling to right, sit to supine/supine to sit at 06/05/2025 0902   [place] minimum assist (75% or more patient effort) at 06/05/2025 0902   Time Frame short-term goal (STG), 1 week at 06/05/2025 0902   Progress/Outcome goal ongoing, goal revised this date  [will continue core strengthening/bed mobility training] at 06/10/2025 0850   Bed Mobility Goal 2    Activity/Assistive Device rolling to left, rolling to right, sit to supine/supine to sit at 06/05/2025 0902   [place] modified independence at 06/05/2025 0902   Time Frame long-term goal (LTG), 3 weeks at 06/05/2025 0902   Progress/Outcome goal ongoing at 06/10/2025 0850   Transfer Goal 1    Activity/Assistive Device sit-to-stand/stand-to-sit, stand pivot at 06/05/2025 0902   [place] tactile cues required  [steadying] at 06/10/2025 0850   Time Frame short-term goal (STG), 1 week at 06/10/2025 0850   Progress/Outcome goal met, goal revised this date at 06/10/2025 0850   Transfer Goal 2    Activity/Assistive Device sit-to-stand/stand-to-sit, stand pivot at 06/05/2025 0902   [place] modified independence at  06/05/2025 0902   Time Frame long-term goal (LTG), 3 weeks at 06/05/2025 0902   Progress/Outcome goal ongoing at 06/10/2025 0850   Gait/Walking Locomotion Goal 1    Activity/Assistive Device gait (walking locomotion) at 06/05/2025 0902   Distance 50 feet at 06/05/2025 0902   [place] tactile cues required at 06/10/2025 0850   Time Frame short-term goal (STG), 1 week at 06/10/2025 0850   Progress/Outcome goal met, goal revised this date at 06/10/2025 0850   Gait/Walking Locomotion Goal 2    Activity/Assistive Device gait (walking locomotion) at 06/05/2025 0902   Distance 150 feet at 06/05/2025 0902   [place] supervision required at 06/05/2025 0902   Time Frame long-term goal (LTG), 3 weeks at 06/05/2025 0902   Progress/Outcome goal ongoing at 06/10/2025 0850   Stairs Goal 1    Activity/Assistive Device stairs, all skills at 06/10/2025 0850   Number of Stairs 8 at 06/10/2025 0850   [place] minimum assist (75% or more patient effort) at 06/10/2025 0850   Time Frame short-term goal (STG), 1 week at 06/10/2025 0850   Stairs Goal 2    Activity/Assistive Device stairs, all skills at 06/10/2025 0850   Number of Stairs 12 at 06/10/2025 0850   [place] supervision required at 06/10/2025 0850   Time Frame long-term goal (LTG), 2 weeks at 06/10/2025 0850

## 2025-06-11 NOTE — PLAN OF CARE
Plan of Care Review  Plan of Care Reviewed With: patient  Progress: improving  Outcome Evaluation: Pt A&O x 4.  Continent of B&B.  Pain managed with tylenol & prn oxycodone.  Min A RW, aspen collar oob.  Sutures removed per orders, well approximated & adebayo, steri strips intact.  Sacral mepolex in place.  Bid acucheck.  Plan of care & meds reviewed

## 2025-06-11 NOTE — TELEPHONE ENCOUNTER
Spoke with Susan at [facility]- sutures will be removed 6/13 and pt will follow up once d/c

## 2025-06-11 NOTE — PROGRESS NOTES
Patient: Aneesh Brito  Location: [facility] [address]  MRN: [id number]  Today's date: 6/11/2025    History of Present Illness    Aneesh is a 75 y.o. male with a history of colon carcinoma, BPH, hypertension, glaucoma, type 2 diabetes, history of a cervical fusion over 10 years ago admitted after a fall inability to get up on his own.  He was seen at [facility] where imaging studies were performed which demonstrated DISH as well as a Chance fracture at the bottom of C7.  He was transferred to [facility] for neurosurgery.    He was taken to the OR on 6/1 by Dr. Benjamin and underwent an ORIF of the C7 Chance fracture with removal and replacement of the C3-6 posterior instrumentation as well as a C3-T3 posterior lateral fusion.  Drains remain in place.  He is on subcu heparin for DVT prophylaxis.  Currently on a nicardipine drip for blood pressure control.  He states pain is well-controlled.  He denies any other complaints of headache or dizziness, chest pain or shortness of breath.  He is voiding on his own.  He has not had a bowel movement yet.    He was seen by PT and OT needing moderate assistance of 2 to transfer sit to stand.  He ambulated 2 feet with moderate assistance of 2 using a rolling walker.  Pertinent radiology results reviewed. Pertinent lab results reviewed.      Past Medical History  Aneesh has a past medical history of C7 cervical fracture (CMS/HCC) (05/31/2025), Colonic adenoma, Diverticulosis of colon, Enlarged prostate with lower urinary tract symptoms (LUTS), Glaucoma, Hypertension, Melanoma of skin (CMS/HCC), Osteoarthritis of knee, Overweight, Spinal stenosis of lumbar region, and Type 2 diabetes mellitus (CMS/HCC).    OT Vitals      Date/Time Pulse HR Source BP BP Location BP Method Pt Position Who   06/11/25 0910 64 Monitor 168/81 Right upper arm Automatic Sitting AMS          OT Pain      Date/Time Pain Type Side/Orientation Rating: Rest Rating: Activity  Interventions [facility]   06/11/25 0904 -- neck 7 -- -- MSM   06/11/25 0910 Pain Assessment neck 5 -- premedicated for activity AMS   06/11/25 0959 Pain Reassessment;Post Activity neck -- 5 diversional activity provided AMS                 Prior Living Environment      Flowsheet Row Most Recent Value   People in Home spouse   Current Living Arrangements independent living facility   Home Accessibility wheelchair accessible   Living Environment Comment PTA residing c wife in [facility] 1 floor set up. Shower stall c shower chair & grab bars. Reports no DME at toilet - information to be verified c pt. wife   Number of Stairs, Main Entrance 0   Patient Bedroom Access Comment PTA residing c wife in [facility] ([facility] 1 floor set up. Shower stall c shower chair & grab bars. Reports no DME at toilet - information to be verified c pt. wife   Bathroom Access Comment PTA residing c wife in [facility] ([facility] 1 floor set up. Shower stall c shower chair & grab bars. Reports no DME at toilet - information to be verified c pt. wife   Number of Stairs, Within Home, Primary 0       Prior Level of Function      Flowsheet Row Most Recent Value   Dominant Hand left   Ambulation assistive equipment  [rollator]   Transferring assistive equipment  [rollator]   Toileting independent   Bathing independent   Dressing independent   Eating independent   IADLs independent   Driving/Transportation    Communication understands/communicates without difficulty   Prior Level of Function Comment Pt reports being independent with use of SPC inside and Rollator to the dining jenkins. Independent ADLs. Denies other falls. Independent ADLs. (+) driving. Retired teacher   Assistive Device Currently Used at Home cane, straight, walker, 4-wheeled, shower chair       Occupational Profile      Flowsheet Row Most Recent Value   Successful Occupations Enjoys participating in the activities at [employer] and [employer]  "hole   Occupational History/Life Experiences PTA independent c ADLs. 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   Patient Goals \"Walk\"        IRF OT Evaluation and Treatment - 06/11/25 0903          OT Time Calculation    Start Time 0903     Stop Time 1003     Time Calculation (min) 60 min        Session Details    Document Type Daily Treatment/Progress Note        General Information    General Observations of Patient Pt received from  in hallway        Mobility Belt    Mobility Belt Used During Session yes        Orthosis Neck Cervical Collar    Orthosis Properties Date Obtained: 06/01/25 Time Obtained: 1803 Location: Neck Type: Cervical Collar Therapeutic Indications: stabilization and support Wearing Schedule: wear when out of bed       Orthosis Neck Cervical Collar    Orthosis Properties Date Obtained: 06/01/25 Time Obtained: 0000 Location: Neck Type: Cervical Collar Features: Hard Description: Apen collar OOB don seated; [place] collar for shower Therapeutic Indications: stabilization and support Wearing Schedule: wear when out of bed    Compliance/Wearing Issues patient;compliant with wearing schedule     Adjustment(s) Needed poor fit;other (see comments)   too loose    Adjustment(s) Completed other (see comments)   re-fastened properly    Adjustment(s) Outcome adjustment effective        Sit to Stand Transfer    [place], Sit to Stand Transfer touching/steadying assist     Safety/Cues verbal cues;hand placement     Assistive Device walker, front-wheeled     Comment w/c        Stand to Sit Transfer    [place], Stand to Sit Transfer touching/steadying assist     Safety/Cues verbal cues;hand placement     Assistive Device walker, front-wheeled     Comment w/c        Balance    Balance Interventions standing;minimal challenge     Comment, Balance with RW present around pt at high/low table " completing shoulder arch wave from R to L passing prior to rise, shoulder extension in standing touching assist x 10 reps each side, IR behind back for toileting simulation each UE x 10 reps        Upper Extremity (Therapeutic Exercise)    Exercise Position/Type seated;AROM (active range of motion);PROM (passive range of motion);AAROM (active assistive range of motion)     General Exercise bilateral;tricep extension   2 x 5 resp with proximal support    Range of Motion Exercises bilateral;shoulder flexion/extension;shoulder abduction/adduction;shoulder horizontal abduction/adduction;elbow flexion/extension;forearm supination/pronation     Reps and Sets 3 x 10 reps     Comment Deltoid aide 5# on each side for assist completing shoulder abd/add and shoulder horizontal abd/add, B air splints to increase elbow ext x 15 min during shoulder therex, postural cues required during all therex, tends to keep shoulders rolled forward and head downward, tightened collar and cues provided        Daily Progress Summary (OT)    Daily Outcome Statement Improved shoulder ROM with airsplints in place to assist with elbow extension.  Also assisted during AAROM.  Recommend ADL suite transfers next session.   Pt would also benefit from supine therex to support trunk and neck and isolate shoulders.  Pt does not have full elbow extension even with PROM however pt reports was unrestricted with functional arm movements prior to admission.                     Education Documentation  Range of Motion/Motor Control Strategies, taught by Gisele Bear OT at 6/11/2025  9:03 AM.  Learner: Patient  Readiness: Acceptance  Method: Explanation  Response: Demonstrated Understanding  Comment: Introduction of deltoid aide and airsplints to assist with ROM    Orthotics, taught by Gisele Bear OT at 6/11/2025  9:03 AM.  Learner: Patient  Readiness: Acceptance  Method: Explanation  Response: Demonstrated Understanding  Comment: Introduction of  deltoid aide and airsplints to assist with ROM          IRF OT Goals      Flowsheet Row Most Recent Value   Transfer Goal 1    Activity/Assistive Device toilet, commode, 3-in-1 at 06/05/2025 1033   [place] supervision required at 06/10/2025 0830   Time Frame short-term goal (STG), 5 - 7 days at 06/05/2025 1033   Progress/Outcome goal met, goal revised this date at 06/10/2025 0830   Transfer Goal 2    Activity/Assistive Device toilet, commode, 3-in-1 at 06/05/2025 1033   [place] modified independence at 06/05/2025 1033   Time Frame long-term goal (LTG), 21 days or less at 06/05/2025 1033   Transfer Goal 3    Activity/Assistive Device shower, tub bench at 06/05/2025 1033   [place] supervision required at 06/10/2025 0830   Time Frame short-term goal (STG), 5 - 7 days at 06/05/2025 1033   Progress/Outcome goal met, goal revised this date at 06/10/2025 0830   Transfer Goal 4    Activity/Assistive Device shower, tub bench at 06/05/2025 1033   [place] modified independence at 06/05/2025 1033   Time Frame long-term goal (LTG), 21 days or less at 06/05/2025 1033   Bathing Goal 1    Activity/Assistive Device bathing skills, all at 06/05/2025 1033   [place] minimum assist (75% or more patient effort) at 06/10/2025 0830   Time Frame short-term goal (STG), 5 - 7 days at 06/05/2025 1033   Strategies/Barriers spinal precautions inhibiting forward reach towards BLE, plan to introduce LHAE at 06/10/2025 0830   Progress/Outcome goal ongoing at 06/10/2025 0830   Bathing Goal 2    Activity/Assistive Device bathing skills, all at 06/05/2025 1033   [place] modified independence at 06/05/2025 1033   Time Frame long-term goal (LTG), 21 days or less at 06/05/2025 1033   UB Dressing Goal 1    Activity/Assistive Device upper body dressing at 06/05/2025 1033   [place] supervision required at 06/10/2025 0830   Time Frame short-term goal (STG), 5 - 7 days at 06/05/2025 1033   Progress/Outcome goal met, goal  revised this date at 06/10/2025 0830   UB Dressing Goal 2    Activity/Assistive Device upper body dressing at 06/05/2025 1033   [place] modified independence at 06/05/2025 1033   Time Frame long-term goal (LTG), 21 days or less at 06/05/2025 1033   LB Dressing Goal 1    Activity/Assistive Device lower body dressing at 06/05/2025 1033   [place] minimum assist (75% or more patient effort) at 06/10/2025 0830   Time Frame short-term goal (STG), 5 - 7 days at 06/05/2025 1033   Progress/Outcome goal met, goal revised this date at 06/10/2025 0830   LB Dressing Goal 2    Activity/Assistive Device lower body dressing at 06/05/2025 1033   [place] modified independence at 06/05/2025 1033   Time Frame long-term goal (LTG), 21 days or less at 06/05/2025 1033   Grooming Goal 1    Activity/Assistive Device grooming skills, all at 06/05/2025 1033   [place] set-up required at 06/10/2025 0830   Time Frame short-term goal (STG), 5 - 7 days at 06/05/2025 1033   Strategies/Barriers standing tolerance/balance, still requiring A in stance at 06/10/2025 0830   Progress/Outcome goal ongoing at 06/10/2025 0830   Grooming Goal 2    Activity/Assistive Device grooming skills, all at 06/05/2025 1033   [place] modified independence at 06/05/2025 1033   Time Frame long-term goal (LTG), 21 days or less at 06/05/2025 1033   Toileting Goal 1    Activity/Assistive Device toileting skills, all at 06/05/2025 1033   [place] minimum assist (75% or more patient effort) at 06/10/2025 0830   Time Frame short-term goal (STG), 5 - 7 days at 06/05/2025 1033   Progress/Outcome goal met, goal revised this date at 06/10/2025 0830   Toileting Goal 2    Activity/Assistive Device toileting skills, all at 06/05/2025 1033   [place] modified independence at 06/05/2025 1033   Time Frame long-term goal (LTG), 21 days or less at 06/05/2025 1033

## 2025-06-11 NOTE — PROGRESS NOTES
Patient: Aneesh Brito  Location: [facility]  MRN: [id number]  Today's date: 6/11/2025    History of Present Illness    Aneesh is a 75 y.o. male with a history of colon carcinoma, BPH, hypertension, glaucoma, type 2 diabetes, history of a cervical fusion over 10 years ago admitted after a fall inability to get up on his own.  He was seen at [facility] where imaging studies were performed which demonstrated DISH as well as a Chance fracture at the bottom of C7.  He was transferred to [facility] for neurosurgery.    He was taken to the OR on 6/1 by Dr. Benjamin and underwent an ORIF of the C7 Chance fracture with removal and replacement of the C3-6 posterior instrumentation as well as a C3-T3 posterior lateral fusion.  Drains remain in place.  He is on subcu heparin for DVT prophylaxis.  Currently on a nicardipine drip for blood pressure control.  He states pain is well-controlled.  He denies any other complaints of headache or dizziness, chest pain or shortness of breath.  He is voiding on his own.  He has not had a bowel movement yet.    He was seen by PT and OT needing moderate assistance of 2 to transfer sit to stand.  He ambulated 2 feet with moderate assistance of 2 using a rolling walker.  Pertinent radiology results reviewed. Pertinent lab results reviewed.      Past Medical History  Aneesh has a past medical history of C7 cervical fracture (CMS/HCC) (05/31/2025), Colonic adenoma, Diverticulosis of colon, Enlarged prostate with lower urinary tract symptoms (LUTS), Glaucoma, Hypertension, Melanoma of skin (CMS/HCC), Osteoarthritis of knee, Overweight, Spinal stenosis of lumbar region, and Type 2 diabetes mellitus (CMS/HCC).    OT Pain      Date/Time Pain Type Rating: Rest Rating: Activity [facility]   06/11/25 1132 Pain Assessment 0 - no pain 0 - no pain KMR                 Prior Living Environment      Flowsheet Row Most Recent Value   People in Home spouse   Current Living  "Arrangements independent living facility   Home Accessibility wheelchair accessible   Living Environment Comment PTA residing c wife in [facility] ([facility] 1 floor set up. Shower stall c shower chair & grab bars. Reports no DME at toilet - information to be verified c pt. wife   Number of Stairs, Main Entrance 0   Patient Bedroom Access Comment PTA residing c wife in [facility] ([facility]). 1 floor set up. Shower stall c shower chair & grab bars. Reports no DME at toilet - information to be verified c pt. wife   Bathroom Access Comment PTA residing c wife in [facility] ([facility]). 1 floor set up. Shower stall c shower chair & grab bars. Reports no DME at toilet - information to be verified c pt. wife   Number of Stairs, Within Home, Primary 0       Prior Level of Function      Flowsheet Row Most Recent Value   Dominant Hand left   Ambulation assistive equipment  [rollator]   Transferring assistive equipment  [rollator]   Toileting independent   Bathing independent   Dressing independent   Eating independent   IADLs independent   Driving/Transportation    Communication understands/communicates without difficulty   Prior Level of Function Comment Pt reports being independent with use of SPC inside and Rollator to the dining jenkins. Independent ADLs. Denies other falls. Independent ADLs. (+) driving. Retired teacher   Assistive Device Currently Used at Home cane, straight, walker, 4-wheeled, shower chair       Occupational Profile      Flowsheet Row Most Recent Value   Successful Occupations Enjoys participating in the activities at [employer] - shuffle board and corn hole   Occupational History/Life Experiences PTA independent c ADLs. 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   Patient Goals \"Walk\"        IRF OT Evaluation and Treatment - 06/11/25 1132          OT Time Calculation    Start Time 1130  "    Stop Time 1200     Time Calculation (min) 30 min        Session Details    Document Type Daily Treatment/Progress Note        General Information    General Observations of Patient pt received seated in WC, direct handoff from PT session        Mobility Belt    Mobility Belt Used During Session yes        Orthosis Neck Cervical Collar    Orthosis Properties Date Obtained: 06/01/25 Time Obtained: 1803 Location: Neck Type: Cervical Collar Therapeutic Indications: stabilization and support Wearing Schedule: wear when out of bed       Orthosis Neck Cervical Collar    Orthosis Properties Date Obtained: 06/01/25 Time Obtained: 0000 Location: Neck Type: Cervical Collar Features: Hard Description: [place]pen collar OOB don seated; [place] collar for shower Therapeutic Indications: stabilization and support Wearing Schedule: wear when out of bed       Sit to Stand Transfer    [place], Sit to Stand Transfer touching/steadying assist     Assistive Device walker, front-wheeled     Comment from WC        Stand to Sit Transfer    [place], Stand to Sit Transfer touching/steadying assist     Assistive Device walker, front-wheeled     Comment to WC        Stand Pivot Transfer    [place], Stand Pivot/Stand Step Transfer minimum assist (75% or more patient effort)     Safety/Cues increased time to complete     Assistive Device walker, front-wheeled     Comment amb approach to/from WC using RW        Impairments/Safety Issues    Comment, Safety Issues/Impairments short HHD amb t/o greenhouse environment while observing various plants and engaging in conversation c treating OT. approx 8 mins total c MIN A for balance/WS at pelvis. shuffling gait noted at times. self-regulated standing rest breaks completed        Lower Extremity (Therapeutic Exercise)    Exercise Position/Type seated;static isometric contraction     Reps and Sets x10 5 sec holds     Comment seated in WC completes inner thigh ball squeezes using  medium-sized ball        Daily Progress Summary (OT)    Symptoms Noted During/After Treatment none     Daily Outcome Statement focus of session dedicated to functional mobility/endurance. pt engaging in functional mobility within greenhouse environment, enjoying therapeutic area. recommend transfer training within ILU in prep for D/C home                          IRF OT Goals      Flowsheet Row Most Recent Value   Transfer Goal 1    Activity/Assistive Device toilet, commode, 3-in-1 at 06/05/2025 1033   [place] supervision required at 06/10/2025 0830   Time Frame short-term goal (STG), 5 - 7 days at 06/05/2025 1033   Progress/Outcome goal met, goal revised this date at 06/10/2025 0830   Transfer Goal 2    Activity/Assistive Device toilet, commode, 3-in-1 at 06/05/2025 1033   [place] modified independence at 06/05/2025 1033   Time Frame long-term goal (LTG), 21 days or less at 06/05/2025 1033   Transfer Goal 3    Activity/Assistive Device shower, tub bench at 06/05/2025 1033   [place] supervision required at 06/10/2025 0830   Time Frame short-term goal (STG), 5 - 7 days at 06/05/2025 1033   Progress/Outcome goal met, goal revised this date at 06/10/2025 0830   Transfer Goal 4    Activity/Assistive Device shower, tub bench at 06/05/2025 1033   [place] modified independence at 06/05/2025 1033   Time Frame long-term goal (LTG), 21 days or less at 06/05/2025 1033   Bathing Goal 1    Activity/Assistive Device bathing skills, all at 06/05/2025 1033   [place] minimum assist (75% or more patient effort) at 06/10/2025 0830   Time Frame short-term goal (STG), 5 - 7 days at 06/05/2025 1033   Strategies/Barriers spinal precautions inhibiting forward reach towards BLE, plan to introduce LHAE at 06/10/2025 0830   Progress/Outcome goal ongoing at 06/10/2025 0830   Bathing Goal 2    Activity/Assistive Device bathing skills, all at 06/05/2025 1033   [place] modified independence at 06/05/2025 1033   Time  Frame long-term goal (LTG), 21 days or less at 06/05/2025 1033   UB Dressing Goal 1    Activity/Assistive Device upper body dressing at 06/05/2025 1033   [place] supervision required at 06/10/2025 0830   Time Frame short-term goal (STG), 5 - 7 days at 06/05/2025 1033   Progress/Outcome goal met, goal revised this date at 06/10/2025 0830   UB Dressing Goal 2    Activity/Assistive Device upper body dressing at 06/05/2025 1033   [place] modified independence at 06/05/2025 1033   Time Frame long-term goal (LTG), 21 days or less at 06/05/2025 1033   LB Dressing Goal 1    Activity/Assistive Device lower body dressing at 06/05/2025 1033   [place] minimum assist (75% or more patient effort) at 06/10/2025 0830   Time Frame short-term goal (STG), 5 - 7 days at 06/05/2025 1033   Progress/Outcome goal met, goal revised this date at 06/10/2025 0830   LB Dressing Goal 2    Activity/Assistive Device lower body dressing at 06/05/2025 1033   [place] modified independence at 06/05/2025 1033   Time Frame long-term goal (LTG), 21 days or less at 06/05/2025 1033   Grooming Goal 1    Activity/Assistive Device grooming skills, all at 06/05/2025 1033   [place] set-up required at 06/10/2025 0830   Time Frame short-term goal (STG), 5 - 7 days at 06/05/2025 1033   Strategies/Barriers standing tolerance/balance, still requiring A in stance at 06/10/2025 0830   Progress/Outcome goal ongoing at 06/10/2025 0830   Grooming Goal 2    Activity/Assistive Device grooming skills, all at 06/05/2025 1033   [place] modified independence at 06/05/2025 1033   Time Frame long-term goal (LTG), 21 days or less at 06/05/2025 1033   Toileting Goal 1    Activity/Assistive Device toileting skills, all at 06/05/2025 1033   [place] minimum assist (75% or more patient effort) at 06/10/2025 0830   Time Frame short-term goal (STG), 5 - 7 days at 06/05/2025 1033   Progress/Outcome goal met, goal revised this date at 06/10/2025 1318    Toileting Goal 2    Activity/Assistive Device toileting skills, all at 06/05/2025 1033   [place] modified independence at 06/05/2025 1033   Time Frame long-term goal (LTG), 21 days or less at 06/05/2025 1033

## 2025-06-11 NOTE — PLAN OF CARE
Plan of Care Review  Plan of Care Reviewed With: patient  Progress: improving  Outcome Evaluation: Pt aaox3, rings appropriately.  Cont of b/b.  [place] cath worn overnight.  Last bm 6/10.  [place] collar OOB.  BID accucheck.  Scheduled tylenol and prn Oxycodone 5mg given for pain management.  posterior neck has steris DOMINGA.   Sacral mepilex intact.  Lovenox and SCDs.  Safety precautions in place

## 2025-06-11 NOTE — PLAN OF CARE
Problem: Rehabilitation (IRF) Plan of Care  Goal: Plan of Care Review  Outcome: Progressing  Flowsheets (Taken 6/11/2025 5038)  Progress: no change  Plan of Care Reviewed With:   patient   spouse   other (see comments)   CM met w pt at bedside to provide updates from treatment team and to discuss goals and ELOS 6/20. Pt in agreement w plan and agreeable to [facility]. CM called pts spouse Yas to provide updates and schedule family training, left message. CM will re  attempt. CM sent HC referral to [facility] and will continue to follow for any needs.

## 2025-06-11 NOTE — PROGRESS NOTES
Patient: Aneesh Brito  Location: [facility]  MRN: [id number]  Today's date: 6/11/2025    History of Present Illness    Aneesh is a 75 y.o. male with a history of colon carcinoma, BPH, hypertension, glaucoma, type 2 diabetes, history of a cervical fusion over 10 years ago admitted after a fall inability to get up on his own.  He was seen at [facility] where imaging studies were performed which demonstrated DISH as well as a Chance fracture at the bottom of C7.  He was transferred to [facility] for neurosurgery.    He was taken to the OR on 6/1 by Dr. Benjamin and underwent an ORIF of the C7 Chance fracture with removal and replacement of the C3-6 posterior instrumentation as well as a C3-T3 posterior lateral fusion.  Drains remain in place.  He is on subcu heparin for DVT prophylaxis.  Currently on a nicardipine drip for blood pressure control.  He states pain is well-controlled.  He denies any other complaints of headache or dizziness, chest pain or shortness of breath.  He is voiding on his own.  He has not had a bowel movement yet.    He was seen by PT and OT needing moderate assistance of 2 to transfer sit to stand.  He ambulated 2 feet with moderate assistance of 2 using a rolling walker.  Pertinent radiology results reviewed. Pertinent lab results reviewed.      Past Medical History  Aneesh has a past medical history of C7 cervical fracture (CMS/HCC) (05/31/2025), Colonic adenoma, Diverticulosis of colon, Enlarged prostate with lower urinary tract symptoms (LUTS), Glaucoma, Hypertension, Melanoma of skin (CMS/HCC), Osteoarthritis of knee, Overweight, Spinal stenosis of lumbar region, and Type 2 diabetes mellitus (CMS/HCC).    PT Vitals      Date/Time Pulse HR Source SpO2 Pt Activity O2 Therapy BP BP Location BP Method Pt Position Who   06/11/25 1410 62 Monitor 98 % At rest None (Room air) 139/64 Right upper arm Automatic Sitting PJ          PT Pain      Date/Time Pain  Type Location Rating: Rest Rating: Activity Interventions [employer]   06/11/25 1410 Pain Assessment neck 1 2 diversional activity provided    06/11/25 1459 Pain Reassessment neck 1 2 relaxation techniques promoted PJ                  Prior Living Environment      Flowsheet Row Most Recent Value   People in Home spouse   Current Living Arrangements independent living facility   Home Accessibility wheelchair accessible   Living Environment Comment PTA residing c wife in [facility] ([facility] 1 floor set up. Shower stall c shower chair & grab bars. Reports no DME at toilet - information to be verified c pt. wife   Number of Stairs, Main Entrance 0   Patient Bedroom Access Comment PTA residing c wife in [facility] ([facility] 1 floor set up. Shower stall c shower chair & grab bars. Reports no DME at toilet - information to be verified c pt. wife   Bathroom Access Comment PTA residing c wife in [facility] ([facility] 1 floor set up. Shower stall c shower chair & grab bars. Reports no DME at toilet - information to be verified c pt. wife   Number of Stairs, Within Home, Primary 0       Prior Level of Function      Flowsheet Row Most Recent Value   Dominant Hand left   Ambulation assistive equipment  [rollator]   Transferring assistive equipment  [rollator]   Toileting independent   Bathing independent   Dressing independent   Eating independent   IADLs independent   Driving/Transportation    Communication understands/communicates without difficulty   Prior Level of Function Comment Pt reports being independent with use of SPC inside and Rollator to the dining jenkins. Independent ADLs. Denies other falls. Independent ADLs. (+) driving. Retired teacher   Assistive Device Currently Used at Home cane, straight, walker, 4-wheeled, shower chair        IRF PT Evaluation and Treatment - 06/11/25 1401          PT Time Calculation    Start Time 1400     Stop Time 1500     Time Calculation (min) 60 min        Session  Details    Document Type Daily Treatment/Progress Note     Mode of Treatment concurrent therapy        General Information    Patient Profile Reviewed yes     General Observations of Patient Pt received in w/c in gym        Mobility Belt    Mobility Belt Used During Session yes        Orthosis Neck Cervical Collar    Orthosis Properties Date Obtained: 06/01/25 Time Obtained: 1803 Location: Neck Type: Cervical Collar Therapeutic Indications: stabilization and support Wearing Schedule: wear when out of bed       Orthosis Neck Cervical Collar    Orthosis Properties Date Obtained: 06/01/25 Time Obtained: 0000 Location: Neck Type: Cervical Collar Features: Hard Description: Apen collar OOB don seated; [place] collar for shower Therapeutic Indications: stabilization and support Wearing Schedule: wear when out of bed       Sit to Stand Transfer    [place], Sit to Stand Transfer close supervision     Safety/Cues technique     Assistive Device walker, front-wheeled     Comment w/c to stance w close S for balance        Stand to Sit Transfer    [place], Stand to Sit Transfer close supervision     Safety/Cues technique     Assistive Device walker, front-wheeled     Comment stance to w/c w close S for controlled descent        Stand Pivot Transfer    [place], Stand Pivot/Stand Step Transfer touching/steadying assist     Safety/Cues increased time to complete     Assistive Device walker, front-wheeled     Comment via amb approach w close S for balance due to general unsteadiness and BLE weakness        Gait Training    [place], Gait touching/steadying assist     Safety/Cues increased time to complete     Assistive Device walker, front-wheeled     Distance in Feet 100 feet     Pattern step-to;step-through     Deviations/Abnormal Patterns step length decreased;stride length decreased;weight shifting decreased;gait speed decreased;avery decreased     Bilateral Gait Deviations heel strike decreased      "Comment 100ft x 2 w RW and steadying A for balance due to general unsteadiness and BLE weakness. Vcs for upright posture and fwd gaze        Stairs Training    [place], Stairs touching/steadying assist     Safety/Cues technique;sequencing     Assistive Device railing     Handrail Location (Stairs) both sides     Number of Stairs 8     Stair Height 6 inches     Ascending Stairs Technique step-over-step     Descending Stairs Technique step-to-step   RLE lead    Comment up/down 4(6\") stairs x2 w steadying A for balance due to general unsteadiness, VCs for sequencing and technique.        Balance    Balance Interventions standing;static;multisensory training;UE activity with balance activity     Comment, Balance 1) static standing on airex foam, performign dual task (scattegories) x 5 min without UE support, steadying A 2) standing without UE support, tossing bean bags to \"cornhole\" board, 1x15, steadying A 3) standing  without UE support, playing Lab[employer] ball x 4 min, steadying A        Daily Progress Summary (PT)    Daily Outcome Statement Pt progressed to 8 consecutive steps this session. Grossly close S to steadying A for all mobility. Performed all balance interventions without LOB. Continues to demonstrate small step length with longer gait distances. Cont POC.                          IRF PT Goals      Flowsheet Row Most Recent Value   Bed Mobility Goal 1    Activity/Assistive Device rolling to left, rolling to right, sit to supine/supine to sit at 06/05/2025 0902   [place] minimum assist (75% or more patient effort) at 06/05/2025 0902   Time Frame short-term goal (STG), 1 week at 06/05/2025 0902   Progress/Outcome goal ongoing, goal revised this date  [will continue core strengthening/bed mobility training] at 06/10/2025 0850   Bed Mobility Goal 2    Activity/Assistive Device rolling to left, rolling to right, sit to supine/supine to sit at 06/05/2025 0902   [place] modified independence at " 06/05/2025 0902   Time Frame long-term goal (LTG), 3 weeks at 06/05/2025 0902   Progress/Outcome goal ongoing at 06/10/2025 0850   Transfer Goal 1    Activity/Assistive Device sit-to-stand/stand-to-sit, stand pivot at 06/05/2025 0902   [place] tactile cues required  [steadying] at 06/10/2025 0850   Time Frame short-term goal (STG), 1 week at 06/10/2025 0850   Progress/Outcome goal met, goal revised this date at 06/10/2025 0850   Transfer Goal 2    Activity/Assistive Device sit-to-stand/stand-to-sit, stand pivot at 06/05/2025 0902   [place] modified independence at 06/05/2025 0902   Time Frame long-term goal (LTG), 3 weeks at 06/05/2025 0902   Progress/Outcome goal ongoing at 06/10/2025 0850   Gait/Walking Locomotion Goal 1    Activity/Assistive Device gait (walking locomotion) at 06/05/2025 0902   Distance 50 feet at 06/05/2025 0902   [place] tactile cues required at 06/10/2025 0850   Time Frame short-term goal (STG), 1 week at 06/10/2025 0850   Progress/Outcome goal met, goal revised this date at 06/10/2025 0850   Gait/Walking Locomotion Goal 2    Activity/Assistive Device gait (walking locomotion) at 06/05/2025 0902   Distance 150 feet at 06/05/2025 0902   [place] supervision required at 06/05/2025 0902   Time Frame long-term goal (LTG), 3 weeks at 06/05/2025 0902   Progress/Outcome goal ongoing at 06/10/2025 0850   Stairs Goal 1    Activity/Assistive Device stairs, all skills at 06/10/2025 0850   Number of Stairs 8 at 06/10/2025 0850   [place] minimum assist (75% or more patient effort) at 06/10/2025 0850   Time Frame short-term goal (STG), 1 week at 06/10/2025 0850   Stairs Goal 2    Activity/Assistive Device stairs, all skills at 06/10/2025 0850   Number of Stairs 12 at 06/10/2025 0850   [place] supervision required at 06/10/2025 0850   Time Frame long-term goal (LTG), 2 weeks at 06/10/2025 0836

## 2025-06-11 NOTE — PROGRESS NOTES
Subjective    Patient seen and examined on rounds.  Chart reviewed.  Events overnight noted.  History reviewed briefly with patient.    CC: Deficits in mobility, transfers, self-care status post fall, unstable 3 column C7 Chance fracture, status post C7 ORIF, removal of prior C3-6 hardware and C3-T1 PLIF by neurosurgery, history of gait instability and multiple falls, peripheral neuropathy, multiple medical problems.    HPI:  Mr. Aneesh Brito is a 75-year-old left handed white male with chronic conditions significant for hypertension, hyperlipidemia, diabetes mellitus type 2, BPH, glaucoma, colon cancer, gait instability, multiple falls in the home environment for which he was seeing a neurologist for workup of falls, history of prior bilateral hip replacements and bilateral knee replacements, spinal stenosis status post previous C3-C6 laminectomy and posterolateral instrumented fusion over 10 years ago by Dr. Maurer, presented to the ER at [facility] on 5/31/25 after suffering from a mechanical fall while at home and was unable to get up so he was on the floor all night until he was found.  At [facility] ER and he reported neck pain and pain in the lower cervical and upper thoracic region.  Imaging studies revealed C7 Chance fracture and findings of DISH (diffuse idiopathic skeletal hyperostosis ).  He was subsequently transferred to [facility] for neurosurgical evaluation where he was admitted on 5/31/25.  He was evaluated by Dr. Benjamin from neurosurgery, noted to have a 3 column Chance fracture at C7-T1 level, given highly unstable fracture was recommended surgical intervention by neurosurgery. He underwent C7 ORIF, removal of C3-6 hardware and C3-T1 PLIF by neurosurgery Dr. Raza Benjamin on 6/1/25.  Postoperatively is allowed weightbearing as tolerated on both lower extremities.  He has been given Aspen collar for use when out of bed in chair and when ambulating.  He can use soft cervical  collar in the bed.  I discussed spinal precautions with him.  Postoperative course was significant for anemia but he did not require transfusion.  His pain is managed with Tylenol and Oxycodone.  He had elevated blood pressure requiring IV Hydralazine but blood pressure improved after surgery and hypertension was managed with managed with Felodipine, Lisinopril, Losartan and Metoprolol.  He is continued on Lipitor for hyperlipidemia.  He has history of BPH and remains on Tamsulosin and Finasteride.  He was on Netarsudil-Latanoprost and Timoptic eye drops for glaucoma.  He had elevated blood sugars related to Decadron perioperatively and required insulin and subsequently blood sugars improved and he is managed with Metformin now. He had elevated CK of 1118 on admission due to rhabdomyolysis from being down on floor prior to admission. He was treated with IV fluids and CK improved.  I discussed his recent clinical course with patient, his wife and son who is a pediatric physician and his son indicated that patient was being evaluated by neurologist Dr. Hussein Haider regarding his gait instability and falls, and lumbar spine MRI was done, EMG studies which showed peripheral neuropathy as well as muscle biopsy was being planned of the right quadriceps to evaluate  his proximal weakness in bilateral lower extremities.  He is unable to do active straight leg raise in bilateral lower extremities in bed, unable to localize position sense in his left great toe and I also discussed with patient, his wife and son at bedside.  I also mentioned to them that neurology will be conservative at him during his stay at [facility] and that he should follow up with Dr. Hussein Haider from neurology on outpatient basis for further evaluation and workup as appropriate.  He is on subcutaneous Heparin and SCDs for DVT prophylaxis.  He is able to tolerate regular with thins consistency diet.  On 6/4/25, hemoglobin was 11.3, WBC count was  "11.85, platelets were 236, BUN was 22, creatinine was 1.1, sodium was 134 and potassium was 3.3.  He has been needing assistance for mobility, transfers, self-care. He is transferred to [facility] on 6/4/25 for further rehabilitation care.     SUBJ: Discussed recommendations of PCC with patient.  Inspected posterior cervical incision which is healing well.  Working on ADLs occupational therapy.  Requiring moderate assistance for upper and lower body dressing, moderate assistance for toileting and bathing in occupational therapy.    ROS: Denies chest pain or shortness of breath. Other ROS negative. Past, family, social history is unchanged.    Current Functional Status:   Bed mobility:   Kane, Supine to Sit: close supervision   Kane, Sit to Supine: touching/steadying assist   Transfers:    Kane, Sit to Stand Transfer: close supervision  Kane, Stand to Sit Transfer: close supervision   Kane, Stand Pivot/Stand Step Transfer: touching/steadying assist   Gait:   Kane, Gait: touching/steadying assist  Assistive Device: walker, front-wheeled   Distance in Feet: 100 feet    Bathing:   Kane: moderate assist (50-74% patient effort)   Toileting:   Kane: moderate assist (50-74% patient effort)   Upper body dressing:   Kane: moderate assist (50-74% patient effort)   Lower body dressing:   Kane: moderate assist (50-74% patient effort)       Functional Progress:    Functional status reviewed. Overall, patient's functional status is improving.      Physical Exam      Blood pressure 138/72, pulse 72, temperature 36.6 °C (97.8 °F), temperature source Oral, resp. rate 18, height 1.803 m (5' 11\"), weight 109 kg (241 lb), SpO2 96%.    Body mass index is 33.61 kg/m².    General Appearance: Not in acute distress  Head/Ear/Nose/Throat: Normocephalic; Atraumatic.   Eye: EOMI; PERRL.   Neck: Healing incision posterior cervicothoracic spine.  " Dressing in place.    Respiratory: Decreased breath sounds at bases.   Cardiovascular: RRR; Normal S1, S2.   Gastrointestinal: Soft; NT; +BS.   Extremities: Bilateral lower extremity edema noted.    Musculoskeletal: Functional active range of motion in both upper extremities except some limitation in left shoulder and is unable to lift left arm up overhead.  Some limitation of active range of motion in both hips and both knees, which is chronic according to patient and his wife at bedside.  Patient is unable to do active straight leg raise and either lower extremity.  He has had previous bilateral hip and bilateral knee replacements.  His wife mentions patient was lifting his lower extremities manually with his arms while getting in the car and after he sat down in the seat inside the car manually left each leg to bring them in the car.   Neurological: AAO ×3. Speech is fluent. Cranial nerve examination does not reveal any gross facial asymmetry. Strength testing about 4/5 strength in both upper extremities except left shoulder is 2-/5 and abduction and forward flexion.  Bilateral hip flexion is 2-/5.  Bilateral quadriceps are 2+/5 with the thighs supported.  Bilateral ankle dorsi and plantar flexion is 3+/5.  He denies significant numbness in his legs and feet at this time.  He is grossly able to localize position sense in his right great toe but not so in his left great toe.  He is able to localize vibration sense in both great toes.  Deep tendon reflexes are hypoactive and symmetric bilaterally.  Plantars are flexor.  Coordination is functional upper extremities.  Both knee jerks were not tested.  Behavior/Emotional: Appropriate; Cooperative.   Skin: Healing incision posterior cervicothoracic spine.  Dressing in place.  Some abrasions noted on right upper extremity.  Left forearm has bruising ecchymosis noted.  Small wound noted on sacrum/coccyx at least stage II decubitus cannot rule out DTI.      Current  Facility-Administered Medications:     acetaminophen (TYLENOL) tablet 650 mg, 650 mg, oral, q6h KOFI, Chandler Falk MD, 650 mg at 06/11/25 1809    alum-mag hydroxide-simeth (MAALOX) 200-200-20 mg/5 mL suspension 30 mL, 30 mL, oral, q6h PRN, Chandler Falk MD    atorvastatin (LIPITOR) tablet 20 mg, 20 mg, oral, Daily, Robert Hawkins MD, 20 mg at 06/11/25 0858    bisacodyL (DULCOLAX) 10 mg suppository 10 mg, 10 mg, rectal, Daily PRN, Chandler Falk MD    cholecalciferol (vitamin D3) tablet 25 mcg, 25 mcg, oral, Daily, Chandler Falk MD, 25 mcg at 06/11/25 0858    cyanocobalamin (VITAMIN B12) tablet 1,000 mcg, 1,000 mcg, oral, Daily, Chandler Falk MD, 1,000 mcg at 06/11/25 0859    glucose chewable tablet 15-30 g of dextrose, 15-30 g of dextrose, oral, PRN **OR** dextrose 40 % oral gel 15-30 g of dextrose, 15-30 g of dextrose, oral, PRN **OR** glucagon (GLUCAGEN) injection 1 mg, 1 mg, intramuscular, PRN **OR** dextrose 50 % in water (D50) injection 12.5 g, 25 mL, intravenous, PRN, Chase Anna MD    enoxaparin (LOVENOX) syringe 40 mg, 40 mg, subcutaneous, Daily (6p), Robert Hawkins MD, 40 mg at 06/11/25 1809    felodipine (PLENDIL) 24 hr ER tablet 10 mg, 10 mg, oral, Daily, Chandler Falk MD, 10 mg at 06/11/25 0858    finasteride (PROSCAR) tablet 5 mg, 5 mg, oral, Daily, Robert Hawkins MD, 5 mg at 06/11/25 0858    hydrALAZINE (APRESOLINE) tablet 25 mg, 25 mg, oral, q6h PRN, Robert Hawkins MD, 25 mg at 06/08/25 1849    insulin lispro U-100 (HumaLOG) pen 2-5 Units, 2-5 Units, subcutaneous, BID AC, Chandler Falk MD    lisinopriL (PRINIVIL) tablet 10 mg, 10 mg, oral, BID, Annette Sherman MD    losartan (COZAAR) tablet 100 mg, 100 mg, oral, Daily, Robert Hawkins MD, 100 mg at 06/11/25 0859    metFORMIN (GLUCOPHAGE) tablet 500 mg, 500 mg, oral, BID with breakfast and dinner, Robert Hawkins MD, 500 mg at 06/11/25 1809    metoprolol tartrate (LOPRESSOR) tablet 75 mg, 75 mg, oral, BID,  Annette Sherman MD    netarsudiL-latanoprost 0.02-0.005 % drops 1 drop, 1 drop, Right Eye, Nightly, Chandler Falk MD, 1 drop at 06/10/25 2045    oxyCODONE (ROXICODONE) immediate release tablet 5 mg, 5 mg, oral, q4h PRN, Reji Mcclain MD, 5 mg at 06/11/25 0904    pantoprazole (PROTONIX) tablet,delayed release (DR/EC) 40 mg, 40 mg, oral, Daily before breakfast, Chandler Falk MD, 40 mg at 06/11/25 0859    polyethylene glycol (MIRALAX) 17 gram packet 17 g, 17 g, oral, Daily, Robert Hawkins MD, 17 g at 06/05/25 0859    sennosides-docusate sodium (SENOKOT-S) 8.6-50 mg per tablet 2 tablet, 2 tablet, oral, BID, Chase Anna MD, 2 tablet at 06/11/25 0858    tamsulosin (FLOMAX) 24 hr ER capsule 0.4 mg, 0.4 mg, oral, Nightly, Robert Hawkins MD, 0.4 mg at 06/10/25 2039    timolol (TIMOPTIC) 0.5 % ophthalmic solution 1 drop, 1 drop, Both Eyes, Daily, Robert Hawkins MD, 1 drop at 06/11/25 0901       Labs / Radiology    Lab Results   Component Value Date    WBC 7.59 06/09/2025    HGB 11.0 (L) 06/09/2025    HCT 32.7 (L) 06/09/2025    MCV 93.2 06/09/2025     06/09/2025     Lab Results   Component Value Date    GLUCOSE 125 (H) 06/11/2025    CALCIUM 8.4 (L) 06/11/2025     (L) 06/11/2025    K 3.6 06/11/2025    CO2 26 06/11/2025     06/11/2025    BUN 21 06/11/2025    CREATININE 1.0 06/11/2025       Assessment and Plan    ASSESSMENT PLAN:  1. 75-year-old left handed white male with chronic conditions significant for hypertension, hyperlipidemia, diabetes mellitus type 2, BPH, glaucoma, colon cancer, gait instability, multiple falls in the home environment for which he was seeing a neurologist for workup of falls, history of prior bilateral hip replacements and bilateral knee replacements, spinal stenosis status post previous C3-C6 laminectomy and posterolateral instrumented fusion over 10 years ago by Dr. Maurer, presented to the ER at [facility] on 5/31/25 after suffering from a mechanical  fall while at home and was unable to get up so he was on the floor all night until he was found.  At [facility] ER and he reported neck pain and pain in the lower cervical and upper thoracic region.  Imaging studies revealed C7 Chance fracture and findings of DISH (diffuse idiopathic skeletal hyperostosis ).  He was subsequently transferred to [facility] for neurosurgical evaluation where he was admitted on 5/31/25.  He was evaluated by Dr. Benjamin from neurosurgery, noted to have a 3 column Chance fracture at C7-T1 level, given highly unstable fracture was recommended surgical intervention by neurosurgery. He underwent C7 ORIF, removal of C3-6 hardware and C3-T1 PLIF by neurosurgery Dr. Raza Benjamin on 6/1/25.  Postoperatively is allowed weightbearing as tolerated on both lower extremities.  He has been given Aspen collar for use when out of bed in chair and when ambulating.  He can use soft cervical collar in the bed.  I discussed spinal precautions with him.  Postoperative course was significant for anemia but he did not require transfusion.  His pain is managed with Tylenol and Oxycodone.  He had elevated blood pressure requiring IV Hydralazine but blood pressure improved after surgery and hypertension was managed with managed with Felodipine, Lisinopril, Losartan and Metoprolol.  He is continued on Lipitor for hyperlipidemia.  He has history of BPH and remains on Tamsulosin and Finasteride.  He was on Netarsudil-Latanoprost and Timoptic eye drops for glaucoma.  He had elevated blood sugars related to Decadron perioperatively and required insulin and subsequently blood sugars improved and he is managed with Metformin now. He had elevated CK of 1118 on admission due to rhabdomyolysis from being down on floor prior to admission. He was treated with IV fluids and CK improved.  I discussed his recent clinical course with patient, his wife and son who is a pediatric physician and his son indicated that  patient was being evaluated by neurologist Dr. Hussein Haider regarding his gait instability and falls, and lumbar spine MRI was done, EMG studies which showed peripheral neuropathy as well as muscle biopsy was being planned of the right quadriceps to evaluate  his proximal weakness in bilateral lower extremities.  He is unable to do active straight leg raise in bilateral lower extremities in bed, unable to localize position sense in his left great toe and I also discussed with patient, his wife and son at bedside.  I also mentioned to them that neurology will be conservative at him during his stay at [facility] and that he should follow up with Dr. Hussein Haider from neurology on outpatient basis for further evaluation and workup as appropriate.  He is on subcutaneous Heparin and SCDs for DVT prophylaxis.  He is able to tolerate regular with thins consistency diet.  On 6/4/25, hemoglobin was 11.3, WBC count was 11.85, platelets were 236, BUN was 22, creatinine was 1.1, sodium was 134 and potassium was 3.3.  He has been needing assistance for mobility, transfers, self-care. He is transferred to [facility] on 6/4/25 for further rehabilitation care.     2. DVT prophylaxis - on subcutaneous Lovenox.  On SCDs.  Platelets 264 on 6/5/2025.  Platelets 307 on 6/9/2025.     3. Neurosurgery - status post fall, unstable 3 column C7 Chance fracture, status post C7 ORIF, removal of prior C3-6 hardware and C3-T1 PLIF by neurosurgery, history of gait instability and multiple falls, peripheral neuropathy, class I obesity, multiple medical problems - continue PT, OT, psychology.  Follow falls precautions, cardiac precautions, aspiration precautions, spinal precautions.  Aspen collar to neck when out of bed.  Monitor healing of posterior cervical incision.  Neurology was consulted.     4. GI - On Protonix for GI prophylaxis. On Senokot-S.  On MiraLAX.  On PRN Dulcolax suppository.  On PRN Maalox.     5.  -on Proscar.   On Flomax.     6. CVS - on Plendil.  On Lisinopril.  On Cozaar.  On Lopressor.  On PRN Hydralazine.     7. Pulmonary - encourage incentive spirometry.     8. Hematology - monitor hemoglobin, platelets.  Hemoglobin 12.2, WBC 10.71 on 6/5/2025.  Hemoglobin 11.0, WBC 7.59 on 6/9/2025.     9. Pain -on Tylenol.  On PRN Oxycodone.     10. Skin - Healing incision posterior cervicothoracic spine.  Dressing in place.  Some abrasions noted on right upper extremity.  Left forearm has bruising ecchymosis noted.  Small wound noted on sacrum/coccyx at least stage II decubitus cannot rule out DTI.     11. F/E/N - on vitamin B-12.  BMI 33.63 consistent with class I obesity.  Nutrition was consulted.     12. Diabetes mellitus type 2  - on sliding scale Humalog coverage.  On Glucophage.  On hypoglycemic protocol.     13. Ophthalmology- He was on NetarsudiL-latanoprost and Timoptic eye drops for glaucoma     14. Hyperlipidemia - on Lipitor.     15. Rehabilitation medicine - Continue comprehensive rehabilitation care. Continue PT, OT, psychology.  Follow falls precautions, cardiac precautions, aspiration precautions, spinal precautions.  Aspen collar to neck when out of bed.  Monitor healing of posterior cervical incision.  Neurology was consulted.Tolerating therapies per endurance on 6/6/2025.  Slept well last night.  Noted to have elevated PVR.  Requiring moderate assistance for sit to stand transfer with physical therapy.  Able to ambulate 20 feet with front wheeled walker dependent gait with physical therapy on 6/6/2025. Discussed with nurse on 6/6/2025. Working on ADLs with occupational therapy on 6/7/2025.  Dependent for upper and lower body dressing, requiring minimal assistance for toileting, moderate assistance for bathing with occupational therapy on 6/7/2025.  Discussed with nurse regarding his PVRs on 6/7/2025.  Using [place] catheter at nighttime.  Discussed with patient on 6/7/2025.Discussed with patient and with his wife  with him on 6/9/2025.  They feel he is making progress in therapy.  Requiring minimal assistance for sit to stand transfer with physical therapy.  Able to ambulate 100 feet with front wheeled walker with minimal assistance with physical therapy.  Reviewed labs on 6/9/2025.Discussed patients progress in therapies with therapists in team meeting on 6/10/2025. Discussed in PCC. See PCC documentation.  Continent of bowel and bladder for nursing on 6/10/2025.Discussed recommendations of PCC with patient on 6/11/2025.  Inspected posterior cervical incision which is healing well.  Working on ADLs occupational therapy.  Requiring moderate assistance for upper and lower body dressing, moderate assistance for toileting and bathing in occupational therapy on 6/11/2025.     16. Reviewed labs today.  BUN 20, creatinine 0.9, sodium 135, potassium 3.7 on 6/5/2025.  BUN 22, creatinine 1.1, sodium 132, potassium 3.4 on 6/9/2025.  BUN 21, creatinine 1.0, sodium 135, potassium 3.6 on 6/11/2025.          Chase Anna MD  6/11/2025      This encounter was completed utilizing the Direct Speech Voice Recognition Software. Grammatical errors, random word insertions, pronoun errors, and incomplete sentences are occasional consequences of the system due to software limitations, ambient noises, and hardware issues. Such errors may be missed prior to affixing electronic signature. Any questions or concerns about the content, text, or information contained within the body of this dictation should be directly addressed to the physician for clarification. If you have any questions or concerns please do not hesitate to call me directly via EPIC chat, page, or email.

## 2025-06-12 ENCOUNTER — APPOINTMENT (INPATIENT)
Dept: OCCUPATIONAL THERAPY | Facility: REHABILITATION | Age: 75
DRG: 560 | End: 2025-06-12
Payer: MEDICARE

## 2025-06-12 ENCOUNTER — APPOINTMENT (INPATIENT)
Dept: PHYSICAL THERAPY | Facility: REHABILITATION | Age: 75
DRG: 560 | End: 2025-06-12
Payer: MEDICARE

## 2025-06-12 LAB
GLUCOSE BLD-MCNC: 107 MG/DL (ref 70–99)
GLUCOSE BLD-MCNC: 148 MG/DL (ref 70–99)
POCT TEST: ABNORMAL
POCT TEST: ABNORMAL

## 2025-06-12 PROCEDURE — 63700000 HC SELF-ADMINISTRABLE DRUG: Performed by: HOSPITALIST

## 2025-06-12 PROCEDURE — 63700000 HC SELF-ADMINISTRABLE DRUG: Performed by: INTERNAL MEDICINE

## 2025-06-12 PROCEDURE — 63700000 HC SELF-ADMINISTRABLE DRUG: Performed by: PHYSICAL MEDICINE & REHABILITATION

## 2025-06-12 PROCEDURE — 97116 GAIT TRAINING THERAPY: CPT | Mod: GP

## 2025-06-12 PROCEDURE — 12800001 HC ROOM AND CARE SEMIPRIVATE REHAB-BRAIN INJ

## 2025-06-12 PROCEDURE — 63600000 HC DRUGS/DETAIL CODE: Mod: JZ | Performed by: INTERNAL MEDICINE

## 2025-06-12 PROCEDURE — 12800000 HC ROOM AND CARE SEMIPRIVATE REHAB

## 2025-06-12 PROCEDURE — 97530 THERAPEUTIC ACTIVITIES: CPT | Mod: GP,CQ

## 2025-06-12 PROCEDURE — 97530 THERAPEUTIC ACTIVITIES: CPT | Mod: GO

## 2025-06-12 RX ORDER — OXYCODONE HYDROCHLORIDE 5 MG/1
5 TABLET ORAL EVERY 6 HOURS PRN
Refills: 0 | Status: DISCONTINUED | OUTPATIENT
Start: 2025-06-12 | End: 2025-06-20 | Stop reason: HOSPADM

## 2025-06-12 RX ADMIN — SENNOSIDES AND DOCUSATE SODIUM 2 TABLET: 50; 8.6 TABLET ORAL at 21:06

## 2025-06-12 RX ADMIN — SENNOSIDES AND DOCUSATE SODIUM 2 TABLET: 50; 8.6 TABLET ORAL at 08:37

## 2025-06-12 RX ADMIN — OXYCODONE HYDROCHLORIDE 5 MG: 5 TABLET ORAL at 12:00

## 2025-06-12 RX ADMIN — TAMSULOSIN HYDROCHLORIDE 0.4 MG: 0.4 CAPSULE ORAL at 21:06

## 2025-06-12 RX ADMIN — Medication 25 MCG: at 08:38

## 2025-06-12 RX ADMIN — METOPROLOL TARTRATE 75 MG: 50 TABLET, FILM COATED ORAL at 08:37

## 2025-06-12 RX ADMIN — TIMOLOL MALEATE 1 DROP: 5 SOLUTION/ DROPS OPHTHALMIC at 08:42

## 2025-06-12 RX ADMIN — CYANOCOBALAMIN TAB 1000 MCG 1000 MCG: 1000 TAB at 08:38

## 2025-06-12 RX ADMIN — FINASTERIDE 5 MG: 5 TABLET, FILM COATED ORAL at 08:37

## 2025-06-12 RX ADMIN — ACETAMINOPHEN 650 MG: 325 TABLET ORAL at 05:11

## 2025-06-12 RX ADMIN — METFORMIN HYDROCHLORIDE 500 MG: 500 TABLET ORAL at 08:38

## 2025-06-12 RX ADMIN — ACETAMINOPHEN 650 MG: 325 TABLET ORAL at 12:00

## 2025-06-12 RX ADMIN — METFORMIN HYDROCHLORIDE 500 MG: 500 TABLET ORAL at 17:16

## 2025-06-12 RX ADMIN — ACETAMINOPHEN 650 MG: 325 TABLET ORAL at 17:16

## 2025-06-12 RX ADMIN — LOSARTAN POTASSIUM 100 MG: 100 TABLET, FILM COATED ORAL at 08:37

## 2025-06-12 RX ADMIN — ENOXAPARIN SODIUM 40 MG: 40 INJECTION SUBCUTANEOUS at 17:16

## 2025-06-12 RX ADMIN — PANTOPRAZOLE SODIUM 40 MG: 40 TABLET, DELAYED RELEASE ORAL at 08:38

## 2025-06-12 RX ADMIN — METOPROLOL TARTRATE 75 MG: 50 TABLET, FILM COATED ORAL at 21:06

## 2025-06-12 RX ADMIN — LISINOPRIL 10 MG: 10 TABLET ORAL at 08:38

## 2025-06-12 RX ADMIN — ATORVASTATIN CALCIUM 20 MG: 20 TABLET, FILM COATED ORAL at 08:37

## 2025-06-12 RX ADMIN — FELODIPINE 10 MG: 5 TABLET, FILM COATED, EXTENDED RELEASE ORAL at 08:37

## 2025-06-12 RX ADMIN — LISINOPRIL 10 MG: 10 TABLET ORAL at 21:05

## 2025-06-12 RX ADMIN — OXYCODONE HYDROCHLORIDE 5 MG: 5 TABLET ORAL at 22:00

## 2025-06-12 NOTE — PROGRESS NOTES
Patient: Aneesh Brito  Location: [facility]  MRN: [id number]  Today's date: 6/12/2025    History of Present Illness    Aneesh is a 75 y.o. male with a history of colon carcinoma, BPH, hypertension, glaucoma, type 2 diabetes, history of a cervical fusion over 10 years ago admitted after a fall inability to get up on his own.  He was seen at [facility] where imaging studies were performed which demonstrated DISH as well as a Chance fracture at the bottom of C7.  He was transferred to [facility] for neurosurgery.    He was taken to the OR on 6/1 by Dr. Benjamin and underwent an ORIF of the C7 Chance fracture with removal and replacement of the C3-6 posterior instrumentation as well as a C3-T3 posterior lateral fusion.  Drains remain in place.  He is on subcu heparin for DVT prophylaxis.  Currently on a nicardipine drip for blood pressure control.  He states pain is well-controlled.  He denies any other complaints of headache or dizziness, chest pain or shortness of breath.  He is voiding on his own.  He has not had a bowel movement yet.    He was seen by PT and OT needing moderate assistance of 2 to transfer sit to stand.  He ambulated 2 feet with moderate assistance of 2 using a rolling walker.  Pertinent radiology results reviewed. Pertinent lab results reviewed.      Past Medical History  Aneesh has a past medical history of C7 cervical fracture (CMS/HCC) (05/31/2025), Colonic adenoma, Diverticulosis of colon, Enlarged prostate with lower urinary tract symptoms (LUTS), Glaucoma, Hypertension, Melanoma of skin (CMS/HCC), Osteoarthritis of knee, Overweight, Spinal stenosis of lumbar region, and Type 2 diabetes mellitus (CMS/HCC).    PT Vitals      Date/Time Pulse HR Source BP BP Location BP Method Pt Position [facility]   06/12/25 1535 52 Monitor 160/83 Left upper arm Automatic Sitting AH          PT Pain      Date/Time Pain Type Location Rating: Rest [facility]   06/12/25 1535 Pain  Assessment neck 2 AH   06/12/25 1600 Pain Assessment neck 2 AH                  Prior Living Environment      Flowsheet Row Most Recent Value   People in Home spouse   Current Living Arrangements independent living facility   Home Accessibility wheelchair accessible   Living Environment Comment PTA residing c wife in [facility] ([facility]). 1 floor set up. Shower stall c shower chair & grab bars. Reports no DME at toilet - information to be verified c pt. wife   Number of Stairs, Main Entrance 0   Patient Bedroom Access Comment PTA residing c wife in \[facility]\"" ([facility]). 1 floor set up. Shower stall c shower chair & grab bars. Reports no DME at toilet - information to be verified c pt. wife   Bathroom Access Comment PTA residing c wife in \[facility]\"" ([facility]). 1 floor set up. Shower stall c shower chair & grab bars. Reports no DME at toilet - information to be verified c pt. wife   Number of Stairs, Within Home, Primary 0       Prior Level of Function      Flowsheet Row Most Recent Value   Dominant Hand left   Ambulation assistive equipment  [rollator]   Transferring assistive equipment  [rollator]   Toileting independent   Bathing independent   Dressing independent   Eating independent   IADLs independent   Driving/Transportation    Communication understands/communicates without difficulty   Prior Level of Function Comment Pt reports being independent with use of SPC inside and Rollator to the dining jenkins. Independent ADLs. Denies other falls. Independent ADLs. (+) driving. Retired teacher   Assistive Device Currently Used at Home cane, straight, walker, 4-wheeled, shower chair        IRF PT Evaluation and Treatment - 06/12/25 1542          PT Time Calculation    Start Time 1530     Stop Time 1600     Time Calculation (min) 30 min        Session Details    Document Type Daily Treatment/Progress Note        General Information    Patient Profile Reviewed yes        Mobility Belt    Mobility Belt  Used During Session yes        Orthosis Neck Cervical Collar    Orthosis Properties Date Obtained: 06/01/25 Time Obtained: 1803 Location: Neck Type: Cervical Collar Therapeutic Indications: stabilization and support Wearing Schedule: wear when out of bed       Orthosis Neck Cervical Collar    Orthosis Properties Date Obtained: 06/01/25 Time Obtained: 0000 Location: Neck Type: Cervical Collar Features: Hard Description: Apen collar OOB don seated; [place] collar for shower Therapeutic Indications: stabilization and support Wearing Schedule: wear when out of bed       Sit to Stand Transfer    [place], Sit to Stand Transfer close supervision     Safety/Cues increased time to complete     Assistive Device walker, front-wheeled     Comment from WC        Stand to Sit Transfer    [place], Stand to Sit Transfer close supervision     Safety/Cues increased time to complete     Assistive Device walker, front-wheeled     Comment to WC        Stand Pivot Transfer    [place], Stand Pivot/Stand Step Transfer touching/steadying assist     Safety/Cues technique;hand placement     Assistive Device walker, front-wheeled     Comment ambulatopry approach using RW, increase steps to turn, steadying assist at trunk        Gait Training    [place], Gait touching/steadying assist     Safety/Cues increased time to complete;gait deviations     Assistive Device walker, front-wheeled     Distance in Feet 100 feet     Pattern step-through     Deviations/Abnormal Patterns gait speed decreased;step length decreased     Bilateral Gait Deviations heel strike decreased     Comment patient demo decrease RLE ground clearance with early mid foot strike which improves with cues for RLE elevations throughout session, decrease avery due to this        Advanced Stepping/Walking Interventions    Box Stepping (Stepping/Walking Interventions) 4inch box taps with RW support, with BUE progressing to LUE support, needing steadying for  posterior trunk lean and balance        Lower Extremity (Therapeutic Exercise)    Exercise Position/Type --     General Exercise --        Daily Progress Summary (PT)    Daily Outcome Statement PT continued interventions to promote RLE elevation and stance stability to address gait deviations seen during ambulation. Patient tolerated intervention with need for minor steadying suggesting effective intensity. Plan to progress LE strength and balance.                          IRF PT Goals      Flowsheet Row Most Recent Value   Bed Mobility Goal 1    Activity/Assistive Device rolling to left, rolling to right, sit to supine/supine to sit at 06/05/2025 0902   Rochelle minimum assist (75% or more patient effort) at 06/05/2025 0902   Time Frame short-term goal (STG), 1 week at 06/05/2025 0902   Progress/Outcome goal ongoing, goal revised this date  [will continue core strengthening/bed mobility training] at 06/10/2025 0850   Bed Mobility Goal 2    Activity/Assistive Device rolling to left, rolling to right, sit to supine/supine to sit at 06/05/2025 0902   Rochelle modified independence at 06/05/2025 0902   Time Frame long-term goal (LTG), 3 weeks at 06/05/2025 0902   Progress/Outcome goal ongoing at 06/10/2025 0850   Transfer Goal 1    Activity/Assistive Device sit-to-stand/stand-to-sit, stand pivot at 06/05/2025 0902   Rochelle tactile cues required  [steadying] at 06/10/2025 0850   Time Frame short-term goal (STG), 1 week at 06/10/2025 0850   Progress/Outcome goal met, goal revised this date at 06/10/2025 0850   Transfer Goal 2    Activity/Assistive Device sit-to-stand/stand-to-sit, stand pivot at 06/05/2025 0902   Rochelle modified independence at 06/05/2025 0902   Time Frame long-term goal (LTG), 3 weeks at 06/05/2025 0902   Progress/Outcome goal ongoing at 06/10/2025 0850   Gait/Walking Locomotion Goal 1    Activity/Assistive Device gait (walking locomotion) at 06/05/2025 0902   Distance 50 feet at  06/05/2025 0902   Benzie tactile cues required at 06/10/2025 0850   Time Frame short-term goal (STG), 1 week at 06/10/2025 0850   Progress/Outcome goal met, goal revised this date at 06/10/2025 0850   Gait/Walking Locomotion Goal 2    Activity/Assistive Device gait (walking locomotion) at 06/05/2025 0902   Distance 150 feet at 06/05/2025 0902   Benzie supervision required at 06/05/2025 0902   Time Frame long-term goal (LTG), 3 weeks at 06/05/2025 0902   Progress/Outcome goal ongoing at 06/10/2025 0850   Stairs Goal 1    Activity/Assistive Device stairs, all skills at 06/10/2025 0850   Number of Stairs 8 at 06/10/2025 0850   Benzie minimum assist (75% or more patient effort) at 06/10/2025 0850   Time Frame short-term goal (STG), 1 week at 06/10/2025 0850   Stairs Goal 2    Activity/Assistive Device stairs, all skills at 06/10/2025 0850   Number of Stairs 12 at 06/10/2025 0850   Benzie supervision required at 06/10/2025 0850   Time Frame long-term goal (LTG), 2 weeks at 06/10/2025 0850

## 2025-06-12 NOTE — PLAN OF CARE
Plan of Care Review  Plan of Care Reviewed With: patient  Progress: improving  Outcome Evaluation: Pt is AAO X 4. Continent of bowel and bladder. c/o neck pain, no PRN order for any analgesics. Physician on call notified, no new orders for now. Ice pack provided and position adjusted, stated relief. Slept well. Safety precautions maintained.

## 2025-06-12 NOTE — PLAN OF CARE
Plan of Care Review  Plan of Care Reviewed With: patient  Progress: improving  Outcome Evaluation: patient is alert and oriented, continent of bowel//bladder, aspen collar maintained OOB, blood sugar stable at 148, oxycodone given for pain

## 2025-06-12 NOTE — PROGRESS NOTES
[facility] Progress Note          Patient was seen and examined.   Attestation Notes: Face to face encounter completed    Aneesh Brito is a 75 y.o. male who was admitted for Falls, sequela [W19.XXXS]. Patient was referred by Chase Anna MD for medical assessment and management      CC: Falls, sequela [W19.XXXS]     HPI: Aneesh Brito is a 75 y.o. male with HTN, HLD, DM2, colon ca, BPH, glaucoma, spinal stenosis s/p previous cervical fusion, presented to [facility] 5/31/25 s/p fall, found to have C7 fracture and transferred to [facility] where he underwent C7 ORIF, removal of C3-6 hardware and C3-T1 PLIF by neurosurgery Dr. Raza Benjamin on 6/1/25.  Post op he had anemia but did not require transfusion. He was put on SQ Heparin for DVT ppx.   His pain was managed with Tylenol and Oxycodone.   He had elevated BP with surgery requiring IV Hydralzine but BP improved after surgery and HTN managed with Felodipine, Lisinopril, Losartan and Metoprolol.   He was on Lipitor for HLD.  He was on Finasteride and Tamsulosin for BPH.    He was on netarsudiL-latanoprost and timoptic eye drops for glaucoma.   He had very elevated blood sugars alaina-op, related to IVFs and Decadron, and required insulin. Blood sugars improved and DM2 managed with Metformin.   He had elevated CK of 1118 on admission due to rhabdomyolysis from being down on floor prior to admission. He was treated with IVFs and CK improved.   He was admitted to [facility] on 6/4/2025 for acute inpatient rehab.     [facility] hospital course:    He participated in therapy. He was seen by Nutrition, STACY. Vit D low, started oral supplement.     SUBJECTIVE:  Doing well today, offers no new somatic complaints overnight  Managing pain with OxyIR  Good appetite, denies GI upset    Review of Systems:  All other systems reviewed and negative except as noted in the HPI.    Current meds and allergies reviewed  Current Facility-Administered  Medications   Medication Dose Route Frequency    acetaminophen  650 mg oral q6h KOFI    alum-mag hydroxide-simeth  30 mL oral q6h PRN    atorvastatin  20 mg oral Daily    bisacodyL  10 mg rectal Daily PRN    cholecalciferol (vitamin D3)  25 mcg oral Daily    cyanocobalamin  1,000 mcg oral Daily    glucose  15-30 g of dextrose oral PRN    Or    dextrose  15-30 g of dextrose oral PRN    Or    glucagon  1 mg intramuscular PRN    Or    dextrose 50 % in water (D50)  25 mL intravenous PRN    enoxaparin  40 mg subcutaneous Daily (6p)    felodipine  10 mg oral Daily    finasteride  5 mg oral Daily    hydrALAZINE  25 mg oral q6h PRN    insulin lispro U-100  2-5 Units subcutaneous BID AC    lisinopriL  10 mg oral BID    losartan  100 mg oral Daily    metFORMIN  500 mg oral BID with breakfast and dinner    metoprolol tartrate  75 mg oral BID    netarsudiL-latanoprost  1 drop Right Eye Nightly    oxyCODONE  5 mg oral q6h PRN    pantoprazole  40 mg oral Daily before breakfast    polyethylene glycol  17 g oral Daily    sennosides-docusate sodium  2 tablet oral BID    tamsulosin  0.4 mg oral Nightly    timolol  1 drop Both Eyes Daily        Past Medical History:   Diagnosis Date    C7 cervical fracture (CMS/HCC) 05/31/2025    Colonic adenoma     Diverticulosis of colon     Enlarged prostate with lower urinary tract symptoms (LUTS)     Glaucoma     Hypertension     Melanoma of skin (CMS/HCC)     Osteoarthritis of knee     Overweight     Spinal stenosis of lumbar region     Type 2 diabetes mellitus (CMS/HCC)      Past Surgical History   Procedure Laterality Date    1) ORIF C7 fracture 2) removal and replacement of C3-6 posterior intrumentation 3) C3 to T3 posterolaterral instrumented fusion  Depuy + removal set [facility] C-arm O-Arm N/A 6/1/2025    Performed by Raza Benjamin MD at  OR    Appendectomy      Cervical fusion  06/01/2025    1) ORIF C7 fracture 2) removal and replacement of C3-6 posterior intrumentation 3) C3 to T3  posterolaterral instrumented fusion    Colonoscopy  01/10/2012    diverticulosis    Colonoscopy w/ polypectomy  02/09/2022    Hip arthroplasty Bilateral     Knee arthroplasty Left     Knee arthroplasty Right 02/02/2022     Social History     Tobacco Use    Smoking status: Never    Smokeless tobacco: Never   Substance Use Topics    Alcohol use: Yes     Comment: BEER, 1 CONSUMED DAILY    Drug use: Never      Family History   Problem Relation Name Age of Onset    Breast cancer Biological Mother         Vital signs in last 24 hours:  Temp:  [36.4 °C (97.6 °F)-36.6 °C (97.9 °F)] 36.5 °C (97.7 °F)  Heart Rate:  [50-74] 52  Resp:  [18-19] 19  BP: (135-176)/(64-85) 160/83  Vital signs reviewed 06/12/25 4:31 PM    Physical Exam  Vitals and nursing note reviewed.   Constitutional:       Appearance: Normal appearance.   HENT:      Head: Normocephalic and atraumatic.      Right Ear: External ear normal.      Left Ear: External ear normal.      Nose: Nose normal.      Mouth/Throat:      Pharynx: Oropharynx is clear.   Eyes:      Conjunctiva/sclera: Conjunctivae normal.   Neck:      Comments: S/p cervical fusion in collar  Cardiovascular:      Rate and Rhythm: Normal rate.      Pulses: Normal pulses.   Pulmonary:      Effort: Pulmonary effort is normal.   Abdominal:      General: Abdomen is flat.   Musculoskeletal:      Right lower leg: Edema present.      Left lower leg: Edema present.   Skin:     Findings: Lesion (right arm, right ear, sacral wounds as documented by nursing) present.   Neurological:      Mental Status: He is alert and oriented to person, place, and time.                 Objective    Labs:  I have reviewed the patient's labs.  Significant abnormals are anemia, hyponatremia, hypokalemia.  Results from last 7 days   Lab Units 06/09/25  0556   WBC K/uL 7.59   HEMOGLOBIN g/dL 11.0*   HEMATOCRIT % 32.7*   PLATELETS K/uL 337     Results from last 7 days   Lab Units 06/11/25  0523 06/09/25  0556   SODIUM mEQ/L 135*  132*   POTASSIUM mEQ/L 3.6 3.4*   CHLORIDE mEQ/L 101 101   CO2 mEQ/L 26 26   BUN mg/dL 21 22   CREATININE mg/dL 1.0 1.1   CALCIUM mg/dL 8.4* 8.6   ALBUMIN g/dL  --  3.2*   BILIRUBIN TOTAL mg/dL  --  0.5   ALK PHOS IU/L  --  133*   ALT IU/L  --  46   AST IU/L  --  38   GLUCOSE mg/dL 125* 120*                  6/5/25: vit d 25-oh: 13 (low)    POCT: 110-152    Imaging:  Not applicable        ASSESSMENT/PLAN:    75 y.o. malewith HTN, HLD, DM2, colon ca, BPH, glaucoma, spinal stenosis s/p previous cervical fusion, presented to [facility] 5/31/25 s/p fall, found to have C7 fracture and transferred to [facility] where he underwent C7 ORIF, removal of C3-6 hardware and C3-T1 PLIF by neurosurgery Dr. Raza Benjamin on 6/1/25     1. Neuro:  # Cervical stenosis  # acute C7 fx  # DISH  -6/1/25s/p C7 ORIF, removal of C3-6 hardware and C3-T1 PLIF by neurosurgery Dr. Raza Benjamin   -pain managed with Tylenol and Oxycodone     2. Vasc:  # DVT ppx  -bilat LE edema  -SCDs and SQ Heparin for DVT ppx     3. Heme:  # ABLA  -post op anemia  -5/12/25 B12 <400, added oral B12  -hgb 6/9/25 11.0  -on multivitamin     4. Renal:  -increased risk of dehydration and electrolyte changes  -electrolyte imbalance, hypokalemia, hyponatremia  -provide K supplementation; trend Na  -follow BMP, Mg     5. Gi:  # constipation  -Miralax for bowels  # ulcer ppx  -Protonix ulcer ppx     6. Gu:  # BPH  -on Proscar and Flomax  -using [place] cath at  for incontinence  -having high volume urine output at night with some urine retention requiring intermitten cath     7. Cardiac:  # HTN  -on Felodipine, Lisinopril, Losartan, metoprolol tartrate, tamsulosin  -titrate lisinopril and metoprolol to improve control  -BP labile, range 138-168/72-81  -manual only monitoring    -watch for orthostatic hypotension  -use cardiac precautions in therapy     8. Pulm:  -incentive spirometry for atelectasis     9. Derm:  # multiple wounds  -surgical wounds  -wounds on  right arm, right ear, sacrum as documented by nursing  -seen by [facility] for skin assessment  -wound care per nursing and WOCN consult     10. Nutrition:  -Adult Diet Regular; Thin Liquids; Consistent Carbohydrate 2000   -sen by Nutrition for assessment and education     11. Endo:  # HLD  -on Lipitor  # DM2  -had steroid induced hyperglycemia due to Decadron, now improved  -on Metformin  -accuchecks BID AC with Lispro scale for BG>200  -hypoglycemia protocol     12. Psych:  # anxiety/depression  -consulted Psychology for support     13. Musculoskeletal:  # rhabdomyolysis  -clinically resolved  -6/5/25 CK back to normal at 124  # vit D deficiency  -6/5/25 Vit D low at 13, started on vit D3 25 mcg po daily.    14. Ophth:  # glaucoma  -on netarsudiL-latanoprost and timoptic eye drops     14. Dispo:  -code status: Full Code        plan discussed with patient, nurse, case management and Chase Anna MD Jeanne Lasota, MD  6/12/2025  4:31 PM

## 2025-06-12 NOTE — PROGRESS NOTES
Patient: Aneesh Brito  Location: [facility]  MRN: [id number]  Today's date: 6/12/2025    History of Present Illness    Aneesh is a 75 y.o. male with a history of colon carcinoma, BPH, hypertension, glaucoma, type 2 diabetes, history of a cervical fusion over 10 years ago admitted after a fall inability to get up on his own.  He was seen at [facility] where imaging studies were performed which demonstrated DISH as well as a Chance fracture at the bottom of C7.  He was transferred to [facility] for neurosurgery.    He was taken to the OR on 6/1 by Dr. Benjamin and underwent an ORIF of the C7 Chance fracture with removal and replacement of the C3-6 posterior instrumentation as well as a C3-T3 posterior lateral fusion.  Drains remain in place.  He is on subcu heparin for DVT prophylaxis.  Currently on a nicardipine drip for blood pressure control.  He states pain is well-controlled.  He denies any other complaints of headache or dizziness, chest pain or shortness of breath.  He is voiding on his own.  He has not had a bowel movement yet.    He was seen by PT and OT needing moderate assistance of 2 to transfer sit to stand.  He ambulated 2 feet with moderate assistance of 2 using a rolling walker.  Pertinent radiology results reviewed. Pertinent lab results reviewed.      Past Medical History  Aneesh has a past medical history of C7 cervical fracture (CMS/HCC) (05/31/2025), Colonic adenoma, Diverticulosis of colon, Enlarged prostate with lower urinary tract symptoms (LUTS), Glaucoma, Hypertension, Melanoma of skin (CMS/HCC), Osteoarthritis of knee, Overweight, Spinal stenosis of lumbar region, and Type 2 diabetes mellitus (CMS/HCC).    OT Vitals      Date/Time Pulse HR Source BP BP Location BP Method Pt Position Who   06/12/25 1034 51 Monitor 142/71 Left upper arm Automatic Sitting [facility]   06/12/25 1157 52 Monitor 135/64 Left upper arm Automatic Lying [facility]          OT Pain       Date/Time Pain Type Side/Orientation Rating: Rest Rating: Rest Rating: Activity Interventions [facility]   06/12/25 1034 Pain Assessment neck -- 2 - mild pain 2 - mild pain premedicated for activity [facility]   06/12/25 1157 Pain Reassessment neck -- 2 - mild pain 2 - mild pain premedicated for activity [facility]                 Prior Living Environment      Flowsheet Row Most Recent Value   People in Home spouse   Current Living Arrangements independent living facility   Home Accessibility wheelchair accessible   Living Environment Comment PTA residing c wife in [facility] ([facility] 1 floor set up. Shower stall c shower chair & grab bars. Reports no DME at toilet - information to be verified c pt. wife   Number of Stairs, Main Entrance 0   Patient Bedroom Access Comment PTA residing c wife in [facility] ([facility] 1 floor set up. Shower stall c shower chair & grab bars. Reports no DME at toilet - information to be verified c pt. wife   Bathroom Access Comment PTA residing c wife in [facility] ([facility]). 1 floor set up. Shower stall c shower chair & grab bars. Reports no DME at toilet - information to be verified c pt. wife   Number of Stairs, Within Home, Primary 0       Prior Level of Function      Flowsheet Row Most Recent Value   Dominant Hand left   Ambulation assistive equipment  [rollator]   Transferring assistive equipment  [rollator]   Toileting independent   Bathing independent   Dressing independent   Eating independent   IADLs independent   Driving/Transportation    Communication understands/communicates without difficulty   Prior Level of Function Comment Pt reports being independent with use of SPC inside and Rollator to the dining jenkins. Independent ADLs. Denies other falls. Independent ADLs. (+) driving. Retired teacher   Assistive Device Currently Used at Home cane, straight, walker, 4-wheeled, shower chair       Occupational Profile      Flowsheet Row Most Recent Value   Successful Occupations  "Enjoys participating in the activities at [employer] and [employer]   Occupational History/Life Experiences PTA independent c ADLs. 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   Patient Goals \"Walk\"        IRF OT Evaluation and Treatment - 06/12/25 1034          OT Time Calculation    Start Time 1030     Stop Time 1200     Time Calculation (min) 90 min        Session Details    Document Type Daily Treatment/Progress Note        General Information    General Observations of Patient pleasant, cooperative; Fatigued throughout session and at end of session difficulty staying awake. VSS. returned to bed for rest at end of session        Mobility Belt    Mobility Belt Used During Session yes        Orthosis Neck Cervical Collar    Orthosis Properties Date Obtained: 06/01/25 Time Obtained: 1803 Location: Neck Type: Cervical Collar Therapeutic Indications: stabilization and support Wearing Schedule: wear when out of bed       Orthosis Neck Cervical Collar    Orthosis Properties Date Obtained: 06/01/25 Time Obtained: 0000 Location: Neck Type: Cervical Collar Features: Hard Description: Apen collar OOB don seated; [place] collar for shower Therapeutic Indications: stabilization and support Wearing Schedule: wear when out of bed       Bed Mobility    Comment OT: Ambulatory approach c RW to standard bed in simulated living environment; supine to/from sit CLS level        Sit to Stand Transfer    [place], Sit to Stand Transfer close supervision     Safety/Cues technique;hand placement     Assistive Device walker, front-wheeled     Comment From W/C & arm chair multiple times        Stand to Sit Transfer    [place], Stand to Sit Transfer close supervision     Safety/Cues technique;hand placement     Assistive Device walker, front-wheeled     Comment To W/C & arm chair multiple times        Stand " Pivot Transfer    [place], Stand Pivot/Stand Step Transfer touching/steadying assist     Safety/Cues technique;hand placement     Assistive Device walker, front-wheeled     Comment From W/C to EOB for return to bed at end of sesison; steadying A level        Toilet Transfer    Transfer Technique --   Amb Tx    [place] touching/steadying assist     Safety/Cues technique;hand placement     Assistive Device accessible height toilet;grab bars/safety frame     Comment Dry trial in simulated living enviornment; Ambulatory approach c RW        Shower Transfer    Transfer Technique backwards entry     [place] touching/steadying assist     Safety/Cues technique;hand placement     Assistive Device grab bars/tub rail;shower chair     Comment Dry trial in simulated living enviornment; Ambulatory approach c RW for back step in/forward step out using RW        Impairments/Safety Issues    Comment, Safety Issues/Impairments OT: HHM c RW in simulated living environment + functional mobility c RW in therapy gym; steadying A c small steps/step to gait pattern c cues for improved technique        Balance    Balance Interventions occupation based/functional task     Comment, Balance 1.) Functional ambulation c RW to/from therapy gym & simulated living environment c RW; 2 mins x 2 c seated rest break in between; steadying A level; 2.) Ambulatory level item retrieval & transport c RW in simulated kitchen environment, 5 mins x 1, steadying A level        Motor Skills    Motor Control/Coordination Interventions neuro-muscular re-education        Neuromuscular Re-education    Interventions closed chain;pattern movements     Positioning sitting;supported     Equipment Used Armeo Spring     Comment Initial set-up on Armeo Spring (UE robotic) for L UE NMRE and AROM in prep for optimal ADL/IADL occupational performance. Completed initial AROM and A-Move assessments. **Note: AROM measurements based on assessment of Armeo Spring  machine, not consistent with goniometric measurements. Goal is increase in Armeo Spring results upon reassessment/re-fitting. SET-UP: Forearm distance: 4, Upper arm distance: 5,  Forearm weight support: A, Upper arm weight support: F. AROM MEASUREMENTS as follows per Armeo Spring (in degrees): Shoulder flexion/ext: 44 to 80,Horizontal shoulder horizontal ab/adduction: -24 to 31,  Shoulder IR/ER: 6 to 80, Elbow flex/ext: 28 to 103, Forearm pronation/supination: -70 to 30, Wrist flex/ext: -21 to 70 . Additionally pt completed 4 games for 3 minute trials each on level easy, and 1 utilized  function. Utilizing approximately 35% of available shoulder flexion/extension reach,  50% of available frontal reach plane,  30% of available shoulder abd/adduction        Therapeutic Interventions    Comment, Therapeutic Intervention Educated on walker safety techniques for functional activity including hand placement, body positioning, item retrieval & transport, safety considerations        Daily Progress Summary (OT)    Daily Outcome Statement Good participation in treatment session. Steadying A for functional transfers in simulated living environment via ambulatory approach c RW. Educated on walker safety for functional activity. Initiated use of Armeo Spring for body weight supported shouler work. Continue OT per POC.                     Education Documentation  Home Safety, taught by Letha Uribe OT at 6/12/2025 11:33 AM.  Learner: Patient  Readiness: Acceptance  Method: Explanation, Demonstration  Response: Verbalizes Understanding, Demonstrated Understanding  Comment: Educated on walker safety techniques for functional activity including hand placement, body positioning, item retrieval & transport, safety considerations    Assistive/Adaptive Devices, taught by Letha Uribe OT at 6/12/2025 11:33 AM.  Learner: Patient  Readiness: Acceptance  Method: Explanation, Demonstration  Response: Verbalizes Understanding,  Demonstrated Understanding  Comment: Educated on walker safety techniques for functional activity including hand placement, body positioning, item retrieval & transport, safety considerations          IRF OT Goals      Flowsheet Row Most Recent Value   Transfer Goal 1    Activity/Assistive Device toilet, commode, 3-in-1 at 06/05/2025 1033   [place] supervision required at 06/10/2025 0830   Time Frame short-term goal (STG), 5 - 7 days at 06/05/2025 1033   Progress/Outcome goal met, goal revised this date at 06/10/2025 0830   Transfer Goal 2    Activity/Assistive Device toilet, commode, 3-in-1 at 06/05/2025 1033   [place] modified independence at 06/05/2025 1033   Time Frame long-term goal (LTG), 21 days or less at 06/05/2025 1033   Transfer Goal 3    Activity/Assistive Device shower, tub bench at 06/05/2025 1033   [place] supervision required at 06/10/2025 0830   Time Frame short-term goal (STG), 5 - 7 days at 06/05/2025 1033   Progress/Outcome goal met, goal revised this date at 06/10/2025 0830   Transfer Goal 4    Activity/Assistive Device shower, tub bench at 06/05/2025 1033   [place] modified independence at 06/05/2025 1033   Time Frame long-term goal (LTG), 21 days or less at 06/05/2025 1033   Bathing Goal 1    Activity/Assistive Device bathing skills, all at 06/05/2025 1033   [place] minimum assist (75% or more patient effort) at 06/10/2025 0830   Time Frame short-term goal (STG), 5 - 7 days at 06/05/2025 1033   Strategies/Barriers spinal precautions inhibiting forward reach towards BLE, plan to introduce LHAE at 06/10/2025 0830   Progress/Outcome goal ongoing at 06/10/2025 0830   Bathing Goal 2    Activity/Assistive Device bathing skills, all at 06/05/2025 1033   [place] modified independence at 06/05/2025 1033   Time Frame long-term goal (LTG), 21 days or less at 06/05/2025 1033   UB Dressing Goal 1    Activity/Assistive Device upper body dressing at 06/05/2025 1033    [place] supervision required at 06/10/2025 0830   Time Frame short-term goal (STG), 5 - 7 days at 06/05/2025 1033   Progress/Outcome goal met, goal revised this date at 06/10/2025 0830   UB Dressing Goal 2    Activity/Assistive Device upper body dressing at 06/05/2025 1033   [place] modified independence at 06/05/2025 1033   Time Frame long-term goal (LTG), 21 days or less at 06/05/2025 1033   LB Dressing Goal 1    Activity/Assistive Device lower body dressing at 06/05/2025 1033   [place] minimum assist (75% or more patient effort) at 06/10/2025 0830   Time Frame short-term goal (STG), 5 - 7 days at 06/05/2025 1033   Progress/Outcome goal met, goal revised this date at 06/10/2025 0830   LB Dressing Goal 2    Activity/Assistive Device lower body dressing at 06/05/2025 1033   [place] modified independence at 06/05/2025 1033   Time Frame long-term goal (LTG), 21 days or less at 06/05/2025 1033   Grooming Goal 1    Activity/Assistive Device grooming skills, all at 06/05/2025 1033   [place] set-up required at 06/10/2025 0830   Time Frame short-term goal (STG), 5 - 7 days at 06/05/2025 1033   Strategies/Barriers standing tolerance/balance, still requiring A in stance at 06/10/2025 0830   Progress/Outcome goal ongoing at 06/10/2025 0830   Grooming Goal 2    Activity/Assistive Device grooming skills, all at 06/05/2025 1033   [place] modified independence at 06/05/2025 1033   Time Frame long-term goal (LTG), 21 days or less at 06/05/2025 1033   Toileting Goal 1    Activity/Assistive Device toileting skills, all at 06/05/2025 1033   [place] minimum assist (75% or more patient effort) at 06/10/2025 0830   Time Frame short-term goal (STG), 5 - 7 days at 06/05/2025 1033   Progress/Outcome goal met, goal revised this date at 06/10/2025 0830   Toileting Goal 2    Activity/Assistive Device toileting skills, all at 06/05/2025 1033   [place] modified independence at 06/05/2025 1033   Time  Frame long-term goal (LTG), 21 days or less at 06/05/2025 [id number]

## 2025-06-12 NOTE — PROGRESS NOTES
Patient: Aneesh Brito  Location: [facility]  MRN: [id number]  Today's date: 6/12/2025    History of Present Illness    Aneesh is a 75 y.o. male with a history of colon carcinoma, BPH, hypertension, glaucoma, type 2 diabetes, history of a cervical fusion over 10 years ago admitted after a fall inability to get up on his own.  He was seen at [facility] where imaging studies were performed which demonstrated DISH as well as a Chance fracture at the bottom of C7.  He was transferred to [facility] for neurosurgery.    He was taken to the OR on 6/1 by Dr. Benjamin and underwent an ORIF of the C7 Chance fracture with removal and replacement of the C3-6 posterior instrumentation as well as a C3-T3 posterior lateral fusion.  Drains remain in place.  He is on subcu heparin for DVT prophylaxis.  Currently on a nicardipine drip for blood pressure control.  He states pain is well-controlled.  He denies any other complaints of headache or dizziness, chest pain or shortness of breath.  He is voiding on his own.  He has not had a bowel movement yet.    He was seen by PT and OT needing moderate assistance of 2 to transfer sit to stand.  He ambulated 2 feet with moderate assistance of 2 using a rolling walker.  Pertinent radiology results reviewed. Pertinent lab results reviewed.      Past Medical History  Aneesh has a past medical history of C7 cervical fracture (CMS/HCC) (05/31/2025), Colonic adenoma, Diverticulosis of colon, Enlarged prostate with lower urinary tract symptoms (LUTS), Glaucoma, Hypertension, Melanoma of skin (CMS/HCC), Osteoarthritis of knee, Overweight, Spinal stenosis of lumbar region, and Type 2 diabetes mellitus (CMS/HCC).    PT Vitals      Date/Time Pulse HR Source BP BP Location BP Method Pt Position Who   06/12/25 0900 62 Left Radial 146/72 Left upper arm Manual Sitting ACP   06/12/25 0956 68 Left Radial 154/78 Left upper arm Manual Sitting ACP          PT Pain       Date/Time Pain Type Rating: Rest Who   06/12/25 0900 Pain Assessment 0 ACP   06/12/25 0956 Pain Assessment 0 ACP                  Prior Living Environment      Flowsheet Row Most Recent Value   People in Home spouse   Current Living Arrangements independent living facility   Home Accessibility wheelchair accessible   Living Environment Comment PTA residing c wife in [facility] ([facility] 1 floor set up. Shower stall c shower chair & grab bars. Reports no DME at toilet - information to be verified c pt. wife   Number of Stairs, Main Entrance 0   Patient Bedroom Access Comment PTA residing c wife in [facility] ([facility] 1 floor set up. Shower stall c shower chair & grab bars. Reports no DME at toilet - information to be verified c pt. wife   Bathroom Access Comment PTA residing c wife in [facility] ([facility] 1 floor set up. Shower stall c shower chair & grab bars. Reports no DME at toilet - information to be verified c pt. wife   Number of Stairs, Within Home, Primary 0       Prior Level of Function      Flowsheet Row Most Recent Value   Dominant Hand left   Ambulation assistive equipment  [rollator]   Transferring assistive equipment  [rollator]   Toileting independent   Bathing independent   Dressing independent   Eating independent   IADLs independent   Driving/Transportation    Communication understands/communicates without difficulty   Prior Level of Function Comment Pt reports being independent with use of SPC inside and Rollator to the dining jenkins. Independent ADLs. Denies other falls. Independent ADLs. (+) driving. Retired teacher   Assistive Device Currently Used at Home cane, straight, walker, 4-wheeled, shower chair        IRF PT Evaluation and Treatment - 06/12/25 0900          PT Time Calculation    Start Time 0900     Stop Time 1000     Time Calculation (min) 60 min        Session Details    Document Type Daily Treatment/Progress Note        General Information    Patient Profile  Reviewed yes     General Observations of Patient Pt. recieved getting dressed with PCT in room while laying in bed. Pleasant and agreeable to PT.        Mobility Belt    Mobility Belt Used During Session yes        Orthosis Neck Cervical Collar    Orthosis Properties Date Obtained: 06/01/25 Time Obtained: 1803 Location: Neck Type: Cervical Collar Therapeutic Indications: stabilization and support Wearing Schedule: wear when out of bed    Skin Assessment intact     Compliance/Wearing Issues patient;compliant with wearing schedule     Adjustment(s) Completed positioning strategies implemented   front of cervical collar donned upsidedown. Collar taken off and flipped to adjust, fit and function approprate.    Adjustment(s) Outcome adjustment effective        Orthosis Neck Cervical Collar    Orthosis Properties Date Obtained: 06/01/25 Time Obtained: 0000 Location: Neck Type: Cervical Collar Features: Hard Description: Apen collar OOB don seated; [place] collar for shower Therapeutic Indications: stabilization and support Wearing Schedule: wear when out of bed       Bed Mobility    [place], Roll Left close supervision     [place], Roll Right close supervision     [place], Supine to Sit close supervision     Safety/Cues maintaining precautions;technique     Assistive Device none     Comment Cl S for safety for bed mobility in room on hospital bed.        Bed to Chair Transfer    [place], Bed to Chair moderate assist (50-74% patient effort)     Safety/Cues increased time to complete;technique;maintaining precautions     Assistive Device walker, front-wheeled     Comment EOB to stance with RW, via amb approach to WC.        Sit to Stand Transfer    [place], Sit to Stand Transfer close supervision     Safety/Cues technique     Assistive Device walker, front-wheeled     Comment from wc to stance with RW, Cl S for balance.        Stand to Sit Transfer    [place], Stand to Sit Transfer close  "supervision     Safety/Cues technique;hand placement     Assistive Device walker, front-wheeled     Comment to wc from stance with RW, Cl S for balance and VC's for handplacement        Stand Pivot Transfer    [place], Stand Pivot/Stand Step Transfer touching/steadying assist     Safety/Cues increased time to complete;technique;sequencing;proper use of assistive device     Assistive Device walker, front-wheeled     Comment via amb approact to wc, Steadying A for balance negotiating turns and VC to keep RW close.        Gait Training    [place], Gait touching/steadying assist     Safety/Cues increased time to complete     Assistive Device walker, front-wheeled     Distance in Feet 100 feet     Pattern step-to;step-through     Deviations/Abnormal Patterns step length decreased;gait speed decreased;stride length decreased;weight shifting decreased     Bilateral Gait Deviations heel strike decreased     Comment 100 ft x 2 with RW. Steadying A for balance due to general unsteadiness.        Rough/Uneven Surface Gait Skills    [place] touching/steadying assist     Assistive Device walker, front-wheeled     Distance in Feet 50 feet     Comment 50 ft x 2 over outdoor surfaces. Steadying A for balance and RW management over sidewalk.        Balance    Comment, Balance Standing dynamic box taps to 6\" block 2 x 10 with RW for BLE support. Steadying A for balance.        Daily Progress Summary (PT)    Daily Outcome Statement Session focused on ambulation with RW and focus on increasing step length over indoor and outdoor surfaces. Continue to progress per PT POC with dynamic balance, stairs, and functional mobility.                          IRF PT Goals      Flowsheet Row Most Recent Value   Bed Mobility Goal 1    Activity/Assistive Device rolling to left, rolling to right, sit to supine/supine to sit at 06/05/2025 0902   [place] minimum assist (75% or more patient effort) at 06/05/2025 0902   Time Frame " short-term goal (STG), 1 week at 06/05/2025 0902   Progress/Outcome goal ongoing, goal revised this date  [will continue core strengthening/bed mobility training] at 06/10/2025 0850   Bed Mobility Goal 2    Activity/Assistive Device rolling to left, rolling to right, sit to supine/supine to sit at 06/05/2025 0902   [place] modified independence at 06/05/2025 0902   Time Frame long-term goal (LTG), 3 weeks at 06/05/2025 0902   Progress/Outcome goal ongoing at 06/10/2025 0850   Transfer Goal 1    Activity/Assistive Device sit-to-stand/stand-to-sit, stand pivot at 06/05/2025 0902   [place] tactile cues required  [steadying] at 06/10/2025 0850   Time Frame short-term goal (STG), 1 week at 06/10/2025 0850   Progress/Outcome goal met, goal revised this date at 06/10/2025 0850   Transfer Goal 2    Activity/Assistive Device sit-to-stand/stand-to-sit, stand pivot at 06/05/2025 0902   [place] modified independence at 06/05/2025 0902   Time Frame long-term goal (LTG), 3 weeks at 06/05/2025 0902   Progress/Outcome goal ongoing at 06/10/2025 0850   Gait/Walking Locomotion Goal 1    Activity/Assistive Device gait (walking locomotion) at 06/05/2025 0902   Distance 50 feet at 06/05/2025 0902   [place] tactile cues required at 06/10/2025 0850   Time Frame short-term goal (STG), 1 week at 06/10/2025 0850   Progress/Outcome goal met, goal revised this date at 06/10/2025 0850   Gait/Walking Locomotion Goal 2    Activity/Assistive Device gait (walking locomotion) at 06/05/2025 0902   Distance 150 feet at 06/05/2025 0902   [place] supervision required at 06/05/2025 0902   Time Frame long-term goal (LTG), 3 weeks at 06/05/2025 0902   Progress/Outcome goal ongoing at 06/10/2025 0850   Stairs Goal 1    Activity/Assistive Device stairs, all skills at 06/10/2025 0850   Number of Stairs 8 at 06/10/2025 0850   [place] minimum assist (75% or more patient effort) at 06/10/2025 0850   Time Frame short-term goal (STG),  1 week at 06/10/2025 0850   Stairs Goal 2    Activity/Assistive Device stairs, all skills at 06/10/2025 0850   Number of Stairs 12 at 06/10/2025 0850   [place] supervision required at 06/10/2025 0850   Time Frame long-term goal (LTG), 2 weeks at 06/10/2025 0850

## 2025-06-12 NOTE — PROGRESS NOTES
Subjective    Patient seen and examined on rounds.  Chart reviewed.  Events overnight noted.  History reviewed briefly with patient.    CC: Deficits in mobility, transfers, self-care status post fall, unstable 3 column C7 Chance fracture, status post C7 ORIF, removal of prior C3-6 hardware and C3-T1 PLIF by neurosurgery, history of gait instability and multiple falls, peripheral neuropathy, multiple medical problems.    HPI:  Mr. Aneesh Brito is a 75-year-old left handed white male with chronic conditions significant for hypertension, hyperlipidemia, diabetes mellitus type 2, BPH, glaucoma, colon cancer, gait instability, multiple falls in the home environment for which he was seeing a neurologist for workup of falls, history of prior bilateral hip replacements and bilateral knee replacements, spinal stenosis status post previous C3-C6 laminectomy and posterolateral instrumented fusion over 10 years ago by Dr. Maurer, presented to the ER at [facility] on 5/31/25 after suffering from a mechanical fall while at home and was unable to get up so he was on the floor all night until he was found.  At [facility] ER and he reported neck pain and pain in the lower cervical and upper thoracic region.  Imaging studies revealed C7 Chance fracture and findings of DISH (diffuse idiopathic skeletal hyperostosis ).  He was subsequently transferred to [facility] for neurosurgical evaluation where he was admitted on 5/31/25.  He was evaluated by Dr. Benjamin from neurosurgery, noted to have a 3 column Chance fracture at C7-T1 level, given highly unstable fracture was recommended surgical intervention by neurosurgery. He underwent C7 ORIF, removal of C3-6 hardware and C3-T1 PLIF by neurosurgery Dr. Raza Benjamin on 6/1/25.  Postoperatively is allowed weightbearing as tolerated on both lower extremities.  He has been given Aspen collar for use when out of bed in chair and when ambulating.  He can use soft cervical  collar in the bed.  I discussed spinal precautions with him.  Postoperative course was significant for anemia but he did not require transfusion.  His pain is managed with Tylenol and Oxycodone.  He had elevated blood pressure requiring IV Hydralazine but blood pressure improved after surgery and hypertension was managed with managed with Felodipine, Lisinopril, Losartan and Metoprolol.  He is continued on Lipitor for hyperlipidemia.  He has history of BPH and remains on Tamsulosin and Finasteride.  He was on Netarsudil-Latanoprost and Timoptic eye drops for glaucoma.  He had elevated blood sugars related to Decadron perioperatively and required insulin and subsequently blood sugars improved and he is managed with Metformin now. He had elevated CK of 1118 on admission due to rhabdomyolysis from being down on floor prior to admission. He was treated with IV fluids and CK improved.  I discussed his recent clinical course with patient, his wife and son who is a pediatric physician and his son indicated that patient was being evaluated by neurologist Dr. Hussein Haider regarding his gait instability and falls, and lumbar spine MRI was done, EMG studies which showed peripheral neuropathy as well as muscle biopsy was being planned of the right quadriceps to evaluate  his proximal weakness in bilateral lower extremities.  He is unable to do active straight leg raise in bilateral lower extremities in bed, unable to localize position sense in his left great toe and I also discussed with patient, his wife and son at bedside.  I also mentioned to them that neurology will be conservative at him during his stay at [facility] and that he should follow up with Dr. Hussein Haider from neurology on outpatient basis for further evaluation and workup as appropriate.  He is on subcutaneous Heparin and SCDs for DVT prophylaxis.  He is able to tolerate regular with thins consistency diet.  On 6/4/25, hemoglobin was 11.3, WBC count was  "11.85, platelets were 236, BUN was 22, creatinine was 1.1, sodium was 134 and potassium was 3.3.  He has been needing assistance for mobility, transfers, self-care. He is transferred to [facility] on 6/4/25 for further rehabilitation care.     SUBJ: Apparently per nursing his pain medications fell off last night and house physician did not renew them according to nurse. Discussed with patient regarding Epic generated automatic stop on narcotics causing these medications to be discontinued but medications were reviewed today by internist.  Discussed with nurse.    ROS: Denies chest pain or shortness of breath. Other ROS negative. Past, family, social history is unchanged.    Current Functional Status:   Bed mobility:   [place], Supine to Sit: close supervision   [place], Sit to Supine: touching/steadying assist   Transfers:    [place], Sit to Stand Transfer: close supervision  [place], Stand to Sit Transfer: close supervision   [place], Stand Pivot/Stand Step Transfer: touching/steadying assist   Gait:   [place], Gait: touching/steadying assist  Assistive Device: walker, front-wheeled   Distance in Feet: 100 feet    Bathing:   [place]: moderate assist (50-74% patient effort)   Toileting:   [place]: moderate assist (50-74% patient effort)   Upper body dressing:   [place]: moderate assist (50-74% patient effort)   Lower body dressing:   [place]: moderate assist (50-74% patient effort)       Functional Progress:    Functional status reviewed. Overall, patient's functional status is improving.      Physical Exam      Blood pressure 135/64, pulse (!) 52, temperature 36.4 °C (97.6 °F), temperature source Oral, resp. rate 19, height 1.803 m (5' 11\"), weight 109 kg (241 lb), SpO2 94%.    Body mass index is 33.61 kg/m².    General Appearance: Not in acute distress  Head/Ear/Nose/Throat: Normocephalic; Atraumatic.   Eye: EOMI; PERRL.   Neck: Healing incision " posterior cervicothoracic spine.  Dressing in place.    Respiratory: Decreased breath sounds at bases.   Cardiovascular: RRR; Normal S1, S2.   Gastrointestinal: Soft; NT; +BS.   Extremities: Bilateral lower extremity edema noted.    Musculoskeletal: Functional active range of motion in both upper extremities except some limitation in left shoulder and is unable to lift left arm up overhead.  Some limitation of active range of motion in both hips and both knees, which is chronic according to patient and his wife at bedside.  Patient is unable to do active straight leg raise and either lower extremity.  He has had previous bilateral hip and bilateral knee replacements.  His wife mentions patient was lifting his lower extremities manually with his arms while getting in the car and after he sat down in the seat inside the car manually left each leg to bring them in the car.   Neurological: AAO ×3. Speech is fluent. Cranial nerve examination does not reveal any gross facial asymmetry. Strength testing about 4/5 strength in both upper extremities except left shoulder is 2-/5 and abduction and forward flexion.  Bilateral hip flexion is 2-/5.  Bilateral quadriceps are 2+/5 with the thighs supported.  Bilateral ankle dorsi and plantar flexion is 3+/5.  He denies significant numbness in his legs and feet at this time.  He is grossly able to localize position sense in his right great toe but not so in his left great toe.  He is able to localize vibration sense in both great toes.  Deep tendon reflexes are hypoactive and symmetric bilaterally.  Plantars are flexor.  Coordination is functional upper extremities.  Both knee jerks were not tested.  Behavior/Emotional: Appropriate; Cooperative.   Skin: Healing incision posterior cervicothoracic spine.  Dressing in place.  Some abrasions noted on right upper extremity.  Left forearm has bruising ecchymosis noted.  Small wound noted on sacrum/coccyx at least stage II decubitus cannot  rule out DTI.      Current Facility-Administered Medications:     acetaminophen (TYLENOL) tablet 650 mg, 650 mg, oral, q6h KOFI, Chandler Falk MD, 650 mg at 06/12/25 1200    alum-mag hydroxide-simeth (MAALOX) 200-200-20 mg/5 mL suspension 30 mL, 30 mL, oral, q6h PRN, Chandler Falk MD    atorvastatin (LIPITOR) tablet 20 mg, 20 mg, oral, Daily, Robert Hawkins MD, 20 mg at 06/12/25 0837    bisacodyL (DULCOLAX) 10 mg suppository 10 mg, 10 mg, rectal, Daily PRN, Chandler Falk MD    cholecalciferol (vitamin D3) tablet 25 mcg, 25 mcg, oral, Daily, Chandler Falk MD, 25 mcg at 06/12/25 0838    cyanocobalamin (VITAMIN B12) tablet 1,000 mcg, 1,000 mcg, oral, Daily, Chandler Falk MD, 1,000 mcg at 06/12/25 0838    glucose chewable tablet 15-30 g of dextrose, 15-30 g of dextrose, oral, PRN **OR** dextrose 40 % oral gel 15-30 g of dextrose, 15-30 g of dextrose, oral, PRN **OR** glucagon (GLUCAGEN) injection 1 mg, 1 mg, intramuscular, PRN **OR** dextrose 50 % in water (D50) injection 12.5 g, 25 mL, intravenous, PRN, Chase Anna MD    enoxaparin (LOVENOX) syringe 40 mg, 40 mg, subcutaneous, Daily (6p), Robert Hawkins MD, 40 mg at 06/11/25 1809    felodipine (PLENDIL) 24 hr ER tablet 10 mg, 10 mg, oral, Daily, Chandler Falk MD, 10 mg at 06/12/25 0837    finasteride (PROSCAR) tablet 5 mg, 5 mg, oral, Daily, Robert Hawkins MD, 5 mg at 06/12/25 0837    hydrALAZINE (APRESOLINE) tablet 25 mg, 25 mg, oral, q6h PRN, Robert Hawkins MD, 25 mg at 06/08/25 1849    insulin lispro U-100 (HumaLOG) pen 2-5 Units, 2-5 Units, subcutaneous, BID AC, Chandler Falk MD    lisinopriL (PRINIVIL) tablet 10 mg, 10 mg, oral, BID, Annette Sherman MD, 10 mg at 06/12/25 0838    losartan (COZAAR) tablet 100 mg, 100 mg, oral, Daily, Robert Hawkins MD, 100 mg at 06/12/25 0837    metFORMIN (GLUCOPHAGE) tablet 500 mg, 500 mg, oral, BID with breakfast and dinner, Robert Hawkins MD, 500 mg at 06/12/25 0838    metoprolol  tartrate (LOPRESSOR) tablet 75 mg, 75 mg, oral, BID, nAnette Sherman MD, 75 mg at 06/12/25 0837    netarsudiL-latanoprost 0.02-0.005 % drops 1 drop, 1 drop, Right Eye, Nightly, Chandler Falk MD, 1 drop at 06/11/25 2121    oxyCODONE (ROXICODONE) immediate release tablet 5 mg, 5 mg, oral, q6h PRN, Annette Sherman MD, 5 mg at 06/12/25 1200    pantoprazole (PROTONIX) tablet,delayed release (DR/EC) 40 mg, 40 mg, oral, Daily before breakfast, Chandler Falk MD, 40 mg at 06/12/25 0838    polyethylene glycol (MIRALAX) 17 gram packet 17 g, 17 g, oral, Daily, Robert Hawkins MD, 17 g at 06/05/25 0859    sennosides-docusate sodium (SENOKOT-S) 8.6-50 mg per tablet 2 tablet, 2 tablet, oral, BID, Chase Anna MD, 2 tablet at 06/12/25 0837    tamsulosin (FLOMAX) 24 hr ER capsule 0.4 mg, 0.4 mg, oral, Nightly, Robert Hawkins MD, 0.4 mg at 06/11/25 2117    timolol (TIMOPTIC) 0.5 % ophthalmic solution 1 drop, 1 drop, Both Eyes, Daily, Robert Hawkins MD, 1 drop at 06/12/25 0842       Labs / Radiology    Lab Results   Component Value Date    WBC 7.59 06/09/2025    HGB 11.0 (L) 06/09/2025    HCT 32.7 (L) 06/09/2025    MCV 93.2 06/09/2025     06/09/2025     Lab Results   Component Value Date    GLUCOSE 125 (H) 06/11/2025    CALCIUM 8.4 (L) 06/11/2025     (L) 06/11/2025    K 3.6 06/11/2025    CO2 26 06/11/2025     06/11/2025    BUN 21 06/11/2025    CREATININE 1.0 06/11/2025       Assessment and Plan    ASSESSMENT PLAN:  1. 75-year-old left handed white male with chronic conditions significant for hypertension, hyperlipidemia, diabetes mellitus type 2, BPH, glaucoma, colon cancer, gait instability, multiple falls in the home environment for which he was seeing a neurologist for workup of falls, history of prior bilateral hip replacements and bilateral knee replacements, spinal stenosis status post previous C3-C6 laminectomy and posterolateral instrumented fusion over 10 years ago by Dr. Maurer, presented to  the ER at [facility] on 5/31/25 after suffering from a mechanical fall while at home and was unable to get up so he was on the floor all night until he was found.  At [facility] ER and he reported neck pain and pain in the lower cervical and upper thoracic region.  Imaging studies revealed C7 Chance fracture and findings of DISH (diffuse idiopathic skeletal hyperostosis ).  He was subsequently transferred to [facility] for neurosurgical evaluation where he was admitted on 5/31/25.  He was evaluated by Dr. Benjamin from neurosurgery, noted to have a 3 column Chance fracture at C7-T1 level, given highly unstable fracture was recommended surgical intervention by neurosurgery. He underwent C7 ORIF, removal of C3-6 hardware and C3-T1 PLIF by neurosurgery Dr. Raza Benjamin on 6/1/25.  Postoperatively is allowed weightbearing as tolerated on both lower extremities.  He has been given Aspen collar for use when out of bed in chair and when ambulating.  He can use soft cervical collar in the bed.  I discussed spinal precautions with him.  Postoperative course was significant for anemia but he did not require transfusion.  His pain is managed with Tylenol and Oxycodone.  He had elevated blood pressure requiring IV Hydralazine but blood pressure improved after surgery and hypertension was managed with managed with Felodipine, Lisinopril, Losartan and Metoprolol.  He is continued on Lipitor for hyperlipidemia.  He has history of BPH and remains on Tamsulosin and Finasteride.  He was on Netarsudil-Latanoprost and Timoptic eye drops for glaucoma.  He had elevated blood sugars related to Decadron perioperatively and required insulin and subsequently blood sugars improved and he is managed with Metformin now. He had elevated CK of 1118 on admission due to rhabdomyolysis from being down on floor prior to admission. He was treated with IV fluids and CK improved.  I discussed his recent clinical course with patient, his  wife and son who is a pediatric physician and his son indicated that patient was being evaluated by neurologist Dr. Hussein Haider regarding his gait instability and falls, and lumbar spine MRI was done, EMG studies which showed peripheral neuropathy as well as muscle biopsy was being planned of the right quadriceps to evaluate  his proximal weakness in bilateral lower extremities.  He is unable to do active straight leg raise in bilateral lower extremities in bed, unable to localize position sense in his left great toe and I also discussed with patient, his wife and son at bedside.  I also mentioned to them that neurology will be conservative at him during his stay at [facility] and that he should follow up with Dr. Hussein Haider from neurology on outpatient basis for further evaluation and workup as appropriate.  He is on subcutaneous Heparin and SCDs for DVT prophylaxis.  He is able to tolerate regular with thins consistency diet.  On 6/4/25, hemoglobin was 11.3, WBC count was 11.85, platelets were 236, BUN was 22, creatinine was 1.1, sodium was 134 and potassium was 3.3.  He has been needing assistance for mobility, transfers, self-care. He is transferred to [facility] on 6/4/25 for further rehabilitation care.     2. DVT prophylaxis - on subcutaneous Lovenox.  On SCDs.  Platelets 264 on 6/5/2025.  Platelets 307 on 6/9/2025.     3. Neurosurgery - status post fall, unstable 3 column C7 Chance fracture, status post C7 ORIF, removal of prior C3-6 hardware and C3-T1 PLIF by neurosurgery, history of gait instability and multiple falls, peripheral neuropathy, class I obesity, multiple medical problems - continue PT, OT, psychology.  Follow falls precautions, cardiac precautions, aspiration precautions, spinal precautions.  Aspen collar to neck when out of bed.  Monitor healing of posterior cervical incision.  Neurology was consulted.     4. GI - On Protonix for GI prophylaxis. On Senokot-S.  On MiraLAX.   On PRN Dulcolax suppository.  On PRN Maalox.     5.  -on Proscar.  On Flomax.     6. CVS - on Plendil.  On Lisinopril.  On Cozaar.  On Lopressor.  On PRN Hydralazine.     7. Pulmonary - encourage incentive spirometry.     8. Hematology - monitor hemoglobin, platelets.  Hemoglobin 12.2, WBC 10.71 on 6/5/2025.  Hemoglobin 11.0, WBC 7.59 on 6/9/2025.     9. Pain -on Tylenol.  On PRN Oxycodone.     10. Skin - Healing incision posterior cervicothoracic spine.  Dressing in place.  Some abrasions noted on right upper extremity.  Left forearm has bruising ecchymosis noted.  Small wound noted on sacrum/coccyx at least stage II decubitus cannot rule out DTI.     11. F/E/N - on vitamin B-12.  BMI 33.63 consistent with class I obesity.  Nutrition was consulted.     12. Diabetes mellitus type 2  - on sliding scale Humalog coverage.  On Glucophage.  On hypoglycemic protocol.     13. Ophthalmology- He was on NetarsudiL-latanoprost and Timoptic eye drops for glaucoma     14. Hyperlipidemia - on Lipitor.     15. Rehabilitation medicine - Continue comprehensive rehabilitation care. Continue PT, OT, psychology.  Follow falls precautions, cardiac precautions, aspiration precautions, spinal precautions.  Aspen collar to neck when out of bed.  Monitor healing of posterior cervical incision.  Neurology was consulted.Tolerating therapies per endurance on 6/6/2025.  Slept well last night.  Noted to have elevated PVR.  Requiring moderate assistance for sit to stand transfer with physical therapy.  Able to ambulate 20 feet with front wheeled walker dependent gait with physical therapy on 6/6/2025. Discussed with nurse on 6/6/2025. Working on ADLs with occupational therapy on 6/7/2025.  Dependent for upper and lower body dressing, requiring minimal assistance for toileting, moderate assistance for bathing with occupational therapy on 6/7/2025.  Discussed with nurse regarding his PVRs on 6/7/2025.  Using [place] catheter at nighttime.   Discussed with patient on 6/7/2025.Discussed with patient and with his wife with him on 6/9/2025.  They feel he is making progress in therapy.  Requiring minimal assistance for sit to stand transfer with physical therapy.  Able to ambulate 100 feet with front wheeled walker with minimal assistance with physical therapy.  Reviewed labs on 6/9/2025.Discussed patients progress in therapies with therapists in team meeting on 6/10/2025. Discussed in PCC. See PCC documentation.  Continent of bowel and bladder for nursing on 6/10/2025.Discussed recommendations of PCC with patient on 6/11/2025.  Inspected posterior cervical incision which is healing well.  Working on ADLs occupational therapy.  Requiring moderate assistance for upper and lower body dressing, moderate assistance for toileting and bathing in occupational therapy on 6/11/2025.Apparently per nursing his pain medications fell off last night and house physician did not renew them according to nurse on 6/12/2025. Discussed with patient regarding Epic generated automatic stop on narcotics causing these medications to be discontinued but medications were reviewed today by internist.  Discussed with nurse on 6/12/2025.     16. Reviewed labs today.  BUN 20, creatinine 0.9, sodium 135, potassium 3.7 on 6/5/2025.  BUN 22, creatinine 1.1, sodium 132, potassium 3.4 on 6/9/2025.  BUN 21, creatinine 1.0, sodium 135, potassium 3.6 on 6/11/2025.          Chase Anna MD  6/12/2025      This encounter was completed utilizing the Direct Speech Voice Recognition Software. Grammatical errors, random word insertions, pronoun errors, and incomplete sentences are occasional consequences of the system due to software limitations, ambient noises, and hardware issues. Such errors may be missed prior to affixing electronic signature. Any questions or concerns about the content, text, or information contained within the body of this dictation should be directly addressed to the  physician for clarification. If you have any questions or concerns please do not hesitate to call me directly via EPIC chat, page, or email.

## 2025-06-13 ENCOUNTER — APPOINTMENT (INPATIENT)
Dept: PHYSICAL THERAPY | Facility: REHABILITATION | Age: 75
DRG: 560 | End: 2025-06-13
Payer: MEDICARE

## 2025-06-13 ENCOUNTER — APPOINTMENT (INPATIENT)
Dept: OCCUPATIONAL THERAPY | Facility: REHABILITATION | Age: 75
DRG: 560 | End: 2025-06-13
Payer: MEDICARE

## 2025-06-13 LAB
GLUCOSE BLD-MCNC: 139 MG/DL (ref 70–99)
GLUCOSE BLD-MCNC: 99 MG/DL (ref 70–99)
POCT TEST: ABNORMAL
POCT TEST: NORMAL

## 2025-06-13 PROCEDURE — 12800000 HC ROOM AND CARE SEMIPRIVATE REHAB

## 2025-06-13 PROCEDURE — 63600000 HC DRUGS/DETAIL CODE: Mod: JZ | Performed by: INTERNAL MEDICINE

## 2025-06-13 PROCEDURE — 97116 GAIT TRAINING THERAPY: CPT | Mod: GP,CQ

## 2025-06-13 PROCEDURE — 63700000 HC SELF-ADMINISTRABLE DRUG: Performed by: INTERNAL MEDICINE

## 2025-06-13 PROCEDURE — 63700000 HC SELF-ADMINISTRABLE DRUG: Performed by: HOSPITALIST

## 2025-06-13 PROCEDURE — 97530 THERAPEUTIC ACTIVITIES: CPT | Mod: GP,CQ

## 2025-06-13 PROCEDURE — 97110 THERAPEUTIC EXERCISES: CPT | Mod: GO

## 2025-06-13 PROCEDURE — 97535 SELF CARE MNGMENT TRAINING: CPT | Mod: GO

## 2025-06-13 PROCEDURE — 63700000 HC SELF-ADMINISTRABLE DRUG: Performed by: PHYSICAL MEDICINE & REHABILITATION

## 2025-06-13 PROCEDURE — 97530 THERAPEUTIC ACTIVITIES: CPT | Mod: GO

## 2025-06-13 PROCEDURE — 12800001 HC ROOM AND CARE SEMIPRIVATE REHAB-BRAIN INJ

## 2025-06-13 RX ADMIN — LOSARTAN POTASSIUM 100 MG: 100 TABLET, FILM COATED ORAL at 08:59

## 2025-06-13 RX ADMIN — ACETAMINOPHEN 650 MG: 325 TABLET ORAL at 00:42

## 2025-06-13 RX ADMIN — METFORMIN HYDROCHLORIDE 500 MG: 500 TABLET ORAL at 09:00

## 2025-06-13 RX ADMIN — SENNOSIDES AND DOCUSATE SODIUM 2 TABLET: 50; 8.6 TABLET ORAL at 08:59

## 2025-06-13 RX ADMIN — OXYCODONE HYDROCHLORIDE 5 MG: 5 TABLET ORAL at 07:13

## 2025-06-13 RX ADMIN — METFORMIN HYDROCHLORIDE 500 MG: 500 TABLET ORAL at 18:25

## 2025-06-13 RX ADMIN — METOPROLOL TARTRATE 75 MG: 50 TABLET, FILM COATED ORAL at 20:47

## 2025-06-13 RX ADMIN — FELODIPINE 10 MG: 5 TABLET, FILM COATED, EXTENDED RELEASE ORAL at 09:00

## 2025-06-13 RX ADMIN — ENOXAPARIN SODIUM 40 MG: 40 INJECTION SUBCUTANEOUS at 18:25

## 2025-06-13 RX ADMIN — METOPROLOL TARTRATE 75 MG: 50 TABLET, FILM COATED ORAL at 08:59

## 2025-06-13 RX ADMIN — TIMOLOL MALEATE 1 DROP: 5 SOLUTION/ DROPS OPHTHALMIC at 09:03

## 2025-06-13 RX ADMIN — ACETAMINOPHEN 650 MG: 325 TABLET ORAL at 11:51

## 2025-06-13 RX ADMIN — LISINOPRIL 10 MG: 10 TABLET ORAL at 08:59

## 2025-06-13 RX ADMIN — OXYCODONE HYDROCHLORIDE 5 MG: 5 TABLET ORAL at 22:16

## 2025-06-13 RX ADMIN — CYANOCOBALAMIN TAB 1000 MCG 1000 MCG: 1000 TAB at 08:59

## 2025-06-13 RX ADMIN — LISINOPRIL 10 MG: 10 TABLET ORAL at 20:47

## 2025-06-13 RX ADMIN — FINASTERIDE 5 MG: 5 TABLET, FILM COATED ORAL at 09:00

## 2025-06-13 RX ADMIN — SENNOSIDES AND DOCUSATE SODIUM 2 TABLET: 50; 8.6 TABLET ORAL at 20:47

## 2025-06-13 RX ADMIN — ACETAMINOPHEN 650 MG: 325 TABLET ORAL at 23:54

## 2025-06-13 RX ADMIN — Medication 25 MCG: at 09:00

## 2025-06-13 RX ADMIN — PANTOPRAZOLE SODIUM 40 MG: 40 TABLET, DELAYED RELEASE ORAL at 09:00

## 2025-06-13 RX ADMIN — TAMSULOSIN HYDROCHLORIDE 0.4 MG: 0.4 CAPSULE ORAL at 20:47

## 2025-06-13 RX ADMIN — ACETAMINOPHEN 650 MG: 325 TABLET ORAL at 05:59

## 2025-06-13 RX ADMIN — ATORVASTATIN CALCIUM 20 MG: 20 TABLET, FILM COATED ORAL at 08:59

## 2025-06-13 RX ADMIN — ACETAMINOPHEN 650 MG: 325 TABLET ORAL at 18:25

## 2025-06-13 NOTE — PLAN OF CARE
Plan of Care Review  Plan of Care Reviewed With: patient  Progress: improving  Outcome Evaluation: Pt. alert and oriented. All VSS. Continen of bladder. No BM this shift. Accu checks B.I.D. Lovenox and SCD's for anticoag. Incision site DOMINGA with no overt signs of infection or drainage noted. Min assist with RW.Pain managed with scheduled Tylenol and PRN Oxycodone. Call bell and personal belongings within reach. All safety alarms on and functioning.

## 2025-06-13 NOTE — PROGRESS NOTES
Patient: Aneesh Brito  Location: [facility] [address]  MRN: [id number]  Today's date: 6/13/2025    History of Present Illness    Aneesh is a 75 y.o. male with a history of colon carcinoma, BPH, hypertension, glaucoma, type 2 diabetes, history of a cervical fusion over 10 years ago admitted after a fall inability to get up on his own.  He was seen at [facility] where imaging studies were performed which demonstrated DISH as well as a Chance fracture at the bottom of C7.  He was transferred to [facility] for neurosurgery.    He was taken to the OR on 6/1 by Dr. Benjamin and underwent an ORIF of the C7 Chance fracture with removal and replacement of the C3-6 posterior instrumentation as well as a C3-T3 posterior lateral fusion.  Drains remain in place.  He is on subcu heparin for DVT prophylaxis.  Currently on a nicardipine drip for blood pressure control.  He states pain is well-controlled.  He denies any other complaints of headache or dizziness, chest pain or shortness of breath.  He is voiding on his own.  He has not had a bowel movement yet.    He was seen by PT and OT needing moderate assistance of 2 to transfer sit to stand.  He ambulated 2 feet with moderate assistance of 2 using a rolling walker.  Pertinent radiology results reviewed. Pertinent lab results reviewed.      Past Medical History  Aneesh has a past medical history of C7 cervical fracture (CMS/HCC) (05/31/2025), Colonic adenoma, Diverticulosis of colon, Enlarged prostate with lower urinary tract symptoms (LUTS), Glaucoma, Hypertension, Melanoma of skin (CMS/HCC), Osteoarthritis of knee, Overweight, Spinal stenosis of lumbar region, and Type 2 diabetes mellitus (CMS/HCC).    OT Vitals      Date/Time Pulse HR Source BP BP Location BP Method Pt Position Who   06/13/25 1105 52 Right Radial 145/75 Left upper arm Manual Sitting [facility]          OT Pain      Date/Time Pain Type Side/Orientation Rating: Rest Rating: Activity  Interventions [facility]   06/13/25 1105 Pain Assessment neck 4 - moderate pain 4 - moderate pain premedicated for activity [facility]   06/13/25 1128 Pain Reassessment neck 4 - moderate pain 4 - moderate pain premedicated for activity [facility]                 Prior Living Environment      Flowsheet Row Most Recent Value   People in Home spouse   Current Living Arrangements independent living facility   Home Accessibility wheelchair accessible   Living Environment Comment PTA residing c wife in [facility] 1 floor set up. Shower stall c shower chair & grab bars. Reports no DME at toilet - information to be verified c pt. wife   Number of Stairs, Main Entrance 0   Patient Bedroom Access Comment PTA residing c wife in [facility] 1 floor set up. Shower stall c shower chair & grab bars. Reports no DME at toilet - information to be verified c pt. wife   Bathroom Access Comment PTA residing c wife in [facility] 1 floor set up. Shower stall c shower chair & grab bars. Reports no DME at toilet - information to be verified c pt. wife   Number of Stairs, Within Home, Primary 0       Prior Level of Function      Flowsheet Row Most Recent Value   Dominant Hand left   Ambulation assistive equipment  [rollator]   Transferring assistive equipment  [rollator]   Toileting independent   Bathing independent   Dressing independent   Eating independent   IADLs independent   Driving/Transportation    Communication understands/communicates without difficulty   Prior Level of Function Comment Pt reports being independent with use of SPC inside and Rollator to the dining jenkins. Independent ADLs. Denies other falls. Independent ADLs. (+) driving. Retired teacher   Assistive Device Currently Used at Home cane, straight, walker, 4-wheeled, shower chair       Occupational Profile      Flowsheet Row Most Recent Value   Successful Occupations Enjoys participating in the activities at [employer]  "and corn hole   Occupational History/Life Experiences PTA independent c ADLs. 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   Patient Goals \"Walk\"        IRF OT Evaluation and Treatment - 06/13/25 1105          OT Time Calculation    Start Time 1100     Stop Time 1130     Time Calculation (min) 30 min        Session Details    Document Type Daily Treatment/Progress Note        General Information    Patient/Family/Caregiver Comments/Observations pt. wife present to observe treatment session     General Observations of Patient pleasant, cooperative        Mobility Belt    Mobility Belt Used During Session yes        Orthosis Neck Cervical Collar    Orthosis Properties Date Obtained: 06/01/25 Time Obtained: 1803 Location: Neck Type: Cervical Collar Therapeutic Indications: stabilization and support Wearing Schedule: wear when out of bed       Orthosis Neck Cervical Collar    Orthosis Properties Date Obtained: 06/01/25 Time Obtained: 0000 Location: Neck Type: Cervical Collar Features: Hard Description: Apen collar OOB don seated; [place] collar for shower Therapeutic Indications: stabilization and support Wearing Schedule: wear when out of bed       Sit to Stand Transfer    [place], Sit to Stand Transfer close supervision     Safety/Cues technique;hand placement     Assistive Device walker, front-wheeled     Comment From W/C; CLS for safety  & balance        Stand to Sit Transfer    [place], Stand to Sit Transfer close supervision     Safety/Cues technique;hand placement     Assistive Device walker, front-wheeled     Comment To W/C;CL S for safety & balance        Impairments/Safety Issues    Comment, Safety Issues/Impairments OT: functional mobility c RW in therapy gym; steadying A level        Balance    Balance Interventions occupation based/functional task     Comment, Balance Ambulatory approach c RW to " targets at 20, 25, 30 foot distance and upon arrival at each target completes 1 set x 10 repetitions tap ups each R & L LE to dome, steadying A level        Upper Extremity (Therapeutic Exercise)    Exercise Position/Type seated;AROM (active range of motion);AAROM (active assistive range of motion)     Range of Motion Exercises left;right;shoulder flexion/extension     Reps and Sets 3 sets x 10 repetitions     Comment Seated in W/C completes unilateral UE shoulder flexion/extension AROM/AAROM via towel slide on incline board x 10 repetitions at 3 different angles each R & L UE        Daily Progress Summary (OT)    Daily Outcome Statement Good participation in treatment session. Steadying A standing balance & mobility c RW. Continued B shoulder strengthening. Continue OT per POC.                          IRF OT Goals      Flowsheet Row Most Recent Value   Transfer Goal 1    Activity/Assistive Device toilet, commode, 3-in-1 at 06/05/2025 1033   [place] supervision required at 06/10/2025 0830   Time Frame short-term goal (STG), 5 - 7 days at 06/05/2025 1033   Progress/Outcome goal met, goal revised this date at 06/10/2025 0830   Transfer Goal 2    Activity/Assistive Device toilet, commode, 3-in-1 at 06/05/2025 1033   [place] modified independence at 06/05/2025 1033   Time Frame long-term goal (LTG), 21 days or less at 06/05/2025 1033   Transfer Goal 3    Activity/Assistive Device shower, tub bench at 06/05/2025 1033   [place] supervision required at 06/10/2025 0830   Time Frame short-term goal (STG), 5 - 7 days at 06/05/2025 1033   Progress/Outcome goal met, goal revised this date at 06/10/2025 0830   Transfer Goal 4    Activity/Assistive Device shower, tub bench at 06/05/2025 1033   [place] modified independence at 06/05/2025 1033   Time Frame long-term goal (LTG), 21 days or less at 06/05/2025 1033   Bathing Goal 1    Activity/Assistive Device bathing skills, all at 06/05/2025 1033   [place]  minimum assist (75% or more patient effort) at 06/10/2025 0830   Time Frame short-term goal (STG), 5 - 7 days at 06/05/2025 1033   Strategies/Barriers spinal precautions inhibiting forward reach towards BLE, plan to introduce LHAE at 06/10/2025 0830   Progress/Outcome goal ongoing at 06/10/2025 0830   Bathing Goal 2    Activity/Assistive Device bathing skills, all at 06/05/2025 1033   [place] modified independence at 06/05/2025 1033   Time Frame long-term goal (LTG), 21 days or less at 06/05/2025 1033   UB Dressing Goal 1    Activity/Assistive Device upper body dressing at 06/05/2025 1033   [place] supervision required at 06/10/2025 0830   Time Frame short-term goal (STG), 5 - 7 days at 06/05/2025 1033   Progress/Outcome goal met, goal revised this date at 06/10/2025 0830   UB Dressing Goal 2    Activity/Assistive Device upper body dressing at 06/05/2025 1033   [place] modified independence at 06/05/2025 1033   Time Frame long-term goal (LTG), 21 days or less at 06/05/2025 1033   LB Dressing Goal 1    Activity/Assistive Device lower body dressing at 06/05/2025 1033   [place] minimum assist (75% or more patient effort) at 06/10/2025 0830   Time Frame short-term goal (STG), 5 - 7 days at 06/05/2025 1033   Progress/Outcome goal met, goal revised this date at 06/10/2025 0830   LB Dressing Goal 2    Activity/Assistive Device lower body dressing at 06/05/2025 1033   [place] modified independence at 06/05/2025 1033   Time Frame long-term goal (LTG), 21 days or less at 06/05/2025 1033   Grooming Goal 1    Activity/Assistive Device grooming skills, all at 06/05/2025 1033   [place] set-up required at 06/10/2025 0830   Time Frame short-term goal (STG), 5 - 7 days at 06/05/2025 1033   Strategies/Barriers standing tolerance/balance, still requiring A in stance at 06/10/2025 0830   Progress/Outcome goal ongoing at 06/10/2025 0830   Grooming Goal 2    Activity/Assistive Device grooming skills, all at  06/05/2025 1033   [place] modified independence at 06/05/2025 1033   Time Frame long-term goal (LTG), 21 days or less at 06/05/2025 1033   Toileting Goal 1    Activity/Assistive Device toileting skills, all at 06/05/2025 1033   [place] minimum assist (75% or more patient effort) at 06/10/2025 0830   Time Frame short-term goal (STG), 5 - 7 days at 06/05/2025 1033   Progress/Outcome goal met, goal revised this date at 06/10/2025 0830   Toileting Goal 2    Activity/Assistive Device toileting skills, all at 06/05/2025 1033   [place] modified independence at 06/05/2025 1033   Time Frame long-term goal (LTG), 21 days or less at 06/05/2025 1033

## 2025-06-13 NOTE — PROGRESS NOTES
Subjective    Patient seen and examined on rounds.  Chart reviewed.  Events overnight noted.  History reviewed briefly with patient.    CC: Deficits in mobility, transfers, self-care status post fall, unstable 3 column C7 Chance fracture, status post C7 ORIF, removal of prior C3-6 hardware and C3-T1 PLIF by neurosurgery, history of gait instability and multiple falls, peripheral neuropathy, multiple medical problems.    HPI:  Mr. Aneesh Brito is a 75-year-old left handed white male with chronic conditions significant for hypertension, hyperlipidemia, diabetes mellitus type 2, BPH, glaucoma, colon cancer, gait instability, multiple falls in the home environment for which he was seeing a neurologist for workup of falls, history of prior bilateral hip replacements and bilateral knee replacements, spinal stenosis status post previous C3-C6 laminectomy and posterolateral instrumented fusion over 10 years ago by Dr. Maurer, presented to the ER at [facility] on 5/31/25 after suffering from a mechanical fall while at home and was unable to get up so he was on the floor all night until he was found.  At [facility] ER and he reported neck pain and pain in the lower cervical and upper thoracic region.  Imaging studies revealed C7 Chance fracture and findings of DISH (diffuse idiopathic skeletal hyperostosis ).  He was subsequently transferred to [facility] for neurosurgical evaluation where he was admitted on 5/31/25.  He was evaluated by Dr. Benjamin from neurosurgery, noted to have a 3 column Chance fracture at C7-T1 level, given highly unstable fracture was recommended surgical intervention by neurosurgery. He underwent C7 ORIF, removal of C3-6 hardware and C3-T1 PLIF by neurosurgery Dr. Raza Benjamin on 6/1/25.  Postoperatively is allowed weightbearing as tolerated on both lower extremities.  He has been given Aspen collar for use when out of bed in chair and when ambulating.  He can use soft cervical  collar in the bed.  I discussed spinal precautions with him.  Postoperative course was significant for anemia but he did not require transfusion.  His pain is managed with Tylenol and Oxycodone.  He had elevated blood pressure requiring IV Hydralazine but blood pressure improved after surgery and hypertension was managed with managed with Felodipine, Lisinopril, Losartan and Metoprolol.  He is continued on Lipitor for hyperlipidemia.  He has history of BPH and remains on Tamsulosin and Finasteride.  He was on Netarsudil-Latanoprost and Timoptic eye drops for glaucoma.  He had elevated blood sugars related to Decadron perioperatively and required insulin and subsequently blood sugars improved and he is managed with Metformin now. He had elevated CK of 1118 on admission due to rhabdomyolysis from being down on floor prior to admission. He was treated with IV fluids and CK improved.  I discussed his recent clinical course with patient, his wife and son who is a pediatric physician and his son indicated that patient was being evaluated by neurologist Dr. Hussein Haider regarding his gait instability and falls, and lumbar spine MRI was done, EMG studies which showed peripheral neuropathy as well as muscle biopsy was being planned of the right quadriceps to evaluate  his proximal weakness in bilateral lower extremities.  He is unable to do active straight leg raise in bilateral lower extremities in bed, unable to localize position sense in his left great toe and I also discussed with patient, his wife and son at bedside.  I also mentioned to them that neurology will be conservative at him during his stay at [facility] and that he should follow up with Dr. Hussein Haider from neurology on outpatient basis for further evaluation and workup as appropriate.  He is on subcutaneous Heparin and SCDs for DVT prophylaxis.  He is able to tolerate regular with thins consistency diet.  On 6/4/25, hemoglobin was 11.3, WBC count was  "11.85, platelets were 236, BUN was 22, creatinine was 1.1, sodium was 134 and potassium was 3.3.  He has been needing assistance for mobility, transfers, self-care. He is transferred to [facility] on 6/4/25 for further rehabilitation care.     SUBJ: Progressing in therapy.  Requiring close supervision for sit to stand transfer with physical therapy.  Able to ambulate up 150 feet with front wheeled walker with touching/steadying assistance with physical therapy.    ROS: Denies chest pain or shortness of breath. Other ROS negative. Past, family, social history is unchanged.    Current Functional Status:   Bed mobility:   Jackson, Supine to Sit: close supervision   Jackson, Sit to Supine: touching/steadying assist   Transfers:    Jackson, Sit to Stand Transfer: close supervision  Jackson, Stand to Sit Transfer: close supervision   Jackson, Stand Pivot/Stand Step Transfer: touching/steadying assist   Gait:   Jackson, Gait: touching/steadying assist  Assistive Device: walker, front-wheeled   Distance in Feet: 150 feet    Bathing:   Jackson: close supervision   Toileting:   Jackson: touching/steadying assist   Upper body dressing:   Jackson: minimum assist (75% or more patient effort)   Lower body dressing:   Jackson: minimum assist (75% or more patient effort)       Functional Progress:    Functional status reviewed. Overall, patient's functional status is improving.      Physical Exam      Blood pressure 126/68, pulse 60, temperature 36.7 °C (98 °F), temperature source Oral, resp. rate 18, height 1.803 m (5' 11\"), weight 102 kg (223 lb 12.8 oz), SpO2 96%.    Body mass index is 31.21 kg/m².    General Appearance: Not in acute distress  Head/Ear/Nose/Throat: Normocephalic; Atraumatic.   Eye: EOMI; PERRL.   Neck: Healing incision posterior cervicothoracic spine.  Dressing in place.    Respiratory: Decreased breath sounds at bases.   Cardiovascular: RRR; Normal " S1, S2.   Gastrointestinal: Soft; NT; +BS.   Extremities: Bilateral lower extremity edema noted.    Musculoskeletal: Functional active range of motion in both upper extremities except some limitation in left shoulder and is unable to lift left arm up overhead.  Some limitation of active range of motion in both hips and both knees, which is chronic according to patient and his wife at bedside.  Patient is unable to do active straight leg raise and either lower extremity.  He has had previous bilateral hip and bilateral knee replacements.  His wife mentions patient was lifting his lower extremities manually with his arms while getting in the car and after he sat down in the seat inside the car manually left each leg to bring them in the car.   Neurological: AAO ×3. Speech is fluent. Cranial nerve examination does not reveal any gross facial asymmetry. Strength testing about 4/5 strength in both upper extremities except left shoulder is 2-/5 and abduction and forward flexion.  Bilateral hip flexion is 2-/5.  Bilateral quadriceps are 2+/5 with the thighs supported.  Bilateral ankle dorsi and plantar flexion is 3+/5.  He denies significant numbness in his legs and feet at this time.  He is grossly able to localize position sense in his right great toe but not so in his left great toe.  He is able to localize vibration sense in both great toes.  Deep tendon reflexes are hypoactive and symmetric bilaterally.  Plantars are flexor.  Coordination is functional upper extremities.  Both knee jerks were not tested.  Behavior/Emotional: Appropriate; Cooperative.   Skin: Healing incision posterior cervicothoracic spine.  Dressing in place.  Some abrasions noted on right upper extremity.  Left forearm has bruising ecchymosis noted.  Small wound noted on sacrum/coccyx at least stage II decubitus cannot rule out DTI.      Current Facility-Administered Medications:     acetaminophen (TYLENOL) tablet 650 mg, 650 mg, oral, q6h [facility],  Chandler Falk MD, 650 mg at 06/13/25 1151    alum-mag hydroxide-simeth (MAALOX) 200-200-20 mg/5 mL suspension 30 mL, 30 mL, oral, q6h PRN, Chandler Falk MD    atorvastatin (LIPITOR) tablet 20 mg, 20 mg, oral, Daily, Robert Hawkins MD, 20 mg at 06/13/25 0859    bisacodyL (DULCOLAX) 10 mg suppository 10 mg, 10 mg, rectal, Daily PRN, Chandler Falk MD    cholecalciferol (vitamin D3) tablet 25 mcg, 25 mcg, oral, Daily, Chandler Falk MD, 25 mcg at 06/13/25 0900    cyanocobalamin (VITAMIN B12) tablet 1,000 mcg, 1,000 mcg, oral, Daily, Chandler Falk MD, 1,000 mcg at 06/13/25 0859    glucose chewable tablet 15-30 g of dextrose, 15-30 g of dextrose, oral, PRN **OR** dextrose 40 % oral gel 15-30 g of dextrose, 15-30 g of dextrose, oral, PRN **OR** glucagon (GLUCAGEN) injection 1 mg, 1 mg, intramuscular, PRN **OR** dextrose 50 % in water (D50) injection 12.5 g, 25 mL, intravenous, PRN, Chase Anna MD    enoxaparin (LOVENOX) syringe 40 mg, 40 mg, subcutaneous, Daily (6p), Robert Hawkins MD, 40 mg at 06/12/25 1716    felodipine (PLENDIL) 24 hr ER tablet 10 mg, 10 mg, oral, Daily, Chandler Falk MD, 10 mg at 06/13/25 0900    finasteride (PROSCAR) tablet 5 mg, 5 mg, oral, Daily, Robert Hawkins MD, 5 mg at 06/13/25 0900    hydrALAZINE (APRESOLINE) tablet 25 mg, 25 mg, oral, q6h PRN, Robert Hawkins MD, 25 mg at 06/08/25 1849    insulin lispro U-100 (HumaLOG) pen 2-5 Units, 2-5 Units, subcutaneous, BID AC, Chandler Falk MD    lisinopriL (PRINIVIL) tablet 10 mg, 10 mg, oral, BID, Annette Sherman MD, 10 mg at 06/13/25 0859    losartan (COZAAR) tablet 100 mg, 100 mg, oral, Daily, Robert Hawkins MD, 100 mg at 06/13/25 0859    metFORMIN (GLUCOPHAGE) tablet 500 mg, 500 mg, oral, BID with breakfast and dinner, Robert Hawkins MD, 500 mg at 06/13/25 0900    metoprolol tartrate (LOPRESSOR) tablet 75 mg, 75 mg, oral, BID, Annette Sherman MD, 75 mg at 06/13/25 0859    netarsudiL-latanoprost 0.02-0.005 %  drops 1 drop, 1 drop, Right Eye, Nightly, Chandler Falk MD, 1 drop at 06/12/25 2106    oxyCODONE (ROXICODONE) immediate release tablet 5 mg, 5 mg, oral, q6h PRN, Annette Sherman MD, 5 mg at 06/13/25 0713    pantoprazole (PROTONIX) tablet,delayed release (DR/EC) 40 mg, 40 mg, oral, Daily before breakfast, Chandler Falk MD, 40 mg at 06/13/25 0900    polyethylene glycol (MIRALAX) 17 gram packet 17 g, 17 g, oral, Daily, Robert Hawkins MD, 17 g at 06/05/25 0859    sennosides-docusate sodium (SENOKOT-S) 8.6-50 mg per tablet 2 tablet, 2 tablet, oral, BID, Chase Anna MD, 2 tablet at 06/13/25 0859    tamsulosin (FLOMAX) 24 hr ER capsule 0.4 mg, 0.4 mg, oral, Nightly, Robert Hawkins MD, 0.4 mg at 06/12/25 2106    timolol (TIMOPTIC) 0.5 % ophthalmic solution 1 drop, 1 drop, Both Eyes, Daily, Robert Hawkins MD, 1 drop at 06/13/25 0903       Labs / Radiology    Lab Results   Component Value Date    WBC 7.59 06/09/2025    HGB 11.0 (L) 06/09/2025    HCT 32.7 (L) 06/09/2025    MCV 93.2 06/09/2025     06/09/2025     Lab Results   Component Value Date    GLUCOSE 125 (H) 06/11/2025    CALCIUM 8.4 (L) 06/11/2025     (L) 06/11/2025    K 3.6 06/11/2025    CO2 26 06/11/2025     06/11/2025    BUN 21 06/11/2025    CREATININE 1.0 06/11/2025       Assessment and Plan    ASSESSMENT PLAN:  1. 75-year-old left handed white male with chronic conditions significant for hypertension, hyperlipidemia, diabetes mellitus type 2, BPH, glaucoma, colon cancer, gait instability, multiple falls in the home environment for which he was seeing a neurologist for workup of falls, history of prior bilateral hip replacements and bilateral knee replacements, spinal stenosis status post previous C3-C6 laminectomy and posterolateral instrumented fusion over 10 years ago by Dr. Maurer, presented to the ER at [facility] on 5/31/25 after suffering from a mechanical fall while at home and was unable to get up so he was on the  floor all night until he was found.  At [facility] ER and he reported neck pain and pain in the lower cervical and upper thoracic region.  Imaging studies revealed C7 Chance fracture and findings of DISH (diffuse idiopathic skeletal hyperostosis ).  He was subsequently transferred to [facility] for neurosurgical evaluation where he was admitted on 5/31/25.  He was evaluated by Dr. Benjamin from neurosurgery, noted to have a 3 column Chance fracture at C7-T1 level, given highly unstable fracture was recommended surgical intervention by neurosurgery. He underwent C7 ORIF, removal of C3-6 hardware and C3-T1 PLIF by neurosurgery Dr. Raza Benjamin on 6/1/25.  Postoperatively is allowed weightbearing as tolerated on both lower extremities.  He has been given Aspen collar for use when out of bed in chair and when ambulating.  He can use soft cervical collar in the bed.  I discussed spinal precautions with him.  Postoperative course was significant for anemia but he did not require transfusion.  His pain is managed with Tylenol and Oxycodone.  He had elevated blood pressure requiring IV Hydralazine but blood pressure improved after surgery and hypertension was managed with managed with Felodipine, Lisinopril, Losartan and Metoprolol.  He is continued on Lipitor for hyperlipidemia.  He has history of BPH and remains on Tamsulosin and Finasteride.  He was on Netarsudil-Latanoprost and Timoptic eye drops for glaucoma.  He had elevated blood sugars related to Decadron perioperatively and required insulin and subsequently blood sugars improved and he is managed with Metformin now. He had elevated CK of 1118 on admission due to rhabdomyolysis from being down on floor prior to admission. He was treated with IV fluids and CK improved.  I discussed his recent clinical course with patient, his wife and son who is a pediatric physician and his son indicated that patient was being evaluated by neurologist Dr. Hussein Haider  regarding his gait instability and falls, and lumbar spine MRI was done, EMG studies which showed peripheral neuropathy as well as muscle biopsy was being planned of the right quadriceps to evaluate  his proximal weakness in bilateral lower extremities.  He is unable to do active straight leg raise in bilateral lower extremities in bed, unable to localize position sense in his left great toe and I also discussed with patient, his wife and son at bedside.  I also mentioned to them that neurology will be conservative at him during his stay at [facility] and that he should follow up with Dr. Hussein Haider from neurology on outpatient basis for further evaluation and workup as appropriate.  He is on subcutaneous Heparin and SCDs for DVT prophylaxis.  He is able to tolerate regular with thins consistency diet.  On 6/4/25, hemoglobin was 11.3, WBC count was 11.85, platelets were 236, BUN was 22, creatinine was 1.1, sodium was 134 and potassium was 3.3.  He has been needing assistance for mobility, transfers, self-care. He is transferred to [facility] on 6/4/25 for further rehabilitation care.     2. DVT prophylaxis - on subcutaneous Lovenox.  On SCDs.  Platelets 264 on 6/5/2025.  Platelets 307 on 6/9/2025.     3. Neurosurgery - status post fall, unstable 3 column C7 Chance fracture, status post C7 ORIF, removal of prior C3-6 hardware and C3-T1 PLIF by neurosurgery, history of gait instability and multiple falls, peripheral neuropathy, class I obesity, multiple medical problems - continue PT, OT, psychology.  Follow falls precautions, cardiac precautions, aspiration precautions, spinal precautions.  Aspen collar to neck when out of bed.  Monitor healing of posterior cervical incision.  Neurology was consulted.     4. GI - On Protonix for GI prophylaxis. On Senokot-S.  On MiraLAX.  On PRN Dulcolax suppository.  On PRN Maalox.     5.  -on Proscar.  On Flomax.     6. CVS - on Plendil.  On Lisinopril.  On  Cozaar.  On Lopressor.  On PRN Hydralazine.     7. Pulmonary - encourage incentive spirometry.     8. Hematology - monitor hemoglobin, platelets.  Hemoglobin 12.2, WBC 10.71 on 6/5/2025.  Hemoglobin 11.0, WBC 7.59 on 6/9/2025.     9. Pain -on Tylenol.  On PRN Oxycodone.     10. Skin - Healing incision posterior cervicothoracic spine.  Dressing in place.  Some abrasions noted on right upper extremity.  Left forearm has bruising ecchymosis noted.  Small wound noted on sacrum/coccyx at least stage II decubitus cannot rule out DTI.     11. F/E/N - on vitamin B-12.  BMI 33.63 consistent with class I obesity.  Nutrition was consulted.     12. Diabetes mellitus type 2  - on sliding scale Humalog coverage.  On Glucophage.  On hypoglycemic protocol.     13. Ophthalmology- He was on NetarsudiL-latanoprost and Timoptic eye drops for glaucoma     14. Hyperlipidemia - on Lipitor.     15. Rehabilitation medicine - Continue comprehensive rehabilitation care. Continue PT, OT, psychology.  Follow falls precautions, cardiac precautions, aspiration precautions, spinal precautions.  Aspen collar to neck when out of bed.  Monitor healing of posterior cervical incision.  Neurology was consulted.Tolerating therapies per endurance on 6/6/2025.  Slept well last night.  Noted to have elevated PVR.  Requiring moderate assistance for sit to stand transfer with physical therapy.  Able to ambulate 20 feet with front wheeled walker dependent gait with physical therapy on 6/6/2025. Discussed with nurse on 6/6/2025. Working on ADLs with occupational therapy on 6/7/2025.  Dependent for upper and lower body dressing, requiring minimal assistance for toileting, moderate assistance for bathing with occupational therapy on 6/7/2025.  Discussed with nurse regarding his PVRs on 6/7/2025.  Using [place] catheter at nighttime.  Discussed with patient on 6/7/2025.Discussed with patient and with his wife with him on 6/9/2025.  They feel he is making progress in  therapy.  Requiring minimal assistance for sit to stand transfer with physical therapy.  Able to ambulate 100 feet with front wheeled walker with minimal assistance with physical therapy.  Reviewed labs on 6/9/2025.Discussed patients progress in therapies with therapists in team meeting on 6/10/2025. Discussed in PCC. See PCC documentation.  Continent of bowel and bladder for nursing on 6/10/2025.Discussed recommendations of PCC with patient on 6/11/2025.  Inspected posterior cervical incision which is healing well.  Working on ADLs occupational therapy.  Requiring moderate assistance for upper and lower body dressing, moderate assistance for toileting and bathing in occupational therapy on 6/11/2025.Apparently per nursing his pain medications fell off last night and house physician did not renew them according to nurse on 6/12/2025. Discussed with patient regarding Epic generated automatic stop on narcotics causing these medications to be discontinued but medications were reviewed today by internist.  Discussed with nurse on 6/12/2025.Progressing in therapy on 6/13/2025.  Requiring close supervision for sit to stand transfer with physical therapy.  Able to ambulate up 150 feet with front wheeled walker with touching/steadying assistance with physical therapy on 6/13/2025.     16. Reviewed labs today.  BUN 20, creatinine 0.9, sodium 135, potassium 3.7 on 6/5/2025.  BUN 22, creatinine 1.1, sodium 132, potassium 3.4 on 6/9/2025.  BUN 21, creatinine 1.0, sodium 135, potassium 3.6 on 6/11/2025.          Chase Anna MD  6/13/2025      This encounter was completed utilizing the Direct Speech Voice Recognition Software. Grammatical errors, random word insertions, pronoun errors, and incomplete sentences are occasional consequences of the system due to software limitations, ambient noises, and hardware issues. Such errors may be missed prior to affixing electronic signature. Any questions or concerns about the  content, text, or information contained within the body of this dictation should be directly addressed to the physician for clarification. If you have any questions or concerns please do not hesitate to call me directly via EPIC chat, page, or email.

## 2025-06-13 NOTE — PROGRESS NOTES
Patient: Aneesh Brito  Location: [facility]  MRN: [id number]  Today's date: 6/13/2025    History of Present Illness    Aneesh is a 75 y.o. male with a history of colon carcinoma, BPH, hypertension, glaucoma, type 2 diabetes, history of a cervical fusion over 10 years ago admitted after a fall inability to get up on his own.  He was seen at [facility] [address] where imaging studies were performed which demonstrated DISH as well as a Chance fracture at the bottom of C7.  He was transferred to [facility] for neurosurgery.    He was taken to the OR on 6/1 by Dr. Benjamin and underwent an ORIF of the C7 Chance fracture with removal and replacement of the C3-6 posterior instrumentation as well as a C3-T3 posterior lateral fusion.  Drains remain in place.  He is on subcu heparin for DVT prophylaxis.  Currently on a nicardipine drip for blood pressure control.  He states pain is well-controlled.  He denies any other complaints of headache or dizziness, chest pain or shortness of breath.  He is voiding on his own.  He has not had a bowel movement yet.    He was seen by PT and OT needing moderate assistance of 2 to transfer sit to stand.  He ambulated 2 feet with moderate assistance of 2 using a rolling walker.  Pertinent radiology results reviewed. Pertinent lab results reviewed.      Past Medical History  Aneesh has a past medical history of C7 cervical fracture (CMS/HCC) (05/31/2025), Colonic adenoma, Diverticulosis of colon, Enlarged prostate with lower urinary tract symptoms (LUTS), Glaucoma, Hypertension, Melanoma of skin (CMS/HCC), Osteoarthritis of knee, Overweight, Spinal stenosis of lumbar region, and Type 2 diabetes mellitus (CMS/HCC).    PT Vitals      Date/Time Pulse HR Source BP BP Location BP Method Pt Position Who   06/13/25 1300 72 Right Radial 138/72 Left upper arm Manual Sitting ACP   06/13/25 1356 60 Right Radial 124/60 Left upper arm Manual Sitting ACP          PT Pain       Date/Time Pain Type Rating: Rest Who   06/13/25 1300 Pain Assessment 5 ACP   06/13/25 1356 Pain Reassessment 3 ACP                  Prior Living Environment      Flowsheet Row Most Recent Value   People in Home spouse   Current Living Arrangements independent living facility   Home Accessibility wheelchair accessible   Living Environment Comment PTA residing c wife in [facility] ([facility] 1 floor set up. Shower stall c shower chair & grab bars. Reports no DME at toilet - information to be verified c pt. wife   Number of Stairs, Main Entrance 0   Patient Bedroom Access Comment PTA residing c wife in [facility] ([facility]). 1 floor set up. Shower stall c shower chair & grab bars. Reports no DME at toilet - information to be verified c pt. wife   Bathroom Access Comment PTA residing c wife in [facility] ([facility]). 1 floor set up. Shower stall c shower chair & grab bars. Reports no DME at toilet - information to be verified c pt. wife   Number of Stairs, Within Home, Primary 0       Prior Level of Function      Flowsheet Row Most Recent Value   Dominant Hand left   Ambulation assistive equipment  [rollator]   Transferring assistive equipment  [rollator]   Toileting independent   Bathing independent   Dressing independent   Eating independent   IADLs independent   Driving/Transportation    Communication understands/communicates without difficulty   Prior Level of Function Comment Pt reports being independent with use of SPC inside and Rollator to the dining jenkins. Independent ADLs. Denies other falls. Independent ADLs. (+) driving. Retired teacher   Assistive Device Currently Used at Home cane, straight, walker, 4-wheeled, shower chair        IRF PT Evaluation and Treatment - 06/13/25 1300          PT Time Calculation    Start Time 1300     Stop Time 1400     Time Calculation (min) 60 min        Session Details    Document Type Daily Treatment/Progress Note        General Information    Patient  Profile Reviewed yes     Patient/Family/Caregiver Comments/Observations pt. wife present to observe treatment session     General Observations of Patient Pt. recieved finishing toileting in bathroom with PCT. Cervical Collar not donned. Donned prior to leaving room. Pleasant and agreeable to PT.        Mobility Belt    Mobility Belt Used During Session yes        Orthosis Neck Cervical Collar    Orthosis Properties Date Obtained: 06/01/25 Time Obtained: 1803 Location: Neck Type: Cervical Collar Therapeutic Indications: stabilization and support Wearing Schedule: wear when out of bed    Skin Assessment intact     Compliance/Wearing Issues patient;caregiver;needs reinforcement     Adjustment(s) Completed positioning strategies implemented   Pt. to wear cervical collar when OOB. Cervical collar not donned for toileting prior to session. Pt. and caregiver ed. on importance of wearing schedule and proprer fit.    Adjustment(s) Outcome adjustment effective        Orthosis Neck Cervical Collar    Orthosis Properties Date Obtained: 06/01/25 Time Obtained: 0000 Location: Neck Type: Cervical Collar Features: Hard Description: Apen collar OOB don seated; [place] collar for shower Therapeutic Indications: stabilization and support Wearing Schedule: wear when out of bed       Sit to Stand Transfer    [place], Sit to Stand Transfer close supervision     Safety/Cues technique;hand placement     Assistive Device walker, front-wheeled     Comment from wc to stance with RW, Cl S for safety and balance        Stand to Sit Transfer    [place], Stand to Sit Transfer close supervision     Safety/Cues technique;hand placement     Assistive Device walker, front-wheeled     Comment to wc from stance with RW. Cl S for safety and balance.        Stand Pivot Transfer    [place], Stand Pivot/Stand Step Transfer touching/steadying assist     Safety/Cues technique;increased time to complete     Assistive Device walker,  "front-wheeled     Comment via amb approach with RW. Steadying A for balance negotiating pivot        Car Transfer    Transfer Technique stand pivot     [place] minimum assist (75% or more patient effort)     Safety/Cues increased time to complete;preparatory posture;maintaining precautions;technique     Assistive Device walker, front-wheeled     Comment via amb approach to simulated car passanger seat in gym with RW. Min A for maintaining precautions and BLE management during sit and swing technique. Steadying A for SPT        Gait Training    [place], Gait touching/steadying assist     Safety/Cues increased time to complete;sequencing;technique     Assistive Device walker, front-wheeled     Distance in Feet 150 feet     Pattern step-through     Deviations/Abnormal Patterns base of support, narrow;gait speed decreased;step length decreased     Bilateral Gait Deviations heel strike decreased     Comment 150ft x 1 with RW. Steadying A at pelvis for balance. VC's to increase step length and improve MARLA.        Stairs Training    [place], Stairs touching/steadying assist     Safety/Cues technique;sequencing     Assistive Device railing     Handrail Location (Stairs) both sides     Number of Stairs 12     Stair Height 6 inches     Ascending Stairs Technique step-to-step   LLE Leading    Descending Stairs Technique step-to-step   RLE    Comment up/down 4(6\") x3 steps, steadying A at pelvis for balance and VC's for sequencing        Daily Progress Summary (PT)    Daily Outcome Statement Session focused on functional mobility. Pt. demo increased step length with VC's during ambulation. Pt. able to increase stair negotiation to 12 steps. Pt. requires increased VC's for handplacement with transfers and to maintain precautions. Continue to progress as approprate per PT POC.                          IRF PT Goals      Flowsheet Row Most Recent Value   Bed Mobility Goal 1    Activity/Assistive Device rolling to " left, rolling to right, sit to supine/supine to sit at 06/05/2025 0902   [place] minimum assist (75% or more patient effort) at 06/05/2025 0902   Time Frame short-term goal (STG), 1 week at 06/05/2025 0902   Progress/Outcome goal ongoing, goal revised this date  [will continue core strengthening/bed mobility training] at 06/10/2025 0850   Bed Mobility Goal 2    Activity/Assistive Device rolling to left, rolling to right, sit to supine/supine to sit at 06/05/2025 0902   [place] modified independence at 06/05/2025 0902   Time Frame long-term goal (LTG), 3 weeks at 06/05/2025 0902   Progress/Outcome goal ongoing at 06/10/2025 0850   Transfer Goal 1    Activity/Assistive Device sit-to-stand/stand-to-sit, stand pivot at 06/05/2025 0902   [place] tactile cues required  [steadying] at 06/10/2025 0850   Time Frame short-term goal (STG), 1 week at 06/10/2025 0850   Progress/Outcome goal met, goal revised this date at 06/10/2025 0850   Transfer Goal 2    Activity/Assistive Device sit-to-stand/stand-to-sit, stand pivot at 06/05/2025 0902   [place] modified independence at 06/05/2025 0902   Time Frame long-term goal (LTG), 3 weeks at 06/05/2025 0902   Progress/Outcome goal ongoing at 06/10/2025 0850   Gait/Walking Locomotion Goal 1    Activity/Assistive Device gait (walking locomotion) at 06/05/2025 0902   Distance 50 feet at 06/05/2025 0902   [place] tactile cues required at 06/10/2025 0850   Time Frame short-term goal (STG), 1 week at 06/10/2025 0850   Progress/Outcome goal met, goal revised this date at 06/10/2025 0850   Gait/Walking Locomotion Goal 2    Activity/Assistive Device gait (walking locomotion) at 06/05/2025 0902   Distance 150 feet at 06/05/2025 0902   [place] supervision required at 06/05/2025 0902   Time Frame long-term goal (LTG), 3 weeks at 06/05/2025 0902   Progress/Outcome goal ongoing at 06/10/2025 0850   Stairs Goal 1    Activity/Assistive Device stairs, all skills at  06/10/2025 0850   Number of Stairs 8 at 06/10/2025 0850   [place] minimum assist (75% or more patient effort) at 06/10/2025 0850   Time Frame short-term goal (STG), 1 week at 06/10/2025 0850   Stairs Goal 2    Activity/Assistive Device stairs, all skills at 06/10/2025 0850   Number of Stairs 12 at 06/10/2025 0850   [place] supervision required at 06/10/2025 0850   Time Frame long-term goal (LTG), 2 weeks at 06/10/2025 0850

## 2025-06-13 NOTE — PLAN OF CARE
Plan of Care Review  Plan of Care Reviewed With: patient  Progress: improving  Outcome Evaluation: Alert and oriented x4. Continent of bowel and bladder. Utilizes a [place] catheter overnight to assist with continence. Pain managed with scheduled Tylenol and PRN Oxycodone. Neck incision is DOMINGA with steristrips intact. [place]en collar worn OOB. Accuchecks BID with no s/s of hyper/hypo-glycemia noted. Sleeping quietly in bed overnight. Fall and safety measures maintained.

## 2025-06-13 NOTE — PROGRESS NOTES
Patient: Aneesh Brito  Location: [facility]  MRN: [id number]  Today's date: 6/13/2025    History of Present Illness    Aneesh is a 75 y.o. male with a history of colon carcinoma, BPH, hypertension, glaucoma, type 2 diabetes, history of a cervical fusion over 10 years ago admitted after a fall inability to get up on his own.  He was seen at [facility] where imaging studies were performed which demonstrated DISH as well as a Chance fracture at the bottom of C7.  He was transferred to [facility] for neurosurgery.    He was taken to the OR on 6/1 by Dr. Benjamin and underwent an ORIF of the C7 Chance fracture with removal and replacement of the C3-6 posterior instrumentation as well as a C3-T3 posterior lateral fusion.  Drains remain in place.  He is on subcu heparin for DVT prophylaxis.  Currently on a nicardipine drip for blood pressure control.  He states pain is well-controlled.  He denies any other complaints of headache or dizziness, chest pain or shortness of breath.  He is voiding on his own.  He has not had a bowel movement yet.    He was seen by PT and OT needing moderate assistance of 2 to transfer sit to stand.  He ambulated 2 feet with moderate assistance of 2 using a rolling walker.  Pertinent radiology results reviewed. Pertinent lab results reviewed.      Past Medical History  Aneesh has a past medical history of C7 cervical fracture (CMS/HCC) (05/31/2025), Colonic adenoma, Diverticulosis of colon, Enlarged prostate with lower urinary tract symptoms (LUTS), Glaucoma, Hypertension, Melanoma of skin (CMS/HCC), Osteoarthritis of knee, Overweight, Spinal stenosis of lumbar region, and Type 2 diabetes mellitus (CMS/HCC).    PT Vitals      Date/Time Pulse HR Source SpO2 Pt Activity O2 Therapy BP BP Location BP Method Pt Position Who   06/13/25 0911 62 Monitor 97 % At rest None (Room air) 146/72 Left upper arm Manual Sitting JRL          PT Pain      Date/Time Pain  Type Location Rating: Rest Rating: Activity Interventions [facility]   06/13/25 0911 Pain Assessment neck 4 4 premedicated for activity;relaxation techniques promoted;diversional activity provided [facility]   06/13/25 0993 Pain Reassessment neck 3 3 relaxation techniques promoted;pain management plan reviewed with patient/caregiver JRL                  Prior Living Environment      Flowsheet Row Most Recent Value   People in Home spouse   Current Living Arrangements independent living facility   Home Accessibility wheelchair accessible   Living Environment Comment PTA residing c wife in [facility] ([facility]). 1 floor set up. Shower stall c shower chair & grab bars. Reports no DME at toilet - information to be verified c pt. wife   Number of Stairs, Main Entrance 0   Patient Bedroom Access Comment PTA residing c wife in [facility] ([facility]). 1 floor set up. Shower stall c shower chair & grab bars. Reports no DME at toilet - information to be verified c pt. wife   Bathroom Access Comment PTA residing c wife in [facility] ([facility]). 1 floor set up. Shower stall c shower chair & grab bars. Reports no DME at toilet - information to be verified c pt. wife   Number of Stairs, Within Home, Primary 0       Prior Level of Function      Flowsheet Row Most Recent Value   Dominant Hand left   Ambulation assistive equipment  [rollator]   Transferring assistive equipment  [rollator]   Toileting independent   Bathing independent   Dressing independent   Eating independent   IADLs independent   Driving/Transportation    Communication understands/communicates without difficulty   Prior Level of Function Comment Pt reports being independent with use of SPC inside and Rollator to the dining jenkins. Independent ADLs. Denies other falls. Independent ADLs. (+) driving. Retired teacher   Assistive Device Currently Used at Home cane, straight, walker, 4-wheeled, shower chair        IRF PT Evaluation and Treatment - 06/13/25 0901           PT Time Calculation    Start Time 0900     Stop Time 0930     Time Calculation (min) 30 min        Session Details    Document Type Daily Treatment/Progress Note        General Information    Patient Profile Reviewed yes     General Observations of Patient Pt received sitting in w/c in room, with nursing administering medications. Agreeable to participate in therapy session.        Mobility Belt    Mobility Belt Used During Session yes        Orthosis Neck Cervical Collar    Orthosis Properties Date Obtained: 06/01/25 Time Obtained: 1803 Location: Neck Type: Cervical Collar Therapeutic Indications: stabilization and support Wearing Schedule: wear when out of bed       Orthosis Neck Cervical Collar    Orthosis Properties Date Obtained: 06/01/25 Time Obtained: 0000 Location: Neck Type: Cervical Collar Features: Hard Description: Apen collar OOB don seated; [place] collar for shower Therapeutic Indications: stabilization and support Wearing Schedule: wear when out of bed       Transfers    Transfers stand pivot transfer     Comment mobility belt donned        Sit to Stand Transfer    [place], Sit to Stand Transfer close supervision     Safety/Cues increased time to complete;technique     Assistive Device walker, front-wheeled     Comment w/c to stance with RW and close S to ensure balance        Stand to Sit Transfer    [place], Stand to Sit Transfer close supervision     Safety/Cues increased time to complete;technique     Assistive Device walker, front-wheeled     Comment stance to w/c with RW and close S to ensure controlled descent        Stand Pivot Transfer    [place], Stand Pivot/Stand Step Transfer touching/steadying assist     Safety/Cues increased time to complete;technique     Assistive Device walker, front-wheeled     Comment via amb approach with RW and steadying A for balance        Gait Training    [place], Gait touching/steadying assist     Safety/Cues increased time to  complete;sequencing;technique     Assistive Device walker, front-wheeled     Distance in Feet 50 feet     Pattern step-through     Deviations/Abnormal Patterns base of support, narrow;gait speed decreased;step length decreased     Bilateral Gait Deviations heel strike decreased     Comment Pt amb 50ft x 1 with RW and steadying A at pelvis for balance; pt demonstrates decreased bilateral step length and a narrowed MARLA        Rough/Uneven Surface Gait Skills    [place] touching/steadying assist     Assistive Device walker, front-wheeled     Distance in Feet 75 feet     Comment Pt amb 75ft x 2 with RW over uneven, sloped sidewalk surface with steadying A at pelvis for balance        Balance    Balance Interventions standing;supported     Comment, Balance (1) 1x10 R/L step outs to half dome with RW and steadying A for balance        Daily Progress Summary (PT)    Daily Outcome Statement Pt tolerates session well with a focus on gait training. Pt continues to demonstrate decreased bilateral step length despite verbal cueing. Recommend trialing rollator and multimodal cues to improve step length and avery. Continue with BLE strengthening, standing balance, and gait/elevation training to progress functional mobility and maximize independence.                      IRF PT Goals      Flowsheet Row Most Recent Value   Bed Mobility Goal 1    Activity/Assistive Device rolling to left, rolling to right, sit to supine/supine to sit at 06/05/2025 0902   [place] minimum assist (75% or more patient effort) at 06/05/2025 0902   Time Frame short-term goal (STG), 1 week at 06/05/2025 0902   Progress/Outcome goal ongoing, goal revised this date  [will continue core strengthening/bed mobility training] at 06/10/2025 0850   Bed Mobility Goal 2    Activity/Assistive Device rolling to left, rolling to right, sit to supine/supine to sit at 06/05/2025 0902   [place] modified independence at 06/05/2025 0902   Time Frame  long-term goal (LTG), 3 weeks at 06/05/2025 0902   Progress/Outcome goal ongoing at 06/10/2025 0850   Transfer Goal 1    Activity/Assistive Device sit-to-stand/stand-to-sit, stand pivot at 06/05/2025 0902   [place] tactile cues required  [steadying] at 06/10/2025 0850   Time Frame short-term goal (STG), 1 week at 06/10/2025 0850   Progress/Outcome goal met, goal revised this date at 06/10/2025 0850   Transfer Goal 2    Activity/Assistive Device sit-to-stand/stand-to-sit, stand pivot at 06/05/2025 0902   [place] modified independence at 06/05/2025 0902   Time Frame long-term goal (LTG), 3 weeks at 06/05/2025 0902   Progress/Outcome goal ongoing at 06/10/2025 0850   Gait/Walking Locomotion Goal 1    Activity/Assistive Device gait (walking locomotion) at 06/05/2025 0902   Distance 50 feet at 06/05/2025 0902   [place] tactile cues required at 06/10/2025 0850   Time Frame short-term goal (STG), 1 week at 06/10/2025 0850   Progress/Outcome goal met, goal revised this date at 06/10/2025 0850   Gait/Walking Locomotion Goal 2    Activity/Assistive Device gait (walking locomotion) at 06/05/2025 0902   Distance 150 feet at 06/05/2025 0902   [place] supervision required at 06/05/2025 0902   Time Frame long-term goal (LTG), 3 weeks at 06/05/2025 0902   Progress/Outcome goal ongoing at 06/10/2025 0850   Stairs Goal 1    Activity/Assistive Device stairs, all skills at 06/10/2025 0850   Number of Stairs 8 at 06/10/2025 0850   [place] minimum assist (75% or more patient effort) at 06/10/2025 0850   Time Frame short-term goal (STG), 1 week at 06/10/2025 0850   Stairs Goal 2    Activity/Assistive Device stairs, all skills at 06/10/2025 0850   Number of Stairs 12 at 06/10/2025 0850   [place] supervision required at 06/10/2025 0850   Time Frame long-term goal (LTG), 2 weeks at 06/10/2025 [id number]

## 2025-06-13 NOTE — PROGRESS NOTES
CM spoke w pts spouse Yas and provided updates. Pts spouse reports she wants to speak w their daughter about family training and will contact CM later today to schedule. CM will continue to follow for any needs.    1:04 PM CM received call from pts spouse, pts spouse and daughter to attend family training 6/16 from 1-3:30pm. CM updated team and will continue to follow for any needs.

## 2025-06-13 NOTE — PROGRESS NOTES
Patient: Aneesh Brito  Location: [facility] [address]  MRN: [id number]  Today's date: 6/13/2025    History of Present Illness    Aneesh is a 75 y.o. male with a history of colon carcinoma, BPH, hypertension, glaucoma, type 2 diabetes, history of a cervical fusion over 10 years ago admitted after a fall inability to get up on his own.  He was seen at [facility] where imaging studies were performed which demonstrated DISH as well as a Chance fracture at the bottom of C7.  He was transferred to [facility] for neurosurgery.    He was taken to the OR on 6/1 by Dr. Benjamin and underwent an ORIF of the C7 Chance fracture with removal and replacement of the C3-6 posterior instrumentation as well as a C3-T3 posterior lateral fusion.  Drains remain in place.  He is on subcu heparin for DVT prophylaxis.  Currently on a nicardipine drip for blood pressure control.  He states pain is well-controlled.  He denies any other complaints of headache or dizziness, chest pain or shortness of breath.  He is voiding on his own.  He has not had a bowel movement yet.    He was seen by PT and OT needing moderate assistance of 2 to transfer sit to stand.  He ambulated 2 feet with moderate assistance of 2 using a rolling walker.  Pertinent radiology results reviewed. Pertinent lab results reviewed.      Past Medical History  Aneesh has a past medical history of C7 cervical fracture (CMS/HCC) (05/31/2025), Colonic adenoma, Diverticulosis of colon, Enlarged prostate with lower urinary tract symptoms (LUTS), Glaucoma, Hypertension, Melanoma of skin (CMS/HCC), Osteoarthritis of knee, Overweight, Spinal stenosis of lumbar region, and Type 2 diabetes mellitus (CMS/HCC).    OT Vitals      Date/Time Pulse HR Source BP BP Location BP Method Pt Position Observations [facility]   06/13/25 0729 62 Left Brachial 150/78 Left upper arm Manual Lying vitals per PCT reoprt, taken immediately prior to session [facility]          OT Pain       Date/Time Pain Type Side/Orientation Rating: Rest Rating: Activity Interventions [facility]   06/13/25 0729 Pain Assessment neck 6 - moderate-severe pain 6 - moderate-severe pain premedicated for activity [facility]   06/13/25 0823 Pain Reassessment neck 4 - moderate pain 4 - moderate pain premedicated for activity [facility]                 Prior Living Environment      Flowsheet Row Most Recent Value   People in Home spouse   Current Living Arrangements independent living facility   Home Accessibility wheelchair accessible   Living Environment Comment PTA residing c wife in [facility] 1 floor set up. Shower stall c shower chair & grab bars. Reports no DME at toilet - information to be verified c pt. wife   Number of Stairs, Main Entrance 0   Patient Bedroom Access Comment PTA residing c wife in [facility] ([facility] 1 floor set up. Shower stall c shower chair & grab bars. Reports no DME at toilet - information to be verified c pt. wife   Bathroom Access Comment PTA residing c wife in [facility]). 1 floor set up. Shower stall c shower chair & grab bars. Reports no DME at toilet - information to be verified c pt. wife   Number of Stairs, Within Home, Primary 0       Prior Level of Function      Flowsheet Row Most Recent Value   Dominant Hand left   Ambulation assistive equipment  [rollator]   Transferring assistive equipment  [rollator]   Toileting independent   Bathing independent   Dressing independent   Eating independent   IADLs independent   Driving/Transportation    Communication understands/communicates without difficulty   Prior Level of Function Comment Pt reports being independent with use of SPC inside and Rollator to the dining jenkins. Independent ADLs. Denies other falls. Independent ADLs. (+) driving. Retired teacher   Assistive Device Currently Used at Home cane, straight, walker, 4-wheeled, shower chair       Occupational Profile      Flowsheet Row Most Recent Value   Successful  "Occupations Enjoys participating in the activities at [employer] - shuffle board and corn hole   Occupational History/Life Experiences PTA independent c ADLs. 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   Patient Goals \"Walk\"        IRF OT Evaluation and Treatment - 06/13/25 0730          OT Time Calculation    Start Time 0728     Stop Time 0828     Time Calculation (min) 60 min        Session Details    Document Type Daily Treatment/Progress Note        General Information    General Observations of Patient pleasant, cooperative        Mobility Belt    Mobility Belt Used During Session yes        Orthosis Neck Cervical Collar    Orthosis Properties Date Obtained: 06/01/25 Time Obtained: 1803 Location: Neck Type: Cervical Collar Therapeutic Indications: stabilization and support Wearing Schedule: wear when out of bed       Orthosis Neck Cervical Collar    Orthosis Properties Date Obtained: 06/01/25 Time Obtained: 0000 Location: Neck Type: Cervical Collar Features: Hard Description: [place] collar OOB don seated; [place] collar for shower Therapeutic Indications: stabilization and support Wearing Schedule: wear when out of bed       Bed Mobility    Assistive Device bed rails;head of bed elevated     Comment OT: Min A supine to sit        Sit to Stand Transfer    [place], Sit to Stand Transfer close supervision     Safety/Cues technique;hand placement     Assistive Device walker, front-wheeled     Comment From EOB & W/C; CLS for safety & balance        Stand to Sit Transfer    [place], Stand to Sit Transfer close supervision     Safety/Cues technique;hand placement     Assistive Device walker, front-wheeled     Comment To EOB & W/C; CLS for safety & balance        Toilet Transfer    Transfer Technique --   Amb Tx    [place] touching/steadying assist     Assistive Device accessible height toilet;grab " bars/safety frame     Comment Ambulatory approach c RW to stand at toilet; steadying A for balance        Shower Transfer    Transfer Technique --   Amb Tx    [place] touching/steadying assist     Safety/Cues technique;hand placement     Assistive Device grab bars/tub rail;tub bench     Comment Ambulatory approach c RW; steadying A for balance        Impairments/Safety Issues    Comment, Safety Issues/Impairments OT: HHM c RW in pt. room; steadying A for balance        Bathing    Shower Provided? Yes     [place] close supervision     Position supported sitting;unsupported sitting     Setup Assistance adaptive equipment setup;adjust water temperature/flow;obtain supplies;open containers     Adaptive Equipment grab bar/tub rail;hand-held shower spray hose;long-handled sponge;tub bench     Comment Full wet shower completed seated on tub bench c CLS for safety & balance c cues for carryover long handled sponge. Incision covered with waterproof dressing during bathing        Upper Body Dressing    Tasks don;pull over garment     [place] minimum assist (75% or more patient effort)     Position supported sitting     Adaptive Equipment orthosis     Comment UB dressing completed seated in W/C c Min A overall        Lower Body Dressing    Tasks don;pants/bottoms;shoes/slippers;socks;underwear     [place] minimum assist (75% or more patient effort)     Position supported sitting;supported standing     Adaptive Equipment sock aid;reacher;long-handled shoe horn     [place], Footwear minimum assist (75% or more patient effort)     Comment LB dressing completed seated in W/C & in stance c RW c Min A for clothing management. Cues for carryover use of AE        Grooming    Tasks oral care (brushing teeth, cleaning dentures)     [place] close supervision     Position supported standing;unsupported standing;sink side     Adaptive Equipment none     [place], Oral Hygiene close supervision     Comment  Performed in stance at sink; CLS for safety & balance        Toileting    Tasks adjust/manage clothing;perform bladder hygiene     [place] touching/steadying assist     Position supported standing;unsupported standing     Adaptive Equipment accessible height toilet;grab bar/safety frame     Comment Continent of bladder in stance at toilet; steadying A level        Daily Progress Summary (OT)    Daily Outcome Statement Good participation in treatment session. ADL routine including full wet shower completed c progression to grossly Min A level c cues for carryover use of AE. Continue OT per POC.                          IRF OT Goals      Flowsheet Row Most Recent Value   Transfer Goal 1    Activity/Assistive Device toilet, commode, 3-in-1 at 06/05/2025 1033   [place] supervision required at 06/10/2025 0830   Time Frame short-term goal (STG), 5 - 7 days at 06/05/2025 1033   Progress/Outcome goal met, goal revised this date at 06/10/2025 0830   Transfer Goal 2    Activity/Assistive Device toilet, commode, 3-in-1 at 06/05/2025 1033   [place] modified independence at 06/05/2025 1033   Time Frame long-term goal (LTG), 21 days or less at 06/05/2025 1033   Transfer Goal 3    Activity/Assistive Device shower, tub bench at 06/05/2025 1033   [place] supervision required at 06/10/2025 0830   Time Frame short-term goal (STG), 5 - 7 days at 06/05/2025 1033   Progress/Outcome goal met, goal revised this date at 06/10/2025 0830   Transfer Goal 4    Activity/Assistive Device shower, tub bench at 06/05/2025 1033   [place] modified independence at 06/05/2025 1033   Time Frame long-term goal (LTG), 21 days or less at 06/05/2025 1033   Bathing Goal 1    Activity/Assistive Device bathing skills, all at 06/05/2025 1033   [place] minimum assist (75% or more patient effort) at 06/10/2025 0830   Time Frame short-term goal (STG), 5 - 7 days at 06/05/2025 1033   Strategies/Barriers spinal precautions inhibiting  forward reach towards BLE, plan to introduce LHAE at 06/10/2025 0830   Progress/Outcome goal ongoing at 06/10/2025 0830   Bathing Goal 2    Activity/Assistive Device bathing skills, all at 06/05/2025 1033   [place] modified independence at 06/05/2025 1033   Time Frame long-term goal (LTG), 21 days or less at 06/05/2025 1033   UB Dressing Goal 1    Activity/Assistive Device upper body dressing at 06/05/2025 1033   [place] supervision required at 06/10/2025 0830   Time Frame short-term goal (STG), 5 - 7 days at 06/05/2025 1033   Progress/Outcome goal met, goal revised this date at 06/10/2025 0830   UB Dressing Goal 2    Activity/Assistive Device upper body dressing at 06/05/2025 1033   [place] modified independence at 06/05/2025 1033   Time Frame long-term goal (LTG), 21 days or less at 06/05/2025 1033   LB Dressing Goal 1    Activity/Assistive Device lower body dressing at 06/05/2025 1033   [place] minimum assist (75% or more patient effort) at 06/10/2025 0830   Time Frame short-term goal (STG), 5 - 7 days at 06/05/2025 1033   Progress/Outcome goal met, goal revised this date at 06/10/2025 0830   LB Dressing Goal 2    Activity/Assistive Device lower body dressing at 06/05/2025 1033   [place] modified independence at 06/05/2025 1033   Time Frame long-term goal (LTG), 21 days or less at 06/05/2025 1033   Grooming Goal 1    Activity/Assistive Device grooming skills, all at 06/05/2025 1033   [place] set-up required at 06/10/2025 0830   Time Frame short-term goal (STG), 5 - 7 days at 06/05/2025 1033   Strategies/Barriers standing tolerance/balance, still requiring A in stance at 06/10/2025 0830   Progress/Outcome goal ongoing at 06/10/2025 0830   Grooming Goal 2    Activity/Assistive Device grooming skills, all at 06/05/2025 1033   [place] modified independence at 06/05/2025 1033   Time Frame long-term goal (LTG), 21 days or less at 06/05/2025 1033   Toileting Goal 1     Activity/Assistive Device toileting skills, all at 06/05/2025 1033   [place] minimum assist (75% or more patient effort) at 06/10/2025 0830   Time Frame short-term goal (STG), 5 - 7 days at 06/05/2025 1033   Progress/Outcome goal met, goal revised this date at 06/10/2025 0830   Toileting Goal 2    Activity/Assistive Device toileting skills, all at 06/05/2025 1033   [place] modified independence at 06/05/2025 1033   Time Frame long-term goal (LTG), 21 days or less at 06/05/2025 1033

## 2025-06-13 NOTE — PROGRESS NOTES
[facility] Progress Note          Patient was seen and examined.   Attestation Notes: Face to face encounter completed    Aneesh Brito is a 75 y.o. male who was admitted for Falls, sequela [W19.XXXS]. Patient was referred by Chase Anna MD for medical assessment and management      CC: Falls, sequela [W19.XXXS]     HPI: Aneesh Brito is a 75 y.o. male with HTN, HLD, DM2, colon ca, BPH, glaucoma, spinal stenosis s/p previous cervical fusion, presented to [facility] 5/31/25 s/p fall, found to have C7 fracture and transferred to [facility] where he underwent C7 ORIF, removal of C3-6 hardware and C3-T1 PLIF by neurosurgery Dr. Raza Benjamin on 6/1/25.  Post op he had anemia but did not require transfusion. He was put on SQ Heparin for DVT ppx.   His pain was managed with Tylenol and Oxycodone.   He had elevated BP with surgery requiring IV Hydralzine but BP improved after surgery and HTN managed with Felodipine, Lisinopril, Losartan and Metoprolol.   He was on Lipitor for HLD.  He was on Finasteride and Tamsulosin for BPH.    He was on netarsudiL-latanoprost and timoptic eye drops for glaucoma.   He had very elevated blood sugars alaina-op, related to IVFs and Decadron, and required insulin. Blood sugars improved and DM2 managed with Metformin.   He had elevated CK of 1118 on admission due to rhabdomyolysis from being down on floor prior to admission. He was treated with IVFs and CK improved.   He was admitted to [facility] on 6/4/2025 for acute inpatient rehab.     [facility] hospital course:    He participated in therapy. He was seen by Nutrition, STACY. Vit D low, started oral supplement.     SUBJECTIVE:  Improved sleep last night, reporting good management of pain  Wearing rigid cervical collar  Denies fever, lighthadedness, chest pain, dyspnea  Good appetite without GI complaint    Review of Systems:  All other systems reviewed and negative except as noted in the HPI.    Current  meds and allergies reviewed  Current Facility-Administered Medications   Medication Dose Route Frequency    acetaminophen  650 mg oral q6h KOFI    alum-mag hydroxide-simeth  30 mL oral q6h PRN    atorvastatin  20 mg oral Daily    bisacodyL  10 mg rectal Daily PRN    cholecalciferol (vitamin D3)  25 mcg oral Daily    cyanocobalamin  1,000 mcg oral Daily    glucose  15-30 g of dextrose oral PRN    Or    dextrose  15-30 g of dextrose oral PRN    Or    glucagon  1 mg intramuscular PRN    Or    dextrose 50 % in water (D50)  25 mL intravenous PRN    enoxaparin  40 mg subcutaneous Daily (6p)    felodipine  10 mg oral Daily    finasteride  5 mg oral Daily    hydrALAZINE  25 mg oral q6h PRN    insulin lispro U-100  2-5 Units subcutaneous BID AC    lisinopriL  10 mg oral BID    losartan  100 mg oral Daily    metFORMIN  500 mg oral BID with breakfast and dinner    metoprolol tartrate  75 mg oral BID    netarsudiL-latanoprost  1 drop Right Eye Nightly    oxyCODONE  5 mg oral q6h PRN    pantoprazole  40 mg oral Daily before breakfast    polyethylene glycol  17 g oral Daily    sennosides-docusate sodium  2 tablet oral BID    tamsulosin  0.4 mg oral Nightly    timolol  1 drop Both Eyes Daily        Past Medical History:   Diagnosis Date    C7 cervical fracture (CMS/HCC) 05/31/2025    Colonic adenoma     Diverticulosis of colon     Enlarged prostate with lower urinary tract symptoms (LUTS)     Glaucoma     Hypertension     Melanoma of skin (CMS/HCC)     Osteoarthritis of knee     Overweight     Spinal stenosis of lumbar region     Type 2 diabetes mellitus (CMS/HCC)      Past Surgical History   Procedure Laterality Date    1) ORIF C7 fracture 2) removal and replacement of C3-6 posterior intrumentation 3) C3 to T3 posterolaterral instrumented fusion  Depuy + removal set [facility] C-arm O-Arm N/A 6/1/2025    Performed by Raza Benjamin MD at  OR    Appendectomy      Cervical fusion  06/01/2025    1) ORIF C7 fracture 2) removal and  replacement of C3-6 posterior intrumentation 3) C3 to T3 posterolaterral instrumented fusion    Colonoscopy  01/10/2012    diverticulosis    Colonoscopy w/ polypectomy  02/09/2022    Hip arthroplasty Bilateral     Knee arthroplasty Left     Knee arthroplasty Right 02/02/2022     Social History     Tobacco Use    Smoking status: Never    Smokeless tobacco: Never   Substance Use Topics    Alcohol use: Yes     Comment: BEER, 1 CONSUMED DAILY    Drug use: Never      Family History   Problem Relation Name Age of Onset    Breast cancer Biological Mother         Vital signs in last 24 hours:  Temp:  [36.4 °C (97.6 °F)-36.5 °C (97.7 °F)] 36.4 °C (97.6 °F)  Heart Rate:  [50-72] 72  Resp:  [18] 18  BP: (138-160)/(71-83) 138/72  Vital signs reviewed 06/13/25 1:32 PM    Physical Exam  Vitals and nursing note reviewed.   Constitutional:       Appearance: Normal appearance.   HENT:      Head: Normocephalic and atraumatic.      Right Ear: External ear normal.      Left Ear: External ear normal.      Nose: Nose normal.      Mouth/Throat:      Pharynx: Oropharynx is clear.   Eyes:      Conjunctiva/sclera: Conjunctivae normal.   Neck:      Comments: S/p cervical fusion in collar  Cardiovascular:      Rate and Rhythm: Normal rate.      Pulses: Normal pulses.   Pulmonary:      Effort: Pulmonary effort is normal.   Abdominal:      General: Abdomen is flat.   Musculoskeletal:      Right lower leg: Edema present.      Left lower leg: Edema present.   Skin:     Findings: Lesion (right arm, right ear, sacral wounds as documented by nursing) present.   Neurological:      Mental Status: He is alert and oriented to person, place, and time.                 Objective    Labs:  I have reviewed the patient's labs.  Significant abnormals are anemia, hyponatremia, hypokalemia.  Results from last 7 days   Lab Units 06/09/25  0556   WBC K/uL 7.59   HEMOGLOBIN g/dL 11.0*   HEMATOCRIT % 32.7*   PLATELETS K/uL 337     Results from last 7 days   Lab Units  06/11/25  0523 06/09/25  0556   SODIUM mEQ/L 135* 132*   POTASSIUM mEQ/L 3.6 3.4*   CHLORIDE mEQ/L 101 101   CO2 mEQ/L 26 26   BUN mg/dL 21 22   CREATININE mg/dL 1.0 1.1   CALCIUM mg/dL 8.4* 8.6   ALBUMIN g/dL  --  3.2*   BILIRUBIN TOTAL mg/dL  --  0.5   ALK PHOS IU/L  --  133*   ALT IU/L  --  46   AST IU/L  --  38   GLUCOSE mg/dL 125* 120*                  6/5/25: vit d 25-oh: 13 (low)    POCT: 148-107    Imaging:  Not applicable        ASSESSMENT/PLAN:    75 y.o. malewith HTN, HLD, DM2, colon ca, BPH, glaucoma, spinal stenosis s/p previous cervical fusion, presented to [facility] 5/31/25 s/p fall, found to have C7 fracture and transferred to [facility] where he underwent C7 ORIF, removal of C3-6 hardware and C3-T1 PLIF by neurosurgery Dr. Raza Benjamin on 6/1/25     1. Neuro:  # Cervical stenosis  # acute C7 fx  # DISH  -6/1/25s/p C7 ORIF, removal of C3-6 hardware and C3-T1 PLIF by neurosurgery Dr. Raza Benjamin   -pain managed with Tylenol and Oxycodone     2. Vasc:  # DVT ppx  -bilat LE edema  -SCDs and SQ Heparin for DVT ppx     3. Heme:  # ABLA  -post op anemia  -5/12/25 B12 <400, added oral B12  -hgb 6/9/25 11.0  -on multivitamin     4. Renal:  -increased risk of dehydration and electrolyte changes  -electrolyte imbalance, hypokalemia, hyponatremia  -provide K supplementation; trend Na  -follow BMP, Mg     5. Gi:  # constipation  -Miralax for bowels  # ulcer ppx  -Protonix ulcer ppx     6. Gu:  # BPH  -on Proscar and Flomax  -using [place] cath at  for incontinence  -having high volume urine output at night with some urine retention requiring intermitten cath     7. Cardiac:  # HTN  -on Felodipine, Lisinopril, Losartan, metoprolol tartrate, tamsulosin  -BP labile, range 135-176/64-85  -assess response to adjusted doses lisinopril and metoprolol  -manual only monitoring  -watch for orthostatic hypotension  -use cardiac precautions in therapy     8. Pulm:  -incentive spirometry for atelectasis     9.  Derm:  # multiple wounds  -surgical wounds  -wounds on right arm, right ear, sacrum as documented by nursing  -seen by [facility] for skin assessment  -wound care per nursing and WOCN consult     10. Nutrition:  -Adult Diet Regular; Thin Liquids; Consistent Carbohydrate 2000   -sen by Nutrition for assessment and education     11. Endo:  # HLD  -on Lipitor  # DM2  -had steroid induced hyperglycemia due to Decadron, now improved  -on Metformin  -accuchecks BID AC with Lispro scale for BG>200  -hypoglycemia protocol     12. Psych:  # anxiety/depression  -consulted Psychology for support     13. Musculoskeletal:  # rhabdomyolysis  -clinically resolved  -6/5/25 CK back to normal at 124  # vit D deficiency  -6/5/25 Vit D low at 13, started on vit D3 25 mcg po daily.    14. Ophth:  # glaucoma  -on netarsudiL-latanoprost and timoptic eye drops     14. Dispo:  -code status: Full Code        plan discussed with patient, nurse, case management and Chase Anna MD Jeanne Lasota, MD  6/13/2025  1:32 PM

## 2025-06-14 ENCOUNTER — APPOINTMENT (INPATIENT)
Dept: OCCUPATIONAL THERAPY | Facility: REHABILITATION | Age: 75
DRG: 560 | End: 2025-06-14
Payer: MEDICARE

## 2025-06-14 LAB
GLUCOSE BLD-MCNC: 111 MG/DL (ref 70–99)
GLUCOSE BLD-MCNC: 124 MG/DL (ref 70–99)
POCT TEST: ABNORMAL
POCT TEST: ABNORMAL

## 2025-06-14 PROCEDURE — 12800000 HC ROOM AND CARE SEMIPRIVATE REHAB

## 2025-06-14 PROCEDURE — 63700000 HC SELF-ADMINISTRABLE DRUG: Performed by: HOSPITALIST

## 2025-06-14 PROCEDURE — 63600000 HC DRUGS/DETAIL CODE: Mod: JZ | Performed by: INTERNAL MEDICINE

## 2025-06-14 PROCEDURE — 63700000 HC SELF-ADMINISTRABLE DRUG: Performed by: INTERNAL MEDICINE

## 2025-06-14 PROCEDURE — 97110 THERAPEUTIC EXERCISES: CPT | Mod: GO

## 2025-06-14 PROCEDURE — 97112 NEUROMUSCULAR REEDUCATION: CPT | Mod: GO

## 2025-06-14 PROCEDURE — 12800001 HC ROOM AND CARE SEMIPRIVATE REHAB-BRAIN INJ

## 2025-06-14 PROCEDURE — 97530 THERAPEUTIC ACTIVITIES: CPT | Mod: GO

## 2025-06-14 PROCEDURE — 63700000 HC SELF-ADMINISTRABLE DRUG: Performed by: PHYSICAL MEDICINE & REHABILITATION

## 2025-06-14 RX ADMIN — ACETAMINOPHEN 650 MG: 325 TABLET ORAL at 04:55

## 2025-06-14 RX ADMIN — PANTOPRAZOLE SODIUM 40 MG: 40 TABLET, DELAYED RELEASE ORAL at 09:01

## 2025-06-14 RX ADMIN — OXYCODONE HYDROCHLORIDE 5 MG: 5 TABLET ORAL at 04:54

## 2025-06-14 RX ADMIN — ATORVASTATIN CALCIUM 20 MG: 20 TABLET, FILM COATED ORAL at 09:02

## 2025-06-14 RX ADMIN — METOPROLOL TARTRATE 75 MG: 50 TABLET, FILM COATED ORAL at 21:33

## 2025-06-14 RX ADMIN — TIMOLOL MALEATE 1 DROP: 5 SOLUTION/ DROPS OPHTHALMIC at 09:05

## 2025-06-14 RX ADMIN — METFORMIN HYDROCHLORIDE 500 MG: 500 TABLET ORAL at 17:21

## 2025-06-14 RX ADMIN — ENOXAPARIN SODIUM 40 MG: 40 INJECTION SUBCUTANEOUS at 17:22

## 2025-06-14 RX ADMIN — TAMSULOSIN HYDROCHLORIDE 0.4 MG: 0.4 CAPSULE ORAL at 21:33

## 2025-06-14 RX ADMIN — FINASTERIDE 5 MG: 5 TABLET, FILM COATED ORAL at 09:02

## 2025-06-14 RX ADMIN — Medication 25 MCG: at 09:01

## 2025-06-14 RX ADMIN — FELODIPINE 10 MG: 5 TABLET, FILM COATED, EXTENDED RELEASE ORAL at 09:01

## 2025-06-14 RX ADMIN — CYANOCOBALAMIN TAB 1000 MCG 1000 MCG: 1000 TAB at 09:02

## 2025-06-14 RX ADMIN — LISINOPRIL 10 MG: 10 TABLET ORAL at 21:33

## 2025-06-14 RX ADMIN — ACETAMINOPHEN 650 MG: 325 TABLET ORAL at 12:57

## 2025-06-14 RX ADMIN — METFORMIN HYDROCHLORIDE 500 MG: 500 TABLET ORAL at 09:02

## 2025-06-14 RX ADMIN — ACETAMINOPHEN 650 MG: 325 TABLET ORAL at 17:21

## 2025-06-14 RX ADMIN — LOSARTAN POTASSIUM 100 MG: 100 TABLET, FILM COATED ORAL at 09:01

## 2025-06-14 RX ADMIN — OXYCODONE HYDROCHLORIDE 5 MG: 5 TABLET ORAL at 21:33

## 2025-06-14 RX ADMIN — LISINOPRIL 10 MG: 10 TABLET ORAL at 09:02

## 2025-06-14 RX ADMIN — METOPROLOL TARTRATE 75 MG: 50 TABLET, FILM COATED ORAL at 09:02

## 2025-06-14 NOTE — PLAN OF CARE
Plan of Care Review  Plan of Care Reviewed With: patient  Progress: improving  Outcome Evaluation: Alert and oriented x4. Continent of bowel and bladder. Utilizes a [place] catheter at HS to maintain continence overnight. Continues on scheduled Tylenol with PRN Oxycodone given x1 this shift for breakthrough neck pain. Neck incision is DOMINGA with steristrips intact. Aspen collar worn OOB. Accuchecks BID with no s/s of hyper/hypo-glycemia noted. Sleeping quietly in bed overnight. Fall and safety measures maintained.

## 2025-06-14 NOTE — PROGRESS NOTES
Subjective    Patient seen and examined on rounds.  Chart reviewed.  Events overnight noted.  History reviewed briefly with patient.    CC: Deficits in mobility, transfers, self-care status post fall, unstable 3 column C7 Chance fracture, status post C7 ORIF, removal of prior C3-6 hardware and C3-T1 PLIF by neurosurgery, history of gait instability and multiple falls, peripheral neuropathy, multiple medical problems.    HPI:  Mr. Aneesh Brito is a 75-year-old left handed white male with chronic conditions significant for hypertension, hyperlipidemia, diabetes mellitus type 2, BPH, glaucoma, colon cancer, gait instability, multiple falls in the home environment for which he was seeing a neurologist for workup of falls, history of prior bilateral hip replacements and bilateral knee replacements, spinal stenosis status post previous C3-C6 laminectomy and posterolateral instrumented fusion over 10 years ago by Dr. Maurer, presented to the ER at [facility] on 5/31/25 after suffering from a mechanical fall while at home and was unable to get up so he was on the floor all night until he was found.  At [facility] ER and he reported neck pain and pain in the lower cervical and upper thoracic region.  Imaging studies revealed C7 Chance fracture and findings of DISH (diffuse idiopathic skeletal hyperostosis ).  He was subsequently transferred to [facility] for neurosurgical evaluation where he was admitted on 5/31/25.  He was evaluated by Dr. Benjamin from neurosurgery, noted to have a 3 column Chance fracture at C7-T1 level, given highly unstable fracture was recommended surgical intervention by neurosurgery. He underwent C7 ORIF, removal of C3-6 hardware and C3-T1 PLIF by neurosurgery Dr. Raza Benjamin on 6/1/25.  Postoperatively is allowed weightbearing as tolerated on both lower extremities.  He has been given Aspen collar for use when out of bed in chair and when ambulating.  He can use soft cervical  collar in the bed.  I discussed spinal precautions with him.  Postoperative course was significant for anemia but he did not require transfusion.  His pain is managed with Tylenol and Oxycodone.  He had elevated blood pressure requiring IV Hydralazine but blood pressure improved after surgery and hypertension was managed with managed with Felodipine, Lisinopril, Losartan and Metoprolol.  He is continued on Lipitor for hyperlipidemia.  He has history of BPH and remains on Tamsulosin and Finasteride.  He was on Netarsudil-Latanoprost and Timoptic eye drops for glaucoma.  He had elevated blood sugars related to Decadron perioperatively and required insulin and subsequently blood sugars improved and he is managed with Metformin now. He had elevated CK of 1118 on admission due to rhabdomyolysis from being down on floor prior to admission. He was treated with IV fluids and CK improved.  I discussed his recent clinical course with patient, his wife and son who is a pediatric physician and his son indicated that patient was being evaluated by neurologist Dr. Hussein Haider regarding his gait instability and falls, and lumbar spine MRI was done, EMG studies which showed peripheral neuropathy as well as muscle biopsy was being planned of the right quadriceps to evaluate  his proximal weakness in bilateral lower extremities.  He is unable to do active straight leg raise in bilateral lower extremities in bed, unable to localize position sense in his left great toe and I also discussed with patient, his wife and son at bedside.  I also mentioned to them that neurology will be conservative at him during his stay at [facility] and that he should follow up with Dr. Hussein Haider from neurology on outpatient basis for further evaluation and workup as appropriate.  He is on subcutaneous Heparin and SCDs for DVT prophylaxis.  He is able to tolerate regular with thins consistency diet.  On 6/4/25, hemoglobin was 11.3, WBC count was  "11.85, platelets were 236, BUN was 22, creatinine was 1.1, sodium was 134 and potassium was 3.3.  He has been needing assistance for mobility, transfers, self-care. He is transferred to [facility] on 6/4/25 for further rehabilitation care.     SUBJ: Working on ADLs with occupational therapy.  Requiring minimal assistance for full body dressing, minimal assistance for lower dressing, touching/steadying assistance for toileting and close supervision for bathing in occupational therapy.  Continent of bowel and bladder for nursing.    ROS: Denies chest pain or shortness of breath. Other ROS negative. Past, family, social history is unchanged.    Current Functional Status:   Bed mobility:   [place], Supine to Sit: close supervision   [place], Sit to Supine: touching/steadying assist   Transfers:    [place], Sit to Stand Transfer: close supervision  [place], Stand to Sit Transfer: close supervision   [place], Stand Pivot/Stand Step Transfer: touching/steadying assist   Gait:   [place]el[place], Gait: touching/steadying assist  Assistive Device: walker, front-wheeled   Distance in Feet: 150 feet    Bathing:   Bigelow: close supervision   Toileting:   [place]elow: touching/steadying assist   Upper body dressing:   Bigelow: minimum assist (75% or more patient effort)   Lower body dressing:   Bigelow: minimum assist (75% or more patient effort)       Functional Progress:    Functional status reviewed. Overall, patient's functional status is improving.      Physical Exam      Blood pressure (!) 140/75, pulse (!) 58, temperature 36.6 °C (97.9 °F), temperature source Oral, resp. rate 18, height 1.803 m (5' 11\"), weight 102 kg (223 lb 12.8 oz), SpO2 95%.    Body mass index is 31.21 kg/m².    General Appearance: Not in acute distress  Head/Ear/Nose/Throat: Normocephalic; Atraumatic.   Eye: EOMI; PERRL.   Neck: Healing incision posterior cervicothoracic spine.  Dressing in place.  "   Respiratory: Decreased breath sounds at bases.   Cardiovascular: RRR; Normal S1, S2.   Gastrointestinal: Soft; NT; +BS.   Extremities: Bilateral lower extremity edema noted.    Musculoskeletal: Functional active range of motion in both upper extremities except some limitation in left shoulder and is unable to lift left arm up overhead.  Some limitation of active range of motion in both hips and both knees, which is chronic according to patient and his wife at bedside.  Patient is unable to do active straight leg raise and either lower extremity.  He has had previous bilateral hip and bilateral knee replacements.  His wife mentions patient was lifting his lower extremities manually with his arms while getting in the car and after he sat down in the seat inside the car manually left each leg to bring them in the car.   Neurological: AAO ×3. Speech is fluent. Cranial nerve examination does not reveal any gross facial asymmetry. Strength testing about 4/5 strength in both upper extremities except left shoulder is 2-/5 and abduction and forward flexion.  Bilateral hip flexion is 2-/5.  Bilateral quadriceps are 2+/5 with the thighs supported.  Bilateral ankle dorsi and plantar flexion is 3+/5.  He denies significant numbness in his legs and feet at this time.  He is grossly able to localize position sense in his right great toe but not so in his left great toe.  He is able to localize vibration sense in both great toes.  Deep tendon reflexes are hypoactive and symmetric bilaterally.  Plantars are flexor.  Coordination is functional upper extremities.  Both knee jerks were not tested.  Behavior/Emotional: Appropriate; Cooperative.   Skin: Healing incision posterior cervicothoracic spine.  Dressing in place.  Some abrasions noted on right upper extremity.  Left forearm has bruising ecchymosis noted.  Small wound noted on sacrum/coccyx at least stage II decubitus cannot rule out DTI.      Current Facility-Administered  Medications:     acetaminophen (TYLENOL) tablet 650 mg, 650 mg, oral, q6h KOFI, Chandler Falk MD, 650 mg at 06/14/25 0455    alum-mag hydroxide-simeth (MAALOX) 200-200-20 mg/5 mL suspension 30 mL, 30 mL, oral, q6h PRN, Chandler Falk MD    atorvastatin (LIPITOR) tablet 20 mg, 20 mg, oral, Daily, Robert Hawkins MD, 20 mg at 06/14/25 0902    bisacodyL (DULCOLAX) 10 mg suppository 10 mg, 10 mg, rectal, Daily PRN, Chandler Falk MD    cholecalciferol (vitamin D3) tablet 25 mcg, 25 mcg, oral, Daily, Chandler Falk MD, 25 mcg at 06/14/25 0901    cyanocobalamin (VITAMIN B12) tablet 1,000 mcg, 1,000 mcg, oral, Daily, Chandler Falk MD, 1,000 mcg at 06/14/25 0902    glucose chewable tablet 15-30 g of dextrose, 15-30 g of dextrose, oral, PRN **OR** dextrose 40 % oral gel 15-30 g of dextrose, 15-30 g of dextrose, oral, PRN **OR** glucagon (GLUCAGEN) injection 1 mg, 1 mg, intramuscular, PRN **OR** dextrose 50 % in water (D50) injection 12.5 g, 25 mL, intravenous, PRN, Chase Anna MD    enoxaparin (LOVENOX) syringe 40 mg, 40 mg, subcutaneous, Daily (6p), Robert Hawkins MD, 40 mg at 06/13/25 1825    felodipine (PLENDIL) 24 hr ER tablet 10 mg, 10 mg, oral, Daily, Chandler Falk MD, 10 mg at 06/14/25 0901    finasteride (PROSCAR) tablet 5 mg, 5 mg, oral, Daily, Robert Hawkins MD, 5 mg at 06/14/25 0902    hydrALAZINE (APRESOLINE) tablet 25 mg, 25 mg, oral, q6h PRN, Robert Hawkins MD, 25 mg at 06/08/25 1849    insulin lispro U-100 (HumaLOG) pen 2-5 Units, 2-5 Units, subcutaneous, BID AC, Chandler Falk MD    lisinopriL (PRINIVIL) tablet 10 mg, 10 mg, oral, BID, Annette Sherman MD, 10 mg at 06/14/25 0902    losartan (COZAAR) tablet 100 mg, 100 mg, oral, Daily, Robert Hawkins MD, 100 mg at 06/14/25 0901    metFORMIN (GLUCOPHAGE) tablet 500 mg, 500 mg, oral, BID with breakfast and dinner, Robert Hawkins MD, 500 mg at 06/14/25 0902    metoprolol tartrate (LOPRESSOR) tablet 75 mg, 75 mg, oral, BID,  Annette Sherman MD, 75 mg at 06/14/25 0902    netarsudiL-latanoprost 0.02-0.005 % drops 1 drop, 1 drop, Right Eye, Nightly, Chandler Falk MD, 1 drop at 06/13/25 2050    oxyCODONE (ROXICODONE) immediate release tablet 5 mg, 5 mg, oral, q6h PRN, Annette Sherman MD, 5 mg at 06/14/25 0454    pantoprazole (PROTONIX) tablet,delayed release (DR/EC) 40 mg, 40 mg, oral, Daily before breakfast, Chandler Falk MD, 40 mg at 06/14/25 0901    polyethylene glycol (MIRALAX) 17 gram packet 17 g, 17 g, oral, Daily, Robert Hawkins MD, 17 g at 06/05/25 0859    sennosides-docusate sodium (SENOKOT-S) 8.6-50 mg per tablet 2 tablet, 2 tablet, oral, BID, Chase Anna MD, 2 tablet at 06/13/25 2047    tamsulosin (FLOMAX) 24 hr ER capsule 0.4 mg, 0.4 mg, oral, Nightly, Robert Hawkins MD, 0.4 mg at 06/13/25 2047    timolol (TIMOPTIC) 0.5 % ophthalmic solution 1 drop, 1 drop, Both Eyes, Daily, Robert Hawkins MD, 1 drop at 06/14/25 0905       Labs / Radiology    Lab Results   Component Value Date    WBC 7.59 06/09/2025    HGB 11.0 (L) 06/09/2025    HCT 32.7 (L) 06/09/2025    MCV 93.2 06/09/2025     06/09/2025     Lab Results   Component Value Date    GLUCOSE 125 (H) 06/11/2025    CALCIUM 8.4 (L) 06/11/2025     (L) 06/11/2025    K 3.6 06/11/2025    CO2 26 06/11/2025     06/11/2025    BUN 21 06/11/2025    CREATININE 1.0 06/11/2025       Assessment and Plan    ASSESSMENT PLAN:  1. 75-year-old left handed white male with chronic conditions significant for hypertension, hyperlipidemia, diabetes mellitus type 2, BPH, glaucoma, colon cancer, gait instability, multiple falls in the home environment for which he was seeing a neurologist for workup of falls, history of prior bilateral hip replacements and bilateral knee replacements, spinal stenosis status post previous C3-C6 laminectomy and posterolateral instrumented fusion over 10 years ago by Dr. Maurer, presented to the ER at [facility] on 5/31/25 after suffering  from a mechanical fall while at home and was unable to get up so he was on the floor all night until he was found.  At [facility] ER and he reported neck pain and pain in the lower cervical and upper thoracic region.  Imaging studies revealed C7 Chance fracture and findings of DISH (diffuse idiopathic skeletal hyperostosis ).  He was subsequently transferred to [facility] for neurosurgical evaluation where he was admitted on 5/31/25.  He was evaluated by Dr. Benjamin from neurosurgery, noted to have a 3 column Chance fracture at C7-T1 level, given highly unstable fracture was recommended surgical intervention by neurosurgery. He underwent C7 ORIF, removal of C3-6 hardware and C3-T1 PLIF by neurosurgery Dr. Raza Benjamin on 6/1/25.  Postoperatively is allowed weightbearing as tolerated on both lower extremities.  He has been given Aspen collar for use when out of bed in chair and when ambulating.  He can use soft cervical collar in the bed.  I discussed spinal precautions with him.  Postoperative course was significant for anemia but he did not require transfusion.  His pain is managed with Tylenol and Oxycodone.  He had elevated blood pressure requiring IV Hydralazine but blood pressure improved after surgery and hypertension was managed with managed with Felodipine, Lisinopril, Losartan and Metoprolol.  He is continued on Lipitor for hyperlipidemia.  He has history of BPH and remains on Tamsulosin and Finasteride.  He was on Netarsudil-Latanoprost and Timoptic eye drops for glaucoma.  He had elevated blood sugars related to Decadron perioperatively and required insulin and subsequently blood sugars improved and he is managed with Metformin now. He had elevated CK of 1118 on admission due to rhabdomyolysis from being down on floor prior to admission. He was treated with IV fluids and CK improved.  I discussed his recent clinical course with patient, his wife and son who is a pediatric physician and his son  indicated that patient was being evaluated by neurologist Dr. Hussein Haider regarding his gait instability and falls, and lumbar spine MRI was done, EMG studies which showed peripheral neuropathy as well as muscle biopsy was being planned of the right quadriceps to evaluate  his proximal weakness in bilateral lower extremities.  He is unable to do active straight leg raise in bilateral lower extremities in bed, unable to localize position sense in his left great toe and I also discussed with patient, his wife and son at bedside.  I also mentioned to them that neurology will be conservative at him during his stay at [facility] and that he should follow up with Dr. Hussein Haider from neurology on outpatient basis for further evaluation and workup as appropriate.  He is on subcutaneous Heparin and SCDs for DVT prophylaxis.  He is able to tolerate regular with thins consistency diet.  On 6/4/25, hemoglobin was 11.3, WBC count was 11.85, platelets were 236, BUN was 22, creatinine was 1.1, sodium was 134 and potassium was 3.3.  He has been needing assistance for mobility, transfers, self-care. He is transferred to [facility] on 6/4/25 for further rehabilitation care.     2. DVT prophylaxis - on subcutaneous Lovenox.  On SCDs.  Platelets 264 on 6/5/2025.  Platelets 307 on 6/9/2025.     3. Neurosurgery - status post fall, unstable 3 column C7 Chance fracture, status post C7 ORIF, removal of prior C3-6 hardware and C3-T1 PLIF by neurosurgery, history of gait instability and multiple falls, peripheral neuropathy, class I obesity, multiple medical problems - continue PT, OT, psychology.  Follow falls precautions, cardiac precautions, aspiration precautions, spinal precautions.  Aspen collar to neck when out of bed.  Monitor healing of posterior cervical incision.  Neurology was consulted.  Neurosurgery recommended removal of catgut sutures and drain sutures at [facility] about 2 weeks postop which has been  completed by nursing.  Patient will follow-up at neurosurgery office after discharge from [facility].     4. GI - On Protonix for GI prophylaxis. On Senokot-S.  On MiraLAX.  On PRN Dulcolax suppository.  On PRN Maalox.     5.  -on Proscar.  On Flomax.     6. CVS - on Plendil.  On Lisinopril.  On Cozaar.  On Lopressor.  On PRN Hydralazine.     7. Pulmonary - encourage incentive spirometry.     8. Hematology - monitor hemoglobin, platelets.  Hemoglobin 12.2, WBC 10.71 on 6/5/2025.  Hemoglobin 11.0, WBC 7.59 on 6/9/2025.     9. Pain -on Tylenol.  On PRN Oxycodone.     10. Skin - Healing incision posterior cervicothoracic spine.  Dressing in place.  Some abrasions noted on right upper extremity.  Left forearm has bruising ecchymosis noted.  Small wound noted on sacrum/coccyx at least stage II decubitus cannot rule out DTI.     11. F/E/N - on vitamin B-12.  BMI 33.63 consistent with class I obesity.  Nutrition was consulted.     12. Diabetes mellitus type 2  - on sliding scale Humalog coverage.  On Glucophage.  On hypoglycemic protocol.     13. Ophthalmology- He was on NetarsudiL-latanoprost and Timoptic eye drops for glaucoma     14. Hyperlipidemia - on Lipitor.     15. Rehabilitation medicine - Continue comprehensive rehabilitation care. Continue PT, OT, psychology.  Follow falls precautions, cardiac precautions, aspiration precautions, spinal precautions.  Aspen collar to neck when out of bed.  Monitor healing of posterior cervical incision.  Neurology was consulted.Tolerating therapies per endurance on 6/6/2025.  Slept well last night.  Noted to have elevated PVR.  Requiring moderate assistance for sit to stand transfer with physical therapy.  Able to ambulate 20 feet with front wheeled walker dependent gait with physical therapy on 6/6/2025. Discussed with nurse on 6/6/2025. Working on ADLs with occupational therapy on 6/7/2025.  Dependent for upper and lower body dressing, requiring minimal assistance for  toileting, moderate assistance for bathing with occupational therapy on 6/7/2025.  Discussed with nurse regarding his PVRs on 6/7/2025.  Using [place] catheter at nighttime.  Discussed with patient on 6/7/2025.Discussed with patient and with his wife with him on 6/9/2025.  They feel he is making progress in therapy.  Requiring minimal assistance for sit to stand transfer with physical therapy.  Able to ambulate 100 feet with front wheeled walker with minimal assistance with physical therapy.  Reviewed labs on 6/9/2025.Discussed patients progress in therapies with therapists in team meeting on 6/10/2025. Discussed in PCC. See PCC documentation.  Continent of bowel and bladder for nursing on 6/10/2025.Discussed recommendations of PCC with patient on 6/11/2025.  Inspected posterior cervical incision which is healing well.  Working on ADLs occupational therapy.  Requiring moderate assistance for upper and lower body dressing, moderate assistance for toileting and bathing in occupational therapy on 6/11/2025.Apparently per nursing his pain medications fell off last night and house physician did not renew them according to nurse on 6/12/2025. Discussed with patient regarding Epic generated automatic stop on narcotics causing these medications to be discontinued but medications were reviewed today by internist.  Discussed with nurse on 6/12/2025.Progressing in therapy on 6/13/2025.  Requiring close supervision for sit to stand transfer with physical therapy.  Able to ambulate up 150 feet with front wheeled walker with touching/steadying assistance with physical therapy on 6/13/2025.Working on ADLs with occupational therapy on 6/14/2025.  Requiring minimal assistance for full body dressing, minimal assistance for lower dressing, touching/steadying assistance for toileting and close supervision for bathing in occupational therapy on 6/14/2025.  Continent of bowel and bladder for nursing on 6/14/2025.     16. Reviewed labs  today.  BUN 20, creatinine 0.9, sodium 135, potassium 3.7 on 6/5/2025.  BUN 22, creatinine 1.1, sodium 132, potassium 3.4 on 6/9/2025.  BUN 21, creatinine 1.0, sodium 135, potassium 3.6 on 6/11/2025.        Chase Anna MD  6/14/2025      This encounter was completed utilizing the Direct Speech Voice Recognition Software. Grammatical errors, random word insertions, pronoun errors, and incomplete sentences are occasional consequences of the system due to software limitations, ambient noises, and hardware issues. Such errors may be missed prior to affixing electronic signature. Any questions or concerns about the content, text, or information contained within the body of this dictation should be directly addressed to the physician for clarification. If you have any questions or concerns please do not hesitate to call me directly via EPIC chat, page, or email.

## 2025-06-14 NOTE — PROGRESS NOTES
Patient: Aneesh Brito  Location: [facility]  MRN: [id number]  Today's date: 6/14/2025    History of Present Illness    Aneesh is a 75 y.o. male with a history of colon carcinoma, BPH, hypertension, glaucoma, type 2 diabetes, history of a cervical fusion over 10 years ago admitted after a fall inability to get up on his own.  He was seen at [facility] where imaging studies were performed which demonstrated DISH as well as a Chance fracture at the bottom of C7.  He was transferred to [facility] for neurosurgery.    He was taken to the OR on 6/1 by Dr. Benjamin and underwent an ORIF of the C7 Chance fracture with removal and replacement of the C3-6 posterior instrumentation as well as a C3-T3 posterior lateral fusion.  Drains remain in place.  He is on subcu heparin for DVT prophylaxis.  Currently on a nicardipine drip for blood pressure control.  He states pain is well-controlled.  He denies any other complaints of headache or dizziness, chest pain or shortness of breath.  He is voiding on his own.  He has not had a bowel movement yet.    He was seen by PT and OT needing moderate assistance of 2 to transfer sit to stand.  He ambulated 2 feet with moderate assistance of 2 using a rolling walker.  Pertinent radiology results reviewed. Pertinent lab results reviewed.      Past Medical History  Aneesh has a past medical history of C7 cervical fracture (CMS/HCC) (05/31/2025), Colonic adenoma, Diverticulosis of colon, Enlarged prostate with lower urinary tract symptoms (LUTS), Glaucoma, Hypertension, Melanoma of skin (CMS/HCC), Osteoarthritis of knee, Overweight, Spinal stenosis of lumbar region, and Type 2 diabetes mellitus (CMS/HCC).    OT Vitals      Date/Time Pulse HR Source BP BP Location BP Method Pt Position Who   06/14/25 1306 62 Right Radial 128/68 Right upper arm Automatic Sitting CW          OT Pain      Date/Time Pain Type Side/Orientation Rating: Rest Rating: Activity  Interventions Who   06/14/25 1306 Pain Assessment neck 2 2 -- CW   06/14/25 1359 Pain Reassessment neck 2 -- -- CW                 Prior Living Environment      Flowsheet Row Most Recent Value   People in Home spouse   Current Living Arrangements independent living facility   Home Accessibility wheelchair accessible   Living Environment Comment PTA residing c wife in [facility] 1 floor set up. Shower stall c shower chair & grab bars. Reports no DME at toilet - information to be verified c pt. wife   Number of Stairs, Main Entrance 0   Patient Bedroom Access Comment PTA residing c wife in [facility] 1 floor set up. Shower stall c shower chair & grab bars. Reports no DME at toilet - information to be verified c pt. wife   Bathroom Access Comment PTA residing c wife in [facility] 1 floor set up. Shower stall c shower chair & grab bars. Reports no DME at toilet - information to be verified c pt. wife   Number of Stairs, Within Home, Primary 0       Prior Level of Function      Flowsheet Row Most Recent Value   Dominant Hand left   Ambulation assistive equipment  [rollator]   Transferring assistive equipment  [rollator]   Toileting independent   Bathing independent   Dressing independent   Eating independent   IADLs independent   Driving/Transportation    Communication understands/communicates without difficulty   Prior Level of Function Comment Pt reports being independent with use of SPC inside and Rollator to the dining jenkins. Independent ADLs. Denies other falls. Independent ADLs. (+) driving. Retired teacher   Assistive Device Currently Used at Home cane, straight, walker, 4-wheeled, shower chair       Occupational Profile      Flowsheet Row Most Recent Value   Successful Occupations Enjoys participating in the activities at [employer] - shuffle board and corn hole   Occupational History/Life Experiences PTA independent c ADLs. Wife completes majority of IADLs,  "pt. does some light meal prep. Retired teacher/professor. (+) . Already has handicap parking placard.   Patient Goals \"Walk\"        IRF OT Evaluation and Treatment - 06/14/25 1307          OT Time Calculation    Start Time 1300     Stop Time 1400     Time Calculation (min) 60 min        Session Details    Document Type Daily Treatment/Progress Note        General Information    Patient/Family/Caregiver Comments/Observations Pt's daughter, son in law and grandchildren present at start of session     General Observations of Patient Pt recieved sitting in w/c in room, agreeable to OT session. Pt requesting to work on UE strengthening and therex        Mobility Belt    Mobility Belt Used During Session yes        Orthosis Neck Cervical Collar    Orthosis Properties Date Obtained: 06/01/25 Time Obtained: 1803 Location: Neck Type: Cervical Collar Therapeutic Indications: stabilization and support Wearing Schedule: wear when out of bed       Orthosis Neck Cervical Collar    Orthosis Properties Date Obtained: 06/01/25 Time Obtained: 0000 Location: Neck Type: Cervical Collar Features: Hard Description: [place]pen collar OOB don seated; [place] collar for shower Therapeutic Indications: stabilization and support Wearing Schedule: wear when out of bed       Sit to Stand Transfer    [place], Sit to Stand Transfer close supervision     Safety/Cues increased time to complete     Assistive Device walker, front-wheeled     Comment from w/c c RW, Cl S 2/2 decreased balance        Stand to Sit Transfer    [place], Stand to Sit Transfer close supervision     Safety/Cues increased time to complete     Assistive Device walker, front-wheeled     Comment to w/c, Cl S to ensure eccentric control        Stand Pivot Transfer    [place], Stand Pivot/Stand Step Transfer touching/steadying assist     Safety/Cues increased time to complete     Assistive Device walker, front-wheeled     Comment via amb approach c RW, steadying " "A for balance at pelvis        Impairments/Safety Issues    Comment, Safety Issues/Impairments OT: Steadying A short-medium HHM c RW in therapy gym        Balance    Comment, Balance 1) While ambulating around therapy gym c RW, Pt tasked to retrieve playing cards placed at varying heights around therapy gym then complete partial squats of quantity that corresponded to number on card. Steadying A thorughout for balance at pelvis. 2) In unsupported stance steadying A for balance at pelvis pt translated tennis ball from anterior to posterior around waist 2x10 each direction. 3) Pt completed pipe tree activity in stance c intermittent UE support on tabletop. Pt tolerated 6 minutes 5 seconds in stance c Cl S 2/2 decreased balance. 4) In stance c unilateral UE support on RW pt engaged in \"shuffleboard: game. Pt used alternating UE to slide large pucks on ground to knock over cones placed anteriorly. Focus on dynamic standing balance c unilateral UE support. No LOB noted, steadying A for balance at pelvis. Pt mood improved with meaningful activity.        Neuromuscular Re-education    Interventions closed chain     Positioning sitting     Equipment Used [facility] [place]     Comment CLOSED CHAIN: While seated in w/c c UE stabelized on SPC pt completed forward, diagonal and circular reach x20 each direction to faciliate gentle B shoulder AROM.        Upper Extremity (Therapeutic Exercise)    Exercise Position/Type seated;resistive exercises     General Exercise bilateral     Range of Motion Exercises other (see comments);elbow flexion/extension   chest press    Weight/Resistance 3 pounds;1 pound;resistance band   orange    Reps and Sets 2x10     Comment While seated in w/c pt completed the followin) Bicep curl c 3# dumbell 2) Chest press press c 1# dumbell 3) tricep extension c orange theraband        Daily Progress Summary (OT)    Daily Outcome Statement Session focused on gentle shoulder ROM, UE strength, standing balance, " tolerance and endurance. Pt tolerated session well. Continue c OT POC.                          IRF OT Goals      Flowsheet Row Most Recent Value   Transfer Goal 1    Activity/Assistive Device toilet, commode, 3-in-1 at 06/05/2025 1033   [place] supervision required at 06/10/2025 0830   Time Frame short-term goal (STG), 5 - 7 days at 06/05/2025 1033   Progress/Outcome goal met, goal revised this date at 06/10/2025 0830   Transfer Goal 2    Activity/Assistive Device toilet, commode, 3-in-1 at 06/05/2025 1033   [place] modified independence at 06/05/2025 1033   Time Frame long-term goal (LTG), 21 days or less at 06/05/2025 1033   Transfer Goal 3    Activity/Assistive Device shower, tub bench at 06/05/2025 1033   [place] supervision required at 06/10/2025 0830   Time Frame short-term goal (STG), 5 - 7 days at 06/05/2025 1033   Progress/Outcome goal met, goal revised this date at 06/10/2025 0830   Transfer Goal 4    Activity/Assistive Device shower, tub bench at 06/05/2025 1033   [place] modified independence at 06/05/2025 1033   Time Frame long-term goal (LTG), 21 days or less at 06/05/2025 1033   Bathing Goal 1    Activity/Assistive Device bathing skills, all at 06/05/2025 1033   [place] minimum assist (75% or more patient effort) at 06/10/2025 0830   Time Frame short-term goal (STG), 5 - 7 days at 06/05/2025 1033   Strategies/Barriers spinal precautions inhibiting forward reach towards BLE, plan to introduce LHAE at 06/10/2025 0830   Progress/Outcome goal ongoing at 06/10/2025 0830   Bathing Goal 2    Activity/Assistive Device bathing skills, all at 06/05/2025 1033   [place] modified independence at 06/05/2025 1033   Time Frame long-term goal (LTG), 21 days or less at 06/05/2025 1033   UB Dressing Goal 1    Activity/Assistive Device upper body dressing at 06/05/2025 1033   [place] supervision required at 06/10/2025 0830   Time Frame short-term goal (STG), 5 - 7 days at 06/05/2025  1033   Progress/Outcome goal met, goal revised this date at 06/10/2025 0830   UB Dressing Goal 2    Activity/Assistive Device upper body dressing at 06/05/2025 1033   [place] modified independence at 06/05/2025 1033   Time Frame long-term goal (LTG), 21 days or less at 06/05/2025 1033   LB Dressing Goal 1    Activity/Assistive Device lower body dressing at 06/05/2025 1033   [place] minimum assist (75% or more patient effort) at 06/10/2025 0830   Time Frame short-term goal (STG), 5 - 7 days at 06/05/2025 1033   Progress/Outcome goal met, goal revised this date at 06/10/2025 0830   LB Dressing Goal 2    Activity/Assistive Device lower body dressing at 06/05/2025 1033   [place] modified independence at 06/05/2025 1033   Time Frame long-term goal (LTG), 21 days or less at 06/05/2025 1033   Grooming Goal 1    Activity/Assistive Device grooming skills, all at 06/05/2025 1033   [place] set-up required at 06/10/2025 0830   Time Frame short-term goal (STG), 5 - 7 days at 06/05/2025 1033   Strategies/Barriers standing tolerance/balance, still requiring A in stance at 06/10/2025 0830   Progress/Outcome goal ongoing at 06/10/2025 0830   Grooming Goal 2    Activity/Assistive Device grooming skills, all at 06/05/2025 1033   [place] modified independence at 06/05/2025 1033   Time Frame long-term goal (LTG), 21 days or less at 06/05/2025 1033   Toileting Goal 1    Activity/Assistive Device toileting skills, all at 06/05/2025 1033   [place] minimum assist (75% or more patient effort) at 06/10/2025 0830   Time Frame short-term goal (STG), 5 - 7 days at 06/05/2025 1033   Progress/Outcome goal met, goal revised this date at 06/10/2025 0830   Toileting Goal 2    Activity/Assistive Device toileting skills, all at 06/05/2025 1033   [place] modified independence at 06/05/2025 1033   Time Frame long-term goal (LTG), 21 days or less at 06/05/2025 0314

## 2025-06-14 NOTE — PLAN OF CARE
Plan of Care Review  Plan of Care Reviewed With: patient  Progress: improving  Outcome Evaluation: Pt A&O x 4.  Continent of B&B.  Pain managed with tylenol.  Neck incision well approximated, steri strips intact & adebayo. Bid acucheck.  Manual VS.  Min A RW, ambulation orders.  [place] collar oob.  Plan of care & meds reviewed

## 2025-06-14 NOTE — PROGRESS NOTES
[facility] Progress Note          Patient was seen and examined.   Attestation Notes: Face to face encounter completed    Aneesh Brito is a 75 y.o. male who was admitted for Falls, sequela [W19.XXXS]. Patient was referred by Chase Anna MD for medical assessment and management      CC: Falls, sequela [W19.XXXS]     HPI: Aneesh Brito is a 75 y.o. male with HTN, HLD, DM2, colon ca, BPH, glaucoma, spinal stenosis s/p previous cervical fusion, presented to [facility] 5/31/25 s/p fall, found to have C7 fracture and transferred to [facility] where he underwent C7 ORIF, removal of C3-6 hardware and C3-T1 PLIF by neurosurgery Dr. Raza Benjamin on 6/1/25.  Post op he had anemia but did not require transfusion. He was put on SQ Heparin for DVT ppx.   His pain was managed with Tylenol and Oxycodone.   He had elevated BP with surgery requiring IV Hydralzine but BP improved after surgery and HTN managed with Felodipine, Lisinopril, Losartan and Metoprolol.   He was on Lipitor for HLD.  He was on Finasteride and Tamsulosin for BPH.    He was on netarsudiL-latanoprost and timoptic eye drops for glaucoma.   He had very elevated blood sugars alaina-op, related to IVFs and Decadron, and required insulin. Blood sugars improved and DM2 managed with Metformin.   He had elevated CK of 1118 on admission due to rhabdomyolysis from being down on floor prior to admission. He was treated with IVFs and CK improved.   He was admitted to [facility] on 6/4/2025 for acute inpatient rehab.     [facility] hospital course:    He participated in therapy. He was seen by Nutrition, STACY. Vit D low, started oral supplement.     SUBJECTIVE:  Awake and alert, resting in wheelchair in room, in no acute distress.    Wearing cervical collar  Reports having slept well last night, reports good management of pain  Good appetite, denies GI upset    Review of Systems:  All other systems reviewed and negative except as noted in  the HPI.    Current meds and allergies reviewed  Current Facility-Administered Medications   Medication Dose Route Frequency    acetaminophen  650 mg oral q6h KOFI    alum-mag hydroxide-simeth  30 mL oral q6h PRN    atorvastatin  20 mg oral Daily    bisacodyL  10 mg rectal Daily PRN    cholecalciferol (vitamin D3)  25 mcg oral Daily    cyanocobalamin  1,000 mcg oral Daily    glucose  15-30 g of dextrose oral PRN    Or    dextrose  15-30 g of dextrose oral PRN    Or    glucagon  1 mg intramuscular PRN    Or    dextrose 50 % in water (D50)  25 mL intravenous PRN    enoxaparin  40 mg subcutaneous Daily (6p)    felodipine  10 mg oral Daily    finasteride  5 mg oral Daily    hydrALAZINE  25 mg oral q6h PRN    insulin lispro U-100  2-5 Units subcutaneous BID AC    lisinopriL  10 mg oral BID    losartan  100 mg oral Daily    metFORMIN  500 mg oral BID with breakfast and dinner    metoprolol tartrate  75 mg oral BID    netarsudiL-latanoprost  1 drop Right Eye Nightly    oxyCODONE  5 mg oral q6h PRN    pantoprazole  40 mg oral Daily before breakfast    polyethylene glycol  17 g oral Daily    sennosides-docusate sodium  2 tablet oral BID    tamsulosin  0.4 mg oral Nightly    timolol  1 drop Both Eyes Daily        Past Medical History:   Diagnosis Date    C7 cervical fracture (CMS/HCC) 05/31/2025    Colonic adenoma     Diverticulosis of colon     Enlarged prostate with lower urinary tract symptoms (LUTS)     Glaucoma     Hypertension     Melanoma of skin (CMS/HCC)     Osteoarthritis of knee     Overweight     Spinal stenosis of lumbar region     Type 2 diabetes mellitus (CMS/HCC)      Past Surgical History   Procedure Laterality Date    1) ORIF C7 fracture 2) removal and replacement of C3-6 posterior intrumentation 3) C3 to T3 posterolaterral instrumented fusion  Depuy + removal set [facility] C-arm O-Arm N/A 6/1/2025    Performed by Raza Benjamin MD at  OR    Appendectomy      Cervical fusion  06/01/2025    1) ORIF C7 fracture  2) removal and replacement of C3-6 posterior intrumentation 3) C3 to T3 posterolaterral instrumented fusion    Colonoscopy  01/10/2012    diverticulosis    Colonoscopy w/ polypectomy  02/09/2022    Hip arthroplasty Bilateral     Knee arthroplasty Left     Knee arthroplasty Right 02/02/2022     Social History     Tobacco Use    Smoking status: Never    Smokeless tobacco: Never   Substance Use Topics    Alcohol use: Yes     Comment: BEER, 1 CONSUMED DAILY    Drug use: Never      Family History   Problem Relation Name Age of Onset    Breast cancer Biological Mother         Vital signs in last 24 hours:  Temp:  [36.4 °C (97.5 °F)-36.7 °C (98 °F)] 36.6 °C (97.9 °F)  Heart Rate:  [52-72] 58  Resp:  [18] 18  BP: (124-152)/(60-84) 140/75  Vital signs reviewed 06/14/25 10:55 AM    Physical Exam  Vitals and nursing note reviewed.   Constitutional:       Appearance: Normal appearance.   HENT:      Head: Normocephalic and atraumatic.      Right Ear: External ear normal.      Left Ear: External ear normal.      Nose: Nose normal.      Mouth/Throat:      Pharynx: Oropharynx is clear.   Eyes:      Conjunctiva/sclera: Conjunctivae normal.   Neck:      Comments: S/p cervical fusion in collar  Cardiovascular:      Rate and Rhythm: Normal rate.      Pulses: Normal pulses.   Pulmonary:      Effort: Pulmonary effort is normal.   Abdominal:      General: Abdomen is flat.   Musculoskeletal:      Right lower leg: Edema present.      Left lower leg: Edema present.   Skin:     Findings: Lesion (right arm, right ear, sacral wounds as documented by nursing) present.   Neurological:      Mental Status: He is alert and oriented to person, place, and time.                 Objective    Labs:  I have reviewed the patient's labs.  Significant abnormals are anemia, hyponatremia, hypokalemia.  Results from last 7 days   Lab Units 06/09/25  0556   WBC K/uL 7.59   HEMOGLOBIN g/dL 11.0*   HEMATOCRIT % 32.7*   PLATELETS K/uL 337     Results from last 7  days   Lab Units 06/11/25  0523 06/09/25  0556   SODIUM mEQ/L 135* 132*   POTASSIUM mEQ/L 3.6 3.4*   CHLORIDE mEQ/L 101 101   CO2 mEQ/L 26 26   BUN mg/dL 21 22   CREATININE mg/dL 1.0 1.1   CALCIUM mg/dL 8.4* 8.6   ALBUMIN g/dL  --  3.2*   BILIRUBIN TOTAL mg/dL  --  0.5   ALK PHOS IU/L  --  133*   ALT IU/L  --  46   AST IU/L  --  38   GLUCOSE mg/dL 125* 120*                  6/5/25: vit d 25-oh: 13 (low)    POCT: 139-99    Imaging:  Not applicable        ASSESSMENT/PLAN:    75 y.o. malewith HTN, HLD, DM2, colon ca, BPH, glaucoma, spinal stenosis s/p previous cervical fusion, presented to [facility] 5/31/25 s/p fall, found to have C7 fracture and transferred to [facility] where he underwent C7 ORIF, removal of C3-6 hardware and C3-T1 PLIF by neurosurgery Dr. Raza Benjamin on 6/1/25     1. Neuro:  # Cervical stenosis  # acute C7 fx  # DISH  -6/1/25s/p C7 ORIF, removal of C3-6 hardware and C3-T1 PLIF by neurosurgery Dr. Raza Benjamin   -pain managed with Tylenol and Oxycodone     2. Vasc:  # DVT ppx  -bilat LE edema  -SCDs and SQ Heparin for DVT ppx     3. Heme:  # ABLA  -post op anemia  -5/12/25 B12 <400, continue oral B12  -hgb 6/9/25 11.0  -on multivitamin     4. Renal:  -increased risk of dehydration and electrolyte changes  -electrolyte imbalance, hypokalemia, hyponatremia  -provide K supplementation; trend Na  -follow BMP, Mg     5. Gi:  # constipation  -Miralax for bowels  # ulcer ppx  -Protonix ulcer ppx     6. Gu:  # BPH  -on Proscar and Flomax  -using [place] cath at  for incontinence  -having high volume urine output at night with some urine retention requiring intermitten cath     7. Cardiac:  # HTN  -on Felodipine, Lisinopril, Losartan, metoprolol tartrate, tamsulosin  -titrated lisinopril and metoprolol to improve control  -BP labile, range 124-152/60-84  -manual only monitoring  -watch for orthostatic hypotension  -use cardiac precautions in therapy     8. Pulm:  -incentive spirometry for  atelectasis     9. Derm:  # multiple wounds  -surgical wounds  -wounds on right arm, right ear, sacrum as documented by nursing  -seen by [facility] for skin assessment  -wound care per nursing and WOCN consult     10. Nutrition:  -Adult Diet Regular; Thin Liquids; Consistent Carbohydrate 2000   -sen by Nutrition for assessment and education     11. Endo:  # HLD  -on Lipitor  # DM2  -had steroid induced hyperglycemia due to Decadron, now improved  -on Metformin  -accuchecks BID AC with Lispro scale for BG>200  -hypoglycemia protocol     12. Psych:  # anxiety/depression  -consulted Psychology for support     13. Musculoskeletal:  # rhabdomyolysis  -clinically resolved  -6/5/25 CK back to normal at 124  # vit D deficiency  -6/5/25 Vit D low at 13, started on vit D3 25 mcg po daily.    14. Ophth:  # glaucoma  -on netarsudiL-latanoprost and timoptic eye drops     14. Dispo:  -code status: Full Code        plan discussed with patient, nurse, case management and Chase Anna MD Jeanne Lasota, MD  6/14/2025  10:55 AM

## 2025-06-15 ENCOUNTER — APPOINTMENT (INPATIENT)
Dept: PHYSICAL THERAPY | Facility: REHABILITATION | Age: 75
DRG: 560 | End: 2025-06-15
Payer: MEDICARE

## 2025-06-15 LAB
GLUCOSE BLD-MCNC: 113 MG/DL (ref 70–99)
GLUCOSE BLD-MCNC: 143 MG/DL (ref 70–99)
POCT TEST: ABNORMAL
POCT TEST: ABNORMAL

## 2025-06-15 PROCEDURE — 63700000 HC SELF-ADMINISTRABLE DRUG: Performed by: PHYSICAL MEDICINE & REHABILITATION

## 2025-06-15 PROCEDURE — 12800000 HC ROOM AND CARE SEMIPRIVATE REHAB

## 2025-06-15 PROCEDURE — 12800001 HC ROOM AND CARE SEMIPRIVATE REHAB-BRAIN INJ

## 2025-06-15 PROCEDURE — 63700000 HC SELF-ADMINISTRABLE DRUG: Performed by: HOSPITALIST

## 2025-06-15 PROCEDURE — 63700000 HC SELF-ADMINISTRABLE DRUG: Performed by: INTERNAL MEDICINE

## 2025-06-15 PROCEDURE — 97110 THERAPEUTIC EXERCISES: CPT | Mod: GP

## 2025-06-15 PROCEDURE — 63600000 HC DRUGS/DETAIL CODE: Mod: JZ | Performed by: INTERNAL MEDICINE

## 2025-06-15 PROCEDURE — 97116 GAIT TRAINING THERAPY: CPT | Mod: GP

## 2025-06-15 RX ADMIN — CYANOCOBALAMIN TAB 1000 MCG 1000 MCG: 1000 TAB at 07:52

## 2025-06-15 RX ADMIN — OXYCODONE HYDROCHLORIDE 5 MG: 5 TABLET ORAL at 06:05

## 2025-06-15 RX ADMIN — METFORMIN HYDROCHLORIDE 500 MG: 500 TABLET ORAL at 07:52

## 2025-06-15 RX ADMIN — METOPROLOL TARTRATE 75 MG: 50 TABLET, FILM COATED ORAL at 07:52

## 2025-06-15 RX ADMIN — ACETAMINOPHEN 650 MG: 325 TABLET ORAL at 00:06

## 2025-06-15 RX ADMIN — METFORMIN HYDROCHLORIDE 500 MG: 500 TABLET ORAL at 17:25

## 2025-06-15 RX ADMIN — ENOXAPARIN SODIUM 40 MG: 40 INJECTION SUBCUTANEOUS at 17:25

## 2025-06-15 RX ADMIN — TAMSULOSIN HYDROCHLORIDE 0.4 MG: 0.4 CAPSULE ORAL at 21:16

## 2025-06-15 RX ADMIN — ATORVASTATIN CALCIUM 20 MG: 20 TABLET, FILM COATED ORAL at 07:52

## 2025-06-15 RX ADMIN — LISINOPRIL 10 MG: 10 TABLET ORAL at 21:15

## 2025-06-15 RX ADMIN — LISINOPRIL 10 MG: 10 TABLET ORAL at 07:52

## 2025-06-15 RX ADMIN — FINASTERIDE 5 MG: 5 TABLET, FILM COATED ORAL at 07:53

## 2025-06-15 RX ADMIN — Medication 25 MCG: at 07:52

## 2025-06-15 RX ADMIN — PANTOPRAZOLE SODIUM 40 MG: 40 TABLET, DELAYED RELEASE ORAL at 07:52

## 2025-06-15 RX ADMIN — LOSARTAN POTASSIUM 100 MG: 100 TABLET, FILM COATED ORAL at 07:52

## 2025-06-15 RX ADMIN — ACETAMINOPHEN 650 MG: 325 TABLET ORAL at 12:18

## 2025-06-15 RX ADMIN — OXYCODONE HYDROCHLORIDE 5 MG: 5 TABLET ORAL at 17:27

## 2025-06-15 RX ADMIN — FELODIPINE 10 MG: 5 TABLET, FILM COATED, EXTENDED RELEASE ORAL at 07:53

## 2025-06-15 RX ADMIN — ACETAMINOPHEN 650 MG: 325 TABLET ORAL at 06:05

## 2025-06-15 RX ADMIN — SENNOSIDES AND DOCUSATE SODIUM 2 TABLET: 50; 8.6 TABLET ORAL at 07:52

## 2025-06-15 RX ADMIN — METOPROLOL TARTRATE 75 MG: 50 TABLET, FILM COATED ORAL at 21:14

## 2025-06-15 RX ADMIN — TIMOLOL MALEATE 1 DROP: 5 SOLUTION/ DROPS OPHTHALMIC at 07:55

## 2025-06-15 NOTE — PLAN OF CARE
Plan of Care Review  Plan of Care Reviewed With: patient  Progress: improving  Outcome Evaluation: Alert and oriented; pleasant and cooperative. Pain being managed with sched tylenol; declined oxy this morning. Meds as ordered. BID accu checks. [place] collar on when OOB. Continent of b/b. Steristrips to surgical incision. All safety precautions maintained.

## 2025-06-15 NOTE — PLAN OF CARE
Problem: Rehabilitation (IRF) Plan of Care  Goal: Plan of Care Review  Outcome: Progressing  Flowsheets (Taken 6/15/2025 0600)  Progress: improving  Outcome Evaluation: Patient resting in bed. Medicated twice in shift for pain. Incision intact steri strips in place, VSS and charted. [place] cath applied overnight while in bed. Call light in reach and bed alarm activated.  Plan of Care Reviewed With: patient   Plan of Care Review  Plan of Care Reviewed With: patient  Progress: improving  Outcome Evaluation: Patient resting in bed. Medicated twice in shift for pain. Incision intact steri strips in place, VSS and charted. [place] cath applied overnight while in bed. Call light in reach and bed alarm activated.

## 2025-06-15 NOTE — PROGRESS NOTES
Patient: Aneesh Brito  Location: [facility]  MRN: [id number]  Today's date: 6/15/2025    History of Present Illness    Aneesh is a 75 y.o. male with a history of colon carcinoma, BPH, hypertension, glaucoma, type 2 diabetes, history of a cervical fusion over 10 years ago admitted after a fall inability to get up on his own.  He was seen at [facility] where imaging studies were performed which demonstrated DISH as well as a Chance fracture at the bottom of C7.  He was transferred to [facility] for neurosurgery.    He was taken to the OR on 6/1 by Dr. Benjamin and underwent an ORIF of the C7 Chance fracture with removal and replacement of the C3-6 posterior instrumentation as well as a C3-T3 posterior lateral fusion.  Drains remain in place.  He is on subcu heparin for DVT prophylaxis.  Currently on a nicardipine drip for blood pressure control.  He states pain is well-controlled.  He denies any other complaints of headache or dizziness, chest pain or shortness of breath.  He is voiding on his own.  He has not had a bowel movement yet.    He was seen by PT and OT needing moderate assistance of 2 to transfer sit to stand.  He ambulated 2 feet with moderate assistance of 2 using a rolling walker.  Pertinent radiology results reviewed. Pertinent lab results reviewed.      Past Medical History  Aneesh has a past medical history of C7 cervical fracture (CMS/HCC) (05/31/2025), Colonic adenoma, Diverticulosis of colon, Enlarged prostate with lower urinary tract symptoms (LUTS), Glaucoma, Hypertension, Melanoma of skin (CMS/HCC), Osteoarthritis of knee, Overweight, Spinal stenosis of lumbar region, and Type 2 diabetes mellitus (CMS/HCC).    PT Vitals      Date/Time Pulse HR Source Pt Activity BP BP Location BP Method Pt Position [facility]   06/15/25 0935 48 Monitor At rest 148/70 Left upper arm Automatic Sitting TL          PT Pain      Date/Time Pain Type Side/Orientation Location  Rating: Rest Interventions [facility]   06/15/25 0935 Pain Assessment posterior neck 4 premedicated for activity TL   06/15/25 0959 Pain Reassessment posterior neck 3 position adjusted TL                  Prior Living Environment      Flowsheet Row Most Recent Value   People in Home spouse   Current Living Arrangements independent living facility   Home Accessibility wheelchair accessible   Living Environment Comment PTA residing c wife in [facility] 1 floor set up. Shower stall c shower chair & grab bars. Reports no DME at toilet - information to be verified c pt. wife   Number of Stairs, Main Entrance 0   Patient Bedroom Access Comment PTA residing c wife in [facility] ([facility] 1 floor set up. Shower stall c shower chair & grab bars. Reports no DME at toilet - information to be verified c pt. wife   Bathroom Access Comment PTA residing c wife in [facility] 1 floor set up. Shower stall c shower chair & grab bars. Reports no DME at toilet - information to be verified c pt. wife   Number of Stairs, Within Home, Primary 0       Prior Level of Function      Flowsheet Row Most Recent Value   Dominant Hand left   Ambulation assistive equipment  [rollator]   Transferring assistive equipment  [rollator]   Toileting independent   Bathing independent   Dressing independent   Eating independent   IADLs independent   Driving/Transportation    Communication understands/communicates without difficulty   Prior Level of Function Comment Pt reports being independent with use of SPC inside and Rollator to the dining jenkins. Independent ADLs. Denies other falls. Independent ADLs. (+) driving. Retired teacher   Assistive Device Currently Used at Home cane, straight, walker, 4-wheeled, shower chair        IRF PT Evaluation and Treatment - 06/15/25 0931          PT Time Calculation    Start Time 0930     Stop Time 1000     Time Calculation (min) 30 min        Session Details    Document Type Daily  Treatment/Progress Note        General Information    Patient Profile Reviewed yes     General Observations of Patient Pt received sitting in w/c agreeable to PT session.     Existing Precautions/Restrictions brace worn when out of bed;free water protocol;spinal;head of bed elevated 30 degrees        Mobility Belt    Mobility Belt Used During Session yes           Orthosis Neck Cervical Collar    Orthosis Properties Date Obtained: 06/01/25 Time Obtained: 0000 Location: Neck Type: Cervical Collar Features: Hard Description: [place] collar OOB don seated; [place] collar for shower Therapeutic Indications: stabilization and support Wearing Schedule: wear when out of bed    Skin Assessment intact     Compliance/Wearing Issues compliant with wearing schedule        Sit to Stand Transfer    [place], Sit to Stand Transfer close supervision     Safety/Cues increased time to complete;verbal cues;hand placement     Assistive Device walker, front-wheeled     Comment from w/c to RW with Cl S        Stand to Sit Transfer    [place], Stand to Sit Transfer close supervision     Safety/Cues increased time to complete;verbal cues;hand placement     Assistive Device walker, front-wheeled     Comment to w/c, Cl S to ensure eccentric control        Stand Pivot Transfer    [place], Stand Pivot/Stand Step Transfer touching/steadying assist     Safety/Cues increased time to complete     Assistive Device walker, front-wheeled     Comment via amb approach, steadying A at pelvis for balance        Gait Training    [place], Gait touching/steadying assist     Safety/Cues increased time to complete;sequencing;technique     Assistive Device walker, front-wheeled     Distance in Feet 150 feet     Pattern step-through     Deviations/Abnormal Patterns base of support, narrow;step length decreased;gait speed decreased     Bilateral Gait Deviations heel strike decreased     Comment 150ft x 1, 100' x 1 with RW. Steadying A at  "pelvis for balance. VC's to increase step length and improve MARLA.        Balance    Comment, Balance Standing at RW alt taps to 4\" block 2x10 with rest break        Lower Extremity (Therapeutic Exercise)    Exercise Position/Type seated;standing     General Exercise bilateral     Reps and Sets 2x10     Comment AP, LAQ, hip flex all seated, standing heel raises and marching        Daily Progress Summary (PT)    Symptoms Noted During/After Treatment none     Daily Outcome Statement Session focus on strengthening, balance and progression of amb endurance.  Pt alt block taps standing as well of some standing ex's today w/o increased symptoms.  Needed vc's to increase step length and heel strike while walking.                          IRF PT Goals      Flowsheet Row Most Recent Value   Bed Mobility Goal 1    Activity/Assistive Device rolling to left, rolling to right, sit to supine/supine to sit at 06/05/2025 0902   [place] minimum assist (75% or more patient effort) at 06/05/2025 0902   Time Frame short-term goal (STG), 1 week at 06/05/2025 0902   Progress/Outcome goal ongoing, goal revised this date  [will continue core strengthening/bed mobility training] at 06/10/2025 0850   Bed Mobility Goal 2    Activity/Assistive Device rolling to left, rolling to right, sit to supine/supine to sit at 06/05/2025 0902   [place] modified independence at 06/05/2025 0902   Time Frame long-term goal (LTG), 3 weeks at 06/05/2025 0902   Progress/Outcome goal ongoing at 06/10/2025 0850   Transfer Goal 1    Activity/Assistive Device sit-to-stand/stand-to-sit, stand pivot at 06/05/2025 0902   [place] tactile cues required  [steadying] at 06/10/2025 0850   Time Frame short-term goal (STG), 1 week at 06/10/2025 0850   Progress/Outcome goal met, goal revised this date at 06/10/2025 0850   Transfer Goal 2    Activity/Assistive Device sit-to-stand/stand-to-sit, stand pivot at 06/05/2025 0902   [place] modified independence at " 06/05/2025 0902   Time Frame long-term goal (LTG), 3 weeks at 06/05/2025 0902   Progress/Outcome goal ongoing at 06/10/2025 0850   Gait/Walking Locomotion Goal 1    Activity/Assistive Device gait (walking locomotion) at 06/05/2025 0902   Distance 50 feet at 06/05/2025 0902   [employer] tactile cues required at 06/10/2025 0850   Time Frame short-term goal (STG), 1 week at 06/10/2025 0850   Progress/Outcome goal met, goal revised this date at 06/10/2025 0850   Gait/Walking Locomotion Goal 2    Activity/Assistive Device gait (walking locomotion) at 06/05/2025 0902   Distance 150 feet at 06/05/2025 0902   [employer] supervision required at 06/05/2025 0902   Time Frame long-term goal (LTG), 3 weeks at 06/05/2025 0902   Progress/Outcome goal ongoing at 06/10/2025 0850   Stairs Goal 1    Activity/Assistive Device stairs, all skills at 06/10/2025 0850   Number of Stairs 8 at 06/10/2025 0850   [employer] minimum assist (75% or more patient effort) at 06/10/2025 0850   Time Frame short-term goal (STG), 1 week at 06/10/2025 0850   Stairs Goal 2    Activity/Assistive Device stairs, all skills at 06/10/2025 0850   Number of Stairs 12 at 06/10/2025 0850   [employer] supervision required at 06/10/2025 0850   Time Frame long-term goal (LTG), 2 weeks at 06/10/2025 0850

## 2025-06-16 ENCOUNTER — APPOINTMENT (OUTPATIENT)
Dept: OTHER | Facility: REHABILITATION | Age: 75
End: 2025-06-16
Payer: MEDICARE

## 2025-06-16 ENCOUNTER — APPOINTMENT (INPATIENT)
Dept: OCCUPATIONAL THERAPY | Facility: REHABILITATION | Age: 75
DRG: 560 | End: 2025-06-16
Payer: MEDICARE

## 2025-06-16 ENCOUNTER — APPOINTMENT (INPATIENT)
Dept: PHYSICAL THERAPY | Facility: REHABILITATION | Age: 75
DRG: 560 | End: 2025-06-16
Payer: MEDICARE

## 2025-06-16 LAB
ALBUMIN SERPL-MCNC: 3.2 G/DL (ref 3.5–5.7)
ALP SERPL-CCNC: 100 IU/L (ref 34–125)
ALT SERPL-CCNC: 15 IU/L (ref 7–52)
ANION GAP SERPL CALC-SCNC: 9 MEQ/L (ref 3–15)
AST SERPL-CCNC: 11 IU/L (ref 13–39)
BILIRUB SERPL-MCNC: 0.3 MG/DL (ref 0.3–1.2)
BUN SERPL-MCNC: 22 MG/DL (ref 7–25)
CALCIUM SERPL-MCNC: 8.5 MG/DL (ref 8.6–10.3)
CHLORIDE SERPL-SCNC: 101 MEQ/L (ref 98–107)
CO2 SERPL-SCNC: 24 MEQ/L (ref 21–31)
CREAT SERPL-MCNC: 1.1 MG/DL (ref 0.7–1.3)
EGFRCR SERPLBLD CKD-EPI 2021: >60 ML/MIN/1.73M*2
ERYTHROCYTE [DISTWIDTH] IN BLOOD BY AUTOMATED COUNT: 13.5 % (ref 11.6–14.4)
GLUCOSE BLD-MCNC: 114 MG/DL (ref 70–99)
GLUCOSE BLD-MCNC: 165 MG/DL (ref 70–99)
GLUCOSE SERPL-MCNC: 130 MG/DL (ref 70–99)
HCT VFR BLD AUTO: 29.4 % (ref 40.1–51)
HGB BLD-MCNC: 10 G/DL (ref 13.7–17.5)
MCH RBC QN AUTO: 32.1 PG (ref 28–33.2)
MCHC RBC AUTO-ENTMCNC: 34 G/DL (ref 32.2–36.5)
MCV RBC AUTO: 94.2 FL (ref 83–98)
PLATELET # BLD AUTO: 353 K/UL (ref 150–350)
PMV BLD AUTO: 10.2 FL (ref 9.4–12.4)
POCT TEST: ABNORMAL
POCT TEST: ABNORMAL
POTASSIUM SERPL-SCNC: 3.6 MEQ/L (ref 3.5–5.1)
PROT SERPL-MCNC: 5.8 G/DL (ref 6–8.2)
RBC # BLD AUTO: 3.12 M/UL (ref 4.5–5.8)
SODIUM SERPL-SCNC: 134 MEQ/L (ref 136–145)
WBC # BLD AUTO: 7.12 K/UL (ref 3.8–10.5)

## 2025-06-16 PROCEDURE — 97535 SELF CARE MNGMENT TRAINING: CPT | Mod: GO

## 2025-06-16 PROCEDURE — 97530 THERAPEUTIC ACTIVITIES: CPT | Mod: GP

## 2025-06-16 PROCEDURE — 97530 THERAPEUTIC ACTIVITIES: CPT | Mod: GO

## 2025-06-16 PROCEDURE — 63700000 HC SELF-ADMINISTRABLE DRUG: Performed by: INTERNAL MEDICINE

## 2025-06-16 PROCEDURE — 12800001 HC ROOM AND CARE SEMIPRIVATE REHAB-BRAIN INJ

## 2025-06-16 PROCEDURE — 36415 COLL VENOUS BLD VENIPUNCTURE: CPT | Performed by: HOSPITALIST

## 2025-06-16 PROCEDURE — 63700000 HC SELF-ADMINISTRABLE DRUG: Performed by: HOSPITALIST

## 2025-06-16 PROCEDURE — 12800000 HC ROOM AND CARE SEMIPRIVATE REHAB

## 2025-06-16 PROCEDURE — 85027 COMPLETE CBC AUTOMATED: CPT | Performed by: HOSPITALIST

## 2025-06-16 PROCEDURE — 97110 THERAPEUTIC EXERCISES: CPT | Mod: GP

## 2025-06-16 PROCEDURE — 97110 THERAPEUTIC EXERCISES: CPT | Mod: GO

## 2025-06-16 PROCEDURE — 80053 COMPREHEN METABOLIC PANEL: CPT | Performed by: HOSPITALIST

## 2025-06-16 PROCEDURE — 97116 GAIT TRAINING THERAPY: CPT | Mod: GP

## 2025-06-16 PROCEDURE — 63600000 HC DRUGS/DETAIL CODE: Mod: JZ | Performed by: INTERNAL MEDICINE

## 2025-06-16 RX ORDER — AMOXICILLIN 250 MG
2 CAPSULE ORAL DAILY
Status: DISCONTINUED | OUTPATIENT
Start: 2025-06-17 | End: 2025-06-17

## 2025-06-16 RX ORDER — POLYETHYLENE GLYCOL 3350 17 G/17G
17 POWDER, FOR SOLUTION ORAL DAILY PRN
Status: DISCONTINUED | OUTPATIENT
Start: 2025-06-16 | End: 2025-06-20 | Stop reason: HOSPADM

## 2025-06-16 RX ADMIN — LISINOPRIL 10 MG: 10 TABLET ORAL at 20:36

## 2025-06-16 RX ADMIN — OXYCODONE HYDROCHLORIDE 5 MG: 5 TABLET ORAL at 00:41

## 2025-06-16 RX ADMIN — CYANOCOBALAMIN TAB 1000 MCG 1000 MCG: 1000 TAB at 08:48

## 2025-06-16 RX ADMIN — ACETAMINOPHEN 650 MG: 325 TABLET ORAL at 11:14

## 2025-06-16 RX ADMIN — METFORMIN HYDROCHLORIDE 500 MG: 500 TABLET ORAL at 08:47

## 2025-06-16 RX ADMIN — METOPROLOL TARTRATE 75 MG: 50 TABLET, FILM COATED ORAL at 20:36

## 2025-06-16 RX ADMIN — ENOXAPARIN SODIUM 40 MG: 40 INJECTION SUBCUTANEOUS at 18:05

## 2025-06-16 RX ADMIN — FELODIPINE 10 MG: 5 TABLET, FILM COATED, EXTENDED RELEASE ORAL at 08:47

## 2025-06-16 RX ADMIN — ATORVASTATIN CALCIUM 20 MG: 20 TABLET, FILM COATED ORAL at 08:47

## 2025-06-16 RX ADMIN — PANTOPRAZOLE SODIUM 40 MG: 40 TABLET, DELAYED RELEASE ORAL at 08:48

## 2025-06-16 RX ADMIN — OXYCODONE HYDROCHLORIDE 5 MG: 5 TABLET ORAL at 11:14

## 2025-06-16 RX ADMIN — METOPROLOL TARTRATE 75 MG: 50 TABLET, FILM COATED ORAL at 08:47

## 2025-06-16 RX ADMIN — TAMSULOSIN HYDROCHLORIDE 0.4 MG: 0.4 CAPSULE ORAL at 20:36

## 2025-06-16 RX ADMIN — LISINOPRIL 10 MG: 10 TABLET ORAL at 08:47

## 2025-06-16 RX ADMIN — ACETAMINOPHEN 650 MG: 325 TABLET ORAL at 18:05

## 2025-06-16 RX ADMIN — Medication 25 MCG: at 08:48

## 2025-06-16 RX ADMIN — TIMOLOL MALEATE 1 DROP: 5 SOLUTION/ DROPS OPHTHALMIC at 08:50

## 2025-06-16 RX ADMIN — LOSARTAN POTASSIUM 100 MG: 100 TABLET, FILM COATED ORAL at 08:47

## 2025-06-16 RX ADMIN — OXYCODONE HYDROCHLORIDE 5 MG: 5 TABLET ORAL at 22:04

## 2025-06-16 RX ADMIN — METFORMIN HYDROCHLORIDE 500 MG: 500 TABLET ORAL at 18:05

## 2025-06-16 RX ADMIN — ACETAMINOPHEN 650 MG: 325 TABLET ORAL at 05:25

## 2025-06-16 RX ADMIN — FINASTERIDE 5 MG: 5 TABLET, FILM COATED ORAL at 08:47

## 2025-06-16 NOTE — PROGRESS NOTES
[facility] Progress Note          Patient was seen and examined.   Attestation Notes: Face to face encounter completed    Aneesh Brito is a 75 y.o. male who was admitted for Falls, sequela [W19.XXXS]. Patient was referred by Chase Anna MD for medical assessment and management      CC: Falls, sequela [W19.XXXS]     HPI: Aneesh Brito is a 75 y.o. male with HTN, HLD, DM2, colon ca, BPH, glaucoma, spinal stenosis s/p previous cervical fusion, presented to [facility] 5/31/25 s/p fall, found to have C7 fracture and transferred to [facility] where he underwent C7 ORIF, removal of C3-6 hardware and C3-T1 PLIF by neurosurgery Dr. Raza Benjamin on 6/1/25.     Post op he had anemia but did not require transfusion. He was put on SQ Heparin for DVT ppx. His pain was managed with Tylenol and Oxycodone.      He had elevated BP with surgery requiring IV Hydralzine but BP improved after surgery and HTN managed with Felodipine, Lisinopril, Losartan and Metoprolol.      He was on Lipitor for HLD. He was on Finasteride and Tamsulosin for BPH.  He was on netarsudiL-latanoprost and timoptic eye drops for glaucoma.      He had very elevated blood sugars alaina-op, related to IVFs and Decadron, and required insulin. Blood sugars improved and DM2 managed with Metformin.      He had elevated CK of 1118 on admission due to rhabdomyolysis from being down on floor prior to admission. He was treated with IVFs and CK improved.      The patient was evaluated by PM&R and found to have residual deficits in ambulation and ADLs due to Falls, sequela [W19.XXXS] and came to [facility] on 6/4/2025 for acute inpatient rehab.     [facility] hospital course:    He participated in therapy. He was seen by Nutrition, STACY. Vit D low, started oral supplement.     SUBJECTIVE:  Patient interviewed and examined    Labs today with Na 134, Hgb 10, otherwise unremarkable.     Requests to decreased bowel meds.     Pain stable, no  new numbness or weakness, moving bowels, chronic urine incontinence, no abdominal pain or nausea, no dysuria, no chest pain, palpitations, dyspnea or fevers    Review of Systems:  All other systems reviewed and negative except as noted in the HPI.    Current meds and allergies reviewed  Current Facility-Administered Medications   Medication Dose Route Frequency    acetaminophen  650 mg oral q6h [facility]    alum-mag hydroxide-simeth  30 mL oral q6h PRN    atorvastatin  20 mg oral Daily    bisacodyL  10 mg rectal Daily PRN    cholecalciferol (vitamin D3)  25 mcg oral Daily    cyanocobalamin  1,000 mcg oral Daily    glucose  15-30 g of dextrose oral PRN    Or    dextrose  15-30 g of dextrose oral PRN    Or    glucagon  1 mg intramuscular PRN    Or    dextrose 50 % in water (D50)  25 mL intravenous PRN    enoxaparin  40 mg subcutaneous Daily (6p)    felodipine  10 mg oral Daily    finasteride  5 mg oral Daily    hydrALAZINE  25 mg oral q6h PRN    insulin lispro U-100  2-5 Units subcutaneous BID AC    lisinopriL  10 mg oral BID    losartan  100 mg oral Daily    metFORMIN  500 mg oral BID with breakfast and dinner    metoprolol tartrate  75 mg oral BID    netarsudiL-latanoprost  1 drop Right Eye Nightly    oxyCODONE  5 mg oral q6h PRN    pantoprazole  40 mg oral Daily before breakfast    polyethylene glycol  17 g oral Daily PRN    [START ON 6/17/2025] sennosides-docusate sodium  2 tablet oral Daily    tamsulosin  0.4 mg oral Nightly    timolol  1 drop Both Eyes Daily        Past Medical History:   Diagnosis Date    C7 cervical fracture (CMS/HCC) 05/31/2025    Colonic adenoma     Diverticulosis of colon     Enlarged prostate with lower urinary tract symptoms (LUTS)     Glaucoma     Hypertension     Melanoma of skin (CMS/HCC)     Osteoarthritis of knee     Overweight     Spinal stenosis of lumbar region     Type 2 diabetes mellitus (CMS/HCC)      Past Surgical History   Procedure Laterality Date    1) ORIF C7 fracture 2) removal  and replacement of C3-6 posterior intrumentation 3) C3 to T3 posterolaterral instrumented fusion  Depuy + removal set SMA C-arm O-Arm N/A 6/1/2025    Performed by Raza Benjamin MD at  OR    Appendectomy      Cervical fusion  06/01/2025    1) ORIF C7 fracture 2) removal and replacement of C3-6 posterior intrumentation 3) C3 to T3 posterolaterral instrumented fusion    Colonoscopy  01/10/2012    diverticulosis    Colonoscopy w/ polypectomy  02/09/2022    Hip arthroplasty Bilateral     Knee arthroplasty Left     Knee arthroplasty Right 02/02/2022     Social History     Tobacco Use    Smoking status: Never    Smokeless tobacco: Never   Substance Use Topics    Alcohol use: Yes     Comment: BEER, 1 CONSUMED DAILY    Drug use: Never      Family History   Problem Relation Name Age of Onset    Breast cancer Biological Mother         Vital signs in last 24 hours:  Temp:  [36.4 °C (97.5 °F)-36.7 °C (98.1 °F)] 36.4 °C (97.5 °F)  Heart Rate:  [45-84] 84  Resp:  [18] 18  BP: (140-154)/(60-81) 142/78  Vital signs reviewed 06/16/25 6:38 PM    Physical Exam  Vitals and nursing note reviewed.   Constitutional:       Appearance: Normal appearance.   HENT:      Head: Normocephalic and atraumatic.      Right Ear: External ear normal.      Left Ear: External ear normal.      Nose: Nose normal.      Mouth/Throat:      Pharynx: Oropharynx is clear.   Eyes:      Conjunctiva/sclera: Conjunctivae normal.   Neck:      Comments: S/p cervical fusion in collar  Cardiovascular:      Rate and Rhythm: Normal rate.      Pulses: Normal pulses.   Pulmonary:      Effort: Pulmonary effort is normal.   Abdominal:      General: Abdomen is flat.   Musculoskeletal:      Right lower leg: Edema present.      Left lower leg: Edema present.   Skin:     Findings: Lesion (right arm, right ear, sacral wounds as documented by nursing) present.   Neurological:      Mental Status: He is alert and oriented to person, place, and time.                 Objective     Labs:  I have reviewed the patient's labs.  Significant abnormals are anemia, leukocytosis, hyponatremia.  Results from last 7 days   Lab Units 06/16/25  0550   WBC K/uL 7.12   HEMOGLOBIN g/dL 10.0*   HEMATOCRIT % 29.4*   PLATELETS K/uL 353*     Results from last 7 days   Lab Units 06/16/25  0550   SODIUM mEQ/L 134*   POTASSIUM mEQ/L 3.6   CHLORIDE mEQ/L 101   CO2 mEQ/L 24   BUN mg/dL 22   CREATININE mg/dL 1.1   CALCIUM mg/dL 8.5*   ALBUMIN g/dL 3.2*   BILIRUBIN TOTAL mg/dL 0.3   ALK PHOS IU/L 100   ALT IU/L 15   AST IU/L 11*   GLUCOSE mg/dL 130*                  6/5/25: vit d 25-oh: 13 (low)    Blood sugars: [phone]    Imaging:  Not applicable        ASSESSMENT/PLAN:    75 y.o. malewith HTN, HLD, DM2, colon ca, BPH, glaucoma, spinal stenosis s/p previous cervical fusion, presented to [facility] 5/31/25 s/p fall, found to have C7 fracture and transferred to [facility] where he underwent C7 ORIF, removal of C3-6 hardware and C3-T1 PLIF by neurosurgery Dr. Raza Benjamin on 6/1/25     1. Neuro:  # Cervical stenosis  # acute C7 fx  # DISH  -6/1/25s/p C7 ORIF, removal of C3-6 hardware and C3-T1 PLIF by neurosurgery Dr. Raza Benjamin   -pain managed with Tylenol and Oxycodone     2. Vasc:  # DVT ppx  -bilat LE edema  -SCDs and SQ Heparin for DVT ppx, can stop on discharge     3. Heme:  # ABLA  -post op anemia  -6/16/25 hgb 10.0, stable  -5/12/25 B12 <400, added oral B12  -on multivitamin  # leukocytosis  -reactive vs due to Decadron  -6/16/25 wbc back to normal at 7.12  -follow CBC     4. Renal:  # hyponatremia  -6/16/25 Na+ stable at 134, no intervention  -increased risk of dehydration and electrolyte changes  -follow BMP, Mg     5. Gi:  # constipation  -Miralax for bowels  # ulcer ppx  -Protonix ulcer ppx     6. Gu:  # BPH  -on Proscar and Flomax  -using [place] cath at  for incontinence  -having high volume urine output at night with some urine retention requiring intermitten cath     7.  Cardiac:  # HTN  -on Felodipine, Lisinopril, Losartan and Metoprolol  -watch for orthostatic hypotension  -use cardiac precautions in therapy     8. Pulm:  -incentive spirometry for atelectasis     9. Derm:  # multiple wounds  -surgical wounds  -wounds on right arm, right ear, sacrum as documented by nursing  -seen by [employer] for skin assessment  -wound care per nursing and WOCN consult     10. Nutrition:  -Adult Diet Regular; Thin Liquids; Consistent Carbohydrate 2000   -sen by Nutrition for assessment and education     11. Endo:  # HLD  -on Lipitor  # DM2  -had steroid induced hyperglycemia due to Decadron, now improved  -on Metformin  -accuchecks BID AC with Lispro scale for BG>200  -hypoglycemia protocol     12. Psych:  # anxiety/depression  -consulted Psychology for support     13. Musculoskeletal:  # rhabdomyolysis  -clinically resolved  -6/5/25 CK back to normal at 124  # vit D deficiency  -6/5/25 Vit D low at 13, started on vit D3 25 mcg po daily.    14. Ophth:  # glaucoma  -on netarsudiL-latanoprost and timoptic eye drops     14. Dispo:  -code status: Full Code  -Expected discharge date 6/20/2025       plan discussed with patient, nurse, case management and Chase Anna MD Scott Sapperstein, MD  6/16/2025  6:38 PM

## 2025-06-16 NOTE — PLAN OF CARE
Plan of Care Review  Plan of Care Reviewed With: patient  Progress: improving  Outcome Evaluation: Assumed Pt at 2300 VSS, Patient resting in bed.oxy given for pain. Incision intact steri strips in place. [place] cath applied overnight while in bed. Call light in reach and bed alarm activated.

## 2025-06-16 NOTE — PROGRESS NOTES
Patient: Aneesh Brito  Location: [facility] [address]  MRN: [id number]  Today's date: 6/16/2025    History of Present Illness    Aneesh is a 75 y.o. male with a history of colon carcinoma, BPH, hypertension, glaucoma, type 2 diabetes, history of a cervical fusion over 10 years ago admitted after a fall inability to get up on his own.  He was seen at [facility] where imaging studies were performed which demonstrated DISH as well as a Chance fracture at the bottom of C7.  He was transferred to [facility] for neurosurgery.    He was taken to the OR on 6/1 by Dr. Benjamin and underwent an ORIF of the C7 Chance fracture with removal and replacement of the C3-6 posterior instrumentation as well as a C3-T3 posterior lateral fusion.  Drains remain in place.  He is on subcu heparin for DVT prophylaxis.  Currently on a nicardipine drip for blood pressure control.  He states pain is well-controlled.  He denies any other complaints of headache or dizziness, chest pain or shortness of breath.  He is voiding on his own.  He has not had a bowel movement yet.    He was seen by PT and OT needing moderate assistance of 2 to transfer sit to stand.  He ambulated 2 feet with moderate assistance of 2 using a rolling walker.  Pertinent radiology results reviewed. Pertinent lab results reviewed.      Past Medical History  Aneesh has a past medical history of C7 cervical fracture (CMS/HCC) (05/31/2025), Colonic adenoma, Diverticulosis of colon, Enlarged prostate with lower urinary tract symptoms (LUTS), Glaucoma, Hypertension, Melanoma of skin (CMS/HCC), Osteoarthritis of knee, Overweight, Spinal stenosis of lumbar region, and Type 2 diabetes mellitus (CMS/[facility]).    PT Vitals      Date/Time Pulse HR Source SpO2 Pt Activity O2 Therapy BP BP Location BP Method Pt Position Who   06/16/25 1002 45 Monitor 95 % At rest None (Room air) 154/76 Left upper arm Manual Sitting JRL          PT Pain      Date/Time Pain  Type Side/Orientation Location Rating: Rest Rating: Activity Interventions [facility]   06/16/25 1002 Pain Assessment posterior neck 2 2 premedicated for activity;relaxation techniques promoted;diversional activity provided [facility]   06/16/25 1029 Pain Reassessment posterior neck 3 3 pain management plan reviewed with patient/caregiver;relaxation techniques promoted JRL                  Prior Living Environment      Flowsheet Row Most Recent Value   People in Home spouse   Current Living Arrangements independent living facility   Home Accessibility wheelchair accessible   Living Environment Comment PTA residing c wife in [facility] ([facility] 1 floor set up. Shower stall c shower chair & grab bars. Reports no DME at toilet - information to be verified c pt. wife   Number of Stairs, Main Entrance 0   Patient Bedroom Access Comment PTA residing c wife in [facility] ([facility] 1 floor set up. Shower stall c shower chair & grab bars. Reports no DME at toilet - information to be verified c pt. wife   Bathroom Access Comment PTA residing c wife in [facility] ([facility] 1 floor set up. Shower stall c shower chair & grab bars. Reports no DME at toilet - information to be verified c pt. wife   Number of Stairs, Within Home, Primary 0       Prior Level of Function      Flowsheet Row Most Recent Value   Dominant Hand left   Ambulation assistive equipment  [rollator]   Transferring assistive equipment  [rollator]   Toileting independent   Bathing independent   Dressing independent   Eating independent   IADLs independent   Driving/Transportation    Communication understands/communicates without difficulty   Prior Level of Function Comment Pt reports being independent with use of SPC inside and Rollator to the dining jenkins. Independent ADLs. Denies other falls. Independent ADLs. (+) driving. Retired teacher   Assistive Device Currently Used at Home cane, straight, walker, 4-wheeled, shower chair        IRF PT Evaluation  and Treatment - 06/16/25 1012          PT Time Calculation    Start Time 1000     Stop Time 1030     Time Calculation (min) 30 min        Session Details    Document Type Daily Treatment/Progress Note        General Information    General Observations of Patient Pt received sitting in w/c in gym. Agreeable to participate in therapy session.        Mobility Belt    Mobility Belt Used During Session yes        Orthosis Neck Cervical Collar    Orthosis Properties Date Obtained: 06/01/25 Time Obtained: 1803 Location: Neck Type: Cervical Collar Therapeutic Indications: stabilization and support Wearing Schedule: wear when out of bed       Orthosis Neck Cervical Collar    Orthosis Properties Date Obtained: 06/01/25 Time Obtained: 0000 Location: Neck Type: Cervical Collar Features: Hard Description: [place] collar OOB don seated; [place] collar for shower Therapeutic Indications: stabilization and support Wearing Schedule: wear when out of bed       Transfers    Transfers stand pivot transfer     Comment mobility belt donned        Sit to Stand Transfer    [place], Sit to Stand Transfer close supervision     Safety/Cues increased time to complete     Assistive Device walker, front-wheeled     Comment w/c to stance with RW and close S to ensure balance        Stand to Sit Transfer    [place], Stand to Sit Transfer close supervision     Safety/Cues increased time to complete     Assistive Device walker, front-wheeled     Comment stance to w/c with RW and close S to ensure controlled descent        Stand Pivot Transfer    [place], Stand Pivot/Stand Step Transfer touching/steadying assist     Safety/Cues increased time to complete     Assistive Device walker, front-wheeled     Comment via amb approach with RW and steadying A at pelvis for balance        Gait Training    [place], Gait touching/steadying assist     Safety/Cues increased time to complete;sequencing;technique     Assistive Device walker,  "front-wheeled     Distance in Feet 150 feet     Pattern step-through     Deviations/Abnormal Patterns base of support, narrow;gait speed decreased;step length decreased     Bilateral Gait Deviations heel strike decreased     Comment Pt amb 150ft x 1 with RW and steadying A at pelvis for balance; pt demonstrates decreased bilateral step length, especially as he fatigues     [place] (Gait Trial 2) touching/steadying assist     Safety/Cues (Gait Trial 2) increased time to complete;sequencing;technique     Assistive Device (Gait Trial 2) walker, front-wheeled     Distance in Feet (Gait Trial 2) 75 feet     Comment (Gait Trial 2) Pt amb 75ft x 2 with RW and steadying A for balance; trialed pink theraband on front of walker as a visual cue and therapist pushing walker at set speed in an attempt to increase step length        Stairs Training    [place], Stairs touching/steadying assist     Safety/Cues increased time to complete;sequencing;technique     Assistive Device railing     Handrail Location (Stairs) both sides     Number of Stairs 4     Stair Height 6 inches     Ascending Stairs Technique step-to-step   LLE lead    Descending Stairs Technique step-to-step   RLE lead    Comment To improve functional strength, pt negotiates 4(6\") stairs x 1 with B HR support and steadying A at pelvis for balance        Lower Extremity (Therapeutic Exercise)    Exercise Position/Type seated;AROM (active range of motion)     General Exercise bilateral;LAQ (long arc quad);marching while seated     Reps and Sets 10 x 2     Comment Seated in w/c as a warm-up (1) LAQs, (2) seated marches        10 Meter Walk Test (Self-Selected Velocity)    Trial One: Ten Meter Walk Test (sec) 24.28 seconds     Assistive Device walker, front-wheeled     Trial One: Gait Speed (m/s) 0.25 m/s        Daily Progress Summary (PT)    Daily Outcome Statement Pt tolerates session well with a focus on gait/stair training. Pt now ambulates at a " self-selected gait speed of 0.25 m/s which continues to fall below his age matched norms and suggests an increased risk for falls. Trialed visual and rhythmic cues to improve step length during gait trails with minimal carryover observed. Continue with BLE strengthening, standing balance, and gait training to progress functional mobility and maximize independence.                      IRF PT Goals      Flowsheet Row Most Recent Value   Bed Mobility Goal 1    Activity/Assistive Device rolling to left, rolling to right, sit to supine/supine to sit at 06/05/2025 0902   [place] minimum assist (75% or more patient effort) at 06/05/2025 0902   Time Frame short-term goal (STG), 1 week at 06/05/2025 0902   Progress/Outcome goal ongoing, goal revised this date  [will continue core strengthening/bed mobility training] at 06/10/2025 0850   Bed Mobility Goal 2    Activity/Assistive Device rolling to left, rolling to right, sit to supine/supine to sit at 06/05/2025 0902   [place] modified independence at 06/05/2025 0902   Time Frame long-term goal (LTG), 3 weeks at 06/05/2025 0902   Progress/Outcome goal ongoing at 06/10/2025 0850   Transfer Goal 1    Activity/Assistive Device sit-to-stand/stand-to-sit, stand pivot at 06/05/2025 0902   [place] tactile cues required  [steadying] at 06/10/2025 0850   Time Frame short-term goal (STG), 1 week at 06/10/2025 0850   Progress/Outcome goal met, goal revised this date at 06/10/2025 0850   Transfer Goal 2    Activity/Assistive Device sit-to-stand/stand-to-sit, stand pivot at 06/05/2025 0902   [place] modified independence at 06/05/2025 0902   Time Frame long-term goal (LTG), 3 weeks at 06/05/2025 0902   Progress/Outcome goal ongoing at 06/10/2025 0850   Gait/Walking Locomotion Goal 1    Activity/Assistive Device gait (walking locomotion) at 06/05/2025 0902   Distance 50 feet at 06/05/2025 0902   [place] tactile cues required at 06/10/2025 0850   Time Frame  short-term goal (STG), 1 week at 06/10/2025 0850   Progress/Outcome goal met, goal revised this date at 06/10/2025 0850   Gait/Walking Locomotion Goal 2    Activity/Assistive Device gait (walking locomotion) at 06/05/2025 0902   Distance 150 feet at 06/05/2025 0902   [place] supervision required at 06/05/2025 0902   Time Frame long-term goal (LTG), 3 weeks at 06/05/2025 0902   Progress/Outcome goal ongoing at 06/10/2025 0850   Stairs Goal 1    Activity/Assistive Device stairs, all skills at 06/10/2025 0850   Number of Stairs 8 at 06/10/2025 0850   [place] minimum assist (75% or more patient effort) at 06/10/2025 0850   Time Frame short-term goal (STG), 1 week at 06/10/2025 0850   Stairs Goal 2    Activity/Assistive Device stairs, all skills at 06/10/2025 0850   Number of Stairs 12 at 06/10/2025 0850   [place] supervision required at 06/10/2025 0850   Time Frame long-term goal (LTG), 2 weeks at 06/10/2025 0850

## 2025-06-16 NOTE — PROGRESS NOTES
Subjective    Patient seen and examined on rounds.  Chart reviewed.  Events overnight noted.  History reviewed briefly with patient.    CC: Deficits in mobility, transfers, self-care status post fall, unstable 3 column C7 Chance fracture, status post C7 ORIF, removal of prior C3-6 hardware and C3-T1 PLIF by neurosurgery, history of gait instability and multiple falls, peripheral neuropathy, multiple medical problems.    HPI:  Mr. Aneesh Brito is a 75-year-old left handed white male with chronic conditions significant for hypertension, hyperlipidemia, diabetes mellitus type 2, BPH, glaucoma, colon cancer, gait instability, multiple falls in the home environment for which he was seeing a neurologist for workup of falls, history of prior bilateral hip replacements and bilateral knee replacements, spinal stenosis status post previous C3-C6 laminectomy and posterolateral instrumented fusion over 10 years ago by Dr. Maurer, presented to the ER at [facility] on 5/31/25 after suffering from a mechanical fall while at home and was unable to get up so he was on the floor all night until he was found.  At [facility] ER and he reported neck pain and pain in the lower cervical and upper thoracic region.  Imaging studies revealed C7 Chance fracture and findings of DISH (diffuse idiopathic skeletal hyperostosis ).  He was subsequently transferred to [facility] for neurosurgical evaluation where he was admitted on 5/31/25.  He was evaluated by Dr. Benjamin from neurosurgery, noted to have a 3 column Chance fracture at C7-T1 level, given highly unstable fracture was recommended surgical intervention by neurosurgery. He underwent C7 ORIF, removal of C3-6 hardware and C3-T1 PLIF by neurosurgery Dr. Raza Benjamin on 6/1/25.  Postoperatively is allowed weightbearing as tolerated on both lower extremities.  He has been given Aspen collar for use when out of bed in chair and when ambulating.  He can use soft cervical  collar in the bed.  I discussed spinal precautions with him.  Postoperative course was significant for anemia but he did not require transfusion.  His pain is managed with Tylenol and Oxycodone.  He had elevated blood pressure requiring IV Hydralazine but blood pressure improved after surgery and hypertension was managed with managed with Felodipine, Lisinopril, Losartan and Metoprolol.  He is continued on Lipitor for hyperlipidemia.  He has history of BPH and remains on Tamsulosin and Finasteride.  He was on Netarsudil-Latanoprost and Timoptic eye drops for glaucoma.  He had elevated blood sugars related to Decadron perioperatively and required insulin and subsequently blood sugars improved and he is managed with Metformin now. He had elevated CK of 1118 on admission due to rhabdomyolysis from being down on floor prior to admission. He was treated with IV fluids and CK improved.  I discussed his recent clinical course with patient, his wife and son who is a pediatric physician and his son indicated that patient was being evaluated by neurologist Dr. Hussein Haider regarding his gait instability and falls, and lumbar spine MRI was done, EMG studies which showed peripheral neuropathy as well as muscle biopsy was being planned of the right quadriceps to evaluate  his proximal weakness in bilateral lower extremities.  He is unable to do active straight leg raise in bilateral lower extremities in bed, unable to localize position sense in his left great toe and I also discussed with patient, his wife and son at bedside.  I also mentioned to them that neurology will be conservative at him during his stay at [facility] and that he should follow up with Dr. Hussein Haider from neurology on outpatient basis for further evaluation and workup as appropriate.  He is on subcutaneous Heparin and SCDs for DVT prophylaxis.  He is able to tolerate regular with thins consistency diet.  On 6/4/25, hemoglobin was 11.3, WBC count was  "11.85, platelets were 236, BUN was 22, creatinine was 1.1, sodium was 134 and potassium was 3.3.  He has been needing assistance for mobility, transfers, self-care. He is transferred to [facility] on 6/4/25 for further rehabilitation care.     SUBJ: Inspected posterior cervical incision which is healing well.  Making progress in therapy.  Reviewed labs today.  Discussed with patient.    ROS: Denies chest pain or shortness of breath. Other ROS negative. Past, family, social history is unchanged.    Current Functional Status:   Bed mobility:   [place], Supine to Sit: close supervision   [place], Sit to Supine: touching/steadying assist   Transfers:    [place], Sit to Stand Transfer: close supervision  [place], Stand to Sit Transfer: close supervision   [place], Stand Pivot/Stand Step Transfer: close supervision   Gait:   [place], Gait: close supervision  Assistive Device: walker, front-wheeled   Distance in Feet: 10 feet    Bathing:   [place]: close supervision   Toileting:   [place]: touching/steadying assist   Upper body dressing:   [place]: minimum assist (75% or more patient effort)   Lower body dressing:   [place]: minimum assist (75% or more patient effort)       Functional Progress:    Functional status reviewed. Overall, patient's functional status is improving.      Physical Exam      Blood pressure (!) 142/78, pulse 84, temperature 36.4 °C (97.5 °F), temperature source Oral, resp. rate 18, height 1.803 m (5' 11\"), weight 102 kg (223 lb 12.8 oz), SpO2 98%.    Body mass index is 31.21 kg/m².    General Appearance: Not in acute distress  Head/Ear/Nose/Throat: Normocephalic; Atraumatic.   Eye: EOMI; PERRL.   Neck: Healing incision posterior cervicothoracic spine.  Dressing in place.    Respiratory: Decreased breath sounds at bases.   Cardiovascular: RRR; Normal S1, S2.   Gastrointestinal: Soft; NT; +BS.   Extremities: Bilateral lower extremity edema " noted.    Musculoskeletal: Functional active range of motion in both upper extremities except some limitation in left shoulder and is unable to lift left arm up overhead.  Some limitation of active range of motion in both hips and both knees, which is chronic according to patient and his wife at bedside.  Patient is unable to do active straight leg raise and either lower extremity.  He has had previous bilateral hip and bilateral knee replacements.  His wife mentions patient was lifting his lower extremities manually with his arms while getting in the car and after he sat down in the seat inside the car manually left each leg to bring them in the car.   Neurological: AAO ×3. Speech is fluent. Cranial nerve examination does not reveal any gross facial asymmetry. Strength testing about 4/5 strength in both upper extremities except left shoulder is 2-/5 and abduction and forward flexion.  Bilateral hip flexion is 2-/5.  Bilateral quadriceps are 2+/5 with the thighs supported.  Bilateral ankle dorsi and plantar flexion is 3+/5.  He denies significant numbness in his legs and feet at this time.  He is grossly able to localize position sense in his right great toe but not so in his left great toe.  He is able to localize vibration sense in both great toes.  Deep tendon reflexes are hypoactive and symmetric bilaterally.  Plantars are flexor.  Coordination is functional upper extremities.  Both knee jerks were not tested.  Behavior/Emotional: Appropriate; Cooperative.   Skin: Healing incision posterior cervicothoracic spine.  Dressing in place.  Some abrasions noted on right upper extremity.  Left forearm has bruising ecchymosis noted.  Small wound noted on sacrum/coccyx at least stage II decubitus cannot rule out DTI.      Current Facility-Administered Medications:     acetaminophen (TYLENOL) tablet 650 mg, 650 mg, oral, q6h Dileep KIRBY Scott, MD, 650 mg at 06/16/25 1853    alum-mag hydroxide-simeth (MAALOX)  200-200-20 mg/5 mL suspension 30 mL, 30 mL, oral, q6h PRN, Chandler Falk MD    atorvastatin (LIPITOR) tablet 20 mg, 20 mg, oral, Daily, Robert Hawkins MD, 20 mg at 06/16/25 0847    bisacodyL (DULCOLAX) 10 mg suppository 10 mg, 10 mg, rectal, Daily PRN, Chandler Falk MD    cholecalciferol (vitamin D3) tablet 25 mcg, 25 mcg, oral, Daily, Chandler Falk MD, 25 mcg at 06/16/25 0848    cyanocobalamin (VITAMIN B12) tablet 1,000 mcg, 1,000 mcg, oral, Daily, Chandler Flak MD, 1,000 mcg at 06/16/25 0848    glucose chewable tablet 15-30 g of dextrose, 15-30 g of dextrose, oral, PRN **OR** dextrose 40 % oral gel 15-30 g of dextrose, 15-30 g of dextrose, oral, PRN **OR** glucagon (GLUCAGEN) injection 1 mg, 1 mg, intramuscular, PRN **OR** dextrose 50 % in water (D50) injection 12.5 g, 25 mL, intravenous, PRN, Chase Anna MD    enoxaparin (LOVENOX) syringe 40 mg, 40 mg, subcutaneous, Daily (6p), Robert Hawkins MD, 40 mg at 06/16/25 1805    felodipine (PLENDIL) 24 hr ER tablet 10 mg, 10 mg, oral, Daily, Chandler Falk MD, 10 mg at 06/16/25 0847    finasteride (PROSCAR) tablet 5 mg, 5 mg, oral, Daily, Robert Hawkins MD, 5 mg at 06/16/25 0847    hydrALAZINE (APRESOLINE) tablet 25 mg, 25 mg, oral, q6h PRN, Robert Hawkins MD, 25 mg at 06/08/25 1849    insulin lispro U-100 (HumaLOG) pen 2-5 Units, 2-5 Units, subcutaneous, BID AC, Chandler Falk MD    lisinopriL (PRINIVIL) tablet 10 mg, 10 mg, oral, BID, Annette Sherman MD, 10 mg at 06/16/25 0847    losartan (COZAAR) tablet 100 mg, 100 mg, oral, Daily, Robert Hawkins MD, 100 mg at 06/16/25 0847    metFORMIN (GLUCOPHAGE) tablet 500 mg, 500 mg, oral, BID with breakfast and dinner, Robert Hawkins MD, 500 mg at 06/16/25 1805    metoprolol tartrate (LOPRESSOR) tablet 75 mg, 75 mg, oral, BID, Annette Sherman MD, 75 mg at 06/16/25 0847    netarsudiL-latanoprost 0.02-0.005 % drops 1 drop, 1 drop, Right Eye, Nightly, Chandler Falk MD, 1 drop at 06/15/25  2117    oxyCODONE (ROXICODONE) immediate release tablet 5 mg, 5 mg, oral, q6h PRN, Annette Sherman MD, 5 mg at 06/16/25 1114    pantoprazole (PROTONIX) tablet,delayed release (DR/EC) 40 mg, 40 mg, oral, Daily before breakfast, Chandler Falk MD, 40 mg at 06/16/25 0848    polyethylene glycol (MIRALAX) 17 gram packet 17 g, 17 g, oral, Daily PRN, Chandler Falk MD    [START ON 6/17/2025] sennosides-docusate sodium (SENOKOT-S) 8.6-50 mg per tablet 2 tablet, 2 tablet, oral, Daily, Chandler Falk MD    tamsulosin (FLOMAX) 24 hr ER capsule 0.4 mg, 0.4 mg, oral, Nightly, Robert Hawkins MD, 0.4 mg at 06/15/25 2116    timolol (TIMOPTIC) 0.5 % ophthalmic solution 1 drop, 1 drop, Both Eyes, Daily, Robert Hawkins MD, 1 drop at 06/16/25 0850       Labs / Radiology    Lab Results   Component Value Date    WBC 7.12 06/16/2025    HGB 10.0 (L) 06/16/2025    HCT 29.4 (L) 06/16/2025    MCV 94.2 06/16/2025     (H) 06/16/2025     Lab Results   Component Value Date    GLUCOSE 130 (H) 06/16/2025    CALCIUM 8.5 (L) 06/16/2025     (L) 06/16/2025    K 3.6 06/16/2025    CO2 24 06/16/2025     06/16/2025    BUN 22 06/16/2025    CREATININE 1.1 06/16/2025       Assessment and Plan    ASSESSMENT PLAN:  1. 75-year-old left handed white male with chronic conditions significant for hypertension, hyperlipidemia, diabetes mellitus type 2, BPH, glaucoma, colon cancer, gait instability, multiple falls in the home environment for which he was seeing a neurologist for workup of falls, history of prior bilateral hip replacements and bilateral knee replacements, spinal stenosis status post previous C3-C6 laminectomy and posterolateral instrumented fusion over 10 years ago by Dr. Harrop, presented to the ER at [facility] on 5/31/25 after suffering from a mechanical fall while at home and was unable to get up so he was on the floor all night until he was found.  At [facility] ER and he reported neck pain and pain in the  lower cervical and upper thoracic region.  Imaging studies revealed C7 Chance fracture and findings of DISH (diffuse idiopathic skeletal hyperostosis ).  He was subsequently transferred to [facility] for neurosurgical evaluation where he was admitted on 5/31/25.  He was evaluated by Dr. Benjamin from neurosurgery, noted to have a 3 column Chance fracture at C7-T1 level, given highly unstable fracture was recommended surgical intervention by neurosurgery. He underwent C7 ORIF, removal of C3-6 hardware and C3-T1 PLIF by neurosurgery Dr. Raza Benjamin on 6/1/25.  Postoperatively is allowed weightbearing as tolerated on both lower extremities.  He has been given Aspen collar for use when out of bed in chair and when ambulating.  He can use soft cervical collar in the bed.  I discussed spinal precautions with him.  Postoperative course was significant for anemia but he did not require transfusion.  His pain is managed with Tylenol and Oxycodone.  He had elevated blood pressure requiring IV Hydralazine but blood pressure improved after surgery and hypertension was managed with managed with Felodipine, Lisinopril, Losartan and Metoprolol.  He is continued on Lipitor for hyperlipidemia.  He has history of BPH and remains on Tamsulosin and Finasteride.  He was on Netarsudil-Latanoprost and Timoptic eye drops for glaucoma.  He had elevated blood sugars related to Decadron perioperatively and required insulin and subsequently blood sugars improved and he is managed with Metformin now. He had elevated CK of 1118 on admission due to rhabdomyolysis from being down on floor prior to admission. He was treated with IV fluids and CK improved.  I discussed his recent clinical course with patient, his wife and son who is a pediatric physician and his son indicated that patient was being evaluated by neurologist Dr. Hussein Haider regarding his gait instability and falls, and lumbar spine MRI was done, EMG studies which showed  peripheral neuropathy as well as muscle biopsy was being planned of the right quadriceps to evaluate  his proximal weakness in bilateral lower extremities.  He is unable to do active straight leg raise in bilateral lower extremities in bed, unable to localize position sense in his left great toe and I also discussed with patient, his wife and son at bedside.  I also mentioned to them that neurology will be conservative at him during his stay at [facility] and that he should follow up with Dr. Hussein Haider from neurology on outpatient basis for further evaluation and workup as appropriate.  He is on subcutaneous Heparin and SCDs for DVT prophylaxis.  He is able to tolerate regular with thins consistency diet.  On 6/4/25, hemoglobin was 11.3, WBC count was 11.85, platelets were 236, BUN was 22, creatinine was 1.1, sodium was 134 and potassium was 3.3.  He has been needing assistance for mobility, transfers, self-care. He is transferred to [facility] on 6/4/25 for further rehabilitation care.     2. DVT prophylaxis - on subcutaneous Lovenox.  On SCDs.  Platelets 264 on 6/5/2025.  Platelets 307 on 6/9/2025.  Platelets 353 on 6/16/2025.     3. Neurosurgery - status post fall, unstable 3 column C7 Chance fracture, status post C7 ORIF, removal of prior C3-6 hardware and C3-T1 PLIF by neurosurgery, history of gait instability and multiple falls, peripheral neuropathy, class I obesity, multiple medical problems - continue PT, OT, psychology.  Follow falls precautions, cardiac precautions, aspiration precautions, spinal precautions.  Aspen collar to neck when out of bed.  Monitor healing of posterior cervical incision.  Neurology was consulted.  Neurosurgery recommended removal of catgut sutures and drain sutures at [facility] about 2 weeks postop which has been completed by nursing.  Patient will follow-up at neurosurgery office after [facility] from [facility].     4. GI - On Protonix for GI  prophylaxis. On Senokot-S.  On MiraLAX.  On PRN Dulcolax suppository.  On PRN Maalox.     5.  -on Proscar.  On Flomax.     6. CVS - on Plendil.  On Lisinopril.  On Cozaar.  On Lopressor.  On PRN Hydralazine.     7. Pulmonary - encourage incentive spirometry.     8. Hematology - monitor hemoglobin, platelets.  Hemoglobin 12.2, WBC 10.71 on 6/5/2025.  Hemoglobin 11.0, WBC 7.59 on 6/9/2025.  Hemoglobin 10.0, WBC 7.12 on 6/16/2025.     9. Pain -on Tylenol.  On PRN Oxycodone.     10. Skin - Healing incision posterior cervicothoracic spine.  Dressing in place.  Some abrasions noted on right upper extremity.  Left forearm has bruising ecchymosis noted.  Small wound noted on sacrum/coccyx at least stage II decubitus cannot rule out DTI.     11. F/E/N - on vitamin B-12.  BMI 33.63 consistent with class I obesity.  Nutrition was consulted.     12. Diabetes mellitus type 2  - on sliding scale Humalog coverage.  On Glucophage.  On hypoglycemic protocol.     13. Ophthalmology- He was on NetarsudiL-latanoprost and Timoptic eye drops for glaucoma     14. Hyperlipidemia - on Lipitor.     15. Rehabilitation medicine - Continue comprehensive rehabilitation care. Continue PT, OT, psychology.  Follow falls precautions, cardiac precautions, aspiration precautions, spinal precautions.  Aspen collar to neck when out of bed.  Monitor healing of posterior cervical incision.  Neurology was consulted.Tolerating therapies per endurance on 6/6/2025.  Slept well last night.  Noted to have elevated PVR.  Requiring moderate assistance for sit to stand transfer with physical therapy.  Able to ambulate 20 feet with front wheeled walker dependent gait with physical therapy on 6/6/2025. Discussed with nurse on 6/6/2025. Working on ADLs with occupational therapy on 6/7/2025.  Dependent for upper and lower body dressing, requiring minimal assistance for toileting, moderate assistance for bathing with occupational therapy on 6/7/2025.  Discussed with  nurse regarding his PVRs on 6/7/2025.  Using [place] catheter at nighttime.  Discussed with patient on 6/7/2025.Discussed with patient and with his wife with him on 6/9/2025.  They feel he is making progress in therapy.  Requiring minimal assistance for sit to stand transfer with physical therapy.  Able to ambulate 100 feet with front wheeled walker with minimal assistance with physical therapy.  Reviewed labs on 6/9/2025.Discussed patients progress in therapies with therapists in team meeting on 6/10/2025. Discussed in PCC. See PCC documentation.  Continent of bowel and bladder for nursing on 6/10/2025.Discussed recommendations of PCC with patient on 6/11/2025.  Inspected posterior cervical incision which is healing well.  Working on ADLs occupational therapy.  Requiring moderate assistance for upper and lower body dressing, moderate assistance for toileting and bathing in occupational therapy on 6/11/2025.Apparently per nursing his pain medications fell off last night and house physician did not renew them according to nurse on 6/12/2025. Discussed with patient regarding Epic generated automatic stop on narcotics causing these medications to be discontinued but medications were reviewed today by internist.  Discussed with nurse on 6/12/2025.Progressing in therapy on 6/13/2025.  Requiring close supervision for sit to stand transfer with physical therapy.  Able to ambulate up 150 feet with front wheeled walker with touching/steadying assistance with physical therapy on 6/13/2025.Working on ADLs with occupational therapy on 6/14/2025.  Requiring minimal assistance for full body dressing, minimal assistance for lower dressing, touching/steadying assistance for toileting and close supervision for bathing in occupational therapy on 6/14/2025.  Continent of bowel and bladder for nursing on 6/14/2025. Inspected posterior cervical incision on 6/16/2025 which is healing well.  Making progress in therapy.  Reviewed labs today.   Discussed with patient on 6/16/2025.     16. Reviewed labs today.  BUN 20, creatinine 0.9, sodium 135, potassium 3.7 on 6/5/2025.  BUN 22, creatinine 1.1, sodium 132, potassium 3.4 on 6/9/2025.  BUN 21, creatinine 1.0, sodium 135, potassium 3.6 on 6/11/2025.  BUN 22, creatinine 1.1, sodium 134, potassium 3.6 on 6/16/2025.          Chase Anna MD  6/16/2025      This encounter was completed utilizing the Direct Speech Voice Recognition Software. Grammatical errors, random word insertions, pronoun errors, and incomplete sentences are occasional consequences of the system due to software limitations, ambient noises, and hardware issues. Such errors may be missed prior to affixing electronic signature. Any questions or concerns about the content, text, or information contained within the body of this dictation should be directly addressed to the physician for clarification. If you have any questions or concerns please do not hesitate to call me directly via EPIC chat, page, or email.

## 2025-06-16 NOTE — PROGRESS NOTES
Patient: Aneesh Brito  Location: [facility]  MRN: [id number]  Today's date: 6/16/2025    History of Present Illness    Aneesh is a 75 y.o. male with a history of colon carcinoma, BPH, hypertension, glaucoma, type 2 diabetes, history of a cervical fusion over 10 years ago admitted after a fall inability to get up on his own.  He was seen at [facility] where imaging studies were performed which demonstrated DISH as well as a Chance fracture at the bottom of C7.  He was transferred to [facility] for neurosurgery.    He was taken to the OR on 6/1 by Dr. Benjamin and underwent an ORIF of the C7 Chance fracture with removal and replacement of the C3-6 posterior instrumentation as well as a C3-T3 posterior lateral fusion.  Drains remain in place.  He is on subcu heparin for DVT prophylaxis.  Currently on a nicardipine drip for blood pressure control.  He states pain is well-controlled.  He denies any other complaints of headache or dizziness, chest pain or shortness of breath.  He is voiding on his own.  He has not had a bowel movement yet.    He was seen by PT and OT needing moderate assistance of 2 to transfer sit to stand.  He ambulated 2 feet with moderate assistance of 2 using a rolling walker.  Pertinent radiology results reviewed. Pertinent lab results reviewed.      Past Medical History  Aneesh has a past medical history of C7 cervical fracture (CMS/HCC) (05/31/2025), Colonic adenoma, Diverticulosis of colon, Enlarged prostate with lower urinary tract symptoms (LUTS), Glaucoma, Hypertension, Melanoma of skin (CMS/HCC), Osteoarthritis of knee, Overweight, Spinal stenosis of lumbar region, and Type 2 diabetes mellitus (CMS/HCC).    PT Pain      Date/Time Pain Type Location Rating: Rest Rating: Activity Interventions [facility]   06/16/25 1400 Pain Assessment neck 3 3 premedicated for activity SV                  Prior Living Environment      Flowsheet Row Most Recent Value    People in Home spouse   Current Living Arrangements independent living facility   Home Accessibility wheelchair accessible   Living Environment Comment PTA residing c wife in [facility] 1 floor set up. Shower stall c shower chair & grab bars. Reports no DME at toilet - information to be verified c pt. wife   Number of Stairs, Main Entrance 0   Patient Bedroom Access Comment PTA residing c wife in [facility] ([facility]). 1 floor set up. Shower stall c shower chair & grab bars. Reports no DME at toilet - information to be verified c pt. wife   Bathroom Access Comment PTA residing c wife in [facility] ([facility]). 1 floor set up. Shower stall c shower chair & grab bars. Reports no DME at toilet - information to be verified c pt. wife   Number of Stairs, Within Home, Primary 0       Prior Level of Function      Flowsheet Row Most Recent Value   Dominant Hand left   Ambulation assistive equipment  [rollator]   Transferring assistive equipment  [rollator]   Toileting independent   Bathing independent   Dressing independent   Eating independent   IADLs independent   Driving/Transportation    Communication understands/communicates without difficulty   Prior Level of Function Comment Pt reports being independent with use of SPC inside and Rollator to the dining jenkins. Independent ADLs. Denies other falls. Independent ADLs. (+) driving. Retired teacher   Assistive Device Currently Used at Home cane, straight, walker, 4-wheeled, shower chair        IRF PT Evaluation and Treatment - 06/16/25 1400          PT Time Calculation    Start Time 1400     Stop Time 1500     Time Calculation (min) 60 min        Session Details    Document Type Daily Treatment/Progress Note        General Information    Patient/Family/Caregiver Comments/Observations pt's wife, son, and 2 daughters present for family education session        Mobility Belt    Mobility Belt Used During Session yes        Orthosis Neck Cervical  Collar    Orthosis Properties Date Obtained: 06/01/25 Time Obtained: 1803 Location: Neck Type: Cervical Collar Therapeutic Indications: stabilization and support Wearing Schedule: wear when out of bed       Orthosis Neck Cervical Collar    Orthosis Properties Date Obtained: 06/01/25 Time Obtained: 0000 Location: Neck Type: Cervical Collar Features: Hard Description: [place]pen collar OOB don seated; [place] collar for shower Therapeutic Indications: stabilization and support Wearing Schedule: wear when out of bed       Sit to Stand Transfer    [place], Sit to Stand Transfer close supervision     Safety/Cues increased time to complete     Assistive Device walker, front-wheeled     Comment from wc, Cl S to ensure safe technique        Stand to Sit Transfer    [place], Stand to Sit Transfer close supervision     Safety/Cues increased time to complete     Assistive Device walker, front-wheeled     Comment to wc, Cl S to ensure safe technique        Stand Pivot Transfer    [place], Stand Pivot/Stand Step Transfer close supervision     Safety/Cues increased time to complete     Assistive Device walker, front-wheeled     Comment via amb approach with RW, Cl S to ensure safe technique        Toilet Transfer    Comment PT: entered via amb approach with RW at close supervision level, pt stood anterior to toilet to urinate        Gait Training    [place], Gait close supervision     Safety/Cues increased time to complete     Assistive Device walker, front-wheeled     Distance in Feet 10 feet     Pattern step-through     Deviations/Abnormal Patterns base of support, narrow;gait speed decreased;step length decreased;stride length decreased;weight shifting decreased     Bilateral Gait Deviations heel strike decreased     Comment 10'x2 with RW wc <> Restroom        Therapeutic Interventions    Comment, Therapeutic Intervention Ordered RW        Daily Progress Summary (PT)    Daily Outcome Statement FTR completed  with pt's wife/son and 2 daughters. Discussed ongoing deficits, DME recs (RW, transport chair for community outing, car cane), energy conservation techniques, healing process, and need for ongoing therapy services upon DC. Pt's family observed STS/SPT/gait training/bed mobility at close supervision level and car transfer at minimal assist level.                     Mobility Impairment Caregiver Training       Title: Mobility Impairment Caregiver Training/Education (IRF) (In Progress)       Points For This Title       Point: Home Safety/Home Assessment (Done)       Description:   Review information regarding home accessibility, modifications, and equipment recommendations:       - Issue and review home questionnaire      - Discuss any home modifications recommended for discharge or future      - Verbalize pertinent adjustments to home for safe discharge whether permanent or temporary (e.g. first floor set up, rental ramp, etc.)                 Learning Progress Summary            Patient Acceptance, Explanation, Verbalizes Understanding by SV at 6/16/2025 1635    Comment: -reviewed ongoing therapy needs, homecare vs OP  -reviewed safety at home  -reducing fall risk  -energy conservation techniques   Family Acceptance, Explanation, Verbalizes Understanding by SV at 6/16/2025 1635    Comment: -reviewed ongoing therapy needs, homecare vs OP  -reviewed safety at home  -reducing fall risk  -energy conservation techniques                      Point: Bed Mobility (Done)       Description:   Review pertinent information related to bed mobility, including:       - Precautions needing to be maintained      - Adaptive equipment recommendations (e.g. bedrail, HOB elevation, use of leg )      - Assistance level recommended at discharge      - Proper positioning of caregiver if assistance is needed      - Additions to home set up for improved safety (e.g. bedrails, wedge, etc.)                 Learning Progress Summary             Patient Acceptance, Explanation, Demonstration, Verbalizes Understanding by SV at 6/16/2025 1633    Comment: -reviewed current functional status and mod I goals  - use of RW for mobility  - possibly need for transport chair for community outing  - observed transfers/gait training/bed mobility at close supervision level   Family Acceptance, Explanation, Demonstration, Verbalizes Understanding by SV at 6/16/2025 1633    Comment: -reviewed current functional status and mod I goals  - use of RW for mobility  - possibly need for transport chair for community outing  - observed transfers/gait training/bed mobility at close supervision level                      Point: Transfer Training (Done)       Description:   Review pertinent information related to transfers, including:       - Precautions needing to be maintained      - Assistive devices and/or bracing recommended      - Assistance level recommended at discharge      - Proper positioning of caregiver if assistance is needed      - Safety concerns for the home environment with various surfaces                 Learning Progress Summary            Patient Acceptance, Explanation, Demonstration, Verbalizes Understanding by SV at 6/16/2025 1633    Comment: -reviewed current functional status and mod I goals  - use of RW for mobility  - possibly need for transport chair for community outing  - observed transfers/gait training/bed mobility at close supervision level   Family Acceptance, Explanation, Demonstration, Verbalizes Understanding by SV at 6/16/2025 1633    Comment: -reviewed current functional status and mod I goals  - use of RW for mobility  - possibly need for transport chair for community outing  - observed transfers/gait training/bed mobility at close supervision level                      Point: Ambulation Training (Done)       Description:   Review pertinent information related to ambulation, including:       - Precautions needing to be maintained      -  Assistive devices and/or bracing recommended      - Assistance level recommended at discharge      - Proper positioning of caregiver if assistance is needed      - Limitations to ambulation (i.e. distances)      - Changes in assistance based on environmental changes (i.e. outdoor vs. indoor ambulation)                 Learning Progress Summary            Patient Acceptance, Explanation, Demonstration, Verbalizes Understanding by SV at 6/16/2025 1633    Comment: -reviewed current functional status and mod I goals  - use of RW for mobility  - possibly need for transport chair for community outing  - observed transfers/gait training/bed mobility at close supervision level   Family Acceptance, Explanation, Demonstration, Verbalizes Understanding by SV at 6/16/2025 1633    Comment: -reviewed current functional status and mod I goals  - use of RW for mobility  - possibly need for transport chair for community outing  - observed transfers/gait training/bed mobility at close supervision level                      Point: Car Transfer (Done)       Description:   Review pertinent information related to car transfers, including:       - Precautions needing to be maintained      - Assistive devices and/or bracing recommended      - Assistance level recommended at discharge      - Proper positioning of caregiver if assistance is needed      - Wheelchair management                 Learning Progress Summary            Patient Acceptance, Explanation, Demonstration, Verbalizes Understanding by SV at 6/16/2025 1634    Comment: -observed car transfer at minimal assist level d/t low car seat  -recommend car cane for improved sit > stand   Family Acceptance, Explanation, Demonstration, Verbalizes Understanding by SV at 6/16/2025 1634    Comment: -observed car transfer at minimal assist level d/t low car seat  -recommend car cane for improved sit > stand                                            IRF PT Goals      Flowsheet Row Most  Recent Value   Bed Mobility Goal 1    Activity/Assistive Device rolling to left, rolling to right, sit to supine/supine to sit at 06/05/2025 0902   [place] minimum assist (75% or more patient effort) at 06/05/2025 0902   Time Frame short-term goal (STG), 1 week at 06/05/2025 0902   Progress/Outcome goal ongoing, goal revised this date  [will continue core strengthening/bed mobility training] at 06/10/2025 0850   Bed Mobility Goal 2    Activity/Assistive Device rolling to left, rolling to right, sit to supine/supine to sit at 06/05/2025 0902   [place] modified independence at 06/05/2025 0902   Time Frame long-term goal (LTG), 3 weeks at 06/05/2025 0902   Progress/Outcome goal ongoing at 06/10/2025 0850   Transfer Goal 1    Activity/Assistive Device sit-to-stand/stand-to-sit, stand pivot at 06/05/2025 0902   [place] tactile cues required  [steadying] at 06/10/2025 0850   Time Frame short-term goal (STG), 1 week at 06/10/2025 0850   Progress/Outcome goal met, goal revised this date at 06/10/2025 0850   Transfer Goal 2    Activity/Assistive Device sit-to-stand/stand-to-sit, stand pivot at 06/05/2025 0902   [place] modified independence at 06/05/2025 0902   Time Frame long-term goal (LTG), 3 weeks at 06/05/2025 0902   Progress/Outcome goal ongoing at 06/10/2025 0850   Gait/Walking Locomotion Goal 1    Activity/Assistive Device gait (walking locomotion) at 06/05/2025 0902   Distance 50 feet at 06/05/2025 0902   [place] tactile cues required at 06/10/2025 0850   Time Frame short-term goal (STG), 1 week at 06/10/2025 0850   Progress/Outcome goal met, goal revised this date at 06/10/2025 0850   Gait/Walking Locomotion Goal 2    Activity/Assistive Device gait (walking locomotion) at 06/05/2025 0902   Distance 150 feet at 06/05/2025 0902   [place] supervision required at 06/05/2025 0902   Time Frame long-term goal (LTG), 3 weeks at 06/05/2025 0902   Progress/Outcome goal ongoing at 06/10/2025 0855    Stairs Goal 1    Activity/Assistive Device stairs, all skills at 06/10/2025 0850   Number of Stairs 8 at 06/10/2025 0850   [place] minimum assist (75% or more patient effort) at 06/10/2025 0850   Time Frame short-term goal (STG), 1 week at 06/10/2025 0850   Stairs Goal 2    Activity/Assistive Device stairs, all skills at 06/10/2025 0850   Number of Stairs 12 at 06/10/2025 0850   [place] supervision required at 06/10/2025 0850   Time Frame long-term goal (LTG), 2 weeks at 06/10/2025 0850

## 2025-06-16 NOTE — PROGRESS NOTES
Patient: Aneesh Brito  Location: [facility] [address]  MRN: [id number]  Today's date: 6/16/2025    History of Present Illness    Aneesh is a 75 y.o. male with a history of colon carcinoma, BPH, hypertension, glaucoma, type 2 diabetes, history of a cervical fusion over 10 years ago admitted after a fall inability to get up on his own.  He was seen at [facility] where imaging studies were performed which demonstrated DISH as well as a Chance fracture at the bottom of C7.  He was transferred to [facility] for neurosurgery.    He was taken to the OR on 6/1 by Dr. Benjamin and underwent an ORIF of the C7 Chance fracture with removal and replacement of the C3-6 posterior instrumentation as well as a C3-T3 posterior lateral fusion.  Drains remain in place.  He is on subcu heparin for DVT prophylaxis.  Currently on a nicardipine drip for blood pressure control.  He states pain is well-controlled.  He denies any other complaints of headache or dizziness, chest pain or shortness of breath.  He is voiding on his own.  He has not had a bowel movement yet.    He was seen by PT and OT needing moderate assistance of 2 to transfer sit to stand.  He ambulated 2 feet with moderate assistance of 2 using a rolling walker.  Pertinent radiology results reviewed. Pertinent lab results reviewed.      Past Medical History  Aneesh has a past medical history of C7 cervical fracture (CMS/HCC) (05/31/2025), Colonic adenoma, Diverticulosis of colon, Enlarged prostate with lower urinary tract symptoms (LUTS), Glaucoma, Hypertension, Melanoma of skin (CMS/HCC), Osteoarthritis of knee, Overweight, Spinal stenosis of lumbar region, and Type 2 diabetes mellitus (CMS/HCC).    OT Vitals      Date/Time Pulse HR Source BP BP Location BP Method Pt Position Who   06/16/25 1030 48 Monitor 140/60 Left upper arm Manual Sitting LM          OT Pain      Date/Time Pain Type Side/Orientation Rating: Rest Rating: Activity  Interventions [facility]   06/16/25 1030 Pain Assessment neck 3 3 premedicated for activity LM                 Prior Living Environment      Flowsheet Row Most Recent Value   People in Home spouse   Current Living Arrangements independent living facility   Home Accessibility wheelchair accessible   Living Environment Comment PTA residing c wife in [facility] ([facility] 1 floor set up. Shower stall c shower chair & grab bars. Reports no DME at toilet - information to be verified c pt. wife   Number of Stairs, Main Entrance 0   Patient Bedroom Access Comment PTA residing c wife in [facility] ([facility]). 1 floor set up. Shower stall c shower chair & grab bars. Reports no DME at toilet - information to be verified c pt. wife   Bathroom Access Comment PTA residing c wife in [facility] ([facility]). 1 floor set up. Shower stall c shower chair & grab bars. Reports no DME at toilet - information to be verified c pt. wife   Number of Stairs, Within Home, Primary 0       Prior Level of Function      Flowsheet Row Most Recent Value   Dominant Hand left   Ambulation assistive equipment  [rollator]   Transferring assistive equipment  [rollator]   Toileting independent   Bathing independent   Dressing independent   Eating independent   IADLs independent   Driving/Transportation    Communication understands/communicates without difficulty   Prior Level of Function Comment Pt reports being independent with use of SPC inside and Rollator to the dining jenkins. Independent ADLs. Denies other falls. Independent ADLs. (+) driving. Retired teacher   Assistive Device Currently Used at Home cane, straight, walker, 4-wheeled, shower chair       Occupational Profile      Flowsheet Row Most Recent Value   Successful Occupations Enjoys participating in the activities at [employer] - shuffle board and corn hole   Occupational History/Life Experiences PTA independent c ADLs. Wife completes majority of IADLs, pt. does some light meal  "prep. Retired teacher/professor. (+) . Already has handicap parking placard.   Patient Goals \"Walk\"        IRF OT Evaluation and Treatment - 06/16/25 1036          OT Time Calculation    Start Time 1030     Stop Time 1100     Time Calculation (min) 30 min        Session Details    Document Type Daily Treatment/Progress Note        General Information    General Observations of Patient pt received seated in WC, direct handoff from PT session. Patient care also provided by Madhuri Adam, OTD, OTR/L        Mobility Belt    Mobility Belt Used During Session yes        Orthosis Neck Cervical Collar    Orthosis Properties Date Obtained: 06/01/25 Time Obtained: 1803 Location: Neck Type: Cervical Collar Therapeutic Indications: stabilization and support Wearing Schedule: wear when out of bed       Orthosis Neck Cervical Collar    Orthosis Properties Date Obtained: 06/01/25 Time Obtained: 0000 Location: Neck Type: Cervical Collar Features: Hard Description: [place] collar OOB don seated; [place] collar for shower Therapeutic Indications: stabilization and support Wearing Schedule: wear when out of bed       Sit to Stand Transfer    [place], Sit to Stand Transfer close supervision     Safety/Cues increased time to complete     Assistive Device walker, front-wheeled     Comment from WC to RW c CL S for balance        Stand to Sit Transfer    [place], Stand to Sit Transfer close supervision     Safety/Cues increased time to complete     Assistive Device walker, front-wheeled     Comment to WC from RW c CL S for balance        Stand Pivot Transfer    [place], Stand Pivot/Stand Step Transfer touching/steadying assist     Safety/Cues increased time to complete     Assistive Device walker, front-wheeled     Comment amb approach to/from WC using RW c touching A for balance/stability. cues for step length and RW mgmt        Impairments/Safety Issues    Comment, Safety Issues/Impairments longer HHD amb t/o " therapy gym using RW over level tile c touching A for balance/WS. cues t/o for step length and RW mgmt 2* noted shuffling. pt completing multiple standing level rest breaks        Upper Extremity (Therapeutic Exercise)    Exercise Position/Type seated;resistive exercises     General Exercise bilateral     Range of Motion Exercises shoulder flexion/extension;elbow flexion/extension;other (see comments);shoulder external/internal rotation   chest press    Weight/Resistance 1 pound     Reps and Sets 2x10     Comment Pt engaged in the above UB therex for improved strength/endurance. Rest breaks as needed. pt then completes AROM posterior shoulder rolls and shoulder shrugs 2x10        Daily Progress Summary (OT)    Symptoms Noted During/After Treatment none     Daily Outcome Statement focus of session dedicated to UE strength and functional mobility. pt will cont to benefit from transfer and mobility training c techniques to minimize shuffling to maximize I and safety                          IRF OT Goals      Flowsheet Row Most Recent Value   Transfer Goal 1    Activity/Assistive Device toilet, commode, 3-in-1 at 06/05/2025 1033   [place] supervision required at 06/10/2025 0830   Time Frame short-term goal (STG), 5 - 7 days at 06/05/2025 1033   Progress/Outcome goal met, goal revised this date at 06/10/2025 0830   Transfer Goal 2    Activity/Assistive Device toilet, commode, 3-in-1 at 06/05/2025 1033   [place] modified independence at 06/05/2025 1033   Time Frame long-term goal (LTG), 21 days or less at 06/05/2025 1033   Transfer Goal 3    Activity/Assistive Device shower, tub bench at 06/05/2025 1033   [place] supervision required at 06/10/2025 0830   Time Frame short-term goal (STG), 5 - 7 days at 06/05/2025 1033   Progress/Outcome goal met, goal revised this date at 06/10/2025 0830   Transfer Goal 4    Activity/Assistive Device shower, tub bench at 06/05/2025 1033   [place] modified independence  at 06/05/2025 1033   Time Frame long-term goal (LTG), 21 days or less at 06/05/2025 1033   Bathing Goal 1    Activity/Assistive Device bathing skills, all at 06/05/2025 1033   [place] minimum assist (75% or more patient effort) at 06/10/2025 0830   Time Frame short-term goal (STG), 5 - 7 days at 06/05/2025 1033   Strategies/Barriers spinal precautions inhibiting forward reach towards BLE, plan to introduce LHAE at 06/10/2025 0830   Progress/Outcome goal ongoing at 06/10/2025 0830   Bathing Goal 2    Activity/Assistive Device bathing skills, all at 06/05/2025 1033   [place] modified independence at 06/05/2025 1033   Time Frame long-term goal (LTG), 21 days or less at 06/05/2025 1033   UB Dressing Goal 1    Activity/Assistive Device upper body dressing at 06/05/2025 1033   [place] supervision required at 06/10/2025 0830   Time Frame short-term goal (STG), 5 - 7 days at 06/05/2025 1033   Progress/Outcome goal met, goal revised this date at 06/10/2025 0830   UB Dressing Goal 2    Activity/Assistive Device upper body dressing at 06/05/2025 1033   [place] modified independence at 06/05/2025 1033   Time Frame long-term goal (LTG), 21 days or less at 06/05/2025 1033   LB Dressing Goal 1    Activity/Assistive Device lower body dressing at 06/05/2025 1033   [place] minimum assist (75% or more patient effort) at 06/10/2025 0830   Time Frame short-term goal (STG), 5 - 7 days at 06/05/2025 1033   Progress/Outcome goal met, goal revised this date at 06/10/2025 0830   LB Dressing Goal 2    Activity/Assistive Device lower body dressing at 06/05/2025 1033   [place] modified independence at 06/05/2025 1033   Time Frame long-term goal (LTG), 21 days or less at 06/05/2025 1033   Grooming Goal 1    Activity/Assistive Device grooming skills, all at 06/05/2025 1033   [place] set-up required at 06/10/2025 0830   Time Frame short-term goal (STG), 5 - 7 days at 06/05/2025 1033   Strategies/Barriers standing  tolerance/balance, still requiring A in stance at 06/10/2025 0830   Progress/Outcome goal ongoing at 06/10/2025 0830   Grooming Goal 2    Activity/Assistive Device grooming skills, all at 06/05/2025 1033   [place] modified independence at 06/05/2025 1033   Time Frame long-term goal (LTG), 21 days or less at 06/05/2025 1033   Toileting Goal 1    Activity/Assistive Device toileting skills, all at 06/05/2025 1033   [place] minimum assist (75% or more patient effort) at 06/10/2025 0830   Time Frame short-term goal (STG), 5 - 7 days at 06/05/2025 1033   Progress/Outcome goal met, goal revised this date at 06/10/2025 0830   Toileting Goal 2    Activity/Assistive Device toileting skills, all at 06/05/2025 1033   [place] modified independence at 06/05/2025 1033   Time Frame long-term goal (LTG), 21 days or less at 06/05/2025 1033

## 2025-06-16 NOTE — PLAN OF CARE
Plan of Care Review  Plan of Care Reviewed With: patient  Progress: improving  Outcome Evaluation: Patient participates in rehab program. Attends therapies as scheduled. Continues to make gains with strength and endurance. VSS. Reports mild to moderate neck pain, using scheduled tylenol position changes and prn oxycodone for pain management.

## 2025-06-16 NOTE — PROGRESS NOTES
Patient: Aneesh Brito  Location: [facility] [address]  MRN: [id number]  Today's date: 6/16/2025    History of Present Illness    Aneesh is a 75 y.o. male with a history of colon carcinoma, BPH, hypertension, glaucoma, type 2 diabetes, history of a cervical fusion over 10 years ago admitted after a fall inability to get up on his own.  He was seen at [facility] where imaging studies were performed which demonstrated DISH as well as a Chance fracture at the bottom of C7.  He was transferred to [facility] for neurosurgery.    He was taken to the OR on 6/1 by Dr. Benjamin and underwent an ORIF of the C7 Chance fracture with removal and replacement of the C3-6 posterior instrumentation as well as a C3-T3 posterior lateral fusion.  Drains remain in place.  He is on subcu heparin for DVT prophylaxis.  Currently on a nicardipine drip for blood pressure control.  He states pain is well-controlled.  He denies any other complaints of headache or dizziness, chest pain or shortness of breath.  He is voiding on his own.  He has not had a bowel movement yet.    He was seen by PT and OT needing moderate assistance of 2 to transfer sit to stand.  He ambulated 2 feet with moderate assistance of 2 using a rolling walker.  Pertinent radiology results reviewed. Pertinent lab results reviewed.      Past Medical History  Aneesh has a past medical history of C7 cervical fracture (CMS/HCC) (05/31/2025), Colonic adenoma, Diverticulosis of colon, Enlarged prostate with lower urinary tract symptoms (LUTS), Glaucoma, Hypertension, Melanoma of skin (CMS/HCC), Osteoarthritis of knee, Overweight, Spinal stenosis of lumbar region, and Type 2 diabetes mellitus (CMS/HCC).    OT Vitals      Date/Time Pulse HR Source BP BP Location BP Method Pt Position Who   06/16/25 1305 52 Monitor 142/64 Left upper arm Manual Sitting TS          OT Pain      Date/Time Pain Type Side/Orientation Rating: Rest Rating: Activity  Interventions [facility]   06/16/25 1305 Pain Assessment neck 3 3 diversional activity provided    06/16/25 1355 Pain Reassessment neck 3 -- -- TS                 Prior Living Environment      Flowsheet Row Most Recent Value   People in Home spouse   Current Living Arrangements independent living facility   Home Accessibility wheelchair accessible   Living Environment Comment PTA residing c wife in [facility] ([facility] 1 floor set up. Shower stall c shower chair & grab bars. Reports no DME at toilet - information to be verified c pt. wife   Number of Stairs, Main Entrance 0   Patient Bedroom Access Comment PTA residing c wife in [facility] ([facility]). 1 floor set up. Shower stall c shower chair & grab bars. Reports no DME at toilet - information to be verified c pt. wife   Bathroom Access Comment PTA residing c wife in [facility] ([facility] 1 floor set up. Shower stall c shower chair & grab bars. Reports no DME at toilet - information to be verified c pt. wife   Number of Stairs, Within Home, Primary 0       Prior Level of Function      Flowsheet Row Most Recent Value   Dominant Hand left   Ambulation assistive equipment  [rollator]   Transferring assistive equipment  [rollator]   Toileting independent   Bathing independent   Dressing independent   Eating independent   IADLs independent   Driving/Transportation    Communication understands/communicates without difficulty   Prior Level of Function Comment Pt reports being independent with use of SPC inside and Rollator to the dining jenkins. Independent ADLs. Denies other falls. Independent ADLs. (+) driving. Retired teacher   Assistive Device Currently Used at Home cane, straight, walker, 4-wheeled, shower chair       Occupational Profile      Flowsheet Row Most Recent Value   Successful Occupations Enjoys participating in the activities at [employer] - shuffle board and corn hole   Occupational History/Life Experiences PTA independent c ADLs. Wife  "completes majority of IADLs, pt. does some light meal prep. Retired teacher/professor. (+) . Already has handicap parking placard.   Patient Goals \"Walk\"        IRF OT Evaluation and Treatment - 06/16/25 1305          OT Time Calculation    Start Time 1300     Stop Time 1400     Time Calculation (min) 60 min        Session Details    Document Type Daily Treatment/Progress Note        General Information    Patient/Family/Caregiver Comments/Observations Pt's wife, son, and daughters present for family training session.     General Observations of Patient Pt received sitting in wc at bedside, pleasant and agreeable to OT session.        Mobility Belt    Mobility Belt Used During Session yes        Orthosis Neck Cervical Collar    Orthosis Properties Date Obtained: 06/01/25 Time Obtained: 1803 Location: Neck Type: Cervical Collar Therapeutic Indications: stabilization and support Wearing Schedule: wear when out of bed       Orthosis Neck Cervical Collar    Orthosis Properties Date Obtained: 06/01/25 Time Obtained: 0000 Location: Neck Type: Cervical Collar Features: Hard Description: Apen collar OOB don seated; [place] collar for shower Therapeutic Indications: stabilization and support Wearing Schedule: wear when out of bed       Daily Progress Summary (OT)    Daily Outcome Statement Session focused on family training session with pt's family providing hands on assist for cervical collar management. Pt completed bathroom transfers with cl s with RW and DME. Confirmed pt has accessible height toilet and will purchase toilet safety rails. Confirmed pt has handicap driving placard. Continue OT POC to address fxl deficits.                     ADL Impairment Caregiver Training       Title: ADL Impairment Caregiver Training/Education (IRF) (Done)       Points For This Title       Point: Additional Training/Education (Done)       Learning Progress Summary            Patient Acceptance, Explanation, Demonstration, " Verbalizes Understanding by TS at 6/16/2025 1444    Comment: walker tray, walker basket, or bag attatched to front of RW for item transportation. Moving commonly used items between chest and waist height.   Family Acceptance, Explanation, Demonstration, Verbalizes Understanding by TS at 6/16/2025 1444    Comment: walker tray, walker basket, or bag attatched to front of RW for item transportation. Moving commonly used items between chest and waist height.                      Point: Toilet Transfer (Done)       Description:   Review pertinent information related to assistance level needed for transfers, including:       - DME and assistive device recommended      - Technique and cueing recommended at discharge      - Proper positioning of caregiver if assistance is needed      - Safety concerns for the home environment                 Learning Progress Summary            Patient Acceptance, Explanation, Verbalizes Understanding, Demonstrated Understanding by TS at 6/16/2025 1439    Comment: via amb approach with RW and use of toilet safety rails and accessible height toilet. completing with cl s and goals for mod I   Family Acceptance, Explanation, Verbalizes Understanding, Demonstrated Understanding by TS at 6/16/2025 1439    Comment: via amb approach with RW and use of toilet safety rails and accessible height toilet. completing with cl s and goals for mod I                      Point: Shower Stall Transfer (Done)       Description:   Review pertinent information related to assistance level needed for transfers, including:       - DME and assistive device recommended      - Technique and cuing recommended at discharge      - Proper positioning of caregiver if assistance is needed      - Safety concerns for home environment                 Learning Progress Summary            Patient Acceptance, Explanation, Verbalizes Understanding by TS at 6/16/2025 1442    Comment: via amb approach with use of RW, technique with  grab bars and built in bench. lateral stepping with BUE support on grab bar.   Family Acceptance, Explanation, Verbalizes Understanding by TS at 6/16/2025 1442    Comment: via amb approach with use of RW, technique with grab bars and built in bench. lateral stepping with BUE support on grab bar.                      Point: Upper Body Dressing (Done)       Description:   Instruct the patient and caregiver/family in methods of completing and assisting with upper body dressing including:       - Assistive devices and adaptive equipment specific to patient need       - Management of orthotics and prosthetics as applicable      - Safety techniques and procedures, body mechanics and lifting techniques      - Patient specific safety concerns                 Learning Progress Summary            Patient Acceptance, Explanation, Verbalizes Understanding by TS at 6/16/2025 1436    Comment: completes while seated. Will require assist for cervical collar management. Recommend loose collar shirts.   Family Acceptance, Explanation, Verbalizes Understanding by TS at 6/16/2025 1436    Comment: completes while seated. Will require assist for cervical collar management. Recommend loose collar shirts.                      Point: Driving (Done)       Description:   Educate on issues and options related to return to driving, including evaluation or training by a  rehabilitation specialist, as needed.       - Driving letter process, reporting, and medical clearance      - Factors, such as seizures, visual, cognitive and muscle control impairments, that may influence the ability to drive      - Procuring a handicap placard if appropriate                 Learning Progress Summary            Patient Acceptance, Explanation, Verbalizes Understanding by TS at 6/16/2025 1440    Comment: No driving until cleared by surgeon. Pt has handicap driving placard   Family Acceptance, Explanation, Verbalizes Understanding by TS at 6/16/2025 1440     Comment: No driving until cleared by surgeon. Pt has handicap driving placard                      Point: Splinting/Orthotics/Prosthetics (Done)       Description:   Instruct caregiver/family on the use and care of any splints, orthotics, and/or prosthetics, including:       - Purpose      - Safe and effective use and positioning      - Donning/doffing      - Wearing schedule      - Cleaning and infection control      - Limb hygiene and skin inspection      - Maintenance and troubleshooting                 Learning Progress Summary            Patient Acceptance, Explanation, Demonstration, Verbalizes Understanding, Demonstrated Understanding by TS at 6/16/2025 1441    Comment: [place] and Deneen admas management.   Family Acceptance, Explanation, Demonstration, Verbalizes Understanding, Demonstrated Understanding by TS at 6/16/2025 1441    Comment: [place] and Deneen collar management.                      Point: Home Safety/Home Assessment (Done)       Description:   Review information regarding home accessibility, modifications, and equipment recommendations:       - Issue and review home questionnaire      - Discuss any home modifications recommended for discharge or future      - Verbalize pertinent adjustments to home for safe discharge whether permanent or temporary (e.g. first floor set up, rental ramp, etc.)                 Learning Progress Summary            Patient Acceptance, Explanation, Verbalizes Understanding by TS at 6/16/2025 1440    Comment: Nightlights at night, non slip bathe mat   Family Acceptance, Explanation, Verbalizes Understanding by TS at 6/16/2025 1440    Comment: Nightlights at night, non slip bathe mat                      Point: Lower Body Dressing (Done)       Description:   Instruct the patient and caregiver/family in methods of completing and assisting lower body dressing including:       - Assistive devices and adaptive equipment specific to patient need      -  Management of orthotics and prosthetics as applicable      - Safety techniques and procedures, body mechanics and lifting techniques      - Patient specific safety concerns                 Learning Progress Summary            Patient Acceptance, Explanation, Demonstration, Verbalizes Understanding, Demonstrated Understanding by TS at 6/16/2025 1437    Comment: Compensatory techniques and completes with sock aid, reacher, dressing stick.   Family Acceptance, Explanation, Demonstration, Verbalizes Understanding, Demonstrated Understanding by TS at 6/16/2025 1437    Comment: Compensatory techniques and completes with sock aid, reacher, dressing stick.                      Point: Toileting (Done)       Description:   Instruct the patient and caregiver/family in methods of completing and assisting with toileting including:       - Assistive devices and adaptive equipment specific to patient need.       - Management of orthotics and prosthetics as applicable      - Safety techniques and procedures, body mechanics and lifting techniques.      - Patient specific safety concerns                 Learning Progress Summary            Patient Acceptance, Explanation, Verbalizes Understanding by TS at 6/16/2025 1438    Comment: recommend use of urinal overnight. Use of toilet safety frame and RW.   Family Acceptance, Explanation, Verbalizes Understanding by TS at 6/16/2025 1438    Comment: recommend use of urinal overnight. Use of toilet safety frame and RW.                      Point: Bathing (Done)       Description:   Instruct the patient and caregiver/family in methods of completing and assisting with bathing including:       - Assistive devices and adaptive equipment specific to patient need      - Management of orthotics and prosthetics as applicable      - Safety techniques and procedures, body mechanics and lifting techniques      - Patient specific safety concern                 Learning Progress Summary             Patient Acceptance, Explanation, Verbalizes Understanding by TS at 6/16/2025 1428    Comment: Discussed goal of set up, assist for Deneen collar management. Will complete seated on built in bench with use of hand held shower head.   Family Acceptance, Explanation, Verbalizes Understanding by TS at 6/16/2025 1428    Comment: Discussed goal of set up, assist for Deneen collar management. Will complete seated on built in bench with use of hand held shower head.                                            IRF OT Goals      Flowsheet Row Most Recent Value   Transfer Goal 1    Activity/Assistive Device toilet, commode, 3-in-1 at 06/05/2025 1033   [place] supervision required at 06/10/2025 0830   Time Frame short-term goal (STG), 5 - 7 days at 06/05/2025 1033   Progress/Outcome goal met, goal revised this date at 06/10/2025 0830   Transfer Goal 2    Activity/Assistive Device toilet, commode, 3-in-1 at 06/05/2025 1033   [place] modified independence at 06/05/2025 1033   Time Frame long-term goal (LTG), 21 days or less at 06/05/2025 1033   Transfer Goal 3    Activity/Assistive Device shower, tub bench at 06/05/2025 1033   [place] supervision required at 06/10/2025 0830   Time Frame short-term goal (STG), 5 - 7 days at 06/05/2025 1033   Progress/Outcome goal met, goal revised this date at 06/10/2025 0830   Transfer Goal 4    Activity/Assistive Device shower, tub bench at 06/05/2025 1033   [place] modified independence at 06/05/2025 1033   Time Frame long-term goal (LTG), 21 days or less at 06/05/2025 1033   Bathing Goal 1    Activity/Assistive Device bathing skills, all at 06/05/2025 1033   [place] minimum assist (75% or more patient effort) at 06/10/2025 0830   Time Frame short-term goal (STG), 5 - 7 days at 06/05/2025 1033   Strategies/Barriers spinal precautions inhibiting forward reach towards BLE, plan to introduce LHAE at 06/10/2025 0830   Progress/Outcome goal ongoing at 06/10/2025 0830    Bathing Goal 2    Activity/Assistive Device bathing skills, all at 06/05/2025 1033   [place] modified independence at 06/05/2025 1033   Time Frame long-term goal (LTG), 21 days or less at 06/05/2025 1033   UB Dressing Goal 1    Activity/Assistive Device upper body dressing at 06/05/2025 1033   [place] supervision required at 06/10/2025 0830   Time Frame short-term goal (STG), 5 - 7 days at 06/05/2025 1033   Progress/Outcome goal met, goal revised this date at 06/10/2025 0830   UB Dressing Goal 2    Activity/Assistive Device upper body dressing at 06/05/2025 1033   [place] modified independence at 06/05/2025 1033   Time Frame long-term goal (LTG), 21 days or less at 06/05/2025 1033   LB Dressing Goal 1    Activity/Assistive Device lower body dressing at 06/05/2025 1033   [place] minimum assist (75% or more patient effort) at 06/10/2025 0830   Time Frame short-term goal (STG), 5 - 7 days at 06/05/2025 1033   Progress/Outcome goal met, goal revised this date at 06/10/2025 0830   LB Dressing Goal 2    Activity/Assistive Device lower body dressing at 06/05/2025 1033   [place] modified independence at 06/05/2025 1033   Time Frame long-term goal (LTG), 21 days or less at 06/05/2025 1033   Grooming Goal 1    Activity/Assistive Device grooming skills, all at 06/05/2025 1033   [place] set-up required at 06/10/2025 0830   Time Frame short-term goal (STG), 5 - 7 days at 06/05/2025 1033   Strategies/Barriers standing tolerance/balance, still requiring A in stance at 06/10/2025 0830   Progress/Outcome goal ongoing at 06/10/2025 0830   Grooming Goal 2    Activity/Assistive Device grooming skills, all at 06/05/2025 1033   [place] modified independence at 06/05/2025 1033   Time Frame long-term goal (LTG), 21 days or less at 06/05/2025 1033   Toileting Goal 1    Activity/Assistive Device toileting skills, all at 06/05/2025 1033   [place] minimum assist (75% or more patient effort) at  06/10/2025 0830   Time Frame short-term goal (STG), 5 - 7 days at 06/05/2025 1033   Progress/Outcome goal met, goal revised this date at 06/10/2025 0830   Toileting Goal 2    Activity/Assistive Device toileting skills, all at 06/05/2025 1033   [place] modified independence at 06/05/2025 1033   Time Frame long-term goal (LTG), 21 days or less at 06/05/2025 1033

## 2025-06-17 ENCOUNTER — APPOINTMENT (INPATIENT)
Dept: PHYSICAL THERAPY | Facility: REHABILITATION | Age: 75
DRG: 560 | End: 2025-06-17
Payer: MEDICARE

## 2025-06-17 ENCOUNTER — APPOINTMENT (INPATIENT)
Dept: OCCUPATIONAL THERAPY | Facility: REHABILITATION | Age: 75
DRG: 560 | End: 2025-06-17
Payer: MEDICARE

## 2025-06-17 LAB
GLUCOSE BLD-MCNC: 118 MG/DL (ref 70–99)
GLUCOSE BLD-MCNC: 121 MG/DL (ref 70–99)
POCT TEST: ABNORMAL
POCT TEST: ABNORMAL

## 2025-06-17 PROCEDURE — 97530 THERAPEUTIC ACTIVITIES: CPT | Mod: GP

## 2025-06-17 PROCEDURE — 97116 GAIT TRAINING THERAPY: CPT | Mod: GP

## 2025-06-17 PROCEDURE — 63700000 HC SELF-ADMINISTRABLE DRUG: Performed by: HOSPITALIST

## 2025-06-17 PROCEDURE — 12800001 HC ROOM AND CARE SEMIPRIVATE REHAB-BRAIN INJ

## 2025-06-17 PROCEDURE — 97535 SELF CARE MNGMENT TRAINING: CPT | Mod: GO

## 2025-06-17 PROCEDURE — 97112 NEUROMUSCULAR REEDUCATION: CPT | Mod: GP

## 2025-06-17 PROCEDURE — 97530 THERAPEUTIC ACTIVITIES: CPT | Mod: GO

## 2025-06-17 PROCEDURE — 63600000 HC DRUGS/DETAIL CODE: Mod: JZ | Performed by: INTERNAL MEDICINE

## 2025-06-17 PROCEDURE — 63700000 HC SELF-ADMINISTRABLE DRUG: Performed by: INTERNAL MEDICINE

## 2025-06-17 PROCEDURE — 12800000 HC ROOM AND CARE SEMIPRIVATE REHAB

## 2025-06-17 PROCEDURE — 97110 THERAPEUTIC EXERCISES: CPT | Mod: GP

## 2025-06-17 RX ORDER — AMOXICILLIN 250 MG
2 CAPSULE ORAL DAILY PRN
Status: DISCONTINUED | OUTPATIENT
Start: 2025-06-17 | End: 2025-06-20 | Stop reason: HOSPADM

## 2025-06-17 RX ADMIN — METOPROLOL TARTRATE 75 MG: 50 TABLET, FILM COATED ORAL at 08:10

## 2025-06-17 RX ADMIN — LOSARTAN POTASSIUM 100 MG: 100 TABLET, FILM COATED ORAL at 08:10

## 2025-06-17 RX ADMIN — LISINOPRIL 10 MG: 10 TABLET ORAL at 20:44

## 2025-06-17 RX ADMIN — ACETAMINOPHEN 650 MG: 325 TABLET ORAL at 06:03

## 2025-06-17 RX ADMIN — ATORVASTATIN CALCIUM 20 MG: 20 TABLET, FILM COATED ORAL at 08:10

## 2025-06-17 RX ADMIN — FELODIPINE 10 MG: 5 TABLET, FILM COATED, EXTENDED RELEASE ORAL at 08:10

## 2025-06-17 RX ADMIN — Medication 25 MCG: at 08:10

## 2025-06-17 RX ADMIN — METOPROLOL TARTRATE 75 MG: 50 TABLET, FILM COATED ORAL at 20:44

## 2025-06-17 RX ADMIN — FINASTERIDE 5 MG: 5 TABLET, FILM COATED ORAL at 08:11

## 2025-06-17 RX ADMIN — ACETAMINOPHEN 650 MG: 325 TABLET ORAL at 22:36

## 2025-06-17 RX ADMIN — ACETAMINOPHEN 650 MG: 325 TABLET ORAL at 00:09

## 2025-06-17 RX ADMIN — CYANOCOBALAMIN TAB 1000 MCG 1000 MCG: 1000 TAB at 08:10

## 2025-06-17 RX ADMIN — ENOXAPARIN SODIUM 40 MG: 40 INJECTION SUBCUTANEOUS at 17:04

## 2025-06-17 RX ADMIN — LISINOPRIL 10 MG: 10 TABLET ORAL at 08:10

## 2025-06-17 RX ADMIN — ACETAMINOPHEN 650 MG: 325 TABLET ORAL at 12:29

## 2025-06-17 RX ADMIN — TIMOLOL MALEATE 1 DROP: 5 SOLUTION/ DROPS OPHTHALMIC at 08:16

## 2025-06-17 RX ADMIN — METFORMIN HYDROCHLORIDE 500 MG: 500 TABLET ORAL at 16:24

## 2025-06-17 RX ADMIN — ACETAMINOPHEN 650 MG: 325 TABLET ORAL at 17:02

## 2025-06-17 RX ADMIN — PANTOPRAZOLE SODIUM 40 MG: 40 TABLET, DELAYED RELEASE ORAL at 08:10

## 2025-06-17 RX ADMIN — METFORMIN HYDROCHLORIDE 500 MG: 500 TABLET ORAL at 08:11

## 2025-06-17 RX ADMIN — TAMSULOSIN HYDROCHLORIDE 0.4 MG: 0.4 CAPSULE ORAL at 20:44

## 2025-06-17 ASSESSMENT — COGNITIVE AND FUNCTIONAL STATUS - GENERAL: AFFECT: WFL

## 2025-06-17 NOTE — PROGRESS NOTES
[facility] Progress Note          Patient was seen and examined.   Attestation Notes: Face to face encounter completed    Aneesh Brito is a 75 y.o. male who was admitted for Falls, sequela [W19.XXXS]. Patient was referred by Chase Anna MD for medical assessment and management      CC: Falls, sequela [W19.XXXS]     HPI: Aneesh Brito is a 75 y.o. male with HTN, HLD, DM2, colon ca, BPH, glaucoma, spinal stenosis s/p previous cervical fusion, presented to [facility] 5/31/25 s/p fall, found to have C7 fracture and transferred to [facility] where he underwent C7 ORIF, removal of C3-6 hardware and C3-T1 PLIF by neurosurgery Dr. Raza Benjamin on 6/1/25.     Post op he had anemia but did not require transfusion. He was put on SQ Heparin for DVT ppx. His pain was managed with Tylenol and Oxycodone.      He had elevated BP with surgery requiring IV Hydralzine but BP improved after surgery and HTN managed with Felodipine, Lisinopril, Losartan and Metoprolol.      He was on Lipitor for HLD. He was on Finasteride and Tamsulosin for BPH.  He was on netarsudiL-latanoprost and timoptic eye drops for glaucoma.      He had very elevated blood sugars alaina-op, related to IVFs and Decadron, and required insulin. Blood sugars improved and DM2 managed with Metformin.      He had elevated CK of 1118 on admission due to rhabdomyolysis from being down on floor prior to admission. He was treated with IVFs and CK improved.      The patient was evaluated by PM&R and found to have residual deficits in ambulation and ADLs due to Falls, sequela [W19.XXXS] and came to [facility] on 6/4/2025 for acute inpatient rehab.     [facility] hospital course:    He participated in therapy. He was seen by Nutrition, STACY. Vit D low, started oral supplement.     SUBJECTIVE:  Patient interviewed and examined    No acute complaints and feels he is progressing in therapy.    Pain stable, no new numbness or weakness, moving  bowels, chronic urine incontinence, no abdominal pain or nausea, no dysuria, no chest pain, palpitations, dyspnea or fevers    Review of Systems:  All other systems reviewed and negative except as noted in the HPI.    Current meds and allergies reviewed  Current Facility-Administered Medications   Medication Dose Route Frequency    acetaminophen  650 mg oral q6h KOFI    alum-mag hydroxide-simeth  30 mL oral q6h PRN    atorvastatin  20 mg oral Daily    bisacodyL  10 mg rectal Daily PRN    cholecalciferol (vitamin D3)  25 mcg oral Daily    cyanocobalamin  1,000 mcg oral Daily    glucose  15-30 g of dextrose oral PRN    Or    dextrose  15-30 g of dextrose oral PRN    Or    glucagon  1 mg intramuscular PRN    Or    dextrose 50 % in water (D50)  25 mL intravenous PRN    enoxaparin  40 mg subcutaneous Daily (6p)    felodipine  10 mg oral Daily    finasteride  5 mg oral Daily    hydrALAZINE  25 mg oral q6h PRN    insulin lispro U-100  2-5 Units subcutaneous BID AC    lisinopriL  10 mg oral BID    losartan  100 mg oral Daily    metFORMIN  500 mg oral BID with breakfast and dinner    metoprolol tartrate  75 mg oral BID    netarsudiL-latanoprost  1 drop Right Eye Nightly    oxyCODONE  5 mg oral q6h PRN    pantoprazole  40 mg oral Daily before breakfast    polyethylene glycol  17 g oral Daily PRN    sennosides-docusate sodium  2 tablet oral Daily PRN    tamsulosin  0.4 mg oral Nightly    timolol  1 drop Both Eyes Daily        Past Medical History:   Diagnosis Date    C7 cervical fracture (CMS/HCC) 05/31/2025    Colonic adenoma     Diverticulosis of colon     Enlarged prostate with lower urinary tract symptoms (LUTS)     Glaucoma     Hypertension     Melanoma of skin (CMS/[facility])     Osteoarthritis of knee     Overweight     Spinal stenosis of lumbar region     Type 2 diabetes mellitus (CMS/[facility])      Past Surgical History   Procedure Laterality Date    1) ORIF C7 fracture 2) removal and replacement of C3-6 posterior intrumentation  3) C3 to T3 posterolaterral instrumented fusion  Depuy + removal set SMA C-arm O-Arm N/A 6/1/2025    Performed by Raza Benjamin MD at  OR    Appendectomy      Cervical fusion  06/01/2025    1) ORIF C7 fracture 2) removal and replacement of C3-6 posterior intrumentation 3) C3 to T3 posterolaterral instrumented fusion    Colonoscopy  01/10/2012    diverticulosis    Colonoscopy w/ polypectomy  02/09/2022    Hip arthroplasty Bilateral     Knee arthroplasty Left     Knee arthroplasty Right 02/02/2022     Social History     Tobacco Use    Smoking status: Never    Smokeless tobacco: Never   Substance Use Topics    Alcohol use: Yes     Comment: BEER, 1 CONSUMED DAILY    Drug use: Never      Family History   Problem Relation Name Age of Onset    Breast cancer Biological Mother         Vital signs in last 24 hours:  Temp:  [36.4 °C (97.6 °F)-36.8 °C (98.2 °F)] 36.4 °C (97.6 °F)  Heart Rate:  [58-71] 58  Resp:  [18] 18  BP: (128-154)/(62-82) 154/82  Vital signs reviewed 06/17/25 4:54 PM    Physical Exam  Vitals and nursing note reviewed.   Constitutional:       Appearance: Normal appearance.   HENT:      Head: Normocephalic and atraumatic.      Right Ear: External ear normal.      Left Ear: External ear normal.      Nose: Nose normal.      Mouth/Throat:      Pharynx: Oropharynx is clear.   Eyes:      Conjunctiva/sclera: Conjunctivae normal.   Neck:      Comments: S/p cervical fusion in collar  Cardiovascular:      Rate and Rhythm: Normal rate.      Pulses: Normal pulses.   Pulmonary:      Effort: Pulmonary effort is normal.   Abdominal:      General: Abdomen is flat.   Musculoskeletal:      Right lower leg: Edema present.      Left lower leg: Edema present.   Skin:     Findings: Lesion (right arm, right ear, sacral wounds as documented by nursing) present.   Neurological:      Mental Status: He is alert and oriented to person, place, and time.                 Objective    Labs:  I have reviewed the patient's labs.   Significant abnormals are anemia, hyponatremia.  Results from last 7 days   Lab Units 06/16/25  0550   WBC K/uL 7.12   HEMOGLOBIN g/dL 10.0*   HEMATOCRIT % 29.4*   PLATELETS K/uL 353*     Results from last 7 days   Lab Units 06/16/25  0550   SODIUM mEQ/L 134*   POTASSIUM mEQ/L 3.6   CHLORIDE mEQ/L 101   CO2 mEQ/L 24   BUN mg/dL 22   CREATININE mg/dL 1.1   CALCIUM mg/dL 8.5*   ALBUMIN g/dL 3.2*   BILIRUBIN TOTAL mg/dL 0.3   ALK PHOS IU/L 100   ALT IU/L 15   AST IU/L 11*   GLUCOSE mg/dL 130*                  6/5/25: vit d 25-oh: 13 (low)    Blood sugars: 165-114-121    Imaging:  Not applicable        ASSESSMENT/PLAN:    75 y.o. malewith HTN, HLD, DM2, colon ca, BPH, glaucoma, spinal stenosis s/p previous cervical fusion, presented to [facility] 5/31/25 s/p fall, found to have C7 fracture and transferred to [facility] where he underwent C7 ORIF, removal of C3-6 hardware and C3-T1 PLIF by neurosurgery Dr. Raza Benjamin on 6/1/25     1. Neuro:  # Cervical stenosis  # acute C7 fx  # DISH  -6/1/25s/p C7 ORIF, removal of C3-6 hardware and C3-T1 PLIF by neurosurgery Dr. Raza Benjamin   -pain managed with Tylenol and Oxycodone     2. Vasc:  # DVT ppx  -bilat LE edema  -SCDs and SQ Heparin for DVT ppx, can stop on discharge     3. Heme:  # ABLA  -post op anemia  -6/16/25 hgb 10.0, stable  -5/12/25 B12 <400, added oral B12  -on multivitamin  # leukocytosis  -reactive vs due to Decadron  -6/16/25 wbc back to normal at 7.12  -follow CBC     4. Renal:  # hyponatremia  -6/16/25 Na+ stable at 134, no intervention  -increased risk of dehydration and electrolyte changes  -follow BMP, Mg     5. Gi:  # constipation  -improved and bowel meds changed to as needed  # ulcer ppx  -Protonix ulcer ppx     6. Gu:  # BPH  -on Proscar and Flomax  -using [place] cath at  for incontinence  -having high volume urine output at night with some urine retention requiring intermitten cath     7. Cardiac:  # HTN  -on Felodipine, Lisinopril,  Losartan and Metoprolol  -watch for orthostatic hypotension  -use cardiac precautions in therapy     8. Pulm:  -incentive spirometry for atelectasis     9. Derm:  # multiple wounds  -surgical wounds  -wounds on right arm, right ear, sacrum as documented by nursing  -seen by [employer] for skin assessment  -wound care per nursing and WOC[facility] consult     10. Nutrition:  -Adult Diet Regular; Thin Liquids; Consistent Carbohydrate 2000   -sen by Nutrition for assessment and education     11. Endo:  # HLD  -on Lipitor  # DM2  -had steroid induced hyperglycemia due to Decadron, now improved  -on Metformin  -accuchecks BID AC with Lispro scale for BG>200  -hypoglycemia protocol     12. Psych:  # anxiety/depression  -consulted Psychology for support     13. Musculoskeletal:  # rhabdomyolysis  -clinically resolved  -6/5/25 CK back to normal at 124  # vit D deficiency  -6/5/25 Vit D low at 13, started on vit D3 25 mcg po daily.    14. Ophth:  # glaucoma  -on netarsudiL-latanoprost and timoptic eye drops     14. Dispo:  -code status: Full Code  -Expected discharge date 6/20/2025       plan discussed with patient, nurse, case management and Cahse Anna MD Scott Sapperstein, MD  6/17/2025  4:54 PM

## 2025-06-17 NOTE — PROGRESS NOTES
Subjective    Patient seen and examined on rounds.  Chart reviewed.  Events overnight noted.  History reviewed briefly with patient.    CC: Deficits in mobility, transfers, self-care status post fall, unstable 3 column C7 Chance fracture, status post C7 ORIF, removal of prior C3-6 hardware and C3-T1 PLIF by neurosurgery, history of gait instability and multiple falls, peripheral neuropathy, multiple medical problems.    HPI:  Mr. Aneesh Brito is a 75-year-old left handed white male with chronic conditions significant for hypertension, hyperlipidemia, diabetes mellitus type 2, BPH, glaucoma, colon cancer, gait instability, multiple falls in the home environment for which he was seeing a neurologist for workup of falls, history of prior bilateral hip replacements and bilateral knee replacements, spinal stenosis status post previous C3-C6 laminectomy and posterolateral instrumented fusion over 10 years ago by Dr. Maurer, presented to the ER at [facility] on 5/31/25 after suffering from a mechanical fall while at home and was unable to get up so he was on the floor all night until he was found.  At [facility] ER and he reported neck pain and pain in the lower cervical and upper thoracic region.  Imaging studies revealed C7 Chance fracture and findings of DISH (diffuse idiopathic skeletal hyperostosis ).  He was subsequently transferred to [facility] for neurosurgical evaluation where he was admitted on 5/31/25.  He was evaluated by Dr. Benjamin from neurosurgery, noted to have a 3 column Chance fracture at C7-T1 level, given highly unstable fracture was recommended surgical intervention by neurosurgery. He underwent C7 ORIF, removal of C3-6 hardware and C3-T1 PLIF by neurosurgery Dr. Raza Benjamin on 6/1/25.  Postoperatively is allowed weightbearing as tolerated on both lower extremities.  He has been given Aspen collar for use when out of bed in chair and when ambulating.  He can use soft cervical  collar in the bed.  I discussed spinal precautions with him.  Postoperative course was significant for anemia but he did not require transfusion.  His pain is managed with Tylenol and Oxycodone.  He had elevated blood pressure requiring IV Hydralazine but blood pressure improved after surgery and hypertension was managed with managed with Felodipine, Lisinopril, Losartan and Metoprolol.  He is continued on Lipitor for hyperlipidemia.  He has history of BPH and remains on Tamsulosin and Finasteride.  He was on Netarsudil-Latanoprost and Timoptic eye drops for glaucoma.  He had elevated blood sugars related to Decadron perioperatively and required insulin and subsequently blood sugars improved and he is managed with Metformin now. He had elevated CK of 1118 on admission due to rhabdomyolysis from being down on floor prior to admission. He was treated with IV fluids and CK improved.  I discussed his recent clinical course with patient, his wife and son who is a pediatric physician and his son indicated that patient was being evaluated by neurologist Dr. Hussein Haider regarding his gait instability and falls, and lumbar spine MRI was done, EMG studies which showed peripheral neuropathy as well as muscle biopsy was being planned of the right quadriceps to evaluate  his proximal weakness in bilateral lower extremities.  He is unable to do active straight leg raise in bilateral lower extremities in bed, unable to localize position sense in his left great toe and I also discussed with patient, his wife and son at bedside.  I also mentioned to them that neurology will be conservative at him during his stay at [facility] and that he should follow up with Dr. Hussein Haider from neurology on outpatient basis for further evaluation and workup as appropriate.  He is on subcutaneous Heparin and SCDs for DVT prophylaxis.  He is able to tolerate regular with thins consistency diet.  On 6/4/25, hemoglobin was 11.3, WBC count was  "11.85, platelets were 236, BUN was 22, creatinine was 1.1, sodium was 134 and potassium was 3.3.  He has been needing assistance for mobility, transfers, self-care. He is transferred to [facility] on 6/4/25 for further rehabilitation care.     SUBJ: Discussed patients progress in therapies with therapists in team meeting today. Discussed in PCC. See PCC documentation.  Family interested in diabetic education and staff doing that with them.    ROS: Denies chest pain or shortness of breath. Other ROS negative. Past, family, social history is unchanged.    Current Functional Status:   Bed mobility:   [place], Supine to Sit: close supervision   [place], Sit to Supine: touching/steadying assist   Transfers:    [place], Sit to Stand Transfer: close supervision  [place], Stand to Sit Transfer: close supervision   [place], Stand Pivot/Stand Step Transfer: close supervision   Gait:   [place], Gait: close supervision  Assistive Device: walker, front-wheeled   Distance in Feet: 10 feet    Bathing:   [place]: close supervision   Toileting:   [place]: touching/steadying assist   Upper body dressing:   [place]: minimum assist (75% or more patient effort)   Lower body dressing:   [place]: minimum assist (75% or more patient effort)       Functional Progress:    Functional status reviewed. Overall, patient's functional status is improving.      Physical Exam      Blood pressure (!) 142/68, pulse 63, temperature 36.6 °C (97.9 °F), resp. rate 18, height 1.803 m (5' 11\"), weight 102 kg (223 lb 12.8 oz), SpO2 98%.    Body mass index is 31.21 kg/m².    General Appearance: Not in acute distress  Head/Ear/Nose/Throat: Normocephalic; Atraumatic.   Eye: EOMI; PERRL.   Neck: Healing incision posterior cervicothoracic spine.  Dressing in place.    Respiratory: Decreased breath sounds at bases.   Cardiovascular: RRR; Normal S1, S2.   Gastrointestinal: Soft; NT; +BS.   Extremities: " Bilateral lower extremity edema noted.    Musculoskeletal: Functional active range of motion in both upper extremities except some limitation in left shoulder and is unable to lift left arm up overhead.  Some limitation of active range of motion in both hips and both knees, which is chronic according to patient and his wife at bedside.  Patient is unable to do active straight leg raise and either lower extremity.  He has had previous bilateral hip and bilateral knee replacements.  His wife mentions patient was lifting his lower extremities manually with his arms while getting in the car and after he sat down in the seat inside the car manually left each leg to bring them in the car.   Neurological: AAO ×3. Speech is fluent. Cranial nerve examination does not reveal any gross facial asymmetry. Strength testing about 4/5 strength in both upper extremities except left shoulder is 2-/5 and abduction and forward flexion.  Bilateral hip flexion is 2-/5.  Bilateral quadriceps are 2+/5 with the thighs supported.  Bilateral ankle dorsi and plantar flexion is 3+/5.  He denies significant numbness in his legs and feet at this time.  He is grossly able to localize position sense in his right great toe but not so in his left great toe.  He is able to localize vibration sense in both great toes.  Deep tendon reflexes are hypoactive and symmetric bilaterally.  Plantars are flexor.  Coordination is functional upper extremities.  Both knee jerks were not tested.  Behavior/Emotional: Appropriate; Cooperative.   Skin: Healing incision posterior cervicothoracic spine.  Dressing in place.  Some abrasions noted on right upper extremity.  Left forearm has bruising ecchymosis noted.  Small wound noted on sacrum/coccyx at least stage II decubitus cannot rule out DTI.      Current Facility-Administered Medications:     acetaminophen (TYLENOL) tablet 650 mg, 650 mg, oral, q6h Dileep KIRBY Scott, MD, 650 mg at 06/17/25 0603    alum-mag  hydroxide-simeth (MAALOX) 200-200-20 mg/5 mL suspension 30 mL, 30 mL, oral, q6h PRN, Chandler Falk MD    atorvastatin (LIPITOR) tablet 20 mg, 20 mg, oral, Daily, Robert Hawkins MD, 20 mg at 06/17/25 0810    bisacodyL (DULCOLAX) 10 mg suppository 10 mg, 10 mg, rectal, Daily PRN, Chandler Falk MD    cholecalciferol (vitamin D3) tablet 25 mcg, 25 mcg, oral, Daily, Chandler Falk MD, 25 mcg at 06/17/25 0810    cyanocobalamin (VITAMIN B12) tablet 1,000 mcg, 1,000 mcg, oral, Daily, Chandler Falk MD, 1,000 mcg at 06/17/25 0810    glucose chewable tablet 15-30 g of dextrose, 15-30 g of dextrose, oral, PRN **OR** dextrose 40 % oral gel 15-30 g of dextrose, 15-30 g of dextrose, oral, PRN **OR** glucagon (GLUCAGEN) injection 1 mg, 1 mg, intramuscular, PRN **OR** dextrose 50 % in water (D50) injection 12.5 g, 25 mL, intravenous, PRN, Chase Anna MD    enoxaparin (LOVENOX) syringe 40 mg, 40 mg, subcutaneous, Daily (6p), Robert Hawkins MD, 40 mg at 06/16/25 1805    felodipine (PLENDIL) 24 hr ER tablet 10 mg, 10 mg, oral, Daily, Chandler Falk MD, 10 mg at 06/17/25 0810    finasteride (PROSCAR) tablet 5 mg, 5 mg, oral, Daily, Robert Hawkins MD, 5 mg at 06/17/25 0811    hydrALAZINE (APRESOLINE) tablet 25 mg, 25 mg, oral, q6h PRN, Robert Hawkins MD, 25 mg at 06/08/25 1849    insulin lispro U-100 (HumaLOG) pen 2-5 Units, 2-5 Units, subcutaneous, BID AC, Chandler Falk MD    lisinopriL (PRINIVIL) tablet 10 mg, 10 mg, oral, BID, Annette Sherman MD, 10 mg at 06/17/25 0810    losartan (COZAAR) tablet 100 mg, 100 mg, oral, Daily, Robert Hawkins MD, 100 mg at 06/17/25 0810    metFORMIN (GLUCOPHAGE) tablet 500 mg, 500 mg, oral, BID with breakfast and dinner, Robert Hawkins MD, 500 mg at 06/17/25 0811    metoprolol tartrate (LOPRESSOR) tablet 75 mg, 75 mg, oral, BID, Annette Sherman MD, 75 mg at 06/17/25 0810    netarsudiL-latanoprost 0.02-0.005 % drops 1 drop, 1 drop, Right Eye, Nightly, Dileep  MD Chandler, 1 drop at 06/16/25 2040    oxyCODONE (ROXICODONE) immediate release tablet 5 mg, 5 mg, oral, q6h PRN, Annette Sherman MD, 5 mg at 06/16/25 2204    pantoprazole (PROTONIX) tablet,delayed release (DR/EC) 40 mg, 40 mg, oral, Daily before breakfast, Chandler Falk MD, 40 mg at 06/17/25 0810    polyethylene glycol (MIRALAX) 17 gram packet 17 g, 17 g, oral, Daily PRN, Chandler Falk MD    sennosides-docusate sodium (SENOKOT-S) 8.6-50 mg per tablet 2 tablet, 2 tablet, oral, Daily, Chandler Falk MD    tamsulosin (FLOMAX) 24 hr ER capsule 0.4 mg, 0.4 mg, oral, Nightly, Robert Hawkins MD, 0.4 mg at 06/16/25 2036    timolol (TIMOPTIC) 0.5 % ophthalmic solution 1 drop, 1 drop, Both Eyes, Daily, Robert Hawkins MD, 1 drop at 06/17/25 0816       Labs / Radiology    Lab Results   Component Value Date    WBC 7.12 06/16/2025    HGB 10.0 (L) 06/16/2025    HCT 29.4 (L) 06/16/2025    MCV 94.2 06/16/2025     (H) 06/16/2025     Lab Results   Component Value Date    GLUCOSE 130 (H) 06/16/2025    CALCIUM 8.5 (L) 06/16/2025     (L) 06/16/2025    K 3.6 06/16/2025    CO2 24 06/16/2025     06/16/2025    BUN 22 06/16/2025    CREATININE 1.1 06/16/2025       Assessment and Plan    ASSESSMENT PLAN:  1. 75-year-old left handed white male with chronic conditions significant for hypertension, hyperlipidemia, diabetes mellitus type 2, BPH, glaucoma, colon cancer, gait instability, multiple falls in the home environment for which he was seeing a neurologist for workup of falls, history of prior bilateral hip replacements and bilateral knee replacements, spinal stenosis status post previous C3-C6 laminectomy and posterolateral instrumented fusion over 10 years ago by Dr. Maurer, presented to the ER at [facility] on 5/31/25 after suffering from a mechanical fall while at home and was unable to get up so he was on the floor all night until he was found.  At [facility] ER and he reported neck pain and  pain in the lower cervical and upper thoracic region.  Imaging studies revealed C7 Chance fracture and findings of DISH (diffuse idiopathic skeletal hyperostosis ).  He was subsequently transferred to [facility] for neurosurgical evaluation where he was admitted on 5/31/25.  He was evaluated by Dr. Benjamin from neurosurgery, noted to have a 3 column Chance fracture at C7-T1 level, given highly unstable fracture was recommended surgical intervention by neurosurgery. He underwent C7 ORIF, removal of C3-6 hardware and C3-T1 PLIF by neurosurgery Dr. Raza Benjamin on 6/1/25.  Postoperatively is allowed weightbearing as tolerated on both lower extremities.  He has been given Aspen collar for use when out of bed in chair and when ambulating.  He can use soft cervical collar in the bed.  I discussed spinal precautions with him.  Postoperative course was significant for anemia but he did not require transfusion.  His pain is managed with Tylenol and Oxycodone.  He had elevated blood pressure requiring IV Hydralazine but blood pressure improved after surgery and hypertension was managed with managed with Felodipine, Lisinopril, Losartan and Metoprolol.  He is continued on Lipitor for hyperlipidemia.  He has history of BPH and remains on Tamsulosin and Finasteride.  He was on Netarsudil-Latanoprost and Timoptic eye drops for glaucoma.  He had elevated blood sugars related to Decadron perioperatively and required insulin and subsequently blood sugars improved and he is managed with Metformin now. He had elevated CK of 1118 on admission due to rhabdomyolysis from being down on floor prior to admission. He was treated with IV fluids and CK improved.  I discussed his recent clinical course with patient, his wife and son who is a pediatric physician and his son indicated that patient was being evaluated by neurologist Dr. Hussein Haider regarding his gait instability and falls, and lumbar spine MRI was done, EMG studies which  showed peripheral neuropathy as well as muscle biopsy was being planned of the right quadriceps to evaluate  his proximal weakness in bilateral lower extremities.  He is unable to do active straight leg raise in bilateral lower extremities in bed, unable to localize position sense in his left great toe and I also discussed with patient, his wife and son at bedside.  I also mentioned to them that neurology will be conservative at him during his stay at [facility] and that he should follow up with Dr. Hussein Haider from neurology on outpatient basis for further evaluation and workup as appropriate.  He is on subcutaneous Heparin and SCDs for DVT prophylaxis.  He is able to tolerate regular with thins consistency diet.  On 6/4/25, hemoglobin was 11.3, WBC count was 11.85, platelets were 236, BUN was 22, creatinine was 1.1, sodium was 134 and potassium was 3.3.  He has been needing assistance for mobility, transfers, self-care. He is transferred to [place] [facility] on 6/4/25 for further rehabilitation care.     2. DVT prophylaxis - on subcutaneous Lovenox.  On SCDs.  Platelets 264 on 6/5/2025.  Platelets 307 on 6/9/2025.  Platelets 353 on 6/16/2025.     3. Neurosurgery - status post fall, unstable 3 column C7 Chance fracture, status post C7 ORIF, removal of prior C3-6 hardware and C3-T1 PLIF by neurosurgery, history of gait instability and multiple falls, peripheral neuropathy, class I obesity, multiple medical problems - continue PT, OT, psychology.  Follow falls precautions, cardiac precautions, aspiration precautions, spinal precautions.  Aspen collar to neck when out of bed.  Monitor healing of posterior cervical incision.  Neurology was consulted.  Neurosurgery recommended removal of catgut sutures and drain sutures at [place] [facility] about 2 weeks postop which has been completed by nursing.  Patient will follow-up at neurosurgery office after discharge from [facility].     4. GI - On Protonix for GI  prophylaxis. On Senokot-S.  On MiraLAX.  On PRN Dulcolax suppository.  On PRN Maalox.     5.  -on Proscar.  On Flomax.     6. CVS - on Plendil.  On Lisinopril.  On Cozaar.  On Lopressor.  On PRN Hydralazine.     7. Pulmonary - encourage incentive spirometry.     8. Hematology - monitor hemoglobin, platelets.  Hemoglobin 12.2, WBC 10.71 on 6/5/2025.  Hemoglobin 11.0, WBC 7.59 on 6/9/2025.  Hemoglobin 10.0, WBC 7.12 on 6/16/2025.     9. Pain -on Tylenol.  On PRN Oxycodone.     10. Skin - Healing incision posterior cervicothoracic spine.  Dressing in place.  Some abrasions noted on right upper extremity.  Left forearm has bruising ecchymosis noted.  Small wound noted on sacrum/coccyx at least stage II decubitus cannot rule out DTI.     11. F/E/N - on vitamin B-12.  BMI 33.63 consistent with class I obesity.  Nutrition was consulted.     12. Diabetes mellitus type 2  - on sliding scale Humalog coverage.  On Glucophage.  On hypoglycemic protocol.     13. Ophthalmology- He was on NetarsudiL-latanoprost and Timoptic eye drops for glaucoma     14. Hyperlipidemia - on Lipitor.     15. Rehabilitation medicine - Continue comprehensive rehabilitation care. Continue PT, OT, psychology.  Follow falls precautions, cardiac precautions, aspiration precautions, spinal precautions.  Aspen collar to neck when out of bed.  Monitor healing of posterior cervical incision.  Neurology was consulted.Tolerating therapies per endurance on 6/6/2025.  Slept well last night.  Noted to have elevated PVR.  Requiring moderate assistance for sit to stand transfer with physical therapy.  Able to ambulate 20 feet with front wheeled walker dependent gait with physical therapy on 6/6/2025. Discussed with nurse on 6/6/2025. Working on ADLs with occupational therapy on 6/7/2025.  Dependent for upper and lower body dressing, requiring minimal assistance for toileting, moderate assistance for bathing with occupational therapy on 6/7/2025.  Discussed with  nurse regarding his PVRs on 6/7/2025.  Using [place] catheter at nighttime.  Discussed with patient on 6/7/2025.Discussed with patient and with his wife with him on 6/9/2025.  They feel he is making progress in therapy.  Requiring minimal assistance for sit to stand transfer with physical therapy.  Able to ambulate 100 feet with front wheeled walker with minimal assistance with physical therapy.  Reviewed labs on 6/9/2025.Discussed patients progress in therapies with therapists in team meeting on 6/10/2025. Discussed in PCC. See PCC documentation.  Continent of bowel and bladder for nursing on 6/10/2025.Discussed recommendations of PCC with patient on 6/11/2025.  Inspected posterior cervical incision which is healing well.  Working on ADLs occupational therapy.  Requiring moderate assistance for upper and lower body dressing, moderate assistance for toileting and bathing in occupational therapy on 6/11/2025.Apparently per nursing his pain medications fell off last night and house physician did not renew them according to nurse on 6/12/2025. Discussed with patient regarding Epic generated automatic stop on narcotics causing these medications to be discontinued but medications were reviewed today by internist.  Discussed with nurse on 6/12/2025.Progressing in therapy on 6/13/2025.  Requiring close supervision for sit to stand transfer with physical therapy.  Able to ambulate up 150 feet with front wheeled walker with touching/steadying assistance with physical therapy on 6/13/2025.Working on ADLs with occupational therapy on 6/14/2025.  Requiring minimal assistance for full body dressing, minimal assistance for lower dressing, touching/steadying assistance for toileting and close supervision for bathing in occupational therapy on 6/14/2025.  Continent of bowel and bladder for nursing on 6/14/2025. Inspected posterior cervical incision on 6/16/2025 which is healing well.  Making progress in therapy.  Reviewed labs today.   Discussed with patient on 6/16/2025.Discussed patients progress in therapies with therapists in team meeting on 6/17/2025.  Discussed in PCC. See PCC documentation.  Family interested in diabetic education and staff doing that with them.  Discussed with nurse on 6/17/2025.     16. Reviewed labs today.  BUN 20, creatinine 0.9, sodium 135, potassium 3.7 on 6/5/2025.  BUN 22, creatinine 1.1, sodium 132, potassium 3.4 on 6/9/2025.  BUN 21, creatinine 1.0, sodium 135, potassium 3.6 on 6/11/2025.  BUN 22, creatinine 1.1, sodium 134, potassium 3.6 on 6/16/2025.          Chase Anna MD  6/17/2025      This encounter was completed utilizing the Direct Speech Voice Recognition Software. Grammatical errors, random word insertions, pronoun errors, and incomplete sentences are occasional consequences of the system due to software limitations, ambient noises, and hardware issues. Such errors may be missed prior to affixing electronic signature. Any questions or concerns about the content, text, or information contained within the body of this dictation should be directly addressed to the physician for clarification. If you have any questions or concerns please do not hesitate to call me directly via EPIC chat, page, or email.

## 2025-06-17 NOTE — PATIENT CARE CONFERENCE
Inpatient Rehabilitation Team Conference Note   Date: 6/17/2025  Time: 11:45 AM       Patient Name: Aneesh Brito     Medical Record Number: [id number]   YOB: 1950  Sex: Male      Room/Bed: 110/110D  Payor Info: Payor: MEDICARE / Plan: MEDICARE PART A & B / Product Type: Medicare /     Admitting Diagnosis: Falls, sequela [W19.XXXS]  Fusion of spine of cervicothoracic region [M43.23]   Admit Date/Time: 6/4/2025  6:07 PM    Principal Problem: Fusion of spine of cervicothoracic region    Patient Active Problem List    Diagnosis Date Noted    Closed displaced fracture of seventh cervical vertebra (CMS/[facility]) 06/09/2025    Fusion of spine of cervicothoracic region 06/06/2025    Multiple falls 06/06/2025    Falls, sequela 06/04/2025    Closed C7 fracture without spinal cord injury, with routine healing, subsequent encounter 06/01/2025    Traumatic closed fracture of C7 vertebra with minimal displacement, initial encounter (CMS/[facility]) 05/31/2025    Type 2 diabetes mellitus without complications (CMS/[facility]) 05/19/2025    Lumbar degenerative disc disease 04/25/2024    Generalized weakness 06/19/2023    Aneurysm of iliac artery (CMS/[facility]) 02/16/2022    Glomerulosclerosis 02/16/2022    Hyponatremia 01/11/2022    Pure hypercholesterolemia 04/28/2021    Tear film insufficiency 01/20/2021    Dry eye syndrome of bilateral lacrimal glands 09/25/2019    Primary open-angle glaucoma, bilateral, indeterminate stage 09/25/2019    Bilateral pseudophakia 09/25/2019    Chronic kidney disease, stage I 03/20/2019    Diverticulosis of colon 09/10/2014    Enlarged prostate with lower urinary tract symptoms (LUTS) 09/10/2014    Hypertension, benign 09/10/2014    Malignant melanoma of skin (CMS/[facility]) 09/10/2014    Osteoarthritis of knee 09/10/2014    Overweight 09/10/2014    Spinal stenosis of lumbar region 09/10/2014       Premorbid Status:  Prior Level of Function      Flowsheet Row Most Recent Value   Dominant Hand left  "  Ambulation assistive equipment  [rollator]   Transferring assistive equipment  [rollator]   Toileting independent   Bathing independent   Dressing independent   Eating independent   IADLs independent   Driving/Transportation    Communication understands/communicates without difficulty   Prior Level of Function Comment Pt reports being independent with use of SPC inside and Rollator to the dining jenkins. Independent ADLs. Denies other falls. Independent ADLs. (+) driving. Retired teacher   Assistive Device Currently Used at Home cane, straight, walker, 4-wheeled, shower chair       Current Functional Status:  Mobility  Gait  [place], Gait: close supervision  Assistive Device: walker, front-wheeled  Distance in Feet: 10 feet  Comment: 10'x2 with RW wc <> Restroom    Stairs  [place], Stairs: touching/steadying assist  Assistive Device: railing  Handrail Location (Stairs): both sides  Number of Stairs: 4  Stair Height: 6 inches  Comment: To improve functional strength, pt negotiates 4(6\") stairs x 1 with B HR support and steadying A at pelvis for balance    Wheelchair     Transfers  Bed Mobility  [place], Roll Left: close supervision  [place], Roll Right: close supervision  [place], Supine to Sit: close supervision  [place], Sit to Supine: touching/steadying assist  Safety/Cues: maintaining precautions; technique  Assistive Device: bed rails; head of bed elevated  Comment: OT: Min A supine to sit    Bed to Chair Transfer  [place], Bed to Chair: moderate assist (50-74% patient effort)  Safety/Cues: increased time to complete; technique; maintaining precautions  Assistive Device: walker, front-wheeled  Comment: EOB to stance with RW, via amb approach to WC.    Chair to Bed Transfer  [place], Chair to Bed: minimum assist (75% or more patient effort)  Safety/Cues: increased time to complete  Assistive Device: walker, front-wheeled  Comment: SPT w/c to bed with RW - " Min A for balance    Sit to Stand Transfer  [place], Sit to Stand Transfer: close supervision  Safety/Cues: increased time to complete  Assistive Device: walker, front-wheeled  Comment: from wc, Cl S to ensure safe technique    Stand to Sit Transfer  [place], Stand to Sit Transfer: close supervision  Safety/Cues: increased time to complete  Assistive Device: walker, front-wheeled  Comment: to wc, Cl S to ensure safe technique    Stand Pivot Transfer  [place], Stand Pivot/Stand Step Transfer: close supervision  Safety/Cues: increased time to complete  Assistive Device: walker, front-wheeled  Comment: via amb approach with RW, Cl S to ensure safe technique    Car Transfer  Transfer Technique: stand pivot  [place]: minimum assist (75% or more patient effort)  Safety/Cues: increased time to complete; preparatory posture; maintaining precautions; technique  Assistive Device: walker, front-wheeled  Comment: via amb approach to simulated car passanger seat in gym with RW. Min A for maintaining precautions and BLE management during sit and swing technique. Steadying A for SPT      Toilet Transfer  Transfer Technique: -- (Amb Tx)  [place]: touching/steadying assist  Safety/Cues: technique; hand placement  Assistive Device: accessible height toilet; grab bars/safety frame  Comment: T/A amb approach to/from elevated toilet, rails, RW support, cues for postural correction as needed    Shower Transfer  Transfer Technique: -- (Amb Tx)  [place]: touching/steadying assist  Safety/Cues: technique; hand placement  Assistive Device: grab bars/tub rail; tub bench  Comment: T/A amb approach to/from barrier free stall shower, RW <> grab bars      Self Care  Bathing  [place]: close supervision  Setup Assistance: obtain supplies; orthosis application; other (see comments)  Adaptive Equipment: grab bar/tub rail; hand-held shower spray hose; long-handled sponge; tub bench  Comment: OT asst with aspen <>  sandra bryan exchange. S bathing once seated on ext bench in barrier free stall shower, grab bar support, LHS. Pt remained seated for duration of shower per orders, lateral weight shift for access to posterior perineal area.    Upper Body Dressing  Tasks: doff; don; pull over garment  [place]: minimum assist (75% or more patient effort)  Adaptive Equipment: orthosis  Comment: min A garment <> orthosis management    Lower Body Dressing  Tasks: doff; don; underwear; pants/bottoms; socks; shoes/slippers  [place]: minimum assist (75% or more patient effort)  Safety/Cues: minimal; verbal cues; proper use of assistive device; sequencing  Adaptive Equipment: dressing stick; reacher; sock aid  Comment: ongoing instruction in use of AE, limited in distal LE access, increased time and min A to thread L/R foot over toes then able to navigate up legs using reacher. Min A to arrange top of R sock to reduce risk of interference with circulation.    Grooming  Tasks: washes, rinses and dries face; washes, rinses and dries hands; brushes/shields hair; oral care (brushing teeth, cleaning dentures)  [place]: close supervision  Adaptive Equipment: none  Comment: RW support, min cues for postural correction as needed 2* increasing fatigue affecting bal    Toileting  Tasks: adjust/manage clothing; perform bladder hygiene  [place]: touching/steadying assist  Safety/Cues: minimal; verbal cues; compensatory techniques; sequencing  Setup Assistance: adaptive equipment setup  Adaptive Equipment: accessible height toilet; grab bar/safety frame  Comment: standing facing toilet w cues to roll walker over toilet for support    Self-Feeding  No data recorded    Cognition     Communication       Frequency of Treatment (OT): 5-7 times per week  Intensity of Treatment (OT): 60-90 minutes per day  Frequency of Treatment (PT): 5-7 times per week  Intensity of Treatment (PT): 60-90 minutes per day          Weekly Outcome  Summaries:  Weekly Progress Summary (PT)  Progress Toward Functional Goals (PT): progressing toward functional goals as expected  Weekly Outcome Statement: Pt grossly S/steadying A for all functional mobility. FTR completed on 6/16. Pt limited by decreased strength/endurance, balance impairments, proprioceptive/sensory deficits, coordination deficits, spinal precautions, and pain. Making progress toward mod I vs S goals. RW ordered.  Barriers to Overall Progress (PT): Pt limited by decreased strength/endurance, balance impairments, proprioceptive/sensory deficits, coordination deficits, spinal precautions, and pain.    Weekly Progress Summary (OT)  Progress Toward Functional Goals (OT): progressing toward functional goals as expected  Weekly Outcome Statement: Good progress towards functional goals. Progressed to steadying A/CLS for all ADLs & functional transfers. Improved activity tolerance, balance, mobility, functional use B UE. Family training completed c pt. wife & daughter. On track for D/C as planned this week.  Impairments Continuing to Limit Function: decreased ROM; decreased strength; impaired balance; pain  Recommendations: Continue IP OT 5-7 days/week, 60-90 min/day        Problem Resolution:  Comment, Barriers to Discharge: neuropathy, DVT prophalaxis, forgetful, balance, decreased strength  Comment, Facilitators for Discharge: OP neuro f/u, aspen collar OOB, soft collar PRN, lovenox, manual vitals, family training completed, home health, RW ordered         Expected Level of Function  Self-Care: Independent  Sphincter Control: Independent  Transfers: Independent  Locomotion: Supervision or touching assistance  Communication: Independent  Social Cognition: Independent      Goals/Support System:  Patient/Family Goals  Patient's Goals For Discharge: take care of myself at home  Family Goals For Discharge: other (see comments) (family not present for IE)    IRF PT Goals      Flowsheet Row Most Recent Value    Bed Mobility Goal 1    Activity/Assistive Device rolling to left, rolling to right, sit to supine/supine to sit at 06/05/2025 0902   [place] minimum assist (75% or more patient effort) at 06/05/2025 0902   Time Frame short-term goal (STG), 1 week at 06/05/2025 0902   Progress/Outcome goal met, goal revised this date  [STG = LTG] at 06/17/2025 0845   Bed Mobility Goal 2    Activity/Assistive Device rolling to left, rolling to right, sit to supine/supine to sit at 06/05/2025 0902   [place] modified independence at 06/05/2025 0902   Time Frame long-term goal (LTG), 3 weeks at 06/05/2025 0902   Progress/Outcome goal ongoing at 06/17/2025 0845   Transfer Goal 1    Activity/Assistive Device sit-to-stand/stand-to-sit, stand pivot at 06/05/2025 0902   [place] tactile cues required  [steadying] at 06/10/2025 0850   Time Frame short-term goal (STG), 1 week at 06/10/2025 0850   Progress/Outcome goal met  [STG = LTG] at 06/17/2025 0845   Transfer Goal 2    Activity/Assistive Device sit-to-stand/stand-to-sit, stand pivot at 06/05/2025 0902   [place] modified independence at 06/05/2025 0902   Time Frame long-term goal (LTG), 3 weeks at 06/05/2025 0902   Progress/Outcome goal ongoing at 06/17/2025 0845   Gait/Walking Locomotion Goal 1    Activity/Assistive Device gait (walking locomotion) at 06/05/2025 0902   Distance 50 feet at 06/05/2025 0902   [place] tactile cues required at 06/10/2025 0850   Time Frame short-term goal (STG), 1 week at 06/10/2025 0850   Progress/Outcome goal met  [STG = LTG] at 06/17/2025 0845   Gait/Walking Locomotion Goal 2    Activity/Assistive Device gait (walking locomotion) at 06/05/2025 0902   Distance 150 feet at 06/05/2025 0902   [place] supervision required at 06/05/2025 0902   Time Frame long-term goal (LTG), 3 weeks at 06/05/2025 0902   Progress/Outcome goal ongoing at 06/17/2025 0845   Stairs Goal 1    Activity/Assistive Device stairs, all skills at 06/10/2025 0826    Number of Stairs 8 at 06/10/2025 0850   [place] minimum assist (75% or more patient effort) at 06/10/2025 0850   Time Frame short-term goal (STG), 1 week at 06/10/2025 0850   Progress/Outcome goal met  [STG = LTG] at 06/17/2025 0845   Stairs Goal 2    Activity/Assistive Device stairs, all skills at 06/10/2025 0850   Number of Stairs 12 at 06/10/2025 0850   [place] supervision required at 06/10/2025 0850   Time Frame long-term goal (LTG), 2 weeks at 06/10/2025 0850   Progress/Outcome goal ongoing at 06/17/2025 0845     IRF OT Goals      Flowsheet Row Most Recent Value   Transfer Goal 1    Activity/Assistive Device toilet, commode, 3-in-1 at 06/05/2025 1033   [place] supervision required at 06/10/2025 0830   Time Frame short-term goal (STG), 5 - 7 days at 06/17/2025 0706   Progress/Outcome goal revised this date, goal ongoing  [pt progressing towards S goal, pt currently performing at Cl S level] at 06/17/2025 0706   Transfer Goal 2    Activity/Assistive Device toilet, commode, 3-in-1 at 06/05/2025 1033   [place] modified independence at 06/05/2025 1033   Time Frame long-term goal (LTG), 21 days or less at 06/05/2025 1033   Progress/Outcome goal ongoing at 06/17/2025 0706   Transfer Goal 3    Activity/Assistive Device shower, tub bench at 06/05/2025 1033   [place] supervision required at 06/10/2025 0830   Time Frame short-term goal (STG), 5 - 7 days at 06/17/2025 0706   Progress/Outcome goal revised this date, goal ongoing  [pt progressing towards S goal, pt currently performing at steadying A level] at 06/17/2025 0706   Transfer Goal 4    Activity/Assistive Device shower, tub bench at 06/05/2025 1033   [place] modified independence at 06/05/2025 1033   Time Frame long-term goal (LTG), 21 days or less at 06/05/2025 1033   Progress/Outcome goal ongoing at 06/17/2025 0706   Bathing Goal 1    Activity/Assistive Device bathing skills, all at 06/05/2025 1033   [place] supervision  required at 06/17/2025 0706   Time Frame short-term goal (STG), 5 - 7 days at 06/17/2025 0706   Strategies/Barriers spinal precautions inhibiting forward reach towards BLE, plan to introduce LHAE at 06/10/2025 0830   Progress/Outcome goal met, goal revised this date at 06/17/2025 0706   Bathing Goal 2    Activity/Assistive Device bathing skills, all at 06/05/2025 1033   [place] modified independence at 06/05/2025 1033   Time Frame long-term goal (LTG), 21 days or less at 06/05/2025 1033   Progress/Outcome goal ongoing at 06/17/2025 0706   UB Dressing Goal 1    Activity/Assistive Device upper body dressing at 06/05/2025 1033   [place] supervision required at 06/10/2025 0830   Time Frame short-term goal (STG), 5 - 7 days at 06/17/2025 0706   Progress/Outcome goal revised this date, goal ongoing  [ongoing min A required,  planning for mod I level] at 06/17/2025 0706   UB Dressing Goal 2    Activity/Assistive Device upper body dressing at 06/05/2025 1033   [place] modified independence at 06/05/2025 1033   Time Frame long-term goal (LTG), 21 days or less at 06/05/2025 1033   Progress/Outcome goal ongoing at 06/17/2025 0706   LB Dressing Goal 1    Activity/Assistive Device lower body dressing at 06/05/2025 1033   [place] other (see comments)  [steadying A] at 06/17/2025 0706   Time Frame short-term goal (STG), 5 - 7 days at 06/17/2025 0706   Progress/Outcome goal met, goal revised this date at 06/17/2025 0706   LB Dressing Goal 2    Activity/Assistive Device lower body dressing at 06/05/2025 1033   [place] modified independence at 06/05/2025 1033   Time Frame long-term goal (LTG), 21 days or less at 06/05/2025 1033   Progress/Outcome goal ongoing at 06/17/2025 0706   Grooming Goal 1    Activity/Assistive Device grooming skills, all at 06/05/2025 1033   [place] set-up required at 06/10/2025 0830   Time Frame short-term goal (STG), 5 - 7 days at 06/17/2025 0706   Strategies/Barriers standing  tolerance/balance, still requiring A in stance at 06/10/2025 0830   Progress/Outcome goal revised this date, goal ongoing  [pt performing at Cl S level,  anticipate improvement by D/C] at 06/17/2025 0706   Grooming Goal 2    Activity/Assistive Device grooming skills, all at 06/05/2025 1033   [place] modified independence at 06/05/2025 1033   Time Frame long-term goal (LTG), 21 days or less at 06/05/2025 1033   Progress/Outcome goal ongoing at 06/17/2025 0706   Toileting Goal 1    Activity/Assistive Device toileting skills, all at 06/05/2025 1033   [place] other (see comments)  [Cl S] at 06/17/2025 0706   Time Frame short-term goal (STG), 5 - 7 days at 06/17/2025 0706   Progress/Outcome goal met, goal revised this date at 06/17/2025 0706   Toileting Goal 2    Activity/Assistive Device toileting skills, all at 06/05/2025 1033   [place] modified independence at 06/05/2025 1033   Time Frame long-term goal (LTG), 21 days or less at 06/05/2025 1033   Progress/Outcome goal ongoing at 06/17/2025 0706       Discharge Planning:  Anticipated Discharge Disposition: home with home health  Type of Home Care Services: home PT;home OT;nursing  Equipment/Device Needs at Discharge  Assistive Device/Animal Currently Used at Home: cane, straight, walker, 4-wheeled, shower chair  Discharge Planning  Type of Current Home Care Services: none  Does the patient need discharge transport arranged?: No    Anticipated Discharge Date: 6/20/2025    Needs Identified:       Team Members Present:     Rehab Attending Present:  Chase Anna MD    Care Coordinator Present:  Leti Styles LSW    Nurse Present:  Taylor Peña RN    OT Present:  Nohelia Guzman OT    PT Present:  Bandar Tillman PT    Psychologist Present:  Maryana Patterson, PhD        Next Team Conference Date: 06/24/25

## 2025-06-17 NOTE — PROGRESS NOTES
Patient: Aneesh Brito  Location: [facility]  MRN: [id number]  Today's date: 6/17/2025    History of Present Illness    Aneesh is a 75 y.o. male with a history of colon carcinoma, BPH, hypertension, glaucoma, type 2 diabetes, history of a cervical fusion over 10 years ago admitted after a fall inability to get up on his own.  He was seen at [facility] where imaging studies were performed which demonstrated DISH as well as a Chance fracture at the bottom of C7.  He was transferred to [facility] for neurosurgery.    He was taken to the OR on 6/1 by Dr. Benjamin and underwent an ORIF of the C7 Chance fracture with removal and replacement of the C3-6 posterior instrumentation as well as a C3-T3 posterior lateral fusion.  Drains remain in place.  He is on subcu heparin for DVT prophylaxis.  Currently on a nicardipine drip for blood pressure control.  He states pain is well-controlled.  He denies any other complaints of headache or dizziness, chest pain or shortness of breath.  He is voiding on his own.  He has not had a bowel movement yet.    He was seen by PT and OT needing moderate assistance of 2 to transfer sit to stand.  He ambulated 2 feet with moderate assistance of 2 using a rolling walker.  Pertinent radiology results reviewed. Pertinent lab results reviewed.      Past Medical History  Aneesh has a past medical history of C7 cervical fracture (CMS/HCC) (05/31/2025), Colonic adenoma, Diverticulosis of colon, Enlarged prostate with lower urinary tract symptoms (LUTS), Glaucoma, Hypertension, Melanoma of skin (CMS/HCC), Osteoarthritis of knee, Overweight, Spinal stenosis of lumbar region, and Type 2 diabetes mellitus (CMS/HCC).    OT Vitals      Date/Time Pulse HR Source SpO2 Pt Activity O2 Therapy BP BP Location BP Method Pt Position Who   06/17/25 0938 62 Monitor 97 % At rest None (Room air) 142/68 Left upper arm Manual Sitting AEB          OT Pain      Date/Time Pain  Type Side/Orientation Location Rating: Rest Description Nonverbal Indicators Interventions Response To Interventions [facility]   06/17/25 0938 Pain Assessment neck generalized 2 intermittent;aching;incisional activity pattern change;mobility limited;body stiff diversional activity provided;premedicated for activity -- AEB   06/17/25 1059 Pain Reassessment neck generalized 3 sore -- -- pain goal met;mobility function improved;self-care function improved AEB                 Prior Living Environment      Flowsheet Row Most Recent Value   People in Home spouse   Current Living Arrangements independent living facility   Home Accessibility wheelchair accessible   Living Environment Comment PTA residing c wife in [facility]). 1 floor set up. Shower stall c shower chair & grab bars. Reports no DME at toilet - information to be verified c pt. wife   Number of Stairs, Main Entrance 0   Patient Bedroom Access Comment PTA residing c wife in [facility]). 1 floor set up. Shower stall c shower chair & grab bars. Reports no DME at toilet - information to be verified c pt. wife   Bathroom Access Comment PTA residing c wife in [facility]). 1 floor set up. Shower stall c shower chair & grab bars. Reports no DME at toilet - information to be verified c pt. wife   Number of Stairs, Within Home, Primary 0       Prior Level of Function      Flowsheet Row Most Recent Value   Dominant Hand left   Ambulation assistive equipment  [rollator]   Transferring assistive equipment  [rollator]   Toileting independent   Bathing independent   Dressing independent   Eating independent   IADLs independent   Driving/Transportation    Communication understands/communicates without difficulty   Prior Level of Function Comment Pt reports being independent with use of SPC inside and Rollator to the dining jenkins. Independent ADLs. Denies other falls. Independent ADLs. (+) driving. Retired teacher   Assistive Device  "Currently Used at Home cane, straight, walker, 4-wheeled, shower chair       Occupational Profile      Flowsheet Row Most Recent Value   Successful Occupations Enjoys participating in the activities at [employer] - shuffle board and corn hole   Occupational History/Life Experiences PTA independent c ADLs. 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   Patient Goals \"Walk\"               06/17/25 0939   OT Time Calculation   Start Time 0930   Stop Time 1100   Time Calculation (min) 90 min   Session Details   Document Type Daily Treatment/Progress Note   General Information   Patient Profile Reviewed yes   General Observations of Patient Pt received sitting upright in bed   Mobility Belt   Mobility Belt Used During Session yes   Orthosis Neck Cervical Collar   Date Obtained/Time Obtained: 06/01/25 0000   Location: Neck  Type: Cervical Collar  Features: Hard  Description: Apen collar OOB don seated; [place] collar for shower  Therapeutic Indications: stabilization and support  Wearing Schedule: wear whe...   Compliance/Wearing Issues patient;compliant with wearing schedule;other (see comments)  (aspen <> sandra collar once seated in shower)   Coping/Community Reintegration   Observed Emotional State/Behavior calm;cooperative;pleasant   Verbalized Emotional State acceptance   Coping Strategies   Trust Relationship/Rapport choices provided;questions encouraged;reassurance provided   Cognition/Psychosocial   Affect/Mental Status WFL   Orientation Status oriented x 4   Follows Commands follows one-step commands;over 90% accuracy;verbal cues/prompting required   Cognitive Function WFL   Comment, Cognition insight into deficits, no impulsivity noted   Bed Mobility   [place], Supine to Sit touching/steadying assist   Comment OT: Assist to lift trunk off elevated bed surface once LEs over edge of bed.   Stand Pivot " Transfer   [place], Stand Pivot/Stand Step Transfer close supervision   Safety/Cues minimal;verbal cues;sequencing   Assistive Device walker, front-wheeled   Comment cues for postural correction when rising to stance w RW support.   Toilet Transfer   [place] touching/steadying assist   Safety/Cues minimal;verbal cues;sequencing;hand placement   Assistive Device accessible height toilet;grab bars/safety frame;walker, front-wheeled   Comment T/A amb approach to/from elevated toilet, rails, RW support, cues for postural correction as needed   Shower Transfer   Transfer Technique lateral;stand pivot   [place] touching/steadying assist   Safety/Cues minimal;verbal cues;sequencing;preparatory posture   Assistive Device bariatric;tub bench   Comment T/A amb approach to/fromext bench in barrier free stall shower, RW <> grab bars   Bathing   Shower Provided? Yes   Tasks chest;left arm;right arm;abdomen;front perineal area;buttocks;left upper leg;right upper leg;left lower leg, including foot;right lower leg, including foot   [place] close supervision   Safety/Cues minimal;compensatory techniques   Position supported sitting   Setup Assistance obtain supplies;orthosis application;other (see comments)   Adaptive Equipment grab bar/tub rail;hand-held shower spray hose;long-handled sponge;tub bench   Comment OT asst with aspen <> sandra adams exchange. S bathing once seated on ext bench in barrier free stall shower, grab bar support, LHS. Pt remained seated for duration of shower per orders, lateral weight shift for access to posterior perineal area.   Upper Body Dressing   Tasks doff;don;pull over garment   [place] minimum assist (75% or more patient effort)   Safety/Cues minimal;verbal cues;compensatory techniques   Position supported sitting   Adaptive Equipment orthosis   Comment min A garment <> orthosis management   Lower Body Dressing   Tasks doff;don;underwear;pants/bottoms;socks;shoes/slippers    [place] minimum assist (75% or more patient effort)   Safety/Cues minimal;verbal cues;proper use of assistive device;sequencing   Position supported sitting;supported standing   Adaptive Equipment dressing stick;reacher;sock aid   [place], Footwear minimum assist (75% or more patient effort)   Comment ongoing instruction in use of AE, limited in distal LE access, increased time and min A to thread L/R foot over toes then able to navigate up legs using reacher. Min A to arrange top of R sock to reduce risk of interference with circulation.   Grooming   Tasks washes, rinses and dries face;washes, rinses and dries hands;brushes/shields hair;oral care (brushing teeth, cleaning dentures)   [place] close supervision   Safety/Cues minimal;verbal cues;compensatory techniques   Position supported standing;sink side   Setup Assistance obtain supplies   Adaptive Equipment none   [place], Oral Hygiene close supervision   Comment RW support, min cues for postural correction as needed 2* increasing fatigue affecting bal   Toileting   Tasks adjust/manage clothing;perform bladder hygiene   [place] touching/steadying assist   Safety/Cues minimal;verbal cues;compensatory techniques;sequencing   Position supported standing   Setup Assistance adaptive equipment setup   Adaptive Equipment accessible height toilet;grab bar/safety frame   Comment standing facing toilet w cues to roll walker over toilet for support   Daily Progress Summary (OT)   Symptoms Noted During/After Treatment none   Progress Toward Functional Goals (OT) progressing toward functional goals as expected   Daily Outcome Statement Ongoing instruction in safe technique during basic ADL to include wet shower. Pt cont to be limited by spinal prec, aspen collar, limited ROM, decreased stratgth affecting bal.   Barriers to Overall Progress (OT) limited by spinal prec, aspen collar, limited ROM, decreased stratgth affecting bal.   Recommendations (OT)  cont POC            IRF OT Goals      Flowsheet Row Most Recent Value   Transfer Goal 1    Activity/Assistive Device toilet, commode, 3-in-1 at 06/05/2025 1033   [place] supervision required at 06/10/2025 0830   Time Frame short-term goal (STG), 5 - 7 days at 06/17/2025 0706   Progress/Outcome goal revised this date, goal ongoing  [pt progressing towards S goal, pt currently performing at Cl S level] at 06/17/2025 0706   Transfer Goal 2    Activity/Assistive Device toilet, commode, 3-in-1 at 06/05/2025 1033   [place] modified independence at 06/05/2025 1033   Time Frame long-term goal (LTG), 21 days or less at 06/05/2025 1033   Progress/Outcome goal ongoing at 06/17/2025 0706   Transfer Goal 3    Activity/Assistive Device shower, tub bench at 06/05/2025 1033   [place] supervision required at 06/10/2025 0830   Time Frame short-term goal (STG), 5 - 7 days at 06/17/2025 0706   Progress/Outcome goal revised this date, goal ongoing  [pt progressing towards S goal, pt currently performing at steadying A level] at 06/17/2025 0706   Transfer Goal 4    Activity/Assistive Device shower, tub bench at 06/05/2025 1033   [place] modified independence at 06/05/2025 1033   Time Frame long-term goal (LTG), 21 days or less at 06/05/2025 1033   Progress/Outcome goal ongoing at 06/17/2025 0706   Bathing Goal 1    Activity/Assistive Device bathing skills, all at 06/05/2025 1033   [place] supervision required at 06/17/2025 0706   Time Frame short-term goal (STG), 5 - 7 days at 06/17/2025 0706   Strategies/Barriers spinal precautions inhibiting forward reach towards BLE, plan to introduce LHAE at 06/10/2025 0830   Progress/Outcome goal met, goal revised this date at 06/17/2025 0706   Bathing Goal 2    Activity/Assistive Device bathing skills, all at 06/05/2025 1033   [place] modified independence at 06/05/2025 1033   Time Frame long-term goal (LTG), 21 days or less at 06/05/2025 1033   Progress/Outcome goal  ongoing at 06/17/2025 0706   UB Dressing Goal 1    Activity/Assistive Device upper body dressing at 06/05/2025 1033   [place] supervision required at 06/10/2025 0830   Time Frame short-term goal (STG), 5 - 7 days at 06/17/2025 0706   Progress/Outcome goal revised this date, goal ongoing  [ongoing min A required,  planning for mod I level] at 06/17/2025 0706   UB Dressing Goal 2    Activity/Assistive Device upper body dressing at 06/05/2025 1033   [place] modified independence at 06/05/2025 1033   Time Frame long-term goal (LTG), 21 days or less at 06/05/2025 1033   Progress/Outcome goal ongoing at 06/17/2025 0706   LB Dressing Goal 1    Activity/Assistive Device lower body dressing at 06/05/2025 1033   [place] other (see comments)  [steadying A] at 06/17/2025 0706   Time Frame short-term goal (STG), 5 - 7 days at 06/17/2025 0706   Progress/Outcome goal met, goal revised this date at 06/17/2025 0706   LB Dressing Goal 2    Activity/Assistive Device lower body dressing at 06/05/2025 1033   [place] modified independence at 06/05/2025 1033   Time Frame long-term goal (LTG), 21 days or less at 06/05/2025 1033   Progress/Outcome goal ongoing at 06/17/2025 0706   Grooming Goal 1    Activity/Assistive Device grooming skills, all at 06/05/2025 1033   [place] set-up required at 06/10/2025 0830   Time Frame short-term goal (STG), 5 - 7 days at 06/17/2025 0706   Strategies/Barriers standing tolerance/balance, still requiring A in stance at 06/10/2025 0830   Progress/Outcome goal revised this date, goal ongoing  [pt performing at Cl S level,  anticipate improvement by D/C] at 06/17/2025 0706   Grooming Goal 2    Activity/Assistive Device grooming skills, all at 06/05/2025 1033   [place] modified independence at 06/05/2025 1033   Time Frame long-term goal (LTG), 21 days or less at 06/05/2025 1033   Progress/Outcome goal ongoing at 06/17/2025 0706   Toileting Goal 1    Activity/Assistive Device  toileting skills, all at 06/05/2025 1033   [place] other (see comments)  [Cl S] at 06/17/2025 0706   Time Frame short-term goal (STG), 5 - 7 days at 06/17/2025 0706   Progress/Outcome goal met, goal revised this date at 06/17/2025 0706   Toileting Goal 2    Activity/Assistive Device toileting skills, all at 06/05/2025 1033   [place] modified independence at 06/05/2025 1033   Time Frame long-term goal (LTG), 21 days or less at 06/05/2025 1033   Progress/Outcome goal ongoing at 06/17/2025 0706

## 2025-06-17 NOTE — PROGRESS NOTES
Patient: Aneesh Brito  Location: [facility]  MRN: [id number]  Today's date: 6/17/2025    History of Present Illness    Aneesh is a 75 y.o. male with a history of colon carcinoma, BPH, hypertension, glaucoma, type 2 diabetes, history of a cervical fusion over 10 years ago admitted after a fall inability to get up on his own.  He was seen at [facility] where imaging studies were performed which demonstrated DISH as well as a Chance fracture at the bottom of C7.  He was transferred to [facility] for neurosurgery.    He was taken to the OR on 6/1 by Dr. Benjamin and underwent an ORIF of the C7 Chance fracture with removal and replacement of the C3-6 posterior instrumentation as well as a C3-T3 posterior lateral fusion.  Drains remain in place.  He is on subcu heparin for DVT prophylaxis.  Currently on a nicardipine drip for blood pressure control.  He states pain is well-controlled.  He denies any other complaints of headache or dizziness, chest pain or shortness of breath.  He is voiding on his own.  He has not had a bowel movement yet.    He was seen by PT and OT needing moderate assistance of 2 to transfer sit to stand.  He ambulated 2 feet with moderate assistance of 2 using a rolling walker.  Pertinent radiology results reviewed. Pertinent lab results reviewed.      Past Medical History  Aneesh has a past medical history of C7 cervical fracture (CMS/HCC) (05/31/2025), Colonic adenoma, Diverticulosis of colon, Enlarged prostate with lower urinary tract symptoms (LUTS), Glaucoma, Hypertension, Melanoma of skin (CMS/HCC), Osteoarthritis of knee, Overweight, Spinal stenosis of lumbar region, and Type 2 diabetes mellitus (CMS/HCC).    PT Vitals      Date/Time Pulse HR Source BP BP Location BP Method Pt Position [facility]   06/17/25 1301 60 Right Radial 138/62 Right upper arm Manual Sitting PJ          PT Pain      Date/Time Pain Type Side/Orientation Location Rating: Rest  Description Interventions [facility]   06/17/25 1301 Pain Assessment generalized neck 2 aching diversional activity provided    06/17/25 1359 Pain Reassessment generalized neck 2 aching relaxation techniques promoted PJ                  Prior Living Environment      Flowsheet Row Most Recent Value   People in Home spouse   Current Living Arrangements independent living facility   Home Accessibility wheelchair accessible   Living Environment Comment PTA residing c wife in [facility] ([facility] 1 floor set up. Shower stall c shower chair & grab bars. Reports no DME at toilet - information to be verified c pt. wife   Number of Stairs, Main Entrance 0   Patient Bedroom Access Comment PTA residing c wife in [facility] ([facility]). 1 floor set up. Shower stall c shower chair & grab bars. Reports no DME at toilet - information to be verified c pt. wife   Bathroom Access Comment PTA residing c wife in [facility] ([facility]). 1 floor set up. Shower stall c shower chair & grab bars. Reports no DME at toilet - information to be verified c pt. wife   Number of Stairs, Within Home, Primary 0       Prior Level of Function      Flowsheet Row Most Recent Value   Dominant Hand left   Ambulation assistive equipment  [rollator]   Transferring assistive equipment  [rollator]   Toileting independent   Bathing independent   Dressing independent   Eating independent   IADLs independent   Driving/Transportation    Communication understands/communicates without difficulty   Prior Level of Function Comment Pt reports being independent with use of SPC inside and Rollator to the dining jenkins. Independent ADLs. Denies other falls. Independent ADLs. (+) driving. Retired teacher   Assistive Device Currently Used at Home cane, straight, walker, 4-wheeled, shower chair        IRF PT Evaluation and Treatment - 06/17/25 1307          PT Time Calculation    Start Time 1300     Stop Time 1400     Time Calculation (min) 60 min        Session  Details    Document Type Daily Treatment/Progress Note        General Information    Patient Profile Reviewed yes     General Observations of Patient Pt received in w/c in gym        Mobility Belt    Mobility Belt Used During Session yes        Orthosis Neck Cervical Collar    Orthosis Properties Date Obtained: 06/01/25 Time Obtained: 1803 Location: Neck Type: Cervical Collar Therapeutic Indications: stabilization and support Wearing Schedule: wear when out of bed       Orthosis Neck Cervical Collar    Orthosis Properties Date Obtained: 06/01/25 Time Obtained: 0000 Location: Neck Type: Cervical Collar Features: Hard Description: Apen collar OOB don seated; [place] collar for shower Therapeutic Indications: stabilization and support Wearing Schedule: wear when out of bed       Sit to Stand Transfer    [place], Sit to Stand Transfer close supervision     Safety/Cues increased time to complete     Assistive Device walker, 4-wheeled;walker, front-wheeled     Comment w/c to stance, close S for balnace        Stand to Sit Transfer    [place], Stand to Sit Transfer close supervision     Safety/Cues increased time to complete     Assistive Device walker, front-wheeled;walker, 4-wheeled     Comment stance to w/c w close S for controlled descent        Stand Pivot Transfer    [place], Stand Pivot/Stand Step Transfer close supervision     Safety/Cues increased time to complete;technique     Assistive Device walker, front-wheeled;walker, 4-wheeled     Comment via amb approach, close S for balance        Gait Training    [place], Gait close supervision     Safety/Cues increased time to complete;gait deviations     Assistive Device walker, front-wheeled     Distance in Feet 200 feet     Pattern step-through;step-to     Deviations/Abnormal Patterns base of support, narrow;gait speed decreased;step length decreased;stride length decreased;weight shifting decreased     Bilateral Gait Deviations heel strike  "decreased     Comment 200ft x 1 w RW and close S for balance due to mild unsteadiness, occasional regression to step to pattern and short R step length/dec heel strike     [place] (Gait Trial 2) close supervision     Safety/Cues (Gait Trial 2) increased time to complete;gait deviations     Assistive Device (Gait Trial 2) walker, 4-wheeled     Distance in Feet (Gait Trial 2) 100 feet     Comment (Gait Trial 2) 100ft x 1 w rollator and close S for balance. Subjective reports of \"smoother\" steps        Curb Negotiation    [place] touching/steadying assist     Assistive Device walker, 4-wheeled     Curb Height 4 inches     Comment up/down 4\" curb/step w rollator. VCs on technique lifting by reaching for anterior cross bar        Stairs Training    [place], Stairs close supervision     Safety/Cues increased time to complete;sequencing     Assistive Device railing     Handrail Location (Stairs) both sides     Number of Stairs 12     Stair Height 7 inches     Ascending Stairs Technique step-over-step     Descending Stairs Technique step-to-step     Comment up/down 12(7\") admin stairs w close S for balance and VCs for sequencing + moving hands down railings        Balance    Balance Interventions standing;UE activity with balance activity     Comment, Balance 1) standing without UE support, chest presses with basketball 1x10 2) standing without UE support, front raises with basketball 1x10 3) standing wihout uniltaral UE support, front raises with AAROM to target anterior delt and inhibit upper trapezius        Daily Progress Summary (PT)    Daily Outcome Statement Pt progressing well with both RW and rollator. Plan to continue use of rollator with potential cancelletion of RW order. Pt progressed to close S for all mobility and up to 200ft ambulation distance. Cont POC.                        IRF PT Goals      Flowsheet Row Most Recent Value   Bed Mobility Goal 1    Activity/Assistive Device rolling to left, " rolling to right, sit to supine/supine to sit at 06/05/2025 0902   [place] minimum assist (75% or more patient effort) at 06/05/2025 0902   Time Frame short-term goal (STG), 1 week at 06/05/2025 0902   Progress/Outcome goal met, goal revised this date  [STG = LTG] at 06/17/2025 0845   Bed Mobility Goal 2    Activity/Assistive Device rolling to left, rolling to right, sit to supine/supine to sit at 06/05/2025 0902   [place] modified independence at 06/05/2025 0902   Time Frame long-term goal (LTG), 3 weeks at 06/05/2025 0902   Progress/Outcome goal ongoing at 06/17/2025 0845   Transfer Goal 1    Activity/Assistive Device sit-to-stand/stand-to-sit, stand pivot at 06/05/2025 0902   [place] tactile cues required  [steadying] at 06/10/2025 0850   Time Frame short-term goal (STG), 1 week at 06/10/2025 0850   Progress/Outcome goal met  [STG = LTG] at 06/17/2025 0845   Transfer Goal 2    Activity/Assistive Device sit-to-stand/stand-to-sit, stand pivot at 06/05/2025 0902   [place] modified independence at 06/05/2025 0902   Time Frame long-term goal (LTG), 3 weeks at 06/05/2025 0902   Progress/Outcome goal ongoing at 06/17/2025 0845   Gait/Walking Locomotion Goal 1    Activity/Assistive Device gait (walking locomotion) at 06/05/2025 0902   Distance 50 feet at 06/05/2025 0902   [place] tactile cues required at 06/10/2025 0850   Time Frame short-term goal (STG), 1 week at 06/10/2025 0850   Progress/Outcome goal met  [STG = LTG] at 06/17/2025 0845   Gait/Walking Locomotion Goal 2    Activity/Assistive Device gait (walking locomotion) at 06/05/2025 0902   Distance 150 feet at 06/05/2025 0902   [place] supervision required at 06/05/2025 0902   Time Frame long-term goal (LTG), 3 weeks at 06/05/2025 0902   Progress/Outcome goal ongoing at 06/17/2025 0845   Stairs Goal 1    Activity/Assistive Device stairs, all skills at 06/10/2025 0850   Number of Stairs 8 at 06/10/2025 0850   [place] minimum  assist (75% or more patient effort) at 06/10/2025 0850   Time Frame short-term goal (STG), 1 week at 06/10/2025 0850   Progress/Outcome goal met  [STG = LTG] at 06/17/2025 0845   Stairs Goal 2    Activity/Assistive Device stairs, all skills at 06/10/2025 0850   Number of Stairs 12 at 06/10/2025 0850   [place] supervision required at 06/10/2025 0850   Time Frame long-term goal (LTG), 2 weeks at 06/10/2025 0850   Progress/Outcome goal ongoing at 06/17/2025 0845

## 2025-06-17 NOTE — PROGRESS NOTES
Patient: Aneesh Brito  Location: [facility]  MRN: [id number]  Today's date: 6/17/2025    History of Present Illness    Aneesh is a 75 y.o. male with a history of colon carcinoma, BPH, hypertension, glaucoma, type 2 diabetes, history of a cervical fusion over 10 years ago admitted after a fall inability to get up on his own.  He was seen at [facility] where imaging studies were performed which demonstrated DISH as well as a Chance fracture at the bottom of C7.  He was transferred to [facility] for neurosurgery.    He was taken to the OR on 6/1 by Dr. Benjamin and underwent an ORIF of the C7 Chance fracture with removal and replacement of the C3-6 posterior instrumentation as well as a C3-T3 posterior lateral fusion.  Drains remain in place.  He is on subcu heparin for DVT prophylaxis.  Currently on a nicardipine drip for blood pressure control.  He states pain is well-controlled.  He denies any other complaints of headache or dizziness, chest pain or shortness of breath.  He is voiding on his own.  He has not had a bowel movement yet.    He was seen by PT and OT needing moderate assistance of 2 to transfer sit to stand.  He ambulated 2 feet with moderate assistance of 2 using a rolling walker.  Pertinent radiology results reviewed. Pertinent lab results reviewed.      Past Medical History  Aneesh has a past medical history of C7 cervical fracture (CMS/HCC) (05/31/2025), Colonic adenoma, Diverticulosis of colon, Enlarged prostate with lower urinary tract symptoms (LUTS), Glaucoma, Hypertension, Melanoma of skin (CMS/HCC), Osteoarthritis of knee, Overweight, Spinal stenosis of lumbar region, and Type 2 diabetes mellitus (CMS/HCC).    PT Vitals      Date/Time Pulse BP BP Location BP Method Pt Position [facility]   06/17/25 1455 60 140/65 Right upper arm Manual Sitting JG          PT Pain      Date/Time Pain Type Location Rating: Rest [facility]   06/17/25 1455 Pain Assessment neck 2 JG    06/17/25 1515 Pain Reassessment neck 2 JG                  Prior Living Environment      Flowsheet Row Most Recent Value   People in Home spouse   Current Living Arrangements independent living facility   Home Accessibility wheelchair accessible   Living Environment Comment PTA residing c wife in [facility] ([facility] 1 floor set up. Shower stall c shower chair & grab bars. Reports no DME at toilet - information to be verified c pt. wife   Number of Stairs, Main Entrance 0   Patient Bedroom Access Comment PTA residing c wife in [facility] ([facility]). 1 floor set up. Shower stall c shower chair & grab bars. Reports no DME at toilet - information to be verified c pt. wife   Bathroom Access Comment PTA residing c wife in [facility] ([facility]). 1 floor set up. Shower stall c shower chair & grab bars. Reports no DME at toilet - information to be verified c pt. wife   Number of Stairs, Within Home, Primary 0       Prior Level of Function      Flowsheet Row Most Recent Value   Dominant Hand left   Ambulation assistive equipment  [rollator]   Transferring assistive equipment  [rollator]   Toileting independent   Bathing independent   Dressing independent   Eating independent   IADLs independent   Driving/Transportation    Communication understands/communicates without difficulty   Prior Level of Function Comment Pt reports being independent with use of SPC inside and Rollator to the dining jenkins. Independent ADLs. Denies other falls. Independent ADLs. (+) driving. Retired teacher   Assistive Device Currently Used at Home cane, straight, walker, 4-wheeled, shower chair        IRF PT Evaluation and Treatment - 06/17/25 1457          PT Time Calculation    Start Time 1450     Stop Time 1520     Time Calculation (min) 30 min        Session Details    Document Type Daily Treatment/Progress Note        General Information    General Observations of Patient Pt upright in room, agreeable to therapy        Mobility  "Belt    Mobility Belt Used During Session yes        Orthosis Neck Cervical Collar    Orthosis Properties Date Obtained: 06/01/25 Time Obtained: 1803 Location: Neck Type: Cervical Collar Therapeutic Indications: stabilization and support Wearing Schedule: wear when out of bed       Orthosis Neck Cervical Collar    Orthosis Properties Date Obtained: 06/01/25 Time Obtained: 0000 Location: Neck Type: Cervical Collar Features: Hard Description: Apen collar OOB don seated; [place] collar for shower Therapeutic Indications: stabilization and support Wearing Schedule: wear when out of bed       Transfers    Transfers stand pivot transfer     Maintains Weight-Bearing Status able to maintain        Sit to Stand Transfer    [place], Sit to Stand Transfer close supervision     Safety/Cues increased time to complete;technique     Assistive Device walker, front-wheeled     Comment Cl S verbal cueing for safety and cueing        Stand to Sit Transfer    [place], Stand to Sit Transfer close supervision     Safety/Cues technique;sequencing     Assistive Device walker, front-wheeled     Comment Cl S for verbal cueing for safety        Stand Pivot Transfer    [place], Stand Pivot/Stand Step Transfer close supervision     Safety/Cues technique;sequencing     Assistive Device walker, front-wheeled     Comment Cl S for verbal cueing and safety        Gait Training    [place], Gait close supervision     Safety/Cues technique;sequencing     Assistive Device walker, front-wheeled     Distance in Feet 150 feet     Pattern step-to;step-through     Deviations/Abnormal Patterns base of support, narrow;step length decreased     Bilateral Gait Deviations heel strike decreased     Comment Ambulated 150' with RW , Cl S for verbal cueing for increased R step length        Balance    Balance Interventions standing     Comment, Balance Standing with BUE support on RW: alternating tap ups to 6\" block x 30 reps, steadying " assistance for balance; Repetitive stepping with RLE x 12 reps over fawn , steadying for balance; Repeated sit <> stands without UE in full stand position x 10 reps, steadying at hips for balance        Daily Progress Summary (PT)    Daily Outcome Statement Pt pleasant and cooperative for session. Focused on RLE hip flexion and increasing step length. Continue with dynamic balance and progress as able.                          IRF PT Goals      Flowsheet Row Most Recent Value   Bed Mobility Goal 1    Activity/Assistive Device rolling to left, rolling to right, sit to supine/supine to sit at 06/05/2025 0902   [place] minimum assist (75% or more patient effort) at 06/05/2025 0902   Time Frame short-term goal (STG), 1 week at 06/05/2025 0902   Progress/Outcome goal met, goal revised this date  [STG = LTG] at 06/17/2025 0845   Bed Mobility Goal 2    Activity/Assistive Device rolling to left, rolling to right, sit to supine/supine to sit at 06/05/2025 0902   [place] modified independence at 06/05/2025 0902   Time Frame long-term goal (LTG), 3 weeks at 06/05/2025 0902   Progress/Outcome goal ongoing at 06/17/2025 0845   Transfer Goal 1    Activity/Assistive Device sit-to-stand/stand-to-sit, stand pivot at 06/05/2025 0902   [place] tactile cues required  [steadying] at 06/10/2025 0850   Time Frame short-term goal (STG), 1 week at 06/10/2025 0850   Progress/Outcome goal met  [STG = LTG] at 06/17/2025 0845   Transfer Goal 2    Activity/Assistive Device sit-to-stand/stand-to-sit, stand pivot at 06/05/2025 0902   [place] modified independence at 06/05/2025 0902   Time Frame long-term goal (LTG), 3 weeks at 06/05/2025 0902   Progress/Outcome goal ongoing at 06/17/2025 0845   Gait/Walking Locomotion Goal 1    Activity/Assistive Device gait (walking locomotion) at 06/05/2025 0902   Distance 50 feet at 06/05/2025 0902   [place] tactile cues required at 06/10/2025 0850   Time Frame short-term goal (STG),  1 week at 06/10/2025 0850   Progress/Outcome goal met  [STG = LTG] at 06/17/2025 0845   Gait/Walking Locomotion Goal 2    Activity/Assistive Device gait (walking locomotion) at 06/05/2025 0902   Distance 150 feet at 06/05/2025 0902   [place] supervision required at 06/05/2025 0902   Time Frame long-term goal (LTG), 3 weeks at 06/05/2025 0902   Progress/Outcome goal ongoing at 06/17/2025 0845   Stairs Goal 1    Activity/Assistive Device stairs, all skills at 06/10/2025 0850   Number of Stairs 8 at 06/10/2025 0850   [place] minimum assist (75% or more patient effort) at 06/10/2025 0850   Time Frame short-term goal (STG), 1 week at 06/10/2025 0850   Progress/Outcome goal met  [STG = LTG] at 06/17/2025 0845   Stairs Goal 2    Activity/Assistive Device stairs, all skills at 06/10/2025 0850   Number of Stairs 12 at 06/10/2025 0850   [place] supervision required at 06/10/2025 0850   Time Frame long-term goal (LTG), 2 weeks at 06/10/2025 0850   Progress/Outcome goal ongoing at 06/17/2025 0845

## 2025-06-17 NOTE — PLAN OF CARE
Plan of Care Review  Plan of Care Reviewed With: patient  Progress: improving  Outcome Evaluation: Alert and oriented x4. Continent of bowel and bladder. Utilizing a urinal overnight in preparation for discharge to home, aware he will no longer be using a [place] catheter at . Pain managed with scheduled Tylenol and PRN Oxycodone. Neck incision is DOMINGA with steristrips intact. [place] collar worn OOB. Accuchecks BID, no s/s of hyper/hypo-glycemia noted. Sleeping quietly in bed overnight. Fall and safety measures maintained.

## 2025-06-18 ENCOUNTER — APPOINTMENT (INPATIENT)
Dept: OCCUPATIONAL THERAPY | Facility: REHABILITATION | Age: 75
DRG: 560 | End: 2025-06-18
Payer: MEDICARE

## 2025-06-18 ENCOUNTER — APPOINTMENT (INPATIENT)
Dept: PHYSICAL THERAPY | Facility: REHABILITATION | Age: 75
DRG: 560 | End: 2025-06-18
Payer: MEDICARE

## 2025-06-18 ENCOUNTER — APPOINTMENT (OUTPATIENT)
Dept: OTHER | Facility: REHABILITATION | Age: 75
End: 2025-06-18
Payer: MEDICARE

## 2025-06-18 LAB
GLUCOSE BLD-MCNC: 107 MG/DL (ref 70–99)
GLUCOSE BLD-MCNC: 120 MG/DL (ref 70–99)
GLUCOSE BLD-MCNC: 98 MG/DL (ref 70–99)
POCT TEST: ABNORMAL
POCT TEST: ABNORMAL
POCT TEST: NORMAL

## 2025-06-18 PROCEDURE — 12800001 HC ROOM AND CARE SEMIPRIVATE REHAB-BRAIN INJ

## 2025-06-18 PROCEDURE — 97150 GROUP THERAPEUTIC PROCEDURES: CPT | Mod: GO | Performed by: OCCUPATIONAL THERAPIST

## 2025-06-18 PROCEDURE — 97530 THERAPEUTIC ACTIVITIES: CPT | Mod: GO,CO

## 2025-06-18 PROCEDURE — 97530 THERAPEUTIC ACTIVITIES: CPT | Mod: GP

## 2025-06-18 PROCEDURE — 63700000 HC SELF-ADMINISTRABLE DRUG: Performed by: HOSPITALIST

## 2025-06-18 PROCEDURE — 12800000 HC ROOM AND CARE SEMIPRIVATE REHAB

## 2025-06-18 PROCEDURE — 63700000 HC SELF-ADMINISTRABLE DRUG: Performed by: INTERNAL MEDICINE

## 2025-06-18 PROCEDURE — 63600000 HC DRUGS/DETAIL CODE: Mod: JZ | Performed by: INTERNAL MEDICINE

## 2025-06-18 PROCEDURE — 97110 THERAPEUTIC EXERCISES: CPT | Mod: GO,CO

## 2025-06-18 PROCEDURE — 97112 NEUROMUSCULAR REEDUCATION: CPT | Mod: GP

## 2025-06-18 PROCEDURE — 97116 GAIT TRAINING THERAPY: CPT | Mod: GP

## 2025-06-18 RX ORDER — CHOLECALCIFEROL (VITAMIN D3) 25 MCG
25 TABLET ORAL DAILY
COMMUNITY
Start: 2025-06-19 | End: 2025-07-19

## 2025-06-18 RX ORDER — OXYCODONE HYDROCHLORIDE 5 MG/1
5 TABLET ORAL EVERY 6 HOURS PRN
Qty: 20 TABLET | Refills: 0 | Status: SHIPPED | OUTPATIENT
Start: 2025-06-18 | End: 2025-06-23

## 2025-06-18 RX ORDER — METOPROLOL TARTRATE 75 MG/1
75 TABLET ORAL 2 TIMES DAILY
Qty: 60 TABLET | Refills: 0 | Status: SHIPPED | OUTPATIENT
Start: 2025-06-18 | End: 2025-07-18

## 2025-06-18 RX ORDER — LANOLIN ALCOHOL/MO/W.PET/CERES
1000 CREAM (GRAM) TOPICAL DAILY
COMMUNITY
Start: 2025-06-19 | End: 2025-07-19

## 2025-06-18 RX ORDER — LISINOPRIL 10 MG/1
10 TABLET ORAL 2 TIMES DAILY
Qty: 60 TABLET | Refills: 0 | Status: SHIPPED | OUTPATIENT
Start: 2025-06-18 | End: 2025-07-18

## 2025-06-18 RX ORDER — ACETAMINOPHEN 325 MG/1
650 TABLET ORAL EVERY 6 HOURS PRN
COMMUNITY
Start: 2025-06-18 | End: 2025-07-18

## 2025-06-18 RX ORDER — PANTOPRAZOLE SODIUM 40 MG/1
40 TABLET, DELAYED RELEASE ORAL
Qty: 30 TABLET | Refills: 0 | Status: SHIPPED | OUTPATIENT
Start: 2025-06-19 | End: 2025-07-19

## 2025-06-18 RX ADMIN — ACETAMINOPHEN 650 MG: 325 TABLET ORAL at 06:17

## 2025-06-18 RX ADMIN — METFORMIN HYDROCHLORIDE 500 MG: 500 TABLET ORAL at 17:48

## 2025-06-18 RX ADMIN — LISINOPRIL 10 MG: 10 TABLET ORAL at 07:48

## 2025-06-18 RX ADMIN — ACETAMINOPHEN 650 MG: 325 TABLET ORAL at 12:24

## 2025-06-18 RX ADMIN — OXYCODONE HYDROCHLORIDE 5 MG: 5 TABLET ORAL at 03:45

## 2025-06-18 RX ADMIN — METOPROLOL TARTRATE 75 MG: 50 TABLET, FILM COATED ORAL at 07:48

## 2025-06-18 RX ADMIN — LOSARTAN POTASSIUM 100 MG: 100 TABLET, FILM COATED ORAL at 07:48

## 2025-06-18 RX ADMIN — ATORVASTATIN CALCIUM 20 MG: 20 TABLET, FILM COATED ORAL at 07:48

## 2025-06-18 RX ADMIN — CYANOCOBALAMIN TAB 1000 MCG 1000 MCG: 1000 TAB at 07:48

## 2025-06-18 RX ADMIN — ACETAMINOPHEN 650 MG: 325 TABLET ORAL at 23:45

## 2025-06-18 RX ADMIN — METFORMIN HYDROCHLORIDE 500 MG: 500 TABLET ORAL at 07:48

## 2025-06-18 RX ADMIN — TAMSULOSIN HYDROCHLORIDE 0.4 MG: 0.4 CAPSULE ORAL at 20:11

## 2025-06-18 RX ADMIN — PANTOPRAZOLE SODIUM 40 MG: 40 TABLET, DELAYED RELEASE ORAL at 07:48

## 2025-06-18 RX ADMIN — ACETAMINOPHEN 650 MG: 325 TABLET ORAL at 17:48

## 2025-06-18 RX ADMIN — METOPROLOL TARTRATE 75 MG: 50 TABLET, FILM COATED ORAL at 20:11

## 2025-06-18 RX ADMIN — LISINOPRIL 10 MG: 10 TABLET ORAL at 20:11

## 2025-06-18 RX ADMIN — FELODIPINE 10 MG: 5 TABLET, FILM COATED, EXTENDED RELEASE ORAL at 07:48

## 2025-06-18 RX ADMIN — Medication 25 MCG: at 07:49

## 2025-06-18 RX ADMIN — FINASTERIDE 5 MG: 5 TABLET, FILM COATED ORAL at 07:48

## 2025-06-18 RX ADMIN — ENOXAPARIN SODIUM 40 MG: 40 INJECTION SUBCUTANEOUS at 17:49

## 2025-06-18 RX ADMIN — TIMOLOL MALEATE 1 DROP: 5 SOLUTION/ DROPS OPHTHALMIC at 07:52

## 2025-06-18 NOTE — PROGRESS NOTES
Patient: Aneesh Brito  Location: [facility]  MRN: [id number]  Today's date: 6/18/2025    History of Present Illness    Aneesh is a 75 y.o. male with a history of colon carcinoma, BPH, hypertension, glaucoma, type 2 diabetes, history of a cervical fusion over 10 years ago admitted after a fall inability to get up on his own.  He was seen at [facility] where imaging studies were performed which demonstrated DISH as well as a Chance fracture at the bottom of C7.  He was transferred to [facility] for neurosurgery.    He was taken to the OR on 6/1 by Dr. Benjamin and underwent an ORIF of the C7 Chance fracture with removal and replacement of the C3-6 posterior instrumentation as well as a C3-T3 posterior lateral fusion.  Drains remain in place.  He is on subcu heparin for DVT prophylaxis.  Currently on a nicardipine drip for blood pressure control.  He states pain is well-controlled.  He denies any other complaints of headache or dizziness, chest pain or shortness of breath.  He is voiding on his own.  He has not had a bowel movement yet.    He was seen by PT and OT needing moderate assistance of 2 to transfer sit to stand.  He ambulated 2 feet with moderate assistance of 2 using a rolling walker.  Pertinent radiology results reviewed. Pertinent lab results reviewed.      Past Medical History  Aneesh has a past medical history of C7 cervical fracture (CMS/HCC) (05/31/2025), Colonic adenoma, Diverticulosis of colon, Enlarged prostate with lower urinary tract symptoms (LUTS), Glaucoma, Hypertension, Melanoma of skin (CMS/HCC), Osteoarthritis of knee, Overweight, Spinal stenosis of lumbar region, and Type 2 diabetes mellitus (CMS/HCC).    PT Vitals      Date/Time Pulse HR Source BP BP Location BP Method Pt Position [facility]   06/18/25 1108 49 Monitor 156/72 Left upper arm Automatic Sitting PJ          PT Pain      Date/Time Pain Type Location Rating: Rest Rating: Activity Description  Interventions [facility]   06/18/25 1108 Pain Assessment neck 2 2 aching diversional activity provided    06/18/25 1203 Pain Reassessment neck 2 2 aching relaxation techniques promoted PJ                  Prior Living Environment      Flowsheet Row Most Recent Value   People in Home spouse   Current Living Arrangements independent living facility   Home Accessibility wheelchair accessible   Living Environment Comment PTA residing c wife in [facility] ([facility]). 1 floor set up. Shower stall c shower chair & grab bars. Reports no DME at toilet - information to be verified c pt. wife   Number of Stairs, Main Entrance 0   Patient Bedroom Access Comment PTA residing c wife in [facility] ([facility]). 1 floor set up. Shower stall c shower chair & grab bars. Reports no DME at toilet - information to be verified c pt. wife   Bathroom Access Comment PTA residing c wife in [facility] ([facility]). 1 floor set up. Shower stall c shower chair & grab bars. Reports no DME at toilet - information to be verified c pt. wife   Number of Stairs, Within Home, Primary 0       Prior Level of Function      Flowsheet Row Most Recent Value   Dominant Hand left   Ambulation assistive equipment  [rollator]   Transferring assistive equipment  [rollator]   Toileting independent   Bathing independent   Dressing independent   Eating independent   IADLs independent   Driving/Transportation    Communication understands/communicates without difficulty   Prior Level of Function Comment Pt reports being independent with use of SPC inside and Rollator to the dining jenkins. Independent ADLs. Denies other falls. Independent ADLs. (+) driving. Retired teacher   Assistive Device Currently Used at Home cane, straight, walker, 4-wheeled, shower chair        IRF PT Evaluation and Treatment - 06/18/25 1134          PT Time Calculation    Start Time 1105     Stop Time 1205     Time Calculation (min) 60 min        Session Details    Document Type Daily  Treatment/Progress Note        General Information    Patient Profile Reviewed yes     General Observations of Patient Pt received in w/c in gym        Mobility Belt    Mobility Belt Used During Session yes        Orthosis Neck Cervical Collar    Orthosis Properties Date Obtained: 06/01/25 Time Obtained: 1803 Location: Neck Type: Cervical Collar Therapeutic Indications: stabilization and support Wearing Schedule: wear when out of bed       Orthosis Neck Cervical Collar    Orthosis Properties Date Obtained: 06/01/25 Time Obtained: 0000 Location: Neck Type: Cervical Collar Features: Hard Description: Apen collar OOB don seated; [place] collar for shower Therapeutic Indications: stabilization and support Wearing Schedule: wear when out of bed       Bed Mobility    [place], Roll Left close supervision     [place], Roll Right close supervision     [place], Supine to Sit close supervision     [place], Sit to Supine touching/steadying assist     Safety/Cues maintaining precautions;technique     Assistive Device none     Comment performed on ILU bed, steadying A for BLE management into bed only        Sit to Stand Transfer    [place], Sit to Stand Transfer close supervision     Safety/Cues increased time to complete     Assistive Device walker, front-wheeled     Comment w/c and EOM to stance w close S for balance        Stand to Sit Transfer    [place], Stand to Sit Transfer close supervision     Safety/Cues increased time to complete     Assistive Device walker, front-wheeled     Comment stance to w/c w close S for controlled descent        Stand Pivot Transfer    [place], Stand Pivot/Stand Step Transfer close supervision     Safety/Cues technique;increased time to complete     Assistive Device walker, front-wheeled     Comment via amb approach w close S for balance due to mild unsteadiness        Car Transfer    Transfer Technique stand pivot     [place] close supervision      Safety/Cues increased time to complete;hand placement     Assistive Device walker, front-wheeled     Comment via short distance amb approach w close S for balance and VCs for hand placement in mock car        Gait Training    [place], Gait close supervision     Safety/Cues technique;sequencing     Assistive Device walker, front-wheeled     Distance in Feet 200 feet     Pattern step-to;step-through     Deviations/Abnormal Patterns base of support, narrow;step length decreased     Bilateral Gait Deviations heel strike decreased     Comment 200ft x 1, 25ft x 4 w RW and close S for balance due to mild unsteadiness and dec R heel strike        Rough/Uneven Surface Gait Skills    [place] close supervision     Assistive Device walker, front-wheeled     Distance in Feet 50 feet     Comment 50ft x 1 over rough/outdoor surfaces w close S for balance and VCs for avoiding obstacles        Balance    Balance Interventions standing;dynamic     Comment, Balance 1) alt step ups onto airex foam, 2x10, min A for balance 2) repeated STS without use of UEs, 2x10 from elevated EOM, close S        Daily Progress Summary (PT)    Daily Outcome Statement Session focused on functional mobility and balance. Pt challenged by STS without use of UEs. Pt continues to need assist for bed mobility on standard bed. Plan to assess on hospital bed and recommend assist for home initially.                        IRF PT Goals      Flowsheet Row Most Recent Value   Bed Mobility Goal 1    Activity/Assistive Device rolling to left, rolling to right, sit to supine/supine to sit at 06/05/2025 0902   [place] minimum assist (75% or more patient effort) at 06/05/2025 0902   Time Frame short-term goal (STG), 1 week at 06/05/2025 0902   Progress/Outcome goal met, goal revised this date  [STG = LTG] at 06/17/2025 0845   Bed Mobility Goal 2    Activity/Assistive Device rolling to left, rolling to right, sit to supine/supine to sit at 06/05/2025 0902    Colon modified independence at 06/05/2025 0902   Time Frame long-term goal (LTG), 3 weeks at 06/05/2025 0902   Progress/Outcome goal ongoing at 06/17/2025 0845   Transfer Goal 1    Activity/Assistive Device sit-to-stand/stand-to-sit, stand pivot at 06/05/2025 0902   Colon tactile cues required  [steadying] at 06/10/2025 0850   Time Frame short-term goal (STG), 1 week at 06/10/2025 0850   Progress/Outcome goal met  [STG = LTG] at 06/17/2025 0845   Transfer Goal 2    Activity/Assistive Device sit-to-stand/stand-to-sit, stand pivot at 06/05/2025 0902   Colon modified independence at 06/05/2025 0902   Time Frame long-term goal (LTG), 3 weeks at 06/05/2025 0902   Progress/Outcome goal ongoing at 06/17/2025 0845   Gait/Walking Locomotion Goal 1    Activity/Assistive Device gait (walking locomotion) at 06/05/2025 0902   Distance 50 feet at 06/05/2025 0902   Colon tactile cues required at 06/10/2025 0850   Time Frame short-term goal (STG), 1 week at 06/10/2025 0850   Progress/Outcome goal met  [STG = LTG] at 06/17/2025 0845   Gait/Walking Locomotion Goal 2    Activity/Assistive Device gait (walking locomotion) at 06/05/2025 0902   Distance 150 feet at 06/05/2025 0902   Colon supervision required at 06/05/2025 0902   Time Frame long-term goal (LTG), 3 weeks at 06/05/2025 0902   Progress/Outcome goal ongoing at 06/17/2025 0845   Stairs Goal 1    Activity/Assistive Device stairs, all skills at 06/10/2025 0850   Number of Stairs 8 at 06/10/2025 0850   Colon minimum assist (75% or more patient effort) at 06/10/2025 0850   Time Frame short-term goal (STG), 1 week at 06/10/2025 0850   Progress/Outcome goal met  [STG = LTG] at 06/17/2025 0845   Stairs Goal 2    Activity/Assistive Device stairs, all skills at 06/10/2025 0850   Number of Stairs 12 at 06/10/2025 0850   Colon supervision required at 06/10/2025 0850   Time Frame long-term goal (LTG), 2 weeks at 06/10/2025 0850    Progress/Outcome goal ongoing at 06/17/2025 1397

## 2025-06-18 NOTE — PLAN OF CARE
Care Coordination Discharge Plan Summary    Admission Assessment Summary    General Information  Readmission Within the last 30 days: unable to assess  Does patient have a : No  Patient-Specific Goals (include timeframe): walk    Living Arrangements  Arrived From: hospital  Current Living Arrangements: independent living facility  People in Home: spouse  Home Accessibility: wheelchair accessible  Living Arrangement Comments: 1 floor living with elevators all around    Social Drivers of Health - Screenings  Housing Stability  In the last 12 months, was there a time when you were not able to pay the mortgage or rent on time?: No  In the past 12 months, how many times have you moved where you were living?: 1  At any time in the past 12 months, were you homeless or living in a shelter (including now)?: No  Utility Access  In the past 12 months has the electric, gas, oil, or water company threatened to shut off services in your home?: No  Transportation Needs  In the past 12 months, has lack of transportation kept you from medical appointments or from getting medications?: No  In the past 12 months, has lack of transportation kept you from meetings, work, or from getting things needed for daily living?: No    Functional Status Prior to Admission  Assistive Device/Animal Currently Used at Home: cane, straight, walker, 4-wheeled, shower chair  Functional Status Comments: independent with cane in apt and rollator community distances  IADL Comments: see above    Discharge Needs Assessment    Concerns to be Addressed: care coordination/care conferences, caregiver training, discharge planning  Current Discharge Risk: physical impairment  Anticipated Changes Related to Illness: inability to care for self    Discharge Plan Summary    Patient Choice  Offered/Gave Vendor List: yes ([facility])  Patient and/or patient guardian/advocate was made aware of their right to choose a provider. A list of eligible providers  was presented and reviewed with the patient and/or patient guardian/advocate in written and/or verbal form. The list delineates providers in the patient’s desired geographic area who are participating in the Medicare program and/or providers contracted with the patient’s primary insurance. The Medicare list and quality ratings were obtained from the Medicare.gov [medicare.gov] website.    Concerns / Comments: CM obtained IMM signature. pts spouse updated and [employer] VIVAR following    Discharge Plan:  Disposition/Destination: Home Health Care - Other / Home       Connection to Community  Not applicable  Community Resources:  n/a    Discharge Transportation:  Is Out of Hospital DNR needed at Discharge: no  Does patient need discharge transport? Yes  Discharge Transportation Vendor: other (see comment)  Type of Transportation:  (private vehicle, pts spouse)  What day is the transport expected?: 06/20/25  What time is the transport expected?: 1100

## 2025-06-18 NOTE — PROGRESS NOTES
Patient: Aneesh Brito  Location: [facility]  MRN: [id number]  Today's date: 6/18/2025    History of Present Illness    Aneesh is a 75 y.o. male with a history of colon carcinoma, BPH, hypertension, glaucoma, type 2 diabetes, history of a cervical fusion over 10 years ago admitted after a fall inability to get up on his own.  He was seen at [facility] where imaging studies were performed which demonstrated DISH as well as a Chance fracture at the bottom of C7.  He was transferred to [facility] for neurosurgery.    He was taken to the OR on 6/1 by Dr. Benjamin and underwent an ORIF of the C7 Chance fracture with removal and replacement of the C3-6 posterior instrumentation as well as a C3-T3 posterior lateral fusion.  Drains remain in place.  He is on subcu heparin for DVT prophylaxis.  Currently on a nicardipine drip for blood pressure control.  He states pain is well-controlled.  He denies any other complaints of headache or dizziness, chest pain or shortness of breath.  He is voiding on his own.  He has not had a bowel movement yet.    He was seen by PT and OT needing moderate assistance of 2 to transfer sit to stand.  He ambulated 2 feet with moderate assistance of 2 using a rolling walker.  Pertinent radiology results reviewed. Pertinent lab results reviewed.      Past Medical History  Aneesh has a past medical history of C7 cervical fracture (CMS/HCC) (05/31/2025), Colonic adenoma, Diverticulosis of colon, Enlarged prostate with lower urinary tract symptoms (LUTS), Glaucoma, Hypertension, Melanoma of skin (CMS/HCC), Osteoarthritis of knee, Overweight, Spinal stenosis of lumbar region, and Type 2 diabetes mellitus (CMS/HCC).             Prior Living Environment      Flowsheet Row Most Recent Value   People in Home spouse   Current Living Arrangements independent living facility   Home Accessibility wheelchair accessible   Living Environment Comment PTA residing c wife  "in [facility] ([facility]). 1 floor set up. Shower stall c shower chair & grab bars. Reports no DME at toilet - information to be verified c pt. wife   Number of Stairs, Main Entrance 0   Patient Bedroom Access Comment PTA residing c wife in [facility] ([facility]). 1 floor set up. Shower stall c shower chair & grab bars. Reports no DME at toilet - information to be verified c pt. wife   Bathroom Access Comment PTA residing c wife in [facility] ([facility]). 1 floor set up. Shower stall c shower chair & grab bars. Reports no DME at toilet - information to be verified c pt. wife   Number of Stairs, Within Home, Primary 0       Prior Level of Function      Flowsheet Row Most Recent Value   Dominant Hand left   Ambulation assistive equipment  [rollator]   Transferring assistive equipment  [rollator]   Toileting independent   Bathing independent   Dressing independent   Eating independent   IADLs independent   Driving/Transportation    Communication understands/communicates without difficulty   Prior Level of Function Comment Pt reports being independent with use of SPC inside and Rollator to the dining jenkins. Independent ADLs. Denies other falls. Independent ADLs. (+) driving. Retired teacher   Assistive Device Currently Used at Home cane, straight, walker, 4-wheeled, shower chair       Occupational Profile      Flowsheet Row Most Recent Value   Successful Occupations Enjoys participating in the activities at [employer] - shuffle board and corn hole   Occupational History/Life Experiences PTA independent c ADLs. 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   Patient Goals \"Walk\"        IRF OT Evaluation and Treatment - 06/18/25 1303          OT Time Calculation    Start Time 1303     Stop Time 1348     Time Calculation (min) 45 min        Session Details    Document Type Daily Treatment/Progress Note     Mode of " Treatment group therapy        General Information    General Observations of Patient Pt recieved for therapy in w/c        Mobility Belt    Mobility Belt Used During Session no - patient did not mobilize out of bed or stand        Orthosis Neck Cervical Collar    Orthosis Properties Date Obtained: 06/01/25 Time Obtained: 1803 Location: Neck Type: Cervical Collar Therapeutic Indications: stabilization and support Wearing Schedule: wear when out of bed       Orthosis Neck Cervical Collar    Orthosis Properties Date Obtained: 06/01/25 Time Obtained: 0000 Location: Neck Type: Cervical Collar Features: Hard Description: Apen collar OOB don seated; [place] collar for shower Therapeutic Indications: stabilization and support Wearing Schedule: wear when out of bed       Daily Progress Summary (OT)    Daily Outcome Statement Patient attended therapy portion of diabetic education series. Patient educated on potential long-term complications of diabetes; physical activity and exercise; and community resources. Written materials provided and long-handled mirror issued with demonstration of use. Patient with good engagement during diabetic education session. Patient would benefit from reinforcement/continued education during remainder of I/P rehab stay.                     Education Documentation  Group Therapy Education, taught by Arleth Live, ALVINA at 6/18/2025  1:52 PM.  Learner: Patient  Readiness: Acceptance  Method: Explanation, Handout  Response: Verbalizes Understanding          IRF OT Goals      Flowsheet Row Most Recent Value   Transfer Goal 1    Activity/Assistive Device toilet, commode, 3-in-1 at 06/05/2025 1033   [place] supervision required at 06/10/2025 0830   Time Frame short-term goal (STG), 5 - 7 days at 06/17/2025 0706   Progress/Outcome goal revised this date, goal ongoing  [pt progressing towards S goal, pt currently performing at Cl S level] at 06/17/2025 0706   Transfer Goal 2     Activity/Assistive Device toilet, commode, 3-in-1 at 06/05/2025 1033   [place] modified independence at 06/05/2025 1033   Time Frame long-term goal (LTG), 21 days or less at 06/05/2025 1033   Progress/Outcome goal ongoing at 06/17/2025 0706   Transfer Goal 3    Activity/Assistive Device shower, tub bench at 06/05/2025 1033   [place] supervision required at 06/10/2025 0830   Time Frame short-term goal (STG), 5 - 7 days at 06/17/2025 0706   Progress/Outcome goal revised this date, goal ongoing  [pt progressing towards S goal, pt currently performing at steadying A level] at 06/17/2025 0706   Transfer Goal 4    Activity/Assistive Device shower, tub bench at 06/05/2025 1033   [place] modified independence at 06/05/2025 1033   Time Frame long-term goal (LTG), 21 days or less at 06/05/2025 1033   Progress/Outcome goal ongoing at 06/17/2025 0706   Bathing Goal 1    Activity/Assistive Device bathing skills, all at 06/05/2025 1033   [place] supervision required at 06/17/2025 0706   Time Frame short-term goal (STG), 5 - 7 days at 06/17/2025 0706   Strategies/Barriers spinal precautions inhibiting forward reach towards BLE, plan to introduce LHAE at 06/10/2025 0830   Progress/Outcome goal met, goal revised this date at 06/17/2025 0706   Bathing Goal 2    Activity/Assistive Device bathing skills, all at 06/05/2025 1033   [place] modified independence at 06/05/2025 1033   Time Frame long-term goal (LTG), 21 days or less at 06/05/2025 1033   Progress/Outcome goal ongoing at 06/17/2025 0706   UB Dressing Goal 1    Activity/Assistive Device upper body dressing at 06/05/2025 1033   [place] supervision required at 06/10/2025 0830   Time Frame short-term goal (STG), 5 - 7 days at 06/17/2025 0706   Progress/Outcome goal revised this date, goal ongoing  [ongoing min A required,  planning for mod I level] at 06/17/2025 0706   UB Dressing Goal 2    Activity/Assistive Device upper body dressing at 06/05/2025  1033   [place] modified independence at 06/05/2025 1033   Time Frame long-term goal (LTG), 21 days or less at 06/05/2025 1033   Progress/Outcome goal ongoing at 06/17/2025 0706   LB Dressing Goal 1    Activity/Assistive Device lower body dressing at 06/05/2025 1033   [place] other (see comments)  [steadying A] at 06/17/2025 0706   Time Frame short-term goal (STG), 5 - 7 days at 06/17/2025 0706   Progress/Outcome goal met, goal revised this date at 06/17/2025 0706   LB Dressing Goal 2    Activity/Assistive Device lower body dressing at 06/05/2025 1033   [place] modified independence at 06/05/2025 1033   Time Frame long-term goal (LTG), 21 days or less at 06/05/2025 1033   Progress/Outcome goal ongoing at 06/17/2025 0706   Grooming Goal 1    Activity/Assistive Device grooming skills, all at 06/05/2025 1033   [place] set-up required at 06/10/2025 0830   Time Frame short-term goal (STG), 5 - 7 days at 06/17/2025 0706   Strategies/Barriers standing tolerance/balance, still requiring A in stance at 06/10/2025 0830   Progress/Outcome goal revised this date, goal ongoing  [pt performing at Cl S level,  anticipate improvement by D/C] at 06/17/2025 0706   Grooming Goal 2    Activity/Assistive Device grooming skills, all at 06/05/2025 1033   [place] modified independence at 06/05/2025 1033   Time Frame long-term goal (LTG), 21 days or less at 06/05/2025 1033   Progress/Outcome goal ongoing at 06/17/2025 0706   Toileting Goal 1    Activity/Assistive Device toileting skills, all at 06/05/2025 1033   [place] other (see comments)  [Cl S] at 06/17/2025 0706   Time Frame short-term goal (STG), 5 - 7 days at 06/17/2025 0706   Progress/Outcome goal met, goal revised this date at 06/17/2025 0706   Toileting Goal 2    Activity/Assistive Device toileting skills, all at 06/05/2025 1033   [place] modified independence at 06/05/2025 1033   Time Frame long-term goal (LTG), 21 days or less at 06/05/2025 1033    Progress/Outcome goal ongoing at 06/17/2025 0798

## 2025-06-18 NOTE — PROGRESS NOTES
Patient: Aneesh Brito  Location: [facility]  MRN: [id number]  Today's date: 6/18/2025    History of Present Illness    Aneesh is a 75 y.o. male with a history of colon carcinoma, BPH, hypertension, glaucoma, type 2 diabetes, history of a cervical fusion over 10 years ago admitted after a fall inability to get up on his own.  He was seen at [facility] where imaging studies were performed which demonstrated DISH as well as a Chance fracture at the bottom of C7.  He was transferred to [facility] for neurosurgery.    He was taken to the OR on 6/1 by Dr. Benjamin and underwent an ORIF of the C7 Chance fracture with removal and replacement of the C3-6 posterior instrumentation as well as a C3-T3 posterior lateral fusion.  Drains remain in place.  He is on subcu heparin for DVT prophylaxis.  Currently on a nicardipine drip for blood pressure control.  He states pain is well-controlled.  He denies any other complaints of headache or dizziness, chest pain or shortness of breath.  He is voiding on his own.  He has not had a bowel movement yet.    He was seen by PT and OT needing moderate assistance of 2 to transfer sit to stand.  He ambulated 2 feet with moderate assistance of 2 using a rolling walker.  Pertinent radiology results reviewed. Pertinent lab results reviewed.      Past Medical History  Aneesh has a past medical history of C7 cervical fracture (CMS/HCC) (05/31/2025), Colonic adenoma, Diverticulosis of colon, Enlarged prostate with lower urinary tract symptoms (LUTS), Glaucoma, Hypertension, Melanoma of skin (CMS/HCC), Osteoarthritis of knee, Overweight, Spinal stenosis of lumbar region, and Type 2 diabetes mellitus (CMS/HCC).    OT Vitals      Date/Time BP BP Location BP Method Pt Position Who   06/18/25 1405 142/78 Left upper arm Automatic Sitting [place]          OT Pain      Date/Time Pain Type Rating: Rest Who   06/18/25 1405 Pain Assessment 0 - no pain [place]   06/18/25 4433  Pain Reassessment 0 - no pain [place]                 Prior Living Environment      Flowsheet Row Most Recent Value   People in Home spouse   Current Living Arrangements independent living facility   Home Accessibility wheelchair accessible   Living Environment Comment PTA residing c wife in [facility] ([facility] 1 floor set up. Shower stall c shower chair & grab bars. Reports no DME at toilet - information to be verified c pt. wife   Number of Stairs, Main Entrance 0   Patient Bedroom Access Comment PTA residing c wife in [facility] ([facility]). 1 floor set up. Shower stall c shower chair & grab bars. Reports no DME at toilet - information to be verified c pt. wife   Bathroom Access Comment PTA residing c wife in [facility] ([facility]). 1 floor set up. Shower stall c shower chair & grab bars. Reports no DME at toilet - information to be verified c pt. wife   Number of Stairs, Within Home, Primary 0       Prior Level of Function      Flowsheet Row Most Recent Value   Dominant Hand left   Ambulation assistive equipment  [rollator]   Transferring assistive equipment  [rollator]   Toileting independent   Bathing independent   Dressing independent   Eating independent   IADLs independent   Driving/Transportation    Communication understands/communicates without difficulty   Prior Level of Function Comment Pt reports being independent with use of SPC inside and Rollator to the dining jenkins. Independent ADLs. Denies other falls. Independent ADLs. (+) driving. Retired teacher   Assistive Device Currently Used at Home cane, straight, walker, 4-wheeled, shower chair       Occupational Profile      Flowsheet Row Most Recent Value   Successful Occupations Enjoys participating in the activities at [employer] - shuffle board and corn hole   Occupational History/Life Experiences PTA independent c ADLs. Wife completes majority of IADLs, pt. does some light meal prep. Retired teacher/professor. (+) . Already has  "handicap parking placard.   Patient Goals \"Walk\"        IRF OT Evaluation and Treatment - 06/18/25 1404          OT Time Calculation    Start Time 1400     Stop Time 1430     Time Calculation (min) 30 min        Session Details    Document Type Daily Treatment/Progress Note        General Information    General Observations of Patient pleasant and cooperative        Mobility Belt    Mobility Belt Used During Session yes        Orthosis Neck Cervical Collar    Orthosis Properties Date Obtained: 06/01/25 Time Obtained: 1803 Location: Neck Type: Cervical Collar Therapeutic Indications: stabilization and support Wearing Schedule: wear when out of bed       Orthosis Neck Cervical Collar    Orthosis Properties Date Obtained: 06/01/25 Time Obtained: 0000 Location: Neck Type: Cervical Collar Features: Hard Description: Apen collar OOB don seated; [place] collar for shower Therapeutic Indications: stabilization and support Wearing Schedule: wear when out of bed       Sit to Stand Transfer    [place], Sit to Stand Transfer close supervision     Safety/Cues increased time to complete     Assistive Device none     Comment to anterior table from w/c; Cl S level for bal concerns        Stand to Sit Transfer    [place], Stand to Sit Transfer close supervision     Safety/Cues increased time to complete     Comment stance at table to wc        Upper Extremity (Therapeutic Exercise)    Exercise Position/Type seated;resistive exercises;AAROM (active assistive range of motion)     General Exercise bilateral     Range of Motion Exercises shoulder flexion/extension;shoulder external/internal rotation;shoulder horizontal abduction/adduction     Weight/Resistance 3 pounds     Reps and Sets 1x10 stretching; 2x10 resistance     Comment Standing therex completed for shoulder + scap stretch to start followed by 3# unilateral exercises including shoulder flex, chest press, bicep curl. Seated to complete second sets. unweightyed " prolonged stretch over head for unilateral shldr flex c therapist assisting stretch to complete soft end range and hold 10 sec x4 each side.        Daily Progress Summary (OT)    Daily Outcome Statement Tolerates therapy session for increased standing bal/endurance and BUE stretch/strengthening. Cont OT POC                          IRF OT Goals      Flowsheet Row Most Recent Value   Transfer Goal 1    Activity/Assistive Device toilet, commode, 3-in-1 at 06/05/2025 1033   [place] supervision required at 06/10/2025 0830   Time Frame short-term goal (STG), 5 - 7 days at 06/17/2025 0706   Progress/Outcome goal revised this date, goal ongoing  [pt progressing towards S goal, pt currently performing at Cl S level] at 06/17/2025 0706   Transfer Goal 2    Activity/Assistive Device toilet, commode, 3-in-1 at 06/05/2025 1033   [place] modified independence at 06/05/2025 1033   Time Frame long-term goal (LTG), 21 days or less at 06/05/2025 1033   Progress/Outcome goal ongoing at 06/17/2025 0706   Transfer Goal 3    Activity/Assistive Device shower, tub bench at 06/05/2025 1033   [place] supervision required at 06/10/2025 0830   Time Frame short-term goal (STG), 5 - 7 days at 06/17/2025 0706   Progress/Outcome goal revised this date, goal ongoing  [pt progressing towards S goal, pt currently performing at steadying A level] at 06/17/2025 0706   Transfer Goal 4    Activity/Assistive Device shower, tub bench at 06/05/2025 1033   [place] modified independence at 06/05/2025 1033   Time Frame long-term goal (LTG), 21 days or less at 06/05/2025 1033   Progress/Outcome goal ongoing at 06/17/2025 0706   Bathing Goal 1    Activity/Assistive Device bathing skills, all at 06/05/2025 1033   [place] supervision required at 06/17/2025 0706   Time Frame short-term goal (STG), 5 - 7 days at 06/17/2025 0706   Strategies/Barriers spinal precautions inhibiting forward reach towards BLE, plan to introduce LHAE at  06/10/2025 0830   Progress/Outcome goal met, goal revised this date at 06/17/2025 0706   Bathing Goal 2    Activity/Assistive Device bathing skills, all at 06/05/2025 1033   [place] modified independence at 06/05/2025 1033   Time Frame long-term goal (LTG), 21 days or less at 06/05/2025 1033   Progress/Outcome goal ongoing at 06/17/2025 0706   UB Dressing Goal 1    Activity/Assistive Device upper body dressing at 06/05/2025 1033   [place] supervision required at 06/10/2025 0830   Time Frame short-term goal (STG), 5 - 7 days at 06/17/2025 0706   Progress/Outcome goal revised this date, goal ongoing  [ongoing min A required,  planning for mod I level] at 06/17/2025 0706   UB Dressing Goal 2    Activity/Assistive Device upper body dressing at 06/05/2025 1033   [place] modified independence at 06/05/2025 1033   Time Frame long-term goal (LTG), 21 days or less at 06/05/2025 1033   Progress/Outcome goal ongoing at 06/17/2025 0706   LB Dressing Goal 1    Activity/Assistive Device lower body dressing at 06/05/2025 1033   [place] other (see comments)  [steadying A] at 06/17/2025 0706   Time Frame short-term goal (STG), 5 - 7 days at 06/17/2025 0706   Progress/Outcome goal met, goal revised this date at 06/17/2025 0706   LB Dressing Goal 2    Activity/Assistive Device lower body dressing at 06/05/2025 1033   [place] modified independence at 06/05/2025 1033   Time Frame long-term goal (LTG), 21 days or less at 06/05/2025 1033   Progress/Outcome goal ongoing at 06/17/2025 0706   Grooming Goal 1    Activity/Assistive Device grooming skills, all at 06/05/2025 1033   [place] set-up required at 06/10/2025 0830   Time Frame short-term goal (STG), 5 - 7 days at 06/17/2025 0706   Strategies/Barriers standing tolerance/balance, still requiring A in stance at 06/10/2025 0830   Progress/Outcome goal revised this date, goal ongoing  [pt performing at Cl S level,  anticipate improvement by D/C] at 06/17/2025  0706   Grooming Goal 2    Activity/Assistive Device grooming skills, all at 06/05/2025 1033   [place] modified independence at 06/05/2025 1033   Time Frame long-term goal (LTG), 21 days or less at 06/05/2025 1033   Progress/Outcome goal ongoing at 06/17/2025 0706   Toileting Goal 1    Activity/Assistive Device toileting skills, all at 06/05/2025 1033   [place] other (see comments)  [Cl S] at 06/17/2025 0706   Time Frame short-term goal (STG), 5 - 7 days at 06/17/2025 0706   Progress/Outcome goal met, goal revised this date at 06/17/2025 0706   Toileting Goal 2    Activity/Assistive Device toileting skills, all at 06/05/2025 1033   [place] modified independence at 06/05/2025 1033   Time Frame long-term goal (LTG), 21 days or less at 06/05/2025 1033   Progress/Outcome goal ongoing at 06/17/2025 0706

## 2025-06-18 NOTE — PROGRESS NOTES
Patient: Aneesh Brito  Location: [facility]  MRN: [id number]  Today's date: 6/18/2025    History of Present Illness    Aneesh is a 75 y.o. male with a history of colon carcinoma, BPH, hypertension, glaucoma, type 2 diabetes, history of a cervical fusion over 10 years ago admitted after a fall inability to get up on his own.  He was seen at [facility] where imaging studies were performed which demonstrated DISH as well as a Chance fracture at the bottom of C7.  He was transferred to [facility] for neurosurgery.    He was taken to the OR on 6/1 by Dr. Benjamin and underwent an ORIF of the C7 Chance fracture with removal and replacement of the C3-6 posterior instrumentation as well as a C3-T3 posterior lateral fusion.  Drains remain in place.  He is on subcu heparin for DVT prophylaxis.  Currently on a nicardipine drip for blood pressure control.  He states pain is well-controlled.  He denies any other complaints of headache or dizziness, chest pain or shortness of breath.  He is voiding on his own.  He has not had a bowel movement yet.    He was seen by PT and OT needing moderate assistance of 2 to transfer sit to stand.  He ambulated 2 feet with moderate assistance of 2 using a rolling walker.  Pertinent radiology results reviewed. Pertinent lab results reviewed.      Past Medical History  Aneesh has a past medical history of C7 cervical fracture (CMS/HCC) (05/31/2025), Colonic adenoma, Diverticulosis of colon, Enlarged prostate with lower urinary tract symptoms (LUTS), Glaucoma, Hypertension, Melanoma of skin (CMS/HCC), Osteoarthritis of knee, Overweight, Spinal stenosis of lumbar region, and Type 2 diabetes mellitus (CMS/HCC).    OT Vitals      Date/Time BP BP Location BP Method Pt Position [facility]   06/18/25 0805 150/82 Left upper arm Manual Sitting [place]          OT Pain      Date/Time Pain Type Side/Orientation Rating: Rest Description [facility]   06/18/25 0805 Pain Assessment neck  2 aching [place]   06/18/25 0850 Pain Reassessment -- 0 -- [place]                 Prior Living Environment      Flowsheet Row Most Recent Value   People in Home spouse   Current Living Arrangements independent living facility   Home Accessibility wheelchair accessible   Living Environment Comment PTA residing c wife in hospitals ([facility] 1 floor set up. Shower stall c shower chair & grab bars. Reports no DME at toilet - information to be verified c pt. wife   Number of Stairs, Main Entrance 0   Patient Bedroom Access Comment PTA residing c wife in hospitals ([facility]). 1 floor set up. Shower stall c shower chair & grab bars. Reports no DME at toilet - information to be verified c pt. wife   Bathroom Access Comment PTA residing c wife in hospitals ([facility]). 1 floor set up. Shower stall c shower chair & grab bars. Reports no DME at toilet - information to be verified c pt. wife   Number of Stairs, Within Home, Primary 0       Prior Level of Function      Flowsheet Row Most Recent Value   Dominant Hand left   Ambulation assistive equipment  [rollator]   Transferring assistive equipment  [rollator]   Toileting independent   Bathing independent   Dressing independent   Eating independent   IADLs independent   Driving/Transportation    Communication understands/communicates without difficulty   Prior Level of Function Comment Pt reports being independent with use of SPC inside and Rollator to the dining jenkins. Independent ADLs. Denies other falls. Independent ADLs. (+) driving. Retired teacher   Assistive Device Currently Used at Home cane, straight, walker, 4-wheeled, shower chair       Occupational Profile      Flowsheet Row Most Recent Value   Successful Occupations Enjoys participating in the activities at [employer] - shuffle board and corn hole   Occupational History/Life Experiences PTA independent c ADLs. Wife completes majority of IADLs, pt. does some light meal prep. Retired teacher/professor.  "(+) . Already has handicap parking placard.   Patient Goals \"Walk\"        IRF OT Evaluation and Treatment - 06/18/25 0804          OT Time Calculation    Start Time 0800     Stop Time 0900     Time Calculation (min) 60 min        Session Details    Document Type Daily Treatment/Progress Note        General Information    General Observations of Patient pleasant and cooperative        Mobility Belt    Mobility Belt Used During Session yes        Orthosis Neck Cervical Collar    Orthosis Properties Date Obtained: 06/01/25 Time Obtained: 1803 Location: Neck Type: Cervical Collar Therapeutic Indications: stabilization and support Wearing Schedule: wear when out of bed       Orthosis Neck Cervical Collar    Orthosis Properties Date Obtained: 06/01/25 Time Obtained: 0000 Location: Neck Type: Cervical Collar Features: Hard Description: Apen collar OOB don seated; [place] collar for shower Therapeutic Indications: stabilization and support Wearing Schedule: wear when out of bed       Sit to Stand Transfer    Safety/Cues increased time to complete     Assistive Device walker, front-wheeled        Impairments/Safety Issues    Comment, Safety Issues/Impairments OT: Amb for long HHD + RW at Cl S level; small stepping pattern noted        Balance    Comment, Balance OT: (1) amb for long HHD + RW at CL S emerging S level assistance for bal concerns; short step pattern noted. (2) stepping in different directions using floor grid and mirror to view grid while maintaining spinal precautions; pt steps fwd/back/and laterally- for x2 rounds each direction; Cl S level t/o for bal. (3) in stance on blue foam stepping to anterior cone at midline alternating feet for x10 reps at each side followed by x5 rounds of 3-cone taps at each LE; touch steadying t/o.        Upper Extremity (Therapeutic Exercise)    Exercise Position/Type seated;resistive exercises     General Exercise bilateral     Range of Motion Exercises shoulder " flexion/extension;shoulder horizontal abduction/adduction;shoulder external/internal rotation;elbow flexion/extension     Weight/Resistance resistance band     Reps and Sets 2x10     Comment Pt engaged in seated therex for band pull aparts, unilateral chest press and Luis Armando lat pulls using orange band; difficulty acheiving full ROM        Daily Progress Summary (OT)    Daily Outcome Statement Focus of therapy session on standing bal; strengthening/stretch and dynamic bal. Cont OT POC focused on home simulated transfer practice and endurance.                          IRF OT Goals      Flowsheet Row Most Recent Value   Transfer Goal 1    Activity/Assistive Device toilet, commode, 3-in-1 at 06/05/2025 1033   [place] supervision required at 06/10/2025 0830   Time Frame short-term goal (STG), 5 - 7 days at 06/17/2025 0706   Progress/Outcome goal revised this date, goal ongoing  [pt progressing towards S goal, pt currently performing at Cl S level] at 06/17/2025 0706   Transfer Goal 2    Activity/Assistive Device toilet, commode, 3-in-1 at 06/05/2025 1033   [place] modified independence at 06/05/2025 1033   Time Frame long-term goal (LTG), 21 days or less at 06/05/2025 1033   Progress/Outcome goal ongoing at 06/17/2025 0706   Transfer Goal 3    Activity/Assistive Device shower, tub bench at 06/05/2025 1033   [place] supervision required at 06/10/2025 0830   Time Frame short-term goal (STG), 5 - 7 days at 06/17/2025 0706   Progress/Outcome goal revised this date, goal ongoing  [pt progressing towards S goal, pt currently performing at steadying A level] at 06/17/2025 0706   Transfer Goal 4    Activity/Assistive Device shower, tub bench at 06/05/2025 1033   [place] modified independence at 06/05/2025 1033   Time Frame long-term goal (LTG), 21 days or less at 06/05/2025 1033   Progress/Outcome goal ongoing at 06/17/2025 0706   Bathing Goal 1    Activity/Assistive Device bathing skills, all at 06/05/2025 1033    [place] supervision required at 06/17/2025 0706   Time Frame short-term goal (STG), 5 - 7 days at 06/17/2025 0706   Strategies/Barriers spinal precautions inhibiting forward reach towards BLE, plan to introduce LHAE at 06/10/2025 0830   Progress/Outcome goal met, goal revised this date at 06/17/2025 0706   Bathing Goal 2    Activity/Assistive Device bathing skills, all at 06/05/2025 1033   [place] modified independence at 06/05/2025 1033   Time Frame long-term goal (LTG), 21 days or less at 06/05/2025 1033   Progress/Outcome goal ongoing at 06/17/2025 0706   UB Dressing Goal 1    Activity/Assistive Device upper body dressing at 06/05/2025 1033   [place] supervision required at 06/10/2025 0830   Time Frame short-term goal (STG), 5 - 7 days at 06/17/2025 0706   Progress/Outcome goal revised this date, goal ongoing  [ongoing min A required,  planning for mod I level] at 06/17/2025 0706   UB Dressing Goal 2    Activity/Assistive Device upper body dressing at 06/05/2025 1033   [place] modified independence at 06/05/2025 1033   Time Frame long-term goal (LTG), 21 days or less at 06/05/2025 1033   Progress/Outcome goal ongoing at 06/17/2025 0706   LB Dressing Goal 1    Activity/Assistive Device lower body dressing at 06/05/2025 1033   [place] other (see comments)  [steadying A] at 06/17/2025 0706   Time Frame short-term goal (STG), 5 - 7 days at 06/17/2025 0706   Progress/Outcome goal met, goal revised this date at 06/17/2025 0706   LB Dressing Goal 2    Activity/Assistive Device lower body dressing at 06/05/2025 1033   [place] modified independence at 06/05/2025 1033   Time Frame long-term goal (LTG), 21 days or less at 06/05/2025 1033   Progress/Outcome goal ongoing at 06/17/2025 0706   Grooming Goal 1    Activity/Assistive Device grooming skills, all at 06/05/2025 1033   [place] set-up required at 06/10/2025 0830   Time Frame short-term goal (STG), 5 - 7 days at 06/17/2025 0779    Strategies/Barriers standing tolerance/balance, still requiring A in stance at 06/10/2025 0830   Progress/Outcome goal revised this date, goal ongoing  [pt performing at Cl S level,  anticipate improvement by D/C] at 06/17/2025 0706   Grooming Goal 2    Activity/Assistive Device grooming skills, all at 06/05/2025 1033   [place] modified independence at 06/05/2025 1033   Time Frame long-term goal (LTG), 21 days or less at 06/05/2025 1033   Progress/Outcome goal ongoing at 06/17/2025 0706   Toileting Goal 1    Activity/Assistive Device toileting skills, all at 06/05/2025 1033   [place] other (see comments)  [Cl S] at 06/17/2025 0706   Time Frame short-term goal (STG), 5 - 7 days at 06/17/2025 0706   Progress/Outcome goal met, goal revised this date at 06/17/2025 0706   Toileting Goal 2    Activity/Assistive Device toileting skills, all at 06/05/2025 1033   [place] modified independence at 06/05/2025 1033   Time Frame long-term goal (LTG), 21 days or less at 06/05/2025 1033   Progress/Outcome goal ongoing at 06/17/2025 0706

## 2025-06-18 NOTE — PROGRESS NOTES
[facility] Progress Note          Patient was seen and examined.   Attestation Notes: Face to face encounter completed    Aneesh Brito is a 75 y.o. male who was admitted for Falls, sequela [W19.XXXS]. Patient was referred by Chase Anna MD for medical assessment and management      CC: Falls, sequela [W19.XXXS]     HPI: Aneesh Brito is a 75 y.o. male with HTN, HLD, DM2, colon ca, BPH, glaucoma, spinal stenosis s/p previous cervical fusion, presented to [facility] 5/31/25 s/p fall, found to have C7 fracture and transferred to [facility] where he underwent C7 ORIF, removal of C3-6 hardware and C3-T1 PLIF by neurosurgery Dr. Raza Benjamin on 6/1/25.     Post op he had anemia but did not require transfusion. He was put on SQ Heparin for DVT ppx. His pain was managed with Tylenol and Oxycodone.      He had elevated BP with surgery requiring IV Hydralzine but BP improved after surgery and HTN managed with Felodipine, Lisinopril, Losartan and Metoprolol.      He was on Lipitor for HLD. He was on Finasteride and Tamsulosin for BPH.  He was on netarsudiL-latanoprost and timoptic eye drops for glaucoma.      He had very elevated blood sugars alaina-op, related to IVFs and Decadron, and required insulin. Blood sugars improved and DM2 managed with Metformin.      He had elevated CK of 1118 on admission due to rhabdomyolysis from being down on floor prior to admission. He was treated with IVFs and CK improved.      The patient was evaluated by PM&R and found to have residual deficits in ambulation and ADLs due to Falls, sequela [W19.XXXS] and came to [facility] on 6/4/2025 for acute inpatient rehab.     [facility] hospital course:    He participated in therapy. He was seen by Nutrition, STACY. Vit D low, started oral supplement.     SUBJECTIVE:  Patient interviewed and examined    He remains without acute complaints and feels he is progressing in therapy.    Pain stable, no new numbness or  weakness, moving bowels, chronic urine incontinence, no abdominal pain or nausea, no dysuria, no chest pain, palpitations, dyspnea or fevers    Review of Systems:  All other systems reviewed and negative except as noted in the HPI.    Current meds and allergies reviewed  Current Facility-Administered Medications   Medication Dose Route Frequency    acetaminophen  650 mg oral q6h KOFI    alum-mag hydroxide-simeth  30 mL oral q6h PRN    atorvastatin  20 mg oral Daily    bisacodyL  10 mg rectal Daily PRN    cholecalciferol (vitamin D3)  25 mcg oral Daily    cyanocobalamin  1,000 mcg oral Daily    glucose  15-30 g of dextrose oral PRN    Or    dextrose  15-30 g of dextrose oral PRN    Or    glucagon  1 mg intramuscular PRN    Or    dextrose 50 % in water (D50)  25 mL intravenous PRN    enoxaparin  40 mg subcutaneous Daily (6p)    felodipine  10 mg oral Daily    finasteride  5 mg oral Daily    hydrALAZINE  25 mg oral q6h PRN    insulin lispro U-100  2-5 Units subcutaneous BID AC    lisinopriL  10 mg oral BID    losartan  100 mg oral Daily    metFORMIN  500 mg oral BID with breakfast and dinner    metoprolol tartrate  75 mg oral BID    netarsudiL-latanoprost  1 drop Right Eye Nightly    oxyCODONE  5 mg oral q6h PRN    pantoprazole  40 mg oral Daily before breakfast    polyethylene glycol  17 g oral Daily PRN    sennosides-docusate sodium  2 tablet oral Daily PRN    tamsulosin  0.4 mg oral Nightly    timolol  1 drop Both Eyes Daily        Past Medical History:   Diagnosis Date    C7 cervical fracture (CMS/HCC) 05/31/2025    Colonic adenoma     Diverticulosis of colon     Enlarged prostate with lower urinary tract symptoms (LUTS)     Glaucoma     Hypertension     Melanoma of skin (CMS/HCC)     Osteoarthritis of knee     Overweight     Spinal stenosis of lumbar region     Type 2 diabetes mellitus (CMS/HCC)      Past Surgical History   Procedure Laterality Date    1) ORIF C7 fracture 2) removal and replacement of C3-6  posterior intrumentation 3) C3 to T3 posterolaterral instrumented fusion  Depuy + removal set SMA C-arm O-Arm N/A 6/1/2025    Performed by Raza Benjamin MD at [facility] OR    Appendectomy      Cervical fusion  06/01/2025    1) ORIF C7 fracture 2) removal and replacement of C3-6 posterior intrumentation 3) C3 to T3 posterolaterral instrumented fusion    Colonoscopy  01/10/2012    diverticulosis    Colonoscopy w/ polypectomy  02/09/2022    Hip arthroplasty Bilateral     Knee arthroplasty Left     Knee arthroplasty Right 02/02/2022     Social History     Tobacco Use    Smoking status: Never    Smokeless tobacco: Never   Substance Use Topics    Alcohol use: Yes     Comment: BEER, 1 CONSUMED DAILY    Drug use: Never      Family History   Problem Relation Name Age of Onset    Breast cancer Biological Mother         Vital signs in last 24 hours:  Temp:  [36.4 °C (97.5 °F)-36.4 °C (97.6 °F)] 36.4 °C (97.6 °F)  Heart Rate:  [49-58] 50  Resp:  [18] 18  BP: (140-156)/(72-82) 140/75  Vital signs reviewed 06/18/25 4:59 PM    Physical Exam  Vitals and nursing note reviewed.   Constitutional:       Appearance: Normal appearance.   HENT:      Head: Normocephalic and atraumatic.      Right Ear: External ear normal.      Left Ear: External ear normal.      Nose: Nose normal.      Mouth/Throat:      Pharynx: Oropharynx is clear.   Eyes:      Conjunctiva/sclera: Conjunctivae normal.   Neck:      Comments: S/p cervical fusion in collar  Cardiovascular:      Rate and Rhythm: Normal rate.      Pulses: Normal pulses.   Pulmonary:      Effort: Pulmonary effort is normal.   Abdominal:      General: Abdomen is flat.   Musculoskeletal:      Right lower leg: Edema present.      Left lower leg: Edema present.   Skin:     Findings: Lesion (right arm, right ear, sacral wounds as documented by nursing) present.   Neurological:      Mental Status: He is alert and oriented to person, place, and time.                 Objective    Labs:  I have reviewed  the patient's labs.  Significant abnormals are anemia, hyponatremia.  Results from last 7 days   Lab Units 06/16/25  0550   WBC K/uL 7.12   HEMOGLOBIN g/dL 10.0*   HEMATOCRIT % 29.4*   PLATELETS K/uL 353*     Results from last 7 days   Lab Units 06/16/25  0550   SODIUM mEQ/L 134*   POTASSIUM mEQ/L 3.6   CHLORIDE mEQ/L 101   CO2 mEQ/L 24   BUN mg/dL 22   CREATININE mg/dL 1.1   CALCIUM mg/dL 8.5*   ALBUMIN g/dL 3.2*   BILIRUBIN TOTAL mg/dL 0.3   ALK PHOS IU/L 100   ALT IU/L 15   AST IU/L 11*   GLUCOSE mg/dL 130*                  6/5/25: vit d 25-oh: 13 (low)    Blood sugars: 121-118-107    Imaging:  Not applicable        ASSESSMENT/PLAN:    75 y.o. malewith HTN, HLD, DM2, colon ca, BPH, glaucoma, spinal stenosis s/p previous cervical fusion, presented to [facility] 5/31/25 s/p fall, found to have C7 fracture and transferred to [facility] where he underwent C7 ORIF, removal of C3-6 hardware and C3-T1 PLIF by neurosurgery Dr. Raza Benjamin on 6/1/25     1. Neuro:  # Cervical stenosis  # acute C7 fx  # DISH  -6/1/25s/p C7 ORIF, removal of C3-6 hardware and C3-T1 PLIF by neurosurgery Dr. Raza Benjamin   -pain managed with Tylenol and Oxycodone     2. Vasc:  # DVT ppx  -bilat LE edema  -SCDs and SQ Heparin for DVT ppx, can stop on discharge     3. Heme:  # ABLA  -post op anemia  -6/16/25 hgb 10.0, stable  -5/12/25 B12 <400, added oral B12  -on multivitamin  # leukocytosis  -reactive vs due to Decadron  -6/16/25 wbc back to normal at 7.12  -follow CBC     4. Renal:  # hyponatremia  -6/16/25 Na+ stable at 134, no intervention  -increased risk of dehydration and electrolyte changes  -follow BMP, Mg     5. Gi:  # constipation  -improved and bowel meds changed to as needed  # ulcer ppx  -Protonix ulcer ppx     6. Gu:  # BPH  -on Proscar and Flomax  -using [place] cath at  for incontinence  -having high volume urine output at night with some urine retention requiring intermitten cath     7. Cardiac:  # HTN  -on  Felodipine, Lisinopril, Losartan and Metoprolol  -watch for orthostatic hypotension  -use cardiac precautions in therapy     8. Pulm:  -incentive spirometry for atelectasis     9. Derm:  # multiple wounds  -surgical wounds  -wounds on right arm, right ear, sacrum as documented by nursing  -seen by [employer] for skin assessment  -wound care per nursing and WOCN consult     10. Nutrition:  -Adult Diet Regular; Thin Liquids; Consistent Carbohydrate 2000   -sen by Nutrition for assessment and education     11. Endo:  # HLD  -on Lipitor  # DM2  -had steroid induced hyperglycemia due to Decadron, now improved  -on Metformin  -accuchecks BID AC with Lispro scale for BG>200  -hypoglycemia protocol     12. Psych:  # anxiety/depression  -consulted Psychology for support     13. Musculoskeletal:  # rhabdomyolysis  -clinically resolved  -6/5/25 CK back to normal at 124  # vit D deficiency  -6/5/25 Vit D low at 13, started on vit D3 25 mcg po daily.    14. Ophth:  # glaucoma  -on netarsudiL-latanoprost and timoptic eye drops     14. Dispo:  -code status: Full Code  -Expected discharge date 6/20/2025       plan discussed with patient, nurse, case management and Chase Anna MD Scott Sapperstein, MD  6/18/2025  4:59 PM

## 2025-06-18 NOTE — PLAN OF CARE
Plan of Care Review  Plan of Care Reviewed With: patient  Progress: improving  Outcome Evaluation: Alert and oriented x4. Continent of bowel and bladder. Patient has been ambulating to the BR overnight to void, no inccontinent episodes so far this shift. Pain managed with scheduled Tylenol and PRN Oxycodone. Steristrips intact to posterior neck incision. Aspen collar worn OOB. Sleeping quietly in bed overnight. Fall and safety measures maintained.

## 2025-06-18 NOTE — PROGRESS NOTES
Clinical nutrition note: RD provided diabetic diet education at family request. Provided Planning Healthy Meals, carbohydrate counting handout, low carb snack ideas. RD reviewed all materials and pt engaged in discussed and asked appropriate questions. Pt states he will share information with wife when she arrives.

## 2025-06-18 NOTE — PLAN OF CARE
Plan of Care Review  Plan of Care Reviewed With: patient  Progress: improving  Outcome Evaluation: patient remains free from falls, using call bell appropraitely, aspen collar maintained OOB, blood sugar stable at 107 this am, continent of bowel/bladder, , continues to functionally progress

## 2025-06-18 NOTE — PLAN OF CARE
Problem: Rehabilitation ([facility]) Plan of Care  Goal: Plan of Care Review  Outcome: Progressing  Flowsheets (Taken 6/18/2025 1040)  Progress: no change  Plan of Care Reviewed With:   patient   spouse   other (see comments)   CM met w pt at bedside to provide updates from treatment team and to discuss goals and ELOS 6/20. Pt in agreement w plan. CM updated pts spouse Yas. Pts spouse to provide transportation home. [facility] following. CM will continue to follow for any needs.

## 2025-06-18 NOTE — PROGRESS NOTES
Subjective    Patient seen and examined on rounds.  Chart reviewed.  Events overnight noted.  History reviewed briefly with patient.    CC: Deficits in mobility, transfers, self-care status post fall, unstable 3 column C7 Chance fracture, status post C7 ORIF, removal of prior C3-6 hardware and C3-T1 PLIF by neurosurgery, history of gait instability and multiple falls, peripheral neuropathy, multiple medical problems.    HPI:  Mr. Aneesh Brito is a 75-year-old left handed white male with chronic conditions significant for hypertension, hyperlipidemia, diabetes mellitus type 2, BPH, glaucoma, colon cancer, gait instability, multiple falls in the home environment for which he was seeing a neurologist for workup of falls, history of prior bilateral hip replacements and bilateral knee replacements, spinal stenosis status post previous C3-C6 laminectomy and posterolateral instrumented fusion over 10 years ago by Dr. Maurer, presented to the ER at [facility] on 5/31/25 after suffering from a mechanical fall while at home and was unable to get up so he was on the floor all night until he was found.  At [facility] ER and he reported neck pain and pain in the lower cervical and upper thoracic region.  Imaging studies revealed C7 Chance fracture and findings of DISH (diffuse idiopathic skeletal hyperostosis ).  He was subsequently transferred to [facility] for neurosurgical evaluation where he was admitted on 5/31/25.  He was evaluated by Dr. Benjamin from neurosurgery, noted to have a 3 column Chance fracture at C7-T1 level, given highly unstable fracture was recommended surgical intervention by neurosurgery. He underwent C7 ORIF, removal of C3-6 hardware and C3-T1 PLIF by neurosurgery Dr. Raza Benjamin on 6/1/25.  Postoperatively is allowed weightbearing as tolerated on both lower extremities.  He has been given Aspen collar for use when out of bed in chair and when ambulating.  He can use soft cervical  collar in the bed.  I discussed spinal precautions with him.  Postoperative course was significant for anemia but he did not require transfusion.  His pain is managed with Tylenol and Oxycodone.  He had elevated blood pressure requiring IV Hydralazine but blood pressure improved after surgery and hypertension was managed with managed with Felodipine, Lisinopril, Losartan and Metoprolol.  He is continued on Lipitor for hyperlipidemia.  He has history of BPH and remains on Tamsulosin and Finasteride.  He was on Netarsudil-Latanoprost and Timoptic eye drops for glaucoma.  He had elevated blood sugars related to Decadron perioperatively and required insulin and subsequently blood sugars improved and he is managed with Metformin now. He had elevated CK of 1118 on admission due to rhabdomyolysis from being down on floor prior to admission. He was treated with IV fluids and CK improved.  I discussed his recent clinical course with patient, his wife and son who is a pediatric physician and his son indicated that patient was being evaluated by neurologist Dr. Hussein Haider regarding his gait instability and falls, and lumbar spine MRI was done, EMG studies which showed peripheral neuropathy as well as muscle biopsy was being planned of the right quadriceps to evaluate  his proximal weakness in bilateral lower extremities.  He is unable to do active straight leg raise in bilateral lower extremities in bed, unable to localize position sense in his left great toe and I also discussed with patient, his wife and son at bedside.  I also mentioned to them that neurology will be conservative at him during his stay at [facility] and that he should follow up with Dr. Hussein Haider from neurology on outpatient basis for further evaluation and workup as appropriate.  He is on subcutaneous Heparin and SCDs for DVT prophylaxis.  He is able to tolerate regular with thins consistency diet.  On 6/4/25, hemoglobin was 11.3, WBC count was  "11.85, platelets were 236, BUN was 22, creatinine was 1.1, sodium was 134 and potassium was 3.3.  He has been needing assistance for mobility, transfers, self-care. He is transferred to [facility] on 6/4/25 for further rehabilitation care.     SUBJ: Discussed recommendations of PCC with patient.  Progressing well in therapy.  Requiring close supervision for sit to stand transfer with physical therapy.  Able to ambulate 200 feet with front wheeled walker with close supervision with physical therapy.  He feels his balance is improving.  Feels comfortable with discharge plans to home later this week.    ROS: Denies chest pain or shortness of breath. Other ROS negative. Past, family, social history is unchanged.    Current Functional Status:   Bed mobility:   [place], Supine to Sit: close supervision   [place], Sit to Supine: touching/steadying assist   Transfers:    [place], Sit to Stand Transfer: close supervision  [place], Stand to Sit Transfer: close supervision   [place], Stand Pivot/Stand Step Transfer: close supervision   Gait:   [place], Gait: close supervision  Assistive Device: walker, front-wheeled   Distance in Feet: 200 feet    Bathing:   [place]: close supervision   Toileting:   [place]: touching/steadying assist   Upper body dressing:   [place]: minimum assist (75% or more patient effort)   Lower body dressing:   [place]: minimum assist (75% or more patient effort)       Functional Progress:    Functional status reviewed. Overall, patient's functional status is improving.      Physical Exam      Blood pressure (!) 140/75, pulse (!) 50, temperature 36.4 °C (97.6 °F), temperature source Oral, resp. rate 18, height 1.803 m (5' 11\"), weight 102 kg (225 lb 6.4 oz), SpO2 97%.    Body mass index is 31.44 kg/m².    General Appearance: Not in acute distress  Head/Ear/Nose/Throat: Normocephalic; Atraumatic.   Eye: EOMI; PERRL.   Neck: Healing incision " posterior cervicothoracic spine.  Dressing in place.    Respiratory: Decreased breath sounds at bases.   Cardiovascular: RRR; Normal S1, S2.   Gastrointestinal: Soft; NT; +BS.   Extremities: Bilateral lower extremity edema noted.    Musculoskeletal: Functional active range of motion in both upper extremities except some limitation in left shoulder and is unable to lift left arm up overhead.  Some limitation of active range of motion in both hips and both knees, which is chronic according to patient and his wife at bedside.  Patient is unable to do active straight leg raise and either lower extremity.  He has had previous bilateral hip and bilateral knee replacements.  His wife mentions patient was lifting his lower extremities manually with his arms while getting in the car and after he sat down in the seat inside the car manually left each leg to bring them in the car.   Neurological: AAO ×3. Speech is fluent. Cranial nerve examination does not reveal any gross facial asymmetry. Strength testing about 4/5 strength in both upper extremities except left shoulder is 2-/5 and abduction and forward flexion.  Bilateral hip flexion is 2-/5.  Bilateral quadriceps are 2+/5 with the thighs supported.  Bilateral ankle dorsi and plantar flexion is 3+/5.  He denies significant numbness in his legs and feet at this time.  He is grossly able to localize position sense in his right great toe but not so in his left great toe.  He is able to localize vibration sense in both great toes.  Deep tendon reflexes are hypoactive and symmetric bilaterally.  Plantars are flexor.  Coordination is functional upper extremities.  Both knee jerks were not tested.  Behavior/Emotional: Appropriate; Cooperative.   Skin: Healing incision posterior cervicothoracic spine.  Dressing in place.  Some abrasions noted on right upper extremity.  Left forearm has bruising ecchymosis noted.  Small wound noted on sacrum/coccyx at least stage II decubitus cannot  rule out DTI.      Current Facility-Administered Medications:     acetaminophen (TYLENOL) tablet 650 mg, 650 mg, oral, q6h KOFI, Chandler Falk MD, 650 mg at 06/18/25 1748    alum-mag hydroxide-simeth (MAALOX) 200-200-20 mg/5 mL suspension 30 mL, 30 mL, oral, q6h PRN, Chandler Falk MD    atorvastatin (LIPITOR) tablet 20 mg, 20 mg, oral, Daily, Robert Hawkins MD, 20 mg at 06/18/25 0748    bisacodyL (DULCOLAX) 10 mg suppository 10 mg, 10 mg, rectal, Daily PRN, Chandler Falk MD    cholecalciferol (vitamin D3) tablet 25 mcg, 25 mcg, oral, Daily, Chandler Falk MD, 25 mcg at 06/18/25 0749    cyanocobalamin (VITAMIN B12) tablet 1,000 mcg, 1,000 mcg, oral, Daily, Chandler Falk MD, 1,000 mcg at 06/18/25 0748    glucose chewable tablet 15-30 g of dextrose, 15-30 g of dextrose, oral, PRN **OR** dextrose 40 % oral gel 15-30 g of dextrose, 15-30 g of dextrose, oral, PRN **OR** glucagon (GLUCAGEN) injection 1 mg, 1 mg, intramuscular, PRN **OR** dextrose 50 % in water (D50) injection 12.5 g, 25 mL, intravenous, PRN, Chase Anna MD    enoxaparin (LOVENOX) syringe 40 mg, 40 mg, subcutaneous, Daily (6p), Robert Hawkins MD, 40 mg at 06/18/25 1749    felodipine (PLENDIL) 24 hr ER tablet 10 mg, 10 mg, oral, Daily, Chandler Falk MD, 10 mg at 06/18/25 0748    finasteride (PROSCAR) tablet 5 mg, 5 mg, oral, Daily, Robert Hawkins MD, 5 mg at 06/18/25 0748    hydrALAZINE (APRESOLINE) tablet 25 mg, 25 mg, oral, q6h PRN, Robert Hawkins MD, 25 mg at 06/08/25 1849    insulin lispro U-100 (HumaLOG) pen 2-5 Units, 2-5 Units, subcutaneous, BID AC, Chandler Falk MD    lisinopriL (PRINIVIL) tablet 10 mg, 10 mg, oral, BID, Annette Sherman MD, 10 mg at 06/18/25 0748    losartan (COZAAR) tablet 100 mg, 100 mg, oral, Daily, Robert Hawkins MD, 100 mg at 06/18/25 0748    metFORMIN (GLUCOPHAGE) tablet 500 mg, 500 mg, oral, BID with breakfast and dinner, Robert Hawkins MD, 500 mg at 06/18/25 1748    metoprolol  tartrate (LOPRESSOR) tablet 75 mg, 75 mg, oral, BID, Annette Sherman MD, 75 mg at 06/18/25 0748    netarsudiL-latanoprost 0.02-0.005 % drops 1 drop, 1 drop, Right Eye, Nightly, Chandler Falk MD, 1 drop at 06/17/25 2046    oxyCODONE (ROXICODONE) immediate release tablet 5 mg, 5 mg, oral, q6h PRN, Annette Sherman MD, 5 mg at 06/18/25 0345    pantoprazole (PROTONIX) tablet,delayed release (DR/EC) 40 mg, 40 mg, oral, Daily before breakfast, Chandler Falk MD, 40 mg at 06/18/25 0748    polyethylene glycol (MIRALAX) 17 gram packet 17 g, 17 g, oral, Daily PRN, Chandler Falk MD    sennosides-docusate sodium (SENOKOT-S) 8.6-50 mg per tablet 2 tablet, 2 tablet, oral, Daily PRN, Chandler Falk MD    tamsulosin (FLOMAX) 24 hr ER capsule 0.4 mg, 0.4 mg, oral, Nightly, Robert Hawkins MD, 0.4 mg at 06/17/25 2044    timolol (TIMOPTIC) 0.5 % ophthalmic solution 1 drop, 1 drop, Both Eyes, Daily, Robert Hawkins MD, 1 drop at 06/18/25 0752       Labs / Radiology    Lab Results   Component Value Date    WBC 7.12 06/16/2025    HGB 10.0 (L) 06/16/2025    HCT 29.4 (L) 06/16/2025    MCV 94.2 06/16/2025     (H) 06/16/2025     Lab Results   Component Value Date    GLUCOSE 130 (H) 06/16/2025    CALCIUM 8.5 (L) 06/16/2025     (L) 06/16/2025    K 3.6 06/16/2025    CO2 24 06/16/2025     06/16/2025    BUN 22 06/16/2025    CREATININE 1.1 06/16/2025       Assessment and Plan    ASSESSMENT PLAN:  1. 75-year-old left handed white male with chronic conditions significant for hypertension, hyperlipidemia, diabetes mellitus type 2, BPH, glaucoma, colon cancer, gait instability, multiple falls in the home environment for which he was seeing a neurologist for workup of falls, history of prior bilateral hip replacements and bilateral knee replacements, spinal stenosis status post previous C3-C6 laminectomy and posterolateral instrumented fusion over 10 years ago by Dr. Maurer, presented to the ER at [facility] on  5/31/25 after suffering from a mechanical fall while at home and was unable to get up so he was on the floor all night until he was found.  At [facility] ER and he reported neck pain and pain in the lower cervical and upper thoracic region.  Imaging studies revealed C7 Chance fracture and findings of DISH (diffuse idiopathic skeletal hyperostosis ).  He was subsequently transferred to [facility] for neurosurgical evaluation where he was admitted on 5/31/25.  He was evaluated by Dr. Benjamin from neurosurgery, noted to have a 3 column Chance fracture at C7-T1 level, given highly unstable fracture was recommended surgical intervention by neurosurgery. He underwent C7 ORIF, removal of C3-6 hardware and C3-T1 PLIF by neurosurgery Dr. Raza Benjamin on 6/1/25.  Postoperatively is allowed weightbearing as tolerated on both lower extremities.  He has been given Aspen collar for use when out of bed in chair and when ambulating.  He can use soft cervical collar in the bed.  I discussed spinal precautions with him.  Postoperative course was significant for anemia but he did not require transfusion.  His pain is managed with Tylenol and Oxycodone.  He had elevated blood pressure requiring IV Hydralazine but blood pressure improved after surgery and hypertension was managed with managed with Felodipine, Lisinopril, Losartan and Metoprolol.  He is continued on Lipitor for hyperlipidemia.  He has history of BPH and remains on Tamsulosin and Finasteride.  He was on Netarsudil-Latanoprost and Timoptic eye drops for glaucoma.  He had elevated blood sugars related to Decadron perioperatively and required insulin and subsequently blood sugars improved and he is managed with Metformin now. He had elevated CK of 1118 on admission due to rhabdomyolysis from being down on floor prior to admission. He was treated with IV fluids and CK improved.  I discussed his recent clinical course with patient, his wife and son who is a  pediatric physician and his son indicated that patient was being evaluated by neurologist Dr. Hussein Haider regarding his gait instability and falls, and lumbar spine MRI was done, EMG studies which showed peripheral neuropathy as well as muscle biopsy was being planned of the right quadriceps to evaluate  his proximal weakness in bilateral lower extremities.  He is unable to do active straight leg raise in bilateral lower extremities in bed, unable to localize position sense in his left great toe and I also discussed with patient, his wife and son at bedside.  I also mentioned to them that neurology will be conservative at him during his stay at [facility] and that he should follow up with Dr. Hussein Haider from neurology on outpatient basis for further evaluation and workup as appropriate.  He is on subcutaneous Heparin and SCDs for DVT prophylaxis.  He is able to tolerate regular with thins consistency diet.  On 6/4/25, hemoglobin was 11.3, WBC count was 11.85, platelets were 236, BUN was 22, creatinine was 1.1, sodium was 134 and potassium was 3.3.  He has been needing assistance for mobility, transfers, self-care. He is transferred to [place] on 6/4/25 for further rehabilitation care.     2. DVT prophylaxis - on subcutaneous Lovenox.  On SCDs.  Platelets 264 on 6/5/2025.  Platelets 307 on 6/9/2025.  Platelets 353 on 6/16/2025.     3. Neurosurgery - status post fall, unstable 3 column C7 Chance fracture, status post C7 ORIF, removal of prior C3-6 hardware and C3-T1 PLIF by neurosurgery, history of gait instability and multiple falls, peripheral neuropathy, class I obesity, multiple medical problems - continue PT, OT, psychology.  Follow falls precautions, cardiac precautions, aspiration precautions, spinal precautions.  Aspen collar to neck when out of bed.  Monitor healing of posterior cervical incision.  Neurology was consulted.  Neurosurgery recommended removal of catgut sutures and drain  sutures at [facility] about 2 weeks postop which has been completed by nursing.  Patient will follow-up at neurosurgery office after discharge from [facility].     4. GI - On Protonix for GI prophylaxis. On Senokot-S.  On MiraLAX.  On PRN Dulcolax suppository.  On PRN Maalox.     5.  -on Proscar.  On Flomax.     6. CVS - on Plendil.  On Lisinopril.  On Cozaar.  On Lopressor.  On PRN Hydralazine.     7. Pulmonary - encourage incentive spirometry.     8. Hematology - monitor hemoglobin, platelets.  Hemoglobin 12.2, WBC 10.71 on 6/5/2025.  Hemoglobin 11.0, WBC 7.59 on 6/9/2025.  Hemoglobin 10.0, WBC 7.12 on 6/16/2025.     9. Pain -on Tylenol.  On PRN Oxycodone.     10. Skin - Healing incision posterior cervicothoracic spine.  Dressing in place.  Some abrasions noted on right upper extremity.  Left forearm has bruising ecchymosis noted.  Small wound noted on sacrum/coccyx at least stage II decubitus cannot rule out DTI.     11. F/E/N - on vitamin B-12.  BMI 33.63 consistent with class I obesity.  Nutrition was consulted.     12. Diabetes mellitus type 2  - on sliding scale Humalog coverage.  On Glucophage.  On hypoglycemic protocol.     13. Ophthalmology- He was on NetarsudiL-latanoprost and Timoptic eye drops for glaucoma     14. Hyperlipidemia - on Lipitor.     15. Rehabilitation medicine - Continue comprehensive rehabilitation care. Continue PT, OT, psychology.  Follow falls precautions, cardiac precautions, aspiration precautions, spinal precautions.  Aspen collar to neck when out of bed.  Monitor healing of posterior cervical incision.  Neurology was consulted.Tolerating therapies per endurance on 6/6/2025.  Slept well last night.  Noted to have elevated PVR.  Requiring moderate assistance for sit to stand transfer with physical therapy.  Able to ambulate 20 feet with front wheeled walker dependent gait with physical therapy on 6/6/2025. Discussed with nurse on 6/6/2025. Working on ADLs with  occupational therapy on 6/7/2025.  Dependent for upper and lower body dressing, requiring minimal assistance for toileting, moderate assistance for bathing with occupational therapy on 6/7/2025.  Discussed with nurse regarding his PVRs on 6/7/2025.  Using [place] catheter at nighttime.  Discussed with patient on 6/7/2025.Discussed with patient and with his wife with him on 6/9/2025.  They feel he is making progress in therapy.  Requiring minimal assistance for sit to stand transfer with physical therapy.  Able to ambulate 100 feet with front wheeled walker with minimal assistance with physical therapy.  Reviewed labs on 6/9/2025.Discussed patients progress in therapies with therapists in team meeting on 6/10/2025. Discussed in PCC. See PCC documentation.  Continent of bowel and bladder for nursing on 6/10/2025.Discussed recommendations of PCC with patient on 6/11/2025.  Inspected posterior cervical incision which is healing well.  Working on ADLs occupational therapy.  Requiring moderate assistance for upper and lower body dressing, moderate assistance for toileting and bathing in occupational therapy on 6/11/2025.Apparently per nursing his pain medications fell off last night and house physician did not renew them according to nurse on 6/12/2025. Discussed with patient regarding Epic generated automatic stop on narcotics causing these medications to be discontinued but medications were reviewed today by internist.  Discussed with nurse on 6/12/2025.Progressing in therapy on 6/13/2025.  Requiring close supervision for sit to stand transfer with physical therapy.  Able to ambulate up 150 feet with front wheeled walker with touching/steadying assistance with physical therapy on 6/13/2025.Working on ADLs with occupational therapy on 6/14/2025.  Requiring minimal assistance for full body dressing, minimal assistance for lower dressing, touching/steadying assistance for toileting and close supervision for bathing in  occupational therapy on 6/14/2025.  Continent of bowel and bladder for nursing on 6/14/2025. Inspected posterior cervical incision on 6/16/2025 which is healing well.  Making progress in therapy.  Reviewed labs today.  Discussed with patient on 6/16/2025.Discussed patients progress in therapies with therapists in team meeting on 6/17/2025.  Discussed in PCC. See PCC documentation.  Family interested in diabetic education and staff doing that with them.  Discussed with nurse on 6/17/2025.Discussed recommendations of PCC with patient on 6/18/2025.  Progressing well in therapy.  Requiring close supervision for sit to stand transfer with physical therapy.  Able to ambulate 200 feet with front wheeled walker with close supervision with physical therapy on 6/18/2025.  He feels his balance is improving.  Feels comfortable with discharge plans to home later this week.  Discussed with patient on 6/18/2025.     16. Reviewed labs today.  BUN 20, creatinine 0.9, sodium 135, potassium 3.7 on 6/5/2025.  BUN 22, creatinine 1.1, sodium 132, potassium 3.4 on 6/9/2025.  BUN 21, creatinine 1.0, sodium 135, potassium 3.6 on 6/11/2025.  BUN 22, creatinine 1.1, sodium 134, potassium 3.6 on 6/16/2025.          Chase Anna MD  6/18/2025      This encounter was completed utilizing the Direct Speech Voice Recognition Software. Grammatical errors, random word insertions, pronoun errors, and incomplete sentences are occasional consequences of the system due to software limitations, ambient noises, and hardware issues. Such errors may be missed prior to affixing electronic signature. Any questions or concerns about the content, text, or information contained within the body of this dictation should be directly addressed to the physician for clarification. If you have any questions or concerns please do not hesitate to call me directly via EPIC chat, page, or email.

## 2025-06-19 ENCOUNTER — APPOINTMENT (INPATIENT)
Dept: PHYSICAL THERAPY | Facility: REHABILITATION | Age: 75
DRG: 560 | End: 2025-06-19
Payer: MEDICARE

## 2025-06-19 ENCOUNTER — APPOINTMENT (INPATIENT)
Dept: OCCUPATIONAL THERAPY | Facility: REHABILITATION | Age: 75
DRG: 560 | End: 2025-06-19
Payer: MEDICARE

## 2025-06-19 LAB
GLUCOSE BLD-MCNC: 116 MG/DL (ref 70–99)
GLUCOSE BLD-MCNC: 93 MG/DL (ref 70–99)
POCT TEST: ABNORMAL
POCT TEST: NORMAL

## 2025-06-19 PROCEDURE — 63700000 HC SELF-ADMINISTRABLE DRUG: Performed by: INTERNAL MEDICINE

## 2025-06-19 PROCEDURE — 63700000 HC SELF-ADMINISTRABLE DRUG: Performed by: HOSPITALIST

## 2025-06-19 PROCEDURE — 12800000 HC ROOM AND CARE SEMIPRIVATE REHAB

## 2025-06-19 PROCEDURE — 97116 GAIT TRAINING THERAPY: CPT | Mod: GP

## 2025-06-19 PROCEDURE — 97110 THERAPEUTIC EXERCISES: CPT | Mod: GP

## 2025-06-19 PROCEDURE — 63600000 HC DRUGS/DETAIL CODE: Mod: JZ | Performed by: INTERNAL MEDICINE

## 2025-06-19 PROCEDURE — 12800001 HC ROOM AND CARE SEMIPRIVATE REHAB-BRAIN INJ

## 2025-06-19 PROCEDURE — 97535 SELF CARE MNGMENT TRAINING: CPT | Mod: GO

## 2025-06-19 PROCEDURE — 97110 THERAPEUTIC EXERCISES: CPT | Mod: GO

## 2025-06-19 PROCEDURE — 97530 THERAPEUTIC ACTIVITIES: CPT | Mod: GO

## 2025-06-19 PROCEDURE — 97530 THERAPEUTIC ACTIVITIES: CPT | Mod: GP

## 2025-06-19 RX ADMIN — ATORVASTATIN CALCIUM 20 MG: 20 TABLET, FILM COATED ORAL at 08:06

## 2025-06-19 RX ADMIN — TAMSULOSIN HYDROCHLORIDE 0.4 MG: 0.4 CAPSULE ORAL at 20:19

## 2025-06-19 RX ADMIN — LOSARTAN POTASSIUM 100 MG: 100 TABLET, FILM COATED ORAL at 08:06

## 2025-06-19 RX ADMIN — METOPROLOL TARTRATE 75 MG: 50 TABLET, FILM COATED ORAL at 20:19

## 2025-06-19 RX ADMIN — OXYCODONE HYDROCHLORIDE 5 MG: 5 TABLET ORAL at 03:19

## 2025-06-19 RX ADMIN — PANTOPRAZOLE SODIUM 40 MG: 40 TABLET, DELAYED RELEASE ORAL at 08:06

## 2025-06-19 RX ADMIN — ACETAMINOPHEN 650 MG: 325 TABLET ORAL at 12:47

## 2025-06-19 RX ADMIN — METFORMIN HYDROCHLORIDE 500 MG: 500 TABLET ORAL at 08:06

## 2025-06-19 RX ADMIN — METFORMIN HYDROCHLORIDE 500 MG: 500 TABLET ORAL at 17:46

## 2025-06-19 RX ADMIN — LISINOPRIL 10 MG: 10 TABLET ORAL at 08:06

## 2025-06-19 RX ADMIN — LISINOPRIL 10 MG: 10 TABLET ORAL at 20:19

## 2025-06-19 RX ADMIN — FINASTERIDE 5 MG: 5 TABLET, FILM COATED ORAL at 08:06

## 2025-06-19 RX ADMIN — TIMOLOL MALEATE 1 DROP: 5 SOLUTION/ DROPS OPHTHALMIC at 09:25

## 2025-06-19 RX ADMIN — ACETAMINOPHEN 650 MG: 325 TABLET ORAL at 23:17

## 2025-06-19 RX ADMIN — CYANOCOBALAMIN TAB 1000 MCG 1000 MCG: 1000 TAB at 08:06

## 2025-06-19 RX ADMIN — METOPROLOL TARTRATE 75 MG: 50 TABLET, FILM COATED ORAL at 08:06

## 2025-06-19 RX ADMIN — ACETAMINOPHEN 650 MG: 325 TABLET ORAL at 06:21

## 2025-06-19 RX ADMIN — FELODIPINE 10 MG: 5 TABLET, FILM COATED, EXTENDED RELEASE ORAL at 08:06

## 2025-06-19 RX ADMIN — Medication 25 MCG: at 08:06

## 2025-06-19 RX ADMIN — ACETAMINOPHEN 650 MG: 325 TABLET ORAL at 17:45

## 2025-06-19 RX ADMIN — ENOXAPARIN SODIUM 40 MG: 40 INJECTION SUBCUTANEOUS at 17:46

## 2025-06-19 ASSESSMENT — PATIENT HEALTH QUESTIONNAIRE - PHQ9: SUM OF ALL RESPONSES TO PHQ9 QUESTIONS 1 & 2: 0

## 2025-06-19 NOTE — PLAN OF CARE
Plan of Care Review  Outcome Evaluation: Patient AAOx4, pleasant, cooperative. Safety precautions maintained. Medications reviewed. Cont of bladder. Incision steris intact, DOMINGA. Supervision with RW. Pain managed with sched Tylenol. BID accucheck, no insulin administered per order parameters. Aspen worn OOB. Manual VS obtained. Patient able to make needs known, call bell within reach. No nursing concerns at the moment.

## 2025-06-19 NOTE — DISCHARGE INSTR - ACTIVITY
Occupational Therapy:    Toilet Transfers: Aneesh completes toilet transfers via ambulatory approach using rolling walker at modified independent level.   Shower/Tub Transfers: Aneesh completes shower transfers via ambulatory approach using rolling walker at modified independent level.   Upper Body Dressing: Aneesh completes upper body dressing at modified independent level.   Lower Body Dressing: Aneesh completes lower body dressing at modified independent level. Recommend use of reacher, sock aide, long handled shoe horn  Bathing: Aneesh completes bathing while seated at modified independent level. Recommend use of long handled sponge   Toileting: Aneesh completes toileting at modified independent level.   Grooming: Aneesh completes grooming at modified independent level.   Household Mobility/Household Activity: Recommend assistance for completion of household activity.   Driving: Driving is unsafe and not recommended at this time. Consult your surgeon for approval before resuming driving. Already has Handicap Placard.  Upper Extremity Management: Continue to follow issued scapular stability and dowel exercise home exercise program.   DME: Recommend use of shower chair and toilet safety frame for maximized safety in the home environment.   Precautions: Spinal precautions in place until cleared by surgeon. No bending or twisting neck, no lifting greater than 5 pounds   Orthosis: Aspen cervical collar at all times out of bed, don seated. Sunflower collar for showering.    Entered by: Letha HORVATH/L CBIS on: 6/19/2025      Physical Therapy:      Bed mobility: Aneesh requires supervision for bed mobility. Depending on the bed, he might need assist for getting legs up and in bed. Be sure to maintain spinal precautions!     Transfers: Aneesh is modified independent with transfers using a rolling walker/rollator.     Ambulation: Aneesh is modified independent with ambulation while using a rolling  walker/rollator     Elevations: Aneesh requires supervision for stairs and curb/steps.      Entered by: Bandar Tillman PT, DPT on: 6/19/25       Additional:     Weight-Bearing Status: weight bearing as tolerated     Precautions: spinal (no bending, lifting >10lbs, twisting)     Durable Medical Equipment:  Patient was issued a rolling walker from [employer].  Please call [phone] with any issues or questions.     Home Exercise Program: Aneesh was issued lower body home exercise program to be completed 3-5x/weekly.

## 2025-06-19 NOTE — PROGRESS NOTES
Transitions of Care  Pharmacy Counseling Note      SUMMARY   Met with Aneesh Brito for medication education. The patient states he is familiar  with his medications. Reviewed all patient’s current medications in detail. Discussed the name, dose, frequency, indication and side effects. Discussed any changes in regimen.    -Reviewed benefits and side effects of patient's medications.  -Instructed patient to take no more than 4,000 mg of Tylenol within a 24 hour interval.  -Counseled patient on lisinopril. Explained medication is for hypertension. Discussed side effects including dry cough. Instructed patient to follow-up with their PCP if coughing becomes intolerable. Instructed patient to monitor for signs/symptoms of angioedema (swelling of the face, tongue, and/or lips) and to report to the emergency room if they experience them.  -Informed patient their metoprolol tartrate dose was increased from 50 mg twice daily to 75 mg twice daily.   -Informed patient they will be sent home with a 5 day supply of oxycodone 5 mg and to follow-up with their surgeon if they require more for their pain.  -Counseled patient on pantoprazole. Explained medication is for GERD.    Confirmed preferred pharmacy, [facility] in [place], [place].     Provided my phone extension for any issues/concerns in this transitional period: [phone]      MEDICATIONS     Medication List    Current Facility-Administered Medications:     acetaminophen (TYLENOL) tablet 650 mg, 650 mg, oral, q6h KOFIDileep Scott, MD, 650 mg at 06/19/25 0621    alum-mag hydroxide-simeth (MAALOX) 200-200-20 mg/5 mL suspension 30 mL, 30 mL, oral, q6h PRN, Chandler Falk MD    atorvastatin (LIPITOR) tablet 20 mg, 20 mg, oral, Daily, Robert Hawkins MD, 20 mg at 06/19/25 0806    bisacodyL (DULCOLAX) 10 mg suppository 10 mg, 10 mg, rectal, Daily PRN, Chandler Falk MD    cholecalciferol (vitamin D3) tablet 25 mcg, 25 mcg, oral, Daily, Chandler Falk MD, 25  mcg at 06/19/25 0806    cyanocobalamin (VITAMIN B12) tablet 1,000 mcg, 1,000 mcg, oral, Daily, Chandler Falk MD, 1,000 mcg at 06/19/25 0806    glucose chewable tablet 15-30 g of dextrose, 15-30 g of dextrose, oral, PRN **OR** dextrose 40 % oral gel 15-30 g of dextrose, 15-30 g of dextrose, oral, PRN **OR** glucagon (GLUCAGEN) injection 1 mg, 1 mg, intramuscular, PRN **OR** dextrose 50 % in water (D50) injection 12.5 g, 25 mL, intravenous, PRN, Chase Anna MD    enoxaparin (LOVENOX) syringe 40 mg, 40 mg, subcutaneous, Daily (6p), Robert Hawkins MD, 40 mg at 06/18/25 1749    felodipine (PLENDIL) 24 hr ER tablet 10 mg, 10 mg, oral, Daily, Chandler Falk MD, 10 mg at 06/19/25 0806    finasteride (PROSCAR) tablet 5 mg, 5 mg, oral, Daily, Robert Hawkins MD, 5 mg at 06/19/25 0806    hydrALAZINE (APRESOLINE) tablet 25 mg, 25 mg, oral, q6h PRN, Robert Hawkins MD, 25 mg at 06/08/25 1849    insulin lispro U-100 (HumaLOG) pen 2-5 Units, 2-5 Units, subcutaneous, BID AC, Chandler Falk MD    lisinopriL (PRINIVIL) tablet 10 mg, 10 mg, oral, BID, Annette Sherman MD, 10 mg at 06/19/25 0806    losartan (COZAAR) tablet 100 mg, 100 mg, oral, Daily, Robert Hawkins MD, 100 mg at 06/19/25 0806    metFORMIN (GLUCOPHAGE) tablet 500 mg, 500 mg, oral, BID with breakfast and dinner, Robert Hawkins MD, 500 mg at 06/19/25 0806    metoprolol tartrate (LOPRESSOR) tablet 75 mg, 75 mg, oral, BID, Annette Sherman MD, 75 mg at 06/19/25 0806    netarsudiL-latanoprost 0.02-0.005 % drops 1 drop, 1 drop, Right Eye, Nightly, Chandler Falk MD, 1 drop at 06/18/25 2012    oxyCODONE (ROXICODONE) immediate release tablet 5 mg, 5 mg, oral, q6h PRN, Annette Sherman MD, 5 mg at 06/19/25 0319    pantoprazole (PROTONIX) tablet,delayed release (DR/EC) 40 mg, 40 mg, oral, Daily before breakfast, Chandler Falk MD, 40 mg at 06/19/25 0806    polyethylene glycol (MIRALAX) 17 gram packet 17 g, 17 g, oral, Daily PRN, Chandler Falk MD     sennosides-docusate sodium (SENOKOT-S) 8.6-50 mg per tablet 2 tablet, 2 tablet, oral, Daily PRN, Chandler Falk MD    tamsulosin (FLOMAX) 24 hr ER capsule 0.4 mg, 0.4 mg, oral, Nightly, Robert Hawkins MD, 0.4 mg at 06/18/25 2011    timolol (TIMOPTIC) 0.5 % ophthalmic solution 1 drop, 1 drop, Both Eyes, Daily, Robert Hawkins MD, 1 drop at 06/19/25 0925          Price Zamarripa, PharmD,    (Time Spent: 15 minutes)  6/19/2025

## 2025-06-19 NOTE — PROGRESS NOTES
Subjective    Patient seen and examined on rounds.  Chart reviewed.  Events overnight noted.  History reviewed briefly with patient.    CC: Deficits in mobility, transfers, self-care status post fall, unstable 3 column C7 Chance fracture, status post C7 ORIF, removal of prior C3-6 hardware and C3-T1 PLIF by neurosurgery, history of gait instability and multiple falls, peripheral neuropathy, multiple medical problems.    HPI:  Mr. Aneesh Brito is a 75-year-old left handed white male with chronic conditions significant for hypertension, hyperlipidemia, diabetes mellitus type 2, BPH, glaucoma, colon cancer, gait instability, multiple falls in the home environment for which he was seeing a neurologist for workup of falls, history of prior bilateral hip replacements and bilateral knee replacements, spinal stenosis status post previous C3-C6 laminectomy and posterolateral instrumented fusion over 10 years ago by Dr. Maurer, presented to the ER at [facility] on 5/31/25 after suffering from a mechanical fall while at home and was unable to get up so he was on the floor all night until he was found.  At [facility] ER and he reported neck pain and pain in the lower cervical and upper thoracic region.  Imaging studies revealed C7 Chance fracture and findings of DISH (diffuse idiopathic skeletal hyperostosis ).  He was subsequently transferred to [facility] for neurosurgical evaluation where he was admitted on 5/31/25.  He was evaluated by Dr. Benjamin from neurosurgery, noted to have a 3 column Chance fracture at C7-T1 level, given highly unstable fracture was recommended surgical intervention by neurosurgery. He underwent C7 ORIF, removal of C3-6 hardware and C3-T1 PLIF by neurosurgery Dr. Raza Benjamin on 6/1/25.  Postoperatively is allowed weightbearing as tolerated on both lower extremities.  He has been given Aspen collar for use when out of bed in chair and when ambulating.  He can use soft cervical  collar in the bed.  I discussed spinal precautions with him.  Postoperative course was significant for anemia but he did not require transfusion.  His pain is managed with Tylenol and Oxycodone.  He had elevated blood pressure requiring IV Hydralazine but blood pressure improved after surgery and hypertension was managed with managed with Felodipine, Lisinopril, Losartan and Metoprolol.  He is continued on Lipitor for hyperlipidemia.  He has history of BPH and remains on Tamsulosin and Finasteride.  He was on Netarsudil-Latanoprost and Timoptic eye drops for glaucoma.  He had elevated blood sugars related to Decadron perioperatively and required insulin and subsequently blood sugars improved and he is managed with Metformin now. He had elevated CK of 1118 on admission due to rhabdomyolysis from being down on floor prior to admission. He was treated with IV fluids and CK improved.  I discussed his recent clinical course with patient, his wife and son who is a pediatric physician and his son indicated that patient was being evaluated by neurologist Dr. Hussein Haider regarding his gait instability and falls, and lumbar spine MRI was done, EMG studies which showed peripheral neuropathy as well as muscle biopsy was being planned of the right quadriceps to evaluate  his proximal weakness in bilateral lower extremities.  He is unable to do active straight leg raise in bilateral lower extremities in bed, unable to localize position sense in his left great toe and I also discussed with patient, his wife and son at bedside.  I also mentioned to them that neurology will be conservative at him during his stay at [facility] and that he should follow up with Dr. Hussein Haider from neurology on outpatient basis for further evaluation and workup as appropriate.  He is on subcutaneous Heparin and SCDs for DVT prophylaxis.  He is able to tolerate regular with thins consistency diet.  On 6/4/25, hemoglobin was 11.3, WBC count was  "11.85, platelets were 236, BUN was 22, creatinine was 1.1, sodium was 134 and potassium was 3.3.  He has been needing assistance for mobility, transfers, self-care. He is transferred to [facility] on 6/4/25 for further rehabilitation care.     SUBJ: Discussed recommendations of PCC with patient.  Progressing well in therapy.  Requiring close supervision for sit to stand transfer with physical therapy.  Able to ambulate 200 feet with front wheeled walker with close supervision with physical therapy.  He feels his balance is improving.  Feels comfortable with discharge plans to home later this week.    ROS: Denies chest pain or shortness of breath. Other ROS negative. Past, family, social history is unchanged.    Current Functional Status:   Bed mobility:   [place], Supine to Sit: close supervision   [place], Sit to Supine: touching/steadying assist   Transfers:    [place], Sit to Stand Transfer: modified independence  [place], Stand to Sit Transfer: modified independence   [place], Stand Pivot/Stand Step Transfer: close supervision   Gait:   [place], Gait: close supervision  Assistive Device: walker, front-wheeled   Distance in Feet: 200 feet    Bathing:   [place]: modified independence   Toileting:   [place]: modified independence   Upper body dressing:   [place]: modified independence   Lower body dressing:   [place]: modified independence       Functional Progress:    Functional status reviewed. Overall, patient's functional status is improving.      Physical Exam      Blood pressure (!) 146/82, pulse 64, temperature 37.1 °C (98.7 °F), temperature source Oral, resp. rate 18, height 1.803 m (5' 11\"), weight 102 kg (225 lb 6.4 oz), SpO2 96%.    Body mass index is 31.44 kg/m².    General Appearance: Not in acute distress  Head/Ear/Nose/Throat: Normocephalic; Atraumatic.   Eye: EOMI; PERRL.   Neck: Healing incision posterior cervicothoracic spine.  " Dressing in place.    Respiratory: Decreased breath sounds at bases.   Cardiovascular: RRR; Normal S1, S2.   Gastrointestinal: Soft; NT; +BS.   Extremities: Bilateral lower extremity edema noted.    Musculoskeletal: Functional active range of motion in both upper extremities except some limitation in left shoulder and is unable to lift left arm up overhead.  Some limitation of active range of motion in both hips and both knees, which is chronic according to patient and his wife at bedside.  Patient is unable to do active straight leg raise and either lower extremity.  He has had previous bilateral hip and bilateral knee replacements.  His wife mentions patient was lifting his lower extremities manually with his arms while getting in the car and after he sat down in the seat inside the car manually left each leg to bring them in the car.   Neurological: AAO ×3. Speech is fluent. Cranial nerve examination does not reveal any gross facial asymmetry. Strength testing about 4/5 strength in both upper extremities except left shoulder is 2-/5 and abduction and forward flexion.  Bilateral hip flexion is 2-/5.  Bilateral quadriceps are 2+/5 with the thighs supported.  Bilateral ankle dorsi and plantar flexion is 3+/5.  He denies significant numbness in his legs and feet at this time.  He is grossly able to localize position sense in his right great toe but not so in his left great toe.  He is able to localize vibration sense in both great toes.  Deep tendon reflexes are hypoactive and symmetric bilaterally.  Plantars are flexor.  Coordination is functional upper extremities.  Both knee jerks were not tested.  Behavior/Emotional: Appropriate; Cooperative.   Skin: Healing incision posterior cervicothoracic spine.  Dressing in place.  Some abrasions noted on right upper extremity.  Left forearm has bruising ecchymosis noted.  Small wound noted on sacrum/coccyx at least stage II decubitus cannot rule out DTI.      Current  Facility-Administered Medications:     acetaminophen (TYLENOL) tablet 650 mg, 650 mg, oral, q6h KOFI, Chandler Falk MD, 650 mg at 06/19/25 1247    alum-mag hydroxide-simeth (MAALOX) 200-200-20 mg/5 mL suspension 30 mL, 30 mL, oral, q6h PRN, Chandler Falk MD    atorvastatin (LIPITOR) tablet 20 mg, 20 mg, oral, Daily, Robert Hawkins MD, 20 mg at 06/19/25 0806    bisacodyL (DULCOLAX) 10 mg suppository 10 mg, 10 mg, rectal, Daily PRN, Chandler Falk MD    cholecalciferol (vitamin D3) tablet 25 mcg, 25 mcg, oral, Daily, Chandler Falk MD, 25 mcg at 06/19/25 0806    cyanocobalamin (VITAMIN B12) tablet 1,000 mcg, 1,000 mcg, oral, Daily, Chandler Falk MD, 1,000 mcg at 06/19/25 0806    glucose chewable tablet 15-30 g of dextrose, 15-30 g of dextrose, oral, PRN **OR** dextrose 40 % oral gel 15-30 g of dextrose, 15-30 g of dextrose, oral, PRN **OR** glucagon (GLUCAGEN) injection 1 mg, 1 mg, intramuscular, PRN **OR** dextrose 50 % in water (D50) injection 12.5 g, 25 mL, intravenous, PRN, Chase Anna MD    enoxaparin (LOVENOX) syringe 40 mg, 40 mg, subcutaneous, Daily (6p), Robert Hawkins MD, 40 mg at 06/18/25 1749    felodipine (PLENDIL) 24 hr ER tablet 10 mg, 10 mg, oral, Daily, Chandler Falk MD, 10 mg at 06/19/25 0806    finasteride (PROSCAR) tablet 5 mg, 5 mg, oral, Daily, Robert Hawkins MD, 5 mg at 06/19/25 0806    hydrALAZINE (APRESOLINE) tablet 25 mg, 25 mg, oral, q6h PRN, Robert Hawkins MD, 25 mg at 06/08/25 1849    insulin lispro U-100 (HumaLOG) pen 2-5 Units, 2-5 Units, subcutaneous, BID AC, Chandler Falk MD    lisinopriL (PRINIVIL) tablet 10 mg, 10 mg, oral, BID, Annette Sherman MD, 10 mg at 06/19/25 0806    losartan (COZAAR) tablet 100 mg, 100 mg, oral, Daily, Robert Hawkins MD, 100 mg at 06/19/25 0806    metFORMIN (GLUCOPHAGE) tablet 500 mg, 500 mg, oral, BID with breakfast and dinner, Robert Hawkins MD, 500 mg at 06/19/25 0806    metoprolol tartrate (LOPRESSOR) tablet 75 mg,  75 mg, oral, BID, Annette Sherman MD, 75 mg at 06/19/25 0806    netarsudiL-latanoprost 0.02-0.005 % drops 1 drop, 1 drop, Right Eye, Nightly, Chandler Falk MD, 1 drop at 06/18/25 2012    oxyCODONE (ROXICODONE) immediate release tablet 5 mg, 5 mg, oral, q6h PRN, Annette Sherman MD, 5 mg at 06/19/25 0319    pantoprazole (PROTONIX) tablet,delayed release (DR/EC) 40 mg, 40 mg, oral, Daily before breakfast, Chandler Falk MD, 40 mg at 06/19/25 0806    polyethylene glycol (MIRALAX) 17 gram packet 17 g, 17 g, oral, Daily PRN, Chandler Falk MD    sennosides-docusate sodium (SENOKOT-S) 8.6-50 mg per tablet 2 tablet, 2 tablet, oral, Daily PRN, Chandler Falk MD    tamsulosin (FLOMAX) 24 hr ER capsule 0.4 mg, 0.4 mg, oral, Nightly, Robert Hawkins MD, 0.4 mg at 06/18/25 2011    timolol (TIMOPTIC) 0.5 % ophthalmic solution 1 drop, 1 drop, Both Eyes, Daily, Robert Hawkins MD, 1 drop at 06/19/25 0925       Labs / Radiology    Lab Results   Component Value Date    WBC 7.12 06/16/2025    HGB 10.0 (L) 06/16/2025    HCT 29.4 (L) 06/16/2025    MCV 94.2 06/16/2025     (H) 06/16/2025     Lab Results   Component Value Date    GLUCOSE 130 (H) 06/16/2025    CALCIUM 8.5 (L) 06/16/2025     (L) 06/16/2025    K 3.6 06/16/2025    CO2 24 06/16/2025     06/16/2025    BUN 22 06/16/2025    CREATININE 1.1 06/16/2025       Assessment and Plan    ASSESSMENT PLAN:  1. 75-year-old left handed white male with chronic conditions significant for hypertension, hyperlipidemia, diabetes mellitus type 2, BPH, glaucoma, colon cancer, gait instability, multiple falls in the home environment for which he was seeing a neurologist for workup of falls, history of prior bilateral hip replacements and bilateral knee replacements, spinal stenosis status post previous C3-C6 laminectomy and posterolateral instrumented fusion over 10 years ago by Dr. Maurer, presented to the ER at [facility] on 5/31/25 after suffering from a  mechanical fall while at home and was unable to get up so he was on the floor all night until he was found.  At [facility] ER and he reported neck pain and pain in the lower cervical and upper thoracic region.  Imaging studies revealed C7 Chance fracture and findings of DISH (diffuse idiopathic skeletal hyperostosis ).  He was subsequently transferred to [facility] for neurosurgical evaluation where he was admitted on 5/31/25.  He was evaluated by Dr. Benjamin from neurosurgery, noted to have a 3 column Chance fracture at C7-T1 level, given highly unstable fracture was recommended surgical intervention by neurosurgery. He underwent C7 ORIF, removal of C3-6 hardware and C3-T1 PLIF by neurosurgery Dr. Raza Benjamin on 6/1/25.  Postoperatively is allowed weightbearing as tolerated on both lower extremities.  He has been given Aspen collar for use when out of bed in chair and when ambulating.  He can use soft cervical collar in the bed.  I discussed spinal precautions with him.  Postoperative course was significant for anemia but he did not require transfusion.  His pain is managed with Tylenol and Oxycodone.  He had elevated blood pressure requiring IV Hydralazine but blood pressure improved after surgery and hypertension was managed with managed with Felodipine, Lisinopril, Losartan and Metoprolol.  He is continued on Lipitor for hyperlipidemia.  He has history of BPH and remains on Tamsulosin and Finasteride.  He was on Netarsudil-Latanoprost and Timoptic eye drops for glaucoma.  He had elevated blood sugars related to Decadron perioperatively and required insulin and subsequently blood sugars improved and he is managed with Metformin now. He had elevated CK of 1118 on admission due to rhabdomyolysis from being down on floor prior to admission. He was treated with IV fluids and CK improved.  I discussed his recent clinical course with patient, his wife and son who is a pediatric physician and his son  indicated that patient was being evaluated by neurologist Dr. Hussein Haider regarding his gait instability and falls, and lumbar spine MRI was done, EMG studies which showed peripheral neuropathy as well as muscle biopsy was being planned of the right quadriceps to evaluate  his proximal weakness in bilateral lower extremities.  He is unable to do active straight leg raise in bilateral lower extremities in bed, unable to localize position sense in his left great toe and I also discussed with patient, his wife and son at bedside.  I also mentioned to them that neurology will be conservative at him during his stay at [facility] and that he should follow up with Dr. Hussein Haider from neurology on outpatient basis for further evaluation and workup as appropriate.  He is on subcutaneous Heparin and SCDs for DVT prophylaxis.  He is able to tolerate regular with thins consistency diet.  On 6/4/25, hemoglobin was 11.3, WBC count was 11.85, platelets were 236, BUN was 22, creatinine was 1.1, sodium was 134 and potassium was 3.3.  He has been needing assistance for mobility, transfers, self-care. He is transferred to [place] [facility] on 6/4/25 for further rehabilitation care.     2. DVT prophylaxis - on subcutaneous Lovenox.  On SCDs.  Platelets 264 on 6/5/2025.  Platelets 307 on 6/9/2025.  Platelets 353 on 6/16/2025.     3. Neurosurgery - status post fall, unstable 3 column C7 Chance fracture, status post C7 ORIF, removal of prior C3-6 hardware and C3-T1 PLIF by neurosurgery, history of gait instability and multiple falls, peripheral neuropathy, class I obesity, multiple medical problems - continue PT, OT, psychology.  Follow falls precautions, cardiac precautions, aspiration precautions, spinal precautions.  Aspen collar to neck when out of bed.  Monitor healing of posterior cervical incision.  Neurology was consulted.  Neurosurgery [facility]  weeks postop which has been completed by nursing.  Patient will follow-up at neurosurgery office after discharge from [place] [facility].     4. GI - On Protonix for GI prophylaxis. On Senokot-S.  On MiraLAX.  On PRN Dulcolax suppository.  On PRN Maalox.     5.  -on Proscar.  On Flomax.     6. CVS - on Plendil.  On Lisinopril.  On Cozaar.  On Lopressor.  On PRN Hydralazine.     7. Pulmonary - encourage incentive spirometry.     8. Hematology - monitor hemoglobin, platelets.  Hemoglobin 12.2, WBC 10.71 on 6/5/2025.  Hemoglobin 11.0, WBC 7.59 on 6/9/2025.  Hemoglobin 10.0, WBC 7.12 on 6/16/2025.     9. Pain -on Tylenol.  On PRN Oxycodone.     10. Skin - Healing incision posterior cervicothoracic spine.  Dressing in place.  Some abrasions noted on right upper extremity.  Left forearm has bruising ecchymosis noted.  Small wound noted on sacrum/coccyx at least stage II decubitus cannot rule out DTI.     11. F/E/N - on vitamin B-12.  BMI 33.63 consistent with class I obesity.  Nutrition was consulted.     12. Diabetes mellitus type 2  - on sliding scale Humalog coverage.  On Glucophage.  On hypoglycemic protocol.     13. Ophthalmology- He was on NetarsudiL-latanoprost and Timoptic eye drops for glaucoma     14. Hyperlipidemia - on Lipitor.     15. Rehabilitation medicine - Continue comprehensive rehabilitation care. Continue PT, OT, psychology.  Follow falls precautions, cardiac precautions, aspiration precautions, spinal precautions.  Aspen collar to neck when out of bed.  Monitor healing of posterior cervical incision.  Neurology was consulted.Tolerating therapies per endurance on 6/6/2025.  Slept well last night.  Noted to have elevated PVR.  Requiring moderate assistance for sit to stand transfer with physical therapy.  Able to ambulate 20 feet with front wheeled walker dependent gait with physical therapy on 6/6/2025. Discussed with nurse on 6/6/2025. Working on ADLs with occupational therapy on 6/7/2025.  Dependent  for upper and lower body dressing, requiring minimal assistance for toileting, moderate assistance for bathing with occupational therapy on 6/7/2025.  Discussed with nurse regarding his PVRs on 6/7/2025.  Using [place] catheter at nighttime.  Discussed with patient on 6/7/2025.Discussed with patient and with his wife with him on 6/9/2025.  They feel he is making progress in therapy.  Requiring minimal assistance for sit to stand transfer with physical therapy.  Able to ambulate 100 feet with front wheeled walker with minimal assistance with physical therapy.  Reviewed labs on 6/9/2025.Discussed patients progress in therapies with therapists in team meeting on 6/10/2025. Discussed in PCC. See PCC documentation.  Continent of bowel and bladder for nursing on 6/10/2025.Discussed recommendations of PCC with patient on 6/11/2025.  Inspected posterior cervical incision which is healing well.  Working on ADLs occupational therapy.  Requiring moderate assistance for upper and lower body dressing, moderate assistance for toileting and bathing in occupational therapy on 6/11/2025.Apparently per nursing his pain medications fell off last night and house physician did not renew them according to nurse on 6/12/2025. Discussed with patient regarding Epic generated automatic stop on narcotics causing these medications to be discontinued but medications were reviewed today by internist.  Discussed with nurse on 6/12/2025.Progressing in therapy on 6/13/2025.  Requiring close supervision for sit to stand transfer with physical therapy.  Able to ambulate up 150 feet with front wheeled walker with touching/steadying assistance with physical therapy on 6/13/2025.Working on ADLs with occupational therapy on 6/14/2025.  Requiring minimal assistance for full body dressing, minimal assistance for lower dressing, touching/steadying assistance for toileting and close supervision for bathing in occupational therapy on 6/14/2025.  Continent of  bowel and bladder for nursing on 6/14/2025. Inspected posterior cervical incision on 6/16/2025 which is healing well.  Making progress in therapy.  Reviewed labs today.  Discussed with patient on 6/16/2025.Discussed patients progress in therapies with therapists in team meeting on 6/17/2025.  Discussed in PCC. See PCC documentation.  Family interested in diabetic education and staff doing that with them.  Discussed with nurse on 6/17/2025.Discussed recommendations of PCC with patient on 6/18/2025.  Progressing well in therapy.  Requiring close supervision for sit to stand transfer with physical therapy.  Able to ambulate 200 feet with front wheeled walker with close supervision with physical therapy on 6/18/2025.  He feels his balance is improving.  Feels comfortable with discharge plans to home later this week.  Discussed with patient on 6/18/2025.     16. Reviewed labs today.  BUN 20, creatinine 0.9, sodium 135, potassium 3.7 on 6/5/2025.  BUN 22, creatinine 1.1, sodium 132, potassium 3.4 on 6/9/2025.  BUN 21, creatinine 1.0, sodium 135, potassium 3.6 on 6/11/2025.  BUN 22, creatinine 1.1, sodium 134, potassium 3.6 on 6/16/2025.          Chase Anna MD  6/19/2025      This encounter was completed utilizing the Direct Speech Voice Recognition Software. Grammatical errors, random word insertions, pronoun errors, and incomplete sentences are occasional consequences of the system due to software limitations, ambient noises, and hardware issues. Such errors may be missed prior to affixing electronic signature. Any questions or concerns about the content, text, or information contained within the body of this dictation should be directly addressed to the physician for clarification. If you have any questions or concerns please do not hesitate to call me directly via EPIC chat, page, or email.

## 2025-06-19 NOTE — PLAN OF CARE
Plan of Care Review  Plan of Care Reviewed With: patient  Progress: improving  Outcome Evaluation: Alert and oriented x4. Continent of bowel and bladder, using a urinal overnight. Pain managed with scheduled Tylenol and PRN oxycodone. Neck incision is DOMINGA with steristrips in place. Aspen collar worn OOB. Accuchecks BID with no s/s of hyper/hypo-glycemia noted. Sleeping quietly in bed overnight. Fall and safety measures maintained.

## 2025-06-19 NOTE — PROGRESS NOTES
[facility] Progress Note          Patient was seen and examined.   Attestation Notes: Face to face encounter completed    Aneesh Brito is a 75 y.o. male who was admitted for Falls, sequela [W19.XXXS]. Patient was referred by Chase Anna MD for medical assessment and management      CC: Falls, sequela [W19.XXXS]     HPI: Aneesh Brito is a 75 y.o. male with HTN, HLD, DM2, colon ca, BPH, glaucoma, spinal stenosis s/p previous cervical fusion, presented to [facility] 5/31/25 s/p fall, found to have C7 fracture and transferred to [facility] where he underwent C7 ORIF, removal of C3-6 hardware and C3-T1 PLIF by neurosurgery Dr. Raza Benjamin on 6/1/25.     Post op he had anemia but did not require transfusion. He was put on SQ Heparin for DVT ppx. His pain was managed with Tylenol and Oxycodone.      He had elevated BP with surgery requiring IV Hydralzine but BP improved after surgery and HTN managed with Felodipine, Lisinopril, Losartan and Metoprolol.      He was on Lipitor for HLD. He was on Finasteride and Tamsulosin for BPH.  He was on netarsudiL-latanoprost and timoptic eye drops for glaucoma.      He had very elevated blood sugars alaina-op, related to IVFs and Decadron, and required insulin. Blood sugars improved and DM2 managed with Metformin.      He had elevated CK of 1118 on admission due to rhabdomyolysis from being down on floor prior to admission. He was treated with IVFs and CK improved.      The patient was evaluated by PM&R and found to have residual deficits in ambulation and ADLs due to Falls, sequela [W19.XXXS] and came to [facility] on 6/4/2025 for acute inpatient rehab.     [facility] hospital course:    He participated in therapy. He was seen by Nutrition, STACY. Vit D low, started oral supplement.    The patient has steadily improved in therapy and is for discharge home 6/20/2025        SUBJECTIVE:  Patient interviewed and examined    He remains without acute  complaints and feels ready for discharge home tomorrow.    Pain stable, no new numbness or weakness, moving bowels, chronic urine incontinence, no abdominal pain or nausea, no dysuria, no chest pain, palpitations, dyspnea or fevers    Review of Systems:  All other systems reviewed and negative except as noted in the HPI.    Current meds and allergies reviewed  Current Facility-Administered Medications   Medication Dose Route Frequency    acetaminophen  650 mg oral q6h KOFI    alum-mag hydroxide-simeth  30 mL oral q6h PRN    atorvastatin  20 mg oral Daily    bisacodyL  10 mg rectal Daily PRN    cholecalciferol (vitamin D3)  25 mcg oral Daily    cyanocobalamin  1,000 mcg oral Daily    glucose  15-30 g of dextrose oral PRN    Or    dextrose  15-30 g of dextrose oral PRN    Or    glucagon  1 mg intramuscular PRN    Or    dextrose 50 % in water (D50)  25 mL intravenous PRN    enoxaparin  40 mg subcutaneous Daily (6p)    felodipine  10 mg oral Daily    finasteride  5 mg oral Daily    hydrALAZINE  25 mg oral q6h PRN    insulin lispro U-100  2-5 Units subcutaneous BID AC    lisinopriL  10 mg oral BID    losartan  100 mg oral Daily    metFORMIN  500 mg oral BID with breakfast and dinner    metoprolol tartrate  75 mg oral BID    netarsudiL-latanoprost  1 drop Right Eye Nightly    oxyCODONE  5 mg oral q6h PRN    pantoprazole  40 mg oral Daily before breakfast    polyethylene glycol  17 g oral Daily PRN    sennosides-docusate sodium  2 tablet oral Daily PRN    tamsulosin  0.4 mg oral Nightly    timolol  1 drop Both Eyes Daily        Past Medical History:   Diagnosis Date    C7 cervical fracture (CMS/[facility]) 05/31/2025    Colonic adenoma     Diverticulosis of colon     Enlarged prostate with lower urinary tract symptoms (LUTS)     Glaucoma     Hypertension     Melanoma of skin (CMS/[facility])     Osteoarthritis of knee     Overweight     Spinal stenosis of lumbar region     Type 2 diabetes mellitus (CMS/[facility])      Past Surgical History    Procedure Laterality Date    1) ORIF C7 fracture 2) removal and replacement of C3-6 posterior intrumentation 3) C3 to T3 posterolaterral instrumented fusion  Depuy + removal set SMA C-arm O-Arm N/A 6/1/2025    Performed by Raza Benjamin MD at  OR    Appendectomy      Cervical fusion  06/01/2025    1) ORIF C7 fracture 2) removal and replacement of C3-6 posterior intrumentation 3) C3 to T3 posterolaterral instrumented fusion    Colonoscopy  01/10/2012    diverticulosis    Colonoscopy w/ polypectomy  02/09/2022    Hip arthroplasty Bilateral     Knee arthroplasty Left     Knee arthroplasty Right 02/02/2022     Social History     Tobacco Use    Smoking status: Never    Smokeless tobacco: Never   Substance Use Topics    Alcohol use: Yes     Comment: BEER, 1 CONSUMED DAILY    Drug use: Never      Family History   Problem Relation Name Age of Onset    Breast cancer Biological Mother         Vital signs in last 24 hours:  Temp:  [36.3 °C (97.4 °F)-37.1 °C (98.7 °F)] 37.1 °C (98.7 °F)  Heart Rate:  [50-75] 64  Resp:  [18] 18  BP: (140-152)/(64-82) 146/82  Vital signs reviewed 06/19/25 1:32 PM    Physical Exam  Vitals and nursing note reviewed.   Constitutional:       Appearance: Normal appearance.   HENT:      Head: Normocephalic and atraumatic.      Right Ear: External ear normal.      Left Ear: External ear normal.      Nose: Nose normal.      Mouth/Throat:      Pharynx: Oropharynx is clear.   Eyes:      Conjunctiva/sclera: Conjunctivae normal.   Neck:      Comments: S/p cervical fusion in collar  Cardiovascular:      Rate and Rhythm: Normal rate.      Pulses: Normal pulses.   Pulmonary:      Effort: Pulmonary effort is normal.   Abdominal:      General: Abdomen is flat.   Musculoskeletal:      Right lower leg: Edema present.      Left lower leg: Edema present.   Skin:     Findings: Lesion (right arm, right ear, sacral wounds as documented by nursing) present.   Neurological:      Mental Status: He is alert and  oriented to person, place, and time.                 Objective    Labs:  I have reviewed the patient's labs.  Significant abnormals are anemia, hyponatremia.  Results from last 7 days   Lab Units 06/16/25  0550   WBC K/uL 7.12   HEMOGLOBIN g/dL 10.0*   HEMATOCRIT % 29.4*   PLATELETS K/uL 353*     Results from last 7 days   Lab Units 06/16/25  0550   SODIUM mEQ/L 134*   POTASSIUM mEQ/L 3.6   CHLORIDE mEQ/L 101   CO2 mEQ/L 24   BUN mg/dL 22   CREATININE mg/dL 1.1   CALCIUM mg/dL 8.5*   ALBUMIN g/dL 3.2*   BILIRUBIN TOTAL mg/dL 0.3   ALK PHOS IU/L 100   ALT IU/L 15   AST IU/L 11*   GLUCOSE mg/dL 130*                  6/5/25: vit d 25-oh: 13 (low)    Blood sugars: 121-118-107    Imaging:  Not applicable        ASSESSMENT/PLAN:    75 y.o. malewith HTN, HLD, DM2, colon ca, BPH, glaucoma, spinal stenosis s/p previous cervical fusion, presented to [facility] 5/31/25 s/p fall, found to have C7 fracture and transferred to [facility] where he underwent C7 ORIF, removal of C3-6 hardware and C3-T1 PLIF by neurosurgery Dr. Raza Benjamin on 6/1/25     1. Neuro:  # Cervical stenosis  # acute C7 fx  # DISH  -6/1/25s/p C7 ORIF, removal of C3-6 hardware and C3-T1 PLIF by neurosurgery Dr. Raza Benjamin   -pain managed with Tylenol and Oxycodone     2. Vasc:  # DVT ppx  -bilat LE edema  -SCDs and SQ Heparin for DVT ppx, can stop on discharge     3. Heme:  # ABLA  -post op anemia  -6/16/25 hgb 10.0, stable  -5/12/25 B12 <400, added oral B12  -on multivitamin  # leukocytosis  -reactive vs due to Decadron  -6/16/25 wbc back to normal at 7.12  -follow CBC     4. Renal:  # hyponatremia  -6/16/25 Na+ stable at 134, no intervention  -increased risk of dehydration and electrolyte changes  -follow BMP, Mg     5. Gi:  # constipation  -improved and bowel meds changed to as needed  # ulcer ppx  -Protonix ulcer ppx     6. Gu:  # BPH  -on Proscar and Flomax  -using [place] cath at HS for incontinence  -having high volume urine output at  night with some urine retention requiring intermitten cath     7. Cardiac:  # HTN  -on Felodipine, Lisinopril, Losartan and Metoprolol  -watch for orthostatic hypotension  -use cardiac precautions in therapy     8. Pulm:  -incentive spirometry for atelectasis     9. Derm:  # multiple wounds  -surgical wounds  -wounds on right arm, right ear, sacrum as documented by nursing  -seen by [employer] for skin assessment  -wound care per nursing and WOCN consult     10. Nutrition:  -Adult Diet Regular; Thin Liquids; Consistent Carbohydrate 2000   -sen by Nutrition for assessment and education     11. Endo:  # HLD  -on Lipitor  # DM2  -had steroid induced hyperglycemia due to Decadron, now improved  -on Metformin  -accuchecks BID AC with Lispro scale for BG>200  -hypoglycemia protocol     12. Psych:  # anxiety/depression  -consulted Psychology for support     13. Musculoskeletal:  # rhabdomyolysis  -clinically resolved  -6/5/25 CK back to normal at 124  # vit D deficiency  -6/5/25 Vit D low at 13, started on vit D3 25 mcg po daily.    14. Ophth:  # glaucoma  -on netarsudiL-latanoprost and timoptic eye drops     14. Dispo:  -code status: Full Code  -Expected discharge date 6/20/2025  -to home  -Reviewed chart and meds  -Completed discharge documentation and wrote scripts as needed       plan discussed with patient, nurse, case management and Chase Anna MD Scott Sapperstein, MD  6/19/2025  1:32 PM

## 2025-06-19 NOTE — PROGRESS NOTES
Patient: Aneesh Brito  Location: [facility] [address]  MRN: [id number]  Today's date: 6/19/2025    History of Present Illness    Aneesh is a 75 y.o. male with a history of colon carcinoma, BPH, hypertension, glaucoma, type 2 diabetes, history of a cervical fusion over 10 years ago admitted after a fall inability to get up on his own.  He was seen at [facility] where imaging studies were performed which demonstrated DISH as well as a Chance fracture at the bottom of C7.  He was transferred to [facility] for neurosurgery.    He was taken to the OR on 6/1 by Dr. Benjamin and underwent an ORIF of the C7 Chance fracture with removal and replacement of the C3-6 posterior instrumentation as well as a C3-T3 posterior lateral fusion.  Drains remain in place.  He is on subcu heparin for DVT prophylaxis.  Currently on a nicardipine drip for blood pressure control.  He states pain is well-controlled.  He denies any other complaints of headache or dizziness, chest pain or shortness of breath.  He is voiding on his own.  He has not had a bowel movement yet.    He was seen by PT and OT needing moderate assistance of 2 to transfer sit to stand.  He ambulated 2 feet with moderate assistance of 2 using a rolling walker.  Pertinent radiology results reviewed. Pertinent lab results reviewed.      Past Medical History  Aneehs has a past medical history of C7 cervical fracture (CMS/HCC) (05/31/2025), Colonic adenoma, Diverticulosis of colon, Enlarged prostate with lower urinary tract symptoms (LUTS), Glaucoma, Hypertension, Melanoma of skin (CMS/HCC), Osteoarthritis of knee, Overweight, Spinal stenosis of lumbar region, and Type 2 diabetes mellitus (CMS/HCC).    PT Vitals      Date/Time Pulse HR Source BP BP Location BP Method Pt Position [facility]   06/19/25 1335 70 Monitor 138/60 Left upper arm Manual Sitting PJ          PT Pain      Date/Time Pain Type Location Rating: Rest Rating: Activity Interventions  Who   06/19/25 1332 Pain Reassessment -- 4 -- -- TG   06/19/25 1335 Pain Assessment neck 4 4 diversional activity provided PJ   06/19/25 1429 Pain Reassessment neck 4 5 relaxation techniques promoted PJ                  Prior Living Environment      Flowsheet Row Most Recent Value   People in Home spouse   Current Living Arrangements independent living facility   Home Accessibility wheelchair accessible   Living Environment Comment PTA residing c wife in [facility] ([facility] 1 floor set up. Shower stall c shower chair & grab bars. Reports no DME at toilet - information to be verified c pt. wife   Number of Stairs, Main Entrance 0   Patient Bedroom Access Comment PTA residing c wife in [facility] ([facility]). 1 floor set up. Shower stall c shower chair & grab bars. Reports no DME at toilet - information to be verified c pt. wife   Bathroom Access Comment PTA residing c wife in [facility] ([facility]). 1 floor set up. Shower stall c shower chair & grab bars. Reports no DME at toilet - information to be verified c pt. wife   Number of Stairs, Within Home, Primary 0       Prior Level of Function      Flowsheet Row Most Recent Value   Dominant Hand left   Ambulation assistive equipment  [rollator]   Transferring assistive equipment  [rollator]   Toileting independent   Bathing independent   Dressing independent   Eating independent   IADLs independent   Driving/Transportation    Communication understands/communicates without difficulty   Prior Level of Function Comment Pt reports being independent with use of SPC inside and Rollator to the dining jenkins. Independent ADLs. Denies other falls. Independent ADLs. (+) driving. Retired teacher   Assistive Device Currently Used at Home cane, straight, walker, 4-wheeled, shower chair        IRF PT Evaluation and Treatment - 06/19/25 1333          PT Time Calculation    Start Time 1330     Stop Time 1430     Time Calculation (min) 60 min        Session Details     Document Type Discharge Evaluation        General Information    Patient Profile Reviewed yes     General Observations of Patient Pt received in w/c in room, wife present        Mobility Belt    Mobility Belt Used During Session yes        Orthosis Neck Cervical Collar    Orthosis Properties Date Obtained: 06/01/25 Time Obtained: 1803 Location: Neck Type: Cervical Collar Therapeutic Indications: stabilization and support Wearing Schedule: wear when out of bed       Orthosis Neck Cervical Collar    Orthosis Properties Date Obtained: 06/01/25 Time Obtained: 0000 Location: Neck Type: Cervical Collar Features: Hard Description: Apen collar OOB don seated; [place] collar for shower Therapeutic Indications: stabilization and support Wearing Schedule: wear when out of bed       Sensory Assessment (Somatosensory)    Left LE Sensory Assessment general sensation;light touch awareness;light touch localization;proprioception;intact   5/5 proprioception at 1st MTP    Right LE Sensory Assessment general sensation;light touch awareness;light touch localization;intact;proprioception;impaired   4/5 proprioception at 1st MTP       Range of Motion (ROM)    Left Lower Extremity (ROM) left LE ROM is WFL except;ankle     Ankle, Left (ROM) to neutral DF     Right Lower Extremity (ROM) right LE ROM is WFL except     Ankle, Right (ROM) to neutral DF        Strength (Manual Muscle Testing)    Hip, Left (Strength) flex 3-/5, ext 3-/5, abd/add 3-/5, ER/IR 3-/5     Knee, Left (Strength) flex 4+/5, ext 4+/5     Ankle, Left (Strength) DF 4+/5, PF 4+/5, EV/INV 4+/5     Hip, Right (Strength) flex 3-/5, ext 3-/5, abd/add 3-/5, ER/IR 3-/5     Knee, Right (Strength) flex 4+/5, ext 4+/5     Ankle, Right (Strength) DF 4+/5, PF 4+/5, EV/INV 4+/5        Bed Mobility    [place], Roll Left modified independence     [place], Roll Right modified independence     [place], Supine to Sit close supervision     [place], Sit to Supine close  supervision     Safety/Cues maintaining precautions     Assistive Device bed rails     Comment performed on hospital bed, mod A rolling L/R, close S for sit<>supine to ensure BLEs into bed        Sit to Stand Transfer    [place], Sit to Stand Transfer modified independence     Safety/Cues increased time to complete     Assistive Device walker, front-wheeled     Comment w/c to stance, mod I        Stand to Sit Transfer    [place], Stand to Sit Transfer modified independence     Safety/Cues increased time to complete     Assistive Device walker, front-wheeled     Comment stance to w/c, mod I        Stand Pivot Transfer    [place], Stand Pivot/Stand Step Transfer modified independence     Safety/Cues increased time to complete;technique     Assistive Device walker, front-wheeled     Comment via amb approach, mod I        Car Transfer    Transfer Technique stand pivot     [place] modified independence     Safety/Cues increased time to complete     Assistive Device walker, front-wheeled     Comment via short distance amb approach, mod I        Gait Training    [place], Gait modified independence     Safety/Cues increased time to complete     Assistive Device walker, front-wheeled     Distance in Feet 200 feet     Pattern step-through;step-to     Deviations/Abnormal Patterns base of support, narrow;step length decreased     Bilateral Gait Deviations heel strike decreased     Comment 200ft x 1, 50ft x 2 w RW, mod I     [place], Picking Up Object modified independence   with reacher       Rough/Uneven Surface Gait Skills    [place] modified independence     Assistive Device walker, front-wheeled     Distance in Feet 15 feet     Comment up/down 15ft ramp, mod I        Stairs Training    [place], Stairs modified independence     Safety/Cues increased time to complete     Assistive Device railing     Handrail Location (Stairs) both sides     Number of Stairs 12     Stair Height 7 inches   "   Ascending Stairs Technique step-over-step     Descending Stairs Technique step-to-step     Comment up/down 12(7\") stairs w mod I        Wheelchair Mobility/Management    Method of Locomotion bimanual (upper extremity) propulsion     Wheelchair Type manual, lightweight     [place], Forward Propulsion maximum assist (25-49% patient effort)     [place], Steering Activities maximum assist (25-49% patient effort)     [place], Turning Activities maximum assist (25-49% patient effort)     Comment, Wheelchair Mobility pt propelled 10ft but had severe difficulty, propelled dependently rest of 140ft, max A for wheelchair propulsion        Balance    Static Sitting Balance WFL     Dynamic Sitting Balance WFL     Sit to Stand Dynamic Balance mild impairment;supported     Static Standing Balance mild impairment;supported     Dynamic Standing Balance mild impairment;supported        Motor Skills    Coordination bilateral;lower extremity;WFL     Functional Endurance fair(+): tolerated 200ft amb distance but gait quality decreased following 150ft     Muscle Tone bilateral;lower extremity(s);WFL        Postural Deviations    Head and Neck forward head     Shoulder left shoulder forward;right shoulder forward     Upper Back abducted scapulae        10 Meter Walk Test (Self-Selected Velocity)    Trial One: Ten Meter Walk Test (sec) 10.52 seconds     Trial Two: Ten Meter Walk Test (sec) 13.3 seconds     Assistive Device walker, front-wheeled     Trial One: Gait Speed (m/s) 0.57 m/s     Trial Two: Gait Speed (m/s) 0.45 m/s     Average Gait Speed (m/s): Two Trials 0.51 m/s        Discharge Summary (PT)    Outcomes Achieved/Progress Made Upon Discharge (PT) all goals met within established timeframes     Transfer to Another Level of Care or Facility (PT) recommend therapy via home health     Discharge Summary Statement (PT) Aneesh is a 75 y.o. male with a history of colon carcinoma, BPH, hypertension, glaucoma, type 2 " diabetes, history of a cervical fusion over 10 years ago admitted after a fall inability to get up on his own. He was seen at [facility] where imaging studies were performed which demonstrated DISH as well as a Chance fracture at the bottom of C7. He was transferred to [facility] for neurosurgery. He was taken to the OR on 6/1 by Dr. Benjamin and underwent an ORIF of the C7 Chance fracture with removal and replacement of the C3-6 posterior instrumentation as well as a C3-T3 posterior lateral fusion. Pt admitted to [facility] on 6/4/25. Pt progressed to mod I for rolling, close S for sit<>supine, mod I for transfers, mod I for ambulation, and mod I for elevations.  FTR completed on 6/16. RW issued via [employer].  Pt continues to be limited by decreased strength/endurance, balance impairments, spinal precautions, and pain. Pt would benefit from ongoing therapy via home care to maximize independence and address remaining impairments. Goals partially achieved prior to discharge.                          IRF PT Goals      Flowsheet Row Most Recent Value   Bed Mobility Goal 1    Activity/Assistive Device rolling to left, rolling to right, sit to supine/supine to sit at 06/05/2025 0902   [place] minimum assist (75% or more patient effort) at 06/05/2025 0902   Time Frame short-term goal (STG), 1 week at 06/05/2025 0902   Progress/Outcome goal met at 06/19/2025 1333   Bed Mobility Goal 2    Activity/Assistive Device rolling to left, rolling to right, sit to supine/supine to sit at 06/05/2025 0902   [place] modified independence at 06/05/2025 0902   Time Frame long-term goal (LTG), 3 weeks at 06/05/2025 0902   Progress/Outcome goal partially met at 06/19/2025 1333   Transfer Goal 1    Activity/Assistive Device sit-to-stand/stand-to-sit, stand pivot at 06/05/2025 0902   [place] tactile cues required  [steadying] at 06/10/2025 0850   Time Frame short-term goal (STG), 1 week at 06/10/2025 0850    Progress/Outcome goal met at 06/19/2025 1333   Transfer Goal 2    Activity/Assistive Device sit-to-stand/stand-to-sit, stand pivot at 06/05/2025 0902   [place] modified independence at 06/05/2025 0902   Time Frame long-term goal (LTG), 3 weeks at 06/05/2025 0902   Progress/Outcome goal met at 06/19/2025 1333   Gait/Walking Locomotion Goal 1    Activity/Assistive Device gait (walking locomotion) at 06/05/2025 0902   Distance 50 feet at 06/05/2025 0902   [place] tactile cues required at 06/10/2025 0850   Time Frame short-term goal (STG), 1 week at 06/10/2025 0850   Progress/Outcome goal met at 06/19/2025 1333   Gait/Walking Locomotion Goal 2    Activity/Assistive Device gait (walking locomotion) at 06/05/2025 0902   Distance 150 feet at 06/05/2025 0902   [place] supervision required at 06/05/2025 0902   Time Frame long-term goal (LTG), 3 weeks at 06/05/2025 0902   Progress/Outcome goal met at 06/19/2025 1333   Stairs Goal 1    Activity/Assistive Device stairs, all skills at 06/10/2025 0850   Number of Stairs 8 at 06/10/2025 0850   [place] minimum assist (75% or more patient effort) at 06/10/2025 0850   Time Frame short-term goal (STG), 1 week at 06/10/2025 0850   Progress/Outcome goal met at 06/19/2025 1333   Stairs Goal 2    Activity/Assistive Device stairs, all skills at 06/10/2025 0850   Number of Stairs 12 at 06/10/2025 0850   [place] supervision required at 06/10/2025 0850   Time Frame long-term goal (LTG), 2 weeks at 06/10/2025 0850   Progress/Outcome goal met at 06/19/2025 1333

## 2025-06-19 NOTE — PLAN OF CARE
Plan of Care Review  Plan of Care Reviewed With: patient  Progress: improving  Outcome Evaluation: Patient AAOx4, pleasant, cooperative. Safety precautions maintained. Medications reviewed. Cont of bladder. Incision steris intact, DOMINGA. Supervision with RW. Pain managed with sched Tylenol. BID accucheck, no insulin administered per order parameters. Aspen worn OOB. Manual VS obtained. Patient able to make needs known, call bell within reach. No nursing concerns at the moment.

## 2025-06-19 NOTE — PROGRESS NOTES
Patient: Aneesh Brito  Location: [facility]  MRN: [id number]  Today's date: 6/19/2025    History of Present Illness    Aneesh is a 75 y.o. male with a history of colon carcinoma, BPH, hypertension, glaucoma, type 2 diabetes, history of a cervical fusion over 10 years ago admitted after a fall inability to get up on his own.  He was seen at [facility] where imaging studies were performed which demonstrated DISH as well as a Chance fracture at the bottom of C7.  He was transferred to [facility] for neurosurgery.    He was taken to the OR on 6/1 by Dr. Benjamin and underwent an ORIF of the C7 Chance fracture with removal and replacement of the C3-6 posterior instrumentation as well as a C3-T3 posterior lateral fusion.  Drains remain in place.  He is on subcu heparin for DVT prophylaxis.  Currently on a nicardipine drip for blood pressure control.  He states pain is well-controlled.  He denies any other complaints of headache or dizziness, chest pain or shortness of breath.  He is voiding on his own.  He has not had a bowel movement yet.    He was seen by PT and OT needing moderate assistance of 2 to transfer sit to stand.  He ambulated 2 feet with moderate assistance of 2 using a rolling walker.  Pertinent radiology results reviewed. Pertinent lab results reviewed.      Past Medical History  Aneesh has a past medical history of C7 cervical fracture (CMS/[facility]) (05/31/2025), Colonic adenoma, Diverticulosis of colon, Enlarged prostate with lower urinary tract symptoms (LUTS), Glaucoma, Hypertension, Melanoma of skin (CMS/[facility]), Osteoarthritis of knee, Overweight, Spinal stenosis of lumbar region, and Type 2 diabetes mellitus (CMS/[facility]).     06/19/25 0821 06/19/25 0928   Pain/Comfort/Sleep   Pain Charting Type Pain Assessment Pain Reassessment   Preferred Pain Scale word (verbal rating pain scale) word (verbal rating pain scale)   Word Pain Rating: Rest 4 - moderate pain 4 -  moderate pain   Word Pain Rating: Activity 4 - moderate pain 4 - moderate pain   Pain Body Location neck neck   Pain Management Interventions premedicated for activity premedicated for activity   Patient Observation   Patient Observations vitals per RN report  --    Vitals   Heart Rate 64  --    Heart Rate Source Monitor  --    BP (!) 146/82  --    BP Location Left upper arm  --    BP Method Manual  --    Patient Position Lying  --                 Prior Living Environment      Flowsheet Row Most Recent Value   People in Home spouse   Current Living Arrangements independent living facility   Home Accessibility wheelchair accessible   Living Environment Comment PTA residing c wife in [facility]). 1 floor set up. Shower stall c shower chair & grab bars. Reports no DME at toilet - information to be verified c pt. wife   Number of Stairs, Main Entrance 0   Patient Bedroom Access Comment PTA residing c wife in [facility]). 1 floor set up. Shower stall c shower chair & grab bars. Reports no DME at toilet - information to be verified c pt. wife   Bathroom Access Comment PTA residing c wife in [facility]). 1 floor set up. Shower stall c shower chair & grab bars. Reports no DME at toilet - information to be verified c pt. wife   Number of Stairs, Within Home, Primary 0       Prior Level of Function      Flowsheet Row Most Recent Value   Dominant Hand left   Ambulation assistive equipment  [rollator]   Transferring assistive equipment  [rollator]   Toileting independent   Bathing independent   Dressing independent   Eating independent   IADLs independent   Driving/Transportation    Communication understands/communicates without difficulty   Prior Level of Function Comment Pt reports being independent with use of SPC inside and Rollator to the dining jenkins. Independent ADLs. Denies other falls. Independent ADLs. (+) driving. Retired teacher   Assistive Device Currently Used at Home  "cane, straight, walker, 4-wheeled, shower chair       Occupational Profile      Flowsheet Row Most Recent Value   Successful Occupations Enjoys participating in the activities at [employer] - shuffle board and corn hole   Occupational History/Life Experiences PTA independent c ADLs. 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   Patient Goals \"Walk\"          06/19/25 0807   OT Time Calculation   Start Time 0800   Stop Time 0930   Time Calculation (min) 90 min   Session Details   Document Type Discharge Evaluation   General Information   General Observations of Patient pleasant, cooperative   Mobility Belt   Mobility Belt Used During Session yes   Cognitive Pattern Assessment   Cognitive Pattern Assessment Used BIMS   Brief Interview for Mental Status (BIMS)   Repetition of Three Words (First Attempt) 3   Temporal Orientation: Year Correct   Temporal Orientation: Month Accurate within 5 days   Temporal Orientation: Day Correct   Recall: \"Sock\" Yes, no cue required   Recall: \"Blue\" Yes, no cue required   Recall: \"Bed\" Yes, no cue required   BIMS Summary Score 15   Confusion Assessment Method (CAM)   Is there evidence of an acute change in mental status from the patient's baseline? No   Inattention Behavior not present   Disorganized thinking Behavior not present   Altered level of consciousness Behavior not present   Range of Motion (ROM)   Left Upper Extremity (ROM) left UE ROM is WFL except;shoulder   Shoulder, Left (ROM) PROM WFL all planes in supine. AROM WFL all planes except flexion 90 degrees in supine and 45 degrees abduction in supine   Right Upper Extremity (ROM) right UE ROM is WFL   Shoulder, Right (ROM) PROM & AROM WFL in supine   Strength (Manual Muscle Testing)   Shoulder, Left (Strength) 2-/5 flexion & abduction. 3/5 internal/external rotation, horizontal abd/adduction, adduction, extension   Elbow, " Left (Strength) 4/5   Wrist, Left (Strength) 4/5   Hand, Left (Strength) 4/5   Shoulder, Right (Strength) 3/5 flexion & abduction; 4/5 all other planes   Elbow, Right (Strength) 4+/5   Wrist, Right (Strength) 4+/5   Hand, Right (Strength) 4+/5   Bed Mobility   Assistive Device bed rails;head of bed elevated   Comment OT: Mod I supine to/from sit   Sit to Stand Transfer   [place], Sit to Stand Transfer modified independence   Assistive Device walker, front-wheeled   Comment From EOB & W/C   Stand to Sit Transfer   [place], Stand to Sit Transfer modified independence   Assistive Device walker, front-wheeled   Comment To EOB & W/C   Toilet Transfer   Transfer Technique   (Amb Tx)   [place] modified independence   Assistive Device accessible height toilet;grab bars/safety frame   Comment Ambulatory approach c RW   Shower Transfer   Transfer Technique   (Amb Tx)   [place] modified independence   Assistive Device grab bars/tub rail;tub bench   Comment Ambulatory approach c RW   Impairments/Safety Issues   Comment, Safety Issues/Impairments OT: HHM c RW in pt. room; Mod I status   Upper Extremity (Therapeutic Exercise)   Exercise Position/Type seated;supine;AROM (active range of motion)   General Exercise bilateral;other (see comments)  (scapular elevation/depression and protraction/retraction seated in W/C)   Range of Motion Exercises bilateral;shoulder flexion/extension;shoulder abduction/adduction;other (see comments)  (chest press. supine in bed using personal cane as dowel)   Reps and Sets 2 sets x 10 repetitions   Comment Issued and reviewed B UE HEP for scapular stability and dowel exercises; demonstrates understanding of completion   Bathing   Shower Provided? Yes   [place] modified independence   Position unsupported sitting;supported sitting   Adaptive Equipment grab bar/tub rail;hand-held shower spray hose;tub bench   Comment Full wet shower completed seated on tub bench. Neck incision  covered c waterproof dressing prior to full wet shower. Independent carryover long handled sponge   Upper Body Dressing   Tasks don;pull over garment   [facility] modified independence   Position supported sitting   Adaptive Equipment none   Comment UB dressing completed seated in W/C   Lower Body Dressing   Tasks don;pants/bottoms;shoes/slippers;socks;underwear   [facility] modified independence   Position supported sitting;supported standing;unsupported standing   Adaptive Equipment reacher;sock aid;long-handled shoe horn   [facility], Footwear modified independence   Comment LB dressing completed seated in W/C & in stance at modified independent level   Grooming   Tasks oral care (brushing teeth, cleaning dentures);brushes/shields hair   [facility] modified independence   Position supported standing;unsupported standing;sink side   Adaptive Equipment none   [facility], Oral Hygiene modified independence   Comment Performed in stance zat sink   Toileting   Tasks adjust/manage clothing;perform bladder hygiene;perform bowel hygiene   [facility] modified independence   Position supported sitting;supported standing;unsupported standing   Adaptive Equipment accessible height toilet;grab bar/safety frame   Comment continent of bowel, mod I status   Outcome Measures   Results, 9 Hole Peg Test of Fine Motor Coordination R UE 35.4 seconds, L UE 30.1 seconds    Assessment   Right Hand Dynamometer Position 2   Left Hand Dynamometer Position 2   Right  - Trial 1 40   Right  - Trial 2 40   Right  - Trial 3 40   Right  - Average of 3 Trials 40   Right  - Pain with Testing N   Left  - Trial 1 35   Left  - Trial 2 35   Left  - Trial 3 35   Left  - Average of 3 Trials 35   Left  - Pain with Testing N   Patient Specific Functional Scale (PSFS)   PSFS ACTIVITY 1 Walking   PSFS ANSWER 1 8   PSFS ACTIVITY 2 standing with good balance   PSFS ANSWER 2 8   PSFS ACTIVITY 3 Going to the  bathroom/showering independently   PSFS ANSWER 3 8   PSFS Sum of Activity Scores 24   PSFS Number of Activities 3   PSFS Percent Score 80 %   Transfer Goal 1   Progress/Outcome goal met   Transfer Goal 2   Progress/Outcome goal met   Transfer Goal 3   Progress/Outcome goal met   Transfer Goal 4   Progress/Outcome goal met   Bathing Goal 1   Progress/Outcome goal met   Bathing Goal 2   Progress/Outcome goal met   UB Dressing Goal 1   Progress/Outcome goal met   UB Dressing Goal 2   Progress/Outcome goal met   LB Dressing Goal 1   Progress/Outcome goal met   LB Dressing Goal 2   Progress/Outcome goal met   Grooming Goal 1   Progress/Outcome goal met   Grooming Goal 2   Progress/Outcome goal met   Toileting Goal 1   Progress/Outcome goal met   Toileting Goal 2   Progress/Outcome goal met   Daily Progress Summary (OT)   Daily Outcome Statement Good participation in treatment session. Performance day ADL completed at Mod I status. Outcome resassessment completed c improvements noted. Planned D/C home tomorrow.   Discharge Summary (OT)   Outcomes Achieved Upon Discharge (OT) all goals met within established timeframes   Discharge Summary Statement (OT) Pt. is previously independent 75 y.o. male presenting to [facility] s/p cervical spinal fusion 2* fall. During admission improvements in all areas of ADL & functional transfer. Additional improvements in balance, strength, endurance, mobility. Family training completed c pt. wife & daughter who are in agreement for DME Recommendations (shower chair and toilet safety frame) and anticipated care needs. Educated to refrain from driving until cleared by surgeon; already has handicap parking placard. Demonstrates understanding of issued scapular stabilitiy and shoulder dowel HEP. Planned D/C home tomorrow c continued therapy via home care.   Transfer to Another Level of Care or Facility (OT) recommend therapy via home health            Education Documentation  Strengthening Exercise,  taught by Letha Uribe OT at 6/19/2025 10:18 AM.  Learner: Patient  Readiness: Acceptance  Method: Explanation, Demonstration, Handout  Response: Verbalizes Understanding, Demonstrated Understanding  Comment: Issued and reviewed B UE HEP for scapular stability and dowel exercises; demonstrates understanding of completion    Range of Motion Exercises, taught by Letha Uribe OT at 6/19/2025 10:18 AM.  Learner: Patient  Readiness: Acceptance  Method: Explanation, Demonstration, Handout  Response: Verbalizes Understanding, Demonstrated Understanding  Comment: Issued and reviewed B UE HEP for scapular stability and dowel exercises; demonstrates understanding of completion    Pain, taught by Letha Uribe OT at 6/19/2025 10:18 AM.  Learner: Patient  Readiness: Acceptance  Method: Explanation, Demonstration, Handout  Response: Verbalizes Understanding, Demonstrated Understanding  Comment: Issued and reviewed B UE HEP for scapular stability and dowel exercises; demonstrates understanding of completion    Instructions, taught by Letha Uribe OT at 6/19/2025 10:18 AM.  Learner: Patient  Readiness: Acceptance  Method: Explanation, Demonstration, Handout  Response: Verbalizes Understanding, Demonstrated Understanding  Comment: Issued and reviewed B UE HEP for scapular stability and dowel exercises; demonstrates understanding of completion    Purpose and Goal, taught by Letha Uribe OT at 6/19/2025 10:18 AM.  Learner: Patient  Readiness: Acceptance  Method: Explanation, Demonstration, Handout  Response: Verbalizes Understanding, Demonstrated Understanding  Comment: Issued and reviewed B UE HEP for scapular stability and dowel exercises; demonstrates understanding of completion          [facility] OT Goals      Flowsheet Row Most Recent Value   Transfer Goal 1    Activity/Assistive Device toilet, commode, 3-in-1 at 06/05/2025 1033   [place] supervision required at 06/10/2025 0830   Time Frame short-term goal  (STG), 5 - 7 days at 06/17/2025 0706   Progress/Outcome goal met at 06/19/2025 0807   Transfer Goal 2    Activity/Assistive Device toilet, commode, 3-in-1 at 06/05/2025 1033   [place] modified independence at 06/05/2025 1033   Time Frame long-term goal (LTG), 21 days or less at 06/05/2025 1033   Progress/Outcome goal met at 06/19/2025 0807   Transfer Goal 3    Activity/Assistive Device shower, tub bench at 06/05/2025 1033   [place] supervision required at 06/10/2025 0830   Time Frame short-term goal (STG), 5 - 7 days at 06/17/2025 0706   Progress/Outcome goal met at 06/19/2025 0807   Transfer Goal 4    Activity/Assistive Device shower, tub bench at 06/05/2025 1033   [place] modified independence at 06/05/2025 1033   Time Frame long-term goal (LTG), 21 days or less at 06/05/2025 1033   Progress/Outcome goal met at 06/19/2025 0807   Bathing Goal 1    Activity/Assistive Device bathing skills, all at 06/05/2025 1033   [place] supervision required at 06/17/2025 0706   Time Frame short-term goal (STG), 5 - 7 days at 06/17/2025 0706   Strategies/Barriers spinal precautions inhibiting forward reach towards BLE, plan to introduce LHAE at 06/10/2025 0830   Progress/Outcome goal met at 06/19/2025 0807   Bathing Goal 2    Activity/Assistive Device bathing skills, all at 06/05/2025 1033   [place] modified independence at 06/05/2025 1033   Time Frame long-term goal (LTG), 21 days or less at 06/05/2025 1033   Progress/Outcome goal met at 06/19/2025 0807   UB Dressing Goal 1    Activity/Assistive Device upper body dressing at 06/05/2025 1033   [place] supervision required at 06/10/2025 0830   Time Frame short-term goal (STG), 5 - 7 days at 06/17/2025 0706   Progress/Outcome goal met at 06/19/2025 0807   UB Dressing Goal 2    Activity/Assistive Device upper body dressing at 06/05/2025 1033   [place] modified independence at 06/05/2025 1033   Time Frame long-term goal (LTG), 21 days or less at  06/05/2025 1033   Progress/Outcome goal met at 06/19/2025 0807   LB Dressing Goal 1    Activity/Assistive Device lower body dressing at 06/05/2025 1033   [place] other (see comments)  [steadying A] at 06/17/2025 0706   Time Frame short-term goal (STG), 5 - 7 days at 06/17/2025 0706   Progress/Outcome goal met at 06/19/2025 0807   LB Dressing Goal 2    Activity/Assistive Device lower body dressing at 06/05/2025 1033   [place] modified independence at 06/05/2025 1033   Time Frame long-term goal (LTG), 21 days or less at 06/05/2025 1033   Progress/Outcome goal met at 06/19/2025 0807   Grooming Goal 1    Activity/Assistive Device grooming skills, all at 06/05/2025 1033   [place] set-up required at 06/10/2025 0830   Time Frame short-term goal (STG), 5 - 7 days at 06/17/2025 0706   Strategies/Barriers standing tolerance/balance, still requiring A in stance at 06/10/2025 0830   Progress/Outcome goal met at 06/19/2025 0807   Grooming Goal 2    Activity/Assistive Device grooming skills, all at 06/05/2025 1033   [place] modified independence at 06/05/2025 1033   Time Frame long-term goal (LTG), 21 days or less at 06/05/2025 1033   Progress/Outcome goal met at 06/19/2025 0807   Toileting Goal 1    Activity/Assistive Device toileting skills, all at 06/05/2025 1033   [place] other (see comments)  [Cl S] at 06/17/2025 0706   Time Frame short-term goal (STG), 5 - 7 days at 06/17/2025 0706   Progress/Outcome goal met at 06/19/2025 0807   Toileting Goal 2    Activity/Assistive Device toileting skills, all at 06/05/2025 1033   [place] modified independence at 06/05/2025 1033   Time Frame long-term goal (LTG), 21 days or less at 06/05/2025 1033   Progress/Outcome goal met at 06/19/2025 0807

## 2025-06-19 NOTE — PROGRESS NOTES
Patient: Aneesh Brito  Location: [facility] [address]  MRN: [id number]  Today's date: 6/19/2025    History of Present Illness    Aneesh is a 75 y.o. male with a history of colon carcinoma, BPH, hypertension, glaucoma, type 2 diabetes, history of a cervical fusion over 10 years ago admitted after a fall inability to get up on his own.  He was seen at [facility] where imaging studies were performed which demonstrated DISH as well as a Chance fracture at the bottom of C7.  He was transferred to [facility] for neurosurgery.    He was taken to the OR on 6/1 by Dr. Benjamin and underwent an ORIF of the C7 Chance fracture with removal and replacement of the C3-6 posterior instrumentation as well as a C3-T3 posterior lateral fusion.  Drains remain in place.  He is on subcu heparin for DVT prophylaxis.  Currently on a nicardipine drip for blood pressure control.  He states pain is well-controlled.  He denies any other complaints of headache or dizziness, chest pain or shortness of breath.  He is voiding on his own.  He has not had a bowel movement yet.    He was seen by PT and OT needing moderate assistance of 2 to transfer sit to stand.  He ambulated 2 feet with moderate assistance of 2 using a rolling walker.  Pertinent radiology results reviewed. Pertinent lab results reviewed.      Past Medical History  Aneesh has a past medical history of C7 cervical fracture (CMS/HCC) (05/31/2025), Colonic adenoma, Diverticulosis of colon, Enlarged prostate with lower urinary tract symptoms (LUTS), Glaucoma, Hypertension, Melanoma of skin (CMS/HCC), Osteoarthritis of knee, Overweight, Spinal stenosis of lumbar region, and Type 2 diabetes mellitus (CMS/HCC).    PT Vitals      Date/Time Pulse HR Source BP BP Location BP Method Pt Position [facility]   06/19/25 1539 74 Monitor 133/65 Left upper arm Manual Sitting PJ          PT Pain      Date/Time Pain Type Location Rating: Rest Rating: Activity Interventions  [facility]   06/19/25 1539 Pain Assessment neck 4 4 diversional activity provided    06/19/25 9279 Pain Reassessment neck 4 4 relaxation techniques promoted PJ                  Prior Living Environment      Flowsheet Row Most Recent Value   People in Home spouse   Current Living Arrangements independent living facility   Home Accessibility wheelchair accessible   Living Environment Comment PTA residing c wife in [facility] ([facility] 1 floor set up. Shower stall c shower chair & grab bars. Reports no DME at toilet - information to be verified c pt. wife   Number of Stairs, Main Entrance 0   Patient Bedroom Access Comment PTA residing c wife in [facility] ([facility] 1 floor set up. Shower stall c shower chair & grab bars. Reports no DME at toilet - information to be verified c pt. wife   Bathroom Access Comment PTA residing c wife in [facility] ([facility] 1 floor set up. Shower stall c shower chair & grab bars. Reports no DME at toilet - information to be verified c pt. wife   Number of Stairs, Within Home, Primary 0       Prior Level of Function      Flowsheet Row Most Recent Value   Dominant Hand left   Ambulation assistive equipment  [rollator]   Transferring assistive equipment  [rollator]   Toileting independent   Bathing independent   Dressing independent   Eating independent   IADLs independent   Driving/Transportation    Communication understands/communicates without difficulty   Prior Level of Function Comment Pt reports being independent with use of SPC inside and Rollator to the dining jenkins. Independent ADLs. Denies other falls. Independent ADLs. (+) driving. Retired teacher   Assistive Device Currently Used at Home cane, straight, walker, 4-wheeled, shower chair        [facility] PT Evaluation and Treatment - 06/19/25 1530          PT Time Calculation    Start Time 1530     Stop Time 1600     Time Calculation (min) 30 min        Session Details    Document Type Discharge Evaluation        General  Information    Patient Profile Reviewed yes     General Observations of Patient Pt recieved in w/c in gym        Mobility Belt    Mobility Belt Used During Session yes        Orthosis Neck Cervical Collar    Orthosis Properties Date Obtained: 06/01/25 Time Obtained: 1803 Location: Neck Type: Cervical Collar Therapeutic Indications: stabilization and support Wearing Schedule: wear when out of bed       Orthosis Neck Cervical Collar    Orthosis Properties Date Obtained: 06/01/25 Time Obtained: 0000 Location: Neck Type: Cervical Collar Features: Hard Description: [place] collar OOB don seated; [place] collar for shower Therapeutic Indications: stabilization and support Wearing Schedule: wear when out of bed       Sit to Stand Transfer    [place], Sit to Stand Transfer modified independence     Safety/Cues increased time to complete     Assistive Device walker, front-wheeled     Comment w/c to stance, mod I        Stand to Sit Transfer    [place], Stand to Sit Transfer modified independence     Safety/Cues increased time to complete     Assistive Device walker, front-wheeled     Comment stance to w/c, mod I        Stand Pivot Transfer    [place], Stand Pivot/Stand Step Transfer modified independence     Safety/Cues increased time to complete     Assistive Device walker, front-wheeled     Comment via amb approach, mod I        Gait Training    [place], Gait modified independence     Safety/Cues increased time to complete     Assistive Device walker, front-wheeled     Distance in Feet 15 feet     Pattern step-through;step-to     Deviations/Abnormal Patterns base of support, narrow;step length decreased     Bilateral Gait Deviations heel strike decreased     Comment 15ft x 2 w RW, mod I        Lower Extremity (Therapeutic Exercise)    Exercise Position/Type standing;seated     General Exercise bilateral     Reps and Sets 2x10     Comment 1) standing marches 2) hip abd kicks 3) seated LAQs 4) mini  squats        Daily Progress Summary (PT)    Daily Outcome Statement Delivered HEP and performed without and compaints/issues. See previous PT note for formal discharge summary/assessment.                        IR[facility] PT Goals      Flowsheet Row Most Recent Value   Bed Mobility Goal 1    Activity/Assistive Device rolling to left, rolling to right, sit to supine/supine to sit at 06/05/2025 0902   [place] minimum assist (75% or more patient effort) at 06/05/2025 0902   Time Frame short-term goal (STG), 1 week at 06/05/2025 0902   Progress/Outcome goal met at 06/19/2025 1333   Bed Mobility Goal 2    Activity/Assistive Device rolling to left, rolling to right, sit to supine/supine to sit at 06/05/2025 0902   [place] modified independence at 06/05/2025 0902   Time Frame long-term goal (LTG), 3 weeks at 06/05/2025 0902   Progress/Outcome goal partially met at 06/19/2025 1333   Transfer Goal 1    Activity/Assistive Device sit-to-stand/stand-to-sit, stand pivot at 06/05/2025 0902   [place] tactile cues required  [steadying] at 06/10/2025 0850   Time Frame short-term goal (STG), 1 week at 06/10/2025 0850   Progress/Outcome goal met at 06/19/2025 1333   Transfer Goal 2    Activity/Assistive Device sit-to-stand/stand-to-sit, stand pivot at 06/05/2025 0902   [place] modified independence at 06/05/2025 0902   Time Frame long-term goal (LTG), 3 weeks at 06/05/2025 0902   Progress/Outcome goal met at 06/19/2025 1333   Gait/Walking Locomotion Goal 1    Activity/Assistive Device gait (walking locomotion) at 06/05/2025 0902   Distance 50 feet at 06/05/2025 0902   [place] tactile cues required at 06/10/2025 0850   Time Frame short-term goal (STG), 1 week at 06/10/2025 0850   Progress/Outcome goal met at 06/19/2025 1333   Gait/Walking Locomotion Goal 2    Activity/Assistive Device gait (walking locomotion) at 06/05/2025 0902   Distance 150 feet at 06/05/2025 0902   [place] supervision required at 06/05/2025  0902   Time Frame long-term goal (LTG), 3 weeks at 06/05/2025 0902   Progress/Outcome goal met at 06/19/2025 1333   Stairs Goal 1    Activity/Assistive Device stairs, all skills at 06/10/2025 0850   Number of Stairs 8 at 06/10/2025 0850   [place] minimum assist (75% or more patient effort) at 06/10/2025 0850   Time Frame short-term goal (STG), 1 week at 06/10/2025 0850   Progress/Outcome goal met at 06/19/2025 1333   Stairs Goal 2    Activity/Assistive Device stairs, all skills at 06/10/2025 0850   Number of Stairs 12 at 06/10/2025 0850   [place] supervision required at 06/10/2025 0850   Time Frame long-term goal (LTG), 2 weeks at 06/10/2025 0850   Progress/Outcome goal met at 06/19/2025 1333

## 2025-06-20 VITALS
HEIGHT: 71 IN | WEIGHT: 225.4 LBS | OXYGEN SATURATION: 97 % | BODY MASS INDEX: 31.56 KG/M2 | SYSTOLIC BLOOD PRESSURE: 110 MMHG | TEMPERATURE: 98 F | DIASTOLIC BLOOD PRESSURE: 78 MMHG | HEART RATE: 58 BPM | RESPIRATION RATE: 20 BRPM

## 2025-06-20 LAB
GLUCOSE BLD-MCNC: 112 MG/DL (ref 70–99)
POCT TEST: ABNORMAL

## 2025-06-20 PROCEDURE — 63700000 HC SELF-ADMINISTRABLE DRUG: Performed by: HOSPITALIST

## 2025-06-20 PROCEDURE — 63700000 HC SELF-ADMINISTRABLE DRUG: Performed by: INTERNAL MEDICINE

## 2025-06-20 RX ADMIN — LOSARTAN POTASSIUM 100 MG: 100 TABLET, FILM COATED ORAL at 09:15

## 2025-06-20 RX ADMIN — FELODIPINE 10 MG: 5 TABLET, FILM COATED, EXTENDED RELEASE ORAL at 09:14

## 2025-06-20 RX ADMIN — LISINOPRIL 10 MG: 10 TABLET ORAL at 09:15

## 2025-06-20 RX ADMIN — ATORVASTATIN CALCIUM 20 MG: 20 TABLET, FILM COATED ORAL at 09:15

## 2025-06-20 RX ADMIN — TIMOLOL MALEATE 1 DROP: 5 SOLUTION/ DROPS OPHTHALMIC at 09:18

## 2025-06-20 RX ADMIN — ACETAMINOPHEN 650 MG: 325 TABLET ORAL at 06:12

## 2025-06-20 RX ADMIN — Medication 25 MCG: at 09:14

## 2025-06-20 RX ADMIN — PANTOPRAZOLE SODIUM 40 MG: 40 TABLET, DELAYED RELEASE ORAL at 09:14

## 2025-06-20 RX ADMIN — METFORMIN HYDROCHLORIDE 500 MG: 500 TABLET ORAL at 09:14

## 2025-06-20 RX ADMIN — FINASTERIDE 5 MG: 5 TABLET, FILM COATED ORAL at 09:14

## 2025-06-20 RX ADMIN — CYANOCOBALAMIN TAB 1000 MCG 1000 MCG: 1000 TAB at 09:14

## 2025-06-20 RX ADMIN — METOPROLOL TARTRATE 75 MG: 50 TABLET, FILM COATED ORAL at 09:14

## 2025-06-20 NOTE — PLAN OF CARE
Plan of Care Review  Plan of Care Reviewed With: patient  Progress: improving  Outcome Evaluation: alert and oriented x4; continent; ambulates to bathroom with rolling walker; tylenol given for pain; call bell in reach; bed alarm set; ready for discharge this AM.

## 2025-06-20 NOTE — PLAN OF CARE
Plan of Care Review  Plan of Care Reviewed With: patient  Progress: improving  Outcome Evaluation: Patient AAOx4, pleasant, cooperative. Cont of b/b. Incision steris intact, DOMINGA. [place] worn OOB. Adequate for discharge today.

## 2025-06-20 NOTE — PROGRESS NOTES
Subjective    Patient seen and examined on rounds.  Chart reviewed.  Events overnight noted.  History reviewed briefly with patient.    CC: Deficits in mobility, transfers, self-care status post fall, unstable 3 column C7 Chance fracture, status post C7 ORIF, removal of prior C3-6 hardware and C3-T1 PLIF by neurosurgery, history of gait instability and multiple falls, peripheral neuropathy, multiple medical problems.    HPI:  Mr. Aneesh Brito is a 75-year-old left handed white male with chronic conditions significant for hypertension, hyperlipidemia, diabetes mellitus type 2, BPH, glaucoma, colon cancer, gait instability, multiple falls in the home environment for which he was seeing a neurologist for workup of falls, history of prior bilateral hip replacements and bilateral knee replacements, spinal stenosis status post previous C3-C6 laminectomy and posterolateral instrumented fusion over 10 years ago by Dr. Maurer, presented to the ER at [facility] on 5/31/25 after suffering from a mechanical fall while at home and was unable to get up so he was on the floor all night until he was found.  At [facility] ER and he reported neck pain and pain in the lower cervical and upper thoracic region.  Imaging studies revealed C7 Chance fracture and findings of DISH (diffuse idiopathic skeletal hyperostosis ).  He was subsequently transferred to [facility] for neurosurgical evaluation where he was admitted on 5/31/25.  He was evaluated by Dr. Benjamin from neurosurgery, noted to have a 3 column Chance fracture at C7-T1 level, given highly unstable fracture was recommended surgical intervention by neurosurgery. He underwent C7 ORIF, removal of C3-6 hardware and C3-T1 PLIF by neurosurgery Dr. Raza Benjamin on 6/1/25.  Postoperatively is allowed weightbearing as tolerated on both lower extremities.  He has been given Aspen collar for use when out of bed in chair and when ambulating.  He can use soft cervical  collar in the bed.  I discussed spinal precautions with him.  Postoperative course was significant for anemia but he did not require transfusion.  His pain is managed with Tylenol and Oxycodone.  He had elevated blood pressure requiring IV Hydralazine but blood pressure improved after surgery and hypertension was managed with managed with Felodipine, Lisinopril, Losartan and Metoprolol.  He is continued on Lipitor for hyperlipidemia.  He has history of BPH and remains on Tamsulosin and Finasteride.  He was on Netarsudil-Latanoprost and Timoptic eye drops for glaucoma.  He had elevated blood sugars related to Decadron perioperatively and required insulin and subsequently blood sugars improved and he is managed with Metformin now. He had elevated CK of 1118 on admission due to rhabdomyolysis from being down on floor prior to admission. He was treated with IV fluids and CK improved.  I discussed his recent clinical course with patient, his wife and son who is a pediatric physician and his son indicated that patient was being evaluated by neurologist Dr. Hussein Haider regarding his gait instability and falls, and lumbar spine MRI was done, EMG studies which showed peripheral neuropathy as well as muscle biopsy was being planned of the right quadriceps to evaluate  his proximal weakness in bilateral lower extremities.  He is unable to do active straight leg raise in bilateral lower extremities in bed, unable to localize position sense in his left great toe and I also discussed with patient, his wife and son at bedside.  I also mentioned to them that neurology will be conservative at him during his stay at [facility] and that he should follow up with Dr. Hussein Haider from neurology on outpatient basis for further evaluation and workup as appropriate.  He is on subcutaneous Heparin and SCDs for DVT prophylaxis.  He is able to tolerate regular with thins consistency diet.  On 6/4/25, hemoglobin was 11.3, WBC count was  "11.85, platelets were 236, BUN was 22, creatinine was 1.1, sodium was 134 and potassium was 3.3.  He has been needing assistance for mobility, transfers, self-care. He is transferred to [facility] on 6/4/25 for further rehabilitation care.     SUBJ: Discussed recommendations of PCC with patient.  Progressing well in therapy.  Requiring close supervision for sit to stand transfer with physical therapy.  Able to ambulate 200 feet with front wheeled walker with close supervision with physical therapy.  He feels his balance is improving.  Feels comfortable with discharge plans to home later this week.    ROS: Denies chest pain or shortness of breath. Other ROS negative. Past, family, social history is unchanged.    Current Functional Status:   Bed mobility:   [place], Supine to Sit: close supervision   [place], Sit to Supine: close supervision   Transfers:    [place], Sit to Stand Transfer: modified independence  [place], Stand to Sit Transfer: modified independence   [place], Stand Pivot/Stand Step Transfer: modified independence   Gait:   [place], Gait: modified independence  Assistive Device: walker, front-wheeled   Distance in Feet: 15 feet    Bathing:   [place]: modified independence   Toileting:   [place]: modified independence   Upper body dressing:   [place]: modified independence   Lower body dressing:   [place]: modified independence       Functional Progress:    Functional status reviewed. Overall, patient's functional status is improving.      Physical Exam      Blood pressure 110/78, pulse (!) 58, temperature 36.7 °C (98 °F), temperature source Oral, resp. rate 20, height 1.803 m (5' 11\"), weight 102 kg (225 lb 6.4 oz), SpO2 97%.    Body mass index is 31.44 kg/m².    General Appearance: Not in acute distress  Head/Ear/Nose/Throat: Normocephalic; Atraumatic.   Eye: EOMI; PERRL.   Neck: Healing incision posterior cervicothoracic spine.  Dressing " in place.    Respiratory: Decreased breath sounds at bases.   Cardiovascular: RRR; Normal S1, S2.   Gastrointestinal: Soft; NT; +BS.   Extremities: Bilateral lower extremity edema noted.    Musculoskeletal: Functional active range of motion in both upper extremities except some limitation in left shoulder and is unable to lift left arm up overhead.  Some limitation of active range of motion in both hips and both knees, which is chronic according to patient and his wife at bedside.  Patient is unable to do active straight leg raise and either lower extremity.  He has had previous bilateral hip and bilateral knee replacements.  His wife mentions patient was lifting his lower extremities manually with his arms while getting in the car and after he sat down in the seat inside the car manually left each leg to bring them in the car.   Neurological: AAO ×3. Speech is fluent. Cranial nerve examination does not reveal any gross facial asymmetry. Strength testing about 4/5 strength in both upper extremities except left shoulder is 2-/5 and abduction and forward flexion.  Bilateral hip flexion is 2-/5.  Bilateral quadriceps are 2+/5 with the thighs supported.  Bilateral ankle dorsi and plantar flexion is 3+/5.  He denies significant numbness in his legs and feet at this time.  He is grossly able to localize position sense in his right great toe but not so in his left great toe.  He is able to localize vibration sense in both great toes.  Deep tendon reflexes are hypoactive and symmetric bilaterally.  Plantars are flexor.  Coordination is functional upper extremities.  Both knee jerks were not tested.  Behavior/Emotional: Appropriate; Cooperative.   Skin: Healing incision posterior cervicothoracic spine.  Dressing in place.  Some abrasions noted on right upper extremity.  Left forearm has bruising ecchymosis noted.  Small wound noted on sacrum/coccyx at least stage II decubitus cannot rule out DTI.      Current  Facility-Administered Medications:     acetaminophen (TYLENOL) tablet 650 mg, 650 mg, oral, q6h KOFI, Chandler Falk MD, 650 mg at 06/20/25 0612    alum-mag hydroxide-simeth (MAALOX) 200-200-20 mg/5 mL suspension 30 mL, 30 mL, oral, q6h PRN, Chandler Falk MD    atorvastatin (LIPITOR) tablet 20 mg, 20 mg, oral, Daily, Robert Hawkins MD, 20 mg at 06/19/25 0806    bisacodyL (DULCOLAX) 10 mg suppository 10 mg, 10 mg, rectal, Daily PRN, Chandler Falk MD    cholecalciferol (vitamin D3) tablet 25 mcg, 25 mcg, oral, Daily, Chandler Falk MD, 25 mcg at 06/19/25 0806    cyanocobalamin (VITAMIN B12) tablet 1,000 mcg, 1,000 mcg, oral, Daily, Chandler Falk MD, 1,000 mcg at 06/19/25 0806    glucose chewable tablet 15-30 g of dextrose, 15-30 g of dextrose, oral, PRN **OR** dextrose 40 % oral gel 15-30 g of dextrose, 15-30 g of dextrose, oral, PRN **OR** glucagon (GLUCAGEN) injection 1 mg, 1 mg, intramuscular, PRN **OR** dextrose 50 % in water (D50) injection 12.5 g, 25 mL, intravenous, PRN, Chase Anna MD    enoxaparin (LOVENOX) syringe 40 mg, 40 mg, subcutaneous, Daily (6p), Robert Hawkins MD, 40 mg at 06/19/25 1746    felodipine (PLENDIL) 24 hr ER tablet 10 mg, 10 mg, oral, Daily, Chandler Falk MD, 10 mg at 06/19/25 0806    finasteride (PROSCAR) tablet 5 mg, 5 mg, oral, Daily, Robert Hawkins MD, 5 mg at 06/19/25 0806    hydrALAZINE (APRESOLINE) tablet 25 mg, 25 mg, oral, q6h PRN, Robert Hawkins MD, 25 mg at 06/08/25 1849    insulin lispro U-100 (HumaLOG) pen 2-5 Units, 2-5 Units, subcutaneous, BID AC, Chandler Falk MD    lisinopriL (PRINIVIL) tablet 10 mg, 10 mg, oral, BID, Annette Sherman MD, 10 mg at 06/19/25 2019    losartan (COZAAR) tablet 100 mg, 100 mg, oral, Daily, Robert Hawkins MD, 100 mg at 06/19/25 0806    metFORMIN (GLUCOPHAGE) tablet 500 mg, 500 mg, oral, BID with breakfast and dinner, Robert Hawkins MD, 500 mg at 06/19/25 1746    metoprolol tartrate (LOPRESSOR) tablet 75 mg,  75 mg, oral, BID, Annette Sherman MD, 75 mg at 06/19/25 2019    netarsudiL-latanoprost 0.02-0.005 % drops 1 drop, 1 drop, Right Eye, Nightly, Chandler aFlk MD, 1 drop at 06/19/25 2020    oxyCODONE (ROXICODONE) immediate release tablet 5 mg, 5 mg, oral, q6h PRN, Annette Sherman MD, 5 mg at 06/19/25 0319    pantoprazole (PROTONIX) tablet,delayed release (DR/EC) 40 mg, 40 mg, oral, Daily before breakfast, Chandler Falk MD, 40 mg at 06/19/25 0806    polyethylene glycol (MIRALAX) 17 gram packet 17 g, 17 g, oral, Daily PRN, Chandler Falk MD    sennosides-docusate sodium (SENOKOT-S) 8.6-50 mg per tablet 2 tablet, 2 tablet, oral, Daily PRN, Chandler Falk MD    tamsulosin (FLOMAX) 24 hr ER capsule 0.4 mg, 0.4 mg, oral, Nightly, Robert Hawkins MD, 0.4 mg at 06/19/25 2019    timolol (TIMOPTIC) 0.5 % ophthalmic solution 1 drop, 1 drop, Both Eyes, Daily, Robert Hawkins MD, 1 drop at 06/19/25 0925       Labs / Radiology    Lab Results   Component Value Date    WBC 7.12 06/16/2025    HGB 10.0 (L) 06/16/2025    HCT 29.4 (L) 06/16/2025    MCV 94.2 06/16/2025     (H) 06/16/2025     Lab Results   Component Value Date    GLUCOSE 130 (H) 06/16/2025    CALCIUM 8.5 (L) 06/16/2025     (L) 06/16/2025    K 3.6 06/16/2025    CO2 24 06/16/2025     06/16/2025    BUN 22 06/16/2025    CREATININE 1.1 06/16/2025       Assessment and Plan    ASSESSMENT PLAN:  1. 75-year-old left handed white male with chronic conditions significant for hypertension, hyperlipidemia, diabetes mellitus type 2, BPH, glaucoma, colon cancer, gait instability, multiple falls in the home environment for which he was seeing a neurologist for workup of falls, history of prior bilateral hip replacements and bilateral knee replacements, spinal stenosis status post previous C3-C6 laminectomy and posterolateral instrumented fusion over 10 years ago by Dr. Maurer, presented to the ER at [facility] on 5/31/25 after suffering from a  mechanical fall while at home and was unable to get up so he was on the floor all night until he was found.  At [facility] ER and he reported neck pain and pain in the lower cervical and upper thoracic region.  Imaging studies revealed C7 Chance fracture and findings of DISH (diffuse idiopathic skeletal hyperostosis ).  He was subsequently transferred to [facility] for neurosurgical evaluation where he was admitted on 5/31/25.  He was evaluated by Dr. Benjamin from neurosurgery, noted to have a 3 column Chance fracture at C7-T1 level, given highly unstable fracture was recommended surgical intervention by neurosurgery. He underwent C7 ORIF, removal of C3-6 hardware and C3-T1 PLIF by neurosurgery Dr. Raza Benjamin on 6/1/25.  Postoperatively is allowed weightbearing as tolerated on both lower extremities.  He has been given Aspen collar for use when out of bed in chair and when ambulating.  He can use soft cervical collar in the bed.  I discussed spinal precautions with him.  Postoperative course was significant for anemia but he did not require transfusion.  His pain is managed with Tylenol and Oxycodone.  He had elevated blood pressure requiring IV Hydralazine but blood pressure improved after surgery and hypertension was managed with managed with Felodipine, Lisinopril, Losartan and Metoprolol.  He is continued on Lipitor for hyperlipidemia.  He has history of BPH and remains on Tamsulosin and Finasteride.  He was on Netarsudil-Latanoprost and Timoptic eye drops for glaucoma.  He had elevated blood sugars related to Decadron perioperatively and required insulin and subsequently blood sugars improved and he is managed with Metformin now. He had elevated CK of 1118 on admission due to rhabdomyolysis from being down on floor prior to admission. He was treated with IV fluids and CK improved.  I discussed his recent clinical course with patient, his wife and son who is a pediatric physician and his son  indicated that patient was being evaluated by neurologist Dr. Hussein Haider regarding his gait instability and falls, and lumbar spine MRI was done, EMG studies which showed peripheral neuropathy as well as muscle biopsy was being planned of the right quadriceps to evaluate  his proximal weakness in bilateral lower extremities.  He is unable to do active straight leg raise in bilateral lower extremities in bed, unable to localize position sense in his left great toe and I also discussed with patient, his wife and son at bedside.  I also mentioned to them that neurology will be conservative at him during his stay at [facility] and that he should follow up with Dr. Hussein Haider from neurology on outpatient basis for further evaluation and workup as appropriate.  He is on subcutaneous Heparin and SCDs for DVT prophylaxis.  He is able to tolerate regular with thins consistency diet.  On 6/4/25, hemoglobin was 11.3, WBC count was 11.85, platelets were 236, BUN was 22, creatinine was 1.1, sodium was 134 and potassium was 3.3.  He has been needing assistance for mobility, transfers, self-care. He is transferred to [facility] on 6/4/25 for further rehabilitation care.     2. DVT prophylaxis - on subcutaneous Lovenox.  On SCDs.  Platelets 264 on 6/5/2025.  Platelets 307 on 6/9/2025.  Platelets 353 on 6/16/2025.     3. Neurosurgery - status post fall, unstable 3 column C7 Chance fracture, status post C7 ORIF, removal of prior C3-6 hardware and C3-T1 PLIF by neurosurgery, history of gait instability and multiple falls, peripheral neuropathy, class I obesity, multiple medical problems - continue PT, OT, psychology.  Follow falls precautions, cardiac precautions, aspiration precautions, spinal precautions.  Aspen collar to neck when out of bed.  Monitor healing of posterior cervical incision.  Neurology was consulted.  Neurosurgery recommended removal of catgut sutures and drain sutures at [facility] about 2  weeks postop which has been completed by nursing.  Patient will follow-up at neurosurgery office after discharge from [facility].     4. GI - On Protonix for GI prophylaxis. On Senokot-S.  On MiraLAX.  On PRN Dulcolax suppository.  On PRN Maalox.     5.  -on Proscar.  On Flomax.     6. CVS - on Plendil.  On Lisinopril.  On Cozaar.  On Lopressor.  On PRN Hydralazine.     7. Pulmonary - encourage incentive spirometry.     8. Hematology - monitor hemoglobin, platelets.  Hemoglobin 12.2, WBC 10.71 on 6/5/2025.  Hemoglobin 11.0, WBC 7.59 on 6/9/2025.  Hemoglobin 10.0, WBC 7.12 on 6/16/2025.     9. Pain -on Tylenol.  On PRN Oxycodone.     10. Skin - Healing incision posterior cervicothoracic spine.  Dressing in place.  Some abrasions noted on right upper extremity.  Left forearm has bruising ecchymosis noted.  Small wound noted on sacrum/coccyx at least stage II decubitus cannot rule out DTI.     11. F/E/N - on vitamin B-12.  BMI 33.63 consistent with class I obesity.  Nutrition was consulted.     12. Diabetes mellitus type 2  - on sliding scale Humalog coverage.  On Glucophage.  On hypoglycemic protocol.     13. Ophthalmology- He was on NetarsudiL-latanoprost and Timoptic eye drops for glaucoma     14. Hyperlipidemia - on Lipitor.     15. Rehabilitation medicine - Continue comprehensive rehabilitation care. Continue PT, OT, psychology.  Follow falls precautions, cardiac precautions, aspiration precautions, spinal precautions.  Aspen collar to neck when out of bed.  Monitor healing of posterior cervical incision.  Neurology was consulted.Tolerating therapies per endurance on 6/6/2025.  Slept well last night.  Noted to have elevated PVR.  Requiring moderate assistance for sit to stand transfer with physical therapy.  Able to ambulate 20 feet with front wheeled walker dependent gait with physical therapy on 6/6/2025. Discussed with nurse on 6/6/2025. Working on ADLs with occupational therapy on 6/7/2025.  Dependent  for upper and lower body dressing, requiring minimal assistance for toileting, moderate assistance for bathing with occupational therapy on 6/7/2025.  Discussed with nurse regarding his PVRs on 6/7/2025.  Using [place] catheter at nighttime.  Discussed with patient on 6/7/2025.Discussed with patient and with his wife with him on 6/9/2025.  They feel he is making progress in therapy.  Requiring minimal assistance for sit to stand transfer with physical therapy.  Able to ambulate 100 feet with front wheeled walker with minimal assistance with physical therapy.  Reviewed labs on 6/9/2025.Discussed patients progress in therapies with therapists in team meeting on 6/10/2025. Discussed in PCC. See PCC documentation.  Continent of bowel and bladder for nursing on 6/10/2025.Discussed recommendations of PCC with patient on 6/11/2025.  Inspected posterior cervical incision which is healing well.  Working on ADLs occupational therapy.  Requiring moderate assistance for upper and lower body dressing, moderate assistance for toileting and bathing in occupational therapy on 6/11/2025.Apparently per nursing his pain medications fell off last night and house physician did not renew them according to nurse on 6/12/2025. Discussed with patient regarding Epic generated automatic stop on narcotics causing these medications to be discontinued but medications were reviewed today by internist.  Discussed with nurse on 6/12/2025.Progressing in therapy on 6/13/2025.  Requiring close supervision for sit to stand transfer with physical therapy.  Able to ambulate up 150 feet with front wheeled walker with touching/steadying assistance with physical therapy on 6/13/2025.Working on ADLs with occupational therapy on 6/14/2025.  Requiring minimal assistance for full body dressing, minimal assistance for lower dressing, touching/steadying assistance for toileting and close supervision for bathing in occupational therapy on 6/14/2025.  Continent of  bowel and bladder for nursing on 6/14/2025. Inspected posterior cervical incision on 6/16/2025 which is healing well.  Making progress in therapy.  Reviewed labs today.  Discussed with patient on 6/16/2025.Discussed patients progress in therapies with therapists in team meeting on 6/17/2025.  Discussed in PCC. See PCC documentation.  Family interested in diabetic education and staff doing that with them.  Discussed with nurse on 6/17/2025.Discussed recommendations of PCC with patient on 6/18/2025.  Progressing well in therapy.  Requiring close supervision for sit to stand transfer with physical therapy.  Able to ambulate 200 feet with front wheeled walker with close supervision with physical therapy on 6/18/2025.  He feels his balance is improving.  Feels comfortable with discharge plans to home later this week.  Discussed with patient on 6/18/2025.     16. Reviewed labs today.  BUN 20, creatinine 0.9, sodium 135, potassium 3.7 on 6/5/2025.  BUN 22, creatinine 1.1, sodium 132, potassium 3.4 on 6/9/2025.  BUN 21, creatinine 1.0, sodium 135, potassium 3.6 on 6/11/2025.  BUN 22, creatinine 1.1, sodium 134, potassium 3.6 on 6/16/2025.          Chase Anna MD  6/20/2025      This encounter was completed utilizing the Direct Speech Voice Recognition Software. Grammatical errors, random word insertions, pronoun errors, and incomplete sentences are occasional consequences of the system due to software limitations, ambient noises, and hardware issues. Such errors may be missed prior to affixing electronic signature. Any questions or concerns about the content, text, or information contained within the body of this dictation should be directly addressed to the physician for clarification. If you have any questions or concerns please do not hesitate to call me directly via EPIC chat, page, or email.

## 2025-06-20 NOTE — DISCHARGE SUMMARY
Rehab Discharge Summary      Admitting Provider: Chase Anna MD  Discharge Provider: Chase Anna MD  Primary Care Physician at Discharge: Kamar Palacios [phone]     Admission Date: 6/4/2025     Discharge Date: 6/20/2025    Discharge Disposition  Home Health Care - Other    Code Status at Discharge: Full Code    Discharge Medications     Medication List        START taking these medications      cholecalciferol (vitamin D3) 1,000 unit (25 mcg) tablet  Take 1 tablet (25 mcg total) by mouth daily.  Dose: 25 mcg     cyanocobalamin 1,000 mcg tablet  Commonly known as: VITAMIN B12  Take 1 tablet (1,000 mcg total) by mouth daily.  Dose: 1,000 mcg     pantoprazole 40 mg EC tablet  Commonly known as: PROTONIX  Take 1 tablet (40 mg total) by mouth daily before breakfast Indications: gastroesophageal reflux disease.  Dose: 40 mg            CHANGE how you take these medications      acetaminophen 325 mg tablet  Commonly known as: TYLENOL  Take 2 tablets (650 mg total) by mouth every 6 (six) hours as needed for mild pain or moderate pain.  Dose: 650 mg  What changed:   when to take this  reasons to take this     lisinopriL 10 mg tablet  Commonly known as: PRINIVIL  Take 1 tablet (10 mg total) by mouth 2 (two) times a day.  Dose: 10 mg  What changed: when to take this     metoprolol tartrate 75 mg tablet  Take 75 mg by mouth 2 (two) times a day.  Dose: 75 mg  What changed:   medication strength  how much to take     oxyCODONE 5 mg immediate release tablet  Commonly known as: ROXICODONE  Take 1 tablet (5 mg total) by mouth every 6 (six) hours as needed for pain (moderate-severe pain) for up to 5 days Indications: pain.  Dose: 5 mg  What changed:   when to take this  reasons to take this            CONTINUE taking these medications      atorvastatin 20 mg tablet  Commonly known as: LIPITOR  Take 1 tablet (20 mg total) by mouth daily.  Dose: 20 mg     felodipine 10 mg 24 hr tablet  Commonly known as:  PLENDIL  Take 1 tablet (10 mg total) by mouth daily.  Dose: 10 mg     finasteride 5 mg tablet  Commonly known as: PROSCAR  TAKE ONE TABLET BY MOUTH ONE TIME DAILY  Dose: 5 mg     losartan 100 mg tablet  Commonly known as: COZAAR  Take 1 tablet (100 mg total) by mouth daily.  Dose: 100 mg     metFORMIN 500 mg tablet  Commonly known as: GLUCOPHAGE  Take 1 tablet (500 mg total) by mouth 2 (two) times a day with meals.  Dose: 500 mg     ROCKLATAN 0.02-0.005 % drops  Administer 1 drop into both eyes nightly. Wait a few minutes in between eye drops  Dose: 1 drop  Generic drug: netarsudiL-latanoprost     silodosin 8 mg capsule  Commonly known as: RAPAFLO  Take 1 capsule (8 mg total) by mouth daily with breakfast.  Dose: 8 mg     timolol 0.5 % ophthalmic solution  Commonly known as: TIMOPTIC  Administer 1 drop into both eyes daily.  Dose: 1 drop            STOP taking these medications      heparin (porcine) 5,000 unit/mL injection     hydrALAZINE 25 mg tablet  Commonly known as: APRESOLINE     polyethylene glycol 17 gram packet  Commonly known as: MIRALAX     tamsulosin 0.4 mg capsule  Commonly known as: FLOMAX                  Reason for Hospitalization    ***    History of Present Illness    ***    Pertinent Physical Findings on discharge    ***      Hospital Course    ***

## 2025-06-23 ENCOUNTER — TELEPHONE (OUTPATIENT)
Dept: CASE MANAGEMENT | Facility: CLINIC | Age: 75
End: 2025-06-23
Payer: MEDICARE

## 2025-06-23 ENCOUNTER — PATIENT OUTREACH (OUTPATIENT)
Dept: CASE MANAGEMENT | Facility: CLINIC | Age: 75
End: 2025-06-23
Payer: MEDICARE

## 2025-06-23 DIAGNOSIS — N40.1 BENIGN PROSTATIC HYPERPLASIA WITH NOCTURIA: ICD-10-CM

## 2025-06-23 DIAGNOSIS — R35.1 BENIGN PROSTATIC HYPERPLASIA WITH NOCTURIA: ICD-10-CM

## 2025-06-23 RX ORDER — SILODOSIN 8 MG/1
8 CAPSULE ORAL
Qty: 90 CAPSULE | Refills: 1 | Status: SHIPPED | OUTPATIENT
Start: 2025-06-23 | End: 2025-09-21

## 2025-06-23 ASSESSMENT — ENCOUNTER SYMPTOMS
FEVER: 0
APPETITE CHANGE: 0
ACTIVITY CHANGE: 1
VOMITING: 0
SHORTNESS OF BREATH: 0
DIAPHORESIS: 0
WEAKNESS: 1
NECK STIFFNESS: 0
CONSTIPATION: 0
COUGH: 0
CHILLS: 0
WOUND: 1
ROS SKIN COMMENTS: POSTERIOR NECK INCISION
DIARRHEA: 0
DIFFICULTY URINATING: 0
NAUSEA: 0
CHEST TIGHTNESS: 0
ABDOMINAL PAIN: 0
NECK PAIN: 0
FATIGUE: 0

## 2025-06-23 NOTE — TELEPHONE ENCOUNTER
Medicine Refill Request    Last Office Visit: Visit date not found   Last Consult Visit: Visit date not found  Last Telemedicine Visit: Visit date not found    Next Appointment: Visit date not found      Current Outpatient Medications:     acetaminophen (TYLENOL) 325 mg tablet, Take 2 tablets (650 mg total) by mouth every 6 (six) hours as needed for mild pain or moderate pain., Disp: , Rfl:     atorvastatin (LIPITOR) 20 mg tablet, Take 1 tablet (20 mg total) by mouth daily., Disp: 90 tablet, Rfl: 3    cholecalciferol, vitamin D3, 1,000 unit (25 mcg) tablet, Take 1 tablet (25 mcg total) by mouth daily., Disp: , Rfl:     cyanocobalamin (VITAMIN B12) 1,000 mcg tablet, Take 1 tablet (1,000 mcg total) by mouth daily. (Patient not taking: Reported on 6/23/2025), Disp: , Rfl:     felodipine (PLENDIL) 10 mg 24 hr tablet, Take 1 tablet (10 mg total) by mouth daily., Disp: 90 tablet, Rfl: 3    finasteride (PROSCAR) 5 mg tablet, TAKE ONE TABLET BY MOUTH ONE TIME DAILY, Disp: 90 tablet, Rfl: 1    lisinopriL (PRINIVIL) 10 mg tablet, Take 1 tablet (10 mg total) by mouth 2 (two) times a day., Disp: 60 tablet, Rfl: 0    losartan (COZAAR) 100 mg tablet, Take 1 tablet (100 mg total) by mouth daily., Disp: 90 tablet, Rfl: 3    metFORMIN (GLUCOPHAGE) 500 mg tablet, Take 1 tablet (500 mg total) by mouth 2 (two) times a day with meals., Disp: 180 tablet, Rfl: 3    metoprolol tartrate 75 mg tablet, Take 75 mg by mouth 2 (two) times a day., Disp: 60 tablet, Rfl: 0    oxyCODONE (ROXICODONE) 5 mg immediate release tablet, Take 1 tablet (5 mg total) by mouth every 6 (six) hours as needed for pain (moderate-severe pain) for up to 5 days Indications: pain. (Patient not taking: Reported on 6/23/2025), Disp: 20 tablet, Rfl: 0    pantoprazole (PROTONIX) 40 mg EC tablet, Take 1 tablet (40 mg total) by mouth daily before breakfast Indications: gastroesophageal reflux disease. (Patient not taking: Reported on 6/23/2025), Disp: 30 tablet, Rfl: 0     ROCKLATAN 0.02-0.005 % drops, Administer 1 drop into both eyes nightly. Wait a few minutes in between eye drops , Disp: , Rfl:     silodosin (RAPAFLO) 8 mg capsule, Take 1 capsule (8 mg total) by mouth daily with breakfast., Disp: 90 capsule, Rfl: 0    timolol (TIMOPTIC) 0.5 % ophthalmic solution, Administer 1 drop into both eyes daily., Disp: , Rfl:     BP Readings from Last 3 Encounters:   06/20/25 110/78   06/04/25 130/61   05/31/25 (!) 175/95       Recent Lab results:  Lab Results   Component Value Date    CHOL 116 02/24/2025   ,   Lab Results   Component Value Date    HDL 40 02/24/2025   ,   Lab Results   Component Value Date    LDLCALC 52 02/24/2025   ,   Lab Results   Component Value Date    TRIG 121 02/24/2025        Lab Results   Component Value Date    GLUCOSE 130 (H) 06/16/2025   ,   Lab Results   Component Value Date    HGBA1C 6.7 (H) 05/19/2025         Lab Results   Component Value Date    CREATININE 1.1 06/16/2025       Lab Results   Component Value Date    TSH 2.45 05/12/2025           Lab Results   Component Value Date    HGBA1C 6.7 (H) 05/19/2025

## 2025-06-23 NOTE — TELEPHONE ENCOUNTER
Hello,    Patient's Rapaflo script noted to be . Please send script renewal request to patient's pharmacy if appropriate.      Thanks in advance for assistance,    Leah Smith, MICHELLEN, RN  Ambulatory Care Manager  [phone]

## 2025-06-23 NOTE — PROGRESS NOTES
NAME: Aneesh Brito    MRN: [id number]    YOB: 1950    Event Review:    Initial TCM Patient Outreach Date: 06/23/25    Assessment completed with: Patient  Patient stated reason for hospitalization: fall with spine injury  Discharge Diagnosis: Fusion of spine of cervicothoracic region    Patient readmitted in the last 30 days: No  Discharging Facility: [facility]  Date of Last Admission: 05/31/25  Date of Last Discharge: 06/04/25    Discharging Facilty SNF/Rehab: [facility]  Date of Admission: 06/04/25  Date of Discharge: 06/20/25    Patient's perception of their health status since discharge: Improving    Appointment Scheduling:    PCP appointment scheduled: Yes  TCM Appointment Type: ARACELI 7 Day  Appointment Date: 06/25/25     Appointment Provider: Dr. Palacios  Patient Scheduling Dispositions: Patient already scheduled appointment     HPI:  Spoke with patient today s/p admission to  w/ discharge to R; patient presented to  after a fall, found to have a cervical spine fracture thus transferred to  for neurosurgical eval/intervention. Imaging showed a 3 column Chance fracture at C7-T1 level, and d/t instability of the fracture, patient underwent a C7 ORIF, removal of C3-6 hardware, and C3-T1 PLIF by Dr. Benjamin on 6/1/. Patient tolerated the procedure well and the post-op course was uncomplicated, discharged to [facility] and completed therapy, then discharged home with [facility] and instructions to f/u with neurosurgery and neurology. Patient provided with a RW through [employer] and given strict instructions re: cervical collars and spinal precautions.     Patient remains stable since discharge home, reported no post-op pain, ambulating w/ RW, and confirmed wearing Aspen collar at all times when out of bed, [place] collar for showering; patient denied further falls, reported [facility] was yesterday with PT eval pending. Patient able to v/u spinal precautions, aware of neurosurgery appt as  below and reported TCM visit with PCP scheduled as above; per patient, planning to call neurosurgeon's office to confirm appt timeframe. ACM RN encouraged patient to also call to scheduled a f/u appt w/ neurology, advised B12 is OTC, and notified patient pharmacy reported Protonix dispensed and picked up last week. Patient v/u, denied barrier to purchasing B12 and denied barriers to attending/scheduling f/u's.    Review of Systems   Constitutional:  Positive for activity change. Negative for appetite change, chills, diaphoresis, fatigue and fever.   Respiratory:  Negative for cough, chest tightness and shortness of breath.    Cardiovascular:  Negative for chest pain and leg swelling.   Gastrointestinal:  Negative for abdominal pain, constipation, diarrhea, nausea and vomiting.   Genitourinary:  Negative for difficulty urinating.   Musculoskeletal:  Positive for gait problem. Negative for neck pain and neck stiffness.   Skin:  Positive for wound.        Posterior neck incision   Neurological:  Positive for weakness.       Medication Review:    Medication Review: Yes     Reported by: Patient  Any new medications prescribed at discharge?: Yes  Is the patient having any side effects they believe may be caused by any medication additions or changes?: No  New prescriptions filled?: Yes (per patient, vitamin B12 and Protonix not filled by pharmacy)     Do you have enough of your regularly prescribed medications?: Yes       Medication adherence problem?: No  Was a medication discrepancy indentified?: No       Nursing Interventions: Nurse provided patient education, Nurse advised patient to follow-up with provider, Nurse to follow-up with provider (called patient's pharmacy re: B12 and Protonix scripts; notified patient Rapaflo script  and sent message to PCP office for renewal)  Reconciled the current and discharge medications: Yes  Reviewed AVS (Discharge Instructions)?: Yes       Acute Pain:    Acute pain: No                   Diet/Nutrition:    Type of Diet: Diabetic             Home Care Services:    Home Care Agency: [facility]  Type of Home Care Services: Home OT, Home PT, Home Nursing  Home Care Interventions: No intervention required     Post-Discharge Durable Medical Equipment::    Durable Medical Equipment: Front wheeled walker, Rollator, Cane, Shower/tub seat, Other ([place] collar, [place] collar)  Does patient's condition require a scale?: No     Oxygen Use: No              DME Interventions: No intervention required    Home Management:    Living Arrangement: Spouse  Support System:: Spouse, Family  Type of Residence: Independent Living Facility  Home Monitoring: None  Any patient reported falls in the last 3 months?: Yes  Mandaen or spiritual beliefs that impact treatment?: No    Follow-Ups:    7/3 - Dr. Abad (neurosurgery)    Neurology - [facility]    Interventions/ Care Coordination:    Interventions/ Care Coordination: Encouraged patient to call PCP/Specialist, Encouraged patient to schedule with the PCP/Specialist, Addressed a knowledge deficit  General Education: Post-Operative instructions, Nutrition and hydration instructions, Falls and home safety precautions     Initiated Care Plan: ARACELI          Reviewed signs/symptoms of worsening condition or complication that necessitate a call to the Physician's office.  Educated patient on access to care.  ACBRIAN RN phone number given for future care management.      MICHELLE ChesterN, RN  Ambulatory Care Manager  [phone]

## 2025-06-24 NOTE — PROGRESS NOTES
NAME: Aneesh Brito    MRN: [id number]    YOB: 1950    Event Review:    Initial TCM Patient Outreach Date: 01/17/22    Assessment completed with: Patient  Patient stated reason for hospitalization: Weakness and diarrhea  Discharge Diagnosis: Weakness    Patient readmitted in the last 30 days: No  Discharging Facility: [facility]  Date of Admission: 01/11/22  Date of Discharge: 01/14/22      Patient's perception of their health status since discharge: Returned to baseline/stable    HPI: Spoke with patient 1/17 for TCM call . He was admitted to [facility] for weakness and diarrhea on 1/11, at that time his potassium was 3.2 and he was found to be febrile.  He was positive for C-diff and campylobacter jejuni.  Patient discharged to home on Azithromycin and Vancomycin.  He has good support at home.  He has been seen by home care.  Patient states he is back to baseline.  Has follow appointment on 1/21/22.      Review of Systems   Constitutional: Negative.    HENT: Negative.    Eyes: Negative.    Respiratory: Negative.    Cardiovascular: Negative.    Gastrointestinal: Negative.    Endocrine: Negative.    Genitourinary: Negative.    Musculoskeletal: Negative.    Skin: Negative.    Allergic/Immunologic: Negative.    Neurological: Negative.    Hematological: Negative.    Psychiatric/Behavioral: Negative.        Medication Review:    Medication Review: Yes     Reported by: Patient  Any new medications prescribed at discharge?: Yes  Is the patient having any side effects they believe may be caused by any medication additions or changes?: No  New prescriptions filled?: Yes     Do you have enough of your regularly prescribed medications?: Yes       Medication adherence problem?: No  Was a medication discrepancy indentified?: No             Reviewed AVS (Discharge Instructions)?: Yes         Acute Pain:    Acute pain: No                Chronic Pain:    Chronic pain: No                Diet/Nutrition:    Type of  Diet: Diabetic  Diet Adherence: Adherent with diet          Home Care Services:    Home Care Agency: [facility]     Home Care Interventions: No intervention required     Post-Discharge Durable Medical Equipment::    Durable Medical Equipment: None  Oxygen Use: No     Home Management:  Living Arrangement: Spouse  Support System:: Spouse  Type of Residence: [address]  Home Monitoring: None  Any patient reported falls in the last 3 months?: No  Denominational or spiritual beliefs that impact treatment?: No    Appointment Scheduling:    TCM Appointment Types: ARACELI 7 Day  Appointment Date: 01/21/22     Appointment Provider: Doctor Palacios  Patient Scheduling Dispositions: Appointment Scheduled by Pt     Follow-Ups:     Relevant Labs & Tests: K 3.2, WBC 15.28     Interventions/ Care Coordination:    Reviewed signs/symptoms of worsening condition or complication that necessitate a call to the Physician's office.  Educated patient on access to care.  RN phone number given for future care management.    Eliza White RN      TCM #1 LVM request a call back     Attending Attestation (For Attendings USE Only)...

## 2025-06-26 PROCEDURE — 85027 COMPLETE CBC AUTOMATED: CPT | Performed by: NURSE PRACTITIONER

## 2025-06-26 PROCEDURE — 80053 COMPREHEN METABOLIC PANEL: CPT | Performed by: NURSE PRACTITIONER

## 2025-06-26 PROCEDURE — 83036 HEMOGLOBIN GLYCOSYLATED A1C: CPT | Performed by: NURSE PRACTITIONER

## 2025-06-27 ENCOUNTER — TELEPHONE (OUTPATIENT)
Dept: NEUROSURGERY | Facility: CLINIC | Age: 75
End: 2025-06-27
Payer: MEDICARE

## 2025-06-27 NOTE — TELEPHONE ENCOUNTER
----- Message from Laura AMEZQUITA sent at 6/27/2025  9:25 AM EDT -----  Silke from [facility] called in asking if patient can shower and what collar they can use. They currently have a soft collar. Phone # [phone]

## 2025-06-30 PROCEDURE — 80053 COMPREHEN METABOLIC PANEL: CPT | Performed by: NURSE PRACTITIONER

## 2025-06-30 PROCEDURE — 85027 COMPLETE CBC AUTOMATED: CPT | Performed by: NURSE PRACTITIONER

## 2025-07-03 ENCOUNTER — OFFICE VISIT (OUTPATIENT)
Dept: NEUROSURGERY | Facility: CLINIC | Age: 75
End: 2025-07-03
Payer: MEDICARE

## 2025-07-03 VITALS
OXYGEN SATURATION: 97 % | SYSTOLIC BLOOD PRESSURE: 150 MMHG | DIASTOLIC BLOOD PRESSURE: 104 MMHG | HEART RATE: 59 BPM | WEIGHT: 215 LBS | HEIGHT: 72 IN | BODY MASS INDEX: 29.12 KG/M2

## 2025-07-03 DIAGNOSIS — R26.81 GAIT INSTABILITY: ICD-10-CM

## 2025-07-03 DIAGNOSIS — S12.601A CLOSED NONDISPLACED FRACTURE OF SEVENTH CERVICAL VERTEBRA, UNSPECIFIED FRACTURE MORPHOLOGY, INITIAL ENCOUNTER (CMS/HCC): Primary | ICD-10-CM

## 2025-07-03 PROCEDURE — 99024 POSTOP FOLLOW-UP VISIT: CPT | Performed by: PHYSICIAN ASSISTANT

## 2025-07-03 ASSESSMENT — PAIN SCALES - GENERAL: PAINLEVEL_OUTOF10: 0-NO PAIN

## 2025-07-03 NOTE — PROGRESS NOTES
[facility]  Medical Office [address], [address]  [place] [address]  Phone: [phone]  Fax: [phone]      Visit Date: 7/3/2025    Re: Aneesh Brito  : 1950      Chief Complaint:  Surgical follow-up    History of Present Illness:   Aneesh Brito is a 75 y.o.  male who presents for surgical follow-up. The patient underwent C3-T3 posterior lateral instrumented fusion, ORIF of C7 fracture, removal and placement of C3-C6 posterior instrumentation 2025.  Patient initially presented emergency department following a fall.    Since last being seen the patient states he has been feeling well.  Neck pain is minimal.  He is living in the assisted living facility.  He has been doing some very gentle PT for balance.  He is ambulating with a rolling walker which is what he was doing prior to surgery.  Does not have any new weakness or paresthesias.    Medical History:  has a past medical history of C7 cervical fracture (CMS/HCC) (2025), Colonic adenoma, Diverticulosis of colon, Enlarged prostate with lower urinary tract symptoms (LUTS), Glaucoma, Hypertension, Melanoma of skin (CMS/HCC), Osteoarthritis of knee, Overweight, Spinal stenosis of lumbar region, and Type 2 diabetes mellitus (CMS/[facility]).    Surgical History:  has a past surgical history that includes Appendectomy; Colonoscopy (01/10/2012); Hip Arthroplasty (Bilateral); Knee Arthroplasty (Left); Colonoscopy w/ polypectomy (2022); Knee Arthroplasty (Right, 2022); and Cervical fusion (2025).    Family History: family history includes Breast cancer in his biological mother.    Social History:   Social History     Socioeconomic History    Marital status:      Spouse name: None    Number of children: None    Years of education: None    Highest education level: None   Occupational History    Occupation: retired teacher   Tobacco Use    Smoking status: Never    Smokeless tobacco:  Never   Substance and Sexual Activity    Alcohol use: Yes     Comment: BEER, 1 CONSUMED DAILY    Drug use: Never     Social Drivers of Health     Financial Resource Strain: Low Risk  (6/19/2023)    Overall Financial Resource Strain (CARDIA)     Difficulty of Paying Living Expenses: Not hard at all   Food Insecurity: No Food Insecurity (6/4/2025)    Hunger Vital Sign     Worried About Running Out of Food in the Last Year: Never true     Ran Out of Food in the Last Year: Never true   Transportation Needs: No Transportation Needs (6/18/2025)    PRAPARE - Transportation     Lack of Transportation (Medical): No     Lack of Transportation (Non-Medical): No   Housing Stability: Low Risk  (6/5/2025)    Housing Stability Vital Sign     Unable to Pay for Housing in the Last Year: No     Number of Times Moved in the Last Year: 1     Homeless in the Last Year: No        Allergies:   Allergies   Allergen Reactions    No Known Allergies        Medications:   Current Outpatient Medications:     acetaminophen (TYLENOL) 325 mg tablet, Take 2 tablets (650 mg total) by mouth every 6 (six) hours as needed for mild pain or moderate pain., Disp: , Rfl:     atorvastatin (LIPITOR) 20 mg tablet, Take 1 tablet (20 mg total) by mouth daily., Disp: 90 tablet, Rfl: 3    cholecalciferol, vitamin D3, 1,000 unit (25 mcg) tablet, Take 1 tablet (25 mcg total) by mouth daily., Disp: , Rfl:     cyanocobalamin (VITAMIN B12) 1,000 mcg tablet, Take 1 tablet (1,000 mcg total) by mouth daily. (Patient not taking: Reported on 6/23/2025), Disp: , Rfl:     felodipine (PLENDIL) 10 mg 24 hr tablet, Take 1 tablet (10 mg total) by mouth daily., Disp: 90 tablet, Rfl: 3    finasteride (PROSCAR) 5 mg tablet, TAKE ONE TABLET BY MOUTH ONE TIME DAILY, Disp: 90 tablet, Rfl: 1    lisinopriL (PRINIVIL) 10 mg tablet, Take 1 tablet (10 mg total) by mouth 2 (two) times a day., Disp: 60 tablet, Rfl: 0    losartan (COZAAR) 100 mg tablet, Take 1 tablet (100 mg total) by mouth  "daily., Disp: 90 tablet, Rfl: 3    metFORMIN (GLUCOPHAGE) 500 mg tablet, Take 1 tablet (500 mg total) by mouth 2 (two) times a day with meals., Disp: 180 tablet, Rfl: 3    metoprolol tartrate 75 mg tablet, Take 75 mg by mouth 2 (two) times a day., Disp: 60 tablet, Rfl: 0    pantoprazole (PROTONIX) 40 mg EC tablet, Take 1 tablet (40 mg total) by mouth daily before breakfast Indications: gastroesophageal reflux disease. (Patient not taking: Reported on 6/23/2025), Disp: 30 tablet, Rfl: 0    ROCKLATAN 0.02-0.005 % drops, Administer 1 drop into both eyes nightly. Wait a few minutes in between eye drops , Disp: , Rfl:     silodosin (RAPAFLO) 8 mg capsule, Take 1 capsule (8 mg total) by mouth daily with breakfast., Disp: 90 capsule, Rfl: 1    timolol (TIMOPTIC) 0.5 % ophthalmic solution, Administer 1 drop into both eyes daily., Disp: , Rfl:     Review of Systems: A 14 point review of systems was performed and aside from what is mentioned above is otherwise negative.    Vital Signs:  Vitals:    07/03/25 1133   BP: (!) 150/104   BP Location: Left upper arm   Patient Position: Sitting   Pulse: (!) 59   SpO2: 97%   Weight: 97.5 kg (215 lb)   Height: 1.816 m (5' 11.5\")       Physical Exam:  Well appearing male in no acute distress.    The patient is awake, alert, oriented x 3, with fluent speech, appropriate attention span, concentration and fund of knowledge.  Remote and recent memory are normal.    Cranial nerves II-XII are grossly intact. Examination reveals full visual fields to confrontation, pupils are equal round reactive to light, extra occular muscles are intact, there is no facial asymmetry and tongue protrudes midline.    On motor examination, the patient is 5/5 throughout the upper extremities.  He has bilateral iliopsoas weakness (3/5).  No pronator drift there is some limited range of motion of the right shoulder (baseline per patient).  Steady gait with rollator  Sensation intact to light touch x 4 " extremities    He has 2+ reflexes throughout, without Poe's sign or ankle clonus.    His surgical site is well-healed.  Sutures removed.  Drain sites well-healed.    Data Review:  Imaging:   Independent review of all imaging was done by myself as well as review of the radiologists readings and comparison to prior films.     No new imaging to review    Assessment and Plan:  In summary, Aneesh Brito is a 75 y.o. male underwent C3-T3 posterior lateral instrumented fusion, ORIF of C7 fracture, removal and placement of C3-C6 posterior instrumentation 6/1/2025.  Patient initially presented emergency department following a fall.  He did well following surgery was able to be discharged back to his living facility where he continues his recovery.  He reports overall feeling well with no significant neck pain.  He has been tolerating the collar.  On exam, he does have bilateral hip flexor weakness 3/5 which he reports has been present since his injury.  He feels his walking is at baseline as well.    Overall, patient seems to be recovering well from his cervical fusion, ORIF of C7 fracture.  We recommend he continue to wear the Aspen collar when ambulatory until 3 months postop.  Will see him back at that time with x-rays of the cervical spine.  He is able to start physical therapy for balance and gait training as well as some gentle lower extremity strengthening.    The patient or wife will call with any additional questions or concerns.  They agree with the plan as stated.    SRUTHI Valdez    25 minutes were spent with the patient on examination, review of past medical history, and prior imaging, and coordinating care.  Patient seen earlier today. Signature timestamp does not reflect patient encounter time.   More than 50% of the time is spent counseling the patient and family and coordinating care.

## 2025-07-08 ENCOUNTER — PATIENT OUTREACH (OUTPATIENT)
Dept: CASE MANAGEMENT | Facility: CLINIC | Age: 75
End: 2025-07-08
Payer: MEDICARE

## 2025-07-08 NOTE — PROGRESS NOTES
ARACELI Follow-up Call      Spoke with patient today, 7/8. Patient is s/p C7 ORIF, removal of C3-6 hardware, and C3-T1 PLIF by Dr. Benjamin on 6/1 to treat Chance fracture at C7-T1 s/p fall at home.    Patient reported after a few days at [place] [facility] level of care at [facility]; per patient, plan is for transition back home w/ spouse and coordination of HH tomorrow. Due to [facility] admission, patient reported needing to cancel TCM visit w/ PCP and POV w/ neurosurgery, awaiting call back from PCP office for scheduling and, per patient, neurosurgery visit rescheduled as below. ACM RN assisted patient with scheduling TCM visit w/ PCP and advised patient call to confirm appt with neurosurgery d/t discrepancy noted; appears POV was rescheduled to 8/4 w/ neurosurgery, then cancelled and rescheduled w/ Dr. Benjamin in September. Patient v/u and denied barriers to attending/scheduling f/u appts, agreeable with f/u outreach.      Next PCP visit: 7/17 @ 1400    Next Follow-Ups  8/3 - POV w/ neurosurgery      MICHELLE ChesterN, RN  Ambulatory Care Manager  [phone]

## 2025-07-17 ENCOUNTER — OFFICE VISIT (OUTPATIENT)
Dept: FAMILY MEDICINE | Facility: CLINIC | Age: 75
End: 2025-07-17
Payer: MEDICARE

## 2025-07-17 VITALS
TEMPERATURE: 98.6 F | DIASTOLIC BLOOD PRESSURE: 80 MMHG | BODY MASS INDEX: 30.52 KG/M2 | OXYGEN SATURATION: 99 % | HEIGHT: 71 IN | HEART RATE: 54 BPM | WEIGHT: 218 LBS | RESPIRATION RATE: 16 BRPM | SYSTOLIC BLOOD PRESSURE: 140 MMHG

## 2025-07-17 DIAGNOSIS — S12.600D CLOSED C7 FRACTURE WITHOUT SPINAL CORD INJURY, WITH ROUTINE HEALING, SUBSEQUENT ENCOUNTER: Primary | ICD-10-CM

## 2025-07-17 DIAGNOSIS — M43.23 FUSION OF SPINE OF CERVICOTHORACIC REGION: ICD-10-CM

## 2025-07-17 DIAGNOSIS — K21.9 GASTROESOPHAGEAL REFLUX DISEASE WITHOUT ESOPHAGITIS: ICD-10-CM

## 2025-07-17 DIAGNOSIS — E11.9 TYPE 2 DIABETES MELLITUS WITHOUT COMPLICATION, WITHOUT LONG-TERM CURRENT USE OF INSULIN (CMS/HCC): ICD-10-CM

## 2025-07-17 DIAGNOSIS — I10 HYPERTENSION, BENIGN: ICD-10-CM

## 2025-07-17 PROCEDURE — 1111F DSCHRG MED/CURRENT MED MERGE: CPT | Performed by: FAMILY MEDICINE

## 2025-07-17 PROCEDURE — 99214 OFFICE O/P EST MOD 30 MIN: CPT | Performed by: FAMILY MEDICINE

## 2025-07-17 PROCEDURE — G8754 DIAS BP LESS 90: HCPCS | Performed by: FAMILY MEDICINE

## 2025-07-17 PROCEDURE — G8753 SYS BP > OR = 140: HCPCS | Performed by: FAMILY MEDICINE

## 2025-07-17 RX ORDER — LOPERAMIDE HYDROCHLORIDE 2 MG/1
2 CAPSULE ORAL 4 TIMES DAILY PRN
COMMUNITY

## 2025-07-17 RX ORDER — HYDRALAZINE HYDROCHLORIDE 10 MG/1
10 TABLET, FILM COATED ORAL 3 TIMES DAILY
COMMUNITY

## 2025-07-17 RX ORDER — ASCORBIC ACID 250 MG
500 TABLET ORAL DAILY
COMMUNITY

## 2025-07-17 NOTE — ASSESSMENT & PLAN NOTE
Fusion of cervical thoracic region secondary to fracture.  Latest x-ray reviewed with patient

## 2025-07-17 NOTE — PROGRESS NOTES
[facility] IN [place]       Reason for visit: Hospital Discharge Follow-up    HPI:  Aneesh Brito is a 75 y.o. male presenting for follow-up after hospital discharge.    Patient readmitted in the last 30 days: No    Discharge Diagnosis: Fusion of spine of cervicothoracic region  Discharging Facility: [facility]  Date of Last Admission: 05/31/25  Date of Last Discharge: 06/04/25    Discharging Facilty SNF/Rehab: [facility]  Date of Admission: 06/04/25  Date of Discharge: 06/20/25         Initial TCM Patient Outreach Date: 06/23/25    Summary of Hospital Course: Physician.  Patient has had a fall with a fracture of the C7.  Patient had had replacement of hardware.  He underwent further surgery for that and then did rehab and [place] rehab facility and then went back to [facility] to further rehab he is now back in his apartment    Medications prescribed at discharge reconciled with current ambulatory medication list: Yes.    Inpatient testing (labs, imaging, cardiovascular) requiring follow-up: Patient will need follow-up A1c and evaluation of blood pressure.    History since hospital discharge: Patient doing well he is now back in his apartment he is going to have home physical therapy.  He continues to wear his neck brace on 24/7 basis.      Advance Care Planning Documents Documents without received dates are displayed at the bottom.      Document Type Status Effective Date Expiration Date Received On Description    Advance Directives and Living Will Received   06/17/25     Power of  Received   06/17/25     Advance Directives and Living Will Not Received        Power of  Not Received                Patient Active Problem List   Diagnosis    Diverticulosis of colon    Enlarged prostate with lower urinary tract symptoms (LUTS)    Hypertension, benign    Malignant melanoma of skin (CMS/HCC)    Osteoarthritis of knee    Overweight    Spinal stenosis of lumbar  region    Chronic kidney disease, stage I    Dry eye syndrome of bilateral lacrimal glands    Primary open-angle glaucoma, bilateral, indeterminate stage    Bilateral pseudophakia    Tear film insufficiency    Pure hypercholesterolemia    Hyponatremia    Aneurysm of iliac artery (CMS/HCC)    Glomerulosclerosis    Generalized weakness    Lumbar degenerative disc disease    Type 2 diabetes mellitus without complications (CMS/[facility])    Traumatic closed fracture of C7 vertebra with minimal displacement, initial encounter (CMS/[facility])    Closed C7 fracture without spinal cord injury, with routine healing, subsequent encounter    Falls, sequela    Fusion of spine of cervicothoracic region    Multiple falls    Closed displaced fracture of seventh cervical vertebra (CMS/[facility])     Social Drivers of Health     Financial Resource Strain: Low Risk  (6/19/2023)    Overall Financial Resource Strain (CARDIA)     Difficulty of Paying Living Expenses: Not hard at all   Food Insecurity: No Food Insecurity (6/4/2025)    Hunger Vital Sign     Worried About Running Out of Food in the Last Year: Never true     Ran Out of Food in the Last Year: Never true   Transportation Needs: No Transportation Needs (6/18/2025)    PRAPARE - Transportation     Lack of Transportation (Medical): No     Lack of Transportation (Non-Medical): No   Physical Activity: Not on file   Stress: Not on file   Social Connections: Not on file   Housing Stability: Low Risk  (6/5/2025)    Housing Stability Vital Sign     Unable to Pay for Housing in the Last Year: No     Number of Times Moved in the Last Year: 1     Homeless in the Last Year: No   Utilities: Not At Risk (6/5/2025)    [facility] Utilities     Threatened with loss of utilities: No     Allergies  No known allergies    Current Outpatient Medications   Medication Sig Dispense Refill    acetaminophen (TYLENOL) 325 mg tablet Take 2 tablets (650 mg total) by mouth every 6 (six) hours as needed for mild pain or moderate  pain.      ascorbic acid (VITAMIN C) 250 mg tablet Take 500 mg by mouth daily.      atorvastatin (LIPITOR) 20 mg tablet Take 1 tablet (20 mg total) by mouth daily. 90 tablet 3    cholecalciferol, vitamin D3, 1,000 unit (25 mcg) tablet Take 1 tablet (25 mcg total) by mouth daily.      felodipine (PLENDIL) 10 mg 24 hr tablet Take 1 tablet (10 mg total) by mouth daily. 90 tablet 3    finasteride (PROSCAR) 5 mg tablet TAKE ONE TABLET BY MOUTH ONE TIME DAILY 90 tablet 1    hydrALAZINE (APRESOLINE) 10 mg tablet Take 10 mg by mouth 3 (three) times a day.      lisinopriL (PRINIVIL) 10 mg tablet Take 1 tablet (10 mg total) by mouth 2 (two) times a day. 60 tablet 0    loperamide (IMODIUM) 2 mg capsule Take 2 mg by mouth 4 (four) times a day as needed for diarrhea.      losartan (COZAAR) 100 mg tablet Take 1 tablet (100 mg total) by mouth daily. 90 tablet 3    metFORMIN (GLUCOPHAGE) 500 mg tablet Take 1 tablet (500 mg total) by mouth 2 (two) times a day with meals. 180 tablet 3    metoprolol tartrate 75 mg tablet Take 75 mg by mouth 2 (two) times a day. 60 tablet 0    cyanocobalamin (VITAMIN B12) 1,000 mcg tablet Take 1 tablet (1,000 mcg total) by mouth daily.      pantoprazole (PROTONIX) 40 mg EC tablet Take 1 tablet (40 mg total) by mouth daily before breakfast Indications: gastroesophageal reflux disease. (Patient taking differently: Take 40 mg by mouth daily before breakfast.) 30 tablet 0    ROCKLATAN 0.02-0.005 % drops Administer 1 drop into both eyes nightly. Wait a few minutes in between eye drops       silodosin (RAPAFLO) 8 mg capsule Take 1 capsule (8 mg total) by mouth daily with breakfast. 90 capsule 1    timolol (TIMOPTIC) 0.5 % ophthalmic solution Administer 1 drop into both eyes daily.       No current facility-administered medications for this visit.       ROS:  Review of Systems   Constitutional:  Negative for appetite change and fatigue.   HENT:  Negative for ear pain and sore throat.    Respiratory:  Negative  "for chest tightness, shortness of breath and wheezing.    Cardiovascular:  Negative for chest pain, palpitations and leg swelling.   Gastrointestinal:  Negative for nausea and vomiting.   Skin:  Negative for rash.   Neurological:  Negative for weakness and numbness.       Vitals:    07/17/25 1346   BP: (!) 140/80   Pulse: (!) 54   Resp: 16   Temp: 37 °C (98.6 °F)   TempSrc: Oral   SpO2: 99%   Weight: 98.9 kg (218 lb)   Height: 1.803 m (5' 11\")       EXAM:  Physical Exam  Vitals reviewed.   Constitutional:       Appearance: He is well-developed.   HENT:      Head: Normocephalic and atraumatic.      Right Ear: Tympanic membrane and ear canal normal.      Left Ear: Tympanic membrane and ear canal normal.      Nose: Nose normal.   Eyes:      Conjunctiva/sclera: Conjunctivae normal.   Neck:      Comments: Patient with neck brace  Cardiovascular:      Rate and Rhythm: Normal rate and regular rhythm.      Heart sounds: Normal heart sounds. No murmur heard.  Pulmonary:      Effort: Pulmonary effort is normal.      Breath sounds: Normal breath sounds. No wheezing or rales.   Musculoskeletal:      Cervical back: Normal range of motion and neck supple.         Lab Results   Component Value Date    WBC 6.03 07/07/2025    HGB 10.4 (L) 07/07/2025    HCT 31.9 (L) 07/07/2025     07/07/2025    CHOL 116 02/24/2025    TRIG 121 02/24/2025    HDL 40 02/24/2025    ALT 9 07/07/2025    AST 10 (L) 07/07/2025     07/09/2025    K 3.4 (L) 07/09/2025     07/09/2025    CREATININE 1.1 07/09/2025    BUN 19 07/09/2025    CO2 25 07/09/2025    TSH 2.45 05/12/2025    INR 1.2 06/01/2025    HGBA1C 6.4 (H) 06/26/2025       Assessment & Plan  Closed C7 fracture without spinal cord injury, with routine healing, subsequent encounter  Fracture of C7 without spinal cord injury.  Patient continues with therapy       Fusion of spine of cervicothoracic region  Fusion of cervical thoracic region secondary to fracture.  Latest x-ray reviewed " with patient       Hypertension, benign  Hypertension adequate control continue current regimen       Type 2 diabetes mellitus without complication, without long-term current use of insulin (CMS/[facility])  Diabetes this has been stable.       Gastroesophageal reflux disease without esophagitis  Gastroesophageal reflux patient continue current medication regimen.           Kamar Palacios MD  7/17/2025

## 2025-07-18 ENCOUNTER — PATIENT OUTREACH (OUTPATIENT)
Dept: CASE MANAGEMENT | Facility: CLINIC | Age: 75
End: 2025-07-18
Payer: MEDICARE

## 2025-07-18 ENCOUNTER — TELEPHONE (OUTPATIENT)
Dept: FAMILY MEDICINE | Facility: CLINIC | Age: 75
End: 2025-07-18
Payer: MEDICARE

## 2025-07-18 NOTE — PROGRESS NOTES
ARACELI F/U Call: Reached patient but ACM RN unable to hear patient d/t poor call quality. Called alternate number and LVM for patient from Ambulatory Care Manager and requested a return call.       MICHELLE ChesterN, RN  Ambulatory Care Manager  [phone]

## 2025-07-18 NOTE — TELEPHONE ENCOUNTER
Lm- called pt due to message fr Home care RN- low HR/pt asymptomatic/ on metoprolol/  if become dizzy/ lightheaded/ SOB- go to ED/ reviewed w Homecare RN   [id number] Detailed Rifampin Pregnancy And Lactation Text: This medication is Pregnancy Category C and it isn't know if it is safe during pregnancy. It is also excreted in breast milk and should not be used if you are breast feeding.

## 2025-07-18 NOTE — TELEPHONE ENCOUNTER
Diana/ [facility]/[phone]    She wanted you to be aware that she is with the patient today and wanted you to be aware of his   Low heart rate (48 post activity/ after rest sitting/ sitting)    He is A symptomatic right.     His wife indicates that he is more tired during the day. Currently he is awake an alert.

## 2025-07-23 ENCOUNTER — PATIENT OUTREACH (OUTPATIENT)
Dept: CASE MANAGEMENT | Facility: CLINIC | Age: 75
End: 2025-07-23
Payer: MEDICARE

## 2025-07-23 NOTE — PROGRESS NOTES
ARACELI Follow-up Call: Unable to reach patient after two f/u outreach attempts; episode closed today. Ambulatory Care Manager left message for patient, encouraged patient to reach out to the physician office for immediate concerns, and provided ACM contact information for future needs.    Of note, patient did attend TCM appt w/ PCP last week as scheduled. Neurosurgery f/u appt scheduled on 9/2 and next PCP appt scheduled 9/19.      MYRON Chester, RN  Ambulatory Care Manager  [phone]

## 2025-08-04 ENCOUNTER — TELEPHONE (OUTPATIENT)
Dept: NEUROSURGERY | Facility: CLINIC | Age: 75
End: 2025-08-04

## 2025-08-07 RX ORDER — POTASSIUM CHLORIDE 1500 MG/1
20 TABLET, EXTENDED RELEASE ORAL DAILY
Qty: 30 TABLET | Refills: 1 | Status: SHIPPED | OUTPATIENT
Start: 2025-08-07

## 2025-08-07 RX ORDER — PANTOPRAZOLE SODIUM 40 MG/1
40 TABLET, DELAYED RELEASE ORAL DAILY
Qty: 30 TABLET | Refills: 1 | Status: SHIPPED | OUTPATIENT
Start: 2025-08-07

## 2025-08-07 RX ORDER — HYDRALAZINE HYDROCHLORIDE 10 MG/1
10 TABLET, FILM COATED ORAL 3 TIMES DAILY
Qty: 90 TABLET | Refills: 1 | Status: SHIPPED | OUTPATIENT
Start: 2025-08-07

## 2025-08-07 NOTE — TELEPHONE ENCOUNTER
Medicine Refill Request    Last Office Visit: 7/17/2025   Last Consult Visit: Visit date not found  Last Telemedicine Visit: Visit date not found    Next Appointment: 9/19/2025      Current Outpatient Medications:     ascorbic acid (VITAMIN C) 250 mg tablet, Take 500 mg by mouth daily., Disp: , Rfl:     atorvastatin (LIPITOR) 20 mg tablet, Take 1 tablet (20 mg total) by mouth daily., Disp: 90 tablet, Rfl: 3    cholecalciferol, vitamin D3, 1,000 unit (25 mcg) tablet, Take 1 tablet (25 mcg total) by mouth daily., Disp: , Rfl:     felodipine (PLENDIL) 10 mg 24 hr tablet, Take 1 tablet (10 mg total) by mouth daily., Disp: 90 tablet, Rfl: 3    finasteride (PROSCAR) 5 mg tablet, TAKE ONE TABLET BY MOUTH ONE TIME DAILY, Disp: 90 tablet, Rfl: 1    hydrALAZINE (APRESOLINE) 10 mg tablet, Take 10 mg by mouth 3 (three) times a day., Disp: , Rfl:     lisinopriL (PRINIVIL) 10 mg tablet, Take 1 tablet (10 mg total) by mouth 2 (two) times a day., Disp: 60 tablet, Rfl: 0    loperamide (IMODIUM) 2 mg capsule, Take 2 mg by mouth 4 (four) times a day as needed for diarrhea., Disp: , Rfl:     losartan (COZAAR) 100 mg tablet, Take 1 tablet (100 mg total) by mouth daily., Disp: 90 tablet, Rfl: 3    metFORMIN (GLUCOPHAGE) 500 mg tablet, Take 1 tablet (500 mg total) by mouth 2 (two) times a day with meals., Disp: 180 tablet, Rfl: 3    metoprolol tartrate 75 mg tablet, Take 75 mg by mouth 2 (two) times a day., Disp: 60 tablet, Rfl: 0    pantoprazole (PROTONIX) 40 mg EC tablet, Take 1 tablet (40 mg total) by mouth daily before breakfast Indications: gastroesophageal reflux disease. (Patient taking differently: Take 40 mg by mouth daily before breakfast.), Disp: 30 tablet, Rfl: 0    ROCKLATAN 0.02-0.005 % drops, Administer 1 drop into both eyes nightly. Wait a few minutes in between eye drops , Disp: , Rfl:     silodosin (RAPAFLO) 8 mg capsule, Take 1 capsule (8 mg total) by mouth daily with breakfast., Disp: 90 capsule, Rfl: 1    timolol  (TIMOPTIC) 0.5 % ophthalmic solution, Administer 1 drop into both eyes daily., Disp: , Rfl:     BP Readings from Last 3 Encounters:   07/17/25 (!) 140/80   07/03/25 (!) 150/104   06/20/25 110/78       Recent Lab results:  Lab Results   Component Value Date    CHOL 116 02/24/2025   ,   Lab Results   Component Value Date    HDL 40 02/24/2025   ,   Lab Results   Component Value Date    LDLCALC 52 02/24/2025   ,   Lab Results   Component Value Date    TRIG 121 02/24/2025        Lab Results   Component Value Date    GLUCOSE 94 07/09/2025   ,   Lab Results   Component Value Date    HGBA1C 6.4 (H) 06/26/2025         Lab Results   Component Value Date    CREATININE 1.1 07/09/2025       Lab Results   Component Value Date    TSH 2.45 05/12/2025           Lab Results   Component Value Date    HGBA1C 6.4 (H) 06/26/2025

## 2025-08-08 NOTE — TELEPHONE ENCOUNTER
REG to call the office back.     A quadracep biopsy can be performed by any general neurosurgeon. However, for Dr. Marin practice, he would need a completion work up from a neurologist and a direct referral from his neurologist.

## 2025-08-12 ENCOUNTER — TELEPHONE (OUTPATIENT)
Dept: NEUROSURGERY | Facility: CLINIC | Age: 75
End: 2025-08-12
Payer: MEDICARE

## 2025-08-18 ENCOUNTER — TRANSCRIBE ORDERS (OUTPATIENT)
Dept: NEUROSURGERY | Facility: CLINIC | Age: 75
End: 2025-08-18
Payer: MEDICARE

## 2025-08-18 DIAGNOSIS — S12.601A CLOSED NONDISPLACED FRACTURE OF SEVENTH CERVICAL VERTEBRA, UNSPECIFIED FRACTURE MORPHOLOGY, INITIAL ENCOUNTER (CMS/HCC): Primary | ICD-10-CM

## 2025-08-20 ENCOUNTER — HOSPITAL ENCOUNTER (OUTPATIENT)
Dept: RADIOLOGY | Facility: HOSPITAL | Age: 75
Discharge: HOME | End: 2025-08-20
Attending: PHYSICIAN ASSISTANT
Payer: MEDICARE

## 2025-08-20 DIAGNOSIS — R26.81 GAIT INSTABILITY: ICD-10-CM

## 2025-08-20 DIAGNOSIS — S12.601A CLOSED NONDISPLACED FRACTURE OF SEVENTH CERVICAL VERTEBRA, UNSPECIFIED FRACTURE MORPHOLOGY, INITIAL ENCOUNTER (CMS/HCC): ICD-10-CM

## 2025-08-20 PROCEDURE — 72040 X-RAY EXAM NECK SPINE 2-3 VW: CPT

## 2025-08-25 ENCOUNTER — OFFICE VISIT (OUTPATIENT)
Dept: NEUROSURGERY | Facility: CLINIC | Age: 75
End: 2025-08-25
Payer: MEDICARE

## 2025-08-25 VITALS
OXYGEN SATURATION: 97 % | WEIGHT: 215 LBS | RESPIRATION RATE: 14 BRPM | HEART RATE: 68 BPM | HEIGHT: 72 IN | SYSTOLIC BLOOD PRESSURE: 160 MMHG | DIASTOLIC BLOOD PRESSURE: 82 MMHG | BODY MASS INDEX: 29.12 KG/M2

## 2025-08-25 DIAGNOSIS — Z51.89 THERAPEUTIC PROCEDURE: ICD-10-CM

## 2025-08-25 DIAGNOSIS — G72.9 MYOPATHY: Primary | ICD-10-CM

## 2025-08-27 ENCOUNTER — OFFICE VISIT (OUTPATIENT)
Dept: NEUROSURGERY | Facility: CLINIC | Age: 75
End: 2025-08-27
Payer: MEDICARE

## 2025-08-27 VITALS
HEIGHT: 72 IN | WEIGHT: 215 LBS | SYSTOLIC BLOOD PRESSURE: 162 MMHG | HEART RATE: 48 BPM | DIASTOLIC BLOOD PRESSURE: 96 MMHG | OXYGEN SATURATION: 98 % | BODY MASS INDEX: 29.12 KG/M2

## 2025-08-27 DIAGNOSIS — S12.600D CLOSED C7 FRACTURE WITHOUT SPINAL CORD INJURY, WITH ROUTINE HEALING, SUBSEQUENT ENCOUNTER: Primary | ICD-10-CM

## 2025-08-27 PROBLEM — G72.9 MYOPATHY: Status: ACTIVE | Noted: 2025-08-25

## 2025-08-27 PROCEDURE — 99024 POSTOP FOLLOW-UP VISIT: CPT | Performed by: NEUROLOGICAL SURGERY

## 2025-08-27 ASSESSMENT — PAIN SCALES - GENERAL: PAINLEVEL_OUTOF10: 0-NO PAIN

## 2025-08-28 ENCOUNTER — TELEPHONE (OUTPATIENT)
Dept: NEUROSURGERY | Facility: CLINIC | Age: 75
End: 2025-08-28
Payer: MEDICARE

## 2025-09-02 ENCOUNTER — PRE-ADMISSION TESTING (OUTPATIENT)
Dept: PREADMISSION TESTING | Age: 75
End: 2025-09-02
Payer: MEDICARE

## 2025-09-02 ENCOUNTER — TELEPHONE (OUTPATIENT)
Dept: NEUROSURGERY | Facility: CLINIC | Age: 75
End: 2025-09-02

## 2025-09-02 VITALS — WEIGHT: 215 LBS | HEIGHT: 72 IN | BODY MASS INDEX: 29.12 KG/M2

## 2025-09-02 ASSESSMENT — PAIN SCALES - GENERAL: PAINLEVEL_OUTOF10: 0-NO PAIN

## 2025-09-03 ENCOUNTER — APPOINTMENT (OUTPATIENT)
Dept: LAB | Facility: HOSPITAL | Age: 75
End: 2025-09-03
Attending: INTERNAL MEDICINE
Payer: MEDICARE

## 2025-09-03 ENCOUNTER — APPOINTMENT (OUTPATIENT)
Dept: LAB | Facility: HOSPITAL | Age: 75
End: 2025-09-03
Attending: PHYSICIAN ASSISTANT
Payer: MEDICARE

## 2025-09-03 ENCOUNTER — HOSPITAL ENCOUNTER (OUTPATIENT)
Dept: CARDIOLOGY | Facility: HOSPITAL | Age: 75
Discharge: HOME | End: 2025-09-03
Attending: PHYSICIAN ASSISTANT
Payer: MEDICARE

## 2025-09-03 DIAGNOSIS — G72.9 MYOPATHY: ICD-10-CM

## 2025-09-03 DIAGNOSIS — N18.2 CHRONIC KIDNEY DISEASE, STAGE II (MILD): ICD-10-CM

## 2025-09-03 DIAGNOSIS — R80.9 PROTEINURIA: ICD-10-CM

## 2025-09-03 DIAGNOSIS — Z51.89 THERAPEUTIC PROCEDURE: ICD-10-CM

## 2025-09-03 LAB
ANION GAP SERPL CALC-SCNC: 8 MEQ/L (ref 3–15)
APTT PPP: 29 SEC (ref 23–35)
ATRIAL RATE: 62
BACTERIA URNS QL MICRO: ABNORMAL /HPF
BASOPHILS # BLD: 0.08 K/UL (ref 0.01–0.1)
BASOPHILS NFR BLD: 1.1 %
BILIRUB UR QL STRIP.AUTO: NEGATIVE MG/DL
BUN SERPL-MCNC: 27 MG/DL (ref 7–25)
CALCIUM SERPL-MCNC: 9.2 MG/DL (ref 8.6–10.3)
CHLORIDE SERPL-SCNC: 103 MEQ/L (ref 98–107)
CLARITY UR REFRACT.AUTO: CLEAR
CO2 SERPL-SCNC: 26 MEQ/L (ref 21–31)
COLOR UR AUTO: YELLOW
CREAT SERPL-MCNC: 1.3 MG/DL (ref 0.7–1.3)
CREAT UR-MCNC: 46.7 MG/DL
DIFFERENTIAL METHOD BLD: ABNORMAL
EGFRCR SERPLBLD CKD-EPI 2021: 56.6 ML/MIN/1.73M*2
EOSINOPHIL # BLD: 0.2 K/UL (ref 0.04–0.54)
EOSINOPHIL NFR BLD: 2.7 %
ERYTHROCYTE [DISTWIDTH] IN BLOOD BY AUTOMATED COUNT: 14.2 % (ref 11.6–14.4)
GLUCOSE SERPL-MCNC: 178 MG/DL (ref 70–99)
GLUCOSE UR STRIP.AUTO-MCNC: NEGATIVE MG/DL
HCT VFR BLD AUTO: 33.6 % (ref 40.1–51)
HGB BLD-MCNC: 10.9 G/DL (ref 13.7–17.5)
HGB UR QL STRIP.AUTO: NEGATIVE
HYALINE CASTS #/AREA URNS LPF: ABNORMAL /LPF
IMM GRANULOCYTES # BLD AUTO: 0.03 K/UL (ref 0–0.08)
IMM GRANULOCYTES NFR BLD AUTO: 0.4 %
INR PPP: 1.1
KETONES UR STRIP.AUTO-MCNC: NEGATIVE MG/DL
LEUKOCYTE ESTERASE UR QL STRIP.AUTO: NEGATIVE
LYMPHOCYTES # BLD: 1.55 K/UL (ref 1.2–3.5)
LYMPHOCYTES NFR BLD: 20.6 %
MCH RBC QN AUTO: 31.1 PG (ref 28–33.2)
MCHC RBC AUTO-ENTMCNC: 32.4 G/DL (ref 32.2–36.5)
MCV RBC AUTO: 96 FL (ref 83–98)
MICROALBUMIN UR-MCNC: 1862.1 MG/L
MICROALBUMIN/CREAT UR: 3987.4 UG/MG
MONOCYTES # BLD: 0.8 K/UL (ref 0.3–1)
MONOCYTES NFR BLD: 10.6 %
NEUTROPHILS # BLD: 4.88 K/UL (ref 1.7–7)
NEUTS SEG NFR BLD: 64.6 %
NITRITE UR QL STRIP.AUTO: NEGATIVE
NRBC BLD-RTO: 0 %
P AXIS: 96
PH UR STRIP.AUTO: 6.5 [PH]
PLATELET # BLD AUTO: 259 K/UL (ref 150–350)
PMV BLD AUTO: 10.5 FL (ref 9.4–12.4)
POTASSIUM SERPL-SCNC: 4.2 MEQ/L (ref 3.5–5.1)
PR INTERVAL: 200
PROT UR QL STRIP.AUTO: 2
PROTHROMBIN TIME: 13.8 SEC (ref 12.2–14.5)
QRS DURATION: 100
QT INTERVAL: 424
QTC CALCULATION(BAZETT): 430
R AXIS: 16
RBC # BLD AUTO: 3.5 M/UL (ref 4.5–5.8)
RBC #/AREA URNS HPF: ABNORMAL /HPF
SODIUM SERPL-SCNC: 137 MEQ/L (ref 136–145)
SP GR UR REFRACT.AUTO: 1.01
SQUAMOUS URNS QL MICRO: ABNORMAL /HPF
T WAVE AXIS: 3
UROBILINOGEN UR STRIP-ACNC: 0.2 EU/DL
VENTRICULAR RATE: 62
WBC # BLD AUTO: 7.54 K/UL (ref 3.8–10.5)
WBC #/AREA URNS HPF: ABNORMAL /HPF

## 2025-09-03 PROCEDURE — 93005 ELECTROCARDIOGRAM TRACING: CPT

## 2025-09-03 PROCEDURE — 80048 BASIC METABOLIC PNL TOTAL CA: CPT

## 2025-09-03 PROCEDURE — 85610 PROTHROMBIN TIME: CPT

## 2025-09-03 PROCEDURE — 36415 COLL VENOUS BLD VENIPUNCTURE: CPT

## 2025-09-03 PROCEDURE — 81003 URINALYSIS AUTO W/O SCOPE: CPT

## 2025-09-03 PROCEDURE — 85025 COMPLETE CBC W/AUTO DIFF WBC: CPT

## 2025-09-03 PROCEDURE — 82570 ASSAY OF URINE CREATININE: CPT

## 2025-09-03 PROCEDURE — 85730 THROMBOPLASTIN TIME PARTIAL: CPT

## 2025-09-04 ENCOUNTER — HOSPITAL ENCOUNTER (OUTPATIENT)
Dept: RADIOLOGY | Facility: HOSPITAL | Age: 75
Discharge: HOME | End: 2025-09-04
Attending: NEUROLOGICAL SURGERY
Payer: MEDICARE

## 2025-09-04 DIAGNOSIS — S12.600D CLOSED C7 FRACTURE WITHOUT SPINAL CORD INJURY, WITH ROUTINE HEALING, SUBSEQUENT ENCOUNTER: ICD-10-CM

## 2025-09-04 PROCEDURE — 72050 X-RAY EXAM NECK SPINE 4/5VWS: CPT

## 2025-09-04 PROCEDURE — 72125 CT NECK SPINE W/O DYE: CPT

## (undated) DEVICE — HEEL  ELBOW PROTECTOR

## (undated) DEVICE — SPONGE SURGIFOAM ABSORBABLE GELATIN 100 8.5CM X 12CM X 10MM

## (undated) DEVICE — DRESSING TEGADERM 4X4 3/4

## (undated) DEVICE — SLEEVE SCD COMFORT KNEE LENGTH MED

## (undated) DEVICE — MANIFOLD SINGLE PORT NEPTUNE

## (undated) DEVICE — BURR LONG MIDAS AM-8

## (undated) DEVICE — PAD GROUND ELECTROSURGICAL W/CORD

## (undated) DEVICE — TUBING SMOKE EVAC PENCIL COATED

## (undated) DEVICE — BLANKET LOWER BODY

## (undated) DEVICE — SPONGE GAUZE W4XL4IN CELLULOSE POST OPERATIVE AVANT

## (undated) DEVICE — DRAPE C-ARM X-RAY EQUIPMENT IMAGE

## (undated) DEVICE — SOLN IRRIG .9%SOD 1000ML

## (undated) DEVICE — SUTURE POLYSORB 2-0 UNDYED 5X18 GS-10 D-TACH

## (undated) DEVICE — TIP BOVIE BLADE COATED INSULATED 2.75IN

## (undated) DEVICE — TOOL MIDAS 15CM 2.2MM MATCH

## (undated) DEVICE — STERISTRIP 1/2INX4IN

## (undated) DEVICE — COVER BACK TABLE REINFORCED

## (undated) DEVICE — DRAPE STERI TOWEL PLASTIC 17X23

## (undated) DEVICE — MANIFOLD FOUR PORT NEPTUNE

## (undated) DEVICE — SUTURE TICRON 1 BLUE 5X18 GS-21 1.2 CIRCLE

## (undated) DEVICE — SPINE TABLE COVER ULTRA COMFORT MEDIUM

## (undated) DEVICE — CORD BI-POLAR DISP STERILE

## (undated) DEVICE — SOLN IRRIG STER WATER 1000ML

## (undated) DEVICE — COTTONOID 1/2 X 1

## (undated) DEVICE — NEEDLE HYPO 23G 1.5 IN

## (undated) DEVICE — SUTURE MONOSOF 2-0 BLACK 1X18 C-15

## (undated) DEVICE — PACK RFID LAMINECTOMY PMH